# Patient Record
Sex: MALE | Race: OTHER | Employment: OTHER | URBAN - METROPOLITAN AREA
[De-identification: names, ages, dates, MRNs, and addresses within clinical notes are randomized per-mention and may not be internally consistent; named-entity substitution may affect disease eponyms.]

---

## 2017-08-21 ENCOUNTER — APPOINTMENT (OUTPATIENT)
Dept: CV DIAGNOSTICS | Facility: HOSPITAL | Age: 67
DRG: 247 | End: 2017-08-21
Attending: PHYSICIAN ASSISTANT
Payer: MEDICARE

## 2017-08-21 ENCOUNTER — APPOINTMENT (OUTPATIENT)
Dept: CT IMAGING | Facility: HOSPITAL | Age: 67
DRG: 247 | End: 2017-08-21
Attending: PHYSICIAN ASSISTANT
Payer: MEDICARE

## 2017-08-21 ENCOUNTER — HOSPITAL ENCOUNTER (INPATIENT)
Facility: HOSPITAL | Age: 67
LOS: 4 days | Discharge: HOME | DRG: 247 | End: 2017-08-25
Attending: EMERGENCY MEDICINE | Admitting: HOSPITALIST
Payer: MEDICARE

## 2017-08-21 ENCOUNTER — APPOINTMENT (OUTPATIENT)
Dept: GENERAL RADIOLOGY | Facility: HOSPITAL | Age: 67
DRG: 247 | End: 2017-08-21
Payer: MEDICARE

## 2017-08-21 DIAGNOSIS — R07.9 CHEST PAIN, CARDIAC: Primary | ICD-10-CM

## 2017-08-21 PROBLEM — R73.9 HYPERGLYCEMIA: Status: ACTIVE | Noted: 2017-08-21

## 2017-08-21 LAB
ALBUMIN SERPL-MCNC: 3.8 G/DL (ref 3.5–4.8)
ALP LIVER SERPL-CCNC: 70 U/L (ref 45–117)
ALT SERPL-CCNC: 37 U/L (ref 17–63)
APTT PPP: 22.5 SECONDS (ref 25–34)
APTT PPP: 28.1 SECONDS (ref 25–34)
AST SERPL-CCNC: 30 U/L (ref 15–41)
ATRIAL RATE: 87 BPM
BASOPHILS # BLD AUTO: 0.09 X10(3) UL (ref 0–0.1)
BASOPHILS NFR BLD AUTO: 0.9 %
BILIRUB SERPL-MCNC: 0.4 MG/DL (ref 0.1–2)
BUN BLD-MCNC: 19 MG/DL (ref 8–20)
CALCIUM BLD-MCNC: 9.7 MG/DL (ref 8.3–10.3)
CHLORIDE: 106 MMOL/L (ref 101–111)
CO2: 25 MMOL/L (ref 22–32)
CREAT BLD-MCNC: 1.04 MG/DL (ref 0.7–1.3)
EOSINOPHIL # BLD AUTO: 0.65 X10(3) UL (ref 0–0.3)
EOSINOPHIL NFR BLD AUTO: 6.7 %
ERYTHROCYTE [DISTWIDTH] IN BLOOD BY AUTOMATED COUNT: 13.3 % (ref 11.5–16)
GLUCOSE BLD-MCNC: 167 MG/DL (ref 70–99)
HCT VFR BLD AUTO: 43.1 % (ref 37–53)
HGB BLD-MCNC: 13.9 G/DL (ref 13–17)
IMMATURE GRANULOCYTE COUNT: 0.04 X10(3) UL (ref 0–1)
IMMATURE GRANULOCYTE RATIO %: 0.4 %
INR BLD: 0.95 (ref 0.89–1.11)
INR BLD: 0.99 (ref 0.89–1.11)
LYMPHOCYTES # BLD AUTO: 2 X10(3) UL (ref 0.9–4)
LYMPHOCYTES NFR BLD AUTO: 20.5 %
M PROTEIN MFR SERPL ELPH: 7.6 G/DL (ref 6.1–8.3)
MCH RBC QN AUTO: 28.7 PG (ref 27–33.2)
MCHC RBC AUTO-ENTMCNC: 32.3 G/DL (ref 31–37)
MCV RBC AUTO: 89 FL (ref 80–99)
MONOCYTES # BLD AUTO: 0.6 X10(3) UL (ref 0.1–0.6)
MONOCYTES NFR BLD AUTO: 6.2 %
NEUTROPHIL ABS PRELIM: 6.37 X10 (3) UL (ref 1.3–6.7)
NEUTROPHILS # BLD AUTO: 6.37 X10(3) UL (ref 1.3–6.7)
NEUTROPHILS NFR BLD AUTO: 65.3 %
P AXIS: 44 DEGREES
P-R INTERVAL: 166 MS
PLATELET # BLD AUTO: 324 10(3)UL (ref 150–450)
POTASSIUM SERPL-SCNC: 4.5 MMOL/L (ref 3.6–5.1)
PSA SERPL DL<=0.01 NG/ML-MCNC: 12.7 SECONDS (ref 12–14.3)
PSA SERPL DL<=0.01 NG/ML-MCNC: 13.1 SECONDS (ref 12–14.3)
Q-T INTERVAL: 432 MS
QRS DURATION: 162 MS
QTC CALCULATION (BEZET): 519 MS
R AXIS: -66 DEGREES
RBC # BLD AUTO: 4.84 X10(6)UL (ref 3.8–5.8)
RED CELL DISTRIBUTION WIDTH-SD: 43.4 FL (ref 35.1–46.3)
SODIUM SERPL-SCNC: 139 MMOL/L (ref 136–144)
T AXIS: 55 DEGREES
TROPONIN: <0.046 NG/ML (ref ?–0.05)
VENTRICULAR RATE: 87 BPM
WBC # BLD AUTO: 9.8 X10(3) UL (ref 4–13)

## 2017-08-21 PROCEDURE — 71010 XR CHEST AP PORTABLE  (CPT=71010): CPT

## 2017-08-21 PROCEDURE — 71010 XR CHEST AP PORTABLE  (CPT=71010): CPT | Performed by: EMERGENCY MEDICINE

## 2017-08-21 PROCEDURE — 93018 CV STRESS TEST I&R ONLY: CPT | Performed by: PHYSICIAN ASSISTANT

## 2017-08-21 PROCEDURE — 78452 HT MUSCLE IMAGE SPECT MULT: CPT | Performed by: PHYSICIAN ASSISTANT

## 2017-08-21 PROCEDURE — 99223 1ST HOSP IP/OBS HIGH 75: CPT | Performed by: HOSPITALIST

## 2017-08-21 PROCEDURE — 71275 CT ANGIOGRAPHY CHEST: CPT | Performed by: PHYSICIAN ASSISTANT

## 2017-08-21 PROCEDURE — 93017 CV STRESS TEST TRACING ONLY: CPT | Performed by: PHYSICIAN ASSISTANT

## 2017-08-21 RX ORDER — HYDROMORPHONE HYDROCHLORIDE 1 MG/ML
0.5 INJECTION, SOLUTION INTRAMUSCULAR; INTRAVENOUS; SUBCUTANEOUS EVERY 30 MIN PRN
Status: DISPENSED | OUTPATIENT
Start: 2017-08-21 | End: 2017-08-21

## 2017-08-21 RX ORDER — LOVASTATIN 20 MG/1
20 TABLET ORAL NIGHTLY
COMMUNITY
End: 2018-06-09 | Stop reason: ALTCHOICE

## 2017-08-21 RX ORDER — ONDANSETRON 2 MG/ML
4 INJECTION INTRAMUSCULAR; INTRAVENOUS EVERY 4 HOURS PRN
Status: DISCONTINUED | OUTPATIENT
Start: 2017-08-21 | End: 2017-08-21

## 2017-08-21 RX ORDER — SODIUM CHLORIDE 9 MG/ML
INJECTION, SOLUTION INTRAVENOUS CONTINUOUS
Status: DISCONTINUED | OUTPATIENT
Start: 2017-08-21 | End: 2017-08-23

## 2017-08-21 RX ORDER — ALFUZOSIN HYDROCHLORIDE 10 MG/1
10 TABLET, EXTENDED RELEASE ORAL
Status: DISCONTINUED | OUTPATIENT
Start: 2017-08-22 | End: 2017-08-25

## 2017-08-21 RX ORDER — BENAZEPRIL HYDROCHLORIDE 20 MG/1
20 TABLET ORAL DAILY
Status: ON HOLD | COMMUNITY
End: 2017-08-22

## 2017-08-21 RX ORDER — BRINZOLAMIDE 10 MG/ML
1 SUSPENSION/ DROPS OPHTHALMIC 3 TIMES DAILY
Status: ON HOLD | COMMUNITY
End: 2018-07-12

## 2017-08-21 RX ORDER — ENOXAPARIN SODIUM 100 MG/ML
0.5 INJECTION SUBCUTANEOUS NIGHTLY
Status: DISCONTINUED | OUTPATIENT
Start: 2017-08-21 | End: 2017-08-25

## 2017-08-21 RX ORDER — CLOPIDOGREL BISULFATE 75 MG/1
75 TABLET ORAL DAILY
Status: DISCONTINUED | OUTPATIENT
Start: 2017-08-22 | End: 2017-08-24

## 2017-08-21 RX ORDER — ASPIRIN 81 MG/1
324 TABLET, CHEWABLE ORAL ONCE
Status: COMPLETED | OUTPATIENT
Start: 2017-08-21 | End: 2017-08-21

## 2017-08-21 RX ORDER — MORPHINE SULFATE 4 MG/ML
4 INJECTION, SOLUTION INTRAMUSCULAR; INTRAVENOUS ONCE
Status: COMPLETED | OUTPATIENT
Start: 2017-08-21 | End: 2017-08-21

## 2017-08-21 RX ORDER — PRAVASTATIN SODIUM 20 MG
20 TABLET ORAL NIGHTLY
Status: DISCONTINUED | OUTPATIENT
Start: 2017-08-21 | End: 2017-08-25

## 2017-08-21 RX ORDER — LATANOPROST 50 UG/ML
1 SOLUTION/ DROPS OPHTHALMIC NIGHTLY
Status: ON HOLD | COMMUNITY
End: 2017-08-25

## 2017-08-21 RX ORDER — DORZOLAMIDE HCL 20 MG/ML
1 SOLUTION/ DROPS OPHTHALMIC 3 TIMES DAILY
Status: DISCONTINUED | OUTPATIENT
Start: 2017-08-21 | End: 2017-08-25

## 2017-08-21 RX ORDER — ASPIRIN 325 MG
325 TABLET, DELAYED RELEASE (ENTERIC COATED) ORAL DAILY
Status: DISCONTINUED | OUTPATIENT
Start: 2017-08-22 | End: 2017-08-21 | Stop reason: SDUPTHER

## 2017-08-21 RX ORDER — ASPIRIN 81 MG/1
324 TABLET, CHEWABLE ORAL ONCE
Status: COMPLETED | OUTPATIENT
Start: 2017-08-22 | End: 2017-08-22

## 2017-08-21 RX ORDER — LEVOTHYROXINE SODIUM 0.1 MG/1
100 TABLET ORAL
COMMUNITY
End: 2018-02-03

## 2017-08-21 RX ORDER — ENOXAPARIN SODIUM 100 MG/ML
40 INJECTION SUBCUTANEOUS DAILY
Status: DISCONTINUED | OUTPATIENT
Start: 2017-08-21 | End: 2017-08-21 | Stop reason: DRUGHIGH

## 2017-08-21 RX ORDER — BRIMONIDINE TARTRATE 2 MG/ML
1 SOLUTION/ DROPS OPHTHALMIC 3 TIMES DAILY
Status: ON HOLD | COMMUNITY
End: 2018-07-12

## 2017-08-21 RX ORDER — DORZOLAMIDE HCL 20 MG/ML
1 SOLUTION/ DROPS OPHTHALMIC 3 TIMES DAILY
Status: ON HOLD | COMMUNITY
End: 2017-08-25

## 2017-08-21 RX ORDER — TAMSULOSIN HYDROCHLORIDE 0.4 MG/1
0.8 CAPSULE ORAL DAILY
Status: ON HOLD | COMMUNITY
End: 2018-07-17

## 2017-08-21 RX ORDER — ASPIRIN 325 MG
325 TABLET, DELAYED RELEASE (ENTERIC COATED) ORAL DAILY
Status: DISCONTINUED | OUTPATIENT
Start: 2017-08-23 | End: 2017-08-25

## 2017-08-21 RX ORDER — ACETAMINOPHEN 325 MG/1
650 TABLET ORAL EVERY 6 HOURS PRN
Status: DISCONTINUED | OUTPATIENT
Start: 2017-08-21 | End: 2017-08-25

## 2017-08-21 RX ORDER — BRIMONIDINE TARTRATE 2 MG/ML
1 SOLUTION/ DROPS OPHTHALMIC 3 TIMES DAILY
Status: DISCONTINUED | OUTPATIENT
Start: 2017-08-21 | End: 2017-08-25

## 2017-08-21 RX ORDER — CLOPIDOGREL BISULFATE 75 MG/1
75 TABLET ORAL DAILY
Status: ON HOLD | COMMUNITY
End: 2017-08-25

## 2017-08-21 RX ORDER — ASPIRIN 81 MG/1
TABLET, CHEWABLE ORAL
Status: DISPENSED
Start: 2017-08-21 | End: 2017-08-22

## 2017-08-21 RX ORDER — ONDANSETRON 2 MG/ML
4 INJECTION INTRAMUSCULAR; INTRAVENOUS EVERY 6 HOURS PRN
Status: DISCONTINUED | OUTPATIENT
Start: 2017-08-21 | End: 2017-08-25

## 2017-08-21 NOTE — H&P
[facility]  History and Physical     Minta Mohs Patient Status:  Emergency    3/23/1950 MRN [id number]   Location [address] [facility] Attending Chapincito Peterson MD   Hosp Day # 0 PCP PHYSICIAN NONSTAFF     Chief Complaint: C (36.7 °C) (Temporal)   Resp 22   Ht 5' 9\" (1.753 m)   Wt 260 lb (117.9 kg)   SpO2 95%   BMI 38.40 kg/m²   General: No acute distress. Alert and oriented x 3. HEENT: Normocephalic atraumatic. Moist mucous membranes. EOM-I. PERRLA. Anicteric.   Neck: No lym

## 2017-08-21 NOTE — ED PROVIDER NOTES
Patient Seen in: [facility] Emergency Department    History   Patient presents with:  Chest Pain Angina (cardiovascular)    Stated Complaint: cp    HPI    CHIEF COMPLAINT: Chest pain, fatigue    HISTORY OF PRESENT ILLNESS: Patient is a 29-year-old c indicated as above.     Past Medical History:   Diagnosis Date   • Essential hypertension    • Glaucoma    • Hyperlipidemia    • Thyroid disease        Past Surgical History:  No date: OTHER      Comment: cardiac stents    Medications :   Lovastatin 20 MG O distention. Neurological:  Grossly intact, no deficits. cranial nerves are intact, 5 out of 5 symmetric upper and lower extremity motor strength. Gait normal.  Skin:  warm and dry, no rashes. No jaundice.  Brisk capillary refill  Musculoskeletal: neck is (cpt=71010)    CONCLUSION:  No acute disease. Dictated by: Charlene Nelson MD on 8/21/2017 at 12:47     Approved by: Charlene Nelson MD          Ct Angiography, Chest (cpt=71275)  CONCLUSION:  Negative for acute pulmonary embolism.     Dictated by: Luciana Fonseca,

## 2017-08-21 NOTE — ED PROVIDER NOTES
I reviewed that chart and discussed the case. I have examined the patient and noted 29-year-old male with a significant cardiovascular history presents with chest pain, fatigue with exertion, dyspnea, and diaphoresis.   The patient has been having these sy

## 2017-08-21 NOTE — CONSULTS
[facility] at [facility]  Report of Consultation    Graciela Marino Patient Status:  Emergency    3/23/1950 MRN [id number]   Location [address] Attending Lula Askew MD   [address] Day # 0 Soft, non-tender. Extremities: Without clubbing, cyanosis or edema. Peripheral pulses are 2+. Neurologic: Alert and oriented, normal affect. Skin: Warm and dry.      Laboratories and Data:  Diagnostics:  EKG: sinus, left anterior hemiblock, right bundl

## 2017-08-21 NOTE — ED INITIAL ASSESSMENT (HPI)
Pt has had intermittent left sided cp, left arm pain, fatigue for apx 2 days. Pt denies chest pain.    Pt also c/o right buttock and right upper leg pain for several months

## 2017-08-21 NOTE — ED NOTES
Pt seen by Dr. Indai Kirby. C/O pain to right leg that has been \"acting up for since yesterday and today\". Radiates into lower back and thigh. Same presentation as prior sciatica flare up. MD aware of request for meds.

## 2017-08-22 ENCOUNTER — APPOINTMENT (OUTPATIENT)
Dept: INTERVENTIONAL RADIOLOGY/VASCULAR | Facility: HOSPITAL | Age: 67
DRG: 247 | End: 2017-08-22
Attending: INTERNAL MEDICINE
Payer: MEDICARE

## 2017-08-22 LAB
EST. AVERAGE GLUCOSE BLD GHB EST-MCNC: 140 MG/DL (ref 68–126)
HBA1C MFR BLD HPLC: 6.5 % (ref ?–5.7)
P2Y12 REACTION UNITS: 41 PRU

## 2017-08-22 PROCEDURE — B2151ZZ FLUOROSCOPY OF LEFT HEART USING LOW OSMOLAR CONTRAST: ICD-10-PCS | Performed by: INTERNAL MEDICINE

## 2017-08-22 PROCEDURE — B2111ZZ FLUOROSCOPY OF MULTIPLE CORONARY ARTERIES USING LOW OSMOLAR CONTRAST: ICD-10-PCS | Performed by: INTERNAL MEDICINE

## 2017-08-22 PROCEDURE — 99232 SBSQ HOSP IP/OBS MODERATE 35: CPT | Performed by: HOSPITALIST

## 2017-08-22 PROCEDURE — B41F1ZZ FLUOROSCOPY OF RIGHT LOWER EXTREMITY ARTERIES USING LOW OSMOLAR CONTRAST: ICD-10-PCS | Performed by: INTERNAL MEDICINE

## 2017-08-22 PROCEDURE — 4A023N7 MEASUREMENT OF CARDIAC SAMPLING AND PRESSURE, LEFT HEART, PERCUTANEOUS APPROACH: ICD-10-PCS | Performed by: INTERNAL MEDICINE

## 2017-08-22 RX ORDER — MIDAZOLAM HYDROCHLORIDE 1 MG/ML
INJECTION INTRAMUSCULAR; INTRAVENOUS
Status: DISCONTINUED
Start: 2017-08-22 | End: 2017-08-22 | Stop reason: WASHOUT

## 2017-08-22 RX ORDER — TEMAZEPAM 15 MG/1
15 CAPSULE ORAL NIGHTLY PRN
Status: DISCONTINUED | OUTPATIENT
Start: 2017-08-22 | End: 2017-08-25

## 2017-08-22 RX ORDER — HEPARIN SODIUM 5000 [USP'U]/ML
INJECTION, SOLUTION INTRAVENOUS; SUBCUTANEOUS
Status: COMPLETED
Start: 2017-08-22 | End: 2017-08-22

## 2017-08-22 RX ORDER — MIDAZOLAM HYDROCHLORIDE 1 MG/ML
INJECTION INTRAMUSCULAR; INTRAVENOUS
Status: COMPLETED
Start: 2017-08-22 | End: 2017-08-22

## 2017-08-22 RX ORDER — SODIUM CHLORIDE 9 MG/ML
INJECTION, SOLUTION INTRAVENOUS CONTINUOUS
Status: ACTIVE | OUTPATIENT
Start: 2017-08-22 | End: 2017-08-22

## 2017-08-22 RX ORDER — LIDOCAINE HYDROCHLORIDE 10 MG/ML
INJECTION, SOLUTION INFILTRATION; PERINEURAL
Status: COMPLETED
Start: 2017-08-22 | End: 2017-08-22

## 2017-08-22 RX ORDER — BENAZEPRIL HYDROCHLORIDE 40 MG/1
40 TABLET, FILM COATED ORAL DAILY
Status: ON HOLD | COMMUNITY
End: 2017-08-25

## 2017-08-22 RX ORDER — HYDROCODONE BITARTRATE AND ACETAMINOPHEN 5; 325 MG/1; MG/1
1 TABLET ORAL EVERY 6 HOURS PRN
Status: DISCONTINUED | OUTPATIENT
Start: 2017-08-22 | End: 2017-08-23

## 2017-08-22 NOTE — PROGRESS NOTES
[facility]  Progress Note     Vincent Ranch Patient Status:  Inpatient    3/23/1950 MRN [id number]   [facility] 2NE-A Attending Janet Carreon MD   Hosp Day # 1 PCP PHYSICIAN NONSTAFF     Chief Complaint: Chest pain    S: Patient d 325 mg Oral Daily       ASSESSMENT / PLAN:     1. CAD sp PCI with abnormal stress  2. Essential hypertension  3. Dyslipidemia  4. Hypothyroidism  5.  Radiculopathy, chronic, RLE  6. Glaucoma    Plan:  Aspirin  Plavix  Metoprolol  Pravastatin  Angiogram toda

## 2017-08-22 NOTE — PROGRESS NOTES
Recommended CABG due to anatomy of sequential LAD lesions. Patient does not want open heart surgery and would opt to undergo PCI. Patient and family understands increased risk of restenosis with PCI in this setting.     Will plan on laser atherectomy and

## 2017-08-22 NOTE — PROGRESS NOTES
NURSING ADMISSION NOTE      Patient admitted via Cart  Oriented to room. Safety precautions initiated. Bed in low position. Call light in reach. Arrived from ED. IV in place. Tele placed. Admission navigator complete. Pt from kentrell SCHROEDER

## 2017-08-22 NOTE — PROCEDURES
[facility]    Cardiac Cath Procedure Note  Ramona Mccarthy Patient Status:  Inpatient    3/23/1950 MRN [id number]   [facility] 2NE-A Attending Sherif Jaquez MD   Hosp Day # 1 PCP PHYSICIAN NONSTAFF       Cardiologist: Viki Eisenmenger, MD  P

## 2017-08-22 NOTE — PLAN OF CARE
CARDIOVASCULAR - ADULT    • Maintains optimal cardiac output and hemodynamic stability Progressing        PAIN - ADULT    • Verbalizes/displays adequate comfort level or patient's stated pain goal Progressing          Assumed care of pt at 2100.   A/O.  RA.

## 2017-08-22 NOTE — PAYOR COMM NOTE
--------------  ADMISSION REVIEW     Payor: MEDICARE Archie Howard  Subscriber #:  [id number]  Authorization Number: N/A    Admit date: 8/21/17  Admit time: 2056       Admitting Physician: April Scott MD  Attending Physician:  Amanda Ji MD  Primary following:     PTT 22.5 (*)     All other components within normal limits    Narrative: The aPTT Heparin Therapeutic Range is approximately 65- 104 seconds. The therapeutic range has been validated against 0.3-0.7 heparin anti-Xa units/mL.      CBC W/ D mg Oral Georgina PARADA RN      aspirin chewable tab 324 mg     Date Action Dose Route User    8/22/2017 8115 Given 324 mg Oral Landon Cruz RN      brimonidine Tartrate (ALPHAGAN) 0.2 % ophthalmic solution 1 drop     Date Action Dose Route User

## 2017-08-22 NOTE — PROGRESS NOTES
[facility] Progress Note:  Anticoagulation Weight Dose Adjustment for enoxaparin (LOVENOX)    Mary Linder is a 74year old male who has been prescribed enoxaparin (LOVENOX) for VTE prophylaxis.       Estimated Creatinine Clearance: 70.8 mL/min (based on

## 2017-08-22 NOTE — PROGRESS NOTES
08/21/17 2058 08/21/17 2059 08/21/17 2102   Vital Signs   Pulse 74 90 82   Heart Rate Source Monitor --  --    Resp 18 --  --    Respiratory Quality Normal --  --    /77 132/91 114/77   BP Location Left arm Left arm Left arm   BP Method Automatic

## 2017-08-22 NOTE — CM/SW NOTE
08/22/17 1536   CM/SW Screening   Referral [facility] staff; Chart review;Nursing rounds   Patient's Mental Status Alert;Oriented   Patient's [address]   Patient lives with Children   Discharge Needs   Antic

## 2017-08-22 NOTE — CONSULTS
Cardiothoracic Surgery Consult   Consult  8/22/17  Dr Lyn Clark  PCP: Dr Sofia Valladares  Diagnosis:CAD  74year old with left arm pain that woke him up and dyspnea   He had a stress test in the ER which revealed reversible and fixed ischemia of the anterior wall.     Ca

## 2017-08-22 NOTE — PROGRESS NOTES
[facility]  Cardiology Consult Note    Lilo Hyde Patient Status:  Inpatient    3/23/1950 MRN [id number]   [facility] 2NE-A Attending Destini Lozano MD   Hosp Day # 1 PCP PHYSICIAN NONSTAFF     75 yo male  1.   CAD previous PCI in 2 AM

## 2017-08-23 LAB
ATRIAL RATE: 69 BPM
P AXIS: 52 DEGREES
P-R INTERVAL: 194 MS
Q-T INTERVAL: 466 MS
QRS DURATION: 168 MS
QTC CALCULATION (BEZET): 499 MS
R AXIS: -63 DEGREES
T AXIS: 11 DEGREES
VENTRICULAR RATE: 69 BPM

## 2017-08-23 PROCEDURE — 99233 SBSQ HOSP IP/OBS HIGH 50: CPT | Performed by: STUDENT IN AN ORGANIZED HEALTH CARE EDUCATION/TRAINING PROGRAM

## 2017-08-23 RX ORDER — LEVOTHYROXINE SODIUM 0.1 MG/1
100 TABLET ORAL
Status: DISCONTINUED | OUTPATIENT
Start: 2017-08-24 | End: 2017-08-25

## 2017-08-23 RX ORDER — HYDROCODONE BITARTRATE AND ACETAMINOPHEN 5; 325 MG/1; MG/1
2 TABLET ORAL EVERY 6 HOURS PRN
Status: DISCONTINUED | OUTPATIENT
Start: 2017-08-23 | End: 2017-08-25

## 2017-08-23 RX ORDER — HYDROCODONE BITARTRATE AND ACETAMINOPHEN 5; 325 MG/1; MG/1
1-2 TABLET ORAL EVERY 6 HOURS PRN
Status: DISCONTINUED | OUTPATIENT
Start: 2017-08-23 | End: 2017-08-23 | Stop reason: SDUPTHER

## 2017-08-23 RX ORDER — HYDROCODONE BITARTRATE AND ACETAMINOPHEN 5; 325 MG/1; MG/1
1 TABLET ORAL EVERY 6 HOURS PRN
Status: DISCONTINUED | OUTPATIENT
Start: 2017-08-23 | End: 2017-08-25

## 2017-08-23 RX ORDER — SODIUM CHLORIDE 9 MG/ML
INJECTION, SOLUTION INTRAVENOUS CONTINUOUS
Status: DISCONTINUED | OUTPATIENT
Start: 2017-08-23 | End: 2017-08-23

## 2017-08-23 RX ORDER — SODIUM CHLORIDE 9 MG/ML
INJECTION, SOLUTION INTRAVENOUS CONTINUOUS
Status: DISCONTINUED | OUTPATIENT
Start: 2017-08-24 | End: 2017-08-25

## 2017-08-23 RX ORDER — ASPIRIN 81 MG/1
324 TABLET, CHEWABLE ORAL ONCE
Status: DISCONTINUED | OUTPATIENT
Start: 2017-08-23 | End: 2017-08-25

## 2017-08-23 NOTE — PROGRESS NOTES
[facility]  Progress Note     Boogie Myers Patient Status:  Inpatient    3/23/1950 MRN [id number]   [facility] 2NE-A Attending Bnejamin Arcos MD   Hosp Day # 2 PCP PHYSICIAN NONSTAFF     Chief Complaint: Chest pain    S: Patient d ASSESSMENT / PLAN:     1. CAD sp PCI with abnormal stress  1. Multivessel dx on cardiac cath  2. Pt offered CABG- would rather forgo PCI   3. Risk factor modification discussed  4. Continue ASA, statin, BB,plavix   2. Essential hypertension  3.  Dysli

## 2017-08-23 NOTE — PLAN OF CARE
Problem: PAIN - ADULT  Goal: Verbalizes/displays adequate comfort level or patient's stated pain goal  INTERVENTIONS:  - Encourage pt to monitor pain and request assistance  - Assess pain using appropriate pain scale  - Administer analgesics based on type for signs/symptoms of CO2 retention   Outcome: Progressing      Problem: METABOLIC/FLUID AND ELECTROLYTES - ADULT  Goal: Electrolytes maintained within normal limits  INTERVENTIONS:  - Monitor labs and rhythm and assess patient for signs and symptoms of el

## 2017-08-23 NOTE — PROGRESS NOTES
[facility]  Cardiology Progress Note    Towana Favre Patient Status:  Inpatient    3/23/1950 MRN [id number]   [facility] 2NE-A Attending Jorge L Leblanc MD   Hosp Day # 2 PCP PHYSICIAN NONSTAFF     Subjective:  No complaints of chest p tomorrow per pt preference. Orders placed. · On DAPT, statin, BB.   · Follows with Cardiologist in [address] · Check lipids. · Pain management of sciatic nerve per primary service   · Check TSH.  Supplement per Dr. Ricarda Burdick   · Would restart ACE-I after PCI a

## 2017-08-24 ENCOUNTER — APPOINTMENT (OUTPATIENT)
Dept: INTERVENTIONAL RADIOLOGY/VASCULAR | Facility: HOSPITAL | Age: 67
DRG: 247 | End: 2017-08-24
Attending: INTERNAL MEDICINE
Payer: MEDICARE

## 2017-08-24 LAB
APTT PPP: 32.3 SECONDS (ref 25–34)
BASOPHILS # BLD AUTO: 0.07 X10(3) UL (ref 0–0.1)
BASOPHILS NFR BLD AUTO: 0.8 %
BUN BLD-MCNC: 19 MG/DL (ref 8–20)
CALCIUM BLD-MCNC: 9.6 MG/DL (ref 8.3–10.3)
CHLORIDE: 106 MMOL/L (ref 101–111)
CHOLEST SMN-MCNC: 213 MG/DL (ref ?–200)
CO2: 25 MMOL/L (ref 22–32)
CREAT BLD-MCNC: 0.85 MG/DL (ref 0.7–1.3)
EOSINOPHIL # BLD AUTO: 0.51 X10(3) UL (ref 0–0.3)
EOSINOPHIL NFR BLD AUTO: 5.5 %
ERYTHROCYTE [DISTWIDTH] IN BLOOD BY AUTOMATED COUNT: 12.9 % (ref 11.5–16)
FREE T4: 0.9 NG/DL (ref 0.9–1.8)
GLUCOSE BLD-MCNC: 107 MG/DL (ref 70–99)
HCT VFR BLD AUTO: 41.3 % (ref 37–53)
HDLC SERPL-MCNC: 37 MG/DL (ref 45–?)
HDLC SERPL: 5.76 {RATIO} (ref ?–4.97)
HGB BLD-MCNC: 13.7 G/DL (ref 13–17)
IMMATURE GRANULOCYTE COUNT: 0.02 X10(3) UL (ref 0–1)
IMMATURE GRANULOCYTE RATIO %: 0.2 %
INR BLD: 0.99 (ref 0.89–1.11)
ISTAT ACTIVATED CLOTTING TIME: 335 SECONDS (ref 74–137)
LDLC SERPL CALC-MCNC: 131 MG/DL (ref ?–130)
LDLC SERPL-MCNC: 45 MG/DL (ref 5–40)
LYMPHOCYTES # BLD AUTO: 2.15 X10(3) UL (ref 0.9–4)
LYMPHOCYTES NFR BLD AUTO: 23.1 %
MCH RBC QN AUTO: 28.8 PG (ref 27–33.2)
MCHC RBC AUTO-ENTMCNC: 33.2 G/DL (ref 31–37)
MCV RBC AUTO: 86.8 FL (ref 80–99)
MONOCYTES # BLD AUTO: 0.91 X10(3) UL (ref 0.1–0.6)
MONOCYTES NFR BLD AUTO: 9.8 %
NEUTROPHIL ABS PRELIM: 5.63 X10 (3) UL (ref 1.3–6.7)
NEUTROPHILS # BLD AUTO: 5.63 X10(3) UL (ref 1.3–6.7)
NEUTROPHILS NFR BLD AUTO: 60.6 %
NONHDLC SERPL-MCNC: 176 MG/DL (ref ?–130)
PLATELET # BLD AUTO: 317 10(3)UL (ref 150–450)
POTASSIUM SERPL-SCNC: 3.8 MMOL/L (ref 3.6–5.1)
PSA SERPL DL<=0.01 NG/ML-MCNC: 13.1 SECONDS (ref 12–14.3)
RBC # BLD AUTO: 4.76 X10(6)UL (ref 3.8–5.8)
RED CELL DISTRIBUTION WIDTH-SD: 40.8 FL (ref 35.1–46.3)
SODIUM SERPL-SCNC: 139 MMOL/L (ref 136–144)
TRIGLYCERIDES: 225 MG/DL (ref ?–150)
TSI SER-ACNC: 7.61 MIU/ML (ref 0.35–5.5)
WBC # BLD AUTO: 9.3 X10(3) UL (ref 4–13)

## 2017-08-24 PROCEDURE — 3E033GC INTRODUCTION OF OTHER THERAPEUTIC SUBSTANCE INTO PERIPHERAL VEIN, PERCUTANEOUS APPROACH: ICD-10-PCS | Performed by: INTERNAL MEDICINE

## 2017-08-24 PROCEDURE — B41F1ZZ FLUOROSCOPY OF RIGHT LOWER EXTREMITY ARTERIES USING LOW OSMOLAR CONTRAST: ICD-10-PCS | Performed by: INTERNAL MEDICINE

## 2017-08-24 PROCEDURE — B240ZZ3 ULTRASONOGRAPHY OF SINGLE CORONARY ARTERY, INTRAVASCULAR: ICD-10-PCS | Performed by: INTERNAL MEDICINE

## 2017-08-24 PROCEDURE — 99232 SBSQ HOSP IP/OBS MODERATE 35: CPT | Performed by: STUDENT IN AN ORGANIZED HEALTH CARE EDUCATION/TRAINING PROGRAM

## 2017-08-24 PROCEDURE — 0270356 DILATION OF CORONARY ARTERY, ONE ARTERY, BIFURCATION, WITH TWO DRUG-ELUTING INTRALUMINAL DEVICES, PERCUTANEOUS APPROACH: ICD-10-PCS | Performed by: INTERNAL MEDICINE

## 2017-08-24 PROCEDURE — 02C03ZZ EXTIRPATION OF MATTER FROM CORONARY ARTERY, ONE ARTERY, PERCUTANEOUS APPROACH: ICD-10-PCS | Performed by: INTERNAL MEDICINE

## 2017-08-24 RX ORDER — HEPARIN SODIUM 5000 [USP'U]/ML
INJECTION, SOLUTION INTRAVENOUS; SUBCUTANEOUS
Status: COMPLETED
Start: 2017-08-24 | End: 2017-08-24

## 2017-08-24 RX ORDER — TEMAZEPAM 15 MG/1
15 CAPSULE ORAL ONCE
Status: COMPLETED | OUTPATIENT
Start: 2017-08-24 | End: 2017-08-24

## 2017-08-24 RX ORDER — DIPHENHYDRAMINE HYDROCHLORIDE 50 MG/ML
INJECTION INTRAMUSCULAR; INTRAVENOUS
Status: COMPLETED
Start: 2017-08-24 | End: 2017-08-24

## 2017-08-24 RX ORDER — SODIUM CHLORIDE 9 MG/ML
INJECTION, SOLUTION INTRAVENOUS CONTINUOUS
Status: ACTIVE | OUTPATIENT
Start: 2017-08-24 | End: 2017-08-24

## 2017-08-24 RX ORDER — CLOPIDOGREL BISULFATE 75 MG/1
75 TABLET ORAL DAILY
Status: DISCONTINUED | OUTPATIENT
Start: 2017-08-25 | End: 2017-08-25

## 2017-08-24 RX ORDER — LIDOCAINE HYDROCHLORIDE 10 MG/ML
INJECTION, SOLUTION INFILTRATION; PERINEURAL
Status: COMPLETED
Start: 2017-08-24 | End: 2017-08-24

## 2017-08-24 RX ORDER — MIDAZOLAM HYDROCHLORIDE 1 MG/ML
INJECTION INTRAMUSCULAR; INTRAVENOUS
Status: COMPLETED
Start: 2017-08-24 | End: 2017-08-24

## 2017-08-24 RX ORDER — POTASSIUM CHLORIDE 20 MEQ/1
40 TABLET, EXTENDED RELEASE ORAL ONCE
Status: COMPLETED | OUTPATIENT
Start: 2017-08-24 | End: 2017-08-24

## 2017-08-24 NOTE — PROCEDURES
[facility]    [facility] Cardiac Cath Procedure Note  Graciela Redds Patient Status:  Inpatient    3/23/1950 MRN [id number]   [facility] 2NE-A Attending Naomi Packer MD   Hosp Day # 3 PCP PHYSICIAN NONSTAFF       Cardiologist: Munira Carlos LAD    IV was maintained by RN and moderate conscious sedation of versed and fentanyl was given. Patient was assessed and monitoring of oxygen, heart rate and blood pressure by nurse and myself during the exam from 320 2035 to 1849.       Marcelina Coates MD  08/24

## 2017-08-24 NOTE — PROGRESS NOTES
[facility]  Progress Note     Curvin Holy Patient Status:  Inpatient    3/23/1950 MRN [id number]   [facility] 2NE-A Attending Tavo Rivera MD   Hosp Day # 3 PCP PHYSICIAN NONSTAFF     Chief Complaint: Chest pain    S: Patient d Alfuzosin HCl ER  10 mg Oral Daily with breakfast   • Pravastatin Sodium  20 mg Oral Nightly   • Dorzolamide HCl  1 drop Both Eyes TID   • Clopidogrel Bisulfate  75 mg Oral Daily   • metoprolol tartrate  25 mg Oral 2x Daily(Beta Blocker)   • enoxaparin  0.

## 2017-08-24 NOTE — PLAN OF CARE
CARDIOVASCULAR - ADULT    • Maintains optimal cardiac output and hemodynamic stability Not Progressing        PAIN - ADULT    • Verbalizes/displays adequate comfort level or patient's stated pain goal Not Progressing        Patient/Family Goals    • Patien

## 2017-08-25 VITALS
OXYGEN SATURATION: 94 % | WEIGHT: 275.38 LBS | HEIGHT: 69 IN | RESPIRATION RATE: 20 BRPM | HEART RATE: 71 BPM | TEMPERATURE: 98 F | SYSTOLIC BLOOD PRESSURE: 122 MMHG | DIASTOLIC BLOOD PRESSURE: 67 MMHG | BODY MASS INDEX: 40.79 KG/M2

## 2017-08-25 LAB
ATRIAL RATE: 56 BPM
BUN BLD-MCNC: 19 MG/DL (ref 8–20)
CALCIUM BLD-MCNC: 9.4 MG/DL (ref 8.3–10.3)
CHLORIDE: 106 MMOL/L (ref 101–111)
CO2: 24 MMOL/L (ref 22–32)
CREAT BLD-MCNC: 0.87 MG/DL (ref 0.7–1.3)
ERYTHROCYTE [DISTWIDTH] IN BLOOD BY AUTOMATED COUNT: 13.2 % (ref 11.5–16)
GLUCOSE BLD-MCNC: 105 MG/DL (ref 70–99)
HCT VFR BLD AUTO: 38.9 % (ref 37–53)
HGB BLD-MCNC: 12.7 G/DL (ref 13–17)
MCH RBC QN AUTO: 28.7 PG (ref 27–33.2)
MCHC RBC AUTO-ENTMCNC: 32.6 G/DL (ref 31–37)
MCV RBC AUTO: 87.8 FL (ref 80–99)
P AXIS: 37 DEGREES
P-R INTERVAL: 190 MS
PLATELET # BLD AUTO: 285 10(3)UL (ref 150–450)
POTASSIUM SERPL-SCNC: 3.8 MMOL/L (ref 3.6–5.1)
Q-T INTERVAL: 474 MS
QRS DURATION: 162 MS
QTC CALCULATION (BEZET): 457 MS
R AXIS: -60 DEGREES
RBC # BLD AUTO: 4.43 X10(6)UL (ref 3.8–5.8)
RED CELL DISTRIBUTION WIDTH-SD: 42.7 FL (ref 35.1–46.3)
SODIUM SERPL-SCNC: 137 MMOL/L (ref 136–144)
T AXIS: 7 DEGREES
VENTRICULAR RATE: 56 BPM
WBC # BLD AUTO: 10.5 X10(3) UL (ref 4–13)

## 2017-08-25 PROCEDURE — 99239 HOSP IP/OBS DSCHRG MGMT >30: CPT | Performed by: STUDENT IN AN ORGANIZED HEALTH CARE EDUCATION/TRAINING PROGRAM

## 2017-08-25 RX ORDER — BENAZEPRIL HYDROCHLORIDE 20 MG/1
20 TABLET ORAL DAILY
Qty: 30 TABLET | Refills: 3 | Status: ON HOLD | OUTPATIENT
Start: 2017-08-25 | End: 2018-07-12

## 2017-08-25 RX ORDER — ASPIRIN 81 MG/1
81 TABLET ORAL DAILY
Status: DISCONTINUED | OUTPATIENT
Start: 2017-08-26 | End: 2017-08-25

## 2017-08-25 RX ORDER — HYDROCODONE BITARTRATE AND ACETAMINOPHEN 5; 325 MG/1; MG/1
1-2 TABLET ORAL EVERY 6 HOURS PRN
Qty: 12 TABLET | Refills: 0 | Status: SHIPPED | OUTPATIENT
Start: 2017-08-25 | End: 2017-10-17 | Stop reason: DRUGHIGH

## 2017-08-25 RX ORDER — CLOPIDOGREL BISULFATE 75 MG/1
75 TABLET ORAL DAILY
Qty: 30 TABLET | Refills: 11 | Status: ON HOLD | OUTPATIENT
Start: 2017-08-25 | End: 2018-07-17

## 2017-08-25 RX ORDER — ASPIRIN 81 MG/1
81 TABLET ORAL DAILY
Qty: 30 TABLET | Refills: 0 | Status: ON HOLD | COMMUNITY
Start: 2017-08-26 | End: 2018-07-12

## 2017-08-25 NOTE — PLAN OF CARE
Assumed care of patient around 1930 from cath, alert and oriented X4, no c/o CP/SOB, SpO2 % 94 on RA  Rhythm/HR: SR 70's    Right groin- perclose, femstop was in place, upon assessing with Al Solomon RN, hematoma noted, manual pressure held for over 10 minutes,

## 2017-08-25 NOTE — DISCHARGE SUMMARY
[facility] [place] [facility]ITAL[facility]  DISCHARGE SUMMARY     Vincent Ranch Patient Status:  Inpatient    3/23/1950 MRN [id number]   [facility] 2NE-A Attending Sarah Reyes MD   [facility] Day # 4 PCP PHYSICIAN NONSTAFF     Date of Admission: 2017  Date of Leroy Wilkerson admitted to the hospital, evaluated by cardiology. Cardaic cath was done and   Pt was advised to have BAG dpne but opted to forgo PCI instead. PCI completed on 8/24 with stenting to LAD.    Pt advised to adhere daily to DAPT, smoking cessation stressed as w details   brimonidine Tartrate 0.2 % Soln  Commonly known as:  ALPHAGAN      Place 1 drop into the right eye 3 (three) times daily.    Refills:  0     Brinzolamide 1 % Susp  Commonly known as:  AZOPT      Place 1 drop into the right eye 3 (three) times rupert murmurs, rubs or gallops. Abdomen: Soft, nontender, nondistended. Positive bowel sounds. No rebound or guarding. Neurologic: No focal neurological deficits. Musculoskeletal: Moves all extremities. Extremities: No edema.  Groin ecchymosis on RIGHT  --

## 2017-08-25 NOTE — PROGRESS NOTES
[facility]  Cardiology Consult Note    Wilnette Schwab Patient Status:  Inpatient    3/23/1950 MRN [id number]   [facility] 2NE-A Attending Dalila Ahumada, MD   Hosp Day # 4 PCP PHYSICIAN NONSTAFF     73 yo male s/p PCI LAD ISR  · CAD pre

## 2017-08-25 NOTE — PROGRESS NOTES
[facility]  Progress Note     Lucian Muff Patient Status:  Inpatient    3/23/1950 MRN [id number]   [facility] 2NE-A Attending Shaunna Paige MD   Hosp Day # 4 PCP PHYSICIAN NONSTAFF     Chief Complaint: Chest pain    S: Patient f Daily(Beta Blocker)   • enoxaparin  0.5 mg/kg Subcutaneous Nightly   • aspirin EC  325 mg Oral Daily       ASSESSMENT / PLAN:     1. CAD sp PCI with abnormal stress  1. Multivessel dx on cardiac cath  2. Pt offered CABG- would rather forgo PCI   3.  Risk fa

## 2017-08-25 NOTE — PROGRESS NOTES
Patient was given discharge instructions in regards to medications, follow up exams, incisional care, activity level, and pain management. IV was removed. Patient was removed from tele. Patient was escorted off the unit by transport.  Patient to go home wit

## 2017-08-25 NOTE — PROGRESS NOTES
Pt transferred to unit via bed; only IVF running. Right and left femoral sites verified with CCL transporting RNs. Right groin site had Femstop applied. Pedal pulses 3+ bilaterally. Telemetry &  applied. VSS.   Pt is extremely drowsy, mouth breathin

## 2017-08-25 NOTE — DIETARY NOTE
Nutrition Short Note   Dietitian consult received per cardiac rehab standing order. Pt to be educated by cardiac rehab staff and encouraged to attend outpatient classes taught by MINDA. MINDA available PRN.      Willi Kelsey RD, LDN  Pager [phone]

## 2017-08-28 ENCOUNTER — PRIOR ORIGINAL RECORDS (OUTPATIENT)
Dept: OTHER | Age: 67
End: 2017-08-28

## 2017-08-28 ENCOUNTER — MYAURORA ACCOUNT LINK (OUTPATIENT)
Dept: OTHER | Age: 67
End: 2017-08-28

## 2017-08-28 ENCOUNTER — HOSPITAL ENCOUNTER (EMERGENCY)
Facility: HOSPITAL | Age: 67
Discharge: HOME | End: 2017-08-28
Payer: MEDICARE

## 2017-08-28 NOTE — ED NOTES
Pt states he has appt at [facility] at 1530. Called daughter to confirm appointment and she said he should be at [facility] to see NP for 1530 appt. Volunteer taking pt to his scheduled appt per wheelchair. Pt alert and oriented.

## 2017-08-29 ENCOUNTER — PRIOR ORIGINAL RECORDS (OUTPATIENT)
Dept: OTHER | Age: 67
End: 2017-08-29

## 2017-08-31 ENCOUNTER — PRIOR ORIGINAL RECORDS (OUTPATIENT)
Dept: OTHER | Age: 67
End: 2017-08-31

## 2017-09-06 ENCOUNTER — PRIOR ORIGINAL RECORDS (OUTPATIENT)
Dept: OTHER | Age: 67
End: 2017-09-06

## 2017-09-28 ENCOUNTER — HOSPITAL ENCOUNTER (EMERGENCY)
Facility: HOSPITAL | Age: 67
Discharge: HOME | End: 2017-09-28
Attending: EMERGENCY MEDICINE
Payer: MEDICARE

## 2017-09-28 ENCOUNTER — APPOINTMENT (OUTPATIENT)
Dept: GENERAL RADIOLOGY | Facility: HOSPITAL | Age: 67
End: 2017-09-28
Attending: EMERGENCY MEDICINE
Payer: MEDICARE

## 2017-09-28 ENCOUNTER — APPOINTMENT (OUTPATIENT)
Dept: CT IMAGING | Facility: HOSPITAL | Age: 67
End: 2017-09-28
Attending: EMERGENCY MEDICINE
Payer: MEDICARE

## 2017-09-28 VITALS
HEART RATE: 92 BPM | SYSTOLIC BLOOD PRESSURE: 165 MMHG | OXYGEN SATURATION: 97 % | WEIGHT: 240 LBS | HEIGHT: 69 IN | BODY MASS INDEX: 35.55 KG/M2 | RESPIRATION RATE: 18 BRPM | DIASTOLIC BLOOD PRESSURE: 93 MMHG | TEMPERATURE: 96 F

## 2017-09-28 DIAGNOSIS — M50.30 DEGENERATIVE DISC DISEASE, CERVICAL: ICD-10-CM

## 2017-09-28 DIAGNOSIS — S13.4XXA WHIPLASH INJURY TO NECK, INITIAL ENCOUNTER: Primary | ICD-10-CM

## 2017-09-28 PROCEDURE — 99284 EMERGENCY DEPT VISIT MOD MDM: CPT

## 2017-09-28 PROCEDURE — 72072 X-RAY EXAM THORAC SPINE 3VWS: CPT | Performed by: EMERGENCY MEDICINE

## 2017-09-28 PROCEDURE — 72125 CT NECK SPINE W/O DYE: CPT | Performed by: EMERGENCY MEDICINE

## 2017-09-28 PROCEDURE — 72100 X-RAY EXAM L-S SPINE 2/3 VWS: CPT | Performed by: EMERGENCY MEDICINE

## 2017-09-28 RX ORDER — HYDROCODONE BITARTRATE AND ACETAMINOPHEN 5; 325 MG/1; MG/1
2 TABLET ORAL ONCE
Status: COMPLETED | OUTPATIENT
Start: 2017-09-28 | End: 2017-09-28

## 2017-09-28 RX ORDER — HYDROCODONE BITARTRATE AND ACETAMINOPHEN 5; 325 MG/1; MG/1
1 TABLET ORAL EVERY 6 HOURS PRN
Qty: 12 TABLET | Refills: 0 | Status: SHIPPED | OUTPATIENT
Start: 2017-09-28 | End: 2017-10-01

## 2017-09-28 NOTE — ED PROVIDER NOTES
Patient Seen in: [facility] Emergency Department    History   Patient presents with:  Back Pain (musculoskeletal)  Neck Pain (musculoskeletal, neurologic)    Stated Complaint: back and neck pain    HPI    The patient is a 51-year-old male presenting except as noted above. PSFH elements reviewed from today and agreed except as otherwise stated in HPI.     Physical Exam   ED Triage Vitals [09/28/17 1340]  BP: (!) 165/93  Pulse: 92  Resp: 18  Temp: (!) 96 °F (35.6 °C)  Temp src: Temporal  SpO2: 97 %  O Course  ------------------------------------------------------------   Patient was given Norco.  He is not driving today. CT of the cervical spine was obtained. X-rays of thoracic lumbar spine were obtained.   MDM     Xr Routine Thoracic Spine (3 Views) ( pain that radiates down his back. FINDINGS:   There is straightening of the cervical lordosis. There is no evidence of fracture or subluxation. The C1-2 articulation is intact. There is moderate left-sided C6-7 and C7-T1 left-sided facet arthropathy.  Mu There is disc space narrowing at L4-5 and L5-S1. No lytic or blastic lesions. CONCLUSION:  Lumbar spondylosis. Correlate for DISH. No acute process.     Dictated by: Halima Beckham MD on 9/28/2017 at 16:30     Approved by: Halima Beckham MD

## 2017-10-03 ENCOUNTER — HOSPITAL (OUTPATIENT)
Dept: OTHER | Age: 67
End: 2017-10-03
Attending: EMERGENCY MEDICINE

## 2017-10-07 ENCOUNTER — HOSPITAL (OUTPATIENT)
Dept: OTHER | Age: 67
End: 2017-10-07
Attending: EMERGENCY MEDICINE

## 2017-10-10 ENCOUNTER — APPOINTMENT (OUTPATIENT)
Dept: GENERAL RADIOLOGY | Facility: HOSPITAL | Age: 67
End: 2017-10-10
Attending: EMERGENCY MEDICINE
Payer: MEDICARE

## 2017-10-10 ENCOUNTER — HOSPITAL ENCOUNTER (EMERGENCY)
Facility: HOSPITAL | Age: 67
Discharge: LEFT BEFORE DISCHARGE | End: 2017-10-10
Attending: EMERGENCY MEDICINE
Payer: MEDICARE

## 2017-10-10 VITALS
OXYGEN SATURATION: 98 % | TEMPERATURE: 98 F | BODY MASS INDEX: 35.55 KG/M2 | DIASTOLIC BLOOD PRESSURE: 85 MMHG | HEIGHT: 69 IN | SYSTOLIC BLOOD PRESSURE: 107 MMHG | RESPIRATION RATE: 20 BRPM | WEIGHT: 240 LBS | HEART RATE: 92 BPM

## 2017-10-10 DIAGNOSIS — I25.118 CORONARY ARTERY DISEASE INVOLVING NATIVE HEART WITH OTHER FORM OF ANGINA PECTORIS, UNSPECIFIED VESSEL OR LESION TYPE (HCC): ICD-10-CM

## 2017-10-10 DIAGNOSIS — Z53.29 LEFT AGAINST MEDICAL ADVICE: ICD-10-CM

## 2017-10-10 DIAGNOSIS — R07.9 ACUTE CHEST PAIN: Primary | ICD-10-CM

## 2017-10-10 PROCEDURE — 71010 XR CHEST AP PORTABLE  (CPT=71010): CPT | Performed by: EMERGENCY MEDICINE

## 2017-10-10 PROCEDURE — 99285 EMERGENCY DEPT VISIT HI MDM: CPT

## 2017-10-10 PROCEDURE — 85025 COMPLETE CBC W/AUTO DIFF WBC: CPT | Performed by: EMERGENCY MEDICINE

## 2017-10-10 PROCEDURE — 84484 ASSAY OF TROPONIN QUANT: CPT | Performed by: EMERGENCY MEDICINE

## 2017-10-10 PROCEDURE — 80053 COMPREHEN METABOLIC PANEL: CPT | Performed by: EMERGENCY MEDICINE

## 2017-10-10 PROCEDURE — 93010 ELECTROCARDIOGRAM REPORT: CPT

## 2017-10-10 PROCEDURE — 93005 ELECTROCARDIOGRAM TRACING: CPT

## 2017-10-10 PROCEDURE — 36415 COLL VENOUS BLD VENIPUNCTURE: CPT

## 2017-10-10 RX ORDER — ASPIRIN 81 MG/1
324 TABLET, CHEWABLE ORAL ONCE
Status: COMPLETED | OUTPATIENT
Start: 2017-10-10 | End: 2017-10-10

## 2017-10-11 NOTE — ED INITIAL ASSESSMENT (HPI)
Pt drove self in to ed today cc left shoulder and left leg tightness since 0600 Monday \"feeling weird\" then stopped at 11am. Returned at 1400 Monday \"had to lay down\".  Got up Tuesday and felt fine then tightness to left shoulder & left arm retuned at 1

## 2017-10-11 NOTE — ED PROVIDER NOTES
Patient Seen in: [facility] Emergency Department    History   Patient presents with:  Chest Pain Angina (cardiovascular)    Stated Complaint:     HPI    81-year-old male with a history of coronary artery disease status post multiple stents including BMI 35.44 kg/m²         Physical Exam    General appearance: This is a middle-aged male lying in a gurney. Vital signs were reviewed per nurse's notes. HEENT: Normocephalic atraumatic. Anicteric sclera.   Oral mucosa is moist.  Oropharynx is normal.  Nec be normal variant. Abnormal EKG. Agree with EKG report. Xr Routine Thoracic Spine (3 Views) (cpt=72072)    Result Date: 9/28/2017  CONCLUSION:  No acute fractures. Degenerative disc disease.     Dictated by: Malachi Holstein, MD on 9/28/2017 at 16: heart with other form of angina pectoris, unspecified vessel or lesion type ([facility] [address]  Left against medical advice    Disposition:  Discharge    Follow-up:  Laura Tafoya MD  Replaced by [facility] [id number]  [phone]

## 2017-10-12 ENCOUNTER — HOSPITAL ENCOUNTER (EMERGENCY)
Facility: HOSPITAL | Age: 67
Discharge: HOME | End: 2017-10-12
Attending: PHYSICIAN ASSISTANT
Payer: MEDICARE

## 2017-10-12 ENCOUNTER — APPOINTMENT (OUTPATIENT)
Dept: GENERAL RADIOLOGY | Facility: HOSPITAL | Age: 67
End: 2017-10-12
Attending: PHYSICIAN ASSISTANT
Payer: MEDICARE

## 2017-10-12 VITALS
HEIGHT: 69 IN | BODY MASS INDEX: 35.55 KG/M2 | DIASTOLIC BLOOD PRESSURE: 74 MMHG | RESPIRATION RATE: 20 BRPM | SYSTOLIC BLOOD PRESSURE: 132 MMHG | TEMPERATURE: 98 F | WEIGHT: 240 LBS | HEART RATE: 88 BPM | OXYGEN SATURATION: 98 %

## 2017-10-12 DIAGNOSIS — S80.12XA CONTUSION OF LEFT LOWER LEG, INITIAL ENCOUNTER: Primary | ICD-10-CM

## 2017-10-12 DIAGNOSIS — S39.012A LUMBAR STRAIN, INITIAL ENCOUNTER: ICD-10-CM

## 2017-10-12 PROCEDURE — 99284 EMERGENCY DEPT VISIT MOD MDM: CPT

## 2017-10-12 PROCEDURE — 72100 X-RAY EXAM L-S SPINE 2/3 VWS: CPT | Performed by: PHYSICIAN ASSISTANT

## 2017-10-12 PROCEDURE — 73590 X-RAY EXAM OF LOWER LEG: CPT | Performed by: PHYSICIAN ASSISTANT

## 2017-10-12 RX ORDER — HYDROCODONE BITARTRATE AND ACETAMINOPHEN 5; 325 MG/1; MG/1
1 TABLET ORAL ONCE
Status: COMPLETED | OUTPATIENT
Start: 2017-10-12 | End: 2017-10-12

## 2017-10-12 RX ORDER — HYDROCODONE BITARTRATE AND ACETAMINOPHEN 5; 325 MG/1; MG/1
1 TABLET ORAL EVERY 6 HOURS PRN
Qty: 12 TABLET | Refills: 0 | Status: SHIPPED | OUTPATIENT
Start: 2017-10-12 | End: 2017-10-17 | Stop reason: ALTCHOICE

## 2017-10-12 NOTE — ED NOTES
Agitated daughter called stating she had been transferred 5 times and was asking for patient information. Daughter was told patient information could not be given out per HIPAA and was given the option to speak with nurse or relative directly.

## 2017-10-12 NOTE — ED NOTES
Attempted to talk to patient daughter Peaches on the phone. Explained to daughter that patient is available to discuss his plan of care but daughter refused and demanded that RN review patient chart with her.  RN once again gave the option to talk to her fa

## 2017-10-12 NOTE — ED PROVIDER NOTES
Patient Seen in: [facility] Emergency Department    History   Patient presents with:  Leg Pain    Stated Complaint:     HPI    HPI: Humberto Meyer. is a 74year old male who presents with chief complaint of left lower leg pain. Onset last night. Brinzolamide 1 % Ophthalmic Suspension,  Place 1 drop into the right eye 3 (three) times daily. Levothyroxine Sodium 100 MCG Oral Tab,  Take 100 mcg by mouth before breakfast.       No family history on file.     Smoking status: Current Every Day Smoke examined. Lymphatic: No gross lymphadenopathy noted. Musculoskeletal: Left lower extremity-Left lower extremity normal to inspection without acute bony deformity. Positive tenderness to palpation along the left lateral lower leg. Distal pulses intact. encounter    Disposition:  Discharge    Follow-up:  Your primary care physician    Schedule an appointment as soon as possible for a visit in 2 days  For follow-up    Ry Nava MD  [address] [address]

## 2017-10-17 ENCOUNTER — OFFICE VISIT (OUTPATIENT)
Dept: INTERNAL MEDICINE CLINIC | Facility: CLINIC | Age: 67
End: 2017-10-17

## 2017-10-17 VITALS
HEART RATE: 98 BPM | TEMPERATURE: 98 F | SYSTOLIC BLOOD PRESSURE: 122 MMHG | WEIGHT: 278 LBS | DIASTOLIC BLOOD PRESSURE: 84 MMHG | OXYGEN SATURATION: 98 % | BODY MASS INDEX: 41.18 KG/M2 | HEIGHT: 69 IN

## 2017-10-17 DIAGNOSIS — S39.012A LOW BACK STRAIN, INITIAL ENCOUNTER: Primary | ICD-10-CM

## 2017-10-17 DIAGNOSIS — I10 ESSENTIAL HYPERTENSION: ICD-10-CM

## 2017-10-17 DIAGNOSIS — I25.10 CORONARY ARTERY DISEASE INVOLVING NATIVE CORONARY ARTERY OF NATIVE HEART WITHOUT ANGINA PECTORIS: ICD-10-CM
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
## 2017-10-17 PROCEDURE — 99214 OFFICE O/P EST MOD 30 MIN: CPT | Performed by: INTERNAL MEDICINE

## 2017-10-17 RX ORDER — HYDROCODONE BITARTRATE AND ACETAMINOPHEN 7.5; 325 MG/1; MG/1
1 TABLET ORAL EVERY 4 HOURS PRN
Qty: 45 TABLET | Refills: 0 | Status: SHIPPED | OUTPATIENT
Start: 2017-10-17 | End: 2017-10-24

## 2017-10-17 RX ORDER — METHYLPREDNISOLONE 4 MG/1
TABLET ORAL
Qty: 1 KIT | Refills: 0 | Status: SHIPPED | OUTPATIENT
Start: 2017-10-17 | End: 2017-11-28 | Stop reason: ALTCHOICE

## 2017-10-17 RX ORDER — CYCLOBENZAPRINE HCL 10 MG
TABLET ORAL
Qty: 24 TABLET | Refills: 1 | Status: SHIPPED | OUTPATIENT
Start: 2017-10-17 | End: 2017-11-06

## 2017-10-17 NOTE — PROGRESS NOTES
HPI:    Patient ID: Harry Allan is a 74year old male. HPI      Patient is here for f/u after ED visit on 10/12/2017. Humberto Fly Allan is a 74year old male , who used to live in [place], recently moved to [place], presents today c/o left low 9/28/2017  PROCEDURE:  XR ROUTINE THORACIC SPINE (3 VIEWS) (CPT=72072)  TECHNIQUE:  AP, lateral, and swimmer's views of the thoracic spine were obtained. COMPARISON:  None.   INDICATIONS:  back and neck pain  PATIENT STATED HISTORY: (As transcribed by [employer] disc space narrowing noted anterior plate osteophytes A0-7 through C6-7. Minimal anterior osteophytes C2-3 through C4-5. Mild uncinate arthritis C5-6 and C6-7. Small central disc protrusion at through C5-6.  Small annular bulging disc with osteophyte comple Constitutional: Positive for activity change. Negative for appetite change and fever. HENT: Negative for sinus pain. Eyes: Negative for visual disturbance. Respiratory: Negative for chest tightness and shortness of breath.     Cardiovascular: Negat Physical Exam   Constitutional: No distress. Morbidly obese. Walks with walker. Antalgic gait. HENT:   Head: Normocephalic. Mouth/Throat: Oropharynx is clear and moist. No oropharyngeal exudate.    Eyes: Conjunctivae and EOM are normal. Pupils are plan and coordinating care.

## 2017-10-24 ENCOUNTER — OFFICE VISIT (OUTPATIENT)
Dept: INTERNAL MEDICINE CLINIC | Facility: CLINIC | Age: 67
End: 2017-10-24

## 2017-10-24 VITALS — OXYGEN SATURATION: 97 % | HEART RATE: 87 BPM | DIASTOLIC BLOOD PRESSURE: 78 MMHG | SYSTOLIC BLOOD PRESSURE: 130 MMHG

## 2017-10-24 DIAGNOSIS — S39.012D LOW BACK STRAIN, SUBSEQUENT ENCOUNTER: Primary | ICD-10-CM

## 2017-10-24 DIAGNOSIS — R29.6 RECURRENT FALLS: ICD-10-CM

## 2017-10-24 DIAGNOSIS — K21.9 GASTROESOPHAGEAL REFLUX DISEASE, ESOPHAGITIS PRESENCE NOT SPECIFIED: ICD-10-CM

## 2017-10-24 PROCEDURE — 99213 OFFICE O/P EST LOW 20 MIN: CPT | Performed by: INTERNAL MEDICINE

## 2017-10-24 RX ORDER — HYDROCODONE BITARTRATE AND ACETAMINOPHEN 5; 325 MG/1; MG/1
1 TABLET ORAL EVERY 6 HOURS PRN
Qty: 45 TABLET | Refills: 0 | Status: SHIPPED | OUTPATIENT
Start: 2017-10-24 | End: 2017-11-06

## 2017-10-24 RX ORDER — OMEPRAZOLE 40 MG/1
40 CAPSULE, DELAYED RELEASE ORAL DAILY
Qty: 90 CAPSULE | Refills: 0 | Status: SHIPPED | OUTPATIENT
Start: 2017-10-24 | End: 2018-01-23

## 2017-10-24 NOTE — PROGRESS NOTES
HPI:    Patient ID: Concepción Wade. is a 74year old male. HPI   Patient was last seen for low back pain after fall when he went out of balance on the hover . He was given medrol dose pack, hydrocodi=one and cyclobenzaprine.  He experienced heartburn and Bisulfate 75 MG Oral Tab Take 1 tablet (75 mg total) by mouth daily. Disp: 30 tablet Rfl: 11   Benazepril HCl 20 MG Oral Tab Take 1 tablet (20 mg total) by mouth daily. Disp: 30 tablet Rfl: 3   Lovastatin 20 MG Oral Tab Take 20 mg by mouth nightly.  Disp: PT soon  Reduce hydrocodone to 5/325  q 6 hs prn. Taper when feeling better. Recurrent Fall  Discussed fall precaution. Fall Prevention  Falls often occur due to slipping, tripping or losing your balance.  Millions of people fall every year and injure Release 90 capsule 0      Sig: Take 1 capsule (40 mg total) by mouth daily. HYDROcodone-acetaminophen (NORCO) 5-325 MG Oral Tab 45 tablet 0      Sig: Take 1 tablet by mouth every 6 (six) hours as needed for Pain. F/u in 3 weeks.

## 2017-10-29 ENCOUNTER — HOSPITAL (OUTPATIENT)
Dept: OTHER | Age: 67
End: 2017-10-29
Attending: EMERGENCY MEDICINE

## 2017-11-06 ENCOUNTER — OFFICE VISIT (OUTPATIENT)
Dept: INTERNAL MEDICINE CLINIC | Facility: CLINIC | Age: 67
End: 2017-11-06

## 2017-11-06 VITALS
HEART RATE: 69 BPM | BODY MASS INDEX: 42.21 KG/M2 | WEIGHT: 285 LBS | HEIGHT: 69 IN | TEMPERATURE: 99 F | OXYGEN SATURATION: 99 % | DIASTOLIC BLOOD PRESSURE: 82 MMHG | RESPIRATION RATE: 19 BRPM | SYSTOLIC BLOOD PRESSURE: 130 MMHG

## 2017-11-06 DIAGNOSIS — I25.10 CORONARY ARTERY DISEASE INVOLVING NATIVE CORONARY ARTERY OF NATIVE HEART WITHOUT ANGINA PECTORIS: ICD-10-CM

## 2017-11-06 DIAGNOSIS — I10 ESSENTIAL HYPERTENSION: ICD-10-CM

## 2017-11-06 DIAGNOSIS — G89.29 CHRONIC MIDLINE LOW BACK PAIN WITH BILATERAL SCIATICA: Primary | ICD-10-CM

## 2017-11-06 DIAGNOSIS — E03.9 ACQUIRED HYPOTHYROIDISM: ICD-10-CM

## 2017-11-06 DIAGNOSIS — Z12.5 PROSTATE CANCER SCREENING: ICD-10-CM

## 2017-11-06 DIAGNOSIS — R73.9 HYPERGLYCEMIA: ICD-10-CM

## 2017-11-06 DIAGNOSIS — M54.41 CHRONIC MIDLINE LOW BACK PAIN WITH BILATERAL SCIATICA: Primary | ICD-10-CM

## 2017-11-06 DIAGNOSIS — M54.42 CHRONIC MIDLINE LOW BACK PAIN WITH BILATERAL SCIATICA: Primary | ICD-10-CM

## 2017-11-06 DIAGNOSIS — R35.89 POLYURIA: ICD-10-CM

## 2017-11-06 PROCEDURE — 80053 COMPREHEN METABOLIC PANEL: CPT | Performed by: INTERNAL MEDICINE

## 2017-11-06 PROCEDURE — 85025 COMPLETE CBC W/AUTO DIFF WBC: CPT | Performed by: INTERNAL MEDICINE

## 2017-11-06 PROCEDURE — 36415 COLL VENOUS BLD VENIPUNCTURE: CPT | Performed by: INTERNAL MEDICINE

## 2017-11-06 PROCEDURE — 83036 HEMOGLOBIN GLYCOSYLATED A1C: CPT | Performed by: INTERNAL MEDICINE

## 2017-11-06 PROCEDURE — 81002 URINALYSIS NONAUTO W/O SCOPE: CPT | Performed by: INTERNAL MEDICINE

## 2017-11-06 PROCEDURE — 99214 OFFICE O/P EST MOD 30 MIN: CPT | Performed by: INTERNAL MEDICINE

## 2017-11-06 PROCEDURE — 84443 ASSAY THYROID STIM HORMONE: CPT | Performed by: INTERNAL MEDICINE

## 2017-11-06 RX ORDER — HYDROCODONE BITARTRATE AND ACETAMINOPHEN 5; 325 MG/1; MG/1
1 TABLET ORAL EVERY 6 HOURS PRN
Qty: 60 TABLET | Refills: 0 | Status: SHIPPED | OUTPATIENT
Start: 2017-11-06 | End: 2017-11-28

## 2017-11-06 RX ORDER — CYCLOBENZAPRINE HCL 10 MG
TABLET ORAL
Qty: 60 TABLET | Refills: 1 | Status: SHIPPED | OUTPATIENT
Start: 2017-11-06 | End: 2018-01-06

## 2017-11-07 NOTE — PROGRESS NOTES
Patient presented in office today for fasting blood draw. Blood draw successful in left arm  Saúl:  Cbc,cmp,A1C,tsh,PSA  D. O.B verified   Orders per Doctor Co

## 2017-11-20 ENCOUNTER — TELEPHONE (OUTPATIENT)
Dept: INTERNAL MEDICINE CLINIC | Facility: CLINIC | Age: 67
End: 2017-11-20

## 2017-11-21 NOTE — TELEPHONE ENCOUNTER
Called patient per insurance to inform patient that he needs to come in for a physical  No answer no way to leave a message letter sent out

## 2017-11-28 ENCOUNTER — HOSPITAL ENCOUNTER (OUTPATIENT)
Dept: GENERAL RADIOLOGY | Facility: HOSPITAL | Age: 67
Discharge: HOME | End: 2017-11-28
Attending: PHYSICAL MEDICINE & REHABILITATION
Payer: MEDICARE

## 2017-11-28 ENCOUNTER — OFFICE VISIT (OUTPATIENT)
Dept: NEUROLOGY | Facility: CLINIC | Age: 67
End: 2017-11-28

## 2017-11-28 ENCOUNTER — TELEPHONE (OUTPATIENT)
Dept: NEUROLOGY | Facility: CLINIC | Age: 67
End: 2017-11-28

## 2017-11-28 ENCOUNTER — APPOINTMENT (OUTPATIENT)
Dept: LAB | Facility: HOSPITAL | Age: 67
End: 2017-11-28
Attending: PHYSICAL MEDICINE & REHABILITATION
Payer: MEDICARE

## 2017-11-28 VITALS
RESPIRATION RATE: 16 BRPM | WEIGHT: 275 LBS | BODY MASS INDEX: 40.73 KG/M2 | HEART RATE: 72 BPM | SYSTOLIC BLOOD PRESSURE: 146 MMHG | HEIGHT: 69 IN | DIASTOLIC BLOOD PRESSURE: 94 MMHG

## 2017-11-28 DIAGNOSIS — M54.2 NECK PAIN, ACUTE: ICD-10-CM

## 2017-11-28 DIAGNOSIS — G89.29 CHRONIC BILATERAL LOW BACK PAIN WITH BILATERAL SCIATICA: Primary | ICD-10-CM

## 2017-11-28 DIAGNOSIS — M54.41 CHRONIC BILATERAL LOW BACK PAIN WITH BILATERAL SCIATICA: Primary | ICD-10-CM

## 2017-11-28 DIAGNOSIS — M54.42 CHRONIC BILATERAL LOW BACK PAIN WITH BILATERAL SCIATICA: ICD-10-CM

## 2017-11-28 DIAGNOSIS — G89.29 CHRONIC BILATERAL LOW BACK PAIN WITH BILATERAL SCIATICA: ICD-10-CM

## 2017-11-28 DIAGNOSIS — M54.41 CHRONIC BILATERAL LOW BACK PAIN WITH BILATERAL SCIATICA: ICD-10-CM

## 2017-11-28 DIAGNOSIS — M54.42 CHRONIC BILATERAL LOW BACK PAIN WITH BILATERAL SCIATICA: Primary | ICD-10-CM

## 2017-11-28 PROCEDURE — 72050 X-RAY EXAM NECK SPINE 4/5VWS: CPT | Performed by: PHYSICAL MEDICINE & REHABILITATION

## 2017-11-28 PROCEDURE — 99204 OFFICE O/P NEW MOD 45 MIN: CPT | Performed by: PHYSICAL MEDICINE & REHABILITATION

## 2017-11-28 PROCEDURE — 80307 DRUG TEST PRSMV CHEM ANLYZR: CPT

## 2017-11-28 RX ORDER — HYDROCODONE BITARTRATE AND ACETAMINOPHEN 10; 325 MG/1; MG/1
1 TABLET ORAL EVERY 8 HOURS PRN
Qty: 40 TABLET | Refills: 0 | Status: SHIPPED | OUTPATIENT
Start: 2017-11-28 | End: 2017-12-14

## 2017-11-28 NOTE — PATIENT INSTRUCTIONS
1) Follow up with me after you have the cervical spine Xray and the lumbar spine MRI done. 2) Wait for our office to call you to get you scheduled for the MRI. The Xray of the neck can be done without needing to schedule this.     As of October 6th 2014, family member will be picking up prescription, office must be given name of individual in advance and they must present an ID as well. · The name of the person picking up your prescription must be documented in your chart.

## 2017-11-28 NOTE — PROGRESS NOTES
Please let the patient know he has disc space narrowing the neck at multiple levels, most significant at C5-6. We will go over this in clinic and the plan remains the same.

## 2017-11-28 NOTE — H&P
No referring provider defined for this encounter.   Kimani Fontenot MD    PHYSICAL MEDICINE and REHABILITATION NEW PATIENT    Chief Complaint: low back pain    HPI: Anastacio Ochoa. is a 74year old male who presents with a chief complaint of Low Back Pain ([employer] affected:  Difficulty walking and standing up. Just walking from the front of the office to the exam room was difficult. Needs to shower while sitting down due to the pain.     Reports neck pain along with numbness and tingling in ===================================================================    Review of system/ Fam. Hx/ Soc.  Hx:    Fever/chills: no  Rash: no  Unintended weight loss: no   Blur vision: no  Shortness of breath: no  Chest pain:  no  Stomach ulcer:  no  Arthritis: Levothyroxine Sodium 100 MCG Oral Tab Take 100 mcg by mouth before breakfast. Disp:  Rfl:      No Known Allergies    Social History  Social History   Marital status: Single  Spouse name: N/A    Years of education: N/A  Number of children: N/A     Occupatio Manual muscle testing:                                         Muscle Stretch Reflex:   Right Left                     Right Left   Hip Flexor 4 4  Knee 1 1   Hip Adductor 4 4  Hamstring NT NT   Hip Abductor 4 4  Achilles 1 1   Knee Flexor 5 5  4=hyperrefl Impression/plan:  1) chronic low back pain with radiation down the posterior thighs, left worse than right  2) acute axial neck pain    Recommend further evaluation of the low back pain with an MRI given the long duration of persistent radicular symptoms,

## 2017-11-28 NOTE — TELEPHONE ENCOUNTER
Pt. informed insurance was verified and MRI L-spine wo is a covered benefit and does not require authorization. Can proceed with scheduling appt.

## 2017-11-29 ENCOUNTER — TELEPHONE (OUTPATIENT)
Dept: NEUROLOGY | Facility: CLINIC | Age: 67
End: 2017-11-29

## 2017-11-29 NOTE — TELEPHONE ENCOUNTER
----- Message from [email or web address], DO sent at 11/28/2017  4:49 PM CST -----  Please let the patient know he has disc space narrowing the neck at multiple levels, most significant at C5-6. We will go over this in clinic and the plan remains the same.
Left message to call back for results.
no

## 2017-12-14 ENCOUNTER — HOSPITAL ENCOUNTER (OUTPATIENT)
Dept: MRI IMAGING | Facility: HOSPITAL | Age: 67
Discharge: HOME | End: 2017-12-14
Attending: PHYSICAL MEDICINE & REHABILITATION
Payer: MEDICARE

## 2017-12-14 DIAGNOSIS — G89.29 CHRONIC BILATERAL LOW BACK PAIN WITH BILATERAL SCIATICA: ICD-10-CM

## 2017-12-14 DIAGNOSIS — M54.41 CHRONIC BILATERAL LOW BACK PAIN WITH BILATERAL SCIATICA: ICD-10-CM

## 2017-12-14 DIAGNOSIS — M54.42 CHRONIC BILATERAL LOW BACK PAIN WITH BILATERAL SCIATICA: ICD-10-CM

## 2017-12-14 DIAGNOSIS — M54.2 NECK PAIN, ACUTE: ICD-10-CM

## 2017-12-14 PROCEDURE — 72148 MRI LUMBAR SPINE W/O DYE: CPT | Performed by: PHYSICAL MEDICINE & REHABILITATION

## 2017-12-14 RX ORDER — HYDROCODONE BITARTRATE AND ACETAMINOPHEN 10; 325 MG/1; MG/1
1 TABLET ORAL EVERY 8 HOURS PRN
Qty: 80 TABLET | Refills: 0 | Status: SHIPPED | OUTPATIENT
Start: 2017-12-14 | End: 2018-03-05

## 2017-12-14 NOTE — TELEPHONE ENCOUNTER
Refill request for Norco  mg take 1 tab q8h prn, qt:40 0 refills    LOV: 11/28/17  NOV: 12/22/17  [place]: 11[place]28[place]17 [place]

## 2017-12-15 ENCOUNTER — TELEPHONE (OUTPATIENT)
Dept: NEUROLOGY | Facility: CLINIC | Age: 67
End: 2017-12-15

## 2017-12-15 NOTE — TELEPHONE ENCOUNTER
----- Message from Cassie Thomas DO sent at 12/15/2017  1:50 PM CST -----  MRI reviewed; please let the patient know he has narrowing of the canal at the L4-5 level that is likely causing the leg pain.  We can go over the images in clinic and talk about tr

## 2017-12-15 NOTE — PROGRESS NOTES
MRI reviewed; please let the patient know he has narrowing of the canal at the L4-5 level that is likely causing the leg pain. We can go over the images in clinic and talk about treatment options if he continues to have the left leg pain.

## 2017-12-15 NOTE — TELEPHONE ENCOUNTER
Left message on patient voice mail with Dr Petar Mcgraw note 12/15/17. Advised to call office back for any questions.

## 2017-12-15 NOTE — TELEPHONE ENCOUNTER
----- Message from Dianne Barrera DO sent at 12/15/2017  1:50 PM CST -----  MRI reviewed; please let the patient know he has narrowing of the canal at the L4-5 level that is likely causing the leg pain.  We can go over the images in clinic and talk about tr

## 2017-12-22 ENCOUNTER — OFFICE VISIT (OUTPATIENT)
Dept: NEUROLOGY | Facility: CLINIC | Age: 67
End: 2017-12-22

## 2017-12-22 VITALS
SYSTOLIC BLOOD PRESSURE: 118 MMHG | DIASTOLIC BLOOD PRESSURE: 74 MMHG | WEIGHT: 270 LBS | RESPIRATION RATE: 16 BRPM | HEIGHT: 69 IN | BODY MASS INDEX: 39.99 KG/M2 | HEART RATE: 76 BPM

## 2017-12-22 DIAGNOSIS — G89.29 CHRONIC BILATERAL LOW BACK PAIN WITH BILATERAL SCIATICA: Primary | ICD-10-CM

## 2017-12-22 DIAGNOSIS — M54.42 CHRONIC BILATERAL LOW BACK PAIN WITH BILATERAL SCIATICA: Primary | ICD-10-CM

## 2017-12-22 DIAGNOSIS — M54.2 NECK PAIN, ACUTE: ICD-10-CM

## 2017-12-22 DIAGNOSIS — M54.41 CHRONIC BILATERAL LOW BACK PAIN WITH BILATERAL SCIATICA: Primary | ICD-10-CM

## 2017-12-22 PROCEDURE — 99213 OFFICE O/P EST LOW 20 MIN: CPT | Performed by: PHYSICAL MEDICINE & REHABILITATION

## 2017-12-22 RX ORDER — OXYCODONE HYDROCHLORIDE AND ACETAMINOPHEN 5; 325 MG/1; MG/1
1 TABLET ORAL EVERY 8 HOURS PRN
Qty: 90 TABLET | Refills: 0 | Status: SHIPPED | OUTPATIENT
Start: 2017-12-22 | End: 2018-01-16

## 2017-12-22 NOTE — PATIENT INSTRUCTIONS
As of October 6th 2014, the Drug Enforcement Agency [facility]) is reclassifying all hydrocodone combination medications from Schedule III to Schedule II. This includes medications such as Norco, Vicodin, Lortab, Zohydro, and Vicoprofen.     What this means for y

## 2017-12-22 NOTE — PROGRESS NOTES
No referring provider defined for this encounter. Mihai Orozco MD    Chief Complaint:  Low back pain  HPI:  Zora Ibanez is a 74year old male who presents with complaints of Low Back Pain (LOV: 11/28/17. F/U on lower back pain.  Patient states that jim ulceration   Ambulation: Antalgic  Observation: No obvious atrophy in the shoulder, upper extremity, or lower extremity muscles  Range of motion: Pain with both lumbar flexion and extension, one not worse than another.    Palpation: Pain with palpation mohit

## 2017-12-27 ENCOUNTER — TELEPHONE (OUTPATIENT)
Dept: NEUROLOGY | Facility: CLINIC | Age: 67
End: 2017-12-27

## 2017-12-27 NOTE — TELEPHONE ENCOUNTER
Called patient to advise insurance was verified and BARIATRICS is a covered benefit and does not require authorization for initial evaluation and that he was going to wait until after the New Year

## 2018-01-06 DIAGNOSIS — M54.42 CHRONIC MIDLINE LOW BACK PAIN WITH BILATERAL SCIATICA: ICD-10-CM

## 2018-01-06 DIAGNOSIS — I10 ESSENTIAL HYPERTENSION: ICD-10-CM

## 2018-01-06 DIAGNOSIS — I25.10 CORONARY ARTERY DISEASE INVOLVING NATIVE CORONARY ARTERY OF NATIVE HEART WITHOUT ANGINA PECTORIS: ICD-10-CM

## 2018-01-06 DIAGNOSIS — R73.9 HYPERGLYCEMIA: ICD-10-CM

## 2018-01-06 DIAGNOSIS — Z12.5 PROSTATE CANCER SCREENING: ICD-10-CM

## 2018-01-06 DIAGNOSIS — R35.89 POLYURIA: ICD-10-CM

## 2018-01-06 DIAGNOSIS — G89.29 CHRONIC MIDLINE LOW BACK PAIN WITH BILATERAL SCIATICA: ICD-10-CM

## 2018-01-06 DIAGNOSIS — E03.9 ACQUIRED HYPOTHYROIDISM: ICD-10-CM

## 2018-01-06 DIAGNOSIS — M54.41 CHRONIC MIDLINE LOW BACK PAIN WITH BILATERAL SCIATICA: ICD-10-CM

## 2018-01-08 RX ORDER — LATANOPROST 50 UG/ML
SOLUTION/ DROPS OPHTHALMIC
Refills: 3 | Status: ON HOLD | COMMUNITY
Start: 2017-12-20 | End: 2018-07-17

## 2018-01-08 RX ORDER — TRAMADOL HYDROCHLORIDE 50 MG/1
TABLET ORAL
Refills: 0 | Status: ON HOLD | COMMUNITY
Start: 2017-10-31 | End: 2018-07-12

## 2018-01-08 RX ORDER — DORZOLAMIDE HYDROCHLORIDE AND TIMOLOL MALEATE 20; 5 MG/ML; MG/ML
SOLUTION/ DROPS OPHTHALMIC
Refills: 3 | Status: ON HOLD | COMMUNITY
Start: 2017-12-20 | End: 2018-07-17

## 2018-01-09 RX ORDER — CYCLOBENZAPRINE HCL 10 MG
TABLET ORAL
Qty: 60 TABLET | Refills: 1 | Status: SHIPPED | OUTPATIENT
Start: 2018-01-09 | End: 2018-03-31

## 2018-01-16 DIAGNOSIS — M54.41 CHRONIC BILATERAL LOW BACK PAIN WITH BILATERAL SCIATICA: ICD-10-CM

## 2018-01-16 DIAGNOSIS — M54.42 CHRONIC BILATERAL LOW BACK PAIN WITH BILATERAL SCIATICA: ICD-10-CM

## 2018-01-16 DIAGNOSIS — G89.29 CHRONIC BILATERAL LOW BACK PAIN WITH BILATERAL SCIATICA: ICD-10-CM

## 2018-01-16 RX ORDER — OXYCODONE HYDROCHLORIDE AND ACETAMINOPHEN 5; 325 MG/1; MG/1
1 TABLET ORAL EVERY 8 HOURS PRN
Qty: 90 TABLET | Refills: 0 | Status: SHIPPED | OUTPATIENT
Start: 2018-01-16 | End: 2018-05-31 | Stop reason: SINTOL

## 2018-01-16 NOTE — TELEPHONE ENCOUNTER
Medication request: Ocycodone-Acetaminophen 5-325 mg Take 1 tab by mouth every 8 hours as needed for pain.      LOV:12/22/17    NOV: 1/25/18      [place] 12[place]22/17 qt 90 per epic 12/22/17

## 2018-01-25 RX ORDER — OMEPRAZOLE 40 MG/1
CAPSULE, DELAYED RELEASE ORAL
Qty: 90 CAPSULE | Refills: 0 | Status: ON HOLD | OUTPATIENT
Start: 2018-01-25 | End: 2018-07-12

## 2018-02-02 ENCOUNTER — OFFICE VISIT (OUTPATIENT)
Dept: INTERNAL MEDICINE CLINIC | Facility: CLINIC | Age: 68
End: 2018-02-02

## 2018-02-02 ENCOUNTER — TELEPHONE (OUTPATIENT)
Dept: NEUROLOGY | Facility: CLINIC | Age: 68
End: 2018-02-02

## 2018-02-02 VITALS
HEART RATE: 76 BPM | DIASTOLIC BLOOD PRESSURE: 85 MMHG | OXYGEN SATURATION: 98 % | SYSTOLIC BLOOD PRESSURE: 135 MMHG | HEIGHT: 69 IN | BODY MASS INDEX: 42.51 KG/M2 | TEMPERATURE: 98 F | RESPIRATION RATE: 17 BRPM | WEIGHT: 287 LBS

## 2018-02-02 DIAGNOSIS — Z12.5 SCREENING FOR PROSTATE CANCER: ICD-10-CM

## 2018-02-02 DIAGNOSIS — Z23 NEED FOR VACCINATION: ICD-10-CM

## 2018-02-02 DIAGNOSIS — M54.41 CHRONIC BILATERAL LOW BACK PAIN WITH BILATERAL SCIATICA: ICD-10-CM

## 2018-02-02 DIAGNOSIS — M51.26 HERNIATED NUCLEUS PULPOSUS, L4-5 RIGHT: Primary | ICD-10-CM

## 2018-02-02 DIAGNOSIS — E03.9 ACQUIRED HYPOTHYROIDISM: Primary | ICD-10-CM

## 2018-02-02 DIAGNOSIS — I25.10 CORONARY ARTERY DISEASE INVOLVING NATIVE CORONARY ARTERY OF NATIVE HEART WITHOUT ANGINA PECTORIS: ICD-10-CM

## 2018-02-02 DIAGNOSIS — Z13.6 SCREENING FOR CARDIOVASCULAR CONDITION: ICD-10-CM

## 2018-02-02 DIAGNOSIS — G89.29 CHRONIC BILATERAL LOW BACK PAIN WITH BILATERAL SCIATICA: ICD-10-CM

## 2018-02-02 DIAGNOSIS — F17.200 CURRENT EVERY DAY SMOKER: ICD-10-CM

## 2018-02-02 DIAGNOSIS — M54.42 CHRONIC BILATERAL LOW BACK PAIN WITH BILATERAL SCIATICA: ICD-10-CM

## 2018-02-02 DIAGNOSIS — E11.8 TYPE 2 DIABETES MELLITUS WITH COMPLICATION, WITHOUT LONG-TERM CURRENT USE OF INSULIN (HCC): ICD-10-CM

## 2018-02-02 DIAGNOSIS — Z00.00 ENCOUNTER FOR ANNUAL HEALTH EXAMINATION: ICD-10-CM

## 2018-02-02 PROBLEM — R07.9 CHEST PAIN, CARDIAC: Status: RESOLVED | Noted: 2017-08-21 | Resolved: 2018-02-02

## 2018-02-02 LAB
ALBUMIN SERPL BCP-MCNC: 3.8 G/DL (ref 3.5–4.8)
ALBUMIN/GLOB SERPL: 1.2 {RATIO} (ref 1–2)
ALP SERPL-CCNC: 57 U/L (ref 32–100)
ALT SERPL-CCNC: 30 U/L (ref 17–63)
ANION GAP SERPL CALC-SCNC: 10 MMOL/L (ref 0–18)
AST SERPL-CCNC: 21 U/L (ref 15–41)
BASOPHILS # BLD: 0.1 K/UL (ref 0–0.2)
BASOPHILS NFR BLD: 1 %
BILIRUB SERPL-MCNC: 0.5 MG/DL (ref 0.3–1.2)
BUN SERPL-MCNC: 15 MG/DL (ref 8–20)
BUN/CREAT SERPL: 16.9 (ref 10–20)
CALCIUM SERPL-MCNC: 9.6 MG/DL (ref 8.5–10.5)
CHLORIDE SERPL-SCNC: 108 MMOL/L (ref 95–110)
CHOLEST SERPL-MCNC: 210 MG/DL (ref 110–200)
CO2 SERPL-SCNC: 21 MMOL/L (ref 22–32)
CREAT SERPL-MCNC: 0.89 MG/DL (ref 0.5–1.5)
CREAT UR-MCNC: 174.9 MG/DL
EOSINOPHIL # BLD: 0.5 K/UL (ref 0–0.7)
EOSINOPHIL NFR BLD: 6 %
ERYTHROCYTE [DISTWIDTH] IN BLOOD BY AUTOMATED COUNT: 14.4 % (ref 11–15)
GLOBULIN PLAS-MCNC: 3.1 G/DL (ref 2.5–3.7)
GLUCOSE SERPL-MCNC: 113 MG/DL (ref 70–99)
HCT VFR BLD AUTO: 45.5 % (ref 41–52)
HDLC SERPL-MCNC: 42 MG/DL
HGB BLD-MCNC: 14.6 G/DL (ref 13.5–17.5)
LDLC SERPL CALC-MCNC: 128 MG/DL (ref 0–99)
LYMPHOCYTES # BLD: 1.8 K/UL (ref 1–4)
LYMPHOCYTES NFR BLD: 20 %
MCH RBC QN AUTO: 28.3 PG (ref 27–32)
MCHC RBC AUTO-ENTMCNC: 32.1 G/DL (ref 32–37)
MCV RBC AUTO: 88.3 FL (ref 80–100)
MICROALBUMIN UR-MCNC: 8.9 MG/DL (ref 0–1.8)
MICROALBUMIN/CREAT UR: 50.9 MG/G{CREAT} (ref 0–20)
MONOCYTES # BLD: 0.7 K/UL (ref 0–1)
MONOCYTES NFR BLD: 8 %
NEUTROPHILS # BLD AUTO: 6 K/UL (ref 1.8–7.7)
NEUTROPHILS NFR BLD: 65 %
NONHDLC SERPL-MCNC: 168 MG/DL
OSMOLALITY UR CALC.SUM OF ELEC: 290 MOSM/KG (ref 275–295)
PLATELET # BLD AUTO: 291 K/UL (ref 140–400)
PMV BLD AUTO: 8.1 FL (ref 7.4–10.3)
POTASSIUM SERPL-SCNC: 3.9 MMOL/L (ref 3.3–5.1)
PROT SERPL-MCNC: 6.9 G/DL (ref 5.9–8.4)
RBC # BLD AUTO: 5.15 M/UL (ref 4.5–5.9)
SODIUM SERPL-SCNC: 139 MMOL/L (ref 136–144)
TRIGL SERPL-MCNC: 202 MG/DL (ref 1–149)
TSH SERPL-ACNC: 4.16 UIU/ML (ref 0.45–5.33)
WBC # BLD AUTO: 9.2 K/UL (ref 4–11)

## 2018-02-02 PROCEDURE — 85025 COMPLETE CBC W/AUTO DIFF WBC: CPT | Performed by: INTERNAL MEDICINE

## 2018-02-02 PROCEDURE — G0402 INITIAL PREVENTIVE EXAM: HCPCS | Performed by: INTERNAL MEDICINE

## 2018-02-02 PROCEDURE — 82043 UR ALBUMIN QUANTITATIVE: CPT | Performed by: INTERNAL MEDICINE

## 2018-02-02 PROCEDURE — 80061 LIPID PANEL: CPT | Performed by: INTERNAL MEDICINE

## 2018-02-02 PROCEDURE — 80053 COMPREHEN METABOLIC PANEL: CPT | Performed by: INTERNAL MEDICINE

## 2018-02-02 PROCEDURE — 90670 PCV13 VACCINE IM: CPT | Performed by: INTERNAL MEDICINE

## 2018-02-02 PROCEDURE — 84443 ASSAY THYROID STIM HORMONE: CPT | Performed by: INTERNAL MEDICINE

## 2018-02-02 PROCEDURE — 36415 COLL VENOUS BLD VENIPUNCTURE: CPT | Performed by: INTERNAL MEDICINE

## 2018-02-02 PROCEDURE — G0009 ADMIN PNEUMOCOCCAL VACCINE: HCPCS | Performed by: INTERNAL MEDICINE

## 2018-02-02 PROCEDURE — 83036 HEMOGLOBIN GLYCOSYLATED A1C: CPT | Performed by: INTERNAL MEDICINE

## 2018-02-02 PROCEDURE — 82570 ASSAY OF URINE CREATININE: CPT | Performed by: INTERNAL MEDICINE

## 2018-02-02 PROCEDURE — G0403 EKG FOR INITIAL PREVENT EXAM: HCPCS | Performed by: INTERNAL MEDICINE

## 2018-02-02 NOTE — PATIENT INSTRUCTIONS
Deejay Cody Jr.'s SCREENING SCHEDULE   Tests on this list are recommended by your physician but may not be covered, or covered at this frequency, by your insurer. Please check with your insurance carrier before scheduling to verify coverage.     PREVENTATI family history    Colorectal Cancer Screening Covered up to Age 76     Colonoscopy Screen   Covered every 10 years- more often if abnormal Colonoscopy,10 Years due on 03/23/2002 Update Health Maintenance if applicable    Flex Sigmoidoscopy Screen  Covered by Medicare Part B) No orders found for this or any previous visit.  This may be covered with your prescription benefits, but Medicare does not cover unless Medically needed    Zoster (Not covered by Medicare Part B) No orders found for this or any previous clutter. ? Make sure carpets and area rugs have skid proof backing. ? Do not use slippery wax on bare floors. ? Keep furniture in its accustomed place. ? If you have pets, be careful that you don’t trip over them. OUTSIDE SAFETY TIPS  ?  Always wear

## 2018-02-02 NOTE — TELEPHONE ENCOUNTER
Left message on voice mail to return call to office. Chart review seen by Dr Kiara Li today. Allergy updated today to include percodan for rash.

## 2018-02-02 NOTE — PROGRESS NOTES
Vaccination administered in left arm IM  Patient tolerated well no reaction at this time, no blood at injection site band aid applied. Vaccine given:  PCV-13  Orders per Doctor Co  Patient presented in office today for fasting blood draw.   Blood draw succ

## 2018-02-02 NOTE — PROGRESS NOTES
HPI:   Harry Allan is a 74year old male who presents for a Medicare Subsequent Annual Wellness visit (Pt already had Initial Annual Wellness). Gained 17 lbs in 1 mo due to inactivity. \" I don't eat much\" .  Chronic bilateral low back pain with problems based on screening of functional status. Vision Problems? : Yes   He has Walking problems based on screening of functional status. Difficulty walking?: Yes   He has problems with Daily Activities based on screening of functional status.    Prob Readings from Last 3 Encounters:  02/02/18 : 287 lb  12/22/17 : 270 lb  11/28/17 : 275 lb     Last Cholesterol Labs:     Lab Results  Component Value Date   CHOLEST 210 (H) 02/02/2018   HDL 42 02/02/2018    (H) 02/02/2018   TRIG 202 (H) 02/02/2018 headaches  PSYCHE: denies depression or anxiety  HEMATOLOGIC: denies hx of anemia  ENDOCRINE:  thyroid history  ALL/ASTHMA: denies hx of allergy or asthma      EXAM:   /85   Pulse 76   Temp 98.4 °F (36.9 °C) (Oral)   Resp 17   Ht 69\"   Wt 287 lb   S and positional perception intact. No foot ulser. FROM.            Vaccination History   Immunization History   Administered Date(s) Administered   • Influenza Vaccine, High Dose, Preserv Free 12/05/2017   • Pneumococcal (Prevnar 13) 02/02/2018        ASSESS Lovastatin  LDL goal < 70. RBBB with left anterior fascicular block  To see Cardio, Dr. Porsche Paris    HTN  BP not at goal today. stated home BP averaging 130/80   ON benazepril. 20 mgs QD, metoprol 25 mgs BID  Strict low salt diet.    DONG goal < 130/80 10/10/2017 179 (H)   ----------    Cardiovascular Disease Screening     LDL Annually LDL Cholesterol (mg/dL)   Date Value   02/02/2018 128 (H)   08/24/2017 131 (H)        EKG - w/ Initial Preventative Physical Exam only, or if medically necessary Electro prescription benefits      SPECIFIC DISEASE MONITORING Internal Lab or Procedure External Lab or Procedure      Annual Monitoring of Persistent     Medications (ACE/ARB, digoxin diuretics, anticonvulsants.)    Potassium  Annually Potassium (mmol/L)   Date

## 2018-02-03 LAB — HBA1C MFR BLD: 6.9 % (ref 4–6)

## 2018-02-03 RX ORDER — ROSUVASTATIN CALCIUM 20 MG/1
20 TABLET, COATED ORAL NIGHTLY
Qty: 90 TABLET | Refills: 3 | Status: ON HOLD | OUTPATIENT
Start: 2018-02-03 | End: 2018-07-17

## 2018-02-03 RX ORDER — LEVOTHYROXINE SODIUM 0.1 MG/1
100 TABLET ORAL
Qty: 90 TABLET | Refills: 0 | Status: ON HOLD | OUTPATIENT
Start: 2018-02-03 | End: 2018-07-17

## 2018-02-05 ENCOUNTER — TELEPHONE (OUTPATIENT)
Dept: NEUROLOGY | Facility: CLINIC | Age: 68
End: 2018-02-05

## 2018-02-05 NOTE — TELEPHONE ENCOUNTER
Spoke to patient. States has been getting itching about 1-2 hours after taking oxycodone since starting to take the medication. States got rash to trunk and forehead also. Unable to say when rash started. Seen by Dr Bonny Fairbanks on 2/2/18.   States he has stopped the

## 2018-02-05 NOTE — TELEPHONE ENCOUNTER
Medicare Online for insurance coverage of right L5-S1 TFESI  cpt codes 26820, 45181. Insurance was verified and procedure is a covered benefit and does not require authorization. Will inform Nursing.

## 2018-02-05 NOTE — TELEPHONE ENCOUNTER
Images reviewed again; due to paracentral disc hernation and L4-5 central stenosis will do right L5-S1 TFESI rather than L4-5. Order placed. If he continues to have pruritis and a rash will have to stick with tramadol.  Needs to have permission to be off AS

## 2018-02-07 NOTE — TELEPHONE ENCOUNTER
S/w pt to notify him of message stated below per Dr. Brando Michelle. Pt has appt w/ Cardiologist tomorrow and will call back to notify us how long he can hold ASA/Plavix.

## 2018-02-08 ENCOUNTER — MYAURORA ACCOUNT LINK (OUTPATIENT)
Dept: OTHER | Age: 68
End: 2018-02-08

## 2018-02-08 ENCOUNTER — TELEPHONE (OUTPATIENT)
Dept: NEUROLOGY | Facility: CLINIC | Age: 68
End: 2018-02-08

## 2018-02-08 ENCOUNTER — PRIOR ORIGINAL RECORDS (OUTPATIENT)
Dept: OTHER | Age: 68
End: 2018-02-08

## 2018-02-08 DIAGNOSIS — M48.061 SPINAL STENOSIS OF LUMBAR REGION, UNSPECIFIED WHETHER NEUROGENIC CLAUDICATION PRESENT: Primary | ICD-10-CM

## 2018-02-08 RX ORDER — HYDROCODONE BITARTRATE AND ACETAMINOPHEN 10; 325 MG/1; MG/1
1 TABLET ORAL EVERY 8 HOURS PRN
Qty: 90 TABLET | Refills: 0 | Status: SHIPPED | OUTPATIENT
Start: 2018-02-08 | End: 2018-02-27

## 2018-02-08 NOTE — TELEPHONE ENCOUNTER
Pt came in stating the Cardiologist did not approve him to be off of medication. Also provided doctors information. ..     Dr. Gonzalez Moran with [facility]    [phone]    Pt is also requesting Norco instead of Percocet due to his reaction, please advise

## 2018-02-08 NOTE — TELEPHONE ENCOUNTER
Norco 10/325 q8h prescribed. Please let patient know that pruritis is a known side effect of this class of medications. If the itching is too much we will need to consider other medications such as gabapentin.

## 2018-02-08 NOTE — TELEPHONE ENCOUNTER
Spoke to patient and notified him of below. He was understanding. He states that he has been taking coQ10 and another OTC medication and is going to stop these to see if the itching subsides.  He will call the office if he continues to have itching when he

## 2018-02-27 DIAGNOSIS — M48.061 SPINAL STENOSIS OF LUMBAR REGION, UNSPECIFIED WHETHER NEUROGENIC CLAUDICATION PRESENT: ICD-10-CM

## 2018-02-27 RX ORDER — HYDROCODONE BITARTRATE AND ACETAMINOPHEN 10; 325 MG/1; MG/1
1 TABLET ORAL EVERY 8 HOURS PRN
Qty: 90 TABLET | Refills: 0 | Status: SHIPPED | OUTPATIENT
Start: 2018-03-10 | End: 2018-03-28

## 2018-02-27 NOTE — TELEPHONE ENCOUNTER
Refill request for norco 10/325 mg, take 1 tab every 8 hrs as needed, #90, no refills    LOV: 12/22/17  NOV: None  [place] .  Next refill set up for 3/10

## 2018-03-05 ENCOUNTER — OFFICE VISIT (OUTPATIENT)
Dept: SURGERY | Facility: CLINIC | Age: 68
End: 2018-03-05

## 2018-03-05 VITALS
HEART RATE: 74 BPM | SYSTOLIC BLOOD PRESSURE: 122 MMHG | DIASTOLIC BLOOD PRESSURE: 79 MMHG | HEIGHT: 69 IN | RESPIRATION RATE: 16 BRPM | OXYGEN SATURATION: 95 %

## 2018-03-05 DIAGNOSIS — I10 ESSENTIAL HYPERTENSION: Primary | ICD-10-CM

## 2018-03-05 DIAGNOSIS — R53.82 CHRONIC FATIGUE: ICD-10-CM

## 2018-03-05 DIAGNOSIS — E66.01 MORBID OBESITY WITH BMI OF 40.0-44.9, ADULT (HCC): ICD-10-CM

## 2018-03-05 DIAGNOSIS — R06.00 DOE (DYSPNEA ON EXERTION): ICD-10-CM

## 2018-03-05 DIAGNOSIS — E11.8 TYPE 2 DIABETES MELLITUS WITH COMPLICATION, WITHOUT LONG-TERM CURRENT USE OF INSULIN (HCC): ICD-10-CM

## 2018-03-05 DIAGNOSIS — R60.0 LOWER EXTREMITY EDEMA: ICD-10-CM

## 2018-03-05 PROCEDURE — 99214 OFFICE O/P EST MOD 30 MIN: CPT | Performed by: INTERNAL MEDICINE

## 2018-03-05 RX ORDER — HYDROCHLOROTHIAZIDE 12.5 MG/1
12.5 CAPSULE, GELATIN COATED ORAL DAILY
Qty: 30 CAPSULE | Refills: 1 | Status: SHIPPED | OUTPATIENT
Start: 2018-03-05 | End: 2018-05-03

## 2018-03-05 RX ORDER — TOPIRAMATE 25 MG/1
25 TABLET ORAL EVERY EVENING
Qty: 30 TABLET | Refills: 2 | Status: SHIPPED | OUTPATIENT
Start: 2018-03-05 | End: 2018-06-02

## 2018-03-05 NOTE — PROGRESS NOTES
[facility]ness [facility] Consultation Note       Date of Consult:  3/5/2018    Patient:  Vincent Ceballos.   :      3/23/1950  MRN:      [id number]    Referring Provider: Dr. Shanae Cruz       Chief Complaint:  Patient presents with:  Consult  Weight Mustapha Forth Tab Take 1 tablet (25 mg total) by mouth 2x Daily(Beta Blocker). Disp: 60 tablet Rfl: 6   Clopidogrel Bisulfate 75 MG Oral Tab Take 1 tablet (75 mg total) by mouth daily. Disp: 30 tablet Rfl: 11   Lovastatin 20 MG Oral Tab Take 20 mg by mouth nightly.  Disp Yes    Exercise No     Social History Narrative    The patient uses the following assistive device(s):  single-point cane. The patient does live in a home with stairs.          Surgical History:  Past Surgical History:  No date: OTHER      Comment: car S1, S2 normal, no murmur, click, rub or gallop, regular rate and rhythm  Abdomen: soft, obese, non tender  Extremities: edema 1, limited movement at hips  Pulses: 2+ and symmetric  Skin: Skin color, texture, turgor normal. No rashes or lesions    ASSESSMEN shannan        [unfilled]

## 2018-03-28 DIAGNOSIS — M48.061 SPINAL STENOSIS OF LUMBAR REGION, UNSPECIFIED WHETHER NEUROGENIC CLAUDICATION PRESENT: ICD-10-CM

## 2018-03-28 RX ORDER — HYDROCODONE BITARTRATE AND ACETAMINOPHEN 10; 325 MG/1; MG/1
1 TABLET ORAL EVERY 8 HOURS PRN
Qty: 90 TABLET | Refills: 0 | Status: SHIPPED | OUTPATIENT
Start: 2018-04-09 | End: 2018-05-31

## 2018-03-28 NOTE — TELEPHONE ENCOUNTER
Refill request for norco 10/325 mg, take 1 tab every 8 hrs as needed, #90, no refills    LOV: 12/22/17  NOV: none  Last refilled on 3/7/18 per [facility].  Per epic, should have been dispensed on 3/10/18

## 2018-03-31 DIAGNOSIS — E03.9 ACQUIRED HYPOTHYROIDISM: ICD-10-CM

## 2018-03-31 DIAGNOSIS — I25.10 CORONARY ARTERY DISEASE INVOLVING NATIVE CORONARY ARTERY OF NATIVE HEART WITHOUT ANGINA PECTORIS: ICD-10-CM

## 2018-03-31 DIAGNOSIS — R73.9 HYPERGLYCEMIA: ICD-10-CM

## 2018-03-31 DIAGNOSIS — G89.29 CHRONIC MIDLINE LOW BACK PAIN WITH BILATERAL SCIATICA: ICD-10-CM

## 2018-03-31 DIAGNOSIS — I10 ESSENTIAL HYPERTENSION: ICD-10-CM

## 2018-03-31 DIAGNOSIS — M54.42 CHRONIC MIDLINE LOW BACK PAIN WITH BILATERAL SCIATICA: ICD-10-CM

## 2018-03-31 DIAGNOSIS — R35.89 POLYURIA: ICD-10-CM

## 2018-03-31 DIAGNOSIS — Z12.5 PROSTATE CANCER SCREENING: ICD-10-CM

## 2018-03-31 DIAGNOSIS — M54.41 CHRONIC MIDLINE LOW BACK PAIN WITH BILATERAL SCIATICA: ICD-10-CM

## 2018-04-02 RX ORDER — CYCLOBENZAPRINE HCL 10 MG
TABLET ORAL
Qty: 60 TABLET | Refills: 1 | Status: SHIPPED | OUTPATIENT
Start: 2018-04-02 | End: 2018-05-28

## 2018-04-30 ENCOUNTER — TELEPHONE (OUTPATIENT)
Dept: NEUROLOGY | Facility: CLINIC | Age: 68
End: 2018-04-30

## 2018-04-30 RX ORDER — HYDROCODONE BITARTRATE AND ACETAMINOPHEN 10; 325 MG/1; MG/1
1 TABLET ORAL EVERY 8 HOURS PRN
Qty: 90 TABLET | Refills: 0 | Status: SHIPPED | OUTPATIENT
Start: 2018-05-03 | End: 2018-05-31

## 2018-04-30 NOTE — TELEPHONE ENCOUNTER
Pt calling stating that he fell recently and now has home care. Pt has taken Norco to help with pain will run out on 5/3 and would like to know if he can get a refill sooner.

## 2018-04-30 NOTE — TELEPHONE ENCOUNTER
Prescription is ready for  at the [place] front office. Spoke with patient, per Ehsan Blanton notified he needs to keep his 5/31/18 appt. Pt verbalized understanding.

## 2018-04-30 NOTE — TELEPHONE ENCOUNTER
Spoke to patient. He states that he he took a fall about 2 weeks ago while going down some steps and was \"bruised up\" for a few days. He states that during those few days he was taking norco every 4 hrs to help with the pain.  He states that now he is sotero

## 2018-05-03 ENCOUNTER — TELEPHONE (OUTPATIENT)
Dept: SURGERY | Facility: CLINIC | Age: 68
End: 2018-05-03

## 2018-05-03 DIAGNOSIS — R60.0 LOWER EXTREMITY EDEMA: ICD-10-CM

## 2018-05-03 RX ORDER — HYDROCHLOROTHIAZIDE 12.5 MG/1
CAPSULE, GELATIN COATED ORAL
Qty: 30 CAPSULE | Refills: 0 | Status: SHIPPED | OUTPATIENT
Start: 2018-05-03 | End: 2018-05-08

## 2018-05-08 DIAGNOSIS — R60.0 LOWER EXTREMITY EDEMA: ICD-10-CM

## 2018-05-08 RX ORDER — HYDROCHLOROTHIAZIDE 12.5 MG/1
12.5 CAPSULE, GELATIN COATED ORAL
Qty: 90 CAPSULE | Refills: 0 | Status: ON HOLD | OUTPATIENT
Start: 2018-05-08 | End: 2018-07-12

## 2018-05-22 DIAGNOSIS — E11.65 TYPE 2 DIABETES MELLITUS WITH HYPERGLYCEMIA, WITHOUT LONG-TERM CURRENT USE OF INSULIN (HCC): ICD-10-CM

## 2018-05-22 DIAGNOSIS — R63.5 WEIGHT GAIN: Primary | ICD-10-CM

## 2018-05-28 DIAGNOSIS — M54.41 CHRONIC MIDLINE LOW BACK PAIN WITH BILATERAL SCIATICA: ICD-10-CM

## 2018-05-28 DIAGNOSIS — R73.9 HYPERGLYCEMIA: ICD-10-CM

## 2018-05-28 DIAGNOSIS — Z12.5 PROSTATE CANCER SCREENING: ICD-10-CM

## 2018-05-28 DIAGNOSIS — I25.10 CORONARY ARTERY DISEASE INVOLVING NATIVE CORONARY ARTERY OF NATIVE HEART WITHOUT ANGINA PECTORIS: ICD-10-CM

## 2018-05-28 DIAGNOSIS — M54.42 CHRONIC MIDLINE LOW BACK PAIN WITH BILATERAL SCIATICA: ICD-10-CM

## 2018-05-28 DIAGNOSIS — I10 ESSENTIAL HYPERTENSION: ICD-10-CM

## 2018-05-28 DIAGNOSIS — E03.9 ACQUIRED HYPOTHYROIDISM: ICD-10-CM

## 2018-05-28 DIAGNOSIS — R35.89 POLYURIA: ICD-10-CM

## 2018-05-28 DIAGNOSIS — G89.29 CHRONIC MIDLINE LOW BACK PAIN WITH BILATERAL SCIATICA: ICD-10-CM

## 2018-05-29 RX ORDER — CYCLOBENZAPRINE HCL 10 MG
TABLET ORAL
Qty: 60 TABLET | Refills: 1 | Status: ON HOLD | OUTPATIENT
Start: 2018-05-29 | End: 2018-07-12

## 2018-05-31 ENCOUNTER — APPOINTMENT (OUTPATIENT)
Dept: LAB | Facility: HOSPITAL | Age: 68
End: 2018-05-31
Attending: INTERNAL MEDICINE
Payer: MEDICARE

## 2018-05-31 ENCOUNTER — OFFICE VISIT (OUTPATIENT)
Dept: NEUROLOGY | Facility: CLINIC | Age: 68
End: 2018-05-31

## 2018-05-31 VITALS
RESPIRATION RATE: 16 BRPM | HEIGHT: 69 IN | SYSTOLIC BLOOD PRESSURE: 140 MMHG | WEIGHT: 280 LBS | BODY MASS INDEX: 41.47 KG/M2 | HEART RATE: 88 BPM | DIASTOLIC BLOOD PRESSURE: 80 MMHG

## 2018-05-31 DIAGNOSIS — Z12.5 SCREENING FOR PROSTATE CANCER: ICD-10-CM

## 2018-05-31 DIAGNOSIS — E11.65 TYPE 2 DIABETES MELLITUS WITH HYPERGLYCEMIA, WITHOUT LONG-TERM CURRENT USE OF INSULIN (HCC): ICD-10-CM

## 2018-05-31 DIAGNOSIS — M48.061 SPINAL STENOSIS OF LUMBAR REGION, UNSPECIFIED WHETHER NEUROGENIC CLAUDICATION PRESENT: ICD-10-CM

## 2018-05-31 DIAGNOSIS — M48.062 SPINAL STENOSIS OF LUMBAR REGION WITH NEUROGENIC CLAUDICATION: Primary | ICD-10-CM

## 2018-05-31 DIAGNOSIS — M48.062 LUMBAR STENOSIS WITH NEUROGENIC CLAUDICATION: ICD-10-CM

## 2018-05-31 DIAGNOSIS — R63.5 WEIGHT GAIN: ICD-10-CM

## 2018-05-31 PROBLEM — M54.41 CHRONIC LOW BACK PAIN WITH BILATERAL SCIATICA: Status: RESOLVED | Noted: 2017-11-28 | Resolved: 2018-05-31

## 2018-05-31 PROBLEM — G89.29 CHRONIC LOW BACK PAIN WITH BILATERAL SCIATICA: Status: RESOLVED | Noted: 2017-11-28 | Resolved: 2018-05-31

## 2018-05-31 PROBLEM — M54.42 CHRONIC LOW BACK PAIN WITH BILATERAL SCIATICA: Status: RESOLVED | Noted: 2017-11-28 | Resolved: 2018-05-31

## 2018-05-31 PROCEDURE — 84403 ASSAY OF TOTAL TESTOSTERONE: CPT

## 2018-05-31 PROCEDURE — 36415 COLL VENOUS BLD VENIPUNCTURE: CPT

## 2018-05-31 PROCEDURE — 80053 COMPREHEN METABOLIC PANEL: CPT

## 2018-05-31 PROCEDURE — 83036 HEMOGLOBIN GLYCOSYLATED A1C: CPT

## 2018-05-31 PROCEDURE — 99213 OFFICE O/P EST LOW 20 MIN: CPT | Performed by: PHYSICAL MEDICINE & REHABILITATION

## 2018-05-31 RX ORDER — HYDROCODONE BITARTRATE AND ACETAMINOPHEN 10; 325 MG/1; MG/1
1 TABLET ORAL EVERY 8 HOURS PRN
Qty: 90 TABLET | Refills: 0 | Status: SHIPPED | OUTPATIENT
Start: 2018-06-01 | End: 2018-06-09 | Stop reason: ALTCHOICE

## 2018-05-31 RX ORDER — GABAPENTIN 300 MG/1
300 CAPSULE ORAL 2 TIMES DAILY
Qty: 60 CAPSULE | Refills: 0 | Status: SHIPPED | OUTPATIENT
Start: 2018-05-31 | End: 2018-06-27

## 2018-05-31 NOTE — PROGRESS NOTES
HPI:    Patient ID: Marge Salmon. is a 79year old male. 53-year-old male presents for bilateral low back pain. Since the last time I saw him he fell while in the shower, right onto his tailbone.   Following the fall in the bathtub he now receives care Sodium 100 MCG Oral Tab Take 1 tablet (100 mcg total) by mouth before breakfast. Disp: 90 tablet Rfl: 0   Rosuvastatin Calcium 20 MG Oral Tab Take 1 tablet (20 mg total) by mouth nightly.  Disp: 90 tablet Rfl: 3   Dorzolamide HCl-Timolol Mal 22.3-6.8 MG/ML Negative Sonya and Fadir bilaterally. Neurological:   Strength: normal muscle bulk in b/l Le. Gives way with hip flexion bilaterally due to low back pain; otherwise 5/5 strength bilateral lower extremities.     Sensation: intact to light touch bilateral

## 2018-06-02 DIAGNOSIS — E11.8 TYPE 2 DIABETES MELLITUS WITH COMPLICATION, WITHOUT LONG-TERM CURRENT USE OF INSULIN (HCC): ICD-10-CM

## 2018-06-04 RX ORDER — TOPIRAMATE 25 MG/1
TABLET ORAL
Qty: 30 TABLET | Refills: 0 | Status: ON HOLD | OUTPATIENT
Start: 2018-06-04 | End: 2018-11-26

## 2018-06-08 ENCOUNTER — OFFICE VISIT (OUTPATIENT)
Dept: INTERNAL MEDICINE CLINIC | Facility: CLINIC | Age: 68
End: 2018-06-08

## 2018-06-08 VITALS
DIASTOLIC BLOOD PRESSURE: 78 MMHG | WEIGHT: 298 LBS | BODY MASS INDEX: 44.14 KG/M2 | RESPIRATION RATE: 19 BRPM | TEMPERATURE: 98 F | HEART RATE: 84 BPM | SYSTOLIC BLOOD PRESSURE: 126 MMHG | OXYGEN SATURATION: 98 % | HEIGHT: 69 IN

## 2018-06-08 DIAGNOSIS — Z12.11 COLON CANCER SCREENING: ICD-10-CM

## 2018-06-08 DIAGNOSIS — I25.10 CORONARY ARTERY DISEASE INVOLVING NATIVE CORONARY ARTERY OF NATIVE HEART WITHOUT ANGINA PECTORIS: ICD-10-CM

## 2018-06-08 DIAGNOSIS — E29.1 HYPOGONADISM IN MALE: Primary | ICD-10-CM

## 2018-06-08 DIAGNOSIS — I10 ESSENTIAL HYPERTENSION: ICD-10-CM

## 2018-06-08 DIAGNOSIS — R26.81 UNSTEADY GAIT: ICD-10-CM

## 2018-06-08 PROCEDURE — 99214 OFFICE O/P EST MOD 30 MIN: CPT | Performed by: INTERNAL MEDICINE

## 2018-06-08 RX ORDER — HYDROCODONE BITARTRATE AND ACETAMINOPHEN 5; 325 MG/1; MG/1
TABLET ORAL
Refills: 0 | Status: ON HOLD | COMMUNITY
Start: 2018-05-18 | End: 2018-07-12

## 2018-06-08 NOTE — PROGRESS NOTES
HPI:    Patient ID: Donavon Granado. is a 79year old male. HPI  Patient is here to discuss low testosterone. This is a chronic problem since 2015. . Stated insurance no longer covers  testosterone gel. C/o persistent weigh gain despite eating less. Umer Patel capsule Rfl: 0   Levothyroxine Sodium 100 MCG Oral Tab Take 1 tablet (100 mcg total) by mouth before breakfast. Disp: 90 tablet Rfl: 0   Rosuvastatin Calcium 20 MG Oral Tab Take 1 tablet (20 mg total) by mouth nightly.  Disp: 90 tablet Rfl: 3   OMEPRAZOLE 4 are normal.   Musculoskeletal: Normal range of motion. He exhibits no edema. Trace edema. Neurological: He is alert and oriented to person, place, and time. Psychiatric: Judgment normal.   Nursing note and vitals reviewed.              ASSESSMENT/PLAN

## 2018-06-11 PROCEDURE — 82274 ASSAY TEST FOR BLOOD FECAL: CPT | Performed by: INTERNAL MEDICINE

## 2018-06-26 DIAGNOSIS — M48.061 SPINAL STENOSIS OF LUMBAR REGION, UNSPECIFIED WHETHER NEUROGENIC CLAUDICATION PRESENT: ICD-10-CM

## 2018-06-26 RX ORDER — HYDROCODONE BITARTRATE AND ACETAMINOPHEN 10; 325 MG/1; MG/1
1 TABLET ORAL EVERY 8 HOURS PRN
Qty: 90 TABLET | Refills: 0 | Status: ON HOLD | OUTPATIENT
Start: 2018-07-01 | End: 2018-07-12

## 2018-06-26 NOTE — TELEPHONE ENCOUNTER
Refill request for norco 10/325 mg, take 1 tab every 8 hrs as needed, #90, no refills    LOV: 5/31/18  NOV: None  Last refilled on 5/31/18 per [facility].  Per [facility], should have been dispensed on 6/1/18

## 2018-06-27 RX ORDER — GABAPENTIN 300 MG/1
300 CAPSULE ORAL 2 TIMES DAILY
Qty: 60 CAPSULE | Refills: 0 | Status: SHIPPED | OUTPATIENT
Start: 2018-06-27 | End: 2018-09-07

## 2018-06-27 NOTE — TELEPHONE ENCOUNTER
Medication request: Gabapentin 300 mg, Take 1 cpsule by mouth BID    LOV: 5/31/18  NOV: 6/28/18    Last refill:5/31/18

## 2018-06-29 ENCOUNTER — PRIOR ORIGINAL RECORDS (OUTPATIENT)
Dept: OTHER | Age: 68
End: 2018-06-29

## 2018-07-12 ENCOUNTER — HOSPITAL ENCOUNTER (INPATIENT)
Facility: HOSPITAL | Age: 68
LOS: 5 days | Discharge: HOME | DRG: 247 | End: 2018-07-17
Attending: EMERGENCY MEDICINE | Admitting: HOSPITALIST
Payer: MEDICARE

## 2018-07-12 ENCOUNTER — APPOINTMENT (OUTPATIENT)
Dept: GENERAL RADIOLOGY | Facility: HOSPITAL | Age: 68
DRG: 247 | End: 2018-07-12
Attending: EMERGENCY MEDICINE
Payer: MEDICARE

## 2018-07-12 DIAGNOSIS — R07.9 CHEST PAIN, RULE OUT ACUTE MYOCARDIAL INFARCTION: Primary | ICD-10-CM

## 2018-07-12 DIAGNOSIS — E11.8 TYPE 2 DIABETES MELLITUS WITH COMPLICATION, WITHOUT LONG-TERM CURRENT USE OF INSULIN (HCC): ICD-10-CM

## 2018-07-12 PROBLEM — E87.6 HYPOKALEMIA: Status: ACTIVE | Noted: 2018-07-12

## 2018-07-12 LAB
ANION GAP SERPL CALC-SCNC: 8 MMOL/L (ref 0–18)
APTT PPP: 26.4 SECONDS (ref 23.2–35.3)
BASOPHILS # BLD: 0.1 K/UL (ref 0–0.2)
BASOPHILS NFR BLD: 1 %
BUN SERPL-MCNC: 11 MG/DL (ref 8–20)
BUN/CREAT SERPL: 12.4 (ref 10–20)
CALCIUM SERPL-MCNC: 9.5 MG/DL (ref 8.5–10.5)
CHLORIDE SERPL-SCNC: 106 MMOL/L (ref 95–110)
CO2 SERPL-SCNC: 25 MMOL/L (ref 22–32)
CREAT SERPL-MCNC: 0.89 MG/DL (ref 0.5–1.5)
D DIMER PPP FEU-MCNC: <0.27 MCG/ML (ref ?–0.66)
EOSINOPHIL # BLD: 0.6 K/UL (ref 0–0.7)
EOSINOPHIL NFR BLD: 6 %
ERYTHROCYTE [DISTWIDTH] IN BLOOD BY AUTOMATED COUNT: 15.1 % (ref 11–15)
GLUCOSE SERPL-MCNC: 184 MG/DL (ref 70–99)
HCT VFR BLD AUTO: 44.7 % (ref 41–52)
HGB BLD-MCNC: 14.3 G/DL (ref 13.5–17.5)
LYMPHOCYTES # BLD: 2.1 K/UL (ref 1–4)
LYMPHOCYTES NFR BLD: 20 %
MCH RBC QN AUTO: 28.8 PG (ref 27–32)
MCHC RBC AUTO-ENTMCNC: 32 G/DL (ref 32–37)
MCV RBC AUTO: 89.8 FL (ref 80–100)
MONOCYTES # BLD: 0.9 K/UL (ref 0–1)
MONOCYTES NFR BLD: 8 %
NEUTROPHILS # BLD AUTO: 7 K/UL (ref 1.8–7.7)
NEUTROPHILS NFR BLD: 66 %
OSMOLALITY UR CALC.SUM OF ELEC: 292 MOSM/KG (ref 275–295)
PLATELET # BLD AUTO: 264 K/UL (ref 140–400)
PMV BLD AUTO: 8.8 FL (ref 7.4–10.3)
POTASSIUM SERPL-SCNC: 3.5 MMOL/L (ref 3.3–5.1)
RBC # BLD AUTO: 4.97 M/UL (ref 4.5–5.9)
SODIUM SERPL-SCNC: 139 MMOL/L (ref 136–144)
TROPONIN I SERPL-MCNC: 0.01 NG/ML (ref ?–0.03)
TROPONIN I SERPL-MCNC: 0.02 NG/ML (ref ?–0.03)
WBC # BLD AUTO: 10.7 K/UL (ref 4–11)

## 2018-07-12 PROCEDURE — 99223 1ST HOSP IP/OBS HIGH 75: CPT | Performed by: HOSPITALIST

## 2018-07-12 PROCEDURE — 71046 X-RAY EXAM CHEST 2 VIEWS: CPT | Performed by: EMERGENCY MEDICINE

## 2018-07-12 RX ORDER — HYDROCODONE BITARTRATE AND ACETAMINOPHEN 10; 325 MG/1; MG/1
1 TABLET ORAL EVERY 8 HOURS PRN
Status: ON HOLD | COMMUNITY
End: 2018-07-17

## 2018-07-12 RX ORDER — POTASSIUM CHLORIDE 20 MEQ/1
40 TABLET, EXTENDED RELEASE ORAL EVERY 4 HOURS
Status: COMPLETED | OUTPATIENT
Start: 2018-07-12 | End: 2018-07-13

## 2018-07-12 RX ORDER — HYDROCODONE BITARTRATE AND ACETAMINOPHEN 5; 325 MG/1; MG/1
1 TABLET ORAL EVERY 4 HOURS PRN
Status: DISCONTINUED | OUTPATIENT
Start: 2018-07-12 | End: 2018-07-17

## 2018-07-12 RX ORDER — FUROSEMIDE 10 MG/ML
40 INJECTION INTRAMUSCULAR; INTRAVENOUS
Status: DISCONTINUED | OUTPATIENT
Start: 2018-07-12 | End: 2018-07-17

## 2018-07-12 RX ORDER — ONDANSETRON 2 MG/ML
4 INJECTION INTRAMUSCULAR; INTRAVENOUS EVERY 6 HOURS PRN
Status: DISCONTINUED | OUTPATIENT
Start: 2018-07-12 | End: 2018-07-15

## 2018-07-12 RX ORDER — ACETAMINOPHEN 325 MG/1
650 TABLET ORAL EVERY 4 HOURS PRN
Status: DISCONTINUED | OUTPATIENT
Start: 2018-07-12 | End: 2018-07-17

## 2018-07-12 RX ORDER — ASPIRIN 81 MG/1
324 TABLET, CHEWABLE ORAL ONCE
Status: COMPLETED | OUTPATIENT
Start: 2018-07-12 | End: 2018-07-12

## 2018-07-12 RX ORDER — HEPARIN SODIUM AND DEXTROSE 10000; 5 [USP'U]/100ML; G/100ML
1000 INJECTION INTRAVENOUS ONCE
Status: DISCONTINUED | OUTPATIENT
Start: 2018-07-12 | End: 2018-07-13

## 2018-07-12 RX ORDER — HEPARIN SODIUM AND DEXTROSE 10000; 5 [USP'U]/100ML; G/100ML
INJECTION INTRAVENOUS CONTINUOUS
Status: DISCONTINUED | OUTPATIENT
Start: 2018-07-13 | End: 2018-07-12

## 2018-07-12 RX ORDER — HEPARIN SODIUM 1000 [USP'U]/ML
5000 INJECTION, SOLUTION INTRAVENOUS; SUBCUTANEOUS ONCE
Status: COMPLETED | OUTPATIENT
Start: 2018-07-12 | End: 2018-07-12

## 2018-07-12 RX ORDER — MORPHINE SULFATE 4 MG/ML
4 INJECTION, SOLUTION INTRAMUSCULAR; INTRAVENOUS ONCE
Status: COMPLETED | OUTPATIENT
Start: 2018-07-12 | End: 2018-07-12

## 2018-07-12 RX ORDER — HYDROCODONE BITARTRATE AND ACETAMINOPHEN 5; 325 MG/1; MG/1
2 TABLET ORAL EVERY 4 HOURS PRN
Status: DISCONTINUED | OUTPATIENT
Start: 2018-07-12 | End: 2018-07-17

## 2018-07-12 RX ORDER — NITROGLYCERIN 0.4 MG/1
0.4 TABLET SUBLINGUAL EVERY 5 MIN PRN
Status: DISCONTINUED | OUTPATIENT
Start: 2018-07-12 | End: 2018-07-17

## 2018-07-12 RX ORDER — 0.9 % SODIUM CHLORIDE 0.9 %
VIAL (ML) INJECTION
Status: COMPLETED
Start: 2018-07-12 | End: 2018-07-12

## 2018-07-12 RX ORDER — SODIUM CHLORIDE 0.9 % (FLUSH) 0.9 %
3 SYRINGE (ML) INJECTION AS NEEDED
Status: DISCONTINUED | OUTPATIENT
Start: 2018-07-12 | End: 2018-07-17

## 2018-07-12 RX ORDER — HEPARIN SODIUM AND DEXTROSE 10000; 5 [USP'U]/100ML; G/100ML
INJECTION INTRAVENOUS CONTINUOUS
Status: DISCONTINUED | OUTPATIENT
Start: 2018-07-13 | End: 2018-07-13

## 2018-07-12 RX ORDER — ACETAMINOPHEN 325 MG/1
650 TABLET ORAL EVERY 6 HOURS PRN
Status: DISCONTINUED | OUTPATIENT
Start: 2018-07-12 | End: 2018-07-17

## 2018-07-12 RX ORDER — HEPARIN SODIUM AND DEXTROSE 10000; 5 [USP'U]/100ML; G/100ML
1000 INJECTION INTRAVENOUS ONCE
Status: DISCONTINUED | OUTPATIENT
Start: 2018-07-12 | End: 2018-07-15

## 2018-07-12 RX ORDER — ASPIRIN 325 MG
325 TABLET ORAL DAILY
Status: DISCONTINUED | OUTPATIENT
Start: 2018-07-13 | End: 2018-07-16

## 2018-07-12 NOTE — ED PROVIDER NOTES
Patient Seen in: [facility] AND [facility] Department    History   Patient presents with:  Chest Pain Angina (cardiovascular)    Stated Complaint: left arm pain, neck pain.  \"think im having a heart attack\"    HPI    79year old male presenting with c Oral Tab,  Take 1 tablet (20 mg total) by mouth nightly.    OMEPRAZOLE 40 MG Oral Capsule Delayed Release,  TAKE 1 CAPSULE(40 MG) BY MOUTH DAILY   Dorzolamide HCl-Timolol Mal 22.3-6.8 MG/ML Ophthalmic Solution,  INT 1 GTT IN OU BID   latanoprost 0.005 % Oph normal, no discharge. Sclera anicteric. Cardiovascular: tachy, regular  Respiratory: Normal breath sounds, no respiratory distress, no wheezing, no chest tenderness. GI: Bowel sounds normal, Soft, no tenderness, no masses, no pulsatile masses.   : No CV intervals and axes as noted on EKG Report.   Rate: 82  Rhythm: sinus rhythm  Reading: rbbb, lvh with strain           MDM     Monitor Interpretation:  80 nsr    Radiology Interpretation:  Xr Chest Pa + Lat Chest (cpt=71046)    Result Date: 7/12/2018  CONCLU aortic insufficiency, hypotension or shock) no         MDM     Admission disposition: 7/12/2018  7:19 PM                 Disposition and Plan     Clinical Impression:  Chest pain, rule out acute myocardial infarction  (primary encounter diagnosis)    Dispo

## 2018-07-12 NOTE — ED INITIAL ASSESSMENT (HPI)
Pt reports having chest pain on and off since last night, and today he feels a tightness in his throat and aching in his left arm. He has a history of 6 cardiac stents. States he has been out of his medications for almost a month.

## 2018-07-13 ENCOUNTER — APPOINTMENT (OUTPATIENT)
Dept: CV DIAGNOSTICS | Facility: HOSPITAL | Age: 68
DRG: 247 | End: 2018-07-13
Attending: HOSPITALIST
Payer: MEDICARE

## 2018-07-13 ENCOUNTER — APPOINTMENT (OUTPATIENT)
Dept: INTERVENTIONAL RADIOLOGY/VASCULAR | Facility: HOSPITAL | Age: 68
DRG: 247 | End: 2018-07-13
Attending: INTERNAL MEDICINE
Payer: MEDICARE

## 2018-07-13 LAB
ANION GAP SERPL CALC-SCNC: 9 MMOL/L (ref 0–18)
APTT PPP: 36.3 SECONDS (ref 23.2–35.3)
APTT PPP: 36.8 SECONDS (ref 23.2–35.3)
APTT PPP: 52.4 SECONDS (ref 23.2–35.3)
BASOPHILS # BLD: 0.1 K/UL (ref 0–0.2)
BASOPHILS NFR BLD: 1 %
BUN SERPL-MCNC: 12 MG/DL (ref 8–20)
BUN/CREAT SERPL: 11.8 (ref 10–20)
CALCIUM SERPL-MCNC: 9.6 MG/DL (ref 8.5–10.5)
CHLORIDE SERPL-SCNC: 105 MMOL/L (ref 95–110)
CHOLEST SERPL-MCNC: 152 MG/DL (ref 110–200)
CO2 SERPL-SCNC: 26 MMOL/L (ref 22–32)
CREAT SERPL-MCNC: 1.02 MG/DL (ref 0.5–1.5)
EOSINOPHIL # BLD: 0.7 K/UL (ref 0–0.7)
EOSINOPHIL NFR BLD: 6 %
ERYTHROCYTE [DISTWIDTH] IN BLOOD BY AUTOMATED COUNT: 15.1 % (ref 11–15)
FSH SERPL-ACNC: 0.2 MIU/ML (ref 1.3–19.3)
GLUCOSE BLDC GLUCOMTR-MCNC: 167 MG/DL (ref 70–99)
GLUCOSE BLDC GLUCOMTR-MCNC: 177 MG/DL (ref 70–99)
GLUCOSE SERPL-MCNC: 154 MG/DL (ref 70–99)
HCT VFR BLD AUTO: 44.5 % (ref 41–52)
HDLC SERPL-MCNC: 33 MG/DL
HGB BLD-MCNC: 14.2 G/DL (ref 13.5–17.5)
INR BLD: 1 (ref 0.9–1.2)
LDLC SERPL CALC-MCNC: 77 MG/DL (ref 0–99)
LYMPHOCYTES # BLD: 2.1 K/UL (ref 1–4)
LYMPHOCYTES NFR BLD: 18 %
MCH RBC QN AUTO: 28.8 PG (ref 27–32)
MCHC RBC AUTO-ENTMCNC: 32 G/DL (ref 32–37)
MCV RBC AUTO: 90 FL (ref 80–100)
MONOCYTES # BLD: 1 K/UL (ref 0–1)
MONOCYTES NFR BLD: 8 %
NEUTROPHILS # BLD AUTO: 7.9 K/UL (ref 1.8–7.7)
NEUTROPHILS NFR BLD: 68 %
NONHDLC SERPL-MCNC: 119 MG/DL
OSMOLALITY UR CALC.SUM OF ELEC: 293 MOSM/KG (ref 275–295)
PLATELET # BLD AUTO: 241 K/UL (ref 140–400)
PMV BLD AUTO: 8.4 FL (ref 7.4–10.3)
POTASSIUM SERPL-SCNC: 4.1 MMOL/L (ref 3.3–5.1)
PROTHROMBIN TIME: 12.7 SECONDS (ref 11.8–14.5)
RBC # BLD AUTO: 4.95 M/UL (ref 4.5–5.9)
SODIUM SERPL-SCNC: 140 MMOL/L (ref 136–144)
T3FREE SERPL-MCNC: 3.27 PG/ML (ref 2.53–4.29)
T4 FREE SERPL-MCNC: 0.65 NG/DL (ref 0.58–1.64)
TRIGL SERPL-MCNC: 208 MG/DL (ref 1–149)
TROPONIN I SERPL-MCNC: 0.01 NG/ML (ref ?–0.03)
TSH SERPL-ACNC: 15.42 UIU/ML (ref 0.45–5.33)
WBC # BLD AUTO: 11.8 K/UL (ref 4–11)

## 2018-07-13 PROCEDURE — 93306 TTE W/DOPPLER COMPLETE: CPT | Performed by: HOSPITALIST

## 2018-07-13 PROCEDURE — 99233 SBSQ HOSP IP/OBS HIGH 50: CPT | Performed by: HOSPITALIST

## 2018-07-13 RX ORDER — SODIUM CHLORIDE 9 MG/ML
INJECTION, SOLUTION INTRAVENOUS CONTINUOUS
Status: DISCONTINUED | OUTPATIENT
Start: 2018-07-13 | End: 2018-07-13

## 2018-07-13 RX ORDER — METOPROLOL SUCCINATE 25 MG/1
25 TABLET, EXTENDED RELEASE ORAL
Status: DISCONTINUED | OUTPATIENT
Start: 2018-07-13 | End: 2018-07-17

## 2018-07-13 RX ORDER — LATANOPROST 50 UG/ML
1 SOLUTION/ DROPS OPHTHALMIC DAILY
Status: DISCONTINUED | OUTPATIENT
Start: 2018-07-13 | End: 2018-07-17

## 2018-07-13 RX ORDER — GABAPENTIN 300 MG/1
300 CAPSULE ORAL 2 TIMES DAILY
Status: DISCONTINUED | OUTPATIENT
Start: 2018-07-13 | End: 2018-07-17

## 2018-07-13 RX ORDER — METOPROLOL SUCCINATE 25 MG/1
25 TABLET, EXTENDED RELEASE ORAL
Status: DISCONTINUED | OUTPATIENT
Start: 2018-07-14 | End: 2018-07-13

## 2018-07-13 RX ORDER — HEPARIN SODIUM 1000 [USP'U]/ML
3000 INJECTION, SOLUTION INTRAVENOUS; SUBCUTANEOUS ONCE
Status: COMPLETED | OUTPATIENT
Start: 2018-07-13 | End: 2018-07-13

## 2018-07-13 RX ORDER — CLOPIDOGREL BISULFATE 75 MG/1
75 TABLET ORAL DAILY
Status: DISCONTINUED | OUTPATIENT
Start: 2018-07-13 | End: 2018-07-16

## 2018-07-13 RX ORDER — TOPIRAMATE 25 MG/1
25 TABLET ORAL NIGHTLY
Status: DISCONTINUED | OUTPATIENT
Start: 2018-07-13 | End: 2018-07-17

## 2018-07-13 RX ORDER — CHLORHEXIDINE GLUCONATE 4 G/100ML
30 SOLUTION TOPICAL
Status: ACTIVE | OUTPATIENT
Start: 2018-07-14 | End: 2018-07-14

## 2018-07-13 RX ORDER — DEXTROSE MONOHYDRATE 25 G/50ML
50 INJECTION, SOLUTION INTRAVENOUS AS NEEDED
Status: DISCONTINUED | OUTPATIENT
Start: 2018-07-13 | End: 2018-07-17

## 2018-07-13 RX ORDER — LEVOTHYROXINE SODIUM 0.1 MG/1
100 TABLET ORAL
Status: DISCONTINUED | OUTPATIENT
Start: 2018-07-13 | End: 2018-07-17

## 2018-07-13 RX ORDER — ALFUZOSIN HYDROCHLORIDE 10 MG/1
10 TABLET, EXTENDED RELEASE ORAL
Status: DISCONTINUED | OUTPATIENT
Start: 2018-07-13 | End: 2018-07-17

## 2018-07-13 RX ORDER — ASPIRIN 81 MG/1
324 TABLET, CHEWABLE ORAL ONCE
Status: DISCONTINUED | OUTPATIENT
Start: 2018-07-13 | End: 2018-07-16

## 2018-07-13 RX ORDER — 0.9 % SODIUM CHLORIDE 0.9 %
VIAL (ML) INJECTION
Status: COMPLETED
Start: 2018-07-13 | End: 2018-07-13

## 2018-07-13 RX ORDER — ROSUVASTATIN CALCIUM 20 MG/1
20 TABLET, COATED ORAL NIGHTLY
Status: DISCONTINUED | OUTPATIENT
Start: 2018-07-13 | End: 2018-07-17

## 2018-07-13 RX ORDER — DORZOLAMIDE HYDROCHLORIDE AND TIMOLOL MALEATE 20; 5 MG/ML; MG/ML
1 SOLUTION/ DROPS OPHTHALMIC 2 TIMES DAILY
Status: DISCONTINUED | OUTPATIENT
Start: 2018-07-13 | End: 2018-07-17

## 2018-07-13 NOTE — HISTORICAL OFFICE NOTE
Bernard Pratt  : 1950  ACCOUNT:  [id number]  [phone]  PCP:    TODAY'S DATE: 2018  DICTATED BY:  [Dr. Ashlee Hampton: [Followup of CAD, of native vessels.]    HPI:    [On 2018, Nader Neumann, a 74year old male, presented w regular exercise. DIET: low sodium diet. MARITAL STATUS: single. OCCUPATION: retired.      ALLERGIES: No Known Allergies    MEDICATIONS: Selected prescriptions see below    VITAL SIGNS: [B/P - 102/78 , Pulse - 92, Weight -  236, Height -   69 , BMI - 34.8 ] Structure Following A Cardiac Catheterization  7. Tobacco abuse      PLAN:  [Please start cardiac rehab. Please see the dietitian to help to lose weight. Please continue taking your medications as directed. I will see you in 3 months.   Thank you]    PRE

## 2018-07-13 NOTE — PROGRESS NOTES
[facility]    Progress Note    Marge Salmon.  Patient Status:  Inpatient    3/23/1950 MRN [id number]   Location [facility]5W Attending Andrea Hollowayissa Day # 1 PCP Vandana Cruz MD     Subjective:     Eltono phone about care with pts permission         Results:     Lab Results  Component Value Date   WBC 11.8 (H) 07/13/2018   HGB 14.2 07/13/2018   HCT 44.5 07/13/2018    07/13/2018   CREATSERUM 1.02 07/13/2018   BUN 12 07/13/2018    07/13/2018   K

## 2018-07-13 NOTE — RESPIRATORY THERAPY NOTE
DIRK ASSESSMENT:    Pt does not have a previous diagnosis of DIRK. Pt does not routinely use a CPAP device at home. This pt is suspected to be at high risk for DIRK and sleep lab packet was provided to patient for outpatient follow-up.       RECOMENDATIONS:

## 2018-07-13 NOTE — CONSULTS
[facility]    Report of Consultation    Zulma Wang.  Patient Status:  Inpatient    3/23/1950 MRN [id number]   Location [address] [place] [address] Attending [facility] Day # 1 PCP Katherine Conrad MD     Date of Admission: Percodan [Kdc:Calixto*    HIVES    Prescriptions Prior to Admission:  HYDROcodone-acetaminophen  MG Oral Tab Take 1 tablet by mouth every 8 (eight) hours as needed for Pain.   gabapentin 300 MG Oral Cap Take 1 capsule (300 mg total) by mouth 2 (two) 4.1 07/13/2018    07/13/2018   CO2 26 07/13/2018    (H) 07/13/2018   CA 9.6 07/13/2018   ALB 3.7 05/31/2018   ALKPHO 63 05/31/2018   BILT 0.5 05/31/2018   TP 6.7 05/31/2018   AST 22 05/31/2018   ALT 26 05/31/2018   PTT 52.4 (H) 07/13/2018   IN TERE Arce  7/13/2018    =======================================================  Patient seen and examined independently. Note reviewed and labs reviewed. Agree with above assessment and plan.     Melina Alan MD  7/13/2018  Cardiology

## 2018-07-13 NOTE — PLAN OF CARE
Problem: Patient Centered Care  Goal: Patient preferences are identified and integrated in the patient's plan of care  Interventions:  - What would you like us to know as we care for you?  I moved here and havent taken my med because there is a problem with antiarrhythmic and heart rate control medications as ordered  - Initiate emergency measures for life threatening arrhythmias  - Monitor electrolytes and administer replacement therapy as ordered   Outcome: Progressing      Problem: METABOLIC/FLUID AND ELEC

## 2018-07-13 NOTE — CM/SW NOTE
SW self-referred to meet with patient due to case findings and diagnosis. Pt lives in apartment with 6 stairs to enter. Pt states that he lives alone but that he might be moving in with his daughter Haylee Mcdonald [address] [place] [address].  [place], [place]

## 2018-07-13 NOTE — H&P
[facility]    PATIENT'S NAME: Justen Cartwright   ATTENDING PHYSICIAN: Bethany Blum MD   PATIENT ACCOUNT#:   [id number]    LOCATION:  [place] [address]  MEDICAL RECORD #:   [id number]       YOB: 1950  ADMISSION DATE:       07/12/20 discomfort. It lasted throughout the night and then recurred this morning. No physical activity with the pain. No nausea or vomiting. The patient described diaphoresis lately at night. Other 12-point review of systems is negative.       PHYSICAL EXAMIN

## 2018-07-14 ENCOUNTER — APPOINTMENT (OUTPATIENT)
Dept: CT IMAGING | Facility: HOSPITAL | Age: 68
DRG: 247 | End: 2018-07-14
Attending: HOSPITALIST
Payer: MEDICARE

## 2018-07-14 ENCOUNTER — APPOINTMENT (OUTPATIENT)
Dept: CV DIAGNOSTICS | Facility: HOSPITAL | Age: 68
DRG: 247 | End: 2018-07-14
Attending: INTERNAL MEDICINE
Payer: MEDICARE

## 2018-07-14 ENCOUNTER — APPOINTMENT (OUTPATIENT)
Dept: NUCLEAR MEDICINE | Facility: HOSPITAL | Age: 68
DRG: 247 | End: 2018-07-14
Attending: INTERNAL MEDICINE
Payer: MEDICARE

## 2018-07-14 LAB
ANION GAP SERPL CALC-SCNC: 8 MMOL/L (ref 0–18)
BASOPHILS # BLD: 0.1 K/UL (ref 0–0.2)
BASOPHILS NFR BLD: 1 %
BUN SERPL-MCNC: 16 MG/DL (ref 8–20)
BUN/CREAT SERPL: 17 (ref 10–20)
CALCIUM SERPL-MCNC: 9.4 MG/DL (ref 8.5–10.5)
CHLORIDE SERPL-SCNC: 104 MMOL/L (ref 95–110)
CO2 SERPL-SCNC: 27 MMOL/L (ref 22–32)
CREAT SERPL-MCNC: 0.94 MG/DL (ref 0.5–1.5)
EOSINOPHIL # BLD: 0.5 K/UL (ref 0–0.7)
EOSINOPHIL NFR BLD: 5 %
ERYTHROCYTE [DISTWIDTH] IN BLOOD BY AUTOMATED COUNT: 14.8 % (ref 11–15)
ERYTHROCYTE [SEDIMENTATION RATE] IN BLOOD: 11 MM/HR (ref 0–20)
GLUCOSE BLDC GLUCOMTR-MCNC: 126 MG/DL (ref 70–99)
GLUCOSE BLDC GLUCOMTR-MCNC: 149 MG/DL (ref 70–99)
GLUCOSE BLDC GLUCOMTR-MCNC: 153 MG/DL (ref 70–99)
GLUCOSE BLDC GLUCOMTR-MCNC: 201 MG/DL (ref 70–99)
GLUCOSE SERPL-MCNC: 145 MG/DL (ref 70–99)
HCT VFR BLD AUTO: 46 % (ref 41–52)
HGB BLD-MCNC: 15 G/DL (ref 13.5–17.5)
LH SERPL-ACNC: 0.4 MIU/ML (ref 1.2–8.6)
LYMPHOCYTES # BLD: 1.9 K/UL (ref 1–4)
LYMPHOCYTES NFR BLD: 18 %
MAGNESIUM SERPL-MCNC: 1.6 MG/DL (ref 1.8–2.5)
MCH RBC QN AUTO: 29 PG (ref 27–32)
MCHC RBC AUTO-ENTMCNC: 32.6 G/DL (ref 32–37)
MCV RBC AUTO: 89.2 FL (ref 80–100)
MONOCYTES # BLD: 0.9 K/UL (ref 0–1)
MONOCYTES NFR BLD: 9 %
NEUTROPHILS # BLD AUTO: 7.3 K/UL (ref 1.8–7.7)
NEUTROPHILS NFR BLD: 68 %
OSMOLALITY UR CALC.SUM OF ELEC: 292 MOSM/KG (ref 275–295)
PLATELET # BLD AUTO: 278 K/UL (ref 140–400)
PMV BLD AUTO: 8.7 FL (ref 7.4–10.3)
POTASSIUM SERPL-SCNC: 4 MMOL/L (ref 3.3–5.1)
RBC # BLD AUTO: 5.15 M/UL (ref 4.5–5.9)
SODIUM SERPL-SCNC: 139 MMOL/L (ref 136–144)
VIT B12 SERPL-MCNC: 618 PG/ML (ref 181–914)
WBC # BLD AUTO: 10.8 K/UL (ref 4–11)

## 2018-07-14 PROCEDURE — 78452 HT MUSCLE IMAGE SPECT MULT: CPT | Performed by: INTERNAL MEDICINE

## 2018-07-14 PROCEDURE — 70470 CT HEAD/BRAIN W/O & W/DYE: CPT | Performed by: HOSPITALIST

## 2018-07-14 PROCEDURE — 93017 CV STRESS TEST TRACING ONLY: CPT | Performed by: INTERNAL MEDICINE

## 2018-07-14 PROCEDURE — 99233 SBSQ HOSP IP/OBS HIGH 50: CPT | Performed by: HOSPITALIST

## 2018-07-14 RX ORDER — MAGNESIUM SULFATE HEPTAHYDRATE 40 MG/ML
2 INJECTION, SOLUTION INTRAVENOUS ONCE
Status: COMPLETED | OUTPATIENT
Start: 2018-07-14 | End: 2018-07-16

## 2018-07-14 RX ORDER — LORAZEPAM 2 MG/ML
0.5 INJECTION INTRAMUSCULAR EVERY 6 HOURS PRN
Status: DISCONTINUED | OUTPATIENT
Start: 2018-07-14 | End: 2018-07-17

## 2018-07-14 RX ORDER — 0.9 % SODIUM CHLORIDE 0.9 %
VIAL (ML) INJECTION
Status: COMPLETED
Start: 2018-07-14 | End: 2018-07-14

## 2018-07-14 NOTE — PROGRESS NOTES
[facility]    Progress Note    Joseph Bryson.  Patient Status:  Inpatient    3/23/1950 MRN [id number]   Location [facility]5W Attending Candido Lacy,*   Hosp Day # 2 PCP Adilene Galeano MD       Subjective:   Joseph Bryson distress. Neurologic: Alert and oriented, normal affect. No motor or coordinational deficit. Skin: Warm and dry. Neck: No JVD, carotids 2+, no bruits. Cardiac: RRR. S1, S2 normal. No murmur or gallop.   Telemetry -  NSR, rates 70-80's  Lungs: Clear wi

## 2018-07-14 NOTE — PLAN OF CARE
Problem: Patient/Family Goals  Goal: Patient/Family Long Term Goal  Patient's Long Term Goal: To go home    Interventions:  - cath today, SW on consult for insurance assistance  - See additional Care Plan goals for specific interventions    Outcome: Progre

## 2018-07-14 NOTE — PROGRESS NOTES
Spoke with Caryl regarding procedure being cancelled today. Dr Celina Chou spoke with pt and family about the plan of care.

## 2018-07-14 NOTE — PROGRESS NOTES
[facility]    Progress Note    Juan Stanley.  Patient Status:  Inpatient    3/23/1950 MRN [id number]   Location [facility]5 [facility] ED Attending Andrea Holloway [place] Day # 2 PCP Verito Neri MD     Subjective:     Yatio repeated that to me as well will check ct and call neuro Mon to eval  7.        Hypothyroid and missed meds restart asap tsh high  ekg tracings reviewed  55 min spent on pt of which 30 min spent coordinating care with [facility]/nurse/dr riley/dr lovell and counseling

## 2018-07-15 LAB
ANION GAP SERPL CALC-SCNC: 9 MMOL/L (ref 0–18)
BASOPHILS # BLD: 0.1 K/UL (ref 0–0.2)
BASOPHILS NFR BLD: 1 %
BUN SERPL-MCNC: 15 MG/DL (ref 8–20)
BUN/CREAT SERPL: 14.6 (ref 10–20)
CALCIUM SERPL-MCNC: 9.3 MG/DL (ref 8.5–10.5)
CHLORIDE SERPL-SCNC: 101 MMOL/L (ref 95–110)
CO2 SERPL-SCNC: 27 MMOL/L (ref 22–32)
CREAT SERPL-MCNC: 1.03 MG/DL (ref 0.5–1.5)
EOSINOPHIL # BLD: 0.5 K/UL (ref 0–0.7)
EOSINOPHIL NFR BLD: 4 %
ERYTHROCYTE [DISTWIDTH] IN BLOOD BY AUTOMATED COUNT: 14.8 % (ref 11–15)
GLUCOSE BLDC GLUCOMTR-MCNC: 126 MG/DL (ref 70–99)
GLUCOSE BLDC GLUCOMTR-MCNC: 155 MG/DL (ref 70–99)
GLUCOSE BLDC GLUCOMTR-MCNC: 167 MG/DL (ref 70–99)
GLUCOSE BLDC GLUCOMTR-MCNC: 222 MG/DL (ref 70–99)
GLUCOSE SERPL-MCNC: 162 MG/DL (ref 70–99)
HCT VFR BLD AUTO: 48 % (ref 41–52)
HGB BLD-MCNC: 15.5 G/DL (ref 13.5–17.5)
LYMPHOCYTES # BLD: 1.8 K/UL (ref 1–4)
LYMPHOCYTES NFR BLD: 16 %
MAGNESIUM SERPL-MCNC: 1.9 MG/DL (ref 1.8–2.5)
MCH RBC QN AUTO: 28.6 PG (ref 27–32)
MCHC RBC AUTO-ENTMCNC: 32.2 G/DL (ref 32–37)
MCV RBC AUTO: 89 FL (ref 80–100)
MONOCYTES # BLD: 1.1 K/UL (ref 0–1)
MONOCYTES NFR BLD: 10 %
NEUTROPHILS # BLD AUTO: 8 K/UL (ref 1.8–7.7)
NEUTROPHILS NFR BLD: 69 %
OSMOLALITY UR CALC.SUM OF ELEC: 288 MOSM/KG (ref 275–295)
PLATELET # BLD AUTO: 275 K/UL (ref 140–400)
PMV BLD AUTO: 8.3 FL (ref 7.4–10.3)
POTASSIUM SERPL-SCNC: 3.8 MMOL/L (ref 3.3–5.1)
RBC # BLD AUTO: 5.4 M/UL (ref 4.5–5.9)
SODIUM SERPL-SCNC: 137 MMOL/L (ref 136–144)
WBC # BLD AUTO: 11.5 K/UL (ref 4–11)

## 2018-07-15 PROCEDURE — 99233 SBSQ HOSP IP/OBS HIGH 50: CPT | Performed by: HOSPITALIST

## 2018-07-15 RX ORDER — FAMOTIDINE 20 MG/1
20 TABLET ORAL 2 TIMES DAILY
Status: DISCONTINUED | OUTPATIENT
Start: 2018-07-15 | End: 2018-07-17

## 2018-07-15 RX ORDER — POTASSIUM CHLORIDE 20 MEQ/1
40 TABLET, EXTENDED RELEASE ORAL ONCE
Status: COMPLETED | OUTPATIENT
Start: 2018-07-15 | End: 2018-07-15

## 2018-07-15 RX ORDER — 0.9 % SODIUM CHLORIDE 0.9 %
VIAL (ML) INJECTION
Status: COMPLETED
Start: 2018-07-15 | End: 2018-07-15

## 2018-07-15 RX ORDER — SODIUM CHLORIDE 9 MG/ML
INJECTION, SOLUTION INTRAVENOUS CONTINUOUS
Status: DISCONTINUED | OUTPATIENT
Start: 2018-07-15 | End: 2018-07-16

## 2018-07-15 RX ORDER — CHLORHEXIDINE GLUCONATE 4 G/100ML
30 SOLUTION TOPICAL
Status: COMPLETED | OUTPATIENT
Start: 2018-07-15 | End: 2018-07-15

## 2018-07-15 RX ORDER — ONDANSETRON 2 MG/ML
4 INJECTION INTRAMUSCULAR; INTRAVENOUS EVERY 4 HOURS PRN
Status: DISCONTINUED | OUTPATIENT
Start: 2018-07-15 | End: 2018-07-17

## 2018-07-15 RX ORDER — DIPHENHYDRAMINE HCL 25 MG
25 CAPSULE ORAL EVERY 6 HOURS PRN
Status: DISCONTINUED | OUTPATIENT
Start: 2018-07-15 | End: 2018-07-17

## 2018-07-15 NOTE — PLAN OF CARE
Problem: Patient Centered Care  Goal: Patient preferences are identified and integrated in the patient's plan of care  Interventions:  - What would you like us to know as we care for you?  I moved here and havent taken my med because there is a problem with of antiarrhythmic and heart rate control medications as ordered  - Initiate emergency measures for life threatening arrhythmias  - Monitor electrolytes and administer replacement therapy as ordered   Outcome: Progressing      Problem: METABOLIC/FLUID AND E

## 2018-07-15 NOTE — PROGRESS NOTES
[facility]    Progress Note    Humberto Meyer.  Patient Status:  Inpatient    3/23/1950 MRN [id number]   Location [facility]5W Attending Andrea Holloway [place] Day # 3 PCP Gay Beverly MD       Subjective:   Humberto Meyer No apparent distress. No respiratory or constitutional distress. Neurologic: Alert and oriented, normal affect. No motor or coordinational deficit. Skin: Warm and dry. Neck: No JVD, carotids 2+, no bruits. Cardiac: RRR.  S1, S2 normal. No murmur or ga However, no evidence of pituitary enlargement on this exam. 4. Bilateral ethmoid, sphenoid and right frontal sinus mucosal changes 1.     Dictated by (CST): Antonella King MD on 7/14/2018 at 17:13     Approved by (CST): Antonella King MD on 7/14/2

## 2018-07-15 NOTE — PROGRESS NOTES
[facility]    Progress Note    Donavon Granado.  Patient Status:  Inpatient    3/23/1950 MRN [id number]   Location [facility] Attending Andrea Holloway Day # 3 PCP Fritz Elias MD     Subjective:     Eltono which 35 min spent coordinating care with nurse/dr riley and counseling pt as well as daughter peaches by phone about care with pts permission            Results:     Lab Results  Component Value Date   WBC 11.5 (H) 07/15/2018   HGB 15.5 07/15/2018   HCT 48

## 2018-07-15 NOTE — PLAN OF CARE
Problem: Patient/Family Goals  Goal: Patient/Family Long Term Goal  Patient's Long Term Goal: To go home    Interventions:  - cath today, SW on consult for insurance assistance  - See additional Care Plan goals for specific interventions    Outcome: Progre needed  - Instruct patient on self management of diabetes   Outcome: Progressing    Goal: Electrolytes maintained within normal limits  INTERVENTIONS:  - Monitor labs and rhythm and assess patient for signs and symptoms of electrolyte imbalances  - Adminis

## 2018-07-16 ENCOUNTER — APPOINTMENT (OUTPATIENT)
Dept: INTERVENTIONAL RADIOLOGY/VASCULAR | Facility: HOSPITAL | Age: 68
DRG: 247 | End: 2018-07-16
Attending: NURSE PRACTITIONER
Payer: MEDICARE

## 2018-07-16 LAB
ANION GAP SERPL CALC-SCNC: 11 MMOL/L (ref 0–18)
APTT PPP: 26.2 SECONDS (ref 23.2–35.3)
BASOPHILS # BLD: 0.1 K/UL (ref 0–0.2)
BASOPHILS NFR BLD: 1 %
BUN SERPL-MCNC: 21 MG/DL (ref 8–20)
BUN/CREAT SERPL: 16.5 (ref 10–20)
CALCIUM SERPL-MCNC: 9 MG/DL (ref 8.5–10.5)
CHLORIDE SERPL-SCNC: 100 MMOL/L (ref 95–110)
CO2 SERPL-SCNC: 26 MMOL/L (ref 22–32)
CREAT SERPL-MCNC: 1.27 MG/DL (ref 0.5–1.5)
EOSINOPHIL # BLD: 0.4 K/UL (ref 0–0.7)
EOSINOPHIL NFR BLD: 4 %
ERYTHROCYTE [DISTWIDTH] IN BLOOD BY AUTOMATED COUNT: 14.5 % (ref 11–15)
GLUCOSE BLDC GLUCOMTR-MCNC: 135 MG/DL (ref 70–99)
GLUCOSE BLDC GLUCOMTR-MCNC: 169 MG/DL (ref 70–99)
GLUCOSE SERPL-MCNC: 163 MG/DL (ref 70–99)
HCT VFR BLD AUTO: 46.6 % (ref 41–52)
HGB BLD-MCNC: 15.2 G/DL (ref 13.5–17.5)
INR BLD: 1 (ref 0.9–1.2)
LYMPHOCYTES # BLD: 1.6 K/UL (ref 1–4)
LYMPHOCYTES NFR BLD: 15 %
MAGNESIUM SERPL-MCNC: 1.9 MG/DL (ref 1.8–2.5)
MCH RBC QN AUTO: 29.1 PG (ref 27–32)
MCHC RBC AUTO-ENTMCNC: 32.6 G/DL (ref 32–37)
MCV RBC AUTO: 89.3 FL (ref 80–100)
MONOCYTES # BLD: 1.1 K/UL (ref 0–1)
MONOCYTES NFR BLD: 10 %
NEUTROPHILS # BLD AUTO: 7.6 K/UL (ref 1.8–7.7)
NEUTROPHILS NFR BLD: 70 %
OSMOLALITY UR CALC.SUM OF ELEC: 291 MOSM/KG (ref 275–295)
PLATELET # BLD AUTO: 256 K/UL (ref 140–400)
PMV BLD AUTO: 8.4 FL (ref 7.4–10.3)
POTASSIUM SERPL-SCNC: 4 MMOL/L (ref 3.3–5.1)
PROTHROMBIN TIME: 12.4 SECONDS (ref 11.8–14.5)
RBC # BLD AUTO: 5.22 M/UL (ref 4.5–5.9)
SODIUM SERPL-SCNC: 137 MMOL/L (ref 136–144)
T PALLIDUM AB SER QL: NEGATIVE
WBC # BLD AUTO: 10.8 K/UL (ref 4–11)

## 2018-07-16 PROCEDURE — 99233 SBSQ HOSP IP/OBS HIGH 50: CPT | Performed by: HOSPITALIST

## 2018-07-16 PROCEDURE — B2111ZZ FLUOROSCOPY OF MULTIPLE CORONARY ARTERIES USING LOW OSMOLAR CONTRAST: ICD-10-PCS | Performed by: INTERNAL MEDICINE

## 2018-07-16 PROCEDURE — 4A023N7 MEASUREMENT OF CARDIAC SAMPLING AND PRESSURE, LEFT HEART, PERCUTANEOUS APPROACH: ICD-10-PCS | Performed by: INTERNAL MEDICINE

## 2018-07-16 PROCEDURE — 027034Z DILATION OF CORONARY ARTERY, ONE ARTERY WITH DRUG-ELUTING INTRALUMINAL DEVICE, PERCUTANEOUS APPROACH: ICD-10-PCS | Performed by: INTERNAL MEDICINE

## 2018-07-16 PROCEDURE — 02C03ZZ EXTIRPATION OF MATTER FROM CORONARY ARTERY, ONE ARTERY, PERCUTANEOUS APPROACH: ICD-10-PCS | Performed by: INTERNAL MEDICINE

## 2018-07-16 RX ORDER — NITROGLYCERIN 0.4 MG/1
TABLET SUBLINGUAL
Status: COMPLETED
Start: 2018-07-16 | End: 2018-07-16

## 2018-07-16 RX ORDER — LIDOCAINE HYDROCHLORIDE 20 MG/ML
INJECTION, SOLUTION EPIDURAL; INFILTRATION; INTRACAUDAL; PERINEURAL
Status: COMPLETED
Start: 2018-07-16 | End: 2018-07-16

## 2018-07-16 RX ORDER — HEPARIN SODIUM 1000 [USP'U]/ML
INJECTION, SOLUTION INTRAVENOUS; SUBCUTANEOUS
Status: COMPLETED
Start: 2018-07-16 | End: 2018-07-16

## 2018-07-16 RX ORDER — CLOPIDOGREL BISULFATE 75 MG/1
TABLET ORAL
Status: COMPLETED
Start: 2018-07-16 | End: 2018-07-16

## 2018-07-16 RX ORDER — NITROGLYCERIN 20 MG/100ML
INJECTION INTRAVENOUS CONTINUOUS
Status: DISCONTINUED | OUTPATIENT
Start: 2018-07-16 | End: 2018-07-17

## 2018-07-16 RX ORDER — NITROGLYCERIN 20 MG/100ML
INJECTION INTRAVENOUS
Status: COMPLETED
Start: 2018-07-16 | End: 2018-07-16

## 2018-07-16 RX ORDER — MORPHINE SULFATE 4 MG/ML
INJECTION, SOLUTION INTRAMUSCULAR; INTRAVENOUS
Status: COMPLETED
Start: 2018-07-16 | End: 2018-07-16

## 2018-07-16 RX ORDER — MORPHINE SULFATE 4 MG/ML
2 INJECTION, SOLUTION INTRAMUSCULAR; INTRAVENOUS ONCE
Status: COMPLETED | OUTPATIENT
Start: 2018-07-16 | End: 2018-07-16

## 2018-07-16 RX ORDER — NITROGLYCERIN 20 MG/100ML
INJECTION INTRAVENOUS
Status: DISCONTINUED
Start: 2018-07-16 | End: 2018-07-17

## 2018-07-16 RX ORDER — ASPIRIN 81 MG/1
81 TABLET ORAL DAILY
Status: DISCONTINUED | OUTPATIENT
Start: 2018-07-17 | End: 2018-07-17

## 2018-07-16 RX ORDER — MIDAZOLAM HYDROCHLORIDE 1 MG/ML
INJECTION INTRAMUSCULAR; INTRAVENOUS
Status: COMPLETED
Start: 2018-07-16 | End: 2018-07-16

## 2018-07-16 RX ORDER — CLOPIDOGREL BISULFATE 75 MG/1
75 TABLET ORAL DAILY
Status: DISCONTINUED | OUTPATIENT
Start: 2018-07-17 | End: 2018-07-17

## 2018-07-16 RX ORDER — ZOLPIDEM TARTRATE 5 MG/1
5 TABLET ORAL NIGHTLY PRN
Status: DISCONTINUED | OUTPATIENT
Start: 2018-07-16 | End: 2018-07-17

## 2018-07-16 RX ORDER — SODIUM CHLORIDE 9 MG/ML
INJECTION, SOLUTION INTRAVENOUS CONTINUOUS
Status: ACTIVE | OUTPATIENT
Start: 2018-07-16 | End: 2018-07-16

## 2018-07-16 NOTE — PROGRESS NOTES
[facility]    Progress Note    Concepción Wade.  Patient Status:  Inpatient    3/23/1950 MRN [id number]   Location [facility] Attending Andrea Holloway Day # 4 PCP Eva Goodwin MD     Subject pain notes; itching on norco and cannot tolerate nsaids            Add benadryl and pepcid; stay on norco  51 min spent on pt of which 33 min spent coordinating care with nurse/dr riley and counseling pt as well as daughter peaches by phone about care with

## 2018-07-16 NOTE — PROGRESS NOTES
Procedure and Bedside hand off report given to [facility]. Pt's vital signs are stable. Procedural access site is dry and intact with no signs and symptoms of bleeding and hematoma. Dr. Julito Blount and Dr. Aurelio Le spoke with patient/family post procedure.

## 2018-07-16 NOTE — BRIEF PROCEDURE NOTE
[facility]    Brief Cardiac Cath Note  Anastacio Ochoa.  Patient Status:  Inpatient    3/23/1950 MRN [id number]   Location [facility] Attending Andrea Holloway [place] Day # 4 PCP Kimani Fontenot MD

## 2018-07-17 ENCOUNTER — TELEPHONE (OUTPATIENT)
Dept: NEUROLOGY | Facility: CLINIC | Age: 68
End: 2018-07-17

## 2018-07-17 VITALS
DIASTOLIC BLOOD PRESSURE: 80 MMHG | HEART RATE: 92 BPM | HEIGHT: 69 IN | SYSTOLIC BLOOD PRESSURE: 135 MMHG | BODY MASS INDEX: 42.65 KG/M2 | OXYGEN SATURATION: 98 % | RESPIRATION RATE: 17 BRPM | TEMPERATURE: 97 F | WEIGHT: 288 LBS

## 2018-07-17 LAB
ANION GAP SERPL CALC-SCNC: 9 MMOL/L (ref 0–18)
APTT PPP: 26.8 SECONDS (ref 23.2–35.3)
BASOPHILS # BLD: 0 K/UL (ref 0–0.2)
BASOPHILS NFR BLD: 1 %
BUN SERPL-MCNC: 18 MG/DL (ref 8–20)
BUN/CREAT SERPL: 15.4 (ref 10–20)
CALCIUM SERPL-MCNC: 8.5 MG/DL (ref 8.5–10.5)
CHLORIDE SERPL-SCNC: 101 MMOL/L (ref 95–110)
CO2 SERPL-SCNC: 26 MMOL/L (ref 22–32)
CREAT SERPL-MCNC: 1.17 MG/DL (ref 0.5–1.5)
EOSINOPHIL # BLD: 0.3 K/UL (ref 0–0.7)
EOSINOPHIL NFR BLD: 4 %
ERYTHROCYTE [DISTWIDTH] IN BLOOD BY AUTOMATED COUNT: 14.6 % (ref 11–15)
GLUCOSE BLDC GLUCOMTR-MCNC: 152 MG/DL (ref 70–99)
GLUCOSE BLDC GLUCOMTR-MCNC: 168 MG/DL (ref 70–99)
GLUCOSE SERPL-MCNC: 148 MG/DL (ref 70–99)
HCT VFR BLD AUTO: 45.4 % (ref 41–52)
HGB BLD-MCNC: 14.6 G/DL (ref 13.5–17.5)
LYMPHOCYTES # BLD: 1.4 K/UL (ref 1–4)
LYMPHOCYTES NFR BLD: 15 %
MAGNESIUM SERPL-MCNC: 1.9 MG/DL (ref 1.8–2.5)
MCH RBC QN AUTO: 29.2 PG (ref 27–32)
MCHC RBC AUTO-ENTMCNC: 32.2 G/DL (ref 32–37)
MCV RBC AUTO: 90.7 FL (ref 80–100)
MONOCYTES # BLD: 0.9 K/UL (ref 0–1)
MONOCYTES NFR BLD: 10 %
NEUTROPHILS # BLD AUTO: 6.2 K/UL (ref 1.8–7.7)
NEUTROPHILS NFR BLD: 70 %
OSMOLALITY UR CALC.SUM OF ELEC: 287 MOSM/KG (ref 275–295)
PLATELET # BLD AUTO: 226 K/UL (ref 140–400)
PMV BLD AUTO: 8.6 FL (ref 7.4–10.3)
POTASSIUM SERPL-SCNC: 3.4 MMOL/L (ref 3.3–5.1)
RBC # BLD AUTO: 5 M/UL (ref 4.5–5.9)
SODIUM SERPL-SCNC: 136 MMOL/L (ref 136–144)
WBC # BLD AUTO: 8.9 K/UL (ref 4–11)

## 2018-07-17 PROCEDURE — 99239 HOSP IP/OBS DSCHRG MGMT >30: CPT | Performed by: HOSPITALIST

## 2018-07-17 PROCEDURE — 99223 1ST HOSP IP/OBS HIGH 75: CPT | Performed by: OTHER

## 2018-07-17 RX ORDER — FAMOTIDINE 20 MG/1
20 TABLET ORAL 2 TIMES DAILY
Qty: 60 TABLET | Refills: 0 | Status: SHIPPED | OUTPATIENT
Start: 2018-07-17 | End: 2018-07-21

## 2018-07-17 RX ORDER — SPIRONOLACTONE 25 MG/1
25 TABLET ORAL DAILY
Status: DISCONTINUED | OUTPATIENT
Start: 2018-07-17 | End: 2018-07-17

## 2018-07-17 RX ORDER — TAMSULOSIN HYDROCHLORIDE 0.4 MG/1
0.8 CAPSULE ORAL DAILY
Qty: 60 CAPSULE | Refills: 0 | Status: SHIPPED | OUTPATIENT
Start: 2018-07-17 | End: 2018-10-21

## 2018-07-17 RX ORDER — HYDROCODONE BITARTRATE AND ACETAMINOPHEN 10; 325 MG/1; MG/1
1 TABLET ORAL EVERY 8 HOURS PRN
Qty: 15 TABLET | Refills: 0 | Status: ON HOLD | OUTPATIENT
Start: 2018-07-17 | End: 2018-07-24

## 2018-07-17 RX ORDER — ROSUVASTATIN CALCIUM 20 MG/1
20 TABLET, COATED ORAL NIGHTLY
Qty: 90 TABLET | Refills: 3 | Status: SHIPPED | OUTPATIENT
Start: 2018-07-17 | End: 2019-01-31

## 2018-07-17 RX ORDER — FUROSEMIDE 40 MG/1
40 TABLET ORAL DAILY
Qty: 30 TABLET | Refills: 0 | Status: SHIPPED | OUTPATIENT
Start: 2018-07-18 | End: 2018-11-30

## 2018-07-17 RX ORDER — POTASSIUM CHLORIDE 20 MEQ/1
40 TABLET, EXTENDED RELEASE ORAL EVERY 4 HOURS
Status: COMPLETED | OUTPATIENT
Start: 2018-07-17 | End: 2018-07-17

## 2018-07-17 RX ORDER — LATANOPROST 50 UG/ML
1 SOLUTION/ DROPS OPHTHALMIC DAILY
Qty: 1 BOTTLE | Refills: 0 | Status: SHIPPED | OUTPATIENT
Start: 2018-07-18 | End: 2020-02-20 | Stop reason: ALTCHOICE

## 2018-07-17 RX ORDER — POTASSIUM CHLORIDE 750 MG/1
10 TABLET, EXTENDED RELEASE ORAL
Qty: 15 TABLET | Refills: 0 | Status: SHIPPED | OUTPATIENT
Start: 2018-07-17 | End: 2018-11-30

## 2018-07-17 RX ORDER — FUROSEMIDE 40 MG/1
40 TABLET ORAL DAILY
Status: DISCONTINUED | OUTPATIENT
Start: 2018-07-17 | End: 2018-07-17

## 2018-07-17 RX ORDER — LEVOTHYROXINE SODIUM 0.1 MG/1
100 TABLET ORAL
Qty: 30 TABLET | Refills: 0 | Status: SHIPPED | OUTPATIENT
Start: 2018-07-18 | End: 2018-11-21

## 2018-07-17 RX ORDER — CLOPIDOGREL BISULFATE 75 MG/1
75 TABLET ORAL DAILY
Qty: 90 TABLET | Refills: 3 | Status: SHIPPED | OUTPATIENT
Start: 2018-07-17 | End: 2018-11-15

## 2018-07-17 RX ORDER — METOPROLOL SUCCINATE 25 MG/1
25 TABLET, EXTENDED RELEASE ORAL
Qty: 60 TABLET | Refills: 0 | Status: ON HOLD | OUTPATIENT
Start: 2018-07-18 | End: 2018-07-24

## 2018-07-17 RX ORDER — SPIRONOLACTONE 25 MG/1
25 TABLET ORAL DAILY
Qty: 30 TABLET | Refills: 0 | Status: SHIPPED | OUTPATIENT
Start: 2018-07-18 | End: 2019-01-31

## 2018-07-17 RX ORDER — 0.9 % SODIUM CHLORIDE 0.9 %
VIAL (ML) INJECTION
Status: COMPLETED
Start: 2018-07-17 | End: 2018-07-17

## 2018-07-17 RX ORDER — DORZOLAMIDE HYDROCHLORIDE AND TIMOLOL MALEATE 20; 5 MG/ML; MG/ML
1 SOLUTION/ DROPS OPHTHALMIC 2 TIMES DAILY
Qty: 1 BOTTLE | Refills: 0 | Status: SHIPPED | OUTPATIENT
Start: 2018-07-17 | End: 2020-05-01

## 2018-07-17 RX ORDER — ASPIRIN 81 MG/1
81 TABLET ORAL DAILY
Qty: 30 TABLET | Refills: 0 | Status: SHIPPED | OUTPATIENT
Start: 2018-07-18 | End: 2019-11-07

## 2018-07-17 NOTE — DISCHARGE SUMMARY
More than 30 min spent on dc  Dc summary # [id number]  Hospital Discharge Diagnoses: ubstable angina    Lace+ Score: 67  59-90 High Risk  29-58 Medium Risk  0-28   Low Risk. TCM Follow-Up Recommendation:  LACE > 58:  High Risk of readmission after discharg

## 2018-07-17 NOTE — CM/SW NOTE
3:11pm Update- Scripts not in chart. RICHAR unable to check the cash price for the meds needed. RN states the pt will need new plavix, cardiac meds, diabetic meds. RICHAR placed call to RICHAR Mgr to request assistance with options.  RICHAR provided resources for applying

## 2018-07-17 NOTE — CARDIAC REHAB
Cardiac Rehab Phase I    Activity:  Distance :Per self  Assistance needed :No  Patient tolerated activity Well per patient. .    Education:  Handouts provided and reviewed: [address] Diet: Healthy Cardiac diet reviewed.     Disease

## 2018-07-17 NOTE — PROGRESS NOTES
[facility]  [facility] Cardiology Progress Note    Humberto Meyer.  Patient Status:  Inpatient    3/23/1950 MRN [id number]   Location [facility] 2W/SW Attending Andrea Holloway Asher Day # 5 PCP Gay Beverly MD     80 yo male presen °C)  Pulse: 88  Resp: 20  BP: 134/85    Intake/Output:     Intake/Output Summary (Last 24 hours) at 07/17/18 0923  Last data filed at 07/17/18 0600   Gross per 24 hour   Intake             1020 ml   Output             1450 ml   Net             -430 ml

## 2018-07-17 NOTE — PLAN OF CARE
PAIN - ADULT    • Verbalizes/displays adequate comfort level or patient's stated pain goal Not Progressing        Patient Centered Care    • Patient preferences are identified and integrated in the patient's plan of care Not Progressing        Patient/Fami
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
## 2018-07-17 NOTE — TRANSITION NOTE
[facility]OSP - [facility]    Cardiology Discharge Summary    Char Bolanos.  Patient Status:  Inpatient    3/23/1950 MRN [id number]   [facility] 2W/SW Attending Andrea Hollowayissa Day # 5 PCP MD Nik Nguyen Daily Beta Blocker. Rosuvastatin Calcium 20 MG Oral Tab  Take 1 tablet (20 mg total) by mouth nightly. tamsulosin HCl 0.4 MG Oral Cap  Take 0.8 mg by mouth daily.                TOPIRAMATE 25 MG Oral Tab  TAKE 1 TABLET BY MOUTH EVERY

## 2018-07-17 NOTE — CONSULTS
Neurology Inpatient Consult Note    Zulma Wang. : 3/23/1950   Referring Physician: Dr. Maureen Leyva  HPI:     Zulma Wang. is a 79year old male who is being seen in neurologic evaluation.     Patient being seen in evaluation for cognitive impair Other Topics            Concern  Caffeine Concern        Yes  Exercise                No    Social History Narrative    The patient uses the following assistive device(s):  single-point cane. The patient does live in a home with stairs. However, no evidence of pituitary enlargement on this exam.  4. Bilateral ethmoid, sphenoid and right frontal sinus mucosal changes       B12 618, treponemal antibody screen negative, TSH 15.42, ESR 11, LDL 77    ASSESSMENT AND PLAN:   Memory loss  Althoug

## 2018-07-17 NOTE — TELEPHONE ENCOUNTER
Called patient who states he is in [address] [place] [address] currently. He states he went to ED on 7/12/2018 with chest pain and SOB. He was admitted and reported that he had to have a stent placed.       Patient states that he brought all his medications to the hospital with

## 2018-07-18 ENCOUNTER — PATIENT OUTREACH (OUTPATIENT)
Dept: CASE MANAGEMENT | Age: 68
End: 2018-07-18

## 2018-07-18 NOTE — TELEPHONE ENCOUNTER
Called patient to inform him of 's remarks regarding \"stolen Norco\". Also, restated [facility] Policy on safeguarding of opoid medications. Patient states, \" that's cool, I understand\".

## 2018-07-18 NOTE — TELEPHONE ENCOUNTER
Needs to report this to nursing staff and see if the medication turns up. Policy is to not replace lost or stolen opioid medications.  In the future he should keep the medication locked up and bring a list with him so that the medication does not get lost.

## 2018-07-18 NOTE — DISCHARGE SUMMARY
[facility]    PATIENT'S NAME: Shanelle Lyn   ATTENDING PHYSICIAN: Dennys Holloway MD   PATIENT ACCOUNT#:   [id number]    LOCATION:  [address] [place] [address]. RECORD #:   [id number]       YOB: 1950  ADMISSION DATE:       07/12/2 I discussed with his daughter. Dr. Lizzette Pacheco came to see him and believed either depression or some early frontotemporal dementia may have been responsible for his symptoms. We discussed workup which would be done as an outpatient.   He was actually anxious July 27. Follow up with Dr. Abbey Robertson in 2 weeks, Dr. Kvng Singletary in 2 weeks, Dr. Sami Starks in 2 weeks, and Dr. Melody Keller in 3 days. Return if fevers, chills, sweats, nausea, vomiting, chest pain, shortness of breath or other complaints.     DISCHARGE MEDICATIONS:

## 2018-07-19 NOTE — PROGRESS NOTES
Initial Post Discharge Follow Up   Discharge Date: 7/17/18  Contact Date: 7/18/2018    Consent Verification:  Assessment Completed With: Patient  HIPAA Verified?   Yes    Discharge Dx:   Chest pain R/O MI    General:   • How have you been since your disc

## 2018-07-21 ENCOUNTER — PRIOR ORIGINAL RECORDS (OUTPATIENT)
Dept: OTHER | Age: 68
End: 2018-07-21

## 2018-07-21 ENCOUNTER — HOSPITAL ENCOUNTER (INPATIENT)
Facility: HOSPITAL | Age: 68
LOS: 3 days | Discharge: HOME | DRG: 246 | End: 2018-07-24
Attending: EMERGENCY MEDICINE | Admitting: HOSPITALIST
Payer: MEDICARE

## 2018-07-21 ENCOUNTER — APPOINTMENT (OUTPATIENT)
Dept: GENERAL RADIOLOGY | Facility: HOSPITAL | Age: 68
DRG: 246 | End: 2018-07-21
Attending: EMERGENCY MEDICINE
Payer: MEDICARE

## 2018-07-21 DIAGNOSIS — I20.0 UNSTABLE ANGINA (HCC): Primary | ICD-10-CM

## 2018-07-21 LAB
ANION GAP SERPL CALC-SCNC: 10 MMOL/L (ref 0–18)
APTT PPP: 28.1 SECONDS (ref 23.2–35.3)
APTT PPP: 36.2 SECONDS (ref 23.2–35.3)
BASOPHILS # BLD: 0.1 K/UL (ref 0–0.2)
BASOPHILS NFR BLD: 1 %
BNP SERPL-MCNC: 28 PG/ML (ref 0–100)
BUN SERPL-MCNC: 20 MG/DL (ref 8–20)
BUN/CREAT SERPL: 16.7 (ref 10–20)
CALCIUM SERPL-MCNC: 9.9 MG/DL (ref 8.5–10.5)
CHLORIDE SERPL-SCNC: 105 MMOL/L (ref 95–110)
CHOLEST SERPL-MCNC: 117 MG/DL (ref 110–200)
CO2 SERPL-SCNC: 23 MMOL/L (ref 22–32)
CREAT SERPL-MCNC: 1.2 MG/DL (ref 0.5–1.5)
EOSINOPHIL # BLD: 0.6 K/UL (ref 0–0.7)
EOSINOPHIL NFR BLD: 5 %
ERYTHROCYTE [DISTWIDTH] IN BLOOD BY AUTOMATED COUNT: 14.8 % (ref 11–15)
GLUCOSE BLDC GLUCOMTR-MCNC: 150 MG/DL (ref 70–99)
GLUCOSE BLDC GLUCOMTR-MCNC: 183 MG/DL (ref 70–99)
GLUCOSE SERPL-MCNC: 227 MG/DL (ref 70–99)
HCT VFR BLD AUTO: 46.4 % (ref 41–52)
HDLC SERPL-MCNC: 29 MG/DL
HGB BLD-MCNC: 14.8 G/DL (ref 13.5–17.5)
LDLC SERPL CALC-MCNC: 56 MG/DL (ref 0–99)
LYMPHOCYTES # BLD: 1.6 K/UL (ref 1–4)
LYMPHOCYTES NFR BLD: 13 %
MCH RBC QN AUTO: 28.6 PG (ref 27–32)
MCHC RBC AUTO-ENTMCNC: 31.9 G/DL (ref 32–37)
MCV RBC AUTO: 89.6 FL (ref 80–100)
MONOCYTES # BLD: 1 K/UL (ref 0–1)
MONOCYTES NFR BLD: 8 %
NEUTROPHILS # BLD AUTO: 9.1 K/UL (ref 1.8–7.7)
NEUTROPHILS NFR BLD: 73 %
NONHDLC SERPL-MCNC: 88 MG/DL
OSMOLALITY UR CALC.SUM OF ELEC: 296 MOSM/KG (ref 275–295)
PLATELET # BLD AUTO: 294 K/UL (ref 140–400)
PMV BLD AUTO: 8.5 FL (ref 7.4–10.3)
POTASSIUM SERPL-SCNC: 3.7 MMOL/L (ref 3.3–5.1)
RBC # BLD AUTO: 5.18 M/UL (ref 4.5–5.9)
SODIUM SERPL-SCNC: 138 MMOL/L (ref 136–144)
TRIGL SERPL-MCNC: 162 MG/DL (ref 1–149)
TROPONIN I SERPL-MCNC: 0.23 NG/ML (ref ?–0.03)
TROPONIN I SERPL-MCNC: 0.27 NG/ML (ref ?–0.03)
TROPONIN I SERPL-MCNC: 0.28 NG/ML (ref ?–0.03)
WBC # BLD AUTO: 12.4 K/UL (ref 4–11)

## 2018-07-21 PROCEDURE — 71045 X-RAY EXAM CHEST 1 VIEW: CPT | Performed by: EMERGENCY MEDICINE

## 2018-07-21 PROCEDURE — 99223 1ST HOSP IP/OBS HIGH 75: CPT | Performed by: HOSPITALIST

## 2018-07-21 RX ORDER — NITROGLYCERIN 0.4 MG/1
0.4 TABLET SUBLINGUAL EVERY 5 MIN PRN
Status: DISCONTINUED | OUTPATIENT
Start: 2018-07-21 | End: 2018-07-24

## 2018-07-21 RX ORDER — HEPARIN SODIUM AND DEXTROSE 10000; 5 [USP'U]/100ML; G/100ML
1000 INJECTION INTRAVENOUS ONCE
Status: DISCONTINUED | OUTPATIENT
Start: 2018-07-21 | End: 2018-07-21

## 2018-07-21 RX ORDER — DORZOLAMIDE HYDROCHLORIDE AND TIMOLOL MALEATE 20; 5 MG/ML; MG/ML
1 SOLUTION/ DROPS OPHTHALMIC 2 TIMES DAILY
Status: DISCONTINUED | OUTPATIENT
Start: 2018-07-21 | End: 2018-07-24

## 2018-07-21 RX ORDER — FUROSEMIDE 40 MG/1
40 TABLET ORAL DAILY
Status: DISCONTINUED | OUTPATIENT
Start: 2018-07-22 | End: 2018-07-24

## 2018-07-21 RX ORDER — LEVOTHYROXINE SODIUM 0.1 MG/1
100 TABLET ORAL
Status: DISCONTINUED | OUTPATIENT
Start: 2018-07-22 | End: 2018-07-24

## 2018-07-21 RX ORDER — GABAPENTIN 300 MG/1
300 CAPSULE ORAL 2 TIMES DAILY
Status: DISCONTINUED | OUTPATIENT
Start: 2018-07-21 | End: 2018-07-24

## 2018-07-21 RX ORDER — LATANOPROST 50 UG/ML
1 SOLUTION/ DROPS OPHTHALMIC NIGHTLY
Status: DISCONTINUED | OUTPATIENT
Start: 2018-07-21 | End: 2018-07-24

## 2018-07-21 RX ORDER — HEPARIN SODIUM 1000 [USP'U]/ML
3000 INJECTION, SOLUTION INTRAVENOUS; SUBCUTANEOUS ONCE
Status: COMPLETED | OUTPATIENT
Start: 2018-07-21 | End: 2018-07-21

## 2018-07-21 RX ORDER — ASPIRIN 81 MG/1
81 TABLET ORAL DAILY
Status: DISCONTINUED | OUTPATIENT
Start: 2018-07-21 | End: 2018-07-24

## 2018-07-21 RX ORDER — ROSUVASTATIN CALCIUM 20 MG/1
20 TABLET, COATED ORAL NIGHTLY
Status: DISCONTINUED | OUTPATIENT
Start: 2018-07-21 | End: 2018-07-24

## 2018-07-21 RX ORDER — CLOPIDOGREL BISULFATE 75 MG/1
75 TABLET ORAL DAILY
Status: DISCONTINUED | OUTPATIENT
Start: 2018-07-21 | End: 2018-07-23

## 2018-07-21 RX ORDER — HEPARIN SODIUM AND DEXTROSE 10000; 5 [USP'U]/100ML; G/100ML
INJECTION INTRAVENOUS CONTINUOUS
Status: DISCONTINUED | OUTPATIENT
Start: 2018-07-21 | End: 2018-07-22

## 2018-07-21 RX ORDER — HYDROCODONE BITARTRATE AND ACETAMINOPHEN 10; 325 MG/1; MG/1
1 TABLET ORAL EVERY 8 HOURS PRN
Status: DISCONTINUED | OUTPATIENT
Start: 2018-07-21 | End: 2018-07-24

## 2018-07-21 RX ORDER — POTASSIUM CHLORIDE 750 MG/1
10 TABLET, EXTENDED RELEASE ORAL
Status: DISCONTINUED | OUTPATIENT
Start: 2018-07-22 | End: 2018-07-24

## 2018-07-21 RX ORDER — TAMSULOSIN HYDROCHLORIDE 0.4 MG/1
0.8 CAPSULE ORAL DAILY
Status: DISCONTINUED | OUTPATIENT
Start: 2018-07-21 | End: 2018-07-24

## 2018-07-21 RX ORDER — HEPARIN SODIUM AND DEXTROSE 10000; 5 [USP'U]/100ML; G/100ML
1000 INJECTION INTRAVENOUS ONCE
Status: COMPLETED | OUTPATIENT
Start: 2018-07-21 | End: 2018-07-21

## 2018-07-21 RX ORDER — METOPROLOL SUCCINATE 25 MG/1
25 TABLET, EXTENDED RELEASE ORAL
Status: DISCONTINUED | OUTPATIENT
Start: 2018-07-21 | End: 2018-07-24

## 2018-07-21 RX ORDER — ASPIRIN 325 MG
325 TABLET ORAL DAILY
Status: DISCONTINUED | OUTPATIENT
Start: 2018-07-21 | End: 2018-07-21

## 2018-07-21 RX ORDER — SODIUM CHLORIDE 0.9 % (FLUSH) 0.9 %
3 SYRINGE (ML) INJECTION AS NEEDED
Status: DISCONTINUED | OUTPATIENT
Start: 2018-07-21 | End: 2018-07-24

## 2018-07-21 RX ORDER — FUROSEMIDE 10 MG/ML
20 INJECTION INTRAMUSCULAR; INTRAVENOUS ONCE
Status: COMPLETED | OUTPATIENT
Start: 2018-07-21 | End: 2018-07-21

## 2018-07-21 RX ORDER — HEPARIN SODIUM AND DEXTROSE 10000; 5 [USP'U]/100ML; G/100ML
INJECTION INTRAVENOUS CONTINUOUS
Status: DISCONTINUED | OUTPATIENT
Start: 2018-07-21 | End: 2018-07-21

## 2018-07-21 RX ORDER — ACETAMINOPHEN 325 MG/1
650 TABLET ORAL EVERY 6 HOURS PRN
Status: DISCONTINUED | OUTPATIENT
Start: 2018-07-21 | End: 2018-07-24

## 2018-07-21 RX ORDER — DEXTROSE MONOHYDRATE 25 G/50ML
50 INJECTION, SOLUTION INTRAVENOUS AS NEEDED
Status: DISCONTINUED | OUTPATIENT
Start: 2018-07-21 | End: 2018-07-24

## 2018-07-21 RX ORDER — HEPARIN SODIUM 1000 [USP'U]/ML
5000 INJECTION, SOLUTION INTRAVENOUS; SUBCUTANEOUS ONCE
Status: COMPLETED | OUTPATIENT
Start: 2018-07-21 | End: 2018-07-21

## 2018-07-21 RX ORDER — ONDANSETRON 2 MG/ML
4 INJECTION INTRAMUSCULAR; INTRAVENOUS EVERY 6 HOURS PRN
Status: DISCONTINUED | OUTPATIENT
Start: 2018-07-21 | End: 2018-07-24

## 2018-07-21 NOTE — H&P
[facility]    PATIENT'S NAME: Sharyle Harris   ATTENDING PHYSICIAN: Queta Nelson MD   PATIENT ACCOUNT#:   [id number]    LOCATION:  [place] [address]  MEDICAL RECORD #:   [id number]       YOB: 1950  ADMISSION DATE:       07/21/20 SOCIAL HISTORY:  Ex-tobacco user. No current tobacco, alcohol, or drug use. Lives with his family, sedentary lifestyle.     REVIEW OF SYSTEMS:  The patient describes recurrent and precordial jaw and left arm pain that gets worse with physical activity follow.     Dictated By Heidi Jonas MD  d: 07/21/2018 14:06:33  t: 07/21/2018 14:37:28  Job [id number]  /

## 2018-07-21 NOTE — PLAN OF CARE
Problem: Patient/Family Goals  Goal: Patient/Family Long Term Goal  Patient's Long Term Goal: to be free of intermittent chest pain    Interventions:  - See additional Care Plan goals for specific interventions   Outcome: Progressing  Since arrival from ED ordered  Outcome: Progressing

## 2018-07-21 NOTE — ED INITIAL ASSESSMENT (HPI)
Patient presents to ER with c/o left chest pain that radiates down his left arm - patient had a stent placed three days ago.

## 2018-07-21 NOTE — ED PROVIDER NOTES
Patient Seen in: [facility] 3w/sw    History   Patient presents with:  Chest Pain Angina (cardiovascular)    Stated Complaint: left arm pain, chest pain    HPI    79year old male with multiple medical problems including HTN, high cholesterol, mor %  O2 Device: None (Room air)    Current:/80 (BP Location: Left arm)   Pulse 97   Temp 98.3 °F (36.8 °C) (Oral)   Resp 20   Ht 175.3 cm (5' 9\")   Wt 131.8 kg   SpO2 93%   BMI 42.90 kg/m²         Physical Exam   Constitutional: He is oriented to Chava Carrington POCT GLUCOSE - Abnormal; Notable for the following:     POC Glucose  150 (*)     All other components within normal limits   CBC W/ DIFFERENTIAL - Abnormal; Notable for the following:     WBC 12.4 (*)     MCHC 31.9 (*)     Neutrophil Absolute 9.1 (*) for follow up    Critical Care:  I spent a total of 30 minutes of critical care time in obtaining history, performing a physical exam, bedside monitoring of interventions, collecting and interpreting tests and discussion with consultants but not including t Units (5,000 Units Intravenous Given 7/21/18 1437)   furosemide (LASIX) injection 20 mg (20 mg Intravenous Given 7/21/18 1429)     D/w Dr Davie Vogel - will admit,   Advised to start the patient on heparin drip, trop elevated compared to prev may be from proc

## 2018-07-21 NOTE — HISTORICAL OFFICE NOTE
Rowan Boas  : 1950  ACCOUNT:  [id number]  [id number]  PCP:    TODAY'S DATE: 2018  DICTATED BY:  [Dr. Manish Hammer]        CHIEF COMPLAINT: [Followup of CAD, of native vessels. ]     HPI:    [On 2018, Frank Juarez, a 74year old male, presente EXERCISE: no regular exercise. DIET: low sodium diet. MARITAL STATUS: single.  OCCUPATION: retired.      ALLERGIES: No Known Allergies     MEDICATIONS: Selected prescriptions see below     VITAL SIGNS: [B/P - 102/78 , Pulse - 92, Weight -  236, Height -   6 Circulatory System Organ Or Structure Following A Cardiac Catheterization  7. Tobacco abuse        PLAN:  [Please start cardiac rehab. Please see the dietitian to help to lose weight. Please continue taking your medications as directed.   I will see you i

## 2018-07-21 NOTE — PROGRESS NOTES
Abraham Mata  : 1950  ACCOUNT:  [id number]  [phone]  PCP:    TODAY'S DATE: 2018  DICTATED BY:  [Dr. Олег Stevenson: [Followup of CAD, of native vessels.]    HPI:    [On 2018, True Fresh, a 74year old male, presented w regular exercise. DIET: low sodium diet. MARITAL STATUS: single. OCCUPATION: retired.      ALLERGIES: No Known Allergies    MEDICATIONS: Selected prescriptions see below    VITAL SIGNS: [B/P - 102/78 , Pulse - 92, Weight -  236, Height -   69 , BMI - 34.8 ] Structure Following A Cardiac Catheterization  7. Tobacco abuse      PLAN:  [Please start cardiac rehab. Please see the dietitian to help to lose weight. Please continue taking your medications as directed. I will see you in 3 months.   Thank you]    PRE

## 2018-07-21 NOTE — ED NOTES
Spoke with Guillermina Toscano RN TL. Attempted to give report to nurse Radha Young. No answer for this RN. HUONG Toscano states she will try to have Divya call this writer back. Awaiting call. Bed assignment also changed to room [address]654. Will inform transport.

## 2018-07-21 NOTE — CONSULTS
[facility]  [facility]HS/[facility]G Cardiology Consult Note    Stevan Hall.  Patient Status:  Emergency    3/23/1950 MRN [id number]   Location [address] Attending Mary Brooks MD   Hosp Day # 0 PCP Abdiel Ramírez MD     HPI:  48 now  - add ACEi closer to DC    Essential hypertension, controlled  - Continue current antihypertensives as noted above    Hyperlipidemia, stable  - continue statin      - repeat EKG now and again in AM  - trend troponins  - Heparin gtt low dose for now  -

## 2018-07-22 LAB
ANION GAP SERPL CALC-SCNC: 10 MMOL/L (ref 0–18)
APTT PPP: 27.2 SECONDS (ref 23.2–35.3)
APTT PPP: 54 SECONDS (ref 23.2–35.3)
BASOPHILS # BLD: 0.1 K/UL (ref 0–0.2)
BASOPHILS NFR BLD: 1 %
BUN SERPL-MCNC: 21 MG/DL (ref 8–20)
BUN/CREAT SERPL: 20.2 (ref 10–20)
CALCIUM SERPL-MCNC: 9.4 MG/DL (ref 8.5–10.5)
CHLORIDE SERPL-SCNC: 103 MMOL/L (ref 95–110)
CO2 SERPL-SCNC: 24 MMOL/L (ref 22–32)
CREAT SERPL-MCNC: 1.04 MG/DL (ref 0.5–1.5)
EOSINOPHIL # BLD: 0.7 K/UL (ref 0–0.7)
EOSINOPHIL NFR BLD: 5 %
ERYTHROCYTE [DISTWIDTH] IN BLOOD BY AUTOMATED COUNT: 14.8 % (ref 11–15)
GLUCOSE BLDC GLUCOMTR-MCNC: 145 MG/DL (ref 70–99)
GLUCOSE BLDC GLUCOMTR-MCNC: 163 MG/DL (ref 70–99)
GLUCOSE BLDC GLUCOMTR-MCNC: 173 MG/DL (ref 70–99)
GLUCOSE BLDC GLUCOMTR-MCNC: 199 MG/DL (ref 70–99)
GLUCOSE SERPL-MCNC: 186 MG/DL (ref 70–99)
HBA1C MFR BLD: 7.2 % (ref 4–6)
HCT VFR BLD AUTO: 44.6 % (ref 41–52)
HGB BLD-MCNC: 14.2 G/DL (ref 13.5–17.5)
LYMPHOCYTES # BLD: 1.9 K/UL (ref 1–4)
LYMPHOCYTES NFR BLD: 15 %
MCH RBC QN AUTO: 28.6 PG (ref 27–32)
MCHC RBC AUTO-ENTMCNC: 31.9 G/DL (ref 32–37)
MCV RBC AUTO: 89.6 FL (ref 80–100)
MONOCYTES # BLD: 1.1 K/UL (ref 0–1)
MONOCYTES NFR BLD: 9 %
NEUTROPHILS # BLD AUTO: 8.7 K/UL (ref 1.8–7.7)
NEUTROPHILS NFR BLD: 70 %
OSMOLALITY UR CALC.SUM OF ELEC: 292 MOSM/KG (ref 275–295)
PLATELET # BLD AUTO: 288 K/UL (ref 140–400)
PMV BLD AUTO: 8.4 FL (ref 7.4–10.3)
POTASSIUM SERPL-SCNC: 3.7 MMOL/L (ref 3.3–5.1)
RBC # BLD AUTO: 4.98 M/UL (ref 4.5–5.9)
SODIUM SERPL-SCNC: 137 MMOL/L (ref 136–144)
WBC # BLD AUTO: 12.5 K/UL (ref 4–11)

## 2018-07-22 PROCEDURE — 99233 SBSQ HOSP IP/OBS HIGH 50: CPT | Performed by: HOSPITALIST

## 2018-07-22 RX ORDER — POTASSIUM CHLORIDE 20 MEQ/1
40 TABLET, EXTENDED RELEASE ORAL ONCE
Status: COMPLETED | OUTPATIENT
Start: 2018-07-22 | End: 2018-07-22

## 2018-07-22 RX ORDER — HEPARIN SODIUM 1000 [USP'U]/ML
3000 INJECTION, SOLUTION INTRAVENOUS; SUBCUTANEOUS ONCE
Status: COMPLETED | OUTPATIENT
Start: 2018-07-22 | End: 2018-07-22

## 2018-07-22 NOTE — PLAN OF CARE
Problem: Patient Centered Care  Goal: Patient preferences are identified and integrated in the patient's plan of care  Interventions:  - What would you like us to know as we care for you?  Pt is from home with family  - Provide timely, complete, and accurat

## 2018-07-22 NOTE — PROGRESS NOTES
[facility] HOSP - [facility]  Progress Note     Kurt Moorejasmin.   : 3/23/1950    Status: Inpatient  Day #: 1    Attending: Marcelino Baptiste MD  PCP: Alfred Paniagua MD      Assessment and Plan     Unstable angina  CAD  -possible NSTEMI  -heparin drip  -con't plavix, 07/17/18   0437  07/21/18   1245  07/21/18   1513  07/21/18   2044  07/22/18   0428  07/22/18   0634   BUN  21*  18  20   --    --    --   21*   CREATSERUM  1.27  1.17  1.20   --    --    --   1.04   GFRAA  >60  >60  >60   --    --    --   >60   GFRNAA  5 ondansetron HCl, HYDROcodone-acetaminophen      Spent <35 minutes, <50% of the time counseling coordinating care. Discussed diagnosis and plan of treatment. Discussed diabetes, plan for treatment education. Counseled on weight loss.       Tony Williamson MD

## 2018-07-22 NOTE — PROGRESS NOTES
[facility]    Progress Note    Wilnette Schwab.  Patient Status:  Inpatient    3/23/1950 MRN [id number]   Location [facility] 3W/SW Attending Isa Samuels MD   Hosp Day # 1 PCP Ariela Orozco MD     Subjective:     Respiratory: Rosa Elena Goldman Michael Stern MD on 7/21/2018 at 13:28     Approved by (CST): Michael Jordan MD on 7/21/2018 at 13:30          Ekg 12-lead    Result Date: 7/22/2018  ECG Report  Interpretation  --------------------------     Ekg 12-lead    Result Date: 7/21/2018  EC

## 2018-07-22 NOTE — RESPIRATORY THERAPY NOTE
Humberto Bill DIRK ASSESSMENT:    Pt does not have a previous diagnosis of DIRK. Pt does not routinely use a CPAP device at home.  This pt is not suspected to be at high risk for DIRK and pt refused to take the sleep lab packet

## 2018-07-23 ENCOUNTER — APPOINTMENT (OUTPATIENT)
Dept: INTERVENTIONAL RADIOLOGY/VASCULAR | Facility: HOSPITAL | Age: 68
DRG: 246 | End: 2018-07-23
Attending: INTERNAL MEDICINE
Payer: MEDICARE

## 2018-07-23 LAB
ANION GAP SERPL CALC-SCNC: 10 MMOL/L (ref 0–18)
APTT PPP: 25.8 SECONDS (ref 23.2–35.3)
BUN SERPL-MCNC: 19 MG/DL (ref 8–20)
BUN/CREAT SERPL: 19.6 (ref 10–20)
CALCIUM SERPL-MCNC: 9.6 MG/DL (ref 8.5–10.5)
CHLORIDE SERPL-SCNC: 102 MMOL/L (ref 95–110)
CO2 SERPL-SCNC: 23 MMOL/L (ref 22–32)
CREAT SERPL-MCNC: 0.97 MG/DL (ref 0.5–1.5)
GLUCOSE BLDC GLUCOMTR-MCNC: 120 MG/DL (ref 70–99)
GLUCOSE BLDC GLUCOMTR-MCNC: 143 MG/DL (ref 70–99)
GLUCOSE BLDC GLUCOMTR-MCNC: 162 MG/DL (ref 70–99)
GLUCOSE BLDC GLUCOMTR-MCNC: 166 MG/DL (ref 70–99)
GLUCOSE SERPL-MCNC: 175 MG/DL (ref 70–99)
OSMOLALITY UR CALC.SUM OF ELEC: 287 MOSM/KG (ref 275–295)
POTASSIUM SERPL-SCNC: 4.1 MMOL/L (ref 3.3–5.1)
POTASSIUM SERPL-SCNC: 4.1 MMOL/L (ref 3.3–5.1)
SODIUM SERPL-SCNC: 135 MMOL/L (ref 136–144)

## 2018-07-23 PROCEDURE — 99233 SBSQ HOSP IP/OBS HIGH 50: CPT | Performed by: HOSPITALIST

## 2018-07-23 PROCEDURE — B2101ZZ FLUOROSCOPY OF SINGLE CORONARY ARTERY USING LOW OSMOLAR CONTRAST: ICD-10-PCS | Performed by: INTERNAL MEDICINE

## 2018-07-23 PROCEDURE — 4A023N7 MEASUREMENT OF CARDIAC SAMPLING AND PRESSURE, LEFT HEART, PERCUTANEOUS APPROACH: ICD-10-PCS | Performed by: INTERNAL MEDICINE

## 2018-07-23 PROCEDURE — 027034Z DILATION OF CORONARY ARTERY, ONE ARTERY WITH DRUG-ELUTING INTRALUMINAL DEVICE, PERCUTANEOUS APPROACH: ICD-10-PCS | Performed by: INTERNAL MEDICINE

## 2018-07-23 PROCEDURE — 02C03ZZ EXTIRPATION OF MATTER FROM CORONARY ARTERY, ONE ARTERY, PERCUTANEOUS APPROACH: ICD-10-PCS | Performed by: INTERNAL MEDICINE

## 2018-07-23 RX ORDER — LIDOCAINE HYDROCHLORIDE 20 MG/ML
INJECTION, SOLUTION EPIDURAL; INFILTRATION; INTRACAUDAL; PERINEURAL
Status: COMPLETED
Start: 2018-07-23 | End: 2018-07-23

## 2018-07-23 RX ORDER — HEPARIN SODIUM 1000 [USP'U]/ML
INJECTION, SOLUTION INTRAVENOUS; SUBCUTANEOUS
Status: COMPLETED
Start: 2018-07-23 | End: 2018-07-23

## 2018-07-23 RX ORDER — CLOPIDOGREL BISULFATE 75 MG/1
75 TABLET ORAL DAILY
Status: DISCONTINUED | OUTPATIENT
Start: 2018-07-24 | End: 2018-07-24

## 2018-07-23 RX ORDER — MIDAZOLAM HYDROCHLORIDE 1 MG/ML
INJECTION INTRAMUSCULAR; INTRAVENOUS
Status: COMPLETED
Start: 2018-07-23 | End: 2018-07-23

## 2018-07-23 RX ORDER — SODIUM CHLORIDE 9 MG/ML
INJECTION, SOLUTION INTRAVENOUS
Status: DISPENSED
Start: 2018-07-23 | End: 2018-07-23

## 2018-07-23 RX ORDER — ASPIRIN 81 MG/1
324 TABLET, CHEWABLE ORAL ONCE
Status: COMPLETED | OUTPATIENT
Start: 2018-07-23 | End: 2018-07-23

## 2018-07-23 RX ORDER — SODIUM CHLORIDE 9 MG/ML
INJECTION, SOLUTION INTRAVENOUS CONTINUOUS
Status: DISCONTINUED | OUTPATIENT
Start: 2018-07-23 | End: 2018-07-24

## 2018-07-23 RX ORDER — HYDROCODONE BITARTRATE AND ACETAMINOPHEN 10; 325 MG/1; MG/1
1 TABLET ORAL ONCE
Status: COMPLETED | OUTPATIENT
Start: 2018-07-23 | End: 2018-07-23

## 2018-07-23 RX ORDER — CLOPIDOGREL BISULFATE 75 MG/1
TABLET ORAL
Status: COMPLETED
Start: 2018-07-23 | End: 2018-07-23

## 2018-07-23 RX ORDER — VERAPAMIL HYDROCHLORIDE 2.5 MG/ML
INJECTION, SOLUTION INTRAVENOUS
Status: COMPLETED
Start: 2018-07-23 | End: 2018-07-23

## 2018-07-23 RX ORDER — NITROGLYCERIN 20 MG/100ML
INJECTION INTRAVENOUS
Status: COMPLETED
Start: 2018-07-23 | End: 2018-07-23

## 2018-07-23 NOTE — PRE-SEDATION ASSESSMENT
Pre-Procedure Sedation Assessment    Chief Complaint/Indication for procedure: unstable angina    History of snoring or sleep or apnea?    No    History of previous problems with anesthesia or sedation  No    Physical Findings:  Neck: nl ROM  CV: RRR no GMR

## 2018-07-23 NOTE — PROCEDURES
[facility] HOSP - [facility]    [facility]HS/[facility]G Cardiac Cath Procedure Note  Minta Mohs.  Patient Status:  Inpatient    3/23/1950 MRN [id number]   Location [facility] 3W/SW Attending Ovidio Rich MD   Hosp Day # 2 PCP Naida Danielle MD       Cardiolog

## 2018-07-23 NOTE — PLAN OF CARE
Problem: Patient/Family Goals  Goal: Patient/Family Long Term Goal  Patient's Long Term Goal: d/c home    Interventions:  - cardiac workup  - increase activity as tolerated  - See additional Care Plan goals for specific interventions     Outcome: Progressi

## 2018-07-23 NOTE — PROGRESS NOTES
[facility] HOSP - [facility]    Progress Note    Lucian Muff.  Patient Status:  Inpatient    3/23/1950 MRN [id number]   Location [facility] 3W/SW Attending Wallis Lombard, MD   Hosp Day # 2 PCP Lucita Herrera MD     Subjective:     Respiratory: Choco Good on 7/21/2018 at 13:28     Approved by (CST): Kaia Jordan MD on 7/21/2018 at 13:30          Ekg 12-lead    Result Date: 7/22/2018  ECG Report  Interpretation  -------------------------- Sinus Rhythm -Right bundle branch block with left axis -bif

## 2018-07-23 NOTE — PROGRESS NOTES
[facility]    Progress Note    Graciela Marino.  Patient Status:  Inpatient    3/23/1950 MRN [id number]   Location [facility] 3W/SW Attending Nancy Jones MD   Hosp Day # 2 PCP Jayro Mccarthy MD       Subjective:     Pt had left a 07/21/2018 14:55:45 No significant changes have occurred Electronically signed on 07/22/2018 at 16:30 by Leanna Nelson MD    Ekg 12-lead    Result Date: 7/21/2018  ECG Report  Interpretation  -------------------------- Sinus Rhythm -Right bundle branch block

## 2018-07-23 NOTE — PROGRESS NOTES
Ayana Saenz  [id number]  7/23/2018    Post procedure/ recovery hand-off report given to [facility]. Patient's vital signs stable, access site dry and intact, no signs and symptoms of bleeding/ hematoma. Site check also completed at bedside with [facility]

## 2018-07-24 ENCOUNTER — PRIOR ORIGINAL RECORDS (OUTPATIENT)
Dept: OTHER | Age: 68
End: 2018-07-24

## 2018-07-24 VITALS
BODY MASS INDEX: 42.75 KG/M2 | RESPIRATION RATE: 18 BRPM | DIASTOLIC BLOOD PRESSURE: 85 MMHG | HEIGHT: 69 IN | OXYGEN SATURATION: 96 % | SYSTOLIC BLOOD PRESSURE: 132 MMHG | HEART RATE: 75 BPM | WEIGHT: 288.63 LBS | TEMPERATURE: 98 F

## 2018-07-24 LAB
ANION GAP SERPL CALC-SCNC: 10 MMOL/L (ref 0–18)
BASOPHILS # BLD: 0.1 K/UL (ref 0–0.2)
BASOPHILS NFR BLD: 1 %
BUN SERPL-MCNC: 16 MG/DL (ref 8–20)
BUN/CREAT SERPL: 16.7 (ref 10–20)
CALCIUM SERPL-MCNC: 9.1 MG/DL (ref 8.5–10.5)
CHLORIDE SERPL-SCNC: 101 MMOL/L (ref 95–110)
CO2 SERPL-SCNC: 22 MMOL/L (ref 22–32)
CREAT SERPL-MCNC: 0.96 MG/DL (ref 0.5–1.5)
EOSINOPHIL # BLD: 0.6 K/UL (ref 0–0.7)
EOSINOPHIL NFR BLD: 5 %
ERYTHROCYTE [DISTWIDTH] IN BLOOD BY AUTOMATED COUNT: 14.5 % (ref 11–15)
GLUCOSE BLDC GLUCOMTR-MCNC: 150 MG/DL (ref 70–99)
GLUCOSE BLDC GLUCOMTR-MCNC: 183 MG/DL (ref 70–99)
GLUCOSE SERPL-MCNC: 160 MG/DL (ref 70–99)
HCT VFR BLD AUTO: 44 % (ref 41–52)
HGB BLD-MCNC: 14.3 G/DL (ref 13.5–17.5)
LYMPHOCYTES # BLD: 1.4 K/UL (ref 1–4)
LYMPHOCYTES NFR BLD: 12 %
MCH RBC QN AUTO: 28.6 PG (ref 27–32)
MCHC RBC AUTO-ENTMCNC: 32.4 G/DL (ref 32–37)
MCV RBC AUTO: 88.2 FL (ref 80–100)
MONOCYTES # BLD: 0.9 K/UL (ref 0–1)
MONOCYTES NFR BLD: 8 %
NEUTROPHILS # BLD AUTO: 8.9 K/UL (ref 1.8–7.7)
NEUTROPHILS NFR BLD: 75 %
OSMOLALITY UR CALC.SUM OF ELEC: 281 MOSM/KG (ref 275–295)
PLATELET # BLD AUTO: 316 K/UL (ref 140–400)
PMV BLD AUTO: 8.9 FL (ref 7.4–10.3)
POTASSIUM SERPL-SCNC: 4 MMOL/L (ref 3.3–5.1)
RBC # BLD AUTO: 4.99 M/UL (ref 4.5–5.9)
SODIUM SERPL-SCNC: 133 MMOL/L (ref 136–144)
WBC # BLD AUTO: 11.9 K/UL (ref 4–11)

## 2018-07-24 PROCEDURE — 99239 HOSP IP/OBS DSCHRG MGMT >30: CPT | Performed by: HOSPITALIST

## 2018-07-24 RX ORDER — METOPROLOL SUCCINATE 50 MG/1
50 TABLET, EXTENDED RELEASE ORAL DAILY
Qty: 30 TABLET | Refills: 6 | Status: SHIPPED | OUTPATIENT
Start: 2018-07-24 | End: 2018-09-18

## 2018-07-24 RX ORDER — HYDROCODONE BITARTRATE AND ACETAMINOPHEN 10; 325 MG/1; MG/1
1 TABLET ORAL EVERY 8 HOURS PRN
Qty: 15 TABLET | Refills: 0 | Status: SHIPPED | OUTPATIENT
Start: 2018-07-24 | End: 2018-08-30

## 2018-07-24 NOTE — TRANSITION NOTE
[facility] - [facility]    Cardiology Discharge Summary    Wilnette Schwab.  Patient Status:  Inpatient    3/23/1950 MRN [id number]   Location [facility] 3W/SW Attending No att. providers found   Hosp Day # 3 PCP Ariela Orozco MD       Subjective 07/22/18   0634  07/24/18   0643   RBC  5.18  4.98  4.99   HGB  14.8  14.2  14.3   HCT  46.4  44.6  44.0   MCV  89.6  89.6  88.2   MCH  28.6  28.6  28.6   MCHC  31.9*  31.9*  32.4   RDW  14.8  14.8  14.5   WBC  12.4*  12.5*  11.9*   PLT  294  288  316

## 2018-07-24 NOTE — DIABETES ED
[facility]   Diabetes Education Note      Mary Linder.  Patient Status Inpatient   3/23/1950  MRN [id number]  Location  [facility] 3W/SW  Attending Kei Washburn MD  Hospital Days # 3  PCP  Maile Brumfield MD    Reason for Visit education.       Gordon Magana RN    7/24/2018  10:04 AM

## 2018-07-24 NOTE — PLAN OF CARE
Problem: Patient Centered Care  Goal: Patient preferences are identified and integrated in the patient's plan of care  Interventions:  - What would you like us to know as we care for you?  I live at home with family  - Provide timely, complete, and accurate

## 2018-07-24 NOTE — CARDIAC REHAB
Cardiac Rehab Phase I    Activity:  Distance :Per self  Assistance needed :No  Patient tolerated activity :Well per patient. Education:  Handouts provided and reviewed: [address] Diet: Healthy Cardiac diet reviewed.     Disease

## 2018-07-24 NOTE — DISCHARGE SUMMARY
[facility]    Discharge Summary    Juan Stanley.  Patient Status:  Inpatient    3/23/1950 MRN [id number]   Location [facility] 3W/SW Attending No att. providers found   Hosp Day # 3 PCP Verito Neri MD     Date of Admission:  said he has been taking his medications, but since the procedure, his pains did not improve in the left arm, anterior chest, and jaw.   He came back today to the emergency department for evaluation and EKG shows right bundle branch block, which is preexiste aspirin 81 MG Tbec      Take 1 tablet (81 mg total) by mouth daily. Quantity:  30 tablet  Refills:  0     Clopidogrel Bisulfate 75 MG Tabs  Commonly known as:  PLAVIX      Take 1 tablet (75 mg total) by mouth daily.    Quantity:  90 tablet  Refills:  3 tablet  Refills:  0           Where to Get Your Medications      These medications were sent to [facility] [phone], [phone]  [address], [address] [place] [address]    Phone:  [phone]   · [facility]

## 2018-07-25 ENCOUNTER — TELEPHONE (OUTPATIENT)
Dept: NEUROLOGY | Facility: CLINIC | Age: 68
End: 2018-07-25

## 2018-07-25 ENCOUNTER — PATIENT OUTREACH (OUTPATIENT)
Dept: CASE MANAGEMENT | Age: 68
End: 2018-07-25

## 2018-07-25 RX ORDER — HYDROCODONE BITARTRATE AND ACETAMINOPHEN 10; 325 MG/1; MG/1
1 TABLET ORAL EVERY 8 HOURS PRN
Qty: 90 TABLET | Refills: 0 | Status: SHIPPED | OUTPATIENT
Start: 2018-07-28 | End: 2018-08-30

## 2018-07-25 NOTE — TELEPHONE ENCOUNTER
Medication request: Hydrocodone-acetaminophen 10-325mg    LOV:05/31/18  NOV:08/16/18    [place]07/17/18

## 2018-07-25 NOTE — TELEPHONE ENCOUNTER
Norco 10/325 q8h PRN pain prescribed, postdated for 7/28 since he just received a script from Dr. Timmy Gannon dated 7/24. Please remind him that his contract states he should be getting the norco from me.     Connie Minium DO  Physical Medicine and Rehabilitat

## 2018-07-25 NOTE — PROGRESS NOTES
Condition Update Post Discharge   Discharge Date: 7/24/18  Contact Date: 7/25/2018    Consent Verification:  Assessment Completed With: Patient  HIPAA Verified? Yes    General:   • How have you been since your discharge from the hospital/facility?  Pt

## 2018-07-26 ENCOUNTER — TELEPHONE (OUTPATIENT)
Dept: INTERNAL MEDICINE CLINIC | Facility: CLINIC | Age: 68
End: 2018-07-26

## 2018-07-26 LAB
BUN: 16 MG/DL
CALCIUM: 9.1 MG/DL
CHLORIDE: 101 MEQ/L
CHOLESTEROL, TOTAL: 117 MG/DL
CREATININE, SERUM: 0.96 MG/DL
GLUCOSE: 160 MG/DL
HDL CHOLESTEROL: 29 MG/DL
HEMATOCRIT: 44 %
HEMOGLOBIN: 14.3 G/DL
LDL CHOLESTEROL: 56 MG/DL
NON-HDL CHOLESTEROL: 88 MG/DL
PLATELETS: 316 K/UL
POTASSIUM, SERUM: 4 MEQ/L
RED BLOOD COUNT: 4.99 X 10-6/U
SODIUM: 133 MEQ/L
TRIGLYCERIDES: 162 MG/DL
WHITE BLOOD COUNT: 11.9 X 10-3/U

## 2018-07-26 NOTE — PROGRESS NOTES
Condition Update Post Discharge   Discharge Date: 7/24/18  Contact Date: 7/25/2018    Consent Verification:  Assessment Completed With: Patient  HIPAA Verified? Yes     Discharge Dx:  Unstable angina, s/p angiogram with GABRIELA to LAD.     General:   • How h by mouth every 8 (eight) hours as needed for Pain. Disp: 15 tablet Rfl: 0   MetFORMIN HCl 500 MG Oral Tab Take 1 tablet (500 mg total) by mouth daily with breakfast. Start Thursday morning (7/26), not today not tomorrow.  Disp: 30 tablet Rfl: 0   aspirin 81 Services ordered at D/C? No    CV Surgery:   Have there been any changes in your wound or incision? no  Are you walking more and more each day?     yes  Are you continuing to use your incentive spirometer?  yes  How well did you feel prepared for yo well as close f/u with PCP and cardiology. He verbalized understanding and will contact office with any further questions or concerns.       [x]  Discharge Summary, Discharge medications reviewed/discussed/and reconciled with patient, and orders reviewed a

## 2018-07-26 NOTE — TELEPHONE ENCOUNTER
Spoke to pt for HFU today. Pt does not have HFU appt scheduled at this time. HFU appt recommended by 7/31/18 as pt is a high risk for readmission. Please advise.       TRIAGE:  Please f/u with pt and try to get him to schedule as he would greatly benefit

## 2018-07-31 ENCOUNTER — TELEPHONE (OUTPATIENT)
Dept: NEUROLOGY | Facility: CLINIC | Age: 68
End: 2018-07-31

## 2018-07-31 NOTE — TELEPHONE ENCOUNTER
Pharmacist Davie Norton from [address] [place] [address] in [facility] called and explained she will have to refuse filling the most recent prescriptions refill orders from Dr. Gregg Petersen as the patient has been filling prescriptions from multiple providers at multiple pharmacies us

## 2018-08-02 ENCOUNTER — TELEPHONE (OUTPATIENT)
Dept: INTERNAL MEDICINE CLINIC | Facility: CLINIC | Age: 68
End: 2018-08-02

## 2018-08-02 NOTE — TELEPHONE ENCOUNTER
Called patient. No answer. VM left. Reminded him to have follow up visit for TCm , also to discuss labs.

## 2018-08-11 DIAGNOSIS — R60.0 LOWER EXTREMITY EDEMA: ICD-10-CM

## 2018-08-11 RX ORDER — HYDROCHLOROTHIAZIDE 12.5 MG/1
12.5 CAPSULE, GELATIN COATED ORAL
Qty: 90 CAPSULE | Refills: 0 | Status: SHIPPED | OUTPATIENT
Start: 2018-08-11 | End: 2018-08-30 | Stop reason: ALTCHOICE

## 2018-08-20 RX ORDER — HYDROCODONE BITARTRATE AND ACETAMINOPHEN 10; 325 MG/1; MG/1
1 TABLET ORAL EVERY 8 HOURS PRN
Qty: 90 TABLET | Refills: 0 | OUTPATIENT
Start: 2018-08-27

## 2018-08-20 NOTE — TELEPHONE ENCOUNTER
Medication request: Hydrocodone-acetaminophen  mg. Take 1 tablet every 8 hours as needed for pain. #90.  No refills    LOV-5/31/2018  NOV-9/7/2018    [place]7/25/2018    Per epic last refill 7/28/2018

## 2018-08-20 NOTE — TELEPHONE ENCOUNTER
Called patient and left message (hipaa verified)to notify patient that Dr. Tiburcio Aldrich will not be able to refill his [place] RX until his NOV of 9/7/2018.

## 2018-08-20 NOTE — TELEPHONE ENCOUNTER
Needs to come see me before I will refill Norco. He can either be moved up or keep the appointment scheduled.

## 2018-08-25 ENCOUNTER — APPOINTMENT (OUTPATIENT)
Dept: GENERAL RADIOLOGY | Facility: HOSPITAL | Age: 68
End: 2018-08-25
Payer: MEDICARE

## 2018-08-25 ENCOUNTER — HOSPITAL ENCOUNTER (EMERGENCY)
Facility: HOSPITAL | Age: 68
Discharge: HOME | End: 2018-08-25
Attending: EMERGENCY MEDICINE
Payer: MEDICARE

## 2018-08-25 VITALS
HEIGHT: 69 IN | SYSTOLIC BLOOD PRESSURE: 140 MMHG | TEMPERATURE: 98 F | BODY MASS INDEX: 39.99 KG/M2 | OXYGEN SATURATION: 94 % | HEART RATE: 82 BPM | RESPIRATION RATE: 22 BRPM | WEIGHT: 270 LBS | DIASTOLIC BLOOD PRESSURE: 92 MMHG

## 2018-08-25 DIAGNOSIS — J98.01 ACUTE BRONCHOSPASM: Primary | ICD-10-CM

## 2018-08-25 DIAGNOSIS — S30.0XXA CONTUSION OF COCCYX, INITIAL ENCOUNTER: ICD-10-CM

## 2018-08-25 DIAGNOSIS — J06.9 UPPER RESPIRATORY TRACT INFECTION, UNSPECIFIED TYPE: ICD-10-CM

## 2018-08-25 PROCEDURE — 94640 AIRWAY INHALATION TREATMENT: CPT

## 2018-08-25 PROCEDURE — 99284 EMERGENCY DEPT VISIT MOD MDM: CPT

## 2018-08-25 PROCEDURE — 71046 X-RAY EXAM CHEST 2 VIEWS: CPT | Performed by: EMERGENCY MEDICINE

## 2018-08-25 PROCEDURE — 72100 X-RAY EXAM L-S SPINE 2/3 VWS: CPT

## 2018-08-25 PROCEDURE — 72220 X-RAY EXAM SACRUM TAILBONE: CPT | Performed by: EMERGENCY MEDICINE

## 2018-08-25 PROCEDURE — 96372 THER/PROPH/DIAG INJ SC/IM: CPT

## 2018-08-25 RX ORDER — ALBUTEROL SULFATE 90 UG/1
2 AEROSOL, METERED RESPIRATORY (INHALATION) EVERY 4 HOURS PRN
Qty: 1 INHALER | Refills: 0 | Status: SHIPPED | OUTPATIENT
Start: 2018-08-25 | End: 2018-08-30

## 2018-08-25 RX ORDER — PREDNISONE 20 MG/1
40 TABLET ORAL DAILY
Qty: 8 TABLET | Refills: 0 | Status: SHIPPED | OUTPATIENT
Start: 2018-08-25 | End: 2018-08-29

## 2018-08-25 RX ORDER — KETOROLAC TROMETHAMINE 30 MG/ML
30 INJECTION, SOLUTION INTRAMUSCULAR; INTRAVENOUS ONCE
Status: COMPLETED | OUTPATIENT
Start: 2018-08-25 | End: 2018-08-25

## 2018-08-25 RX ORDER — IPRATROPIUM BROMIDE AND ALBUTEROL SULFATE 2.5; .5 MG/3ML; MG/3ML
3 SOLUTION RESPIRATORY (INHALATION) ONCE
Status: COMPLETED | OUTPATIENT
Start: 2018-08-25 | End: 2018-08-25

## 2018-08-25 RX ORDER — AZITHROMYCIN 250 MG/1
TABLET, FILM COATED ORAL
Qty: 1 PACKAGE | Refills: 0 | Status: SHIPPED | OUTPATIENT
Start: 2018-08-25 | End: 2018-08-30 | Stop reason: ALTCHOICE

## 2018-08-25 RX ORDER — NAPROXEN 500 MG/1
500 TABLET ORAL 2 TIMES DAILY PRN
Qty: 20 TABLET | Refills: 0 | Status: SHIPPED | OUTPATIENT
Start: 2018-08-25 | End: 2018-08-30 | Stop reason: ALTCHOICE

## 2018-08-25 NOTE — ED INITIAL ASSESSMENT (HPI)
Pt has had a cough for 3 days and he is feeling SOB and bilateral rib pain. Pt also fell last night and he landed on his tailbone.

## 2018-08-25 NOTE — ED PROVIDER NOTES
Patient Seen in: [facility] AND [facility] Emergency Department    History   Patient presents with:  Fall (musculoskeletal, neurologic)  Cough/URI    Stated Complaint: SOB x 3 days with fall yesterday no loc     HPI    Patient presents to the emergency departmen every 8 (eight) hours as needed for Pain. Metoprolol Succinate ER 50 MG Oral Tablet 24 Hr,  Take 1 tablet (50 mg total) by mouth daily. HYDROcodone-acetaminophen  MG Oral Tab,  Take 1 tablet by mouth every 8 (eight) hours as needed for Pain.    Me dysuria  Musculoskeletal: Pain radiates from the tailbone up to his low back    Positive for stated complaint: SOB x 3 days with fall yesterday no loc   Other systems are as noted in HPI. Constitutional and vital signs reviewed.       All other systems rev still having coughing we will give dose of oral prednisone as well. He does have history of smoking the past not currently a smoker. Patient was requesting prescription for pain medication.   He is already under the care of the pain clinic receiving chr

## 2018-08-30 ENCOUNTER — OFFICE VISIT (OUTPATIENT)
Dept: INTERNAL MEDICINE CLINIC | Facility: CLINIC | Age: 68
End: 2018-08-30
Payer: MEDICARE

## 2018-08-30 ENCOUNTER — TELEPHONE (OUTPATIENT)
Dept: INTERNAL MEDICINE CLINIC | Facility: CLINIC | Age: 68
End: 2018-08-30

## 2018-08-30 VITALS
SYSTOLIC BLOOD PRESSURE: 136 MMHG | TEMPERATURE: 98 F | DIASTOLIC BLOOD PRESSURE: 88 MMHG | HEIGHT: 69 IN | OXYGEN SATURATION: 95 % | BODY MASS INDEX: 41.77 KG/M2 | RESPIRATION RATE: 19 BRPM | HEART RATE: 88 BPM | WEIGHT: 282 LBS

## 2018-08-30 DIAGNOSIS — S30.0XXD COCCYX CONTUSION, SUBSEQUENT ENCOUNTER: ICD-10-CM

## 2018-08-30 DIAGNOSIS — E11.59 TYPE 2 DIABETES MELLITUS WITH OTHER CIRCULATORY COMPLICATION, WITHOUT LONG-TERM CURRENT USE OF INSULIN (HCC): ICD-10-CM

## 2018-08-30 DIAGNOSIS — J20.9 BRONCHITIS, ACUTE, WITH BRONCHOSPASM: ICD-10-CM

## 2018-08-30 DIAGNOSIS — I10 ESSENTIAL HYPERTENSION: ICD-10-CM

## 2018-08-30 DIAGNOSIS — E03.9 ACQUIRED HYPOTHYROIDISM: ICD-10-CM

## 2018-08-30 DIAGNOSIS — I51.9 CHRONIC SYSTOLIC DYSFUNCTION OF LEFT VENTRICLE: ICD-10-CM

## 2018-08-30 DIAGNOSIS — I25.10 CORONARY ARTERY DISEASE INVOLVING NATIVE CORONARY ARTERY OF NATIVE HEART WITHOUT ANGINA PECTORIS: Primary | ICD-10-CM

## 2018-08-30 LAB
ALBUMIN SERPL BCP-MCNC: 3.9 G/DL (ref 3.5–4.8)
ALBUMIN/GLOB SERPL: 1.2 {RATIO} (ref 1–2)
ALP SERPL-CCNC: 66 U/L (ref 32–100)
ALT SERPL-CCNC: 29 U/L (ref 17–63)
ANION GAP SERPL CALC-SCNC: 14 MMOL/L (ref 0–18)
AST SERPL-CCNC: 22 U/L (ref 15–41)
BILIRUB SERPL-MCNC: 0.5 MG/DL (ref 0.3–1.2)
BUN SERPL-MCNC: 28 MG/DL (ref 8–20)
BUN/CREAT SERPL: 22 (ref 10–20)
CALCIUM SERPL-MCNC: 9.4 MG/DL (ref 8.5–10.5)
CHLORIDE SERPL-SCNC: 105 MMOL/L (ref 95–110)
CO2 SERPL-SCNC: 18 MMOL/L (ref 22–32)
CREAT SERPL-MCNC: 1.27 MG/DL (ref 0.5–1.5)
CREAT UR-MCNC: 245.1 MG/DL
GLOBULIN PLAS-MCNC: 3.3 G/DL (ref 2.5–3.7)
GLUCOSE SERPL-MCNC: 136 MG/DL (ref 70–99)
MICROALBUMIN UR-MCNC: 39.4 MG/DL (ref 0–1.8)
MICROALBUMIN/CREAT UR: 160.8 MG/G{CREAT} (ref 0–20)
OSMOLALITY UR CALC.SUM OF ELEC: 292 MOSM/KG (ref 275–295)
PATIENT FASTING: YES
POTASSIUM SERPL-SCNC: 4.1 MMOL/L (ref 3.3–5.1)
PROT SERPL-MCNC: 7.2 G/DL (ref 5.9–8.4)
SODIUM SERPL-SCNC: 137 MMOL/L (ref 136–144)
T4 FREE SERPL-MCNC: 0.86 NG/DL (ref 0.58–1.64)
TSH SERPL-ACNC: 9.46 UIU/ML (ref 0.45–5.33)

## 2018-08-30 PROCEDURE — 83036 HEMOGLOBIN GLYCOSYLATED A1C: CPT | Performed by: INTERNAL MEDICINE

## 2018-08-30 PROCEDURE — 80053 COMPREHEN METABOLIC PANEL: CPT | Performed by: INTERNAL MEDICINE

## 2018-08-30 PROCEDURE — 84439 ASSAY OF FREE THYROXINE: CPT | Performed by: INTERNAL MEDICINE

## 2018-08-30 PROCEDURE — 84443 ASSAY THYROID STIM HORMONE: CPT | Performed by: INTERNAL MEDICINE

## 2018-08-30 PROCEDURE — 82570 ASSAY OF URINE CREATININE: CPT | Performed by: INTERNAL MEDICINE

## 2018-08-30 PROCEDURE — 82043 UR ALBUMIN QUANTITATIVE: CPT | Performed by: INTERNAL MEDICINE

## 2018-08-30 PROCEDURE — 99214 OFFICE O/P EST MOD 30 MIN: CPT | Performed by: INTERNAL MEDICINE

## 2018-08-30 RX ORDER — HYDROCODONE BITARTRATE AND ACETAMINOPHEN 10; 325 MG/1; MG/1
1 TABLET ORAL EVERY 8 HOURS PRN
Qty: 21 TABLET | Refills: 0 | Status: SHIPPED | OUTPATIENT
Start: 2018-08-30 | End: 2018-09-07

## 2018-08-30 RX ORDER — ALBUTEROL SULFATE 90 UG/1
2 AEROSOL, METERED RESPIRATORY (INHALATION) EVERY 4 HOURS PRN
Qty: 1 INHALER | Refills: 2 | Status: SHIPPED | OUTPATIENT
Start: 2018-08-30 | End: 2018-09-29

## 2018-08-30 RX ORDER — FLUTICASONE PROPIONATE 50 MCG
2 SPRAY, SUSPENSION (ML) NASAL DAILY
Qty: 1 BOTTLE | Refills: 3 | Status: SHIPPED | OUTPATIENT
Start: 2018-08-30 | End: 2019-08-25

## 2018-08-30 RX ORDER — GUAIFENESIN 600 MG
600 TABLET, EXTENDED RELEASE 12 HR ORAL 2 TIMES DAILY
Qty: 18 TABLET | Refills: 0 | Status: SHIPPED | OUTPATIENT
Start: 2018-08-30 | End: 2018-09-07

## 2018-08-30 NOTE — PROGRESS NOTES
HPI:    Patient ID: Donavon Granado. is a 79year old male. HPI  Patient had complex CAD with multiple PCI stent procedures . He had LAD stent in 8/2017. He was hospitazied 7/12/2018 with chest pain and EKG changes with CHF. EFwas 45 %.  He had stent in lungs every 4 (four) hours as needed for Wheezing. Disp: 1 Inhaler Rfl: 2   HYDROcodone-acetaminophen  MG Oral Tab Take 1 tablet by mouth every 8 (eight) hours as needed for Pain.  Disp: 21 tablet Rfl: 0   HYDROcodone-acetaminophen  MG Oral Tab [Kdc:Yello*    HIVES   PHYSICAL EXAM:   Physical Exam   Constitutional: He is oriented to person, place, and time. No distress. Morbidly obese, walks with cane. HENT:   Head: Normocephalic. Eyes: Pupils are equal, round, and reactive to light.    Nec COCCYX:          Coccyx intact. No acute fracture or destructive lesions. SACRO ILIAC JOINTS:  Well maintained. OTHER:             DJD noted in the visualized lumbar spine.     =====  CONCLUSION:   1. No acute findings.                   PROCEDURE: fung, pepperoni, soy sauce, pre-packaged rice or potatoes. Please remember to read nutrition labels for sodium content. 5. Coccyx contusion, subsequent encounter  Refill [place] for severe pain relief. F/u at pain clinic.      6.Type 2 diabetes mellitus

## 2018-08-30 NOTE — TELEPHONE ENCOUNTER
Called patient to schedule a ER F/u patient was not feeling well when I talked to him he will be in today at 940

## 2018-08-30 NOTE — PATIENT INSTRUCTIONS
Left-Sided Congestive Heart Failure (CHF)    The heart is a large muscle that pumps blood throughout the body. Blood carries oxygen to all the organs (including the brain), muscles, and skin of your body.  After the body takes the oxygen out of the blood, Heart failure is a chronic condition. There is no cure. The purpose of medical treatment is to improve the pumping action of the heart, and remove excess water and fluids from the body.  A number of medicines can help reach this goal, improve symptoms and k Besides taking your medicine as instructed, an important part of treatment includes lifestyle changes. These include diet, physical activity, stopping smoking, and weight control. Improve your diet.  Often in the hospital, people are given a heart healthy

## 2018-08-31 LAB — HBA1C MFR BLD: 7.8 % (ref 4–6)

## 2018-09-07 ENCOUNTER — OFFICE VISIT (OUTPATIENT)
Dept: NEUROLOGY | Facility: CLINIC | Age: 68
End: 2018-09-07
Payer: MEDICARE

## 2018-09-07 VITALS — RESPIRATION RATE: 23 BRPM | DIASTOLIC BLOOD PRESSURE: 90 MMHG | HEART RATE: 93 BPM | SYSTOLIC BLOOD PRESSURE: 134 MMHG

## 2018-09-07 DIAGNOSIS — M79.18 MYOFASCIAL PAIN SYNDROME, CERVICAL: ICD-10-CM

## 2018-09-07 DIAGNOSIS — M54.42 CHRONIC LEFT-SIDED LOW BACK PAIN WITH LEFT-SIDED SCIATICA: Primary | ICD-10-CM

## 2018-09-07 DIAGNOSIS — G89.29 CHRONIC LEFT-SIDED LOW BACK PAIN WITH LEFT-SIDED SCIATICA: Primary | ICD-10-CM

## 2018-09-07 PROCEDURE — 99213 OFFICE O/P EST LOW 20 MIN: CPT | Performed by: PHYSICAL MEDICINE & REHABILITATION

## 2018-09-07 RX ORDER — HYDROCODONE BITARTRATE AND ACETAMINOPHEN 10; 325 MG/1; MG/1
1 TABLET ORAL EVERY 8 HOURS PRN
Qty: 45 TABLET | Refills: 0 | Status: SHIPPED | OUTPATIENT
Start: 2018-09-07 | End: 2018-09-17

## 2018-09-07 RX ORDER — GABAPENTIN 300 MG/1
300 CAPSULE ORAL 2 TIMES DAILY
Qty: 60 CAPSULE | Refills: 0 | Status: SHIPPED | OUTPATIENT
Start: 2018-09-07 | End: 2018-10-08

## 2018-09-07 NOTE — PROGRESS NOTES
HPI:    Patient ID: Vincent Swann is a 79year old male. 72-year-old male presents for bilateral low back pain.  He was previously on Norco 10/325 TID; was considering epidural steroid injection but cannot be off DAPT per cardiologist. He had received m Oral Tab Take 1 tablet (20 mg total) by mouth nightly. Disp: 90 tablet Rfl: 3   spironolactone 25 MG Oral Tab Take 1 tablet (25 mg total) by mouth daily. Disp: 30 tablet Rfl: 0   tamsulosin HCl 0.4 MG Oral Cap Take 2 capsules (0.8 mg total) by mouth daily. ASSESSMENT/PLAN:   Chronic left-sided low back pain with left-sided sciatica  (primary encounter diagnosis)  Myofascial pain syndrome, cervical    Recommend aquatic therapy.   Patient given note to give to caregivers that he can arrange for transportation

## 2018-09-07 NOTE — PATIENT INSTRUCTIONS
As of October 6th 2014, the Drug Enforcement Agency [facility]) is reclassifying all hydrocodone combination medications from Schedule III to Schedule II. This includes medications such as Norco, Vicodin, Lortab, Zohydro, and Vicoprofen.     What this means for y

## 2018-09-17 RX ORDER — HYDROCODONE BITARTRATE AND ACETAMINOPHEN 10; 325 MG/1; MG/1
1 TABLET ORAL EVERY 8 HOURS PRN
Qty: 45 TABLET | Refills: 0 | Status: SHIPPED | OUTPATIENT
Start: 2018-09-22 | End: 2018-10-07

## 2018-09-17 NOTE — TELEPHONE ENCOUNTER
Medication request: HYDROcodone-acetaminophen  MG Oral Tab, #45, no refill  Take 1 tablet by mouth every 8 (eight) hours as needed for pain.     [place]: 9/8/18 (for 15 day supply)    LOV: 9/7/18  NOV: None    Medication refill pended - rout

## 2018-09-18 RX ORDER — METOPROLOL SUCCINATE 50 MG/1
50 TABLET, EXTENDED RELEASE ORAL DAILY
Qty: 90 TABLET | Refills: 3 | Status: SHIPPED | OUTPATIENT
Start: 2018-09-18 | End: 2019-04-07

## 2018-09-18 NOTE — TELEPHONE ENCOUNTER
Spoke to patient and notified him that prescription was ready for . Scheduled him for a follow-up on 10/8/18.

## 2018-10-08 ENCOUNTER — TELEPHONE (OUTPATIENT)
Dept: NEUROLOGY | Facility: CLINIC | Age: 68
End: 2018-10-08

## 2018-10-08 ENCOUNTER — OFFICE VISIT (OUTPATIENT)
Dept: NEUROLOGY | Facility: CLINIC | Age: 68
End: 2018-10-08
Payer: MEDICARE

## 2018-10-08 VITALS
BODY MASS INDEX: 41.47 KG/M2 | HEIGHT: 69 IN | HEART RATE: 80 BPM | SYSTOLIC BLOOD PRESSURE: 134 MMHG | WEIGHT: 280 LBS | DIASTOLIC BLOOD PRESSURE: 86 MMHG

## 2018-10-08 DIAGNOSIS — M48.062 LUMBAR STENOSIS WITH NEUROGENIC CLAUDICATION: ICD-10-CM

## 2018-10-08 DIAGNOSIS — M79.18 MYOFASCIAL PAIN: Primary | ICD-10-CM

## 2018-10-08 PROCEDURE — 99213 OFFICE O/P EST LOW 20 MIN: CPT | Performed by: PHYSICAL MEDICINE & REHABILITATION

## 2018-10-08 RX ORDER — HYDROCODONE BITARTRATE AND ACETAMINOPHEN 10; 325 MG/1; MG/1
1 TABLET ORAL EVERY 8 HOURS PRN
Qty: 90 TABLET | Refills: 0 | Status: SHIPPED | OUTPATIENT
Start: 2018-10-08 | End: 2018-11-02

## 2018-10-08 RX ORDER — GABAPENTIN 100 MG/1
100 CAPSULE ORAL 2 TIMES DAILY
Qty: 60 CAPSULE | Refills: 0 | Status: SHIPPED | OUTPATIENT
Start: 2018-10-08 | End: 2018-11-30

## 2018-10-08 NOTE — PROGRESS NOTES
HPI:    Patient ID: Ramona Yee is a 79year old male. 72-year-old male presents for bilateral low back pain.  He was previously on Norco 10/325 TID; was considering epidural steroid injection but cannot be off DAPT per cardiologist.  Today he continu aspirin 81 MG Oral Tab EC Take 1 tablet (81 mg total) by mouth daily. Disp: 30 tablet Rfl: 0   Clopidogrel Bisulfate 75 MG Oral Tab Take 1 tablet (75 mg total) by mouth daily.  Disp: 90 tablet Rfl: 3   Rosuvastatin Calcium 20 MG Oral Tab Take 1 tablet (20 dose of gabapentin to 100mg BID since the 300mg BID dosing is sedating for him. Norco 10/325 TID refilled today for 30 day script. He will follow up for trigger point injections to the bilateral upper lumbar paraspinals once approved.  When able he should p

## 2018-10-08 NOTE — PATIENT INSTRUCTIONS
1) Make sure you dispose of the old formulations of gabapentin since the dose I have sent to your pharmacy is different.     2) Trigger point injections ordered; if you do not hear back from us by Wednesday please call the office and ask if the injection ha

## 2018-10-08 NOTE — TELEPHONE ENCOUNTER
Called Medicaid to verify eligibility for bilateral upper lumbar paraspinal trigger point injections cpt code . Pt. is eligible for services until 10/30/18. He will be enrolled with [facility] effective 11/01/18.  Will inform Nursing

## 2018-10-22 RX ORDER — TAMSULOSIN HYDROCHLORIDE 0.4 MG/1
0.8 CAPSULE ORAL DAILY
Qty: 180 CAPSULE | Refills: 0 | Status: SHIPPED | OUTPATIENT
Start: 2018-10-22 | End: 2019-01-31

## 2018-10-23 ENCOUNTER — OFFICE VISIT (OUTPATIENT)
Dept: NEUROLOGY | Facility: CLINIC | Age: 68
End: 2018-10-23
Payer: MEDICARE

## 2018-10-23 VITALS
HEIGHT: 69 IN | RESPIRATION RATE: 20 BRPM | WEIGHT: 280 LBS | BODY MASS INDEX: 41.47 KG/M2 | HEART RATE: 84 BPM | DIASTOLIC BLOOD PRESSURE: 78 MMHG | SYSTOLIC BLOOD PRESSURE: 126 MMHG

## 2018-10-23 DIAGNOSIS — M79.18 MYOFASCIAL PAIN: ICD-10-CM

## 2018-10-23 PROCEDURE — 20552 NJX 1/MLT TRIGGER POINT 1/2: CPT | Performed by: PHYSICAL MEDICINE & REHABILITATION

## 2018-10-23 RX ORDER — TRIAMCINOLONE ACETONIDE 40 MG/ML
40 INJECTION, SUSPENSION INTRA-ARTICULAR; INTRAMUSCULAR ONCE
Status: COMPLETED | OUTPATIENT
Start: 2018-10-23 | End: 2018-10-23

## 2018-10-23 RX ORDER — LIDOCAINE HYDROCHLORIDE 10 MG/ML
4 INJECTION, SOLUTION INFILTRATION; PERINEURAL ONCE
Status: COMPLETED | OUTPATIENT
Start: 2018-10-23 | End: 2018-10-23

## 2018-10-23 NOTE — PROCEDURES
Procedure note: Trigger point injections  Procedure Diagnosis: Myofascial pain    Summary of Procedure:   Risks and benefits of the procedure were discussed with the patient and consent was obtained.  Trigger points were palpated and marked along the left l

## 2018-10-24 ENCOUNTER — MED REC SCAN ONLY (OUTPATIENT)
Dept: NEUROLOGY | Facility: CLINIC | Age: 68
End: 2018-10-24

## 2018-10-24 ENCOUNTER — TELEPHONE (OUTPATIENT)
Dept: INTERNAL MEDICINE CLINIC | Facility: CLINIC | Age: 68
End: 2018-10-24

## 2018-10-24 NOTE — TELEPHONE ENCOUNTER
Patient called requesting refill on medication for his prostate. Informed patient that Tamsulosin was sent 2 days ago by covering physician (Dr. Neno Murcia) to pharmacy -- [facility] in [facility]. He verbalized understanding.     Medication Detail      Disp

## 2018-10-25 RX ORDER — NAPROXEN 500 MG/1
500 TABLET ORAL 2 TIMES DAILY PRN
Qty: 20 TABLET | Refills: 0 | OUTPATIENT
Start: 2018-10-25 | End: 2018-11-01

## 2018-11-02 DIAGNOSIS — M48.062 LUMBAR STENOSIS WITH NEUROGENIC CLAUDICATION: ICD-10-CM

## 2018-11-02 RX ORDER — HYDROCODONE BITARTRATE AND ACETAMINOPHEN 10; 325 MG/1; MG/1
1 TABLET ORAL EVERY 8 HOURS PRN
Qty: 90 TABLET | Refills: 0 | Status: ON HOLD | OUTPATIENT
Start: 2018-11-09 | End: 2018-11-28

## 2018-11-05 ENCOUNTER — TELEPHONE (OUTPATIENT)
Dept: INTERNAL MEDICINE CLINIC | Facility: CLINIC | Age: 68
End: 2018-11-05

## 2018-11-15 RX ORDER — CLOPIDOGREL BISULFATE 75 MG/1
75 TABLET ORAL DAILY
Qty: 90 TABLET | Refills: 3 | Status: SHIPPED | OUTPATIENT
Start: 2018-11-15 | End: 2019-04-07

## 2018-11-20 ENCOUNTER — HOSPITAL ENCOUNTER (EMERGENCY)
Facility: HOSPITAL | Age: 68
Discharge: HOME | End: 2018-11-20
Attending: EMERGENCY MEDICINE
Payer: MEDICARE

## 2018-11-20 ENCOUNTER — APPOINTMENT (OUTPATIENT)
Dept: GENERAL RADIOLOGY | Facility: HOSPITAL | Age: 68
End: 2018-11-20
Attending: EMERGENCY MEDICINE
Payer: MEDICARE

## 2018-11-20 ENCOUNTER — TELEPHONE (OUTPATIENT)
Dept: NEUROLOGY | Facility: CLINIC | Age: 68
End: 2018-11-20

## 2018-11-20 VITALS
BODY MASS INDEX: 39.99 KG/M2 | WEIGHT: 270 LBS | HEIGHT: 69 IN | SYSTOLIC BLOOD PRESSURE: 147 MMHG | OXYGEN SATURATION: 95 % | HEART RATE: 64 BPM | DIASTOLIC BLOOD PRESSURE: 81 MMHG | RESPIRATION RATE: 20 BRPM

## 2018-11-20 DIAGNOSIS — M54.50 BACK PAIN, LUMBOSACRAL: Primary | ICD-10-CM

## 2018-11-20 PROCEDURE — 72110 X-RAY EXAM L-2 SPINE 4/>VWS: CPT | Performed by: EMERGENCY MEDICINE

## 2018-11-20 PROCEDURE — 72220 X-RAY EXAM SACRUM TAILBONE: CPT | Performed by: EMERGENCY MEDICINE

## 2018-11-20 PROCEDURE — 96372 THER/PROPH/DIAG INJ SC/IM: CPT

## 2018-11-20 PROCEDURE — 99284 EMERGENCY DEPT VISIT MOD MDM: CPT

## 2018-11-20 RX ORDER — MORPHINE SULFATE 4 MG/ML
10 INJECTION, SOLUTION INTRAMUSCULAR; INTRAVENOUS ONCE
Status: COMPLETED | OUTPATIENT
Start: 2018-11-20 | End: 2018-11-20

## 2018-11-20 NOTE — TELEPHONE ENCOUNTER
Spoke with patient regarding increase of pain. Pt has a caregiver that takes care of him 3 days per week. Caregiver lost control of the wheelchair going down ramp, pt fell out of wheelchair.  Has had constant back pain since this happened 5 days ago, unable

## 2018-11-20 NOTE — ED INITIAL ASSESSMENT (HPI)
Pt to ER with c/o increased lower back pain after falling out of wheelchair last Thursday. Pt denies hitting head or LOC. Pt states hx of chronic back pain. Pt sees Pain clinic and has a contract. Pt takes Norco at home- last dose 0600. Next due at 1400.  P

## 2018-11-20 NOTE — ED PROVIDER NOTES
Patient Seen in: [facility] AND [facility] Emergency Department    History   Patient presents with:  Back Pain (musculoskeletal)    Stated Complaint: back pain     HPI    Patient is a 71-year-old male with a long-standing history of chronic low back pain who sta Constitutional: He is oriented to person, place, and time. He appears well-developed and well-nourished. Morbidly obese   HENT:   Head: Normocephalic and atraumatic. Eyes: Conjunctivae and EOM are normal. Pupils are equal, round, and reactive to light.

## 2018-11-20 NOTE — ED NOTES
Pt to ED c/o lumbar back pain & tailbone pain s/p falling from wheelchair approximately 5 days ago. Per patient, his caregiver was wheeling him down a ramp at a hospital when she lost control of the chair and he fell out of the WC at the bottom.  Pt states

## 2018-11-21 RX ORDER — LEVOTHYROXINE SODIUM 0.1 MG/1
100 TABLET ORAL
Qty: 30 TABLET | Refills: 0 | Status: ON HOLD | OUTPATIENT
Start: 2018-11-21 | End: 2019-01-28

## 2018-11-21 NOTE — TELEPHONE ENCOUNTER
Spoke with patient who was seen in ED yesterday. Imaging was completed, in Epic. Pt was given a shot of morphine and was discharged with daughter. Pt states Norco 10-325mg is no longer offering relief.  Requested percocet, explained he used to take it b

## 2018-11-26 ENCOUNTER — HOSPITAL ENCOUNTER (INPATIENT)
Facility: HOSPITAL | Age: 68
LOS: 2 days | Discharge: HOME | DRG: 303 | End: 2018-11-28
Attending: EMERGENCY MEDICINE | Admitting: HOSPITALIST
Payer: MEDICARE

## 2018-11-26 ENCOUNTER — APPOINTMENT (OUTPATIENT)
Dept: GENERAL RADIOLOGY | Facility: HOSPITAL | Age: 68
DRG: 303 | End: 2018-11-26
Attending: EMERGENCY MEDICINE
Payer: MEDICARE

## 2018-11-26 DIAGNOSIS — M48.062 LUMBAR STENOSIS WITH NEUROGENIC CLAUDICATION: ICD-10-CM

## 2018-11-26 DIAGNOSIS — R07.9 CHEST PAIN, UNSPECIFIED TYPE: Primary | ICD-10-CM

## 2018-11-26 DIAGNOSIS — G89.29 CHRONIC BILATERAL LOW BACK PAIN WITHOUT SCIATICA: ICD-10-CM

## 2018-11-26 DIAGNOSIS — M54.50 CHRONIC BILATERAL LOW BACK PAIN WITHOUT SCIATICA: ICD-10-CM

## 2018-11-26 PROCEDURE — 71045 X-RAY EXAM CHEST 1 VIEW: CPT | Performed by: EMERGENCY MEDICINE

## 2018-11-26 PROCEDURE — 99223 1ST HOSP IP/OBS HIGH 75: CPT | Performed by: HOSPITALIST

## 2018-11-26 RX ORDER — METOPROLOL SUCCINATE 50 MG/1
50 TABLET, EXTENDED RELEASE ORAL DAILY
Status: DISCONTINUED | OUTPATIENT
Start: 2018-11-27 | End: 2018-11-28

## 2018-11-26 RX ORDER — DEXTROSE MONOHYDRATE 25 G/50ML
50 INJECTION, SOLUTION INTRAVENOUS AS NEEDED
Status: DISCONTINUED | OUTPATIENT
Start: 2018-11-26 | End: 2018-11-28

## 2018-11-26 RX ORDER — CLOPIDOGREL BISULFATE 75 MG/1
75 TABLET ORAL DAILY
Status: DISCONTINUED | OUTPATIENT
Start: 2018-11-26 | End: 2018-11-26

## 2018-11-26 RX ORDER — LEVOTHYROXINE SODIUM 0.1 MG/1
100 TABLET ORAL
Status: DISCONTINUED | OUTPATIENT
Start: 2018-11-27 | End: 2018-11-28

## 2018-11-26 RX ORDER — HYDROCODONE BITARTRATE AND ACETAMINOPHEN 10; 325 MG/1; MG/1
1 TABLET ORAL EVERY 8 HOURS PRN
Status: DISCONTINUED | OUTPATIENT
Start: 2018-11-26 | End: 2018-11-28

## 2018-11-26 RX ORDER — CLOPIDOGREL BISULFATE 75 MG/1
75 TABLET ORAL DAILY
Status: DISCONTINUED | OUTPATIENT
Start: 2018-11-27 | End: 2018-11-28

## 2018-11-26 RX ORDER — LATANOPROST 50 UG/ML
1 SOLUTION/ DROPS OPHTHALMIC NIGHTLY
Status: DISCONTINUED | OUTPATIENT
Start: 2018-11-26 | End: 2018-11-28

## 2018-11-26 RX ORDER — SODIUM CHLORIDE 9 MG/ML
125 INJECTION, SOLUTION INTRAVENOUS CONTINUOUS
Status: DISCONTINUED | OUTPATIENT
Start: 2018-11-26 | End: 2018-11-27

## 2018-11-26 RX ORDER — ONDANSETRON 2 MG/ML
4 INJECTION INTRAMUSCULAR; INTRAVENOUS EVERY 6 HOURS PRN
Status: DISCONTINUED | OUTPATIENT
Start: 2018-11-26 | End: 2018-11-28

## 2018-11-26 RX ORDER — ROSUVASTATIN CALCIUM 20 MG/1
20 TABLET, COATED ORAL NIGHTLY
Status: DISCONTINUED | OUTPATIENT
Start: 2018-11-26 | End: 2018-11-28

## 2018-11-26 RX ORDER — DORZOLAMIDE HYDROCHLORIDE AND TIMOLOL MALEATE 20; 5 MG/ML; MG/ML
1 SOLUTION/ DROPS OPHTHALMIC 2 TIMES DAILY
Status: DISCONTINUED | OUTPATIENT
Start: 2018-11-26 | End: 2018-11-28

## 2018-11-26 RX ORDER — MORPHINE SULFATE 2 MG/ML
1 INJECTION, SOLUTION INTRAMUSCULAR; INTRAVENOUS EVERY 2 HOUR PRN
Status: DISCONTINUED | OUTPATIENT
Start: 2018-11-26 | End: 2018-11-28

## 2018-11-26 RX ORDER — MORPHINE SULFATE 4 MG/ML
4 INJECTION, SOLUTION INTRAMUSCULAR; INTRAVENOUS ONCE
Status: COMPLETED | OUTPATIENT
Start: 2018-11-26 | End: 2018-11-26

## 2018-11-26 RX ORDER — ASPIRIN 325 MG
325 TABLET ORAL DAILY
Status: DISCONTINUED | OUTPATIENT
Start: 2018-11-26 | End: 2018-11-27

## 2018-11-26 RX ORDER — SODIUM CHLORIDE 9 MG/ML
INJECTION, SOLUTION INTRAVENOUS CONTINUOUS
Status: DISCONTINUED | OUTPATIENT
Start: 2018-11-26 | End: 2018-11-27

## 2018-11-26 RX ORDER — ALFUZOSIN HYDROCHLORIDE 10 MG/1
10 TABLET, EXTENDED RELEASE ORAL
Status: DISCONTINUED | OUTPATIENT
Start: 2018-11-27 | End: 2018-11-28

## 2018-11-26 RX ORDER — GABAPENTIN 100 MG/1
100 CAPSULE ORAL 2 TIMES DAILY
Status: DISCONTINUED | OUTPATIENT
Start: 2018-11-26 | End: 2018-11-28

## 2018-11-26 RX ORDER — NITROGLYCERIN 0.4 MG/1
0.4 TABLET SUBLINGUAL EVERY 5 MIN PRN
Status: DISCONTINUED | OUTPATIENT
Start: 2018-11-26 | End: 2018-11-28

## 2018-11-26 RX ORDER — HEPARIN SODIUM 5000 [USP'U]/ML
5000 INJECTION, SOLUTION INTRAVENOUS; SUBCUTANEOUS EVERY 12 HOURS SCHEDULED
Status: DISCONTINUED | OUTPATIENT
Start: 2018-11-26 | End: 2018-11-28

## 2018-11-26 RX ORDER — SODIUM CHLORIDE 0.9 % (FLUSH) 0.9 %
3 SYRINGE (ML) INJECTION AS NEEDED
Status: DISCONTINUED | OUTPATIENT
Start: 2018-11-26 | End: 2018-11-28

## 2018-11-26 RX ORDER — ACETAMINOPHEN 325 MG/1
650 TABLET ORAL EVERY 6 HOURS PRN
Status: DISCONTINUED | OUTPATIENT
Start: 2018-11-26 | End: 2018-11-28

## 2018-11-26 RX ORDER — FLUTICASONE PROPIONATE 50 MCG
2 SPRAY, SUSPENSION (ML) NASAL
Status: DISCONTINUED | OUTPATIENT
Start: 2018-11-26 | End: 2018-11-28

## 2018-11-26 RX ORDER — MORPHINE SULFATE 2 MG/ML
2 INJECTION, SOLUTION INTRAMUSCULAR; INTRAVENOUS EVERY 2 HOUR PRN
Status: DISCONTINUED | OUTPATIENT
Start: 2018-11-26 | End: 2018-11-28

## 2018-11-26 RX ORDER — MORPHINE SULFATE 4 MG/ML
4 INJECTION, SOLUTION INTRAMUSCULAR; INTRAVENOUS EVERY 2 HOUR PRN
Status: DISCONTINUED | OUTPATIENT
Start: 2018-11-26 | End: 2018-11-28

## 2018-11-26 NOTE — CM/SW NOTE
Dr. Tio Odell office called to cancel appointment as patient is in the ER currently. Pt. Rescheduled to first available apportionment @ 1/2/19 @ 9:15 am.  [address]

## 2018-11-26 NOTE — TELEPHONE ENCOUNTER
He may come see me in clinic so I may re-evalaute him if he wishes, however it appears he was prescribed tramadol and Norco 10/325 by two other physicians.  A few months ago I discussed in detail that he cannot be getting opioids from other providers and if

## 2018-11-26 NOTE — TELEPHONE ENCOUNTER
Left detailed message for patient (FYI verified) informing him that we are sorry to hear he had a negative experience at the ED today and that we received a response from Dr. Jean Ceballos pertaining to the discussion him and I had on 11/21 about the Norco 10-32

## 2018-11-26 NOTE — TELEPHONE ENCOUNTER
Pts daughter calling inregarding to pt being the ER, daughter states that pt cannot walk due to his pain and the ER \"does not seem to care and is going to discharge him\".  Daughter would like to speak with someone because she is not happy with how the ER

## 2018-11-26 NOTE — ED INITIAL ASSESSMENT (HPI)
[facility] is here for eval via EMS c/o posterior neck pain radiating to left arm. Had episode of chest pain, dizziness, and vomiting at 530am.  Denies chest pain now. Had episode of neck pain yesterday that subsided. Received 1 dose of SL nitro per EMS.

## 2018-11-26 NOTE — ED PROVIDER NOTES
Patient Seen in: [facility] AND [facility] Emergency Department    History   Patient presents with:  Pain (neurologic)    Stated Complaint: neck/arm pain    HPI    24-year-old male with history of hypertension, hypercholesterolemia, coronary artery disease post Temp 97.7 °F (36.5 °C)   Temp src Oral   SpO2 95 %   O2 Device None (Room air)       Current:/76   Pulse 82   Temp 97.7 °F (36.5 °C) (Oral)   Resp 23   SpO2 93%         Physical Exam    General Appearance: alert, no distress  Eyes: pupils equal and PLATELET.   Procedure                               Abnormality         Status                     ---------                               -----------         ------                     CBC W/ DIFFERENTIAL[[id number]]          Abnormal            Final resul

## 2018-11-26 NOTE — HISTORICAL OFFICE NOTE
Sy Olvera  : 1950  ACCOUNT:  [id number]  [phone]  PCP:    TODAY'S DATE: 2018  DICTATED BY:  [Dr. Jose Manuel Teran: [Followup of CAD, of native vessels.]    HPI:    [On 2018, August Christian, a 74year old male, presented w regular exercise. DIET: low sodium diet. MARITAL STATUS: single. OCCUPATION: retired.      ALLERGIES: No Known Allergies    MEDICATIONS: Selected prescriptions see below    VITAL SIGNS: [B/P - 102/78 , Pulse - 92, Weight -  236, Height -   69 , BMI - 34.8 ] Structure Following A Cardiac Catheterization  7. Tobacco abuse      PLAN:  [Please start cardiac rehab. Please see the dietitian to help to lose weight. Please continue taking your medications as directed. I will see you in 3 months.   Thank you]    PRE

## 2018-11-26 NOTE — H&P
[facility]    PATIENT'S NAME: Supriya Enmanuel   ATTENDING PHYSICIAN: Heidi Jonas MD   PATIENT ACCOUNT#:   [id number]    LOCATION:  [place] [address]  MEDICAL RECORD #:   [id number]       YOB: 1950  ADMISSION DATE:       11/26/20 ambulating, with shortness of breath. Also today he had an episode of precordial chest discomfort radiating to the left shoulder and left arm. Patient currently asymptomatic. Other 12-point review of systems negative.       PHYSICAL EXAMINATION:    GENER

## 2018-11-27 ENCOUNTER — APPOINTMENT (OUTPATIENT)
Dept: CV DIAGNOSTICS | Facility: HOSPITAL | Age: 68
DRG: 303 | End: 2018-11-27
Attending: INTERNAL MEDICINE
Payer: MEDICARE

## 2018-11-27 ENCOUNTER — APPOINTMENT (OUTPATIENT)
Dept: NUCLEAR MEDICINE | Facility: HOSPITAL | Age: 68
DRG: 303 | End: 2018-11-27
Attending: INTERNAL MEDICINE
Payer: MEDICARE

## 2018-11-27 PROCEDURE — 93017 CV STRESS TEST TRACING ONLY: CPT | Performed by: INTERNAL MEDICINE

## 2018-11-27 PROCEDURE — 99233 SBSQ HOSP IP/OBS HIGH 50: CPT | Performed by: HOSPITALIST

## 2018-11-27 PROCEDURE — 78452 HT MUSCLE IMAGE SPECT MULT: CPT | Performed by: INTERNAL MEDICINE

## 2018-11-27 RX ORDER — ASPIRIN 81 MG/1
81 TABLET, CHEWABLE ORAL DAILY
Status: DISCONTINUED | OUTPATIENT
Start: 2018-11-27 | End: 2018-11-28

## 2018-11-27 RX ORDER — 0.9 % SODIUM CHLORIDE 0.9 %
VIAL (ML) INJECTION
Status: COMPLETED
Start: 2018-11-27 | End: 2018-11-27

## 2018-11-27 NOTE — PROGRESS NOTES
[facility]  [facility] Cardiology Progress Note    Zulma Wang. Patient Status:  Inpatient    3/23/1950 MRN [id number]   Location [facility] 3W/SW Attending Gunner Goodman MD   Hosp Day # 1 PCP Katherine Conrad MD     Assessment:  1.   Ni He reports that he does not drink alcohol or use drugs.     Objective:   Temp: 97.8 °F (36.6 °C)  Pulse: 86  Resp: 18  BP: 129/80    Intake/Output:     Intake/Output Summary (Last 24 hours) at 11/27/2018 1102  Last data filed at 11/27/2018 7064  Gross per 2

## 2018-11-27 NOTE — TELEPHONE ENCOUNTER
Pt calling wanting to speak with a nurse about medications and breach of contract, pt states that he is unaware of any conversation with Dr. Karan Culver and that he has had medications filled elsewhere, please advise

## 2018-11-27 NOTE — CONSULTS
Cardiology (consult dictated)    Assessment:  1. Constellation of symptoms including shoulder pain or arm pain pectoral pain and sweats. Rule out ischemia. Negative troponin x2. Nonspecific EKG abnormalities.     2.  History of prior PCI with multiple s

## 2018-11-27 NOTE — PROGRESS NOTES
[facility] HOSP - [facility]  Progress Note     Winston Quiles.   : 3/23/1950    Status: Inpatient  Day #: 1    Attending: Luis Sosa MD  PCP: Marianne Pearce MD      Assessment and Plan     Chest pain  CAD  -cardiology consult appreciated  -troponin neg  -str K  3.9   --    --    --    CL  108   --    --   105   CO2  18*   --    --   22   GLU  154*   --    --   142*   TROP  0.00  0.01  0.00   --        Xr Chest Ap Portable  (cpt=71045)    Result Date: 11/26/2018  CONCLUSION: Normal examination.       Dictated Saline Flush, acetaminophen, ondansetron HCl, morphINE sulfate **OR** morphINE sulfate **OR** morphINE sulfate, influenza virus vaccine PF, Fluticasone Propionate, HYDROcodone-acetaminophen    Spent <35 minutes, <50% of the time counseling coordinating car

## 2018-11-27 NOTE — DISCHARGE SUMMARY
[facility]  Discharge Summary     Marshall Jackson.   : 3/23/1950    Status: Inpatient  Day #: 1    Attending: Gabrielle Garner MD  PCP: Castro Candelario MD     Date of Admission: 2018  Date of Discharge: 2018     Hospital Discharge Diagno Supple, symmetrical  Cardiac:  Regular rate, regular rhythm  Pulmonary:  Clear to auscultation bilaterally, respirations unlabored  Gastrointestinal:  Soft, non-tender, normal bowel sounds  Musculoskeletal:  No joint swelling  Extremities:  No edema, no c total) by mouth daily. Quantity:  90 tablet  Refills:  3     Potassium Chloride ER 10 MEQ Tbcr  Commonly known as:  K-DUR      Take 1 tablet (10 mEq total) by mouth every other day.    Quantity:  15 tablet  Refills:  0     Rosuvastatin Calcium 20 MG Tabs

## 2018-11-27 NOTE — PLAN OF CARE
Report called to [facility]. Transportation arranged. Tele box ordered. Pt medicated for pain with prn morphine. VSS. No acute distress noted.

## 2018-11-27 NOTE — TELEPHONE ENCOUNTER
Spoke to patient, I advised him Dr Lisette Caraballo will not be prescribing narcotics to him but he  can continue to see Dr Lisette Caraballo if he wishes.    States he has not been prescribed narcotics from other providers after speaking to Dr Lisette Caraballo about having narcotics

## 2018-11-27 NOTE — CM/SW NOTE
SW received an MDO regarding PHQ4. Sw met with pt at bedside. Pt lives alone in an apartment without an elevator and 6 stairs to enter. Pt has a daughter who lives close by and is able to assist pt if needed.  Pt states that he is not independent with ADLs,

## 2018-11-27 NOTE — CONSULTS
[facility]    PATIENT'S NAME: Gela Slider   ATTENDING PHYSICIAN: Twin Duval MD   CONSULTING PHYSICIAN: Skyler Gonzalez.  Noy Archibald MD   PATIENT ACCOUNT#:   [id number]    LOCATION:  [place] [address] #:   [id number]       DATE OF BIRTH:  03[birth year]2 exercise. FAMILY HISTORY:  Positive for premature coronary disease. REVIEW OF SYSTEMS:  Negative except for the above. PHYSICAL EXAMINATION:    GENERAL:  Well-developed, well-nourished male in no acute distress. Alert and oriented x3.    VITAL kidney as well as the renal arteries. Hold any ACE inhibitors that he may be on at home. Thank you for this consultation. Dictated By Luisito Rodriguez.  Fatemeh Dumont MD  d: 11/26/2018 18:47:14  t: 11/26/2018 20:09:39  [facility] [id number]  [facility]/

## 2018-11-27 NOTE — PLAN OF CARE
Problem: Patient/Family Goals  Goal: Patient/Family Long Term Goal  Patient's Long Term Goal:     Interventions:  - get better  -follow up with dr post discharge  - See additional Care Plan goals for specific interventions  Outcome: Progressing    Goal: Pa

## 2018-11-28 ENCOUNTER — APPOINTMENT (OUTPATIENT)
Dept: CV DIAGNOSTICS | Facility: HOSPITAL | Age: 68
DRG: 303 | End: 2018-11-28
Attending: INTERNAL MEDICINE
Payer: MEDICARE

## 2018-11-28 VITALS
OXYGEN SATURATION: 94 % | HEIGHT: 69 IN | DIASTOLIC BLOOD PRESSURE: 84 MMHG | HEART RATE: 87 BPM | BODY MASS INDEX: 40.31 KG/M2 | SYSTOLIC BLOOD PRESSURE: 134 MMHG | WEIGHT: 272.19 LBS | TEMPERATURE: 98 F | RESPIRATION RATE: 18 BRPM

## 2018-11-28 PROCEDURE — 99233 SBSQ HOSP IP/OBS HIGH 50: CPT | Performed by: HOSPITALIST

## 2018-11-28 PROCEDURE — 93306 TTE W/DOPPLER COMPLETE: CPT | Performed by: INTERNAL MEDICINE

## 2018-11-28 RX ORDER — HYDROCODONE BITARTRATE AND ACETAMINOPHEN 10; 325 MG/1; MG/1
1 TABLET ORAL EVERY 8 HOURS PRN
Qty: 5 TABLET | Refills: 0 | Status: SHIPPED | OUTPATIENT
Start: 2018-11-28 | End: 2018-11-30

## 2018-11-28 RX ORDER — ZOLPIDEM TARTRATE 5 MG/1
5 TABLET ORAL NIGHTLY PRN
Status: DISCONTINUED | OUTPATIENT
Start: 2018-11-28 | End: 2018-11-28

## 2018-11-28 RX ORDER — LOSARTAN POTASSIUM AND HYDROCHLOROTHIAZIDE 25; 100 MG/1; MG/1
TABLET ORAL
Qty: 30 TABLET | Refills: 0 | OUTPATIENT
Start: 2018-11-28

## 2018-11-28 NOTE — PROGRESS NOTES
[facility]  Progress Note     Chelsey Whitt.   : 3/23/1950    Status: Inpatient  Day #: 2    Attending: Kannan Martin MD  PCP: Marrion Rubinstein, MD      Assessment and Plan     Chest pain  CAD  -cardiology consult appreciated  -troponin neg  -str --    CL  108   --    --   105   CO2  18*   --    --   22   GLU  154*   --    --   142*   TROP  0.00  0.01  0.00   --        Xr Chest Ap Portable  (cpt=71045)    Result Date: 11/26/2018  CONCLUSION: Normal examination.       Dictated by (CST): Julian Flush, acetaminophen, ondansetron HCl, morphINE sulfate **OR** morphINE sulfate **OR** morphINE sulfate, influenza virus vaccine PF, Fluticasone Propionate, HYDROcodone-acetaminophen    Gen: No acute distress, alert and oriented x3  Pulm: Lungs clear, norm

## 2018-11-28 NOTE — PROGRESS NOTES
Echo EF unchanged to mildly improved compared to July. Wall motion of the inferior wall normal.    Discharge home, close outpatient follow up in 2 weeks.     [address] [place] [address], 11/28/18, 11:41 AM  INTERVENTIONAL CARDIOLOGY  [facility]

## 2018-11-28 NOTE — PROGRESS NOTES
[facility] AND [facility]  [facility] Cardiology Progress Note    Zora Jimenez. Patient Status:  Inpatient    3/23/1950 MRN [id number]   Location [facility] 3W/SW Attending Bartolo Bamberger, MD   Hosp Day # 2 PCP Mihai Orozco MD     Assessment:  1.   Ni drugs.    Objective:   Temp: 97.7 °F (36.5 °C)  Pulse: 87  Resp: 18  BP: 134/84    Intake/Output:     Intake/Output Summary (Last 24 hours) at 11/28/2018 0844  Last data filed at 11/28/2018 0547  Gross per 24 hour   Intake 360 ml   Output 0 ml   Net 360 ml

## 2018-11-28 NOTE — PLAN OF CARE
Problem: Patient Centered Care  Goal: Patient preferences are identified and integrated in the patient's plan of care  Interventions:  - What would you like us to know as we care for you  - Provide timely, complete, and accurate information to patient/fami Utilize distraction and/or relaxation techniques  - Monitor for opioid side effects  - Notify MD/LIP if interventions unsuccessful or patient reports new pain  - Anticipate increased pain with activity and pre-medicate as appropriate  Outcome: Progressing signs/symptoms of CO2 retention  Outcome: Progressing

## 2018-11-28 NOTE — TRANSITION NOTE
HISTORY OF PRESENT ILLNESS:  The patient is a 79-year-old male who noted night sweats the night before last.  Yesterday morning, he developed a pain in the left shoulder, left upper pectoral area, and the back of his neck. It lasted about 15 minutes. total) by mouth daily. Quantity:  30 tablet  Refills:  0     Clopidogrel Bisulfate 75 MG Tabs  Commonly known as:  PLAVIX      Take 1 tablet (75 mg total) by mouth daily.    Quantity:  90 tablet  Refills:  3     Dorzolamide HCl-Timolol Mal 22.3-6.8 MG/ML prescription for each of these medications  · HYDROcodone-acetaminophen  MG Tabs

## 2018-11-28 NOTE — SPIRITUAL CARE NOTE
provided emotional support, prayer, and listening to patient as he reviewed his life story.  Patient has strong [place] caridad, which is his support through times in the hospital.

## 2018-11-28 NOTE — PROGRESS NOTES
Patient refused echo because he stated he was tired and had a long day. Patient is in agreement having it tomorrow 11/28/2018.

## 2018-11-28 NOTE — RESPIRATORY THERAPY NOTE
DIRK ASSESSMENT:    Pt does not have a previous diagnosis of DIRK. Pt does not routinely use a CPAP device at home.  This pt is suspected to be at high risk for DIRK and sleep lab packet was provided to patient for outpatient follow-up.consider sleep study

## 2018-11-29 ENCOUNTER — TELEPHONE (OUTPATIENT)
Dept: INTERNAL MEDICINE CLINIC | Facility: CLINIC | Age: 68
End: 2018-11-29

## 2018-11-29 ENCOUNTER — PATIENT OUTREACH (OUTPATIENT)
Dept: CASE MANAGEMENT | Age: 68
End: 2018-11-29

## 2018-11-29 DIAGNOSIS — Z02.9 ENCOUNTERS FOR ADMINISTRATIVE PURPOSE: ICD-10-CM

## 2018-11-29 NOTE — PROGRESS NOTES
Initial Post Discharge Follow Up   Discharge Date: 11/28/18  Contact Date: 11/29/2018    Consent Verification:  Assessment Completed With: Patient  HIPAA Verified?   Yes    Discharge Dx:  Chest pain     General:   • How have you been since your discharge No  • Are you able to perform normal daily activities of living as you have prior to your hospital stay? no  o If No, What are some barriers or concerns?   Pt states he cannot complete ADLs due to pain and is refusing to eat or drink anything because he'll Solution Place 1 drop into both eyes 2 (two) times daily. Disp: 1 Bottle Rfl: 0   latanoprost 0.005 % Ophthalmic Solution Place 1 drop into both eyes daily.  (Patient taking differently: Place 1 drop into both eyes nightly.  ) Disp: 1 Bottle Rfl: 0   furose you cannot make your appointments? No     NCM Reviewed upcoming Specialist Appt with patient     Yes     Interventions by NCM:  All d/c instructions reviewed with pt. Reviewed when to call MD vs when to go to ER/call 911.   Advised pt to proceed to ER si

## 2018-11-29 NOTE — TELEPHONE ENCOUNTER
Spoke to pt for TCM today. Pt does not have HFU appt scheduled at this time. TCM/HFU appt recommended by 12/5/18 as pt is a high risk for readmission. Please advise.     BOOK BY DATE (last date for TCM): 12/12/18    TRIAGE:  Please f/u with pt and try to

## 2018-11-29 NOTE — TELEPHONE ENCOUNTER
Spoke to pt for TCM. Pt states he is having severe pain right now in his lower back and does not have any pain medication.   Patient states Dr. Gregg Petersen stopped [place] pain meds [place] by other physic

## 2018-11-30 ENCOUNTER — OFFICE VISIT (OUTPATIENT)
Dept: INTERNAL MEDICINE CLINIC | Facility: CLINIC | Age: 68
End: 2018-11-30
Payer: MEDICARE

## 2018-11-30 VITALS
OXYGEN SATURATION: 98 % | TEMPERATURE: 98 F | HEART RATE: 74 BPM | SYSTOLIC BLOOD PRESSURE: 135 MMHG | HEIGHT: 69 IN | BODY MASS INDEX: 29.47 KG/M2 | WEIGHT: 199 LBS | RESPIRATION RATE: 19 BRPM | DIASTOLIC BLOOD PRESSURE: 80 MMHG

## 2018-11-30 DIAGNOSIS — I25.10 CORONARY ARTERY DISEASE INVOLVING NATIVE CORONARY ARTERY OF NATIVE HEART WITHOUT ANGINA PECTORIS: ICD-10-CM

## 2018-11-30 DIAGNOSIS — E03.9 ACQUIRED HYPOTHYROIDISM: ICD-10-CM

## 2018-11-30 DIAGNOSIS — M48.062 LUMBAR STENOSIS WITH NEUROGENIC CLAUDICATION: ICD-10-CM

## 2018-11-30 DIAGNOSIS — Z12.11 SCREEN FOR COLON CANCER: Primary | ICD-10-CM

## 2018-11-30 DIAGNOSIS — E11.8 TYPE 2 DIABETES MELLITUS WITH COMPLICATION, WITHOUT LONG-TERM CURRENT USE OF INSULIN (HCC): ICD-10-CM

## 2018-11-30 DIAGNOSIS — I25.5 ISCHEMIC CARDIOMYOPATHY: ICD-10-CM

## 2018-11-30 PROBLEM — H40.9 GLAUCOMA: Status: ACTIVE | Noted: 2018-11-30

## 2018-11-30 PROCEDURE — 1111F DSCHRG MED/CURRENT MED MERGE: CPT | Performed by: INTERNAL MEDICINE

## 2018-11-30 PROCEDURE — 84443 ASSAY THYROID STIM HORMONE: CPT | Performed by: INTERNAL MEDICINE

## 2018-11-30 PROCEDURE — 84439 ASSAY OF FREE THYROXINE: CPT | Performed by: INTERNAL MEDICINE

## 2018-11-30 PROCEDURE — 99496 TRANSJ CARE MGMT HIGH F2F 7D: CPT | Performed by: INTERNAL MEDICINE

## 2018-11-30 PROCEDURE — 36415 COLL VENOUS BLD VENIPUNCTURE: CPT | Performed by: INTERNAL MEDICINE

## 2018-11-30 PROCEDURE — 80053 COMPREHEN METABOLIC PANEL: CPT | Performed by: INTERNAL MEDICINE

## 2018-11-30 RX ORDER — METHYLPREDNISOLONE 4 MG/1
TABLET ORAL
Qty: 1 KIT | Refills: 0 | Status: ON HOLD | OUTPATIENT
Start: 2018-11-30 | End: 2019-01-28

## 2018-11-30 RX ORDER — HYDROCODONE BITARTRATE AND ACETAMINOPHEN 10; 325 MG/1; MG/1
1 TABLET ORAL EVERY 8 HOURS PRN
Qty: 60 TABLET | Refills: 0 | Status: ON HOLD | OUTPATIENT
Start: 2018-11-30 | End: 2019-01-26

## 2018-11-30 RX ORDER — GLIPIZIDE 5 MG/1
5 TABLET ORAL
Qty: 30 TABLET | Refills: 0 | Status: SHIPPED | OUTPATIENT
Start: 2018-11-30 | End: 2018-12-27

## 2018-11-30 NOTE — PROGRESS NOTES
HPI:    Marge Salmon. is a 79year old male here today for hospital follow up.    He was discharged from Inpatient hospital, [facility] AND [facility]  to Home   Admission Date: 11/26/18   Discharge Date: 11/28/18    Hospital Discharge Diagnoses (since 10/31/2018) The patient had elevated creatinine at 1.7 on admission. His Lasix was held and he was given IV fluids. His creatinine improved to 1.04. He had no further recurrence of chest pain. He c/o severe low back pain. Gus Jerome gave him some relief.  His appt w <5.7 %    ESTIMATED AVERAGE GLUCOSE      68 - 126 mg/dL    TSH      0.45 - 5.33 uIU/mL      HB a1c 7.8   11/26/2018. Allergies:  He is allergic to percodan [kdc:yellow dye+aspirin+oxycodone].     Current Meds:    Current Outpatient Medications on File P Glaucoma, High blood pressure, High cholesterol, Hyperlipidemia, Left Sided Neck pain, acute (11/28/2017), Lumbar stenosis with neurogenic claudication (5/31/2018), Morbid obesity with BMI of 40.0-44.9, adult ([facility] [address] and Thyroid disease.     He  has a past s Trace edema. No calf tenderenss.   NEURO: Oriented times three, cranial nerves are intact, motor and sensory are grossly intact    ASSESSMENT/ PLAN:   CAD s/p multiple stents, No angina  Nuclear stress showed  a fixed defect and reduced Ef of 37%  Continue

## 2018-11-30 NOTE — PROGRESS NOTES
Non fasting blood draw administered in left arm   Draw successful   Saúl:  Tsh, cmp  D. O.B verified   Ordering Dr: Erica Thornton

## 2018-12-02 DIAGNOSIS — E06.3 HYPOTHYROIDISM DUE TO HASHIMOTO'S THYROIDITIS: Primary | ICD-10-CM

## 2018-12-02 DIAGNOSIS — E03.8 HYPOTHYROIDISM DUE TO HASHIMOTO'S THYROIDITIS: Primary | ICD-10-CM

## 2018-12-03 ENCOUNTER — TELEPHONE (OUTPATIENT)
Dept: NEUROLOGY | Facility: CLINIC | Age: 68
End: 2018-12-03

## 2018-12-03 RX ORDER — GABAPENTIN 100 MG/1
CAPSULE ORAL
Qty: 60 CAPSULE | Refills: 0 | Status: SHIPPED | OUTPATIENT
Start: 2018-12-03 | End: 2019-01-01

## 2018-12-03 NOTE — TELEPHONE ENCOUNTER
Medication request: gabapentin 100mg. Take 1 cap BID. #60. No refills.     LOV-10/23/2018  NOV-12/21/2018    Last refill:10/8/2018

## 2018-12-04 ENCOUNTER — OFFICE VISIT (OUTPATIENT)
Dept: NEUROLOGY | Facility: CLINIC | Age: 68
End: 2018-12-04
Payer: MEDICARE

## 2018-12-04 VITALS
HEIGHT: 69 IN | BODY MASS INDEX: 29.47 KG/M2 | SYSTOLIC BLOOD PRESSURE: 130 MMHG | HEART RATE: 72 BPM | WEIGHT: 199 LBS | RESPIRATION RATE: 16 BRPM | DIASTOLIC BLOOD PRESSURE: 78 MMHG

## 2018-12-04 DIAGNOSIS — M48.10 DISH (DIFFUSE IDIOPATHIC SKELETAL HYPEROSTOSIS): ICD-10-CM

## 2018-12-04 DIAGNOSIS — M54.16 LUMBAR RADICULITIS: ICD-10-CM

## 2018-12-04 DIAGNOSIS — E66.01 CLASS 3 SEVERE OBESITY WITH SERIOUS COMORBIDITY IN ADULT, UNSPECIFIED BMI, UNSPECIFIED OBESITY TYPE (HCC): Primary | ICD-10-CM

## 2018-12-04 DIAGNOSIS — M54.42 ACUTE LEFT-SIDED LOW BACK PAIN WITH LEFT-SIDED SCIATICA: ICD-10-CM

## 2018-12-04 DIAGNOSIS — S39.012A STRAIN OF LUMBAR REGION, INITIAL ENCOUNTER: ICD-10-CM

## 2018-12-04 PROCEDURE — 99215 OFFICE O/P EST HI 40 MIN: CPT | Performed by: PHYSICAL MEDICINE & REHABILITATION

## 2018-12-04 RX ORDER — METAXALONE 800 MG/1
800 TABLET ORAL 3 TIMES DAILY
Qty: 90 TABLET | Refills: 0 | Status: SHIPPED | OUTPATIENT
Start: 2018-12-04 | End: 2019-11-07

## 2018-12-04 RX ORDER — TRAMADOL HYDROCHLORIDE 50 MG/1
100 TABLET ORAL EVERY 6 HOURS PRN
Qty: 200 TABLET | Refills: 0 | Status: ON HOLD | OUTPATIENT
Start: 2018-12-04 | End: 2019-01-26

## 2018-12-04 NOTE — PROGRESS NOTES
[address]  Progress Note    CHIEF COMPLAINT:  Patient presents with:  Low Back Pain: Patient presents for a c/o lower back pain x 1 yr. He states he had an accident using a hover board.  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 He lives in a house where he has 6 steps to enter and 5 steps up to his bathroom and living quarters. He denies paresthesias, focal weakness, or bowel bladder incontinence.   He does sta as needed for Pain. Disp: 60 tablet Rfl: 0   methylPREDNISolone (MEDROL) 4 MG Oral Tablet Therapy Pack As directed.  Disp: 1 kit Rfl: 0   glipiZIDE 5 MG Oral Tab Take 1 tablet (5 mg total) by mouth every morning before breakfast. Disp: 30 tablet Rfl: 0   Le leg  Endo/Heme/Allergies: Negative. Psychiatric/Behavioral: Negative. All other systems reviewed and are negative. Pertinent positives and negatives noted in the HPI.         PHYSICAL EXAM:   /78 (BP Location: Left arm, Patient Position: Sitti laterally  Facet Loading: no specific facet pain  ALEXANDRA: Positive for ipsilateral low back pain when testing the left side and contralateral low back pain when testing the right side  Jeniffer's test: no SI joint instablitiy  Straight leg raise: negative f Lavender 11/26/2018 Auto Resulted   Final   • Hold Lt Green 11/26/2018 Auto Resulted   Final   • Hold Lt Green 11/26/2018 Auto Resulted   Final   • Hold Gold 11/26/2018 Auto Resulted   Final   • Hold Lavender 11/26/2018 Auto Resulted   Final   • Glucose 11 11/26/2018 0.01  <=0.03 ng/mL Final   • HgbA1C 11/26/2018 7.8* <5.7 % Final   • Estimated Average Glucose 11/26/2018 177* 68 - 126 mg/dL Final   • Troponin 11/26/2018 0.00  <=0.03 ng/mL Final   • POC Glucose  11/26/2018 193* 70 - 99 Final   • POC Glucose • Lymphocyte Absolute 11/27/2018 2.1  1.0 - 4.0 K/UL Final   • Monocyte Absolute 11/27/2018 1.0  0.0 - 1.0 K/UL Final   • Eosinophil Absolute 11/27/2018 0.4  0.0 - 0.7 K/UL Final   • Basophil Absolute 11/27/2018 0.1  0.0 - 0.2 K/UL Final   • POC Glucose 08/30/2018 3.9  3.5 - 4.8 g/dL Final   • Globulin 08/30/2018 3.3  2.5 - 3.7 g/dL Final   • A/G Ratio 08/30/2018 1.2  1.0 - 2.0 Final   • Anion Gap 08/30/2018 14  0 - 18 mmol/L Final   • BUN/CREA Ratio 08/30/2018 22.0* 10.0 - 20.0 Final   • Calculated Osmol <=0.03 ng/mL Final   • WBC 07/21/2018 12.4* 4.0 - 11.0 K/UL Final   • RBC 07/21/2018 5.18  4.50 - 5.90 M/UL Final   • HGB 07/21/2018 14.8  13.5 - 17.5 g/dL Final   • HCT 07/21/2018 46.4  41.0 - 52.0 % Final   • MCV 07/21/2018 89.6  80.0 - 100.0 fL Final Calcium, Total 07/22/2018 9.4  8.5 - 10.5 mg/dL Final   • BUN/CREA Ratio 07/22/2018 20.2* 10.0 - 20.0 Final   • Anion Gap 07/22/2018 10  0 - 18 mmol/L Final   • Calculated Osmolality 07/22/2018 292  275 - 295 mOsm/kg Final   • GFR, Non-African American 07/ 0. 97  0.50 - 1.50 mg/dL Final   • Calcium, Total 07/23/2018 9.6  8.5 - 10.5 mg/dL Final   • BUN/CREA Ratio 07/23/2018 19.6  10.0 - 20.0 Final   • Anion Gap 07/23/2018 10  0 - 18 mmol/L Final   • Calculated Osmolality 07/23/2018 287  275 - 295 mOsm/kg Final Neutrophil Absolute 07/24/2018 8.9* 1.8 - 7.7 K/UL Final   • Lymphocyte Absolute 07/24/2018 1.4  1.0 - 4.0 K/UL Final   • Monocyte Absolute 07/24/2018 0.9  0.0 - 1.0 K/UL Final   • Eosinophil Absolute 07/24/2018 0.6  0.0 - 0.7 K/UL Final   • Basophil Absol Other chronic pain M54.41 Lumbago with sciatica, right side M54.42 Lumbago with sciatica, left side     TECHNIQUE:    A variety of imaging planes and parameters were utilized for visualization of suspected pathology.      FINDINGS:          NUMERATION: For Trinidad Mejía MD on 12/15/2017 at 8:03       Approved by (CST): Laquita Fuentes MD on 12/15/2017 at 8:11          =====  CONCLUSION:   1. Multilevel degenerative changes of the lumbar spine as detailed. Notable levels as follows:  2.  L4-L5: Moderate to severe s radicular pain down the left leg with an EMG proven radiculopathy. We can perform a epidural steroid injection. I will see him in 6 weeks to discuss this further. RTC in 6 weeks after beginning PT.      Discharge Instructions were provided as documente

## 2018-12-04 NOTE — PATIENT INSTRUCTIONS
1) Tramadol 100 mg every 6 hrs as needed for pain. Tylenol in between (no more than 3,000 mg daily). Start metaxalone 800 mg three times per day as needed for spasms. Do not take this medication while operating heavy machinery.    2) [place] LSO brace  3) EMG

## 2018-12-25 RX ORDER — LEVOTHYROXINE SODIUM 0.1 MG/1
100 TABLET ORAL
Qty: 30 TABLET | Refills: 0 | OUTPATIENT
Start: 2018-12-25

## 2018-12-29 RX ORDER — GLIPIZIDE 5 MG/1
TABLET ORAL
Qty: 90 TABLET | Refills: 1 | Status: SHIPPED | OUTPATIENT
Start: 2018-12-29 | End: 2019-01-31

## 2019-01-01 DIAGNOSIS — M48.062 LUMBAR STENOSIS WITH NEUROGENIC CLAUDICATION: ICD-10-CM

## 2019-01-01 RX ORDER — GABAPENTIN 100 MG/1
CAPSULE ORAL
Qty: 60 CAPSULE | Refills: 0 | Status: SHIPPED | OUTPATIENT
Start: 2019-01-01 | End: 2019-01-07

## 2019-01-01 NOTE — TELEPHONE ENCOUNTER
Medication request: Gabapentin 100mg. Take 1 cap BID. #60.  No refill    LOV-12/4/2018 with Dr.Behar  NOV-none    [place] ref[place]:12/3/2018  -

## 2019-01-02 ENCOUNTER — OFFICE VISIT (OUTPATIENT)
Dept: ENDOCRINOLOGY CLINIC | Facility: CLINIC | Age: 69
End: 2019-01-02
Payer: MEDICARE

## 2019-01-02 VITALS
HEART RATE: 71 BPM | SYSTOLIC BLOOD PRESSURE: 119 MMHG | BODY MASS INDEX: 41 KG/M2 | DIASTOLIC BLOOD PRESSURE: 73 MMHG | WEIGHT: 280.38 LBS

## 2019-01-02 DIAGNOSIS — E03.9 HYPOTHYROIDISM, UNSPECIFIED TYPE: ICD-10-CM

## 2019-01-02 DIAGNOSIS — R79.89 LOW SERUM TESTOSTERONE LEVEL: Primary | ICD-10-CM

## 2019-01-02 PROCEDURE — 99204 OFFICE O/P NEW MOD 45 MIN: CPT | Performed by: INTERNAL MEDICINE

## 2019-01-02 PROCEDURE — G0463 HOSPITAL OUTPT CLINIC VISIT: HCPCS | Performed by: INTERNAL MEDICINE

## 2019-01-02 RX ORDER — OXYCODONE AND ACETAMINOPHEN 10; 325 MG/1; MG/1
TABLET ORAL
Refills: 0 | COMMUNITY
Start: 2018-12-12 | End: 2019-01-03 | Stop reason: ALTCHOICE

## 2019-01-02 NOTE — H&P
New Patient Evaluation - History and Physical    CONSULT - Reason for Visit:  Hypothyroidism.  Low testosterone level  Requesting Physician: Dr. Adrienne Grover:    Hypothyroidism  Low testosterone level     HISTORY OF PRESENT ILLNESS:   Leandra Angel is MG Oral Cap TAKE 1 CAPSULE BY MOUTH TWICE A DAY Disp: 60 capsule Rfl: 0   GLIPIZIDE 5 MG Oral Tab TAKE 1 TABLET BY MOUTH EVERY MORNING BEFORE BREAKFAST.  Disp: 90 tablet Rfl: 1   Metaxalone (METAXALL) 800 MG Oral Tab Take 1 tablet (800 mg total) by mouth 3 8 (eight) hours as needed for Pain.  Disp: 60 tablet Rfl: 0   Levothyroxine Sodium 100 MCG Oral Tab Take 1 tablet (100 mcg total) by mouth before breakfast. Disp: 30 tablet Rfl: 0       ALLERGIES:  No Active Allergies    SOCIAL HISTORY:    Social History CONSTITUTIONAL:  awake, alert, cooperative, no apparent distress, and appears stated age  PSYCH: normal affect  EYES:  No proptosis, no ptosis, conjunctiva normal  ENT:  Normocephalic, atraumatic  NECK:  Supple, symmetrical, trachea midline, no adenopa render a single T measurement inadequate to accurately. Hence repeat measurements are of utmost importance to establish the diagnosis of Testosterone deficiency that would necessitate testosterone replacement.       Discussed the changes that can be induced

## 2019-01-03 ENCOUNTER — OFFICE VISIT (OUTPATIENT)
Dept: INTERNAL MEDICINE CLINIC | Facility: CLINIC | Age: 69
End: 2019-01-03
Payer: MEDICARE

## 2019-01-03 VITALS
TEMPERATURE: 99 F | HEART RATE: 75 BPM | RESPIRATION RATE: 21 BRPM | DIASTOLIC BLOOD PRESSURE: 80 MMHG | SYSTOLIC BLOOD PRESSURE: 136 MMHG | WEIGHT: 280 LBS | HEIGHT: 69 IN | OXYGEN SATURATION: 95 % | BODY MASS INDEX: 41.47 KG/M2

## 2019-01-03 DIAGNOSIS — R79.89 LOW TESTOSTERONE: ICD-10-CM

## 2019-01-03 DIAGNOSIS — I25.5 ISCHEMIC CARDIOMYOPATHY: ICD-10-CM

## 2019-01-03 DIAGNOSIS — M48.062 LUMBAR STENOSIS WITH NEUROGENIC CLAUDICATION: Primary | ICD-10-CM

## 2019-01-03 DIAGNOSIS — R53.83 TIREDNESS: ICD-10-CM

## 2019-01-03 DIAGNOSIS — N18.30 STAGE 3 CHRONIC KIDNEY DISEASE (HCC): ICD-10-CM

## 2019-01-03 DIAGNOSIS — I51.9 CHRONIC SYSTOLIC DYSFUNCTION OF LEFT VENTRICLE: ICD-10-CM

## 2019-01-03 DIAGNOSIS — E11.8 TYPE 2 DIABETES MELLITUS WITH COMPLICATION, WITHOUT LONG-TERM CURRENT USE OF INSULIN (HCC): ICD-10-CM

## 2019-01-03 DIAGNOSIS — E03.9 ACQUIRED HYPOTHYROIDISM: ICD-10-CM

## 2019-01-03 PROCEDURE — 99214 OFFICE O/P EST MOD 30 MIN: CPT | Performed by: INTERNAL MEDICINE

## 2019-01-03 RX ORDER — HYDROCODONE BITARTRATE AND ACETAMINOPHEN 10; 325 MG/1; MG/1
1 TABLET ORAL EVERY 6 HOURS PRN
Qty: 60 TABLET | Refills: 0 | Status: ON HOLD | OUTPATIENT
Start: 2019-01-03 | End: 2019-01-26

## 2019-01-03 NOTE — PROGRESS NOTES
HPI:    Patient ID: Juan Stanley. is a 79year old male. HPI   C/o worsening low back pain. He has  lumbar stenosis, morbid obesity. could hardly get ou of bed. He walks with a cane .  Pain is increased with activity especially walking and prolong anna (METAXALL) 800 MG Oral Tab Take 1 tablet (800 mg total) by mouth 3 (three) times daily. Disp: 90 tablet Rfl: 0   TraMADol HCl 50 MG Oral Tab Take 2 tablets (100 mg total) by mouth every 6 (six) hours as needed for Pain.  Disp: 200 tablet Rfl: 0   Levothyrox Constitutional: He is oriented to person, place, and time and obese. Walks with cane   HENT:   Mouth/Throat: Oropharynx is clear and moist.   Eyes: Conjunctivae and EOM are normal. Pupils are equal, round, and reactive to light. No scleral icterus.    Danita Albright testosterone  Seen Dr. Jam Shen. Free and total testosterone ordered    8.  Stage 3 chronic kidney disease (HCC)  Avoid nephrotoxins, no NSAID        Meds This Visit:  Requested Prescriptions     Signed Prescriptions Disp Refills   • HYDROcodone-acetaminop

## 2019-01-07 DIAGNOSIS — M48.062 LUMBAR STENOSIS WITH NEUROGENIC CLAUDICATION: ICD-10-CM

## 2019-01-07 RX ORDER — GABAPENTIN 100 MG/1
CAPSULE ORAL
Qty: 60 CAPSULE | Refills: 0 | Status: ON HOLD | OUTPATIENT
Start: 2019-01-07 | End: 2019-01-28

## 2019-01-26 ENCOUNTER — APPOINTMENT (OUTPATIENT)
Dept: GENERAL RADIOLOGY | Facility: HOSPITAL | Age: 69
End: 2019-01-26
Payer: MEDICARE

## 2019-01-26 ENCOUNTER — APPOINTMENT (OUTPATIENT)
Dept: GENERAL RADIOLOGY | Facility: HOSPITAL | Age: 69
End: 2019-01-26
Attending: EMERGENCY MEDICINE
Payer: MEDICARE

## 2019-01-26 ENCOUNTER — HOSPITAL ENCOUNTER (OUTPATIENT)
Facility: HOSPITAL | Age: 69
Setting detail: OBSERVATION
Discharge: HOME | End: 2019-01-28
Attending: EMERGENCY MEDICINE | Admitting: HOSPITALIST
Payer: MEDICARE

## 2019-01-26 DIAGNOSIS — R07.9 ACUTE CHEST PAIN: Primary | ICD-10-CM

## 2019-01-26 LAB
ALBUMIN SERPL-MCNC: 3.3 G/DL (ref 3.1–4.5)
ALBUMIN/GLOB SERPL: 0.9 {RATIO} (ref 1–2)
ALP LIVER SERPL-CCNC: 64 U/L (ref 45–117)
ALT SERPL-CCNC: 22 U/L (ref 17–63)
ANION GAP SERPL CALC-SCNC: 7 MMOL/L (ref 0–18)
AST SERPL-CCNC: 14 U/L (ref 15–41)
ATRIAL RATE: 67 BPM
BASOPHILS # BLD AUTO: 0.08 X10(3) UL (ref 0–0.1)
BASOPHILS NFR BLD AUTO: 0.9 %
BILIRUB SERPL-MCNC: 0.2 MG/DL (ref 0.1–2)
BUN BLD-MCNC: 15 MG/DL (ref 8–20)
BUN/CREAT SERPL: 16.3 (ref 10–20)
CALCIUM BLD-MCNC: 9.5 MG/DL (ref 8.3–10.3)
CHLORIDE SERPL-SCNC: 108 MMOL/L (ref 101–111)
CO2 SERPL-SCNC: 27 MMOL/L (ref 22–32)
CREAT BLD-MCNC: 0.92 MG/DL (ref 0.7–1.3)
EOSINOPHIL # BLD AUTO: 0.61 X10(3) UL (ref 0–0.3)
EOSINOPHIL NFR BLD AUTO: 6.6 %
ERYTHROCYTE [DISTWIDTH] IN BLOOD BY AUTOMATED COUNT: 13.2 % (ref 11.5–16)
EST. AVERAGE GLUCOSE BLD GHB EST-MCNC: 148 MG/DL (ref 68–126)
GLOBULIN PLAS-MCNC: 3.7 G/DL (ref 2.8–4.4)
GLUCOSE BLD-MCNC: 112 MG/DL (ref 65–99)
GLUCOSE BLD-MCNC: 134 MG/DL (ref 70–99)
HBA1C MFR BLD HPLC: 6.8 % (ref ?–5.7)
HCT VFR BLD AUTO: 45.8 % (ref 37–53)
HGB BLD-MCNC: 14.5 G/DL (ref 13–17)
IMMATURE GRANULOCYTE COUNT: 0.05 X10(3) UL (ref 0–1)
IMMATURE GRANULOCYTE RATIO %: 0.5 %
LYMPHOCYTES # BLD AUTO: 1.66 X10(3) UL (ref 0.9–4)
LYMPHOCYTES NFR BLD AUTO: 17.9 %
M PROTEIN MFR SERPL ELPH: 7 G/DL (ref 6.4–8.2)
MCH RBC QN AUTO: 28.7 PG (ref 27–33.2)
MCHC RBC AUTO-ENTMCNC: 31.7 G/DL (ref 31–37)
MCV RBC AUTO: 90.7 FL (ref 80–99)
MONOCYTES # BLD AUTO: 0.73 X10(3) UL (ref 0.1–1)
MONOCYTES NFR BLD AUTO: 7.9 %
NEUTROPHIL ABS PRELIM: 6.12 X10 (3) UL (ref 1.3–6.7)
NEUTROPHILS # BLD AUTO: 6.12 X10(3) UL (ref 1.3–6.7)
NEUTROPHILS NFR BLD AUTO: 66.2 %
OSMOLALITY SERPL CALC.SUM OF ELEC: 297 MOSM/KG (ref 275–295)
P AXIS: 23 DEGREES
P-R INTERVAL: 148 MS
PLATELET # BLD AUTO: 316 10(3)UL (ref 150–450)
POTASSIUM SERPL-SCNC: 4.2 MMOL/L (ref 3.6–5.1)
Q-T INTERVAL: 438 MS
QRS DURATION: 142 MS
QTC CALCULATION (BEZET): 462 MS
R AXIS: -66 DEGREES
RBC # BLD AUTO: 5.05 X10(6)UL (ref 3.8–5.8)
RED CELL DISTRIBUTION WIDTH-SD: 44.1 FL (ref 35.1–46.3)
SODIUM SERPL-SCNC: 142 MMOL/L (ref 136–144)
T AXIS: -2 DEGREES
TROPONIN I SERPL-MCNC: <0.046 NG/ML (ref ?–0.05)
VENTRICULAR RATE: 67 BPM
WBC # BLD AUTO: 9.3 X10(3) UL (ref 4–13)

## 2019-01-26 PROCEDURE — 99220 INITIAL OBSERVATION CARE,LEVL III: CPT | Performed by: HOSPITALIST

## 2019-01-26 PROCEDURE — 71045 X-RAY EXAM CHEST 1 VIEW: CPT | Performed by: EMERGENCY MEDICINE

## 2019-01-26 RX ORDER — METOPROLOL SUCCINATE 50 MG/1
50 TABLET, EXTENDED RELEASE ORAL
Status: DISCONTINUED | OUTPATIENT
Start: 2019-01-27 | End: 2019-01-28

## 2019-01-26 RX ORDER — MORPHINE SULFATE 4 MG/ML
4 INJECTION, SOLUTION INTRAMUSCULAR; INTRAVENOUS EVERY 30 MIN PRN
Status: DISCONTINUED | OUTPATIENT
Start: 2019-01-26 | End: 2019-01-26

## 2019-01-26 RX ORDER — GABAPENTIN 100 MG/1
100 CAPSULE ORAL 2 TIMES DAILY
Status: DISCONTINUED | OUTPATIENT
Start: 2019-01-26 | End: 2019-01-27

## 2019-01-26 RX ORDER — ONDANSETRON 2 MG/ML
4 INJECTION INTRAMUSCULAR; INTRAVENOUS EVERY 6 HOURS PRN
Status: DISCONTINUED | OUTPATIENT
Start: 2019-01-26 | End: 2019-01-28

## 2019-01-26 RX ORDER — ENOXAPARIN SODIUM 100 MG/ML
0.5 INJECTION SUBCUTANEOUS NIGHTLY
Status: DISCONTINUED | OUTPATIENT
Start: 2019-01-26 | End: 2019-01-28

## 2019-01-26 RX ORDER — LEVOTHYROXINE SODIUM 0.12 MG/1
125 TABLET ORAL
Status: DISCONTINUED | OUTPATIENT
Start: 2019-01-27 | End: 2019-01-28

## 2019-01-26 RX ORDER — OXYCODONE AND ACETAMINOPHEN 10; 325 MG/1; MG/1
1 TABLET ORAL 2 TIMES DAILY PRN
Status: ON HOLD | COMMUNITY
End: 2019-01-28

## 2019-01-26 RX ORDER — HYDROCODONE BITARTRATE AND ACETAMINOPHEN 10; 325 MG/1; MG/1
1 TABLET ORAL EVERY 6 HOURS PRN
Status: DISCONTINUED | OUTPATIENT
Start: 2019-01-26 | End: 2019-01-27

## 2019-01-26 RX ORDER — ASPIRIN 81 MG/1
81 TABLET, CHEWABLE ORAL DAILY
Status: DISCONTINUED | OUTPATIENT
Start: 2019-01-27 | End: 2019-01-28

## 2019-01-26 RX ORDER — SPIRONOLACTONE 25 MG/1
25 TABLET ORAL DAILY
Status: DISCONTINUED | OUTPATIENT
Start: 2019-01-27 | End: 2019-01-26

## 2019-01-26 RX ORDER — METOCLOPRAMIDE HYDROCHLORIDE 5 MG/ML
10 INJECTION INTRAMUSCULAR; INTRAVENOUS EVERY 8 HOURS PRN
Status: DISCONTINUED | OUTPATIENT
Start: 2019-01-26 | End: 2019-01-28

## 2019-01-26 RX ORDER — LATANOPROST 50 UG/ML
1 SOLUTION/ DROPS OPHTHALMIC NIGHTLY
Status: DISCONTINUED | OUTPATIENT
Start: 2019-01-26 | End: 2019-01-28

## 2019-01-26 RX ORDER — DEXTROSE MONOHYDRATE 25 G/50ML
50 INJECTION, SOLUTION INTRAVENOUS
Status: DISCONTINUED | OUTPATIENT
Start: 2019-01-26 | End: 2019-01-28

## 2019-01-26 RX ORDER — ROSUVASTATIN CALCIUM 20 MG/1
20 TABLET, COATED ORAL NIGHTLY
Status: DISCONTINUED | OUTPATIENT
Start: 2019-01-26 | End: 2019-01-28

## 2019-01-26 RX ORDER — DORZOLAMIDE HYDROCHLORIDE AND TIMOLOL MALEATE 20; 5 MG/ML; MG/ML
1 SOLUTION/ DROPS OPHTHALMIC 2 TIMES DAILY
Status: DISCONTINUED | OUTPATIENT
Start: 2019-01-26 | End: 2019-01-28

## 2019-01-26 RX ORDER — NITROGLYCERIN 0.4 MG/1
0.4 TABLET SUBLINGUAL EVERY 5 MIN PRN
Status: DISCONTINUED | OUTPATIENT
Start: 2019-01-26 | End: 2019-01-28

## 2019-01-26 RX ORDER — MELATONIN
6 NIGHTLY PRN
Status: DISCONTINUED | OUTPATIENT
Start: 2019-01-26 | End: 2019-01-28

## 2019-01-26 RX ORDER — ACETAMINOPHEN 325 MG/1
650 TABLET ORAL EVERY 6 HOURS PRN
Status: DISCONTINUED | OUTPATIENT
Start: 2019-01-26 | End: 2019-01-28

## 2019-01-26 RX ORDER — CLOPIDOGREL BISULFATE 75 MG/1
75 TABLET ORAL DAILY
Status: DISCONTINUED | OUTPATIENT
Start: 2019-01-27 | End: 2019-01-28

## 2019-01-26 RX ORDER — MORPHINE SULFATE 4 MG/ML
4 INJECTION, SOLUTION INTRAMUSCULAR; INTRAVENOUS ONCE
Status: COMPLETED | OUTPATIENT
Start: 2019-01-26 | End: 2019-01-26

## 2019-01-26 RX ORDER — ALFUZOSIN HYDROCHLORIDE 10 MG/1
10 TABLET, EXTENDED RELEASE ORAL
Status: DISCONTINUED | OUTPATIENT
Start: 2019-01-27 | End: 2019-01-28

## 2019-01-26 NOTE — ED INITIAL ASSESSMENT (HPI)
Pt to ED with c/o CP that started at 0500. Pt sts pain radiates to L arm and L leg and lower back.    Pt received asa and nitro en route

## 2019-01-26 NOTE — ED PROVIDER NOTES
Patient Seen in: [facility] Emergency Department    History   Patient presents with:  Chest Pain Angina (cardiovascular)    Stated Complaint: CP    HPI    70-year-old male presents reporting chest pain.   He states that approximately 10 hours ago he reviewed and negative except as noted above.     Physical Exam     ED Triage Vitals [01/26/19 1457]   /80   Pulse 68   Resp 16   Temp 97.2 °F (36.2 °C)   Temp src Temporal   SpO2 95 %   O2 Device None (Room air)       Current:/80   Pulse 64   Te ST elevation. T wave in lead III is inverted when compared to November 26, 2018              Xr Chest Ap Portable  (cpt=71045)    Result Date: 1/26/2019  PROCEDURE:  XR CHEST AP PORTABLE  (CPT=71045)  TECHNIQUE:  AP chest radiograph was obtained.   Sebas Loyda

## 2019-01-26 NOTE — CONSULTS
[facility]  Cardiology Consultation    Chelsey Whitt.  Patient Status:  Emergency    3/23/1950 MRN [id number]   Location [address] [facility] Attending Odalis Jackson MD   Hosp Day # 0 PCP Marrion Rubinstein, MD     Reason for Consultation alcohol or use drugs. Allergies:  No Known Allergies    Medications:  No current facility-administered medications for this encounter. Review of Systems:  A comprehensive review of systems was negative if not otherwise mention in above HPI.     BP 13 ARB  HTN  HLP    Recommendations:  - admit to floor with tele  - continue home cardiac meds  - no need for IV heparin or nitro gtt  - LHC on Monday  - follow labs - repeat troponin in AM  - cardiac diet  - nursing heart failure protocol  - daily weights, I

## 2019-01-27 LAB
ANION GAP SERPL CALC-SCNC: 7 MMOL/L (ref 0–18)
BASOPHILS # BLD AUTO: 0.07 X10(3) UL (ref 0–0.1)
BASOPHILS NFR BLD AUTO: 0.8 %
BUN BLD-MCNC: 14 MG/DL (ref 8–20)
BUN/CREAT SERPL: 17.5 (ref 10–20)
CALCIUM BLD-MCNC: 8.7 MG/DL (ref 8.3–10.3)
CHLORIDE SERPL-SCNC: 109 MMOL/L (ref 101–111)
CHOLEST SMN-MCNC: 114 MG/DL (ref ?–200)
CO2 SERPL-SCNC: 24 MMOL/L (ref 22–32)
CREAT BLD-MCNC: 0.8 MG/DL (ref 0.7–1.3)
EOSINOPHIL # BLD AUTO: 0.6 X10(3) UL (ref 0–0.3)
EOSINOPHIL NFR BLD AUTO: 6.6 %
ERYTHROCYTE [DISTWIDTH] IN BLOOD BY AUTOMATED COUNT: 12.9 % (ref 11.5–16)
GLUCOSE BLD-MCNC: 113 MG/DL (ref 70–99)
GLUCOSE BLD-MCNC: 125 MG/DL (ref 65–99)
GLUCOSE BLD-MCNC: 128 MG/DL (ref 65–99)
GLUCOSE BLD-MCNC: 133 MG/DL (ref 65–99)
GLUCOSE BLD-MCNC: 206 MG/DL (ref 65–99)
HAV IGM SER QL: 1.9 MG/DL (ref 1.8–2.5)
HCT VFR BLD AUTO: 43.2 % (ref 37–53)
HDLC SERPL-MCNC: 35 MG/DL (ref 40–59)
HGB BLD-MCNC: 13.7 G/DL (ref 13–17)
IMMATURE GRANULOCYTE COUNT: 0.04 X10(3) UL (ref 0–1)
IMMATURE GRANULOCYTE RATIO %: 0.4 %
LDLC SERPL CALC-MCNC: 43 MG/DL (ref ?–100)
LYMPHOCYTES # BLD AUTO: 1.87 X10(3) UL (ref 0.9–4)
LYMPHOCYTES NFR BLD AUTO: 20.4 %
MCH RBC QN AUTO: 28.4 PG (ref 27–33.2)
MCHC RBC AUTO-ENTMCNC: 31.7 G/DL (ref 31–37)
MCV RBC AUTO: 89.6 FL (ref 80–99)
MONOCYTES # BLD AUTO: 0.81 X10(3) UL (ref 0.1–1)
MONOCYTES NFR BLD AUTO: 8.9 %
NEUTROPHIL ABS PRELIM: 5.76 X10 (3) UL (ref 1.3–6.7)
NEUTROPHILS # BLD AUTO: 5.76 X10(3) UL (ref 1.3–6.7)
NEUTROPHILS NFR BLD AUTO: 62.9 %
NONHDLC SERPL-MCNC: 79 MG/DL (ref ?–130)
OSMOLALITY SERPL CALC.SUM OF ELEC: 291 MOSM/KG (ref 275–295)
PLATELET # BLD AUTO: 285 10(3)UL (ref 150–450)
POTASSIUM SERPL-SCNC: 3.6 MMOL/L (ref 3.6–5.1)
POTASSIUM SERPL-SCNC: 4.5 MMOL/L (ref 3.6–5.1)
RBC # BLD AUTO: 4.82 X10(6)UL (ref 3.8–5.8)
RED CELL DISTRIBUTION WIDTH-SD: 42.5 FL (ref 35.1–46.3)
SODIUM SERPL-SCNC: 140 MMOL/L (ref 136–144)
TRIGL SERPL-MCNC: 181 MG/DL (ref 30–149)
TROPONIN I SERPL-MCNC: <0.046 NG/ML (ref ?–0.05)
VLDLC SERPL CALC-MCNC: 36 MG/DL (ref 0–30)
WBC # BLD AUTO: 9.2 X10(3) UL (ref 4–13)

## 2019-01-27 PROCEDURE — 99225 SUBSEQUENT OBSERVATION CARE: CPT | Performed by: HOSPITALIST

## 2019-01-27 RX ORDER — METHYLPREDNISOLONE 4 MG/1
8 TABLET ORAL
Status: DISCONTINUED | OUTPATIENT
Start: 2019-01-28 | End: 2019-01-27

## 2019-01-27 RX ORDER — POTASSIUM CHLORIDE 20 MEQ/1
40 TABLET, EXTENDED RELEASE ORAL EVERY 4 HOURS
Status: COMPLETED | OUTPATIENT
Start: 2019-01-27 | End: 2019-01-27

## 2019-01-27 RX ORDER — METHYLPREDNISOLONE 4 MG/1
4 TABLET ORAL
Status: DISCONTINUED | OUTPATIENT
Start: 2019-01-30 | End: 2019-01-27

## 2019-01-27 RX ORDER — OXYCODONE HYDROCHLORIDE AND ACETAMINOPHEN 5; 325 MG/1; MG/1
2 TABLET ORAL EVERY 4 HOURS PRN
Status: DISCONTINUED | OUTPATIENT
Start: 2019-01-27 | End: 2019-01-28

## 2019-01-27 RX ORDER — GABAPENTIN 100 MG/1
200 CAPSULE ORAL 3 TIMES DAILY
Status: DISCONTINUED | OUTPATIENT
Start: 2019-01-27 | End: 2019-01-28

## 2019-01-27 RX ORDER — METHYLPREDNISOLONE 4 MG/1
4 TABLET ORAL
Status: DISCONTINUED | OUTPATIENT
Start: 2019-02-01 | End: 2019-01-27

## 2019-01-27 RX ORDER — METHYLPREDNISOLONE 4 MG/1
4 TABLET ORAL 2 TIMES DAILY
Status: DISCONTINUED | OUTPATIENT
Start: 2019-01-31 | End: 2019-01-27

## 2019-01-27 RX ORDER — METHYLPREDNISOLONE 4 MG/1
4 TABLET ORAL
Status: DISCONTINUED | OUTPATIENT
Start: 2019-01-28 | End: 2019-01-27

## 2019-01-27 RX ORDER — METHYLPREDNISOLONE SODIUM SUCCINATE 40 MG/ML
40 INJECTION, POWDER, LYOPHILIZED, FOR SOLUTION INTRAMUSCULAR; INTRAVENOUS ONCE
Status: COMPLETED | OUTPATIENT
Start: 2019-01-27 | End: 2019-01-27

## 2019-01-27 RX ORDER — METHYLPREDNISOLONE 4 MG/1
4 TABLET ORAL
Status: DISCONTINUED | OUTPATIENT
Start: 2019-01-27 | End: 2019-01-27

## 2019-01-27 RX ORDER — METHYLPREDNISOLONE 4 MG/1
4 TABLET ORAL
Status: DISCONTINUED | OUTPATIENT
Start: 2019-01-29 | End: 2019-01-27

## 2019-01-27 RX ORDER — ZOLPIDEM TARTRATE 5 MG/1
5 TABLET ORAL NIGHTLY PRN
Status: DISCONTINUED | OUTPATIENT
Start: 2019-01-27 | End: 2019-01-28

## 2019-01-27 RX ORDER — CYCLOBENZAPRINE HCL 10 MG
10 TABLET ORAL 3 TIMES DAILY PRN
Status: DISCONTINUED | OUTPATIENT
Start: 2019-01-27 | End: 2019-01-28

## 2019-01-27 RX ORDER — OXYCODONE HYDROCHLORIDE AND ACETAMINOPHEN 5; 325 MG/1; MG/1
1 TABLET ORAL EVERY 4 HOURS PRN
Status: DISCONTINUED | OUTPATIENT
Start: 2019-01-27 | End: 2019-01-28

## 2019-01-27 RX ORDER — METHYLPREDNISOLONE 4 MG/1
8 TABLET ORAL
Status: DISCONTINUED | OUTPATIENT
Start: 2019-01-27 | End: 2019-01-27

## 2019-01-27 NOTE — PLAN OF CARE
Pt arrived from ER at [address]. Pt currently getting situated in bed and orientated to the room. Dr. Rahel Wilson placed admission orders and Dr. Luiza Craig saw in ER. Pt in severe pain on left leg.

## 2019-01-27 NOTE — PLAN OF CARE
Patient/Family Goals    • Patient/Family Long Term Goal Progressing    • Patient/Family Short Term Goal Progressing          Patient is A&Ox4. NSR with right BBB on tele. Pain ranged from moderate to severe in left leg. Sciatic pain.  Patient states he has

## 2019-01-27 NOTE — PLAN OF CARE
Patient/Family Goals    • Patient/Family Long Term Goal Progressing    • Patient/Family Short Term Goal Progressing        A/o x3 denies chest pain, lightheadedness, dizziness, sob. C/o extreme pain to left lower leg and great toe.  States he has no hx/o

## 2019-01-27 NOTE — PROGRESS NOTES
Acute Pain Service    Consult for chronic back pain; pt admitted to hospital yesterday with reports of chest pain. Assessed pt in bed. Pt reports lower back pain that radiates down the left side of his buttock and down his left leg. Denies paresthesia. Neuroscience      Plan  Spoke with Dr. Oumar Espinoza. Will add Flexeril to current regimen. If acute processes are suspected by hospitalist service, recommend imaging. An MRI is warranted if it is felt pt would benefit from \[facility] [place] [facility]\"" SERVICES.  Pt is on Plavix and cardiolo

## 2019-01-27 NOTE — PROGRESS NOTES
[facility] Cardiology Progress Note    Patient seen and examined.  Chart reviewed.  Discussed with RN. No chest pain or shortness of breath. Says he feels miserable because of left sided sciatica pain.     /73 (BP Location: Left arm)   Pulse 66   Temp 98 ophthalmic solution 1 drop 1 drop Both Eyes Nightly   Levothyroxine Sodium (SYNTHROID, LEVOTHROID) tab 125 mcg 125 mcg Oral Before breakfast   Alfuzosin HCl ER (UROXATRAL) 24 hr tab 10 mg 10 mg Oral Daily with breakfast   nitroGLYCERIN (NITROSTAT) SL tab 0

## 2019-01-27 NOTE — PROGRESS NOTES
[facility] Progress Note:  Anticoagulation Weight Dose Adjustment for enoxaparin (LOVENOX)    Lucian Goodwin. is a 79year old male who has been prescribed enoxaparin (LOVENOX) 40 mg subcutaneously nightly for VTE prophylaxis.       Estimated Creatinine Cl

## 2019-01-27 NOTE — H&P
[facility]     Graciela Marino.  Patient Status:  Observation    3/23/1950 MRN [id number]   [facility] 8NE-A Attending Ghulam Light MD   Hosp Day # 0 PCP Jayro Mccarthy MD     Chief Complaint: cp    History of  MG Oral Tab Take 1 tablet by mouth every 6 (six) hours as needed for Pain. Disp: 60 tablet Rfl: 0   GLIPIZIDE 5 MG Oral Tab TAKE 1 TABLET BY MOUTH EVERY MORNING BEFORE BREAKFAST.  Disp: 90 tablet Rfl: 1   Metaxalone (METAXALL) 800 MG Oral Tab Take 1 21-Apr-2017 22:25 both eyes daily. (Patient taking differently: Place 1 drop into both eyes nightly.  ) Disp: 1 Bottle Rfl: 0       Review of Systems:   A comprehensive 14 point review of systems was completed. Pertinent positives and negatives noted in the HPI.     Physi stress  5. Statin  3. Sciatica  1. Oxycodone  4. HTn  5.  HLD    Quality:  · DVT Prophylaxis: lovenox  · CODE status: full  · [place]: no    Plan of care discussed with pt, ed physician    Patsy Lira MD  1/26/2019

## 2019-01-27 NOTE — PROGRESS NOTES
[facility]  Progress Note     Zulma Wang.  Patient Status:  Observation    3/23/1950 MRN [id number]   [facility] 8NE-A Attending Max Abel MD   Hosp Day # 0 PCP Katherine Conrad MD     Chief Complaint: cp    S: Patient states no Oral Daily   • metoprolol succinate  50 mg Oral Daily Beta Blocker   • aspirin  81 mg Oral Daily   • Rosuvastatin Calcium  20 mg Oral Nightly   • Dorzolamide HCl-Timolol Mal  1 drop Both Eyes BID   • latanoprost  1 drop Both Eyes Nightly   • Levothyroxine

## 2019-01-28 VITALS
WEIGHT: 271.38 LBS | HEIGHT: 69 IN | BODY MASS INDEX: 40.2 KG/M2 | DIASTOLIC BLOOD PRESSURE: 71 MMHG | OXYGEN SATURATION: 96 % | TEMPERATURE: 98 F | HEART RATE: 78 BPM | RESPIRATION RATE: 20 BRPM | SYSTOLIC BLOOD PRESSURE: 145 MMHG

## 2019-01-28 LAB — GLUCOSE BLD-MCNC: 128 MG/DL (ref 65–99)

## 2019-01-28 PROCEDURE — 99217 OBSERVATION CARE DISCHARGE: CPT | Performed by: HOSPITALIST

## 2019-01-28 RX ORDER — LISINOPRIL 2.5 MG/1
2.5 TABLET ORAL DAILY
Qty: 30 TABLET | Refills: 1 | Status: SHIPPED | OUTPATIENT
Start: 2019-01-28 | End: 2019-01-31

## 2019-01-28 RX ORDER — METHYLPREDNISOLONE 4 MG/1
TABLET ORAL
Qty: 1 KIT | Refills: 0 | Status: SHIPPED | OUTPATIENT
Start: 2019-01-28 | End: 2019-01-31 | Stop reason: ALTCHOICE

## 2019-01-28 RX ORDER — GABAPENTIN 100 MG/1
200 CAPSULE ORAL 3 TIMES DAILY
Qty: 180 CAPSULE | Refills: 0 | Status: SHIPPED | OUTPATIENT
Start: 2019-01-28 | End: 2019-01-31

## 2019-01-28 RX ORDER — CYCLOBENZAPRINE HCL 10 MG
10 TABLET ORAL 3 TIMES DAILY PRN
Qty: 90 TABLET | Refills: 0 | Status: SHIPPED | OUTPATIENT
Start: 2019-01-28 | End: 2019-01-31

## 2019-01-28 RX ORDER — OXYCODONE HYDROCHLORIDE AND ACETAMINOPHEN 5; 325 MG/1; MG/1
1 TABLET ORAL EVERY 4 HOURS PRN
Qty: 20 TABLET | Refills: 0 | Status: SHIPPED | OUTPATIENT
Start: 2019-01-28 | End: 2019-02-20 | Stop reason: ALTCHOICE

## 2019-01-28 RX ORDER — TAMSULOSIN HYDROCHLORIDE 0.4 MG/1
CAPSULE ORAL
Qty: 180 CAPSULE | Refills: 0 | Status: CANCELLED | OUTPATIENT
Start: 2019-01-28

## 2019-01-28 NOTE — PHYSICAL THERAPY NOTE
PHYSICAL THERAPY QUICK EVALUATION - INPATIENT    Room Number: 1419/9809-V  Evaluation Date: 1/28/2019  Presenting Problem: Acute chest pain  Physician Order: PT Eval and Treat    Problem List  Principal Problem:    Acute chest pain  Active Problems:    [facility] extremity ROM and strength are within functional limits     Lower extremity ROM is within functional limits     Lower extremity strength is within functional limits except for the following:    Left Hip flexion  4/5  Left Knee extension  4/5  Left Knee fle recommendations. Patient End of Session: Needs met;Call light within reach;RN aware of session/findings; All patient questions and concerns addressed; In bed    ASSESSMENT   Patient is a 79year old male admitted on 1/26/2019 for acute chest pain.   Pertin

## 2019-01-28 NOTE — PLAN OF CARE
NURSING DISCHARGE NOTE    Discharged Home via Ambulance. Accompanied by Support staff  Belongings Taken by patient/family. Pt. Sent with medicar. Reviewed prescription for medrol dose destini and pain medications (along with flexaril).  Also, discussed add

## 2019-01-28 NOTE — OCCUPATIONAL THERAPY NOTE
OCCUPATIONAL THERAPY QUICK EVALUATION - INPATIENT    Room Number: 1367/2055-B  Evaluation Date: 1/28/2019     Type of Evaluation: Quick Eval  Presenting Problem: Acute chest Pain, Back/L calf pain    Physician Order: IP Consult to Occupational Therapy  Mercy Borrego husbands recovery from recent surgery and for him to receive some assist as well. Pt drives, does not have a car.      SUBJECTIVE   \"It just hurts a lot\"    Patient self-stated goal is to get rid of his pain    OBJECTIVE  Precautions: Fluid restriction current function related to AROM, MMT, GMc, FMC, cognition, mobility and ADL's. Bed mobilty, transfers at I level. Functional ambulation for approx 150 ft w/RW at Mod I level. Gross cognition assessed throughout session-no issues noted.    Pt returned to safety with ADLS: safely and independently

## 2019-01-28 NOTE — PLAN OF CARE
Pt. Suzanne Spurr down for stress test but refused the test due to he says \" I cannot sit still for 2 hours\". Cardiology notified of this. Pt. Is back on monitor and wants to talk with pain control  (who was paged).

## 2019-01-28 NOTE — PLAN OF CARE
Pt. Is alert and oriented times four. Lungs clear on auscultation. Pt. Has no c/o chest pain. He had treadmill breakfast and is to go for stress test today. He c/o left leg sciatica and walks with a limp. He is sinus rhythm on monitor with BBB.

## 2019-01-28 NOTE — CM/SW NOTE
MSW met with patient, he wants a medicar home due to leg pain, does not feel he can get in and out of a car. MSW told patient is private pay for [employer], they will bill him, he is agreeable.     Medicare set up for 12pm.

## 2019-01-28 NOTE — DISCHARGE SUMMARY
[facility]  DISCHARGE SUMMARY     Edilberto Henriquez.  Patient Status:  Observation    3/23/1950 MRN [id number]   [facility] 8NE-A Attending No att. providers found   Hosp Day # 0 PCP Earl Oconnell MD     Date of Admission: 2019  Claudio Attempted to get stress test but pt refused as he wanted pain meds in order to get test. Seen by cards and should f/u as outpt. Suspect non cardiac pain. Will dc w/ medrol dose pack, increased gabapentin, muscle relaxants.  Small quantity of percocet given Levothyroxine Sodium 125 MCG Caps  What changed:  Another medication with the same name was removed. Continue taking this medication, and follow the directions you see here. Take 1 tablet by mouth daily. Every morning on an empty stomach.    Stop yannick PERCOCET  Replaced by:  oxyCODONE-acetaminophen 5-325 MG Tabs              Where to Get Your Medications      These medications were sent to [facility] [address] [place] [address] [phone], [address] [facility]

## 2019-01-28 NOTE — PLAN OF CARE
Impaired Activities of Daily Living    • Achieve highest/safest level of independence in self care Adequate for Discharge        Impaired Functional Mobility    • Achieve highest/safest level of mobility/gait Adequate for Discharge        Patient/Family Go

## 2019-01-28 NOTE — PROGRESS NOTES
[facility]  Cardiology Progress Note    Graciela Marino.  Patient Status:  Observation    3/23/1950 MRN [id number]   [facility] 8NE-A Attending Ghulam Light MD   Hosp Day # 0 PCP Jayro Mccarthy MD     Subjective:  Complaining of leg pain sure if he had CP on admission.  Troponin (-), ECG not consistent with ischemia  · Ischemic CM, LVEF 45%  · HTN controlled   · HLD on statin  · Sciatica- Pain MD following      Plan:   · Patient refused nuclear stress test this AM, due to difficulty sitting

## 2019-01-28 NOTE — PLAN OF CARE
Patient/Family Goals    • Patient/Family Long Term Goal Progressing    • Patient/Family Short Term Goal Progressing          Patient is A&Ox4. NSR with right BBB on tele. Lung sounds clear upon auscultation.  Pain ranged from moderate to severe sciatic pain

## 2019-01-29 ENCOUNTER — PATIENT OUTREACH (OUTPATIENT)
Dept: CASE MANAGEMENT | Age: 69
End: 2019-01-29

## 2019-01-29 DIAGNOSIS — Z02.9 ENCOUNTERS FOR UNSPECIFIED ADMINISTRATIVE PURPOSE: ICD-10-CM

## 2019-01-29 DIAGNOSIS — M54.42 ACUTE LEFT-SIDED LOW BACK PAIN WITH LEFT-SIDED SCIATICA: ICD-10-CM

## 2019-01-29 PROCEDURE — 1111F DSCHRG MED/CURRENT MED MERGE: CPT

## 2019-01-30 NOTE — PROGRESS NOTES
Initial Post Discharge Follow Up   Discharge Date: 1/28/19  Contact Date: 1/29/2019    Consent Verification:  Assessment Completed With: Patient  HIPAA Verified?   Yes    Discharge Dx:  Chest pain, CAD, acute sciatic exacerbation    General:   • How have follow at discharge? no    Medications:  Meds reviewed with pt. Current Outpatient Medications:  gabapentin 100 MG Oral Cap Take 2 capsules (200 mg total) by mouth 3 (three) times daily.  Disp: 180 capsule Rfl: 0   Cyclobenzaprine HCl 10 MG Oral Tab T times daily. Disp: 1 Bottle Rfl: 0   latanoprost 0.005 % Ophthalmic Solution Place 1 drop into both eyes daily.  (Patient taking differently: Place 1 drop into both eyes nightly.  ) Disp: 1 Bottle Rfl: 0     • When you were leaving the hospital were any med Services ordered at D/C? No      Needs post D/C:   Now that you are home, are there any needs or concerns you need addressed before your next visit with your PCP?  (DME, meds, disease concerns, Etc): Yes, having issues filling Percocet 5-325mg.   See a medications and blood glucose meter/supplies if applicable.

## 2019-01-31 ENCOUNTER — OFFICE VISIT (OUTPATIENT)
Dept: INTERNAL MEDICINE CLINIC | Facility: CLINIC | Age: 69
End: 2019-01-31
Payer: MEDICARE

## 2019-01-31 VITALS
HEART RATE: 91 BPM | BODY MASS INDEX: 40 KG/M2 | WEIGHT: 268 LBS | OXYGEN SATURATION: 98 % | DIASTOLIC BLOOD PRESSURE: 84 MMHG | SYSTOLIC BLOOD PRESSURE: 124 MMHG | TEMPERATURE: 98 F | RESPIRATION RATE: 18 BRPM

## 2019-01-31 DIAGNOSIS — E11.59 TYPE 2 DIABETES MELLITUS WITH OTHER CIRCULATORY COMPLICATION, WITHOUT LONG-TERM CURRENT USE OF INSULIN (HCC): ICD-10-CM

## 2019-01-31 DIAGNOSIS — I10 ESSENTIAL HYPERTENSION: ICD-10-CM

## 2019-01-31 DIAGNOSIS — F33.1 MODERATE EPISODE OF RECURRENT MAJOR DEPRESSIVE DISORDER (HCC): ICD-10-CM

## 2019-01-31 DIAGNOSIS — M48.062 LUMBAR STENOSIS WITH NEUROGENIC CLAUDICATION: ICD-10-CM

## 2019-01-31 DIAGNOSIS — I25.84 CORONARY ARTERY DISEASE DUE TO CALCIFIED CORONARY LESION: ICD-10-CM

## 2019-01-31 DIAGNOSIS — I20.8 STABLE ANGINA PECTORIS (HCC): Primary | ICD-10-CM

## 2019-01-31 DIAGNOSIS — I25.10 CORONARY ARTERY DISEASE DUE TO CALCIFIED CORONARY LESION: ICD-10-CM

## 2019-01-31 DIAGNOSIS — E78.5 HYPERLIPIDEMIA LDL GOAL <70: ICD-10-CM

## 2019-01-31 PROCEDURE — 99496 TRANSJ CARE MGMT HIGH F2F 7D: CPT | Performed by: INTERNAL MEDICINE

## 2019-01-31 RX ORDER — ROSUVASTATIN CALCIUM 20 MG/1
20 TABLET, COATED ORAL NIGHTLY
Qty: 90 TABLET | Refills: 3 | Status: SHIPPED | OUTPATIENT
Start: 2019-01-31 | End: 2019-02-11

## 2019-01-31 RX ORDER — GLIPIZIDE 5 MG/1
TABLET ORAL
Qty: 90 TABLET | Refills: 3 | Status: SHIPPED | OUTPATIENT
Start: 2019-01-31 | End: 2019-04-07

## 2019-01-31 RX ORDER — LEVOTHYROXINE SODIUM 125 UG/1
1 CAPSULE ORAL DAILY
Qty: 90 CAPSULE | Refills: 1 | Status: SHIPPED | OUTPATIENT
Start: 2019-01-31 | End: 2019-04-07

## 2019-01-31 RX ORDER — TAMSULOSIN HYDROCHLORIDE 0.4 MG/1
0.8 CAPSULE ORAL DAILY
Qty: 180 CAPSULE | Refills: 2 | Status: SHIPPED | OUTPATIENT
Start: 2019-01-31 | End: 2019-04-07

## 2019-01-31 RX ORDER — GABAPENTIN 100 MG/1
200 CAPSULE ORAL 3 TIMES DAILY
Qty: 180 CAPSULE | Refills: 0 | Status: SHIPPED | OUTPATIENT
Start: 2019-01-31 | End: 2019-04-07

## 2019-01-31 RX ORDER — DULOXETIN HYDROCHLORIDE 30 MG/1
30 CAPSULE, DELAYED RELEASE ORAL DAILY
Qty: 90 CAPSULE | Refills: 1 | Status: SHIPPED | OUTPATIENT
Start: 2019-01-31 | End: 2019-04-07

## 2019-01-31 RX ORDER — CYCLOBENZAPRINE HCL 10 MG
10 TABLET ORAL 3 TIMES DAILY PRN
Qty: 90 TABLET | Refills: 0 | Status: SHIPPED | OUTPATIENT
Start: 2019-01-31 | End: 2019-02-19 | Stop reason: ALTCHOICE

## 2019-01-31 RX ORDER — METHYLPREDNISOLONE 4 MG/1
TABLET ORAL
Qty: 1 KIT | Refills: 0 | Status: SHIPPED | OUTPATIENT
Start: 2019-01-31 | End: 2019-02-20 | Stop reason: ALTCHOICE

## 2019-01-31 RX ORDER — HYDROCODONE BITARTRATE AND ACETAMINOPHEN 7.5; 325 MG/1; MG/1
1 TABLET ORAL EVERY 6 HOURS PRN
Qty: 21 TABLET | Refills: 0 | Status: SHIPPED | OUTPATIENT
Start: 2019-01-31 | End: 2019-02-19 | Stop reason: ALTCHOICE

## 2019-01-31 RX ORDER — SPIRONOLACTONE 25 MG/1
25 TABLET ORAL DAILY
Qty: 90 TABLET | Refills: 3 | Status: SHIPPED | OUTPATIENT
Start: 2019-01-31 | End: 2019-04-07

## 2019-01-31 RX ORDER — LISINOPRIL 2.5 MG/1
2.5 TABLET ORAL DAILY
Qty: 90 TABLET | Refills: 1 | Status: SHIPPED | OUTPATIENT
Start: 2019-01-31 | End: 2019-04-07

## 2019-01-31 NOTE — PROGRESS NOTES
HPI:    Devan Gillespie. is a 79year old male here today for hospital follow up.    He was discharged from Inpatient hospital, [facility] to Home   Admission Date: 1/26/19   Discharge Date: 1/28/19  Hospital Discharge Diagnoses (since 1/1/2019)     ches everywhere. Attempted to get stress test but pt refused as he wanted pain meds in order to get test. Seen by cards and will f/u as outpt. Suspect non cardiac pain. He was  D/c planned to be given w/ medrol dose pack, increased gabapentin, muscle relaxants. aspirin 81 MG Oral Tab EC Take 1 tablet (81 mg total) by mouth daily. Rosuvastatin Calcium 20 MG Oral Tab Take 1 tablet (20 mg total) by mouth nightly. spironolactone 25 MG Oral Tab Take 1 tablet (25 mg total) by mouth daily.    Dorzolamide HCl-Timolo dizziness, denies weakness  PSYCHE:  anxiety  HEMATOLOGIC: denies hx of anemia, or bruising, denies bleeding  ENDOCRINE: thyroid history  ALL/ASTHMA: denies hx of allergy or asthma    PHYSICAL EXAM:   No LMP for male patient.   Estimated body mass index is Examine feet daily      Low carbohydrate diet with portion control to ensure weight reduction       Regular exercise 3-4 times a week minimum      Should have yearly eye exam- Goal blood pressure 130/80       Goal Hb A1c < 7  5.     HTN         Stable

## 2019-02-04 ENCOUNTER — PATIENT OUTREACH (OUTPATIENT)
Dept: CASE MANAGEMENT | Age: 69
End: 2019-02-04

## 2019-02-06 ENCOUNTER — TELEPHONE (OUTPATIENT)
Dept: INTERNAL MEDICINE CLINIC | Facility: CLINIC | Age: 69
End: 2019-02-06

## 2019-02-06 ENCOUNTER — PATIENT OUTREACH (OUTPATIENT)
Dept: CASE MANAGEMENT | Age: 69
End: 2019-02-06

## 2019-02-06 DIAGNOSIS — R07.9 ACUTE CHEST PAIN: ICD-10-CM

## 2019-02-06 DIAGNOSIS — I25.10 CORONARY ARTERY DISEASE DUE TO CALCIFIED CORONARY LESION: ICD-10-CM

## 2019-02-06 DIAGNOSIS — I25.84 CORONARY ARTERY DISEASE DUE TO CALCIFIED CORONARY LESION: ICD-10-CM

## 2019-02-06 DIAGNOSIS — E11.8 TYPE 2 DIABETES MELLITUS WITH COMPLICATION, WITHOUT LONG-TERM CURRENT USE OF INSULIN (HCC): ICD-10-CM

## 2019-02-06 DIAGNOSIS — I10 ESSENTIAL HYPERTENSION: ICD-10-CM

## 2019-02-06 RX ORDER — TRAZODONE HYDROCHLORIDE 50 MG/1
50 TABLET ORAL NIGHTLY PRN
Qty: 60 TABLET | Refills: 0 | Status: SHIPPED | OUTPATIENT
Start: 2019-02-06 | End: 2019-04-07

## 2019-02-06 NOTE — PROGRESS NOTES
2/6/2019  Spoke to Michelle Tucker for CCM. Updates to patient care team/ comments:  None   Patient reported changes in medications: None (may be possible change to Flomax, pt requesting a new sleep medication.         Health Maintenance:   Colonoscopy due on 03/ 30     Physical assessment, complete health history, and care plan established by Lucita Herrera MD, need for CCM determined from these assessments and data.

## 2019-02-06 NOTE — TELEPHONE ENCOUNTER
Called pt today for CCM outreach. Pt states that the Flomax does not work for him and would like an alternative.  Pt states that he is having a lot of trouble sleeping at night since being discharged from the hospital.   He also is requesting a medicati

## 2019-02-06 NOTE — TELEPHONE ENCOUNTER
Outreach call done today. Patient states he went to workout last night and during the night and this morning he is c/o severe calf pain.

## 2019-02-07 ENCOUNTER — PATIENT OUTREACH (OUTPATIENT)
Dept: CASE MANAGEMENT | Age: 69
End: 2019-02-07

## 2019-02-07 NOTE — PROGRESS NOTES
Called pt to check on him- Right lower calf pain. Dr. Charissa Kohler recommended per TE from 2/6/19- pt to be seen at Dr. Sindhu Kay office. I called over to Dr. Sindhu Kay office [facility] for patient to be seen tomorrow. Appointment made.    I called p

## 2019-02-07 NOTE — TELEPHONE ENCOUNTER
Called patient to advise him of Dr. Enzo Iqbal wanting him to make appointment. Pt agreed with plan. Called Dr. Tam Carlin office and spoke with [facility]. appt made.    Called pt and he is aware appt is tomorrow at 9:20 AM.    Future Appointments   Date Time Provider Aneta Kc

## 2019-02-11 RX ORDER — ROSUVASTATIN CALCIUM 20 MG/1
20 TABLET, COATED ORAL NIGHTLY
Qty: 90 TABLET | Refills: 3 | Status: SHIPPED | OUTPATIENT
Start: 2019-02-11 | End: 2019-04-07

## 2019-02-19 ENCOUNTER — OFFICE VISIT (OUTPATIENT)
Dept: INTERNAL MEDICINE CLINIC | Facility: CLINIC | Age: 69
End: 2019-02-19
Payer: MEDICARE

## 2019-02-19 VITALS
BODY MASS INDEX: 40.73 KG/M2 | RESPIRATION RATE: 22 BRPM | SYSTOLIC BLOOD PRESSURE: 118 MMHG | TEMPERATURE: 98 F | HEART RATE: 54 BPM | OXYGEN SATURATION: 94 % | WEIGHT: 275 LBS | DIASTOLIC BLOOD PRESSURE: 70 MMHG | HEIGHT: 69 IN

## 2019-02-19 DIAGNOSIS — Z00.00 ENCOUNTER FOR ANNUAL HEALTH EXAMINATION: ICD-10-CM

## 2019-02-19 DIAGNOSIS — I20.8 STABLE ANGINA PECTORIS (HCC): ICD-10-CM

## 2019-02-19 DIAGNOSIS — R35.1 BENIGN PROSTATIC HYPERPLASIA WITH NOCTURIA: ICD-10-CM

## 2019-02-19 DIAGNOSIS — M54.16 LUMBAR RADICULITIS: ICD-10-CM

## 2019-02-19 DIAGNOSIS — M48.062 LUMBAR STENOSIS WITH NEUROGENIC CLAUDICATION: ICD-10-CM

## 2019-02-19 DIAGNOSIS — Z12.5 PROSTATE CANCER SCREENING: ICD-10-CM

## 2019-02-19 DIAGNOSIS — E78.5 HYPERLIPIDEMIA LDL GOAL <70: ICD-10-CM

## 2019-02-19 DIAGNOSIS — N40.1 BENIGN PROSTATIC HYPERPLASIA WITH NOCTURIA: ICD-10-CM

## 2019-02-19 DIAGNOSIS — Z23 NEED FOR VACCINATION: ICD-10-CM

## 2019-02-19 DIAGNOSIS — Z11.59 NEED FOR HEPATITIS C SCREENING TEST: ICD-10-CM

## 2019-02-19 DIAGNOSIS — E11.65 TYPE 2 DIABETES MELLITUS WITH HYPERGLYCEMIA, WITHOUT LONG-TERM CURRENT USE OF INSULIN (HCC): Primary | ICD-10-CM

## 2019-02-19 DIAGNOSIS — E03.9 ACQUIRED HYPOTHYROIDISM: ICD-10-CM

## 2019-02-19 DIAGNOSIS — F33.1 MODERATE EPISODE OF RECURRENT MAJOR DEPRESSIVE DISORDER (HCC): ICD-10-CM

## 2019-02-19 DIAGNOSIS — N18.30 STAGE 3 CHRONIC KIDNEY DISEASE (HCC): ICD-10-CM

## 2019-02-19 DIAGNOSIS — G47.9 SLEEP DISORDER: ICD-10-CM

## 2019-02-19 PROCEDURE — 99212 OFFICE O/P EST SF 10 MIN: CPT | Performed by: INTERNAL MEDICINE

## 2019-02-19 PROCEDURE — G0438 PPPS, INITIAL VISIT: HCPCS | Performed by: INTERNAL MEDICINE

## 2019-02-19 RX ORDER — HYDROCODONE BITARTRATE AND ACETAMINOPHEN 10; 325 MG/1; MG/1
1 TABLET ORAL EVERY 6 HOURS PRN
Qty: 40 TABLET | Refills: 0 | Status: SHIPPED | OUTPATIENT
Start: 2019-02-19 | End: 2019-11-07

## 2019-02-19 NOTE — PROGRESS NOTES
HPI:   Concepción Wade. is a 79year old male who presents for a Medicare Subsequent Annual Wellness visit (Pt already had Initial Annual Wellness). Patient has multiple medical issues.  He has CAD with multiple stent,type 2 Dm, HTN, Hyperlipidemia,  Interpretation: 4+ At Risk      Cognitive Assessment   He had a completely normal cognitive assessment- see flowsheet entries      Functional Ability/Status   Barbi Mancia. has some abnormal functions as listed below:  He has Dressing and/or Bathing issue screened for Alcohol abuse and had a score of 0 so is at low risk.      Patient Care Team: Patient Care Team:  Adebayo Graham MD as PCP - General (Internal Medicine)  Chelsea See DO as Consulting Physician (Physical Medicine)  Ramana Mendoza MD as Minor Harpin medical history of Atherosclerosis of coronary artery, Chronic low back pain with bilateral sciatica, left worse than right (11/28/2017), Coronary atherosclerosis, Diabetes ([facility] [address] Essential hypertension, Glaucoma, Heart attack ([facility] [address] High blood pressure, H Eye Chart Acuity: 20/50   Left Eye Visual Acuity: Uncorrected Left Eye Chart Acuity: 20/40   Both Eyes Visual Acuity: Uncorrected Both Eyes Chart Acuity: 20/30   Able To Tolerate Visual Acuity: Yes      General Appearance:  Alert, cooperative, no distress, male who presents for a Medicare Assessment.      PLAN SUMMARY:   Diagnoses and all orders for this visit:    Type 2 diabetes mellitus with hyperglycemia, without long-term current use of insulin ([facility] [address]  -Encourage to monitor BG at least 3 x weekly  -[employer] to the plan. Reinforced healthy diet, lifestyle, and exercise. Lab work ordered. Consult ordered. Continue with current treatment plan. Follow up in  3  month(s).         Earl Oconnell MD, 2/19/2019     General Health     In the past six months, have y applicable)     Influenza  Covered Annually 11/28/2018   Please get every year    Pneumococcal 13 (Prevnar)  Covered Once after 65 02/02/2018 Please get once after your 65th birthday    Pneumococcal 23 (Pneumovax)  Covered Once after 65 2/19/2019 Please ge

## 2019-02-19 NOTE — PATIENT INSTRUCTIONS
Irwin Renteria Jr.'s SCREENING SCHEDULE   Tests on this list are recommended by your physician but may not be covered, or covered at this frequency, by your insurer. Please check with your insurance carrier before scheduling to verify coverage.     PREVENTATI every 10 years- more often if abnormal Colonoscopy due on 03/23/1952 Update [facility] if applicable    Flex Sigmoidoscopy Screen  Covered every 5 years No results found for this or any previous visit. No flowsheet data found.      Fecal Occult Bloo Medicare Part B) No orders found for this or any previous visit.  This may be covered with your prescription benefits, but Medicare does not cover unless Medically needed    Zoster (Not covered by Medicare Part B) No orders found for this or any previous vi

## 2019-02-20 PROBLEM — R07.9 CHEST PAIN, RULE OUT ACUTE MYOCARDIAL INFARCTION: Status: RESOLVED | Noted: 2018-07-12 | Resolved: 2019-02-20

## 2019-02-20 PROBLEM — R73.9 HYPERGLYCEMIA: Status: RESOLVED | Noted: 2017-08-21 | Resolved: 2019-02-20

## 2019-02-20 PROBLEM — R07.9 CHEST PAIN: Status: RESOLVED | Noted: 2017-08-21 | Resolved: 2019-02-20

## 2019-02-20 PROBLEM — I20.0 UNSTABLE ANGINA (HCC): Status: RESOLVED | Noted: 2018-07-21 | Resolved: 2019-02-20

## 2019-02-20 PROBLEM — N18.30 STAGE 3 CHRONIC KIDNEY DISEASE (HCC): Status: ACTIVE | Noted: 2019-02-20

## 2019-02-20 PROBLEM — E87.6 HYPOKALEMIA: Status: RESOLVED | Noted: 2018-07-12 | Resolved: 2019-02-20

## 2019-02-20 PROBLEM — R07.9 CHEST PAIN, UNSPECIFIED TYPE: Status: RESOLVED | Noted: 2018-11-26 | Resolved: 2019-02-20

## 2019-02-20 PROBLEM — N40.1 BENIGN PROSTATIC HYPERPLASIA WITH NOCTURIA: Status: ACTIVE | Noted: 2019-02-20

## 2019-02-20 PROBLEM — R07.9 ACUTE CHEST PAIN: Status: RESOLVED | Noted: 2019-01-26 | Resolved: 2019-02-20

## 2019-02-20 PROBLEM — R35.1 BENIGN PROSTATIC HYPERPLASIA WITH NOCTURIA: Status: ACTIVE | Noted: 2019-02-20

## 2019-02-22 RX ORDER — CYCLOBENZAPRINE HCL 10 MG
10 TABLET ORAL 3 TIMES DAILY
Qty: 30 TABLET | Refills: 1 | OUTPATIENT
Start: 2019-02-22 | End: 2019-03-14

## 2019-02-27 ENCOUNTER — PATIENT OUTREACH (OUTPATIENT)
Dept: CASE MANAGEMENT | Age: 69
End: 2019-02-27

## 2019-02-27 NOTE — PROGRESS NOTES
Called pt for CCM ASSESSMENT. In the middle of assessment, pt had to go. He asked me to give him a call next week. Informed pt I would call him next week to follow up. I want to know a little about you. • What do you do for fun? • Any hobbies? No  · Pt states that he is not following a certain type of diet. He states that he is managing his diet and tries to eat \"fair. \"     Exercise:  • Do you exercise? What do you do? Pt states that as of right now he is not physically active.  He states that reports is stable on all medications and denies experiencing any side effects.     Care manager f/up:  Work on for next time:  Resources needed:  Care gaps? :       Goal:  Care Plan: Reviewed and updated where needed  Sending education on:  Time Spent This

## 2019-02-28 VITALS
BODY MASS INDEX: 41.03 KG/M2 | DIASTOLIC BLOOD PRESSURE: 72 MMHG | HEIGHT: 69 IN | WEIGHT: 277 LBS | HEART RATE: 74 BPM | SYSTOLIC BLOOD PRESSURE: 130 MMHG

## 2019-02-28 VITALS
DIASTOLIC BLOOD PRESSURE: 78 MMHG | BODY MASS INDEX: 34.96 KG/M2 | HEIGHT: 69 IN | SYSTOLIC BLOOD PRESSURE: 102 MMHG | OXYGEN SATURATION: 96 % | WEIGHT: 236 LBS | HEART RATE: 92 BPM

## 2019-02-28 PROCEDURE — 99490 CHRNC CARE MGMT STAFF 1ST 20: CPT

## 2019-03-04 ENCOUNTER — TELEPHONE (OUTPATIENT)
Dept: NEUROLOGY | Facility: CLINIC | Age: 69
End: 2019-03-04

## 2019-03-04 ENCOUNTER — TELEPHONE (OUTPATIENT)
Dept: INTERNAL MEDICINE CLINIC | Facility: CLINIC | Age: 69
End: 2019-03-04

## 2019-03-04 ENCOUNTER — PATIENT OUTREACH (OUTPATIENT)
Dept: CASE MANAGEMENT | Age: 69
End: 2019-03-04

## 2019-03-04 DIAGNOSIS — I10 ESSENTIAL HYPERTENSION: ICD-10-CM

## 2019-03-04 DIAGNOSIS — I25.10 CORONARY ARTERY DISEASE DUE TO CALCIFIED CORONARY LESION: ICD-10-CM

## 2019-03-04 DIAGNOSIS — E11.8 TYPE 2 DIABETES MELLITUS WITH COMPLICATION, WITHOUT LONG-TERM CURRENT USE OF INSULIN (HCC): ICD-10-CM

## 2019-03-04 DIAGNOSIS — I25.84 CORONARY ARTERY DISEASE DUE TO CALCIFIED CORONARY LESION: ICD-10-CM

## 2019-03-04 DIAGNOSIS — R53.82 CHRONIC FATIGUE: ICD-10-CM

## 2019-03-04 NOTE — TELEPHONE ENCOUNTER
I was speaking with patient regarding CCM. Pt is requesting a refill of Norco.   I informed pt that I would check to see if this is possible. I also informed pt of Dr. Howie Yanez recommendations on seeing Dr. Yarelis Lynn for pain control.  Pt asked me if

## 2019-03-04 NOTE — TELEPHONE ENCOUNTER
received call from Dr Nava`s office for patient to be seen by Dr Renae Orozco. I reviewed chart and spoke to Dr Ying Rodriguez, requests patient not have care transferred to other physiatry provider in this office.  Patient can continue to see Dr Ying Rodriguez or Dr Justin Moya

## 2019-03-04 NOTE — PROGRESS NOTES
Called pt. CCM ASSESSMENT CONTINUED. I want to know a little about you. • Pt states that he enjoys reading, he mostly reads everything no matter what it is he states.    · Pt states that he enjoys watching classic movies     Social support:  • Do you h diet you are put on?  (cardiac, Dm diet, etc) pt states no   o Pt states that he is not following a certain type of diet. He states that he is managing diet and tries to eat \"fair. \" He states that his caregiver helps him with his meals.    Exercise:  • Do pt Patient Active Problem List:     Hypothyroidism     Type 2 diabetes mellitus with complication, without long-term current use of insulin ([facility] [address]     Coronary artery disease     Left Sided Neck pain, acute     Essential hypertension     Chronic fatigue stable on all medications and denies experiencing any side effects. Care manager f/up: 1 month     Work on for next time:  Start off small- at home exercises. Also recommended by Dr. Velvet Funk - regular exercises 3-4 times a week minimum.    Resources needed:

## 2019-03-07 ENCOUNTER — TELEPHONE (OUTPATIENT)
Dept: INTERNAL MEDICINE CLINIC | Facility: CLINIC | Age: 69
End: 2019-03-07

## 2019-03-07 RX ORDER — ALBUTEROL SULFATE 90 UG/1
2 AEROSOL, METERED RESPIRATORY (INHALATION) EVERY 6 HOURS PRN
Qty: 18 G | Refills: 11 | Status: SHIPPED | OUTPATIENT
Start: 2019-03-07 | End: 2019-11-08

## 2019-03-07 NOTE — TELEPHONE ENCOUNTER
Received fax from [facility], proAir HFA 80 MCG is not covered by pt's insurance.  Suggested is ventolin 90 MCG

## 2019-03-28 ENCOUNTER — PATIENT OUTREACH (OUTPATIENT)
Dept: INTERNAL MEDICINE CLINIC | Facility: CLINIC | Age: 69
End: 2019-03-28

## 2019-03-31 PROCEDURE — 99490 CHRNC CARE MGMT STAFF 1ST 20: CPT

## 2019-04-02 RX ORDER — GABAPENTIN 100 MG/1
200 CAPSULE ORAL 3 TIMES DAILY
Qty: 180 CAPSULE | Refills: 0 | Status: CANCELLED | OUTPATIENT
Start: 2019-04-02

## 2019-04-02 RX ORDER — CLOPIDOGREL BISULFATE 75 MG/1
75 TABLET ORAL DAILY
Qty: 90 TABLET | Refills: 3 | Status: CANCELLED | OUTPATIENT
Start: 2019-04-02

## 2019-04-02 RX ORDER — LEVOTHYROXINE SODIUM 125 UG/1
1 CAPSULE ORAL DAILY
Qty: 90 CAPSULE | Refills: 1 | Status: CANCELLED | OUTPATIENT
Start: 2019-04-02 | End: 2019-07-01

## 2019-04-02 RX ORDER — METOPROLOL SUCCINATE 50 MG/1
50 TABLET, EXTENDED RELEASE ORAL DAILY
Qty: 90 TABLET | Refills: 3 | Status: CANCELLED | OUTPATIENT
Start: 2019-04-02

## 2019-04-03 ENCOUNTER — PATIENT OUTREACH (OUTPATIENT)
Dept: CASE MANAGEMENT | Age: 69
End: 2019-04-03

## 2019-04-03 NOTE — PROGRESS NOTES
Called and left detailed message to call back for CCM MONTHLY. Verified HIPPA. Will follow up with patient at a later time.      Time spent this encounter: 3 mins    Total time spent this month: 3 mins

## 2019-04-07 RX ORDER — CLOPIDOGREL BISULFATE 75 MG/1
75 TABLET ORAL DAILY
Qty: 90 TABLET | Refills: 3 | Status: SHIPPED | OUTPATIENT
Start: 2019-04-07 | End: 2019-11-01

## 2019-04-07 RX ORDER — METOPROLOL SUCCINATE 50 MG/1
50 TABLET, EXTENDED RELEASE ORAL DAILY
Qty: 90 TABLET | Refills: 3 | Status: SHIPPED | OUTPATIENT
Start: 2019-04-07 | End: 2019-09-19

## 2019-04-07 RX ORDER — GABAPENTIN 100 MG/1
200 CAPSULE ORAL 3 TIMES DAILY
Qty: 180 CAPSULE | Refills: 3 | Status: SHIPPED | OUTPATIENT
Start: 2019-04-07 | End: 2019-06-27

## 2019-04-07 RX ORDER — ROSUVASTATIN CALCIUM 20 MG/1
20 TABLET, COATED ORAL NIGHTLY
Qty: 90 TABLET | Refills: 3 | Status: SHIPPED | OUTPATIENT
Start: 2019-04-07 | End: 2019-12-02

## 2019-04-07 RX ORDER — LEVOTHYROXINE SODIUM 125 UG/1
1 CAPSULE ORAL DAILY
Qty: 90 CAPSULE | Refills: 3 | Status: SHIPPED | OUTPATIENT
Start: 2019-04-07 | End: 2019-07-06

## 2019-04-07 RX ORDER — DULOXETIN HYDROCHLORIDE 30 MG/1
30 CAPSULE, DELAYED RELEASE ORAL DAILY
Qty: 90 CAPSULE | Refills: 3 | Status: SHIPPED | OUTPATIENT
Start: 2019-04-07 | End: 2020-04-03

## 2019-04-07 RX ORDER — TRAZODONE HYDROCHLORIDE 50 MG/1
50 TABLET ORAL NIGHTLY PRN
Qty: 60 TABLET | Refills: 3 | Status: SHIPPED | OUTPATIENT
Start: 2019-04-07 | End: 2020-02-20 | Stop reason: ALTCHOICE

## 2019-04-07 RX ORDER — SPIRONOLACTONE 25 MG/1
25 TABLET ORAL DAILY
Qty: 90 TABLET | Refills: 3 | Status: SHIPPED | OUTPATIENT
Start: 2019-04-07 | End: 2020-02-20

## 2019-04-07 RX ORDER — TAMSULOSIN HYDROCHLORIDE 0.4 MG/1
0.8 CAPSULE ORAL DAILY
Qty: 180 CAPSULE | Refills: 2 | Status: SHIPPED | OUTPATIENT
Start: 2019-04-07 | End: 2019-10-14

## 2019-04-07 RX ORDER — GLIPIZIDE 5 MG/1
TABLET ORAL
Qty: 90 TABLET | Refills: 3 | Status: SHIPPED | OUTPATIENT
Start: 2019-04-07 | End: 2019-12-02

## 2019-04-07 RX ORDER — LISINOPRIL 2.5 MG/1
2.5 TABLET ORAL DAILY
Qty: 90 TABLET | Refills: 3 | Status: SHIPPED | OUTPATIENT
Start: 2019-04-07 | End: 2019-10-25

## 2019-04-10 RX ORDER — HYDROCODONE BITARTRATE AND ACETAMINOPHEN 10; 325 MG/1; MG/1
1 TABLET ORAL EVERY 6 HOURS PRN
Qty: 40 TABLET | Refills: 0 | OUTPATIENT
Start: 2019-04-10

## 2019-04-16 ENCOUNTER — PATIENT OUTREACH (OUTPATIENT)
Dept: CASE MANAGEMENT | Age: 69
End: 2019-04-16

## 2019-04-16 NOTE — PROGRESS NOTES
Called pt for CCM monthly. Left message to call back. Verified HIPPA. I will follow up with patient at a later time.      Time spent this encounter: 3 mins   Total time spent this month: 6 mins

## 2019-04-30 ENCOUNTER — PATIENT OUTREACH (OUTPATIENT)
Dept: CASE MANAGEMENT | Age: 69
End: 2019-04-30

## 2019-04-30 NOTE — PROGRESS NOTES
Called patient for CCM monthly. Left detailed message to call back  I will f/u with patient at a later time.      Time spent this encounter: 3 mins   Total time spent this month: 9 mins

## 2019-05-15 ENCOUNTER — PATIENT OUTREACH (OUTPATIENT)
Dept: CASE MANAGEMENT | Age: 69
End: 2019-05-15

## 2019-05-15 NOTE — PROGRESS NOTES
Called patient for ccm monthly.    Left detailed message to call back  Reviewed chart   I will f/u with patient at a later time     Time spent this encounter: 5 minutes   Total time spent this month: 5 minutes

## 2019-05-29 ENCOUNTER — TELEPHONE (OUTPATIENT)
Dept: INTERNAL MEDICINE CLINIC | Facility: CLINIC | Age: 69
End: 2019-05-29

## 2019-05-29 ENCOUNTER — PATIENT OUTREACH (OUTPATIENT)
Dept: CASE MANAGEMENT | Age: 69
End: 2019-05-29

## 2019-05-29 DIAGNOSIS — R79.89 LOW TESTOSTERONE: ICD-10-CM

## 2019-05-29 DIAGNOSIS — I10 ESSENTIAL HYPERTENSION: ICD-10-CM

## 2019-05-29 DIAGNOSIS — R35.1 BENIGN PROSTATIC HYPERPLASIA WITH NOCTURIA: ICD-10-CM

## 2019-05-29 DIAGNOSIS — N18.30 STAGE 3 CHRONIC KIDNEY DISEASE (HCC): ICD-10-CM

## 2019-05-29 DIAGNOSIS — I25.10 CORONARY ARTERY DISEASE DUE TO CALCIFIED CORONARY LESION: ICD-10-CM

## 2019-05-29 DIAGNOSIS — R53.82 CHRONIC FATIGUE: ICD-10-CM

## 2019-05-29 DIAGNOSIS — E11.8 TYPE 2 DIABETES MELLITUS WITH COMPLICATION, WITHOUT LONG-TERM CURRENT USE OF INSULIN (HCC): ICD-10-CM

## 2019-05-29 DIAGNOSIS — M48.062 LUMBAR STENOSIS WITH NEUROGENIC CLAUDICATION: ICD-10-CM

## 2019-05-29 DIAGNOSIS — N40.1 BENIGN PROSTATIC HYPERPLASIA WITH NOCTURIA: ICD-10-CM

## 2019-05-29 DIAGNOSIS — I25.84 CORONARY ARTERY DISEASE DUE TO CALCIFIED CORONARY LESION: ICD-10-CM

## 2019-05-29 RX ORDER — TRAMADOL HYDROCHLORIDE 50 MG/1
50 TABLET ORAL 2 TIMES DAILY
Refills: 0 | COMMUNITY
Start: 2019-03-18 | End: 2019-11-07

## 2019-05-29 NOTE — PROGRESS NOTES
5/29/2019  Spoke to Phil Marino for CCM.     Verified HIPPA    Patient Active Problem List:     Hypothyroidism     Type 2 diabetes mellitus with complication, without long-term current use of insulin ([facility] [address]     Coronary artery disease     Left Sided Neck pain, acut Health maintenance: we discussed health maintenance. I discussed the importance of his yearly eye exam and colonoscopy. Pt relates that upon his return next month from [place] he will try to do all of this.  I informed patient I would help him with sandyu

## 2019-05-29 NOTE — TELEPHONE ENCOUNTER
Called patient for CCM Monthly. Patient requesting refill of Hydrocodone for his back pain. He is currently in [place] helping out a relative who is sick. I discussed with patient if he has followed up with pain specialty and he has not yet done so.     H

## 2019-05-30 NOTE — TELEPHONE ENCOUNTER
Called patient and left detailed message to call back regarding Norco prescription.    He is to follow up with pain specialty for refills per Dr. Abigail Vogel.

## 2019-05-31 PROCEDURE — 99490 CHRNC CARE MGMT STAFF 1ST 20: CPT

## 2019-06-04 ENCOUNTER — TELEPHONE (OUTPATIENT)
Dept: NEUROLOGY | Facility: CLINIC | Age: 69
End: 2019-06-04

## 2019-06-14 ENCOUNTER — PATIENT OUTREACH (OUTPATIENT)
Dept: CASE MANAGEMENT | Age: 69
End: 2019-06-14

## 2019-06-18 ENCOUNTER — MED REC SCAN ONLY (OUTPATIENT)
Dept: NEUROLOGY | Facility: CLINIC | Age: 69
End: 2019-06-18

## 2019-06-27 ENCOUNTER — PATIENT OUTREACH (OUTPATIENT)
Dept: CASE MANAGEMENT | Age: 69
End: 2019-06-27

## 2019-06-27 RX ORDER — GABAPENTIN 100 MG/1
200 CAPSULE ORAL 3 TIMES DAILY
Qty: 180 CAPSULE | Refills: 0 | Status: SHIPPED | OUTPATIENT
Start: 2019-06-27 | End: 2019-08-06

## 2019-06-27 NOTE — PROGRESS NOTES
Called patient for ccm monthly. Was informed to call back later today.    Reviewed chart    Time spent: 5 mins   Time spent this month: 3 mins

## 2019-07-16 ENCOUNTER — PATIENT OUTREACH (OUTPATIENT)
Dept: CASE MANAGEMENT | Age: 69
End: 2019-07-16

## 2019-07-16 DIAGNOSIS — N40.1 BENIGN PROSTATIC HYPERPLASIA WITH NOCTURIA: ICD-10-CM

## 2019-07-16 DIAGNOSIS — R35.1 BENIGN PROSTATIC HYPERPLASIA WITH NOCTURIA: ICD-10-CM

## 2019-07-16 DIAGNOSIS — I25.10 CORONARY ARTERY DISEASE DUE TO CALCIFIED CORONARY LESION: ICD-10-CM

## 2019-07-16 DIAGNOSIS — M48.062 LUMBAR STENOSIS WITH NEUROGENIC CLAUDICATION: ICD-10-CM

## 2019-07-16 DIAGNOSIS — N18.30 STAGE 3 CHRONIC KIDNEY DISEASE (HCC): ICD-10-CM

## 2019-07-16 DIAGNOSIS — E11.8 TYPE 2 DIABETES MELLITUS WITH COMPLICATION, WITHOUT LONG-TERM CURRENT USE OF INSULIN (HCC): ICD-10-CM

## 2019-07-16 DIAGNOSIS — M54.32 SCIATICA OF LEFT SIDE: ICD-10-CM

## 2019-07-16 DIAGNOSIS — R53.82 CHRONIC FATIGUE: ICD-10-CM

## 2019-07-16 DIAGNOSIS — M54.2 NECK PAIN, ACUTE: ICD-10-CM

## 2019-07-16 DIAGNOSIS — E66.01 MORBID OBESITY WITH BMI OF 40.0-44.9, ADULT (HCC): ICD-10-CM

## 2019-07-16 DIAGNOSIS — I10 ESSENTIAL HYPERTENSION: ICD-10-CM

## 2019-07-16 DIAGNOSIS — I25.84 CORONARY ARTERY DISEASE DUE TO CALCIFIED CORONARY LESION: ICD-10-CM

## 2019-07-16 DIAGNOSIS — E03.9 ACQUIRED HYPOTHYROIDISM: ICD-10-CM

## 2019-07-16 NOTE — PROGRESS NOTES
7/16/2019  Spoke to [facility] for CCM.   Verified HIPPA    Patient Active Problem List:     Hypothyroidism     Type 2 diabetes mellitus with complication, without long-term current use of insulin ([facility] [address]     Coronary artery disease     Left Sided Neck pain, acute Goals/Action Plan:    Spoke with patient- he states that he is still in [place]. He is there taking care of his friend, she has health issues. He is taking her to doctor's appointments. He relates he is not sure when he is returning but hopefully soon.  He st [address]     Physical assessment, complete health history, and care plan established by Gay Beverly MD, need for CCM determined from these assessments and data.

## 2019-07-31 PROCEDURE — 99490 CHRNC CARE MGMT STAFF 1ST 20: CPT

## 2019-08-05 ENCOUNTER — OFFICE VISIT (OUTPATIENT)
Dept: INTERNAL MEDICINE CLINIC | Facility: CLINIC | Age: 69
End: 2019-08-05
Payer: MEDICARE

## 2019-08-05 VITALS
WEIGHT: 266 LBS | SYSTOLIC BLOOD PRESSURE: 138 MMHG | BODY MASS INDEX: 39.4 KG/M2 | RESPIRATION RATE: 19 BRPM | HEART RATE: 83 BPM | OXYGEN SATURATION: 98 % | HEIGHT: 69 IN | DIASTOLIC BLOOD PRESSURE: 88 MMHG

## 2019-08-05 DIAGNOSIS — R42 DIZZINESS: Primary | ICD-10-CM

## 2019-08-05 DIAGNOSIS — E78.5 HYPERLIPIDEMIA ASSOCIATED WITH TYPE 2 DIABETES MELLITUS (HCC): ICD-10-CM

## 2019-08-05 DIAGNOSIS — N40.1 BENIGN PROSTATIC HYPERPLASIA WITH NOCTURIA: ICD-10-CM

## 2019-08-05 DIAGNOSIS — Z12.5 PROSTATE CANCER SCREENING: ICD-10-CM

## 2019-08-05 DIAGNOSIS — Z11.59 NEED FOR HEPATITIS C SCREENING TEST: ICD-10-CM

## 2019-08-05 DIAGNOSIS — I10 ESSENTIAL HYPERTENSION: ICD-10-CM

## 2019-08-05 DIAGNOSIS — Z12.11 SCREEN FOR COLON CANCER: ICD-10-CM

## 2019-08-05 DIAGNOSIS — M54.16 LUMBAR RADICULOPATHY: ICD-10-CM

## 2019-08-05 DIAGNOSIS — I25.5 ISCHEMIC CARDIOMYOPATHY: ICD-10-CM

## 2019-08-05 DIAGNOSIS — I25.10 CORONARY ARTERY DISEASE DUE TO CALCIFIED CORONARY LESION: ICD-10-CM

## 2019-08-05 DIAGNOSIS — E11.65 TYPE 2 DIABETES MELLITUS WITH HYPERGLYCEMIA, WITHOUT LONG-TERM CURRENT USE OF INSULIN (HCC): ICD-10-CM

## 2019-08-05 DIAGNOSIS — R35.1 BENIGN PROSTATIC HYPERPLASIA WITH NOCTURIA: ICD-10-CM

## 2019-08-05 DIAGNOSIS — E11.69 HYPERLIPIDEMIA ASSOCIATED WITH TYPE 2 DIABETES MELLITUS (HCC): ICD-10-CM

## 2019-08-05 DIAGNOSIS — I25.84 CORONARY ARTERY DISEASE DUE TO CALCIFIED CORONARY LESION: ICD-10-CM

## 2019-08-05 DIAGNOSIS — E03.9 ACQUIRED HYPOTHYROIDISM: ICD-10-CM

## 2019-08-05 LAB
ALBUMIN SERPL-MCNC: 3.6 G/DL (ref 3.4–5)
ALBUMIN/GLOB SERPL: 0.9 {RATIO} (ref 1–2)
ALP LIVER SERPL-CCNC: 76 U/L (ref 45–117)
ALT SERPL-CCNC: 28 U/L (ref 16–61)
ANION GAP SERPL CALC-SCNC: 6 MMOL/L (ref 0–18)
AST SERPL-CCNC: 10 U/L (ref 15–37)
BASOPHILS # BLD AUTO: 0.06 X10(3) UL (ref 0–0.2)
BASOPHILS NFR BLD AUTO: 0.6 %
BILIRUB SERPL-MCNC: 0.4 MG/DL (ref 0.1–2)
BUN BLD-MCNC: 33 MG/DL (ref 7–18)
BUN/CREAT SERPL: 24.4 (ref 10–20)
CALCIUM BLD-MCNC: 9.6 MG/DL (ref 8.5–10.1)
CHLORIDE SERPL-SCNC: 108 MMOL/L (ref 98–112)
CO2 SERPL-SCNC: 29 MMOL/L (ref 21–32)
COMPLEXED PSA SERPL-MCNC: 2.18 NG/ML (ref ?–4)
CREAT BLD-MCNC: 1.35 MG/DL (ref 0.7–1.3)
DEPRECATED RDW RBC AUTO: 46.5 FL (ref 35.1–46.3)
EOSINOPHIL # BLD AUTO: 0.61 X10(3) UL (ref 0–0.7)
EOSINOPHIL NFR BLD AUTO: 6 %
ERYTHROCYTE [DISTWIDTH] IN BLOOD BY AUTOMATED COUNT: 13.8 % (ref 11–15)
EST. AVERAGE GLUCOSE BLD GHB EST-MCNC: 151 MG/DL (ref 68–126)
GLOBULIN PLAS-MCNC: 3.9 G/DL (ref 2.8–4.4)
GLUCOSE BLD-MCNC: 100 MG/DL (ref 70–99)
HBA1C MFR BLD HPLC: 6.9 % (ref ?–5.7)
HCT VFR BLD AUTO: 47.7 % (ref 39–53)
HCV AB SERPL QL IA: NONREACTIVE
HGB BLD-MCNC: 15 G/DL (ref 13–17.5)
IMM GRANULOCYTES # BLD AUTO: 0.02 X10(3) UL (ref 0–1)
IMM GRANULOCYTES NFR BLD: 0.2 %
LYMPHOCYTES # BLD AUTO: 1.72 X10(3) UL (ref 1–4)
LYMPHOCYTES NFR BLD AUTO: 17 %
M PROTEIN MFR SERPL ELPH: 7.5 G/DL (ref 6.4–8.2)
MCH RBC QN AUTO: 28.7 PG (ref 26–34)
MCHC RBC AUTO-ENTMCNC: 31.4 G/DL (ref 31–37)
MCV RBC AUTO: 91.4 FL (ref 80–100)
MONOCYTES # BLD AUTO: 0.86 X10(3) UL (ref 0.1–1)
MONOCYTES NFR BLD AUTO: 8.5 %
NEUTROPHILS # BLD AUTO: 6.85 X10 (3) UL (ref 1.5–7.7)
NEUTROPHILS # BLD AUTO: 6.85 X10(3) UL (ref 1.5–7.7)
NEUTROPHILS NFR BLD AUTO: 67.7 %
OSMOLALITY SERPL CALC.SUM OF ELEC: 303 MOSM/KG (ref 275–295)
PATIENT FASTING: NO
PLATELET # BLD AUTO: 309 10(3)UL (ref 150–450)
POTASSIUM SERPL-SCNC: 4.1 MMOL/L (ref 3.5–5.1)
RBC # BLD AUTO: 5.22 X10(6)UL (ref 3.8–5.8)
SODIUM SERPL-SCNC: 143 MMOL/L (ref 136–145)
TSI SER-ACNC: 1.22 MIU/ML (ref 0.36–3.74)
WBC # BLD AUTO: 10.1 X10(3) UL (ref 4–11)

## 2019-08-05 PROCEDURE — 36415 COLL VENOUS BLD VENIPUNCTURE: CPT | Performed by: INTERNAL MEDICINE

## 2019-08-05 PROCEDURE — 85025 COMPLETE CBC W/AUTO DIFF WBC: CPT | Performed by: INTERNAL MEDICINE

## 2019-08-05 PROCEDURE — 83036 HEMOGLOBIN GLYCOSYLATED A1C: CPT | Performed by: INTERNAL MEDICINE

## 2019-08-05 PROCEDURE — 84443 ASSAY THYROID STIM HORMONE: CPT | Performed by: INTERNAL MEDICINE

## 2019-08-05 PROCEDURE — 99214 OFFICE O/P EST MOD 30 MIN: CPT | Performed by: INTERNAL MEDICINE

## 2019-08-05 PROCEDURE — 80053 COMPREHEN METABOLIC PANEL: CPT | Performed by: INTERNAL MEDICINE

## 2019-08-05 PROCEDURE — 93000 ELECTROCARDIOGRAM COMPLETE: CPT | Performed by: INTERNAL MEDICINE

## 2019-08-05 PROCEDURE — 86803 HEPATITIS C AB TEST: CPT | Performed by: INTERNAL MEDICINE

## 2019-08-05 RX ORDER — TIMOLOL MALEATE 5 MG/ML
SOLUTION/ DROPS OPHTHALMIC
Refills: 6 | COMMUNITY
Start: 2019-07-26 | End: 2019-08-05

## 2019-08-05 NOTE — PROGRESS NOTES
HPI:   Fanny Cloud. is a 81year old male who presents for recheck of his diabetes. Patient presents with:  Back Pain  Diabetes    Patient left town to be with his step daughter for few months.,  He had returned  recently.   H/o  CAD, type 2 dm,  HTN, Urine microalbumin - Last refreshed: 8/5/2019  3:56 PM:  U microalb View Report for additional information:  39.4 mg/dL  8/30/2018:  11mo ago         Urine protein - Last refreshed: 8/5/2019  3:56 PM:  U protein View Report for additional information:  neg He  has a past medical history of Atherosclerosis of coronary artery, Chronic low back pain with bilateral sciatica, left worse than right (11/28/2017), Coronary atherosclerosis, Diabetes ([facility] [address] Essential hypertension, Glaucoma, Heart attack ([facility] [address] High bl Rate, intervals and axes as noted on EKG Report.   Rate: 75 BPM  Rhythm: Sinus Rhythm  Reading: RBBB with left axis-bifascicular block       Assessment and Plan     Dizziness  -Avoid sudden positional change   -Adequate hydration during warm weather   -[facility]

## 2019-08-05 NOTE — PROGRESS NOTES
Pt presented to clinic today for blood draw. Per physician able to draw orders. Orders  documented within chart. Pt tolerated lab draw well.  verified.   Orders drawn include: cmp, cbc, a1c, tsh, psa, hcv  Site of draw: rt jocelyne Reza CMA

## 2019-08-06 RX ORDER — GABAPENTIN 100 MG/1
200 CAPSULE ORAL 3 TIMES DAILY
Qty: 180 CAPSULE | Refills: 0 | Status: SHIPPED | OUTPATIENT
Start: 2019-08-06 | End: 2019-08-18

## 2019-08-09 ENCOUNTER — PATIENT OUTREACH (OUTPATIENT)
Dept: CASE MANAGEMENT | Age: 69
End: 2019-08-09

## 2019-08-09 DIAGNOSIS — M48.062 LUMBAR STENOSIS WITH NEUROGENIC CLAUDICATION: ICD-10-CM

## 2019-08-09 DIAGNOSIS — R35.1 BENIGN PROSTATIC HYPERPLASIA WITH NOCTURIA: ICD-10-CM

## 2019-08-09 DIAGNOSIS — R53.82 CHRONIC FATIGUE: ICD-10-CM

## 2019-08-09 DIAGNOSIS — I25.10 CORONARY ARTERY DISEASE DUE TO CALCIFIED CORONARY LESION: ICD-10-CM

## 2019-08-09 DIAGNOSIS — I10 ESSENTIAL HYPERTENSION: ICD-10-CM

## 2019-08-09 DIAGNOSIS — E66.01 MORBID OBESITY WITH BMI OF 40.0-44.9, ADULT (HCC): ICD-10-CM

## 2019-08-09 DIAGNOSIS — I25.84 CORONARY ARTERY DISEASE DUE TO CALCIFIED CORONARY LESION: ICD-10-CM

## 2019-08-09 DIAGNOSIS — M54.32 SCIATICA OF LEFT SIDE: ICD-10-CM

## 2019-08-09 DIAGNOSIS — N18.30 STAGE 3 CHRONIC KIDNEY DISEASE (HCC): ICD-10-CM

## 2019-08-09 DIAGNOSIS — E03.9 ACQUIRED HYPOTHYROIDISM: ICD-10-CM

## 2019-08-09 DIAGNOSIS — M54.2 NECK PAIN, ACUTE: ICD-10-CM

## 2019-08-09 DIAGNOSIS — N40.1 BENIGN PROSTATIC HYPERPLASIA WITH NOCTURIA: ICD-10-CM

## 2019-08-09 DIAGNOSIS — E11.8 TYPE 2 DIABETES MELLITUS WITH COMPLICATION, WITHOUT LONG-TERM CURRENT USE OF INSULIN (HCC): ICD-10-CM

## 2019-08-09 RX ORDER — GABAPENTIN 100 MG/1
200 CAPSULE ORAL 3 TIMES DAILY
Qty: 60 CAPSULE | Refills: 2 | OUTPATIENT
Start: 2019-08-09

## 2019-08-09 NOTE — PROGRESS NOTES
8/9/2019  Spoke to [place] for CCM.     Verified HIPPA    Patient Active Problem List:     Hypothyroidism     Type 2 diabetes mellitus with complication, without long-term current use of insulin ([facility] [address]     Coronary artery disease     Left Sided Neck pain, acute recently had an EKG- he has to follow up with cardiology- Dr. Hilton Lopez. Offered assistance in making this appointment for patient- he declined and states he will make it.      Transportation: pt relates that his daughter takes him to doctor's appointments established by Abraham Oquendo MD, need for CCM determined from these assessments and data.

## 2019-08-19 ENCOUNTER — TELEPHONE (OUTPATIENT)
Dept: INTERNAL MEDICINE CLINIC | Facility: CLINIC | Age: 69
End: 2019-08-19

## 2019-08-28 RX ORDER — GABAPENTIN 100 MG/1
200 CAPSULE ORAL 3 TIMES DAILY
Qty: 180 CAPSULE | Refills: 1 | Status: SHIPPED | OUTPATIENT
Start: 2019-08-28 | End: 2019-09-06

## 2019-08-31 PROCEDURE — 99490 CHRNC CARE MGMT STAFF 1ST 20: CPT

## 2019-09-12 ENCOUNTER — PATIENT OUTREACH (OUTPATIENT)
Dept: CASE MANAGEMENT | Age: 69
End: 2019-09-12

## 2019-09-16 RX ORDER — GABAPENTIN 100 MG/1
CAPSULE ORAL
Qty: 180 CAPSULE | Refills: 1 | Status: SHIPPED | OUTPATIENT
Start: 2019-09-16 | End: 2020-04-03

## 2019-09-19 ENCOUNTER — PATIENT OUTREACH (OUTPATIENT)
Dept: CASE MANAGEMENT | Age: 69
End: 2019-09-19

## 2019-09-19 DIAGNOSIS — N18.30 STAGE 3 CHRONIC KIDNEY DISEASE (HCC): ICD-10-CM

## 2019-09-19 DIAGNOSIS — I25.10 CORONARY ARTERY DISEASE DUE TO CALCIFIED CORONARY LESION: ICD-10-CM

## 2019-09-19 DIAGNOSIS — R35.1 BENIGN PROSTATIC HYPERPLASIA WITH NOCTURIA: ICD-10-CM

## 2019-09-19 DIAGNOSIS — M54.2 NECK PAIN, ACUTE: ICD-10-CM

## 2019-09-19 DIAGNOSIS — M48.062 LUMBAR STENOSIS WITH NEUROGENIC CLAUDICATION: ICD-10-CM

## 2019-09-19 DIAGNOSIS — N40.1 BENIGN PROSTATIC HYPERPLASIA WITH NOCTURIA: ICD-10-CM

## 2019-09-19 DIAGNOSIS — E11.8 TYPE 2 DIABETES MELLITUS WITH COMPLICATION, WITHOUT LONG-TERM CURRENT USE OF INSULIN (HCC): ICD-10-CM

## 2019-09-19 DIAGNOSIS — E66.01 MORBID OBESITY WITH BMI OF 40.0-44.9, ADULT (HCC): ICD-10-CM

## 2019-09-19 DIAGNOSIS — R53.82 CHRONIC FATIGUE: ICD-10-CM

## 2019-09-19 DIAGNOSIS — E03.9 ACQUIRED HYPOTHYROIDISM: ICD-10-CM

## 2019-09-19 DIAGNOSIS — I25.84 CORONARY ARTERY DISEASE DUE TO CALCIFIED CORONARY LESION: ICD-10-CM

## 2019-09-19 DIAGNOSIS — M54.32 SCIATICA OF LEFT SIDE: ICD-10-CM

## 2019-09-19 RX ORDER — METOPROLOL SUCCINATE 50 MG/1
50 TABLET, EXTENDED RELEASE ORAL DAILY
Qty: 90 TABLET | Refills: 3 | Status: SHIPPED | OUTPATIENT
Start: 2019-09-19 | End: 2020-02-20

## 2019-09-19 NOTE — PROGRESS NOTES
9/19/2019  Spoke to [facility] for CCM.       Updates to patient care team/ comments: UTD  Patient reported changes in medications: reports no changes   Med Adherence  Comment: reports taking medications as prescribed      Health Maintenance: REVIEWED   Informed dx.      See self management Goals/Action plan     Time Spent This Encounter Total: 28 min medical record review, telephone communication, care plan updates where needed, education, goals and action plan recreation/update.  Provided acknowledgment and valid

## 2019-09-19 NOTE — PROGRESS NOTES
Returned patient's call.    Reviewed chart  Left detailed message to call back    Time spent this encounter:  5 mins   Total time spent this month: 10 mins

## 2019-09-19 NOTE — TELEPHONE ENCOUNTER
Metoprolol Succinate ER 50 MG Oral Tablet 24 Hr   tamsulosin HCl 0.4 MG Oral Cap  GABAPENTIN 100 MG Oral Cap   Clopidogrel Bisulfate 75 MG Oral Tab     Please send this medications to the following pharmacy:    [facility] [id number] #[id number] - Shruthi Bronson,

## 2019-09-30 PROCEDURE — 99490 CHRNC CARE MGMT STAFF 1ST 20: CPT

## 2019-10-09 ENCOUNTER — PATIENT OUTREACH (OUTPATIENT)
Dept: CASE MANAGEMENT | Age: 69
End: 2019-10-09

## 2019-10-14 ENCOUNTER — TELEPHONE (OUTPATIENT)
Dept: INTERNAL MEDICINE CLINIC | Facility: CLINIC | Age: 69
End: 2019-10-14

## 2019-10-16 ENCOUNTER — TELEPHONE (OUTPATIENT)
Dept: INTERNAL MEDICINE CLINIC | Facility: CLINIC | Age: 69
End: 2019-10-16

## 2019-10-24 ENCOUNTER — PATIENT OUTREACH (OUTPATIENT)
Dept: CASE MANAGEMENT | Age: 69
End: 2019-10-24

## 2019-10-24 NOTE — PROGRESS NOTES
Called patient for monthly CCM outreach. No answer unable to leave voicemail. Time Spent This Encounter Total: 5 minutes medical record review, telephone communication, care plan updates where needed, education, goals and action plan recreation/update.

## 2019-10-25 RX ORDER — LISINOPRIL 2.5 MG/1
TABLET ORAL
Qty: 90 TABLET | Refills: 2 | Status: SHIPPED | OUTPATIENT
Start: 2019-10-25 | End: 2020-02-20 | Stop reason: ALTCHOICE

## 2019-10-29 ENCOUNTER — PATIENT OUTREACH (OUTPATIENT)
Dept: CASE MANAGEMENT | Age: 69
End: 2019-10-29

## 2019-10-29 NOTE — PROGRESS NOTES
Called patient for monthly CCM outreach. No answer left voicemail to call back.     Time Spent This Encounter Total: 3 minutes medical record review, telephone communication, care plan updates where needed, education, goals and action plan recreation/update

## 2019-11-04 RX ORDER — CLOPIDOGREL BISULFATE 75 MG/1
TABLET ORAL
Qty: 90 TABLET | Refills: 1 | Status: SHIPPED | OUTPATIENT
Start: 2019-11-04 | End: 2019-11-07

## 2019-11-07 ENCOUNTER — TELEPHONE (OUTPATIENT)
Dept: CARDIOLOGY | Age: 69
End: 2019-11-07

## 2019-11-07 ENCOUNTER — PATIENT OUTREACH (OUTPATIENT)
Dept: CASE MANAGEMENT | Age: 69
End: 2019-11-07

## 2019-11-07 DIAGNOSIS — I10 ESSENTIAL HYPERTENSION: ICD-10-CM

## 2019-11-07 DIAGNOSIS — E11.8 TYPE 2 DIABETES MELLITUS WITH COMPLICATION, WITHOUT LONG-TERM CURRENT USE OF INSULIN (HCC): ICD-10-CM

## 2019-11-07 DIAGNOSIS — E66.01 MORBID OBESITY WITH BMI OF 40.0-44.9, ADULT (HCC): ICD-10-CM

## 2019-11-07 DIAGNOSIS — N40.1 BENIGN PROSTATIC HYPERPLASIA WITH NOCTURIA: ICD-10-CM

## 2019-11-07 DIAGNOSIS — N18.30 STAGE 3 CHRONIC KIDNEY DISEASE (HCC): ICD-10-CM

## 2019-11-07 DIAGNOSIS — R53.82 CHRONIC FATIGUE: ICD-10-CM

## 2019-11-07 DIAGNOSIS — R35.1 BENIGN PROSTATIC HYPERPLASIA WITH NOCTURIA: ICD-10-CM

## 2019-11-07 RX ORDER — LEVOTHYROXINE SODIUM 0.12 MG/1
1 TABLET ORAL EVERY MORNING
Refills: 1 | COMMUNITY
Start: 2019-09-06 | End: 2019-12-02

## 2019-11-07 RX ORDER — OXYCODONE AND ACETAMINOPHEN 10; 325 MG/1; MG/1
1 TABLET ORAL 2 TIMES DAILY
Refills: 0 | COMMUNITY
Start: 2019-09-26 | End: 2020-10-30 | Stop reason: ALTCHOICE

## 2019-11-07 NOTE — PROGRESS NOTES
Returned patient's call. Pt states he called cardiologist and phone number was incorrect. I clarified cardiologist phone number and gave pt Dr. Trudy Severin contact information as well.

## 2019-11-07 NOTE — PROGRESS NOTES
11/7/2019  Spoke to [facility] for CCM. Updates to patient care team/ comments: None  Patient reported changes in medications: Pt now taking Oxycodone-Acetaminophen and Levothyroxine.  Updated med list.   Med Adherence  Comment: Taking as prescribed     Heal office. Memory issues- Pt states he has noticed he is being forgetful more often. Pt relates he has to get reminded of a lot things frequently. Pt states his daughter and family members notice his forgetfulness and point it out.  I suggested to send mess Sent message to PCP for medication refills. Time Spent This Encounter Total: 35 min medical record review, telephone communication, care plan updates where needed, education, goals and action plan recreation/update.  Provided acknowledgment and validation

## 2019-11-07 NOTE — TELEPHONE ENCOUNTER
Called patient for CCM monthly. Pt is requesting refills on Albuterol inhaler and Clopidogrel to be sent to [facility] [address] in [facility]. Scripts pended for approval. Please advise and thank you in advance.

## 2019-11-08 RX ORDER — ALBUTEROL SULFATE 90 UG/1
2 AEROSOL, METERED RESPIRATORY (INHALATION) EVERY 6 HOURS PRN
Qty: 18 G | Refills: 0 | Status: SHIPPED | OUTPATIENT
Start: 2019-11-08 | End: 2019-12-13

## 2019-11-08 RX ORDER — CLOPIDOGREL BISULFATE 75 MG/1
75 TABLET ORAL
Qty: 90 TABLET | Refills: 1 | Status: SHIPPED | OUTPATIENT
Start: 2019-11-08 | End: 2020-07-21

## 2019-11-12 ENCOUNTER — PATIENT OUTREACH (OUTPATIENT)
Dept: CASE MANAGEMENT | Age: 69
End: 2019-11-12

## 2019-11-12 PROBLEM — I25.10 CAD (CORONARY ARTERY DISEASE), NATIVE CORONARY ARTERY: Status: ACTIVE | Noted: 2019-11-12

## 2019-11-12 PROBLEM — I97.630: Status: ACTIVE | Noted: 2019-11-12

## 2019-11-12 PROBLEM — Z72.0 TOBACCO ABUSE: Status: ACTIVE | Noted: 2019-11-12

## 2019-11-12 PROBLEM — I10 HTN (HYPERTENSION): Status: ACTIVE | Noted: 2019-11-12

## 2019-11-12 PROBLEM — Z98.61 HISTORY OF PTCA: Status: ACTIVE | Noted: 2019-11-12

## 2019-11-12 PROBLEM — E78.00 PURE HYPERCHOLESTEROLEMIA: Status: ACTIVE | Noted: 2019-11-12

## 2019-11-12 NOTE — PROGRESS NOTES
Returned patient's call. Pt states he called cardiologist and is scheduled for 11/14/19. Pt mentioned he fell 2 days ago pt hit his head, hurt his L side and L elbow. Pt states he gets occasional dizziness.  I offered appt with Dr. João Yoder declined stated he

## 2019-11-21 ENCOUNTER — OFFICE VISIT (OUTPATIENT)
Dept: INTERNAL MEDICINE CLINIC | Facility: CLINIC | Age: 69
End: 2019-11-21
Payer: MEDICARE

## 2019-11-21 VITALS
DIASTOLIC BLOOD PRESSURE: 58 MMHG | SYSTOLIC BLOOD PRESSURE: 100 MMHG | HEIGHT: 69 IN | WEIGHT: 267 LBS | HEART RATE: 82 BPM | BODY MASS INDEX: 39.55 KG/M2 | OXYGEN SATURATION: 98 % | RESPIRATION RATE: 22 BRPM

## 2019-11-21 DIAGNOSIS — I25.10 CORONARY ARTERY DISEASE DUE TO CALCIFIED CORONARY LESION: Primary | ICD-10-CM

## 2019-11-21 DIAGNOSIS — J01.10 ACUTE NON-RECURRENT FRONTAL SINUSITIS: ICD-10-CM

## 2019-11-21 DIAGNOSIS — E29.1 HYPOGONADISM IN MALE: ICD-10-CM

## 2019-11-21 DIAGNOSIS — I25.84 CORONARY ARTERY DISEASE DUE TO CALCIFIED CORONARY LESION: Primary | ICD-10-CM

## 2019-11-21 DIAGNOSIS — R06.83 SNORES: ICD-10-CM

## 2019-11-21 DIAGNOSIS — E11.69 HYPERLIPIDEMIA ASSOCIATED WITH TYPE 2 DIABETES MELLITUS (HCC): ICD-10-CM

## 2019-11-21 DIAGNOSIS — S09.90XA INJURY OF HEAD, INITIAL ENCOUNTER: ICD-10-CM

## 2019-11-21 DIAGNOSIS — E78.5 HYPERLIPIDEMIA ASSOCIATED WITH TYPE 2 DIABETES MELLITUS (HCC): ICD-10-CM

## 2019-11-21 DIAGNOSIS — E11.65 TYPE 2 DIABETES MELLITUS WITH HYPERGLYCEMIA, WITHOUT LONG-TERM CURRENT USE OF INSULIN (HCC): ICD-10-CM

## 2019-11-21 DIAGNOSIS — E03.9 ACQUIRED HYPOTHYROIDISM: ICD-10-CM

## 2019-11-21 DIAGNOSIS — I10 ESSENTIAL HYPERTENSION: ICD-10-CM

## 2019-11-21 PROBLEM — Z72.0 TOBACCO ABUSE: Status: ACTIVE | Noted: 2019-11-12

## 2019-11-21 PROBLEM — E78.00 PURE HYPERCHOLESTEROLEMIA: Status: ACTIVE | Noted: 2019-11-12

## 2019-11-21 PROBLEM — Z98.61 HISTORY OF PTCA: Status: ACTIVE | Noted: 2019-11-12

## 2019-11-21 PROBLEM — I97.630: Status: ACTIVE | Noted: 2019-11-12

## 2019-11-21 PROCEDURE — 99214 OFFICE O/P EST MOD 30 MIN: CPT | Performed by: INTERNAL MEDICINE

## 2019-11-21 RX ORDER — BIMATOPROST 0.01 %
DROPS OPHTHALMIC (EYE)
Refills: 1 | Status: ON HOLD | COMMUNITY
Start: 2019-11-01 | End: 2020-10-30

## 2019-11-21 RX ORDER — TIMOLOL MALEATE 5 MG/ML
SOLUTION/ DROPS OPHTHALMIC
Refills: 6 | COMMUNITY
Start: 2019-11-11 | End: 2020-05-01

## 2019-11-21 RX ORDER — FLUTICASONE PROPIONATE 50 MCG
2 SPRAY, SUSPENSION (ML) NASAL DAILY
Qty: 1 BOTTLE | Refills: 0 | Status: SHIPPED | OUTPATIENT
Start: 2019-11-21 | End: 2019-12-18

## 2019-11-21 RX ORDER — AMOXICILLIN AND CLAVULANATE POTASSIUM 875; 125 MG/1; MG/1
1 TABLET, FILM COATED ORAL 2 TIMES DAILY
Qty: 14 TABLET | Refills: 0 | Status: SHIPPED | OUTPATIENT
Start: 2019-11-21 | End: 2020-01-03 | Stop reason: ALTCHOICE

## 2019-11-21 RX ORDER — BENAZEPRIL HYDROCHLORIDE 20 MG/1
20 TABLET ORAL
COMMUNITY
Start: 2018-01-19 | End: 2020-02-20 | Stop reason: ALTCHOICE

## 2019-11-21 RX ORDER — DEXTROMETHORPHAN HYDROBROMIDE AND PROMETHAZINE HYDROCHLORIDE 15; 6.25 MG/5ML; MG/5ML
2.5 SYRUP ORAL 4 TIMES DAILY PRN
Qty: 240 ML | Refills: 0 | Status: SHIPPED | OUTPATIENT
Start: 2019-11-21 | End: 2020-02-20 | Stop reason: ALTCHOICE

## 2019-11-21 NOTE — PROGRESS NOTES
HPI:    Patient ID: Joseph Bryson. is a 81year old male. HPI   Here for f/u: He was in [place], [place] for several months. Returned recently to [place]. She had fallen at home and hit his head. There was no loss of consciousness.       H/o CAD, ty 25 MG Oral Tab Take 25 mg by mouth. • Amoxicillin-Pot Clavulanate 875-125 MG Oral Tab Take 1 tablet by mouth 2 (two) times daily. 14 tablet 0   • Promethazine-DM 6.25-15 MG/5ML Oral Syrup Take 2.5 mL by mouth 4 (four) times daily as needed for cough.  2 HCl-Timolol Mal 22.3-6.8 MG/ML Ophthalmic Solution Place 1 drop into both eyes 2 (two) times daily. 1 Bottle 0   • latanoprost 0.005 % Ophthalmic Solution Place 1 drop into both eyes daily. (Patient taking differently: Place 1 drop into both eyes nightly. every morning. Adjust dose pending TSH    4. Acute non-recurrent frontal sinusitis  Augmentin 875 mg for 7 days. Adequate hydration. Mucinex 600 mg twice daily as needed.   Promethazine–DM 6.2 5–15 grams/5 mL 5 mL every 4 hours as needed for cough  OTC f INTERNAL  ENDOCRINOLOGY - INTERNAL  CT BRAIN OR HEAD ([id number])

## 2019-11-22 RX ORDER — FUROSEMIDE 40 MG/1
40 TABLET ORAL DAILY
COMMUNITY
End: 2019-11-23

## 2019-11-23 PROBLEM — Z72.0 TOBACCO ABUSE: Status: RESOLVED | Noted: 2019-11-12 | Resolved: 2019-11-23

## 2019-11-23 NOTE — PATIENT INSTRUCTIONS
Healthy Diet and Regular Exercise  [employer] [facility] [employer] for making healthy food choices    Enjoy your food, but eat less. Fully enjoy your food when eating. Don’t eat while distracted and slow down. Avoid over sized portions.    Don’t

## 2019-11-30 PROCEDURE — 99490 CHRNC CARE MGMT STAFF 1ST 20: CPT

## 2019-12-02 ENCOUNTER — PATIENT OUTREACH (OUTPATIENT)
Dept: CASE MANAGEMENT | Age: 69
End: 2019-12-02

## 2019-12-02 DIAGNOSIS — I10 ESSENTIAL HYPERTENSION: ICD-10-CM

## 2019-12-02 DIAGNOSIS — E78.00 PURE HYPERCHOLESTEROLEMIA: ICD-10-CM

## 2019-12-02 DIAGNOSIS — E66.01 MORBID OBESITY WITH BMI OF 40.0-44.9, ADULT (HCC): ICD-10-CM

## 2019-12-02 DIAGNOSIS — E11.8 TYPE 2 DIABETES MELLITUS WITH COMPLICATION, WITHOUT LONG-TERM CURRENT USE OF INSULIN (HCC): ICD-10-CM

## 2019-12-02 DIAGNOSIS — R53.82 CHRONIC FATIGUE: ICD-10-CM

## 2019-12-02 DIAGNOSIS — N18.30 STAGE 3 CHRONIC KIDNEY DISEASE (HCC): ICD-10-CM

## 2019-12-02 DIAGNOSIS — M48.062 LUMBAR STENOSIS WITH NEUROGENIC CLAUDICATION: ICD-10-CM

## 2019-12-02 RX ORDER — GLIPIZIDE 5 MG/1
TABLET ORAL
Qty: 90 TABLET | Refills: 1 | Status: SHIPPED | OUTPATIENT
Start: 2019-12-02 | End: 2020-04-03

## 2019-12-02 RX ORDER — ROSUVASTATIN CALCIUM 20 MG/1
20 TABLET, COATED ORAL NIGHTLY
Qty: 90 TABLET | Refills: 1 | Status: SHIPPED | OUTPATIENT
Start: 2019-12-02 | End: 2020-06-14

## 2019-12-02 RX ORDER — LEVOTHYROXINE SODIUM 0.12 MG/1
125 TABLET ORAL EVERY MORNING
Qty: 90 TABLET | Refills: 0 | Status: SHIPPED | OUTPATIENT
Start: 2019-12-02 | End: 2020-02-28

## 2019-12-02 NOTE — TELEPHONE ENCOUNTER
Spoke with pt for [employer]. Pt is requesting refills on Rosuvastatin, Levothyroxine and Glipizide to be sent to [facility] pharmacy in [facility] on file. Scripts pended for approval. Please advise and thank you in advance.     Last office visit: 1

## 2019-12-02 NOTE — PROGRESS NOTES
12/2/2019  Spoke to Chan Marinelli for CCM.       Updates to patient care team/ comments: None  Patient reported changes in medications: None  Med Adherence  Comment: Taking as prescribed    Health Maintenance:   Colonoscopy due on 03/23/2002  Diabetes Care Dilated E transportation. Per pt he will hold off for now. Other- Pt relates he has not scheduled sleep study ordered by Dr. Edilia Turner. I notified pt he is due for labs as well. I suggested for pt to get fasting labs done the same day he schedules CT scan.  Per pt he aisha

## 2019-12-18 RX ORDER — FLUTICASONE PROPIONATE 50 MCG
SPRAY, SUSPENSION (ML) NASAL
Qty: 16 G | Refills: 0 | Status: SHIPPED | OUTPATIENT
Start: 2019-12-18 | End: 2020-08-01

## 2019-12-31 PROCEDURE — 99490 CHRNC CARE MGMT STAFF 1ST 20: CPT

## 2020-01-03 ENCOUNTER — PATIENT OUTREACH (OUTPATIENT)
Dept: CASE MANAGEMENT | Age: 70
End: 2020-01-03

## 2020-01-03 DIAGNOSIS — N18.30 STAGE 3 CHRONIC KIDNEY DISEASE (HCC): ICD-10-CM

## 2020-01-03 DIAGNOSIS — E78.00 PURE HYPERCHOLESTEROLEMIA: ICD-10-CM

## 2020-01-03 DIAGNOSIS — R53.82 CHRONIC FATIGUE: ICD-10-CM

## 2020-01-03 DIAGNOSIS — E66.01 MORBID OBESITY WITH BMI OF 40.0-44.9, ADULT (HCC): ICD-10-CM

## 2020-01-03 DIAGNOSIS — I10 ESSENTIAL HYPERTENSION: ICD-10-CM

## 2020-01-03 DIAGNOSIS — M48.062 LUMBAR STENOSIS WITH NEUROGENIC CLAUDICATION: ICD-10-CM

## 2020-01-03 DIAGNOSIS — E11.8 TYPE 2 DIABETES MELLITUS WITH COMPLICATION, WITHOUT LONG-TERM CURRENT USE OF INSULIN (HCC): ICD-10-CM

## 2020-01-03 NOTE — PROGRESS NOTES
1/3/2020  Spoke to Suresh Calderon for CCM. Updates to patient care team/ comments: None  Patient reported changes in medications: None  Med Adherence  Comment: Taking as prescribed.     Health Maintenance:   Colonoscopy due on 03/23/2002  Diabetes Care Dilated E interested in getting colonoscopy done. We will discuss at next outreach call when pt returns from [place].      Previous goal met: Continue previous goal    Self Management Goals/Action Plan:    • Active goal from previous outreach: Make appointment with donaldo

## 2020-01-09 RX ORDER — ALBUTEROL SULFATE 90 UG/1
AEROSOL, METERED RESPIRATORY (INHALATION)
Qty: 18 G | Refills: 3 | Status: SHIPPED | OUTPATIENT
Start: 2020-01-09 | End: 2020-05-01

## 2020-01-31 PROCEDURE — 99490 CHRNC CARE MGMT STAFF 1ST 20: CPT

## 2020-02-03 ENCOUNTER — PATIENT OUTREACH (OUTPATIENT)
Dept: CASE MANAGEMENT | Age: 70
End: 2020-02-03

## 2020-02-11 ENCOUNTER — TELEPHONE (OUTPATIENT)
Dept: CARDIOLOGY | Age: 70
End: 2020-02-11

## 2020-02-20 ENCOUNTER — OFFICE VISIT (OUTPATIENT)
Dept: INTERNAL MEDICINE CLINIC | Facility: CLINIC | Age: 70
End: 2020-02-20
Payer: MEDICARE

## 2020-02-20 ENCOUNTER — APPOINTMENT (OUTPATIENT)
Dept: LAB | Facility: HOSPITAL | Age: 70
End: 2020-02-20
Attending: INTERNAL MEDICINE
Payer: MEDICARE

## 2020-02-20 VITALS
BODY MASS INDEX: 42.8 KG/M2 | RESPIRATION RATE: 22 BRPM | SYSTOLIC BLOOD PRESSURE: 130 MMHG | WEIGHT: 289 LBS | DIASTOLIC BLOOD PRESSURE: 80 MMHG | HEART RATE: 82 BPM | HEIGHT: 69 IN

## 2020-02-20 DIAGNOSIS — I20.8 STABLE ANGINA PECTORIS (HCC): ICD-10-CM

## 2020-02-20 DIAGNOSIS — N18.30 STAGE 3 CHRONIC KIDNEY DISEASE (HCC): ICD-10-CM

## 2020-02-20 DIAGNOSIS — E66.01 MORBID OBESITY WITH BMI OF 40.0-44.9, ADULT (HCC): ICD-10-CM

## 2020-02-20 DIAGNOSIS — E03.9 ACQUIRED HYPOTHYROIDISM: ICD-10-CM

## 2020-02-20 DIAGNOSIS — Z23 NEED FOR VACCINATION: ICD-10-CM

## 2020-02-20 DIAGNOSIS — E29.1 HYPOGONADISM IN MALE: ICD-10-CM

## 2020-02-20 DIAGNOSIS — I25.10 CORONARY ARTERY DISEASE DUE TO CALCIFIED CORONARY LESION: ICD-10-CM

## 2020-02-20 DIAGNOSIS — M48.062 LUMBAR STENOSIS WITH NEUROGENIC CLAUDICATION: ICD-10-CM

## 2020-02-20 DIAGNOSIS — E11.69 HYPERLIPIDEMIA ASSOCIATED WITH TYPE 2 DIABETES MELLITUS (HCC): ICD-10-CM

## 2020-02-20 DIAGNOSIS — I10 ESSENTIAL HYPERTENSION: ICD-10-CM

## 2020-02-20 DIAGNOSIS — H40.2290 CHRONIC PRIMARY ANGLE-CLOSURE GLAUCOMA, UNSPECIFIED GLAUCOMA STAGE, UNSPECIFIED LATERALITY: ICD-10-CM

## 2020-02-20 DIAGNOSIS — E11.65 TYPE 2 DIABETES MELLITUS WITH HYPERGLYCEMIA, WITHOUT LONG-TERM CURRENT USE OF INSULIN (HCC): ICD-10-CM

## 2020-02-20 DIAGNOSIS — R35.1 BENIGN PROSTATIC HYPERPLASIA WITH NOCTURIA: ICD-10-CM

## 2020-02-20 DIAGNOSIS — I25.84 CORONARY ARTERY DISEASE DUE TO CALCIFIED CORONARY LESION: ICD-10-CM

## 2020-02-20 DIAGNOSIS — I25.5 ISCHEMIC CARDIOMYOPATHY: ICD-10-CM

## 2020-02-20 DIAGNOSIS — Z00.00 ENCOUNTER FOR ANNUAL HEALTH EXAMINATION: Primary | ICD-10-CM

## 2020-02-20 DIAGNOSIS — R79.89 LOW TESTOSTERONE: ICD-10-CM

## 2020-02-20 DIAGNOSIS — I50.42 CHRONIC COMBINED SYSTOLIC AND DIASTOLIC CHF, NYHA CLASS 3 (HCC): ICD-10-CM

## 2020-02-20 DIAGNOSIS — N40.1 BENIGN PROSTATIC HYPERPLASIA WITH NOCTURIA: ICD-10-CM

## 2020-02-20 DIAGNOSIS — E78.5 HYPERLIPIDEMIA ASSOCIATED WITH TYPE 2 DIABETES MELLITUS (HCC): ICD-10-CM

## 2020-02-20 PROBLEM — I97.630: Status: RESOLVED | Noted: 2019-11-12 | Resolved: 2020-02-20

## 2020-02-20 PROBLEM — M54.2 NECK PAIN, ACUTE: Status: RESOLVED | Noted: 2017-11-28 | Resolved: 2020-02-20

## 2020-02-20 LAB
ABSOLUTE IMMATURE GRANULOCYTES (OFFPRE24): NORMAL
ALBUMIN SERPL-MCNC: 3.4 G/DL
ALBUMIN SERPL-MCNC: 3.4 G/DL (ref 3.4–5)
ALBUMIN/GLOB SERPL: 1 {RATIO}
ALBUMIN/GLOB SERPL: 1 {RATIO} (ref 1–2)
ALP LIVER SERPL-CCNC: 72 U/L (ref 45–117)
ALP SERPL-CCNC: 72 U/L
ALT SERPL-CCNC: 30 U/L
ALT SERPL-CCNC: 30 U/L (ref 16–61)
ANION GAP SERPL CALC-SCNC: 6 MMOL/L
ANION GAP SERPL CALC-SCNC: 6 MMOL/L (ref 0–18)
AST SERPL-CCNC: 10 U/L
AST SERPL-CCNC: 10 U/L (ref 15–37)
BASO+EOS+MONOS # BLD: NORMAL 10*3/UL
BASO+EOS+MONOS NFR BLD: NORMAL %
BASOPHILS # BLD AUTO: 0.1 X10(3) UL (ref 0–0.2)
BASOPHILS # BLD: NORMAL 10*3/UL
BASOPHILS NFR BLD AUTO: 1.2 %
BASOPHILS NFR BLD: NORMAL %
BILIRUB SERPL-MCNC: 0.3 MG/DL
BILIRUB SERPL-MCNC: 0.3 MG/DL (ref 0.1–2)
BUN BLD-MCNC: 15 MG/DL (ref 7–18)
BUN SERPL-MCNC: 15 MG/DL
BUN/CREAT SERPL: 15.8
BUN/CREAT SERPL: 15.8 (ref 10–20)
CALCIUM BLD-MCNC: 9.1 MG/DL (ref 8.5–10.1)
CALCIUM SERPL-MCNC: 9.1 MG/DL
CHLORIDE SERPL-SCNC: 108 MMOL/L
CHLORIDE SERPL-SCNC: 108 MMOL/L (ref 98–112)
CHOLEST SERPL-MCNC: 138 MG/DL
CHOLEST SMN-MCNC: 138 MG/DL (ref ?–200)
CHOLEST/HDLC SERPL: NORMAL {RATIO}
CO2 SERPL-SCNC: 25 MMOL/L
CO2 SERPL-SCNC: 25 MMOL/L (ref 21–32)
CREAT BLD-MCNC: 0.95 MG/DL (ref 0.7–1.3)
CREAT SERPL-MCNC: 0.95 MG/DL
DEPRECATED RDW RBC AUTO: 42.7 FL (ref 35.1–46.3)
DIFFERENTIAL METHOD BLD: NORMAL
EOSINOPHIL # BLD AUTO: 0.5 X10(3) UL (ref 0–0.7)
EOSINOPHIL # BLD: NORMAL 10*3/UL
EOSINOPHIL NFR BLD AUTO: 5.8 %
EOSINOPHIL NFR BLD: NORMAL %
ERYTHROCYTE [DISTWIDTH] IN BLOOD BY AUTOMATED COUNT: 13.2 % (ref 11–15)
ERYTHROCYTE [DISTWIDTH] IN BLOOD: NORMAL %
EST. AVERAGE GLUCOSE BLD GHB EST-MCNC: 174 MG/DL
EST. AVERAGE GLUCOSE BLD GHB EST-MCNC: 174 MG/DL (ref 68–126)
GLOBULIN PLAS-MCNC: 3.5 G/DL (ref 2.8–4.4)
GLOBULIN SER-MCNC: 3.5 G/DL
GLUCOSE BLD-MCNC: 204 MG/DL (ref 70–99)
GLUCOSE SERPL-MCNC: 204 MG/DL
HBA1C MFR BLD HPLC: 7.7 % (ref ?–5.7)
HBA1C MFR BLD: 7.7 %
HBA1C MFR BLD: NORMAL % (ref 4.5–5.6)
HCT VFR BLD AUTO: 44.9 % (ref 39–53)
HCT VFR BLD CALC: 44.9 %
HDLC SERPL-MCNC: 34 MG/DL
HDLC SERPL-MCNC: 34 MG/DL (ref 40–59)
HGB BLD-MCNC: 14.4 G/DL
HGB BLD-MCNC: 14.4 G/DL (ref 13–17.5)
IMM GRANULOCYTES # BLD AUTO: 0.03 X10(3) UL (ref 0–1)
IMM GRANULOCYTES NFR BLD: 0.3 %
IMMATURE GRANULOCYTES (OFFPRE25): NORMAL
LDLC SERPL CALC-MCNC: 70 MG/DL
LDLC SERPL CALC-MCNC: 70 MG/DL (ref ?–100)
LENGTH OF FAST TIME PATIENT: NORMAL H
LENGTH OF FAST TIME PATIENT: NORMAL H
LYMPHOCYTES # BLD AUTO: 1.72 X10(3) UL (ref 1–4)
LYMPHOCYTES # BLD: NORMAL 10*3/UL
LYMPHOCYTES NFR BLD AUTO: 19.9 %
LYMPHOCYTES NFR BLD: NORMAL %
M PROTEIN MFR SERPL ELPH: 6.9 G/DL (ref 6.4–8.2)
MCH RBC QN AUTO: 28.6 PG (ref 26–34)
MCH RBC QN AUTO: NORMAL PG
MCHC RBC AUTO-ENTMCNC: 32.1 G/DL (ref 31–37)
MCHC RBC AUTO-ENTMCNC: NORMAL G/DL
MCV RBC AUTO: 89.1 FL (ref 80–100)
MCV RBC AUTO: NORMAL FL
MONOCYTES # BLD AUTO: 0.78 X10(3) UL (ref 0.1–1)
MONOCYTES # BLD: NORMAL 10*3/UL
MONOCYTES NFR BLD AUTO: 9 %
MONOCYTES NFR BLD: NORMAL %
MPV (OFFPRE2): NORMAL
NEUTROPHILS # BLD AUTO: 5.51 X10 (3) UL (ref 1.5–7.7)
NEUTROPHILS # BLD AUTO: 5.51 X10(3) UL (ref 1.5–7.7)
NEUTROPHILS # BLD: NORMAL 10*3/UL
NEUTROPHILS NFR BLD AUTO: 63.8 %
NEUTROPHILS NFR BLD: NORMAL %
NONHDLC SERPL-MCNC: 104 MG/DL
NONHDLC SERPL-MCNC: 104 MG/DL (ref ?–130)
NRBC BLD MANUAL-RTO: NORMAL %
OSMOLALITY SERPL CALC.SUM OF ELEC: 295 MOSM/KG (ref 275–295)
PATIENT FASTING Y/N/NP: YES
PATIENT FASTING Y/N/NP: YES
PLAT MORPH BLD: NORMAL
PLATELET # BLD AUTO: 255 10(3)UL (ref 150–450)
PLATELET # BLD: 255 10*3/UL
POTASSIUM SERPL-SCNC: 4 MMOL/L
POTASSIUM SERPL-SCNC: 4 MMOL/L (ref 3.5–5.1)
PROT SERPL-MCNC: 6.9 G/DL
RBC # BLD AUTO: 5.04 X10(6)UL (ref 3.8–5.8)
RBC # BLD: 5.04 10*6/UL
RBC MORPH BLD: NORMAL
SODIUM SERPL-SCNC: 139 MMOL/L
SODIUM SERPL-SCNC: 139 MMOL/L (ref 136–145)
TESTOST SERPL-MCNC: 189.04 NG/DL
TRIGL SERPL-MCNC: 168 MG/DL
TRIGL SERPL-MCNC: 168 MG/DL (ref 30–149)
VLDLC SERPL CALC-MCNC: 34 MG/DL
VLDLC SERPL CALC-MCNC: 34 MG/DL (ref 0–30)
WBC # BLD AUTO: 8.6 X10(3) UL (ref 4–11)
WBC # BLD: 8.6 10*3/UL
WBC MORPH BLD: NORMAL

## 2020-02-20 PROCEDURE — 85025 COMPLETE CBC W/AUTO DIFF WBC: CPT | Performed by: INTERNAL MEDICINE

## 2020-02-20 PROCEDURE — 99497 ADVNCD CARE PLAN 30 MIN: CPT | Performed by: INTERNAL MEDICINE

## 2020-02-20 PROCEDURE — 90732 PPSV23 VACC 2 YRS+ SUBQ/IM: CPT | Performed by: INTERNAL MEDICINE

## 2020-02-20 PROCEDURE — G0439 PPPS, SUBSEQ VISIT: HCPCS | Performed by: INTERNAL MEDICINE

## 2020-02-20 PROCEDURE — 80053 COMPREHEN METABOLIC PANEL: CPT

## 2020-02-20 PROCEDURE — G0009 ADMIN PNEUMOCOCCAL VACCINE: HCPCS | Performed by: INTERNAL MEDICINE

## 2020-02-20 PROCEDURE — 80061 LIPID PANEL: CPT

## 2020-02-20 PROCEDURE — 83036 HEMOGLOBIN GLYCOSYLATED A1C: CPT

## 2020-02-20 PROCEDURE — 84403 ASSAY OF TOTAL TESTOSTERONE: CPT

## 2020-02-20 PROCEDURE — 36415 COLL VENOUS BLD VENIPUNCTURE: CPT

## 2020-02-20 RX ORDER — METOPROLOL SUCCINATE 50 MG/1
50 TABLET, EXTENDED RELEASE ORAL DAILY
Qty: 90 TABLET | Refills: 3 | Status: SHIPPED | OUTPATIENT
Start: 2020-02-20 | End: 2020-07-21

## 2020-02-20 RX ORDER — SPIRONOLACTONE 25 MG/1
25 TABLET ORAL DAILY
Qty: 90 TABLET | Refills: 3 | Status: SHIPPED | OUTPATIENT
Start: 2020-02-20 | End: 2020-08-01

## 2020-02-20 NOTE — PROGRESS NOTES
Pneumovax 23 injection/vaccine  0.5 mL administered IM to the rt deltoid. Vaccine/injection ordered by physician and documented within chart. Per physician able to administer vaccine/injection. Pt tolerated well.  VIS provided and pt had no further question

## 2020-02-20 NOTE — PROGRESS NOTES
HPI:   Jose Antonio Curtis. is a 81year old male who presents for a Medicare Subsequent Annual Wellness visit (Pt already had Initial Annual Wellness). Multiple issues as shown below. CAD with multiple stents. Non-complaint with diet.  Gained 22 lbs in oxyCODONE-acetaminophen  MG Oral Tab Take 1 tablet by mouth 2 (two) times daily. 0   • tamsulosin HCl 0.4 MG Oral Cap Take 2 capsules (0.8 mg total) by mouth daily.  At night 180 capsule 2   • GABAPENTIN 100 MG Oral Cap TAKE 2 CAPSULES BY MOUTH THREE No   He has Hearing problems based on screening of functional status. Hearing Problems?: Yes  He has Vision problems based on screening of functional status. Vision Problems? : Yes   He has Walking problems based on screening of functional status.    Laquita Ritter without long-term current use of insulin (HCC)     CAD (coronary artery disease), native coronary artery     HTN (hypertension)     Chronic fatigue     Morbid obesity with BMI of 40.0-44.9, adult (HCC)     Lumbar stenosis with neurogenic claudication     G Thyroid disease. He  has a past surgical history that includes other and cath bare metal stent (bms). His family history includes Hypertension in his brother. SOCIAL HISTORY:   He  reports that he has quit smoking.  His smoking use included cigarett drainage or sinus tenderness   Throat: Lips, mucosa, and tongue normal; teeth and gums normal,   Neck: Supple, symmetrical, trachea midline, no adenopathy, thyroid: not enlarged, symmetric, no tenderness/mass/nodules, no carotid bruit or JVD   Back:   Symm 0.00 - 0.70 x10(3) uL  0.50   Basophils Absolute      0.00 - 0.20 x10(3) uL  0.10   Immature Granulocyte Absolute      0.00 - 1.00 x10(3) uL  0.03   Neutrophils %      %  63.8   Lymphocytes %      %  19.9   Monocytes %      %  9.0   Eosinophils %      %  5 FIRST 30 MINS    Type 2 diabetes mellitus with hyperglycemia, without long-term current use of insulin (HCC)  - Examine feet daily  - Low carbohydrate diet with portion control to ensure weight reduction  - Regular exercise 3-4 times a week minimum  - Shou PLAN:  The patient indicates understanding of these issues and agrees to the plan. Reinforced healthy diet, lifestyle, and exercise. Lab work ordered. Consult ordered. Continue with current treatment plan.   Follow up in  3  month(s) with labs prior PSA  Annually PSA due on 08/05/2021  Update Health Maintenance if applicable     Immunizations (Update Immunization Activity if applicable)     Influenza  Covered Annually 10/24/2019   Please get every year    Pneumococcal 13 (Prevnar)  Covered Once after Data entered on: 12/20/2017   Last Dilated Eye Exam 12/20/2017     No flowsheet data found.

## 2020-02-20 NOTE — PATIENT INSTRUCTIONS
Neymar Posada Jr.'s SCREENING SCHEDULE   Tests on this list are recommended by your physician but may not be covered, or covered at this frequency, by your insurer. Please check with your insurance carrier before scheduling to verify coverage.     PREVENTATI every 10 years- more often if abnormal Colonoscopy due on 03/23/2002 Update [facility] if applicable    Flex Sigmoidoscopy Screen  Covered every 5 years No results found for this or any previous visit. No flowsheet data found.      Fecal Occult Bloo Tetanus Toxoid- Only covered with a cut with metal- TD and TDaP Not covered by Medicare Part B) No orders found for this or any previous visit.  This may be covered with your prescription benefits, but Medicare does not cover unless Medically needed    Z

## 2020-02-21 SDOH — HEALTH STABILITY: MENTAL HEALTH: HOW OFTEN DO YOU HAVE A DRINK CONTAINING ALCOHOL?: NEVER

## 2020-02-22 PROBLEM — I50.42 CHRONIC COMBINED SYSTOLIC AND DIASTOLIC CHF, NYHA CLASS 3 (HCC): Status: ACTIVE | Noted: 2020-02-22

## 2020-02-22 RX ORDER — LISINOPRIL 2.5 MG/1
2.5 TABLET ORAL DAILY
COMMUNITY
End: 2020-07-21

## 2020-02-24 ENCOUNTER — OFFICE VISIT (OUTPATIENT)
Dept: CARDIOLOGY | Age: 70
End: 2020-02-24

## 2020-02-24 VITALS
BODY MASS INDEX: 42.95 KG/M2 | SYSTOLIC BLOOD PRESSURE: 110 MMHG | HEART RATE: 56 BPM | HEIGHT: 69 IN | WEIGHT: 290 LBS | DIASTOLIC BLOOD PRESSURE: 72 MMHG | OXYGEN SATURATION: 95 %

## 2020-02-24 DIAGNOSIS — E78.00 PURE HYPERCHOLESTEROLEMIA: ICD-10-CM

## 2020-02-24 DIAGNOSIS — Z98.61 HISTORY OF PTCA: ICD-10-CM

## 2020-02-24 DIAGNOSIS — I25.118 CORONARY ARTERY DISEASE OF NATIVE ARTERY OF NATIVE HEART WITH STABLE ANGINA PECTORIS (CMD): Primary | ICD-10-CM

## 2020-02-24 DIAGNOSIS — I10 ESSENTIAL HYPERTENSION: ICD-10-CM

## 2020-02-24 PROCEDURE — 99214 OFFICE O/P EST MOD 30 MIN: CPT | Performed by: INTERNAL MEDICINE

## 2020-02-24 RX ORDER — ONDANSETRON 4 MG/1
4 TABLET, FILM COATED ORAL EVERY 8 HOURS PRN
Status: ON HOLD | COMMUNITY
End: 2022-02-04

## 2020-02-24 ASSESSMENT — PATIENT HEALTH QUESTIONNAIRE - PHQ9
SUM OF ALL RESPONSES TO PHQ9 QUESTIONS 1 AND 2: 0
SUM OF ALL RESPONSES TO PHQ9 QUESTIONS 1 AND 2: 0
2. FEELING DOWN, DEPRESSED OR HOPELESS: NOT AT ALL
1. LITTLE INTEREST OR PLEASURE IN DOING THINGS: NOT AT ALL

## 2020-02-27 ENCOUNTER — PATIENT OUTREACH (OUTPATIENT)
Dept: CASE MANAGEMENT | Age: 70
End: 2020-02-27

## 2020-02-27 DIAGNOSIS — N18.30 STAGE 3 CHRONIC KIDNEY DISEASE (HCC): ICD-10-CM

## 2020-02-27 DIAGNOSIS — R53.82 CHRONIC FATIGUE: ICD-10-CM

## 2020-02-27 DIAGNOSIS — E78.00 PURE HYPERCHOLESTEROLEMIA: ICD-10-CM

## 2020-02-27 DIAGNOSIS — I10 ESSENTIAL HYPERTENSION: ICD-10-CM

## 2020-02-27 DIAGNOSIS — R79.89 LOW TESTOSTERONE: ICD-10-CM

## 2020-02-27 DIAGNOSIS — E66.01 MORBID OBESITY WITH BMI OF 40.0-44.9, ADULT (HCC): ICD-10-CM

## 2020-02-27 DIAGNOSIS — I50.42 CHRONIC COMBINED SYSTOLIC AND DIASTOLIC CHF, NYHA CLASS 3 (HCC): ICD-10-CM

## 2020-02-27 DIAGNOSIS — E03.9 ACQUIRED HYPOTHYROIDISM: ICD-10-CM

## 2020-02-27 DIAGNOSIS — E11.8 TYPE 2 DIABETES MELLITUS WITH COMPLICATION, WITHOUT LONG-TERM CURRENT USE OF INSULIN (HCC): ICD-10-CM

## 2020-02-27 NOTE — PROGRESS NOTES
2/27/2020  Spoke to [facility] for CCM. Updates to patient care team/ comments: None  Patient reported changes in medications: None  Med Adherence  Comment: Taking as prescribed.      Health Maintenance:  Colonoscopy due on 03/23/2002  Diabetes Care Dilated barriers and concerns. - Plan for overcoming all barriers: N/A            Patient agrees to goal action plan.   Self-Management Abilities (patient reported)             1= least confident in achieving goal, 5= very confident

## 2020-02-28 RX ORDER — LEVOTHYROXINE SODIUM 0.12 MG/1
TABLET ORAL
Qty: 90 TABLET | Refills: 0 | Status: SHIPPED | OUTPATIENT
Start: 2020-02-28 | End: 2020-06-06

## 2020-02-29 PROCEDURE — 99490 CHRNC CARE MGMT STAFF 1ST 20: CPT

## 2020-03-12 ENCOUNTER — PATIENT OUTREACH (OUTPATIENT)
Dept: INTERNAL MEDICINE CLINIC | Facility: CLINIC | Age: 70
End: 2020-03-12

## 2020-03-12 RX ORDER — TAMSULOSIN HYDROCHLORIDE 0.4 MG/1
CAPSULE ORAL
Qty: 180 CAPSULE | Refills: 2 | Status: SHIPPED | OUTPATIENT
Start: 2020-03-12 | End: 2020-12-14

## 2020-03-19 ENCOUNTER — TELEPHONE (OUTPATIENT)
Dept: INTERNAL MEDICINE CLINIC | Facility: CLINIC | Age: 70
End: 2020-03-19

## 2020-03-25 ENCOUNTER — PATIENT OUTREACH (OUTPATIENT)
Dept: CASE MANAGEMENT | Age: 70
End: 2020-03-25

## 2020-04-03 ENCOUNTER — OFFICE VISIT (OUTPATIENT)
Dept: INTERNAL MEDICINE CLINIC | Facility: CLINIC | Age: 70
End: 2020-04-03
Payer: MEDICARE

## 2020-04-03 VITALS
WEIGHT: 283 LBS | HEART RATE: 74 BPM | SYSTOLIC BLOOD PRESSURE: 130 MMHG | HEIGHT: 69 IN | DIASTOLIC BLOOD PRESSURE: 80 MMHG | RESPIRATION RATE: 22 BRPM | OXYGEN SATURATION: 98 % | BODY MASS INDEX: 41.92 KG/M2

## 2020-04-03 DIAGNOSIS — R10.9 FLANK PAIN: ICD-10-CM

## 2020-04-03 DIAGNOSIS — E11.8 TYPE 2 DIABETES MELLITUS WITH COMPLICATION, WITHOUT LONG-TERM CURRENT USE OF INSULIN (HCC): ICD-10-CM

## 2020-04-03 DIAGNOSIS — I10 ESSENTIAL HYPERTENSION: ICD-10-CM

## 2020-04-03 DIAGNOSIS — M48.062 LUMBAR STENOSIS WITH NEUROGENIC CLAUDICATION: ICD-10-CM

## 2020-04-03 DIAGNOSIS — N20.0 RENAL CALCULUS OR STONE: Primary | ICD-10-CM

## 2020-04-03 DIAGNOSIS — I25.10 CORONARY ARTERY DISEASE INVOLVING NATIVE CORONARY ARTERY OF NATIVE HEART WITHOUT ANGINA PECTORIS: ICD-10-CM

## 2020-04-03 DIAGNOSIS — F32.4 MAJOR DEPRESSIVE DISORDER WITH SINGLE EPISODE, IN PARTIAL REMISSION (HCC): ICD-10-CM

## 2020-04-03 PROCEDURE — 81003 URINALYSIS AUTO W/O SCOPE: CPT | Performed by: INTERNAL MEDICINE

## 2020-04-03 PROCEDURE — 99214 OFFICE O/P EST MOD 30 MIN: CPT | Performed by: INTERNAL MEDICINE

## 2020-04-03 RX ORDER — ONDANSETRON 4 MG/1
4 TABLET, FILM COATED ORAL
COMMUNITY
End: 2020-08-01

## 2020-04-03 RX ORDER — DULOXETIN HYDROCHLORIDE 30 MG/1
30 CAPSULE, DELAYED RELEASE ORAL DAILY
Qty: 90 CAPSULE | Refills: 3 | Status: SHIPPED | OUTPATIENT
Start: 2020-04-03 | End: 2020-10-30

## 2020-04-03 RX ORDER — GABAPENTIN 600 MG/1
TABLET ORAL
COMMUNITY
Start: 2020-04-02 | End: 2020-09-09

## 2020-04-03 RX ORDER — GLIPIZIDE 5 MG/1
TABLET ORAL
Qty: 90 TABLET | Refills: 1 | Status: SHIPPED | OUTPATIENT
Start: 2020-04-03 | End: 2021-07-03

## 2020-04-03 NOTE — PROGRESS NOTES
HPI:    Patient ID: Chelsey Whitt. is a 78year old male. HPI      H/o  CAD, type 2 dm,chronic systolic and diastolic dysfunction  HTN, ,lumbar stenosis, morbid obesity. She has chronic low back pain . Presents today c/o right and left flank pain.  Pa LEVOTHYROXINE SODIUM 125 MCG Oral Tab TAKE 1 TABLET BY MOUTH EVERY MORNING 90 tablet 0   • lisinopril 2.5 MG Oral Tab Take 2.5 mg by mouth daily. • Metoprolol Succinate ER 50 MG Oral Tablet 24 Hr Take 1 tablet (50 mg total) by mouth daily.  90 tablet 3 no tenderness. Musculoskeletal:      Comments: Tender paralumbar area. Straight leg positive bilateral. No motor, sensory deficit. Neurological: He is alert and oriented to person, place, and time.    Psychiatric: Judgment normal. His mood appears depres 1 capsule (30 mg total) by mouth daily.        Imaging & Referrals:  CT ABDOMEN+PELVIS KIDNEYSTONE 2D RNDR(NO IV,NO ORAL)(CPT=[id number])         [facility]#[id number]

## 2020-04-03 NOTE — PATIENT INSTRUCTIONS
Healthy Diet and Regular Exercise  [employer] [facility] [employer] for making healthy food choices    Enjoy your food, but eat less. Fully enjoy your food when eating. Don’t eat while distracted and slow down. Avoid over sized portions.    Don’t

## 2020-04-28 ENCOUNTER — TELEPHONE (OUTPATIENT)
Dept: INTERNAL MEDICINE CLINIC | Facility: CLINIC | Age: 70
End: 2020-04-28

## 2020-04-28 NOTE — TELEPHONE ENCOUNTER
Greg Dickey from [facility] is calling in regards to some forms that he states he faxed on 03/18, 4/21 and 4/23 that need to be signed by the doctor. He has not received them back and I calling to follow up. Please advice.

## 2020-05-21 ENCOUNTER — TELEMEDICINE (OUTPATIENT)
Dept: INTERNAL MEDICINE CLINIC | Facility: CLINIC | Age: 70
End: 2020-05-21
Payer: MEDICARE

## 2020-05-21 DIAGNOSIS — I25.10 CORONARY ARTERY DISEASE INVOLVING NATIVE CORONARY ARTERY OF NATIVE HEART WITHOUT ANGINA PECTORIS: Primary | ICD-10-CM

## 2020-05-21 DIAGNOSIS — E78.2 MIXED HYPERLIPIDEMIA: ICD-10-CM

## 2020-05-21 DIAGNOSIS — I10 ESSENTIAL HYPERTENSION: ICD-10-CM

## 2020-05-21 DIAGNOSIS — I50.42 CHRONIC COMBINED SYSTOLIC AND DIASTOLIC CHF (CONGESTIVE HEART FAILURE) (HCC): ICD-10-CM

## 2020-05-21 DIAGNOSIS — R53.83 FATIGUE, UNSPECIFIED TYPE: ICD-10-CM

## 2020-05-21 DIAGNOSIS — G47.33 OSA (OBSTRUCTIVE SLEEP APNEA): ICD-10-CM

## 2020-05-21 DIAGNOSIS — E11.8 TYPE 2 DIABETES MELLITUS WITH COMPLICATION, WITHOUT LONG-TERM CURRENT USE OF INSULIN (HCC): ICD-10-CM

## 2020-05-21 DIAGNOSIS — R41.3 MEMORY LOSS: ICD-10-CM

## 2020-05-21 PROCEDURE — 99214 OFFICE O/P EST MOD 30 MIN: CPT | Performed by: INTERNAL MEDICINE

## 2020-05-24 RX ORDER — LANCETS 33 GAUGE
EACH MISCELLANEOUS
Qty: 1 BOX | Refills: 3 | Status: SHIPPED | OUTPATIENT
Start: 2020-05-24

## 2020-05-24 RX ORDER — BLOOD-GLUCOSE METER
1 EACH MISCELLANEOUS 2 TIMES DAILY
Qty: 1 KIT | Refills: 0 | Status: SHIPPED | OUTPATIENT
Start: 2020-05-24 | End: 2021-05-24

## 2020-05-24 RX ORDER — BLOOD SUGAR DIAGNOSTIC
STRIP MISCELLANEOUS
Qty: 100 STRIP | Refills: 3 | Status: SHIPPED | OUTPATIENT
Start: 2020-05-24 | End: 2021-05-24

## 2020-05-27 ENCOUNTER — HOSPITAL ENCOUNTER (OUTPATIENT)
Dept: CV DIAGNOSTICS | Facility: HOSPITAL | Age: 70
Discharge: HOME | End: 2020-05-27
Attending: INTERNAL MEDICINE
Payer: MEDICARE

## 2020-05-27 ENCOUNTER — HOSPITAL ENCOUNTER (OUTPATIENT)
Dept: NUCLEAR MEDICINE | Facility: HOSPITAL | Age: 70
Discharge: HOME | End: 2020-05-27
Attending: INTERNAL MEDICINE
Payer: MEDICARE

## 2020-05-27 DIAGNOSIS — I25.118 CORONARY ARTERY DISEASE INVOLVING NATIVE CORONARY ARTERY WITH OTHER FORM OF ANGINA PECTORIS, UNSPECIFIED WHETHER NATIVE OR TRANSPLANTED HEART (HCC): ICD-10-CM

## 2020-05-27 PROCEDURE — 78452 HT MUSCLE IMAGE SPECT MULT: CPT | Performed by: INTERNAL MEDICINE

## 2020-05-27 PROCEDURE — 93017 CV STRESS TEST TRACING ONLY: CPT | Performed by: INTERNAL MEDICINE

## 2020-05-27 PROCEDURE — 93016 CV STRESS TEST SUPVJ ONLY: CPT | Performed by: INTERNAL MEDICINE

## 2020-05-27 PROCEDURE — 93018 CV STRESS TEST I&R ONLY: CPT | Performed by: INTERNAL MEDICINE

## 2020-06-04 ENCOUNTER — TELEPHONE (OUTPATIENT)
Dept: CARDIOLOGY | Age: 70
End: 2020-06-04

## 2020-06-04 DIAGNOSIS — Z01.818 PRE-OP TESTING: Primary | ICD-10-CM

## 2020-06-04 DIAGNOSIS — Z98.61 HISTORY OF PTCA: ICD-10-CM

## 2020-06-04 DIAGNOSIS — I10 ESSENTIAL HYPERTENSION: ICD-10-CM

## 2020-06-04 DIAGNOSIS — I25.118 CORONARY ARTERY DISEASE OF NATIVE ARTERY OF NATIVE HEART WITH STABLE ANGINA PECTORIS (CMD): ICD-10-CM

## 2020-06-04 DIAGNOSIS — R94.39 ABNORMAL NUCLEAR STRESS TEST: ICD-10-CM

## 2020-06-04 DIAGNOSIS — E78.00 PURE HYPERCHOLESTEROLEMIA: ICD-10-CM

## 2020-06-06 RX ORDER — LEVOTHYROXINE SODIUM 0.12 MG/1
TABLET ORAL
Qty: 90 TABLET | Refills: 0 | Status: SHIPPED | OUTPATIENT
Start: 2020-06-06 | End: 2020-09-02

## 2020-06-08 DIAGNOSIS — I25.118 CORONARY ARTERY DISEASE OF NATIVE ARTERY OF NATIVE HEART WITH STABLE ANGINA PECTORIS (CMD): ICD-10-CM

## 2020-06-10 ENCOUNTER — TELEPHONE (OUTPATIENT)
Dept: INTERNAL MEDICINE CLINIC | Facility: CLINIC | Age: 70
End: 2020-06-10

## 2020-06-10 NOTE — TELEPHONE ENCOUNTER
XR cannot do much with kidney stones. Dr. Elham Browning already placed an active CT Abd/Pel order for this. Left detailed voicemail with central scheduling number so he can schedule an appointment. Already authorized, and no expiration date noted.   May schedule

## 2020-06-12 ENCOUNTER — HOSPITAL ENCOUNTER (OUTPATIENT)
Dept: CT IMAGING | Facility: HOSPITAL | Age: 70
Discharge: HOME | End: 2020-06-12
Attending: INTERNAL MEDICINE
Payer: MEDICARE

## 2020-06-12 DIAGNOSIS — N20.1 URETEROLITHIASIS: Primary | ICD-10-CM

## 2020-06-12 DIAGNOSIS — N20.0 RENAL CALCULUS OR STONE: ICD-10-CM

## 2020-06-12 PROCEDURE — 74176 CT ABD & PELVIS W/O CONTRAST: CPT | Performed by: INTERNAL MEDICINE

## 2020-06-14 RX ORDER — ROSUVASTATIN CALCIUM 20 MG/1
TABLET, COATED ORAL
Qty: 90 TABLET | Refills: 1 | Status: SHIPPED | OUTPATIENT
Start: 2020-06-14 | End: 2021-06-25

## 2020-06-15 DIAGNOSIS — E78.00 PURE HYPERCHOLESTEROLEMIA: ICD-10-CM

## 2020-06-15 DIAGNOSIS — I10 ESSENTIAL HYPERTENSION: ICD-10-CM

## 2020-06-15 DIAGNOSIS — Z98.61 HISTORY OF PTCA: ICD-10-CM

## 2020-06-15 DIAGNOSIS — R94.39 ABNORMAL NUCLEAR STRESS TEST: ICD-10-CM

## 2020-06-15 DIAGNOSIS — Z01.818 PRE-OP TESTING: Primary | ICD-10-CM

## 2020-06-15 DIAGNOSIS — I25.118 CORONARY ARTERY DISEASE OF NATIVE ARTERY OF NATIVE HEART WITH STABLE ANGINA PECTORIS (CMD): ICD-10-CM

## 2020-06-16 ENCOUNTER — TELEPHONE (OUTPATIENT)
Dept: CARDIOLOGY | Age: 70
End: 2020-06-16

## 2020-06-16 RX ORDER — ASPIRIN 81 MG/1
81 TABLET ORAL DAILY
Qty: 90 TABLET | Status: SHIPPED | COMMUNITY
Start: 2020-06-16 | End: 2022-08-04

## 2020-06-17 ENCOUNTER — OFFICE VISIT (OUTPATIENT)
Dept: SURGERY | Facility: CLINIC | Age: 70
End: 2020-06-17
Payer: MEDICARE

## 2020-06-17 ENCOUNTER — TELEPHONE (OUTPATIENT)
Dept: CARDIOLOGY | Age: 70
End: 2020-06-17

## 2020-06-17 VITALS
DIASTOLIC BLOOD PRESSURE: 81 MMHG | WEIGHT: 280 LBS | SYSTOLIC BLOOD PRESSURE: 123 MMHG | HEART RATE: 69 BPM | BODY MASS INDEX: 41 KG/M2

## 2020-06-17 DIAGNOSIS — N20.0 KIDNEY STONE ON LEFT SIDE: Primary | ICD-10-CM

## 2020-06-17 DIAGNOSIS — Z51.81 MONITORING FOR ANTICOAGULANT USE: ICD-10-CM

## 2020-06-17 DIAGNOSIS — Z79.01 MONITORING FOR ANTICOAGULANT USE: ICD-10-CM

## 2020-06-17 DIAGNOSIS — Z12.5 PROSTATE CANCER SCREENING: ICD-10-CM

## 2020-06-17 DIAGNOSIS — N47.1 PHIMOSIS OF PENIS: ICD-10-CM

## 2020-06-17 PROCEDURE — G0463 HOSPITAL OUTPT CLINIC VISIT: HCPCS | Performed by: UROLOGY

## 2020-06-17 PROCEDURE — 99205 OFFICE O/P NEW HI 60 MIN: CPT | Performed by: UROLOGY

## 2020-06-19 ENCOUNTER — TELEPHONE (OUTPATIENT)
Dept: INTERNAL MEDICINE CLINIC | Facility: CLINIC | Age: 70
End: 2020-06-19

## 2020-06-19 NOTE — TELEPHONE ENCOUNTER
Patient wants to speak to Dr. Montana Hewitt bout getting a second opinion for his kidney stone. Please call and advise.

## 2020-06-23 ENCOUNTER — TELEPHONE (OUTPATIENT)
Dept: CARDIOLOGY | Age: 70
End: 2020-06-23

## 2020-06-24 ENCOUNTER — TELEPHONE (OUTPATIENT)
Dept: CARDIOLOGY | Age: 70
End: 2020-06-24

## 2020-06-29 ENCOUNTER — APPOINTMENT (OUTPATIENT)
Dept: CARDIOLOGY | Age: 70
End: 2020-06-29

## 2020-06-29 ENCOUNTER — LAB ENCOUNTER (OUTPATIENT)
Dept: LAB | Facility: REFERENCE LAB | Age: 70
End: 2020-06-29
Attending: INTERNAL MEDICINE
Payer: MEDICARE

## 2020-06-29 DIAGNOSIS — N20.0 KIDNEY STONE ON LEFT SIDE: ICD-10-CM

## 2020-06-29 DIAGNOSIS — Z51.81 MONITORING FOR ANTICOAGULANT USE: ICD-10-CM

## 2020-06-29 DIAGNOSIS — Z79.01 MONITORING FOR ANTICOAGULANT USE: ICD-10-CM

## 2020-06-29 DIAGNOSIS — E11.8 TYPE 2 DIABETES MELLITUS WITH COMPLICATION, WITHOUT LONG-TERM CURRENT USE OF INSULIN (HCC): ICD-10-CM

## 2020-06-29 DIAGNOSIS — R41.3 MEMORY LOSS: ICD-10-CM

## 2020-06-29 LAB
ALBUMIN SERPL-MCNC: 3.5 G/DL (ref 3.4–5)
ALBUMIN/GLOB SERPL: 0.9 {RATIO} (ref 1–2)
ALP LIVER SERPL-CCNC: 74 U/L (ref 45–117)
ALT SERPL-CCNC: 31 U/L (ref 16–61)
ANION GAP SERPL CALC-SCNC: 7 MMOL/L (ref 0–18)
APTT PPP: 28.1 SECONDS (ref 23.2–35.3)
AST SERPL-CCNC: 12 U/L (ref 15–37)
BASOPHILS # BLD AUTO: 0.06 X10(3) UL (ref 0–0.2)
BASOPHILS NFR BLD AUTO: 0.7 %
BILIRUB SERPL-MCNC: 0.4 MG/DL (ref 0.1–2)
BILIRUB UR QL: NEGATIVE
BUN BLD-MCNC: 22 MG/DL (ref 7–18)
BUN/CREAT SERPL: 21 (ref 10–20)
CALCIUM BLD-MCNC: 9.2 MG/DL (ref 8.5–10.1)
CHLORIDE SERPL-SCNC: 107 MMOL/L (ref 98–112)
CO2 SERPL-SCNC: 25 MMOL/L (ref 21–32)
COLOR UR: YELLOW
CREAT BLD-MCNC: 1.05 MG/DL (ref 0.7–1.3)
DEPRECATED RDW RBC AUTO: 44.4 FL (ref 35.1–46.3)
EOSINOPHIL # BLD AUTO: 0.55 X10(3) UL (ref 0–0.7)
EOSINOPHIL NFR BLD AUTO: 6.1 %
ERYTHROCYTE [DISTWIDTH] IN BLOOD BY AUTOMATED COUNT: 13.2 % (ref 11–15)
EST. AVERAGE GLUCOSE BLD GHB EST-MCNC: 180 MG/DL (ref 68–126)
FOLATE SERPL-MCNC: 11.7 NG/ML (ref 8.7–?)
GLOBULIN PLAS-MCNC: 3.7 G/DL (ref 2.8–4.4)
GLUCOSE BLD-MCNC: 198 MG/DL (ref 70–99)
GLUCOSE UR-MCNC: NEGATIVE MG/DL
HBA1C MFR BLD HPLC: 7.9 % (ref ?–5.7)
HCT VFR BLD AUTO: 46.2 % (ref 39–53)
HGB BLD-MCNC: 14.7 G/DL (ref 13–17.5)
HGB UR QL STRIP.AUTO: NEGATIVE
IMM GRANULOCYTES # BLD AUTO: 0.04 X10(3) UL (ref 0–1)
IMM GRANULOCYTES NFR BLD: 0.4 %
INR BLD: 0.99 (ref 0.9–1.2)
KETONES UR-MCNC: NEGATIVE MG/DL
LYMPHOCYTES # BLD AUTO: 1.93 X10(3) UL (ref 1–4)
LYMPHOCYTES NFR BLD AUTO: 21.3 %
M PROTEIN MFR SERPL ELPH: 7.2 G/DL (ref 6.4–8.2)
MCH RBC QN AUTO: 28.9 PG (ref 26–34)
MCHC RBC AUTO-ENTMCNC: 31.8 G/DL (ref 31–37)
MCV RBC AUTO: 90.9 FL (ref 80–100)
MONOCYTES # BLD AUTO: 0.7 X10(3) UL (ref 0.1–1)
MONOCYTES NFR BLD AUTO: 7.7 %
NEUTROPHILS # BLD AUTO: 5.79 X10 (3) UL (ref 1.5–7.7)
NEUTROPHILS # BLD AUTO: 5.79 X10(3) UL (ref 1.5–7.7)
NEUTROPHILS NFR BLD AUTO: 63.8 %
NITRITE UR QL STRIP.AUTO: NEGATIVE
OSMOLALITY SERPL CALC.SUM OF ELEC: 297 MOSM/KG (ref 275–295)
PATIENT FASTING Y/N/NP: YES
PH UR: 5 [PH] (ref 5–8)
PLATELET # BLD AUTO: 247 10(3)UL (ref 150–450)
POTASSIUM SERPL-SCNC: 4.2 MMOL/L (ref 3.5–5.1)
PROT UR-MCNC: 30 MG/DL
PROTHROMBIN TIME: 12.9 SECONDS (ref 11.8–14.5)
RBC # BLD AUTO: 5.08 X10(6)UL (ref 3.8–5.8)
RBC #/AREA URNS AUTO: <1 /HPF
SODIUM SERPL-SCNC: 139 MMOL/L (ref 136–145)
SP GR UR STRIP: 1.03 (ref 1–1.03)
T4 FREE SERPL-MCNC: 0.9 NG/DL (ref 0.8–1.7)
TSI SER-ACNC: 8.75 MIU/ML (ref 0.36–3.74)
UROBILINOGEN UR STRIP-ACNC: <2
VIT B12 SERPL-MCNC: 351 PG/ML (ref 193–986)
WBC # BLD AUTO: 9.1 X10(3) UL (ref 4–11)
WBC #/AREA URNS AUTO: 3 /HPF

## 2020-06-29 PROCEDURE — 84443 ASSAY THYROID STIM HORMONE: CPT

## 2020-06-29 PROCEDURE — 85730 THROMBOPLASTIN TIME PARTIAL: CPT

## 2020-06-29 PROCEDURE — 85610 PROTHROMBIN TIME: CPT

## 2020-06-29 PROCEDURE — 82746 ASSAY OF FOLIC ACID SERUM: CPT

## 2020-06-29 PROCEDURE — 80053 COMPREHEN METABOLIC PANEL: CPT

## 2020-06-29 PROCEDURE — 81001 URINALYSIS AUTO W/SCOPE: CPT

## 2020-06-29 PROCEDURE — 83036 HEMOGLOBIN GLYCOSYLATED A1C: CPT

## 2020-06-29 PROCEDURE — 87086 URINE CULTURE/COLONY COUNT: CPT

## 2020-06-29 PROCEDURE — 84439 ASSAY OF FREE THYROXINE: CPT

## 2020-06-29 PROCEDURE — 82607 VITAMIN B-12: CPT

## 2020-06-29 PROCEDURE — 85025 COMPLETE CBC W/AUTO DIFF WBC: CPT

## 2020-06-29 PROCEDURE — 36415 COLL VENOUS BLD VENIPUNCTURE: CPT

## 2020-06-29 PROCEDURE — 87077 CULTURE AEROBIC IDENTIFY: CPT

## 2020-07-06 ENCOUNTER — PATIENT OUTREACH (OUTPATIENT)
Dept: CASE MANAGEMENT | Age: 70
End: 2020-07-06

## 2020-07-07 ENCOUNTER — HOSPITAL ENCOUNTER (OUTPATIENT)
Dept: GENERAL RADIOLOGY | Facility: HOSPITAL | Age: 70
Discharge: HOME | End: 2020-07-07
Attending: UROLOGY | Admitting: UROLOGY
Payer: MEDICARE

## 2020-07-07 ENCOUNTER — PATIENT OUTREACH (OUTPATIENT)
Dept: CASE MANAGEMENT | Age: 70
End: 2020-07-07

## 2020-07-07 ENCOUNTER — OFFICE VISIT (OUTPATIENT)
Dept: SURGERY | Facility: CLINIC | Age: 70
End: 2020-07-07
Payer: MEDICARE

## 2020-07-07 ENCOUNTER — HOSPITAL ENCOUNTER (OUTPATIENT)
Dept: GENERAL RADIOLOGY | Facility: HOSPITAL | Age: 70
Discharge: HOME | End: 2020-07-07
Attending: UROLOGY
Payer: MEDICARE

## 2020-07-07 ENCOUNTER — TELEPHONE (OUTPATIENT)
Dept: CARDIOLOGY | Age: 70
End: 2020-07-07

## 2020-07-07 VITALS
SYSTOLIC BLOOD PRESSURE: 143 MMHG | BODY MASS INDEX: 41 KG/M2 | HEART RATE: 68 BPM | DIASTOLIC BLOOD PRESSURE: 81 MMHG | WEIGHT: 280 LBS

## 2020-07-07 DIAGNOSIS — I10 ESSENTIAL HYPERTENSION: ICD-10-CM

## 2020-07-07 DIAGNOSIS — R35.1 BENIGN PROSTATIC HYPERPLASIA WITH NOCTURIA: ICD-10-CM

## 2020-07-07 DIAGNOSIS — I50.42 CHRONIC COMBINED SYSTOLIC AND DIASTOLIC CHF, NYHA CLASS 3 (HCC): ICD-10-CM

## 2020-07-07 DIAGNOSIS — N20.0 KIDNEY STONE ON LEFT SIDE: Primary | ICD-10-CM

## 2020-07-07 DIAGNOSIS — E78.00 PURE HYPERCHOLESTEROLEMIA: ICD-10-CM

## 2020-07-07 DIAGNOSIS — E03.9 ACQUIRED HYPOTHYROIDISM: ICD-10-CM

## 2020-07-07 DIAGNOSIS — E66.01 MORBID OBESITY WITH BMI OF 40.0-44.9, ADULT (HCC): ICD-10-CM

## 2020-07-07 DIAGNOSIS — R10.9 LEFT FLANK PAIN: ICD-10-CM

## 2020-07-07 DIAGNOSIS — N18.30 STAGE 3 CHRONIC KIDNEY DISEASE (HCC): ICD-10-CM

## 2020-07-07 DIAGNOSIS — E11.8 TYPE 2 DIABETES MELLITUS WITH COMPLICATION, WITHOUT LONG-TERM CURRENT USE OF INSULIN (HCC): ICD-10-CM

## 2020-07-07 DIAGNOSIS — N20.0 KIDNEY STONE ON LEFT SIDE: ICD-10-CM

## 2020-07-07 DIAGNOSIS — N40.1 BENIGN PROSTATIC HYPERPLASIA WITH NOCTURIA: ICD-10-CM

## 2020-07-07 DIAGNOSIS — R53.82 CHRONIC FATIGUE: ICD-10-CM

## 2020-07-07 DIAGNOSIS — N47.1 PHIMOSIS OF PENIS: ICD-10-CM

## 2020-07-07 DIAGNOSIS — M48.062 LUMBAR STENOSIS WITH NEUROGENIC CLAUDICATION: ICD-10-CM

## 2020-07-07 DIAGNOSIS — R79.89 LOW TESTOSTERONE: ICD-10-CM

## 2020-07-07 PROCEDURE — 99214 OFFICE O/P EST MOD 30 MIN: CPT | Performed by: UROLOGY

## 2020-07-07 PROCEDURE — 74018 RADEX ABDOMEN 1 VIEW: CPT | Performed by: UROLOGY

## 2020-07-07 PROCEDURE — 71046 X-RAY EXAM CHEST 2 VIEWS: CPT | Performed by: UROLOGY

## 2020-07-07 PROCEDURE — G0463 HOSPITAL OUTPT CLINIC VISIT: HCPCS | Performed by: UROLOGY

## 2020-07-07 NOTE — H&P
[facility], [facility] Urology  Follow-Up Visit    HPI: Joseph Braydon. is a 78year old male presents for a follow up visit. Patient was last seen on 6/17/2020. Here by himself. INTERVAL HISTORY: Patient tried topical triamcinolone cream for his phimosis.  He Flomax for the past several years as prescribed by his PCP.       PAST MEDICAL HISTORY: CAD s/p MI and PCI with multiple stents, DM, HTN, Glaucoma, HLD, morbid obesity, lumbar stenosis, hypothyroidism.     PAST SURGICAL HISTORY: PCI with coronary stents x 6 phimosis of the penile foreskin. Patient did not tolerate topical steroid therapy for his tight phimosis. He is requesting definitive management of his phimosis and left kidney stone. Findings reviewed with patient.   We previously discussed treatmen an updated urine culture prior to surgery. 25 mintues were spent with more than half the time in face-to-face discussion involving counseling, further management and treatment planning.     Sandy Be MD  7/7/2020

## 2020-07-07 NOTE — PROGRESS NOTES
Patient seen in office scheduled, Circumcision, Cystoscopy, Left Retrograde Pyelogram, left ureteral stent insertion, possible left ureteroscopy, left laser lithotripsy, Friday 07/31/2020, [facility]/outpatient, went over pre-op instructions, cxr do

## 2020-07-07 NOTE — PROGRESS NOTES
7/7/2020  Spoke to Tre Palma for CCM. Updates to patient care team/ comments: None  Patient reported changes in medications: None  Med Adherence  Comment: Per patient taking as prescribed.     Health Maintenance:   Colonoscopy due on 03/23/2002  Diabetes Ca Metoprolol and Plavix. I notified patient he has refills on file but will need them transferred to his new pharmacy.  Called [facility] in [facility] spoke with pharmacist he stated I have to contact patient's preferred pharmacy to get medications transferre

## 2020-07-08 ENCOUNTER — TELEPHONE (OUTPATIENT)
Dept: SURGERY | Facility: CLINIC | Age: 70
End: 2020-07-08

## 2020-07-14 ENCOUNTER — TELEPHONE (OUTPATIENT)
Dept: SURGERY | Facility: CLINIC | Age: 70
End: 2020-07-14

## 2020-07-14 NOTE — TELEPHONE ENCOUNTER
Phoned pt and spoke with him. Read to him 's result note as outlined below in this encounter, in it's entirety. Pt verbalized understanding, agrees to plan, and is thankful.

## 2020-07-14 NOTE — TELEPHONE ENCOUNTER
----- Message from Gary Ruiz MD sent at 7/13/2020 12:28 PM CDT -----  Results reviewed. Please inform patient that the recent KUB X-ray shows the left kidney stone to be in the same location within the kidney, which is similar to prior.  Will proceed

## 2020-07-21 ENCOUNTER — TELEPHONE (OUTPATIENT)
Dept: INTERNAL MEDICINE CLINIC | Facility: CLINIC | Age: 70
End: 2020-07-21

## 2020-07-21 RX ORDER — LISINOPRIL 2.5 MG/1
2.5 TABLET ORAL DAILY
Qty: 90 TABLET | Refills: 3 | Status: ON HOLD | OUTPATIENT
Start: 2020-07-21 | End: 2020-11-02

## 2020-07-21 RX ORDER — CLOPIDOGREL BISULFATE 75 MG/1
75 TABLET ORAL
Qty: 90 TABLET | Refills: 1 | Status: ON HOLD | OUTPATIENT
Start: 2020-07-21 | End: 2020-08-04

## 2020-07-21 RX ORDER — METOPROLOL SUCCINATE 50 MG/1
50 TABLET, EXTENDED RELEASE ORAL DAILY
Qty: 90 TABLET | Refills: 3 | Status: SHIPPED | OUTPATIENT
Start: 2020-07-21 | End: 2021-06-24

## 2020-07-27 ENCOUNTER — TELEPHONE (OUTPATIENT)
Dept: SURGERY | Facility: CLINIC | Age: 70
End: 2020-07-27

## 2020-07-27 DIAGNOSIS — N20.0 KIDNEY STONE ON LEFT SIDE: Primary | ICD-10-CM

## 2020-07-27 DIAGNOSIS — N47.1 PHIMOSIS: ICD-10-CM

## 2020-07-27 NOTE — TELEPHONE ENCOUNTER
Called patient due a scheduling conflict, patient' surgery scheduled for Friday 07/31/2020, will be rescheduled at this time. Patient verbalized understanding.

## 2020-07-29 NOTE — TELEPHONE ENCOUNTER
Called patient back, confirmed surgery Tuesday 08/04/2020, [facility]/outpatient, informed patient to hold plavix as previous planned.

## 2020-07-29 NOTE — TELEPHONE ENCOUNTER
Spoke with patient informed that I schedule surgery for Friday 08/21/2020, patient asking why does he have to wait so long. I informed patient that I would check for a sooner date, i will call back with a confirmed date and time.

## 2020-07-30 ENCOUNTER — TELEPHONE (OUTPATIENT)
Dept: SURGERY | Facility: CLINIC | Age: 70
End: 2020-07-30

## 2020-07-30 RX ORDER — HYDROCODONE BITARTRATE AND ACETAMINOPHEN 5; 325 MG/1; MG/1
1 TABLET ORAL EVERY 6 HOURS PRN
Qty: 30 TABLET | Refills: 0 | Status: SHIPPED | OUTPATIENT
Start: 2020-07-30 | End: 2020-10-30 | Stop reason: ALTCHOICE

## 2020-07-30 NOTE — TELEPHONE ENCOUNTER
Returned call and daughter, answered. She states she is the one who called and she is very angry because she feels her father is being taken advantage of.  She states he is weak and fragile and in pain, and the surgery that was supposed to help him just got anything. \" I informed him I will forward this message to Scottdelmy Payne and staff will call back with his reply. Confirmed with him that he would use [facility] on [address], should any meds be ordered. , please advise . Thank you.

## 2020-07-30 NOTE — TELEPHONE ENCOUNTER
Jocelyn Webb, please see pt's c/o pain, as outlined below in my note. Per Nurse manager,  asked that you address this. Thank you.

## 2020-07-30 NOTE — TELEPHONE ENCOUNTER
Daughter states pt is in a lot of pain and can't wait until Mon to do the surgery states he is to much pain can't get out of pain can't even make it to the bathroom please advise

## 2020-07-30 NOTE — TELEPHONE ENCOUNTER
Spoke with daughter Cherry Crespo who stated that not sure why her father surgery was canceled. I advised that do to family emergency with Dr. Sherryle Poot surgery needed to be rescheduled. Patient stated that her father is a cardiac patient that smokes.  I stated th

## 2020-07-30 NOTE — TELEPHONE ENCOUNTER
Called patient. He complains of left flank pain \"8/10\" when up and moving around. Pain is relieved with lying down. States he has done very little activity due to the pain. No nausea or vomiting. No fevers or chills.     He has morphine listed as an

## 2020-07-30 NOTE — TELEPHONE ENCOUNTER
Phoned pt again at this time. He continues to report no pain . States , again, his pain is only when he is up and walking. States when it occurs, it is below the level of his waist on the left side.  When asked if he was doing any strenuous activity that ma

## 2020-07-31 PROCEDURE — G2058 CCM ADD 20MIN: HCPCS

## 2020-07-31 PROCEDURE — 99490 CHRNC CARE MGMT STAFF 1ST 20: CPT

## 2020-08-01 ENCOUNTER — LAB ENCOUNTER (OUTPATIENT)
Dept: LAB | Facility: HOSPITAL | Age: 70
End: 2020-08-01
Attending: UROLOGY
Payer: MEDICARE

## 2020-08-01 DIAGNOSIS — Z01.818 PREOP TESTING: ICD-10-CM

## 2020-08-01 LAB — SARS-COV-2 RNA RESP QL NAA+PROBE: NOT DETECTED

## 2020-08-04 ENCOUNTER — ANESTHESIA (OUTPATIENT)
Dept: SURGERY | Facility: HOSPITAL | Age: 70
End: 2020-08-04
Payer: MEDICARE

## 2020-08-04 ENCOUNTER — HOSPITAL ENCOUNTER (OUTPATIENT)
Facility: HOSPITAL | Age: 70
Setting detail: HOSPITAL OUTPATIENT SURGERY
Discharge: HOME | End: 2020-08-04
Attending: UROLOGY | Admitting: UROLOGY
Payer: MEDICARE

## 2020-08-04 ENCOUNTER — ANESTHESIA EVENT (OUTPATIENT)
Dept: SURGERY | Facility: HOSPITAL | Age: 70
End: 2020-08-04
Payer: MEDICARE

## 2020-08-04 ENCOUNTER — APPOINTMENT (OUTPATIENT)
Dept: GENERAL RADIOLOGY | Facility: HOSPITAL | Age: 70
End: 2020-08-04
Attending: UROLOGY
Payer: MEDICARE

## 2020-08-04 VITALS
WEIGHT: 290.5 LBS | HEART RATE: 55 BPM | OXYGEN SATURATION: 94 % | HEIGHT: 69 IN | BODY MASS INDEX: 43.03 KG/M2 | SYSTOLIC BLOOD PRESSURE: 167 MMHG | DIASTOLIC BLOOD PRESSURE: 89 MMHG | RESPIRATION RATE: 16 BRPM | TEMPERATURE: 98 F

## 2020-08-04 DIAGNOSIS — Z01.818 PREOP TESTING: Primary | ICD-10-CM

## 2020-08-04 DIAGNOSIS — N20.0 KIDNEY STONE ON LEFT SIDE: ICD-10-CM

## 2020-08-04 DIAGNOSIS — N47.1 PHIMOSIS: ICD-10-CM

## 2020-08-04 LAB
BACTERIA UR QL AUTO: NEGATIVE /HPF
BACTERIA UR QL AUTO: NEGATIVE /HPF
BILIRUB UR QL: NEGATIVE
BILIRUB UR QL: NEGATIVE
COLOR UR: YELLOW
COLOR UR: YELLOW
GLUCOSE BLDC GLUCOMTR-MCNC: 136 MG/DL (ref 70–99)
GLUCOSE UR-MCNC: NEGATIVE MG/DL
GLUCOSE UR-MCNC: NEGATIVE MG/DL
HGB UR QL STRIP.AUTO: NEGATIVE
HGB UR QL STRIP.AUTO: NEGATIVE
KETONES UR-MCNC: NEGATIVE MG/DL
KETONES UR-MCNC: NEGATIVE MG/DL
LEUKOCYTE ESTERASE UR QL STRIP.AUTO: NEGATIVE
LEUKOCYTE ESTERASE UR QL STRIP.AUTO: NEGATIVE
NITRITE UR QL STRIP.AUTO: NEGATIVE
NITRITE UR QL STRIP.AUTO: NEGATIVE
PH UR: 5 [PH] (ref 5–8)
PH UR: 5 [PH] (ref 5–8)
PROT UR-MCNC: 30 MG/DL
PROT UR-MCNC: 30 MG/DL
RBC #/AREA URNS AUTO: 2 /HPF
RBC #/AREA URNS AUTO: 2 /HPF
SP GR UR STRIP: 1.03 (ref 1–1.03)
SP GR UR STRIP: 1.03 (ref 1–1.03)
UROBILINOGEN UR STRIP-ACNC: <2
UROBILINOGEN UR STRIP-ACNC: <2
WBC #/AREA URNS AUTO: 1 /HPF
WBC #/AREA URNS AUTO: 1 /HPF

## 2020-08-04 PROCEDURE — 0TC78ZZ EXTIRPATION OF MATTER FROM LEFT URETER, VIA NATURAL OR ARTIFICIAL OPENING ENDOSCOPIC: ICD-10-PCS | Performed by: UROLOGY

## 2020-08-04 PROCEDURE — BT1F1ZZ FLUOROSCOPY OF LEFT KIDNEY, URETER AND BLADDER USING LOW OSMOLAR CONTRAST: ICD-10-PCS | Performed by: UROLOGY

## 2020-08-04 PROCEDURE — 0T7D8DZ DILATION OF URETHRA WITH INTRALUMINAL DEVICE, VIA NATURAL OR ARTIFICIAL OPENING ENDOSCOPIC: ICD-10-PCS | Performed by: UROLOGY

## 2020-08-04 PROCEDURE — 54161 CIRCUM 28 DAYS OR OLDER: CPT | Performed by: UROLOGY

## 2020-08-04 PROCEDURE — 0VTTXZZ RESECTION OF PREPUCE, EXTERNAL APPROACH: ICD-10-PCS | Performed by: UROLOGY

## 2020-08-04 PROCEDURE — 52356 CYSTO/URETERO W/LITHOTRIPSY: CPT | Performed by: UROLOGY

## 2020-08-04 PROCEDURE — 0T778DZ DILATION OF LEFT URETER WITH INTRALUMINAL DEVICE, VIA NATURAL OR ARTIFICIAL OPENING ENDOSCOPIC: ICD-10-PCS | Performed by: UROLOGY

## 2020-08-04 DEVICE — URETERAL STENT
Type: IMPLANTABLE DEVICE | Site: URETER | Status: FUNCTIONAL
Brand: ASCERTA™

## 2020-08-04 RX ORDER — SODIUM CHLORIDE, SODIUM LACTATE, POTASSIUM CHLORIDE, CALCIUM CHLORIDE 600; 310; 30; 20 MG/100ML; MG/100ML; MG/100ML; MG/100ML
INJECTION, SOLUTION INTRAVENOUS CONTINUOUS
Status: DISCONTINUED | OUTPATIENT
Start: 2020-08-04 | End: 2020-08-04

## 2020-08-04 RX ORDER — FAMOTIDINE 20 MG/1
20 TABLET ORAL ONCE
Status: COMPLETED | OUTPATIENT
Start: 2020-08-04 | End: 2020-08-04

## 2020-08-04 RX ORDER — SENNA AND DOCUSATE SODIUM 50; 8.6 MG/1; MG/1
2 TABLET, FILM COATED ORAL NIGHTLY PRN
Qty: 14 TABLET | Refills: 0 | Status: SHIPPED | OUTPATIENT
Start: 2020-08-04 | End: 2021-05-05

## 2020-08-04 RX ORDER — BUPIVACAINE HYDROCHLORIDE 5 MG/ML
INJECTION, SOLUTION EPIDURAL; INTRACAUDAL AS NEEDED
Status: DISCONTINUED | OUTPATIENT
Start: 2020-08-04 | End: 2020-08-04

## 2020-08-04 RX ORDER — DEXTROSE MONOHYDRATE 25 G/50ML
50 INJECTION, SOLUTION INTRAVENOUS
Status: DISCONTINUED | OUTPATIENT
Start: 2020-08-04 | End: 2020-08-04

## 2020-08-04 RX ORDER — EPHEDRINE SULFATE 50 MG/ML
INJECTION, SOLUTION INTRAVENOUS AS NEEDED
Status: DISCONTINUED | OUTPATIENT
Start: 2020-08-04 | End: 2020-08-04 | Stop reason: SURG

## 2020-08-04 RX ORDER — NALOXONE HYDROCHLORIDE 0.4 MG/ML
80 INJECTION, SOLUTION INTRAMUSCULAR; INTRAVENOUS; SUBCUTANEOUS AS NEEDED
Status: DISCONTINUED | OUTPATIENT
Start: 2020-08-04 | End: 2020-08-04

## 2020-08-04 RX ORDER — PHENYLEPHRINE HCL 10 MG/ML
VIAL (ML) INJECTION AS NEEDED
Status: DISCONTINUED | OUTPATIENT
Start: 2020-08-04 | End: 2020-08-04 | Stop reason: SURG

## 2020-08-04 RX ORDER — PROCHLORPERAZINE EDISYLATE 5 MG/ML
5 INJECTION INTRAMUSCULAR; INTRAVENOUS ONCE AS NEEDED
Status: DISCONTINUED | OUTPATIENT
Start: 2020-08-04 | End: 2020-08-04

## 2020-08-04 RX ORDER — HYDROMORPHONE HYDROCHLORIDE 1 MG/ML
0.4 INJECTION, SOLUTION INTRAMUSCULAR; INTRAVENOUS; SUBCUTANEOUS EVERY 5 MIN PRN
Status: DISCONTINUED | OUTPATIENT
Start: 2020-08-04 | End: 2020-08-04

## 2020-08-04 RX ORDER — PHENAZOPYRIDINE HYDROCHLORIDE 200 MG/1
200 TABLET, FILM COATED ORAL ONCE AS NEEDED
Status: DISCONTINUED | OUTPATIENT
Start: 2020-08-04 | End: 2020-08-04

## 2020-08-04 RX ORDER — MORPHINE SULFATE 4 MG/ML
4 INJECTION, SOLUTION INTRAMUSCULAR; INTRAVENOUS EVERY 10 MIN PRN
Status: DISCONTINUED | OUTPATIENT
Start: 2020-08-04 | End: 2020-08-04

## 2020-08-04 RX ORDER — ACETAMINOPHEN 500 MG
1000 TABLET ORAL ONCE
Status: COMPLETED | OUTPATIENT
Start: 2020-08-04 | End: 2020-08-04

## 2020-08-04 RX ORDER — CEPHALEXIN 500 MG/1
500 CAPSULE ORAL 3 TIMES DAILY
Qty: 15 CAPSULE | Refills: 0 | Status: SHIPPED | OUTPATIENT
Start: 2020-08-04 | End: 2020-08-05

## 2020-08-04 RX ORDER — HALOPERIDOL 5 MG/ML
0.25 INJECTION INTRAMUSCULAR ONCE AS NEEDED
Status: DISCONTINUED | OUTPATIENT
Start: 2020-08-04 | End: 2020-08-04

## 2020-08-04 RX ORDER — HYDROMORPHONE HYDROCHLORIDE 1 MG/ML
0.6 INJECTION, SOLUTION INTRAMUSCULAR; INTRAVENOUS; SUBCUTANEOUS EVERY 5 MIN PRN
Status: DISCONTINUED | OUTPATIENT
Start: 2020-08-04 | End: 2020-08-04

## 2020-08-04 RX ORDER — CEFTRIAXONE 1 G/1
INJECTION, POWDER, FOR SOLUTION INTRAMUSCULAR; INTRAVENOUS AS NEEDED
Status: DISCONTINUED | OUTPATIENT
Start: 2020-08-04 | End: 2020-08-04 | Stop reason: SURG

## 2020-08-04 RX ORDER — HYDROCODONE BITARTRATE AND ACETAMINOPHEN 5; 325 MG/1; MG/1
2 TABLET ORAL EVERY 4 HOURS PRN
Status: DISCONTINUED | OUTPATIENT
Start: 2020-08-04 | End: 2020-08-04

## 2020-08-04 RX ORDER — PHENAZOPYRIDINE HYDROCHLORIDE 100 MG/1
100 TABLET, FILM COATED ORAL 3 TIMES DAILY PRN
Qty: 9 TABLET | Refills: 0 | Status: SHIPPED | OUTPATIENT
Start: 2020-08-04 | End: 2020-08-21

## 2020-08-04 RX ORDER — HYDROCODONE BITARTRATE AND ACETAMINOPHEN 5; 325 MG/1; MG/1
1 TABLET ORAL EVERY 4 HOURS PRN
Status: DISCONTINUED | OUTPATIENT
Start: 2020-08-04 | End: 2020-08-04

## 2020-08-04 RX ORDER — ONDANSETRON 2 MG/ML
4 INJECTION INTRAMUSCULAR; INTRAVENOUS ONCE AS NEEDED
Status: DISCONTINUED | OUTPATIENT
Start: 2020-08-04 | End: 2020-08-04

## 2020-08-04 RX ORDER — HYDROMORPHONE HYDROCHLORIDE 1 MG/ML
0.2 INJECTION, SOLUTION INTRAMUSCULAR; INTRAVENOUS; SUBCUTANEOUS EVERY 5 MIN PRN
Status: DISCONTINUED | OUTPATIENT
Start: 2020-08-04 | End: 2020-08-04

## 2020-08-04 RX ORDER — ACETAMINOPHEN 325 MG/1
650 TABLET ORAL EVERY 4 HOURS PRN
Status: DISCONTINUED | OUTPATIENT
Start: 2020-08-04 | End: 2020-08-04

## 2020-08-04 RX ORDER — MORPHINE SULFATE 10 MG/ML
6 INJECTION, SOLUTION INTRAMUSCULAR; INTRAVENOUS EVERY 10 MIN PRN
Status: DISCONTINUED | OUTPATIENT
Start: 2020-08-04 | End: 2020-08-04

## 2020-08-04 RX ORDER — HYDROCODONE BITARTRATE AND ACETAMINOPHEN 5; 325 MG/1; MG/1
1 TABLET ORAL AS NEEDED
Status: COMPLETED | OUTPATIENT
Start: 2020-08-04 | End: 2020-08-04

## 2020-08-04 RX ORDER — METOCLOPRAMIDE 10 MG/1
10 TABLET ORAL ONCE
Status: DISCONTINUED | OUTPATIENT
Start: 2020-08-04 | End: 2020-08-04 | Stop reason: HOSPADM

## 2020-08-04 RX ORDER — METOPROLOL TARTRATE 5 MG/5ML
2.5 INJECTION INTRAVENOUS ONCE
Status: DISCONTINUED | OUTPATIENT
Start: 2020-08-04 | End: 2020-08-04

## 2020-08-04 RX ORDER — MORPHINE SULFATE 4 MG/ML
2 INJECTION, SOLUTION INTRAMUSCULAR; INTRAVENOUS EVERY 10 MIN PRN
Status: DISCONTINUED | OUTPATIENT
Start: 2020-08-04 | End: 2020-08-04

## 2020-08-04 RX ORDER — HYDROCODONE BITARTRATE AND ACETAMINOPHEN 5; 325 MG/1; MG/1
2 TABLET ORAL AS NEEDED
Status: COMPLETED | OUTPATIENT
Start: 2020-08-04 | End: 2020-08-04

## 2020-08-04 RX ORDER — ONDANSETRON 2 MG/ML
INJECTION INTRAMUSCULAR; INTRAVENOUS AS NEEDED
Status: DISCONTINUED | OUTPATIENT
Start: 2020-08-04 | End: 2020-08-04 | Stop reason: SURG

## 2020-08-04 RX ADMIN — SODIUM CHLORIDE, SODIUM LACTATE, POTASSIUM CHLORIDE, CALCIUM CHLORIDE: 600; 310; 30; 20 INJECTION, SOLUTION INTRAVENOUS at 14:55:00

## 2020-08-04 RX ADMIN — CEFTRIAXONE 1 G: 1 INJECTION, POWDER, FOR SOLUTION INTRAMUSCULAR; INTRAVENOUS at 15:05:00

## 2020-08-04 RX ADMIN — ONDANSETRON 4 MG: 2 INJECTION INTRAMUSCULAR; INTRAVENOUS at 16:26:00

## 2020-08-04 RX ADMIN — PHENYLEPHRINE HCL 100 MCG: 10 MG/ML VIAL (ML) INJECTION at 15:09:00

## 2020-08-04 RX ADMIN — EPHEDRINE SULFATE 10 MG: 50 INJECTION, SOLUTION INTRAVENOUS at 15:13:00

## 2020-08-04 RX ADMIN — SODIUM CHLORIDE, SODIUM LACTATE, POTASSIUM CHLORIDE, CALCIUM CHLORIDE: 600; 310; 30; 20 INJECTION, SOLUTION INTRAVENOUS at 17:57:00

## 2020-08-04 NOTE — OPERATIVE REPORT
[facility]   Urology Operative Note     Gracieladonna Marino.  Location: OR   [facility] [id number] MRN [id number]   Admission Date 8/4/2020 Operation Date 8/4/2020   Service Urology Surgeon Humble Bosch MD      Primary Surgeon: Humble Bosch MD setting' into tiny passable fragments. 2. A 6 Fr x 26 cm ureteral JJ stent was inserted into the left lower pole moiety. 3. Tight phimosis. Surgical circumcision performed. 4. Narrow urethra meatus. Calibrated using mariya sounds to 28 Fr. performed. 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   The stone was noted to be in the lower pole moiety at the le fragments were retrieved and sent for chemical analysis. A sensor wire was introduced through the ureteroscope and coiled in the lower pole moiety.   The ureteroscope, access sheath, and safety wire were then withdrawn while maintaining the newly placed wi chromic suture with the first suture placed at 12 o'clock and the second suture placed at 6 o'clock. Once this was complete, a dorsal penile nerve block was performed using 0.5% plain Marcaine.  Bacitracin was then applied to the site of the circumcision,

## 2020-08-04 NOTE — INTERVAL H&P NOTE
Pre-op Diagnosis: Kidney stone on left side [N20.0]  Phimosis [N47.1]    The above referenced H&P was reviewed by Tony Chaney MD on 8/4/2020, the patient was examined and no significant changes have occurred in the patient's condition since the H&P wa

## 2020-08-04 NOTE — ANESTHESIA PROCEDURE NOTES
Airway  Urgency: Elective      General Information and Staff    Patient location during procedure: OR  Anesthesiologist: Marcell Weber MD  Performed: anesthesiologist     Indications and Patient Condition  Indications for airway management: anesthesia  Se

## 2020-08-04 NOTE — ANESTHESIA POSTPROCEDURE EVALUATION
Patient: Jose Antonio Curtis.     Procedure Summary     Date:  08/04/20 Room / Location:  [address] [place] [address] MAIN OR 14 / [address] [facility] MAIN OR    Anesthesia Start:  8304 Anesthesia Stop:  1800    Procedures:       CIRCUMCISION ADULT (N/A )      CYSTOSCOPY RETROGRADE (Left )      CYSTOSC

## 2020-08-05 ENCOUNTER — TELEPHONE (OUTPATIENT)
Dept: SURGERY | Facility: CLINIC | Age: 70
End: 2020-08-05

## 2020-08-05 RX ORDER — CEPHALEXIN 500 MG/1
500 CAPSULE ORAL 3 TIMES DAILY
Qty: 15 CAPSULE | Refills: 0 | Status: SHIPPED | OUTPATIENT
Start: 2020-08-05 | End: 2020-08-10

## 2020-08-05 NOTE — TELEPHONE ENCOUNTER
Ointment is Bacitracin. The bacitracin tube should've been sent home with the patient from the recovery room yesterday. If not, then he can purchase bacitracin or neosporin from over the counter and apply as instructed.

## 2020-08-05 NOTE — TELEPHONE ENCOUNTER
Spoke with daughter Larisa on JOE. Called pharmacy and states they do have cephalexin and will fill it now. Did not see any ointment ordered? Dr Viv Quintero please advise.

## 2020-08-05 NOTE — TELEPHONE ENCOUNTER
Spoke with daughter, informed instructions below. She also said cephelaxin needed to be sent to [facility], not [facility]. Resent script to [facility].

## 2020-08-06 ENCOUNTER — PATIENT OUTREACH (OUTPATIENT)
Dept: CASE MANAGEMENT | Age: 70
End: 2020-08-06

## 2020-08-06 ENCOUNTER — TELEPHONE (OUTPATIENT)
Dept: SURGERY | Facility: CLINIC | Age: 70
End: 2020-08-06

## 2020-08-06 DIAGNOSIS — N18.30 STAGE 3 CHRONIC KIDNEY DISEASE (HCC): ICD-10-CM

## 2020-08-06 DIAGNOSIS — I50.42 CHRONIC COMBINED SYSTOLIC AND DIASTOLIC CHF, NYHA CLASS 3 (HCC): ICD-10-CM

## 2020-08-06 DIAGNOSIS — N20.0 KIDNEY STONE ON LEFT SIDE: ICD-10-CM

## 2020-08-06 DIAGNOSIS — N40.1 BENIGN PROSTATIC HYPERPLASIA WITH NOCTURIA: ICD-10-CM

## 2020-08-06 DIAGNOSIS — E78.00 PURE HYPERCHOLESTEROLEMIA: ICD-10-CM

## 2020-08-06 DIAGNOSIS — R53.82 CHRONIC FATIGUE: ICD-10-CM

## 2020-08-06 DIAGNOSIS — M48.062 LUMBAR STENOSIS WITH NEUROGENIC CLAUDICATION: ICD-10-CM

## 2020-08-06 DIAGNOSIS — I10 ESSENTIAL HYPERTENSION: ICD-10-CM

## 2020-08-06 DIAGNOSIS — E11.8 TYPE 2 DIABETES MELLITUS WITH COMPLICATION, WITHOUT LONG-TERM CURRENT USE OF INSULIN (HCC): ICD-10-CM

## 2020-08-06 DIAGNOSIS — R35.1 BENIGN PROSTATIC HYPERPLASIA WITH NOCTURIA: ICD-10-CM

## 2020-08-06 DIAGNOSIS — E66.01 MORBID OBESITY WITH BMI OF 40.0-44.9, ADULT (HCC): ICD-10-CM

## 2020-08-06 DIAGNOSIS — Q62.5 DUPLICATED LEFT RENAL COLLECTING SYSTEM: ICD-10-CM

## 2020-08-06 DIAGNOSIS — E03.9 ACQUIRED HYPOTHYROIDISM: ICD-10-CM

## 2020-08-06 NOTE — PROGRESS NOTES
8/6/2020  Spoke to Kristan Espinoza for CCM. Updates to patient care team/ comments: None  Patient reported changes in medications: None  Med Adherence  Comment: Taking as prescribed per patient.     Health Maintenance:   Colonoscopy due on 03/23/2002  Diabetes Ca cardiologist and keep up with cardiac compliance.     · Patient reported progress toward goal: Patient states he will call to schedule follow up appointment with cardiologist once he recovers from his recent surgery.     · Update to previous barriers: N/A

## 2020-08-06 NOTE — TELEPHONE ENCOUNTER
Kailyn Pillai, sent below message through orders call. Unable to document on encounter type. Message copied below. Spoke with patient.  Patient confirmed appt for cystoscopy with stent removal.     Future Appointments   Date Time Provider Andrea Brown

## 2020-08-09 LAB — CALCULI NUMBER: 1

## 2020-08-21 ENCOUNTER — PROCEDURE (OUTPATIENT)
Dept: SURGERY | Facility: CLINIC | Age: 70
End: 2020-08-21
Payer: MEDICARE

## 2020-08-21 VITALS
HEART RATE: 77 BPM | DIASTOLIC BLOOD PRESSURE: 87 MMHG | HEIGHT: 69 IN | BODY MASS INDEX: 42.95 KG/M2 | SYSTOLIC BLOOD PRESSURE: 148 MMHG | WEIGHT: 290 LBS

## 2020-08-21 DIAGNOSIS — N20.0 KIDNEY STONE ON LEFT SIDE: ICD-10-CM

## 2020-08-21 DIAGNOSIS — Z98.890 S/P ROUTINE CIRCUMCISION: Primary | ICD-10-CM

## 2020-08-21 DIAGNOSIS — Z96.0 RETAINED URETERAL STENT: ICD-10-CM

## 2020-08-21 PROBLEM — N47.1 PHIMOSIS OF PENIS: Status: RESOLVED | Noted: 2020-06-17 | Resolved: 2020-08-21

## 2020-08-21 PROCEDURE — 52310 CYSTOSCOPY AND TREATMENT: CPT | Performed by: UROLOGY

## 2020-08-21 RX ORDER — CIPROFLOXACIN 500 MG/1
500 TABLET, FILM COATED ORAL ONCE
Status: CANCELLED | OUTPATIENT
Start: 2020-08-21 | End: 2020-08-21

## 2020-08-21 NOTE — PROGRESS NOTES
[facility], [facility] Urology  Follow-Up Visit    HPI: Edilberto Henriquez. is a 78year old male presents for a follow up visit. Patient was last seen on 7/7/2020. Here by himself.      INTERVAL HISTORY: Patient is status post circumcision, cystoscopy, left ureterosco - 8/4/2020: underwent circumcision and urethral meatal calibration. - 8/21/20 F/U: circumcision healing well. Patient has not been providing proper genital hygiene and postoperative care to circumcision site.  Instructed on proper care and need to r Effort normal.   Genitourinary: Circumcised. No phimosis. Genitourinary Comments: Circumcision site healing well however patient did not seem to be providing proper and adequate postoperative hygiene/care.   The foreskin was retracted completely and the without any complications. POST CYSTOSCOPY MEDICATIONS: sample one tablet Cipro 500 mg given to patient    DIAGNOSIS: Successful removal of left ureteral JJ stent.        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

## 2020-08-31 PROCEDURE — 99490 CHRNC CARE MGMT STAFF 1ST 20: CPT

## 2020-09-02 RX ORDER — LEVOTHYROXINE SODIUM 0.12 MG/1
TABLET ORAL
Qty: 90 TABLET | Refills: 0 | Status: SHIPPED | OUTPATIENT
Start: 2020-09-02 | End: 2020-09-11

## 2020-09-09 ENCOUNTER — PATIENT OUTREACH (OUTPATIENT)
Dept: CASE MANAGEMENT | Age: 70
End: 2020-09-09

## 2020-09-09 ENCOUNTER — TELEPHONE (OUTPATIENT)
Dept: INTERNAL MEDICINE CLINIC | Facility: CLINIC | Age: 70
End: 2020-09-09

## 2020-09-09 DIAGNOSIS — N40.1 BENIGN PROSTATIC HYPERPLASIA WITH NOCTURIA: ICD-10-CM

## 2020-09-09 DIAGNOSIS — M48.062 LUMBAR STENOSIS WITH NEUROGENIC CLAUDICATION: ICD-10-CM

## 2020-09-09 DIAGNOSIS — I10 ESSENTIAL HYPERTENSION: ICD-10-CM

## 2020-09-09 DIAGNOSIS — E03.9 ACQUIRED HYPOTHYROIDISM: ICD-10-CM

## 2020-09-09 DIAGNOSIS — E78.00 PURE HYPERCHOLESTEROLEMIA: ICD-10-CM

## 2020-09-09 DIAGNOSIS — R35.1 BENIGN PROSTATIC HYPERPLASIA WITH NOCTURIA: ICD-10-CM

## 2020-09-09 DIAGNOSIS — I50.42 CHRONIC COMBINED SYSTOLIC AND DIASTOLIC CHF, NYHA CLASS 3 (HCC): ICD-10-CM

## 2020-09-09 DIAGNOSIS — R53.82 CHRONIC FATIGUE: ICD-10-CM

## 2020-09-09 DIAGNOSIS — E11.8 TYPE 2 DIABETES MELLITUS WITH COMPLICATION, WITHOUT LONG-TERM CURRENT USE OF INSULIN (HCC): ICD-10-CM

## 2020-09-09 DIAGNOSIS — E66.01 MORBID OBESITY WITH BMI OF 40.0-44.9, ADULT (HCC): ICD-10-CM

## 2020-09-09 DIAGNOSIS — N18.30 STAGE 3 CHRONIC KIDNEY DISEASE (HCC): ICD-10-CM

## 2020-09-09 RX ORDER — PREGABALIN 300 MG/1
1 CAPSULE ORAL 2 TIMES DAILY
COMMUNITY
Start: 2020-08-14 | End: 2020-10-30 | Stop reason: ALTCHOICE

## 2020-09-09 RX ORDER — CLOPIDOGREL BISULFATE 75 MG/1
75 TABLET ORAL DAILY
COMMUNITY
End: 2020-12-07

## 2020-09-09 NOTE — TELEPHONE ENCOUNTER
Spoke with pt states he went out of town for a couple of days and lost his Levothyroxine 125 mcg pill bottle. Pt was only provided with 4 tablets by pharmacy and will not be due for refill until next month.  Pt is also requesting glucose meter to be sent to

## 2020-09-09 NOTE — PROGRESS NOTES
9/9/2020  Spoke to [facility] for CCM. Updates to patient care team/ comments: None  Patient reported changes in medications: Added Plavix and Lyrica  Med Adherence  Comment: Reviewed medications with patient taking as prescribed.     Health Maintenance:  Co with cardiologist and keep up with cardiac compliance.     · Patient reported progress toward goal: Patient states he scheduled an appointment with cardiologist for 9/27/20.     · Update to previous barriers: N/A     · Patient Reported New Barriers And Con

## 2020-09-11 RX ORDER — LEVOTHYROXINE SODIUM 0.12 MG/1
125 TABLET ORAL EVERY MORNING
Qty: 90 TABLET | Refills: 0 | Status: SHIPPED | OUTPATIENT
Start: 2020-09-11 | End: 2021-05-05

## 2020-09-30 PROCEDURE — 99490 CHRNC CARE MGMT STAFF 1ST 20: CPT

## 2020-09-30 PROCEDURE — G2058 CCM ADD 20MIN: HCPCS

## 2020-10-30 ENCOUNTER — APPOINTMENT (OUTPATIENT)
Dept: GENERAL RADIOLOGY | Facility: HOSPITAL | Age: 70
DRG: 247 | End: 2020-10-30
Attending: EMERGENCY MEDICINE
Payer: MEDICARE

## 2020-10-30 ENCOUNTER — OFFICE VISIT (OUTPATIENT)
Dept: INTERNAL MEDICINE CLINIC | Facility: CLINIC | Age: 70
End: 2020-10-30
Payer: MEDICARE

## 2020-10-30 ENCOUNTER — HOSPITAL ENCOUNTER (INPATIENT)
Facility: HOSPITAL | Age: 70
LOS: 3 days | Discharge: HOME | DRG: 247 | End: 2020-11-03
Attending: EMERGENCY MEDICINE | Admitting: HOSPITALIST
Payer: MEDICARE

## 2020-10-30 VITALS
OXYGEN SATURATION: 96 % | SYSTOLIC BLOOD PRESSURE: 144 MMHG | BODY MASS INDEX: 42.95 KG/M2 | DIASTOLIC BLOOD PRESSURE: 92 MMHG | WEIGHT: 290 LBS | HEART RATE: 88 BPM | HEIGHT: 69 IN

## 2020-10-30 DIAGNOSIS — E11.8 TYPE 2 DIABETES MELLITUS WITH COMPLICATION, WITHOUT LONG-TERM CURRENT USE OF INSULIN (HCC): ICD-10-CM

## 2020-10-30 DIAGNOSIS — I50.42 CHRONIC COMBINED SYSTOLIC AND DIASTOLIC CHF, NYHA CLASS 3 (HCC): ICD-10-CM

## 2020-10-30 DIAGNOSIS — R53.83 FATIGUE, UNSPECIFIED TYPE: ICD-10-CM

## 2020-10-30 DIAGNOSIS — R35.1 BENIGN PROSTATIC HYPERPLASIA WITH NOCTURIA: ICD-10-CM

## 2020-10-30 DIAGNOSIS — R07.9 ACUTE CHEST PAIN: Primary | ICD-10-CM

## 2020-10-30 DIAGNOSIS — N40.1 BENIGN PROSTATIC HYPERPLASIA WITH NOCTURIA: ICD-10-CM

## 2020-10-30 DIAGNOSIS — I10 ESSENTIAL HYPERTENSION: ICD-10-CM

## 2020-10-30 DIAGNOSIS — E03.9 ACQUIRED HYPOTHYROIDISM: ICD-10-CM

## 2020-10-30 DIAGNOSIS — I25.10 CORONARY ARTERY DISEASE INVOLVING NATIVE CORONARY ARTERY OF NATIVE HEART, ANGINA PRESENCE UNSPECIFIED: Primary | ICD-10-CM

## 2020-10-30 PROBLEM — N18.30 STAGE 3 CHRONIC KIDNEY DISEASE (HCC): Status: RESOLVED | Noted: 2019-02-20 | Resolved: 2020-10-30

## 2020-10-30 PROCEDURE — B2051ZZ PLAIN RADIOGRAPHY OF LEFT HEART USING LOW OSMOLAR CONTRAST: ICD-10-PCS | Performed by: INTERNAL MEDICINE

## 2020-10-30 PROCEDURE — 99223 1ST HOSP IP/OBS HIGH 75: CPT | Performed by: HOSPITALIST

## 2020-10-30 PROCEDURE — 71045 X-RAY EXAM CHEST 1 VIEW: CPT | Performed by: EMERGENCY MEDICINE

## 2020-10-30 PROCEDURE — 72170 X-RAY EXAM OF PELVIS: CPT | Performed by: EMERGENCY MEDICINE

## 2020-10-30 PROCEDURE — 99214 OFFICE O/P EST MOD 30 MIN: CPT | Performed by: INTERNAL MEDICINE

## 2020-10-30 PROCEDURE — B2011ZZ PLAIN RADIOGRAPHY OF MULTIPLE CORONARY ARTERIES USING LOW OSMOLAR CONTRAST: ICD-10-PCS | Performed by: INTERNAL MEDICINE

## 2020-10-30 PROCEDURE — 027034Z DILATION OF CORONARY ARTERY, ONE ARTERY WITH DRUG-ELUTING INTRALUMINAL DEVICE, PERCUTANEOUS APPROACH: ICD-10-PCS | Performed by: INTERNAL MEDICINE

## 2020-10-30 PROCEDURE — 02703ZZ DILATION OF CORONARY ARTERY, ONE ARTERY, PERCUTANEOUS APPROACH: ICD-10-PCS | Performed by: INTERNAL MEDICINE

## 2020-10-30 PROCEDURE — 4A023N7 MEASUREMENT OF CARDIAC SAMPLING AND PRESSURE, LEFT HEART, PERCUTANEOUS APPROACH: ICD-10-PCS | Performed by: INTERNAL MEDICINE

## 2020-10-30 PROCEDURE — 99222 1ST HOSP IP/OBS MODERATE 55: CPT | Performed by: INTERNAL MEDICINE

## 2020-10-30 RX ORDER — TAMSULOSIN HYDROCHLORIDE 0.4 MG/1
0.4 CAPSULE ORAL DAILY
Status: DISCONTINUED | OUTPATIENT
Start: 2020-10-31 | End: 2020-11-03

## 2020-10-30 RX ORDER — AMOXICILLIN AND CLAVULANATE POTASSIUM 875; 125 MG/1; MG/1
TABLET, FILM COATED ORAL
COMMUNITY
Start: 2020-10-28 | End: 2020-10-30 | Stop reason: ALTCHOICE

## 2020-10-30 RX ORDER — METOCLOPRAMIDE HYDROCHLORIDE 5 MG/ML
10 INJECTION INTRAMUSCULAR; INTRAVENOUS EVERY 8 HOURS PRN
Status: DISCONTINUED | OUTPATIENT
Start: 2020-10-30 | End: 2020-11-03

## 2020-10-30 RX ORDER — SENNA AND DOCUSATE SODIUM 50; 8.6 MG/1; MG/1
2 TABLET, FILM COATED ORAL NIGHTLY PRN
Status: DISCONTINUED | OUTPATIENT
Start: 2020-10-30 | End: 2020-11-03

## 2020-10-30 RX ORDER — NITROGLYCERIN 0.4 MG/1
0.4 TABLET SUBLINGUAL EVERY 5 MIN PRN
Status: DISCONTINUED | OUTPATIENT
Start: 2020-10-30 | End: 2020-11-03

## 2020-10-30 RX ORDER — KETOROLAC TROMETHAMINE 30 MG/ML
15 INJECTION, SOLUTION INTRAMUSCULAR; INTRAVENOUS ONCE
Status: COMPLETED | OUTPATIENT
Start: 2020-10-30 | End: 2020-10-30

## 2020-10-30 RX ORDER — PREGABALIN 75 MG/1
300 CAPSULE ORAL 2 TIMES DAILY PRN
Status: DISCONTINUED | OUTPATIENT
Start: 2020-10-30 | End: 2020-11-01

## 2020-10-30 RX ORDER — ENOXAPARIN SODIUM 100 MG/ML
40 INJECTION SUBCUTANEOUS DAILY
Status: DISCONTINUED | OUTPATIENT
Start: 2020-10-30 | End: 2020-10-30

## 2020-10-30 RX ORDER — PREGABALIN 300 MG/1
300 CAPSULE ORAL 2 TIMES DAILY PRN
COMMUNITY
End: 2022-01-21

## 2020-10-30 RX ORDER — TIMOLOL MALEATE 5 MG/ML
1 SOLUTION/ DROPS OPHTHALMIC 2 TIMES DAILY
Status: DISCONTINUED | OUTPATIENT
Start: 2020-10-30 | End: 2020-11-03

## 2020-10-30 RX ORDER — OXYCODONE AND ACETAMINOPHEN 10; 325 MG/1; MG/1
1 TABLET ORAL EVERY 8 HOURS PRN
Status: DISCONTINUED | OUTPATIENT
Start: 2020-10-30 | End: 2020-11-02

## 2020-10-30 RX ORDER — HYDROCODONE BITARTRATE AND ACETAMINOPHEN 7.5; 325 MG/1; MG/1
TABLET ORAL
Status: ON HOLD | COMMUNITY
Start: 2020-10-28 | End: 2020-10-30

## 2020-10-30 RX ORDER — ONDANSETRON 2 MG/ML
4 INJECTION INTRAMUSCULAR; INTRAVENOUS EVERY 6 HOURS PRN
Status: DISCONTINUED | OUTPATIENT
Start: 2020-10-30 | End: 2020-11-03

## 2020-10-30 RX ORDER — ENOXAPARIN SODIUM 100 MG/ML
0.5 INJECTION SUBCUTANEOUS NIGHTLY
Status: DISCONTINUED | OUTPATIENT
Start: 2020-10-30 | End: 2020-11-03

## 2020-10-30 RX ORDER — ACETAMINOPHEN 325 MG/1
650 TABLET ORAL EVERY 6 HOURS PRN
Status: DISCONTINUED | OUTPATIENT
Start: 2020-10-30 | End: 2020-11-03

## 2020-10-30 RX ORDER — MORPHINE SULFATE 2 MG/ML
2 INJECTION, SOLUTION INTRAMUSCULAR; INTRAVENOUS EVERY 2 HOUR PRN
Status: DISCONTINUED | OUTPATIENT
Start: 2020-10-30 | End: 2020-10-31

## 2020-10-30 RX ORDER — MORPHINE SULFATE 4 MG/ML
4 INJECTION, SOLUTION INTRAMUSCULAR; INTRAVENOUS EVERY 2 HOUR PRN
Status: DISCONTINUED | OUTPATIENT
Start: 2020-10-30 | End: 2020-10-31

## 2020-10-30 RX ORDER — ONDANSETRON 2 MG/ML
4 INJECTION INTRAMUSCULAR; INTRAVENOUS ONCE
Status: COMPLETED | OUTPATIENT
Start: 2020-10-30 | End: 2020-10-30

## 2020-10-30 RX ORDER — GABAPENTIN 600 MG/1
600 TABLET ORAL 3 TIMES DAILY
Status: DISCONTINUED | OUTPATIENT
Start: 2020-10-30 | End: 2020-10-31

## 2020-10-30 RX ORDER — GABAPENTIN 600 MG/1
600 TABLET ORAL 3 TIMES DAILY
Status: ON HOLD | COMMUNITY
End: 2020-10-31

## 2020-10-30 RX ORDER — METOPROLOL SUCCINATE 50 MG/1
50 TABLET, EXTENDED RELEASE ORAL DAILY
Status: DISCONTINUED | OUTPATIENT
Start: 2020-10-31 | End: 2020-11-03

## 2020-10-30 RX ORDER — DEXTROSE MONOHYDRATE 25 G/50ML
50 INJECTION, SOLUTION INTRAVENOUS
Status: DISCONTINUED | OUTPATIENT
Start: 2020-10-30 | End: 2020-11-03

## 2020-10-30 RX ORDER — DEXTROSE MONOHYDRATE 25 G/50ML
50 INJECTION, SOLUTION INTRAVENOUS
Status: DISCONTINUED | OUTPATIENT
Start: 2020-10-30 | End: 2020-10-30

## 2020-10-30 RX ORDER — LEVOTHYROXINE SODIUM 0.12 MG/1
125 TABLET ORAL
Status: DISCONTINUED | OUTPATIENT
Start: 2020-10-31 | End: 2020-11-03

## 2020-10-30 RX ORDER — SODIUM CHLORIDE 0.9 % (FLUSH) 0.9 %
3 SYRINGE (ML) INJECTION AS NEEDED
Status: DISCONTINUED | OUTPATIENT
Start: 2020-10-30 | End: 2020-11-03

## 2020-10-30 RX ORDER — MORPHINE SULFATE 4 MG/ML
2 INJECTION, SOLUTION INTRAMUSCULAR; INTRAVENOUS ONCE
Status: COMPLETED | OUTPATIENT
Start: 2020-10-30 | End: 2020-10-30

## 2020-10-30 RX ORDER — LISINOPRIL 10 MG/1
10 TABLET ORAL DAILY
Status: DISCONTINUED | OUTPATIENT
Start: 2020-10-30 | End: 2020-11-03

## 2020-10-30 RX ORDER — A/SINGAPORE/GP1908/2015 IVR-180 (AN A/MICHIGAN/45/2015 (H1N1)PDM09-LIKE VIRUS, A/HONG KONG/4801/2014, NYMC X-263B (H3N2) (AN A/HONG KONG/4801/2014-LIKE VIRUS), AND B/BRISBANE/60/2008, WILD TYPE (A B/BRISBANE/60/2008-LIKE VIRUS) 15; 15; 15 UG/.5ML; UG/.5ML; UG/.5ML
0.5 INJECTION, SUSPENSION INTRAMUSCULAR SEE ADMIN INSTRUCTIONS
Status: ON HOLD | COMMUNITY
Start: 2020-10-09 | End: 2020-10-30

## 2020-10-30 RX ORDER — SPIRONOLACTONE 25 MG/1
25 TABLET ORAL DAILY
COMMUNITY
End: 2021-05-05

## 2020-10-30 RX ORDER — GABAPENTIN 600 MG/1
TABLET ORAL
COMMUNITY
Start: 2020-10-01 | End: 2020-10-30 | Stop reason: ALTCHOICE

## 2020-10-30 RX ORDER — ALBUTEROL SULFATE 90 UG/1
2 AEROSOL, METERED RESPIRATORY (INHALATION) EVERY 6 HOURS PRN
Status: DISCONTINUED | OUTPATIENT
Start: 2020-10-30 | End: 2020-11-03

## 2020-10-30 RX ORDER — ROSUVASTATIN CALCIUM 10 MG/1
20 TABLET, COATED ORAL NIGHTLY
Status: DISCONTINUED | OUTPATIENT
Start: 2020-10-30 | End: 2020-11-03

## 2020-10-30 RX ORDER — MORPHINE SULFATE 2 MG/ML
1 INJECTION, SOLUTION INTRAMUSCULAR; INTRAVENOUS EVERY 2 HOUR PRN
Status: DISCONTINUED | OUTPATIENT
Start: 2020-10-30 | End: 2020-10-31

## 2020-10-30 RX ORDER — SPIRONOLACTONE 25 MG/1
25 TABLET ORAL DAILY
Status: DISCONTINUED | OUTPATIENT
Start: 2020-10-31 | End: 2020-11-03

## 2020-10-30 RX ORDER — GLIPIZIDE 5 MG/1
5 TABLET ORAL
Status: DISCONTINUED | OUTPATIENT
Start: 2020-10-31 | End: 2020-10-31

## 2020-10-30 RX ORDER — ASPIRIN 325 MG
325 TABLET ORAL DAILY
Status: DISCONTINUED | OUTPATIENT
Start: 2020-10-30 | End: 2020-10-31

## 2020-10-30 RX ORDER — OXYCODONE AND ACETAMINOPHEN 10; 325 MG/1; MG/1
1 TABLET ORAL EVERY 8 HOURS PRN
Status: ON HOLD | COMMUNITY
End: 2022-01-21

## 2020-10-30 RX ORDER — LATANOPROST 50 UG/ML
1 SOLUTION/ DROPS OPHTHALMIC NIGHTLY
Status: DISCONTINUED | OUTPATIENT
Start: 2020-10-30 | End: 2020-11-03

## 2020-10-30 RX ORDER — TIMOLOL MALEATE 5 MG/ML
1 SOLUTION/ DROPS OPHTHALMIC 2 TIMES DAILY
COMMUNITY

## 2020-10-30 RX ORDER — CLOPIDOGREL BISULFATE 75 MG/1
75 TABLET ORAL DAILY
Status: DISCONTINUED | OUTPATIENT
Start: 2020-10-30 | End: 2020-11-03

## 2020-10-30 NOTE — H&P
[facility]    PATIENT'S NAME: Edouard Liriano   ATTENDING PHYSICIAN: Crissy Mukherjee.  Zafar Barriga MD   PATIENT ACCOUNT#:   [id number]    LOCATION:  [place] [address]  MEDICAL RECORD #:   [id number]       YOB: 1950  ADMISSION DATE:       10/30/20 activities of daily living. Noncompliant with diet. REVIEW OF SYSTEMS:  The patient reports precordial chest discomfort and left forearm and arm discomfort more of tightness.   Comes with physical activity, goes away with rest.  Pain has been on and off

## 2020-10-30 NOTE — ED INITIAL ASSESSMENT (HPI)
Pt came in sent here from [address] office, for evaluation of chest pain and left arm pain that ahs been going on x 10 days.  + SOB. Pt has cardiac stents , history of heart attack as reported by pt. Pain scale at 8/10.   Pt is A/O x 4, breathing is unlabor

## 2020-10-30 NOTE — ED NOTES
Orders for admission, patient is aware of plan, and ready to go upstairs. Any questions, please [facility] [id number] [facility]

## 2020-10-30 NOTE — ED NOTES
Patient presents with left arm pain. Notes pain worse when walking. Denies cough/fevers. Denies chest pain at this time. Patient also notes right hip pain. Denies shortness of breath at this time.

## 2020-10-30 NOTE — ED PROVIDER NOTES
Patient Seen in: [facility] AND [facility] Emergency Department    History   Patient presents with:  Chest Pain  Chest Pain Angina    Stated Complaint:     HPI    Patient is here with multiple symptoms ongoing for he states he thinks a month or 2.   This includes • CYSTOSCOPY RETROGRADE Left 8/4/2020    Performed by Ricarda Skinner MD at [facility] OR   • [place] Left 8/4/2020    Performed by Ricarda Skinner MD at [facility] OR   • CYSTOSCOPY URETEROSCOPY Left 8/4/2020    Performed by [facility] every 6 (six) hours as needed for Wheezing. metFORMIN HCl 500 MG Oral Tab,  Take 1 tablet (500 mg total) by mouth 2 (two) times daily. glipiZIDE 5 MG Oral Tab,  TAKE 1 TABLET BY MOUTH EVERY MORNING BEFORE BREAKFAST.    DULoxetine HCl (CYMBALTA) 30 MG Or buttocks  Skin:  Warm, dry, well perfused. Good skin turgor. No rashes seen. Neurology:  Moving all extremities equally with good coordination. No cranial nerve asymmetry noted. Psychiatric:  Normal affect. Oriented. No unusual behavior.   Interactin RAINBOW DRAW GOLD   RAPID SARS-COV-2 BY PCR   CBC W/ DIFFERENTIAL     EKG    Rate, intervals and axes as noted on EKG Report.   Rate: EKG shows rate 68 normal sinus rhythm right bundle branch block       Repeat EKG shows rate 63 normal sinus rhythm no sawyer

## 2020-10-30 NOTE — ED NOTES
Pt reported having fall 2 days ago.   Per pt he felt dizzy, loss his balance and fell 4 stairs, unknown LOC, pt states \" I Don't remember\"

## 2020-10-30 NOTE — PROGRESS NOTES
HPI:    Patient ID: Zora Jimenez. is a 78year old male.     HPI      Patient has multiple medical problems, CAD with multiple stents , chronic combined systolic and diastolic heart failure [place] HA class III, hypertension, hyperlipidemia hypothyroidism, type Essential hypertension    • Glaucoma    • Heart attack [facility])    • High blood pressure    • High cholesterol    • Hyperlipidemia    • Kidney stone 07/2020   • Left Sided Neck pain, acute 11/28/2017   • Lumbar stenosis with neurogenic claudication 5/31/2018 Positive for sleep disturbance. Negative for behavioral problems. Current Outpatient Medications   Medication Sig Dispense Refill   • spironolactone 25 MG Oral Tab Take 25 mg by mouth daily.      • FLUAD QUADRIVALENT 0.5 ML Intramuscular Prefille Ophthalmic Solution Apply 1 drop to eye. • LUMIGAN 0.01 % Ophthalmic Solution INSTILL 1 DROP IN AFFECTED EYE EVERY EVENING  1     Allergies:No Known Allergies   PHYSICAL EXAM:   Physical Exam   Nursing note and vitals reviewed.    Constitutional: He is A1C; Future    5. Benign prostatic hyperplasia with nocturia  -Tamsulosin 0.4 mg 2 caps at night     6. Acquired hypothyroidism  -Resume levothyroxine 125 mcg every morning. Discussed importance of taking medication in AM and waiting 30-60 min before eating

## 2020-10-30 NOTE — CONSULTS
[facility]    Cardiology Consultation    Ramona Mccarthy.  Patient Status:  Emergency    3/23/1950 MRN [id number]   Location [address] Attending Hamida Pierce MD   [facility] Day # 0 PCP Jah Love MD     10/30/ than right 11/28/2017   • Coronary atherosclerosis    • Diabetes ([facility]     borderline    • Disorder of thyroid    • Essential hypertension    • Glaucoma    • Heart attack ([facility]    • High blood pressure    • High cholesterol    • Hyperlipidemia    • Kidney Pulse 67   Temp 98.9 °F (37.2 °C) (Oral)   Resp 17   Ht 5' 9\" (1.753 m)   Wt 280 lb (127 kg)   SpO2 98%   BMI 41.35 kg/m²   Temp (24hrs), Av.9 °F (37.2 °C), Min:98.9 °F (37.2 °C), Max:98.9 °F (37.2 °C)     No intake or output data in the 24 hours end Finalized by (CST): Lito Parks MD on 10/30/2020 at 3:50 PM          Ekg 12-lead    Result Date: 10/30/2020  ECG Report  Interpretation  --------------------------     Ekg 12-lead    Result Date: 10/30/2020  ECG Report  Interpretation  -------- HDL 34 in February 2020 at goal.        Will follow,    Thank you for allowing me to participate in the care of your patient. Bibiana Odell MD    Medical Director Non Invasive Cardiology [address] [place] [address] Cardiology.   [phone]

## 2020-10-31 ENCOUNTER — APPOINTMENT (OUTPATIENT)
Dept: CV DIAGNOSTICS | Facility: HOSPITAL | Age: 70
DRG: 247 | End: 2020-10-31
Attending: INTERNAL MEDICINE
Payer: MEDICARE

## 2020-10-31 PROCEDURE — 93306 TTE W/DOPPLER COMPLETE: CPT | Performed by: INTERNAL MEDICINE

## 2020-10-31 PROCEDURE — 99232 SBSQ HOSP IP/OBS MODERATE 35: CPT | Performed by: NURSE PRACTITIONER

## 2020-10-31 PROCEDURE — 99233 SBSQ HOSP IP/OBS HIGH 50: CPT | Performed by: INTERNAL MEDICINE

## 2020-10-31 RX ORDER — ASPIRIN 81 MG/1
81 TABLET ORAL DAILY
Status: DISCONTINUED | OUTPATIENT
Start: 2020-10-31 | End: 2020-11-03

## 2020-10-31 RX ORDER — AMOXICILLIN AND CLAVULANATE POTASSIUM 875; 125 MG/1; MG/1
875 TABLET, FILM COATED ORAL EVERY 12 HOURS SCHEDULED
Status: DISCONTINUED | OUTPATIENT
Start: 2020-10-31 | End: 2020-11-03

## 2020-10-31 RX ORDER — AMOXICILLIN AND CLAVULANATE POTASSIUM 875; 125 MG/1; MG/1
1 TABLET, FILM COATED ORAL 2 TIMES DAILY
COMMUNITY
End: 2020-12-14 | Stop reason: ALTCHOICE

## 2020-10-31 RX ORDER — MORPHINE SULFATE 2 MG/ML
1 INJECTION, SOLUTION INTRAMUSCULAR; INTRAVENOUS EVERY 2 HOUR PRN
Status: DISCONTINUED | OUTPATIENT
Start: 2020-10-31 | End: 2020-11-02

## 2020-10-31 NOTE — PROGRESS NOTES
[facility] HOSP - [facility]    Progress Note    Lucian Muff.  Patient Status:  Inpatient    3/23/1950 MRN [id number]   Location [address] Attending Klaudia Woodward MD   Hosp Day # 0 PCP MD Krysten Wood Aspart Pen  1-7 Units Subcutaneous TID CC   • lisinopril  10 mg Oral Daily   • Clopidogrel Bisulfate  75 mg Oral Daily   • enoxaparin  0.5 mg/kg Subcutaneous Nightly   • latanoprost  1 drop Both Eyes Nightly   • Levothyroxine Sodium  125 mcg Oral Before br dislocation. 2. Mild bilateral hip osteoarthritis.     Dictated by (CST): Hannah Granados MD on 10/30/2020 at 3:43 PM     Finalized by (CST): Hannah Granados MD on 10/30/2020 at 3:45 PM          Xr Chest Ap Portable  (cpt=71045)    Result Date: 10/3 statin. BB as HR allows.  Telemetry monitoring.    - Cardiology consulted, recs further ischemic evaluation with cardiac cath on Monday as pt with reported abnormal stress in Feb.         Hypothyroidism  - Cont levothyroxine      Type 2 diabetes mellitus wi

## 2020-10-31 NOTE — PROGRESS NOTES
· [facility]      Subjective:  No recurrent chest pain    Objective:  110/80  Afebrile SR SB with sleep  No I/O  Troponins negative    Neuro: awake/alert  HEENT: unable to assess JVP  Cardiac: S1 S2 regular  Lungs: clear anterior  Abdomen: sof

## 2020-10-31 NOTE — PLAN OF CARE
No c/o CP or shortness of breath. Pt reports intermittent dizziness when lying flat- pt states this is why he went to his doctor's office yesterday. Ambulating to the bathroom without difficulty. Repeat morning troponin negative.  Pt states \"I don't have d decreased coronary artery perfusion - ex.  Angina  - Evaluate fluid balance, assess for edema, trend weights  Outcome: Progressing  Goal: Absence of cardiac arrhythmias or at baseline  Description: INTERVENTIONS:  - Continuous cardiac monitoring, monitor vi

## 2020-10-31 NOTE — PLAN OF CARE
Patient slept well, no complaints of chest pain overnight, only chronic leg and back pain. Troponins negative. Plan for Cath on Monday.        Problem: Diabetes/Glucose Control  Goal: Glucose maintained within prescribed range  Description: INTERVENTIONS: response  - Implement non-pharmacological measures as appropriate and evaluate response  - Consider cultural and social influences on pain and pain management  - Manage/alleviate anxiety  - Utilize distraction and/or relaxation techniques  - Monitor for op

## 2020-10-31 NOTE — PROGRESS NOTES
[facility] Pharmacy Note:  Anticoagulation Weight Dose Adjustment for enoxaparin (LOVENOX)    Chelsey Whitt. is a 78year old patient who has been prescribed enoxaparin (LOVENOX) 40 mg every 24 hours.       Estimated Creatinine Clearance: 73.6 mL/min (based on SCr

## 2020-11-01 PROCEDURE — 99232 SBSQ HOSP IP/OBS MODERATE 35: CPT | Performed by: NURSE PRACTITIONER

## 2020-11-01 PROCEDURE — 99233 SBSQ HOSP IP/OBS HIGH 50: CPT | Performed by: INTERNAL MEDICINE

## 2020-11-01 RX ORDER — PREGABALIN 75 MG/1
150 CAPSULE ORAL ONCE
Status: COMPLETED | OUTPATIENT
Start: 2020-11-01 | End: 2020-11-01

## 2020-11-01 RX ORDER — PREGABALIN 75 MG/1
300 CAPSULE ORAL 2 TIMES DAILY
Status: DISCONTINUED | OUTPATIENT
Start: 2020-11-01 | End: 2020-11-03

## 2020-11-01 RX ORDER — SODIUM CHLORIDE 9 MG/ML
INJECTION, SOLUTION INTRAVENOUS
Status: COMPLETED | OUTPATIENT
Start: 2020-11-02 | End: 2020-11-02

## 2020-11-01 RX ORDER — CHLORHEXIDINE GLUCONATE 4 G/100ML
30 SOLUTION TOPICAL
Status: COMPLETED | OUTPATIENT
Start: 2020-11-02 | End: 2020-11-02

## 2020-11-01 RX ORDER — PREGABALIN 75 MG/1
150 CAPSULE ORAL 2 TIMES DAILY
Status: DISCONTINUED | OUTPATIENT
Start: 2020-11-01 | End: 2020-11-01

## 2020-11-01 NOTE — PLAN OF CARE
Pt c/o chronic generalized pain- to back and left knee. Asking for increased dosing and frequency of lyrica- MD notified- doses adjusted and pain services consulted. No c/o CP, shortness of breath or dizziness today.  Plan- Cardiac cath tomorrow, consent si balance, assess for edema, trend weights  Outcome: Progressing  Goal: Absence of cardiac arrhythmias or at baseline  Description: INTERVENTIONS:  - Continuous cardiac monitoring, monitor vital signs, obtain 12 lead EKG if indicated  - Evaluate effectivenes

## 2020-11-01 NOTE — PROGRESS NOTES
· [facility]      Subjective:  Just up to bathroom no CP or dyspnea, dizziness resolved    Objective:  120/63  145/81  Afebrile SR SB with sleep  No I/O  BUN/cr 16/0.87  K 4.4    Neuro: awake/alert  HEENT: unable to assess JVP  Cardiac: S1 S2

## 2020-11-01 NOTE — PLAN OF CARE
Patient without complaints of CP or dizziness overnight. PO pain medications for chronic leg pain. Plan for Cath of Monday. Will continue to monitor patient.      Problem: Diabetes/Glucose Control  Goal: Glucose maintained within prescribed range  Descripti and evaluate response  - Implement non-pharmacological measures as appropriate and evaluate response  - Consider cultural and social influences on pain and pain management  - Manage/alleviate anxiety  - Utilize distraction and/or relaxation techniques  - M

## 2020-11-01 NOTE — PROGRESS NOTES
[facility] HOSP - [facility]    Progress Note    Jacinto Whiting.  Patient Status:  Inpatient    3/23/1950 MRN [id number]   Location [address] Attending Javi Amor MD   Hosp Day # 1 PCP MD Loren Pedroza aspirin EC  81 mg Oral Daily   • Amoxicillin-Pot Clavulanate  875 mg Oral Q12H   • Insulin Aspart Pen  1-7 Units Subcutaneous TID CC   • lisinopril  10 mg Oral Daily   • Clopidogrel Bisulfate  75 mg Oral Daily   • enoxaparin  0.5 mg/kg Subcutaneous Nightly A1C 7.7 (H) 02/20/2020       Culture:  No results found for this visit on 10/30/20. Imaging/EKG:   Xr Pelvis (1 View) (cpt=72170)    Result Date: 10/30/2020  CONCLUSION:  1. No acute fracture dislocation. 2. Mild bilateral hip osteoarthritis.     Dict Value Date    DDIMER 0.29 10/30/2020    DDIMER <0.27 07/12/2018       Assessment and Plan:     Acute chest pain   - some concern for ACS in pt with risk factors. - Cont ASA, plavix, and statin. BB as HR allows.  Telemetry monitoring.    - Cardiology consu

## 2020-11-02 ENCOUNTER — PATIENT OUTREACH (OUTPATIENT)
Dept: CASE MANAGEMENT | Age: 70
End: 2020-11-02

## 2020-11-02 ENCOUNTER — APPOINTMENT (OUTPATIENT)
Dept: INTERVENTIONAL RADIOLOGY/VASCULAR | Facility: HOSPITAL | Age: 70
DRG: 247 | End: 2020-11-02
Attending: NURSE PRACTITIONER
Payer: MEDICARE

## 2020-11-02 PROCEDURE — B2151ZZ FLUOROSCOPY OF LEFT HEART USING LOW OSMOLAR CONTRAST: ICD-10-PCS | Performed by: INTERNAL MEDICINE

## 2020-11-02 PROCEDURE — 4A023N7 MEASUREMENT OF CARDIAC SAMPLING AND PRESSURE, LEFT HEART, PERCUTANEOUS APPROACH: ICD-10-PCS | Performed by: INTERNAL MEDICINE

## 2020-11-02 PROCEDURE — 99152 MOD SED SAME PHYS/QHP 5/>YRS: CPT | Performed by: INTERNAL MEDICINE

## 2020-11-02 PROCEDURE — 027034Z DILATION OF CORONARY ARTERY, ONE ARTERY WITH DRUG-ELUTING INTRALUMINAL DEVICE, PERCUTANEOUS APPROACH: ICD-10-PCS | Performed by: INTERNAL MEDICINE

## 2020-11-02 PROCEDURE — 92928 PRQ TCAT PLMT NTRAC ST 1 LES: CPT | Performed by: INTERNAL MEDICINE

## 2020-11-02 PROCEDURE — 93458 L HRT ARTERY/VENTRICLE ANGIO: CPT | Performed by: INTERNAL MEDICINE

## 2020-11-02 PROCEDURE — 02C03ZZ EXTIRPATION OF MATTER FROM CORONARY ARTERY, ONE ARTERY, PERCUTANEOUS APPROACH: ICD-10-PCS | Performed by: INTERNAL MEDICINE

## 2020-11-02 PROCEDURE — 99233 SBSQ HOSP IP/OBS HIGH 50: CPT | Performed by: INTERNAL MEDICINE

## 2020-11-02 PROCEDURE — B2111ZZ FLUOROSCOPY OF MULTIPLE CORONARY ARTERIES USING LOW OSMOLAR CONTRAST: ICD-10-PCS | Performed by: INTERNAL MEDICINE

## 2020-11-02 PROCEDURE — 92933 PRQ TRLML C ATHRC ST ANGIOP1: CPT | Performed by: INTERNAL MEDICINE

## 2020-11-02 PROCEDURE — 02703ZZ DILATION OF CORONARY ARTERY, ONE ARTERY, PERCUTANEOUS APPROACH: ICD-10-PCS | Performed by: INTERNAL MEDICINE

## 2020-11-02 RX ORDER — LIDOCAINE HYDROCHLORIDE 20 MG/ML
INJECTION, SOLUTION EPIDURAL; INFILTRATION; INTRACAUDAL; PERINEURAL
Status: COMPLETED
Start: 2020-11-02 | End: 2020-11-02

## 2020-11-02 RX ORDER — MIDAZOLAM HYDROCHLORIDE 1 MG/ML
INJECTION INTRAMUSCULAR; INTRAVENOUS
Status: COMPLETED
Start: 2020-11-02 | End: 2020-11-02

## 2020-11-02 RX ORDER — CLOPIDOGREL BISULFATE 75 MG/1
TABLET ORAL
Status: COMPLETED
Start: 2020-11-02 | End: 2020-11-02

## 2020-11-02 RX ORDER — OXYCODONE AND ACETAMINOPHEN 10; 325 MG/1; MG/1
1 TABLET ORAL EVERY 6 HOURS PRN
Status: DISCONTINUED | OUTPATIENT
Start: 2020-11-02 | End: 2020-11-03

## 2020-11-02 RX ORDER — HEPARIN SODIUM 1000 [USP'U]/ML
INJECTION, SOLUTION INTRAVENOUS; SUBCUTANEOUS
Status: COMPLETED
Start: 2020-11-02 | End: 2020-11-02

## 2020-11-02 RX ORDER — ASPIRIN 81 MG/1
81 TABLET ORAL DAILY
Qty: 30 TABLET | Refills: 2 | Status: SHIPPED | OUTPATIENT
Start: 2020-11-03

## 2020-11-02 RX ORDER — MORPHINE SULFATE 2 MG/ML
2 INJECTION, SOLUTION INTRAMUSCULAR; INTRAVENOUS EVERY 2 HOUR PRN
Status: DISCONTINUED | OUTPATIENT
Start: 2020-11-02 | End: 2020-11-03

## 2020-11-02 RX ORDER — ASPIRIN 81 MG/1
TABLET, CHEWABLE ORAL
Status: COMPLETED
Start: 2020-11-02 | End: 2020-11-02

## 2020-11-02 RX ORDER — LISINOPRIL 10 MG/1
10 TABLET ORAL DAILY
Qty: 30 TABLET | Refills: 2 | Status: SHIPPED | OUTPATIENT
Start: 2020-11-02 | End: 2021-02-11

## 2020-11-02 NOTE — PROGRESS NOTES
[facility]    Progress Note    Mary Linder.  Patient Status:  Inpatient    3/23/1950 MRN [id number]   Location [address] Attending Edwin Mann MD   [facility] Day # 2 PCP MD Salma Landin enoxaparin  0.5 mg/kg Subcutaneous Nightly   • latanoprost  1 drop Both Eyes Nightly   • Levothyroxine Sodium  125 mcg Oral Before breakfast   • Metoprolol Succinate ER  50 mg Oral Daily   • Rosuvastatin Calcium  20 mg Oral Nightly   • spironolactone  25 m 10/30/20. Imaging/EKG:   Xr Pelvis (1 View) (cpt=72170)    Result Date: 10/30/2020  CONCLUSION:  1. No acute fracture dislocation. 2. Mild bilateral hip osteoarthritis.     Dictated by (CST): Chad Ricardo MD on 10/30/2020 at 3:43 PM     Finalized

## 2020-11-02 NOTE — PROCEDURES
[facility]    [facility] Cardiac Cath Procedure Note  Marshall Jackson.  Patient Status:  Inpatient    3/23/1950 MRN [id number]   Location [address] Attending Nir Do MD   [facility] Day # 2 PCP Travis Ann unchanged after nitro. Decided not to add a second layer to the mid-distal segment. Cx intervention  Lesion Characteristics- not torturous, not calcified. Type non-C lesion. Pre-intervention stenosis 80%, Post intervention stenosis 0%.   Pre FATOU 3,

## 2020-11-02 NOTE — PLAN OF CARE
Received PRN pain medication for generalized/chronic LLE pain. NPO for cardiac cath, pt prepped.      Problem: Diabetes/Glucose Control  Goal: Glucose maintained within prescribed range  Description: INTERVENTIONS:  - Monitor Blood Glucose as ordered  - Ass indicated  - Evaluate effectiveness of antiarrhythmic and heart rate control medications as ordered  - Initiate emergency measures for life threatening arrhythmias  - Monitor electrolytes and administer replacement therapy as ordered  Outcome: Progressing

## 2020-11-02 NOTE — PLAN OF CARE
IV morphine and Percocet administered for pain. NPO after midnight. Plan for cath Monday.      Problem: Diabetes/Glucose Control  Goal: Glucose maintained within prescribed range  Description: INTERVENTIONS:  - Monitor Blood Glucose as ordered  - Assess for Evaluate effectiveness of antiarrhythmic and heart rate control medications as ordered  - Initiate emergency measures for life threatening arrhythmias  - Monitor electrolytes and administer replacement therapy as ordered  Outcome: Progressing     Problem:

## 2020-11-03 ENCOUNTER — TELEPHONE (OUTPATIENT)
Dept: CARDIOLOGY | Age: 70
End: 2020-11-03

## 2020-11-03 VITALS
WEIGHT: 281.69 LBS | DIASTOLIC BLOOD PRESSURE: 76 MMHG | OXYGEN SATURATION: 96 % | HEART RATE: 78 BPM | TEMPERATURE: 98 F | SYSTOLIC BLOOD PRESSURE: 109 MMHG | RESPIRATION RATE: 20 BRPM | HEIGHT: 69 IN | BODY MASS INDEX: 41.72 KG/M2

## 2020-11-03 PROCEDURE — 99232 SBSQ HOSP IP/OBS MODERATE 35: CPT | Performed by: NURSE PRACTITIONER

## 2020-11-03 PROCEDURE — 99239 HOSP IP/OBS DSCHRG MGMT >30: CPT | Performed by: INTERNAL MEDICINE

## 2020-11-03 NOTE — PLAN OF CARE
Problem: Diabetes/Glucose Control  Goal: Glucose maintained within prescribed range  Description: INTERVENTIONS:  - Monitor Blood Glucose as ordered  - Assess for signs and symptoms of hyperglycemia and hypoglycemia  - Administer ordered medications to m control medications as ordered  - Initiate emergency measures for life threatening arrhythmias  - Monitor electrolytes and administer replacement therapy as ordered  Outcome: Adequate for Discharge     Problem: PAIN - ADULT  Goal: Verbalizes/displays adequ

## 2020-11-03 NOTE — PROGRESS NOTES
Patient did not wait to be seen by diabetic educator and cardiac rehab prior to discharge. Patient states that \"I need to get home to my apartment and I can't wait to be seen. \"

## 2020-11-03 NOTE — DISCHARGE SUMMARY
[facility] HOSP - [facility]    Discharge Summary    Winston Quiles.  Patient Status:  Inpatient    3/23/1950 MRN [id number]   Location 1 Jo[facility] Wayne Attending Scar Del Cid MD   Hosp Day # 3 PCP Marianne Pearce MD     D Extraocular muscles were intact. PERRL. NECK:  Supple. Trach midline  CHEST:  Symmetrical movement on inspiration  HEART:  S1 and S2 heard. RRR   LUNGS:  Good air entry. No crackles or wheezes   ABDOMEN: Soft and non-tender.   Bowel sounds were present 11/03/20  0553   RBC 4.96 4.64 4.82 5.25 5.06   HGB 14.3 13.3 13.7 15.3 14.4   HCT 44.0 42.5 43.0 48.1 45.0   MCV 88.7 91.6 89.2 91.6 88.9   MCH 28.8 28.7 28.4 29.1 28.5   MCHC 32.5 31.3 31.9 31.8 32.0   RDW 13.8 13.8 13.4 13.7 13.7   NEPRELIM 5.63 6.55  - Soln  Generic drug: Bimatoprost      Apply 1 drop to eye. Refills: 0     metFORMIN HCl 500 MG Tabs  Commonly known as: GLUCOPHAGE  Notes to patient: **DO NOT TAKE UNTIL Thursday 11/05**      Take 1 tablet (500 mg total) by mouth 2 (two) times daily.    Marlo Goltz Inhaler  Refills: 3           Where to Get Your Medications      These medications were sent to [address], [address] [facility], [phone], [address] [facility], [address] [address]

## 2020-11-03 NOTE — PROGRESS NOTES
[facility] HOSP - [facility]    Progress Note    Kurt Stoddard.  Patient Status:  Inpatient    3/23/1950 MRN [id number]   Location 1 [facility] Attending Mattie Duong MD   Hosp Day # 3 PCP Alfred Paniagua MD       [facility] EC  81 mg Oral Daily   • Amoxicillin-Pot Clavulanate  875 mg Oral Q12H   • Insulin Aspart Pen  1-7 Units Subcutaneous TID CC   • lisinopril  10 mg Oral Daily   • Clopidogrel Bisulfate  75 mg Oral Daily   • enoxaparin  0.5 mg/kg Subcutaneous Nightly   • lat

## 2020-11-04 ENCOUNTER — PATIENT OUTREACH (OUTPATIENT)
Dept: CASE MANAGEMENT | Age: 70
End: 2020-11-04

## 2020-11-04 DIAGNOSIS — Z02.9 ENCOUNTERS FOR ADMINISTRATIVE PURPOSE: ICD-10-CM

## 2020-11-04 PROCEDURE — 1111F DSCHRG MED/CURRENT MED MERGE: CPT

## 2020-11-04 NOTE — PROGRESS NOTES
Initial Post Discharge Follow Up   Discharge Date: 11/3/20  Contact Date: 11/4/2020    Consent Verification:  Assessment Completed With: Patient  HIPAA Verified?   Yes    Discharge Dx:   Acute chest pain    Was TCC ordered: no      General:   • How have y needed. • Timolol Maleate 0.5 % Ophthalmic Solution Place 1 drop into both eyes 2 (two) times daily. • oxyCODONE-acetaminophen  MG Oral Tab Take 1 tablet by mouth every 8 (eight) hours as needed for Pain.      • Levothyroxine Sodium 125 MCG Or medication’s purpose & side effects reviewed? yes  o Do you have any questions about your new medication?  No  • Did you  your discharge medications when you left the hospital? Yes  • May I go over your medications with you to make sure we are not mi upon discharge and only has his Plavix and Aspirin as he had extra in the house. JAQUELINE informed patient that she would look into this and return his phone call. JAQUELINE scheduled HFU with PCP for 11/10/2020.  He denied having any further questions or concerns at

## 2020-11-05 ENCOUNTER — PATIENT OUTREACH (OUTPATIENT)
Dept: CASE MANAGEMENT | Age: 70
End: 2020-11-05

## 2020-11-05 DIAGNOSIS — R35.1 BENIGN PROSTATIC HYPERPLASIA WITH NOCTURIA: ICD-10-CM

## 2020-11-05 DIAGNOSIS — N40.1 BENIGN PROSTATIC HYPERPLASIA WITH NOCTURIA: ICD-10-CM

## 2020-11-05 DIAGNOSIS — E78.00 PURE HYPERCHOLESTEROLEMIA: ICD-10-CM

## 2020-11-05 DIAGNOSIS — E66.01 MORBID OBESITY WITH BMI OF 40.0-44.9, ADULT (HCC): ICD-10-CM

## 2020-11-05 DIAGNOSIS — R79.89 LOW TESTOSTERONE: ICD-10-CM

## 2020-11-05 DIAGNOSIS — I10 ESSENTIAL HYPERTENSION: ICD-10-CM

## 2020-11-05 DIAGNOSIS — I25.10 CORONARY ARTERY DISEASE INVOLVING NATIVE CORONARY ARTERY OF NATIVE HEART WITHOUT ANGINA PECTORIS: ICD-10-CM

## 2020-11-05 DIAGNOSIS — E11.8 TYPE 2 DIABETES MELLITUS WITH COMPLICATION, WITHOUT LONG-TERM CURRENT USE OF INSULIN (HCC): ICD-10-CM

## 2020-11-05 DIAGNOSIS — E03.9 ACQUIRED HYPOTHYROIDISM: ICD-10-CM

## 2020-11-05 NOTE — PROGRESS NOTES
Called patient for monthly CCM outreach as well as to introduce myself, left message to call back.       Chart review - 5 min  Time with patient - 0 min  Total time - 5 min

## 2020-11-05 NOTE — PROGRESS NOTES
11/5/2020  Spoke to [facility] for CCM.       Updates to patient care team/ comments: Fransisca Tidwell LPN CCM added  Patient reported changes in medications: None  Med Adherence  Comment: Not taking does not have any med's at home, they were not sent with pt upon Plan:    • Active goal from previous outreach:                       Make appointment with cardio and keep up with compliance    • Patient reported progress toward goal: Trying to follow poc but has no med's as the hospital didn't send them home with pt at

## 2020-11-17 ENCOUNTER — TELEPHONE (OUTPATIENT)
Dept: SURGERY | Facility: CLINIC | Age: 70
End: 2020-11-17

## 2020-11-17 NOTE — TELEPHONE ENCOUNTER
Patient having pain in the same area and having problem urinating, please advise at:[phone],thanks.

## 2020-11-17 NOTE — TELEPHONE ENCOUNTER
S/W pt and determined that he is OOT in [place] about 1 hr away and he is experiencing constant severe pain in his Lt flank at level 8-9 and difficulty urinating with a slow dribbling stream and having to strain to urinate and when the urine does come out

## 2020-11-18 NOTE — TELEPHONE ENCOUNTER
S/W pt and informed him of DUANE's response in his msg below and pt stated he would go the the nearest ER tomorrow.

## 2020-11-20 ENCOUNTER — APPOINTMENT (OUTPATIENT)
Dept: CT IMAGING | Facility: HOSPITAL | Age: 70
End: 2020-11-20
Attending: EMERGENCY MEDICINE
Payer: MEDICARE

## 2020-11-20 ENCOUNTER — HOSPITAL ENCOUNTER (EMERGENCY)
Facility: HOSPITAL | Age: 70
Discharge: HOME | End: 2020-11-20
Attending: EMERGENCY MEDICINE
Payer: MEDICARE

## 2020-11-20 ENCOUNTER — APPOINTMENT (OUTPATIENT)
Dept: GENERAL RADIOLOGY | Facility: HOSPITAL | Age: 70
End: 2020-11-20
Payer: MEDICARE

## 2020-11-20 VITALS
RESPIRATION RATE: 17 BRPM | OXYGEN SATURATION: 95 % | HEIGHT: 69 IN | WEIGHT: 280 LBS | DIASTOLIC BLOOD PRESSURE: 89 MMHG | TEMPERATURE: 98 F | SYSTOLIC BLOOD PRESSURE: 155 MMHG | BODY MASS INDEX: 41.47 KG/M2 | HEART RATE: 63 BPM

## 2020-11-20 DIAGNOSIS — J06.9 VIRAL UPPER RESPIRATORY TRACT INFECTION: Primary | ICD-10-CM

## 2020-11-20 DIAGNOSIS — M54.59 INTRACTABLE LOW BACK PAIN: ICD-10-CM

## 2020-11-20 DIAGNOSIS — R07.89 CHEST PAIN, ATYPICAL: ICD-10-CM

## 2020-11-20 PROCEDURE — 80048 BASIC METABOLIC PNL TOTAL CA: CPT

## 2020-11-20 PROCEDURE — 71045 X-RAY EXAM CHEST 1 VIEW: CPT

## 2020-11-20 PROCEDURE — 85025 COMPLETE CBC W/AUTO DIFF WBC: CPT | Performed by: EMERGENCY MEDICINE

## 2020-11-20 PROCEDURE — 85025 COMPLETE CBC W/AUTO DIFF WBC: CPT

## 2020-11-20 PROCEDURE — 93010 ELECTROCARDIOGRAM REPORT: CPT | Performed by: EMERGENCY MEDICINE

## 2020-11-20 PROCEDURE — 99285 EMERGENCY DEPT VISIT HI MDM: CPT

## 2020-11-20 PROCEDURE — 84484 ASSAY OF TROPONIN QUANT: CPT

## 2020-11-20 PROCEDURE — 84484 ASSAY OF TROPONIN QUANT: CPT | Performed by: EMERGENCY MEDICINE

## 2020-11-20 PROCEDURE — 74176 CT ABD & PELVIS W/O CONTRAST: CPT | Performed by: EMERGENCY MEDICINE

## 2020-11-20 PROCEDURE — 81003 URINALYSIS AUTO W/O SCOPE: CPT | Performed by: EMERGENCY MEDICINE

## 2020-11-20 PROCEDURE — 96374 THER/PROPH/DIAG INJ IV PUSH: CPT

## 2020-11-20 PROCEDURE — 96367 TX/PROPH/DG ADDL SEQ IV INF: CPT

## 2020-11-20 PROCEDURE — 93005 ELECTROCARDIOGRAM TRACING: CPT

## 2020-11-20 PROCEDURE — 80048 BASIC METABOLIC PNL TOTAL CA: CPT | Performed by: EMERGENCY MEDICINE

## 2020-11-20 RX ORDER — KETOROLAC TROMETHAMINE 15 MG/ML
15 INJECTION, SOLUTION INTRAMUSCULAR; INTRAVENOUS ONCE
Status: COMPLETED | OUTPATIENT
Start: 2020-11-20 | End: 2020-11-20

## 2020-11-20 RX ORDER — ACETAMINOPHEN 325 MG/1
650 TABLET ORAL ONCE
Status: COMPLETED | OUTPATIENT
Start: 2020-11-20 | End: 2020-11-20

## 2020-11-20 RX ORDER — TRAMADOL HYDROCHLORIDE 50 MG/1
TABLET ORAL EVERY 8 HOURS PRN
Qty: 10 TABLET | Refills: 0 | Status: SHIPPED | OUTPATIENT
Start: 2020-11-20 | End: 2020-11-27

## 2020-11-20 NOTE — ED PROVIDER NOTES
Patient Seen in: [facility] Emergency Department      History   Patient presents with:  Chest Pain Angina    Stated Complaint:     HPI    The patient is a 73-year-old male with a history of coronary artery disease, low back pain, kidney stone who Performed by Kaiden Coley MD at [facility] OR   • CYSTOSCOPY [address] Left 8/4/2020    Performed by Kaiden Coley MD at [facility] OR   • CYSTOSCOPY URETEROSCOPY Left 8/4/2020    Performed by Kaiden Coley MD at [facility] OR   • [id number] [facility] Breath sounds: Normal breath sounds. No wheezing or rales. Abdominal:      General: Bowel sounds are normal. There is no distension. Palpations: Abdomen is soft. There is no mass. Tenderness: There is no abdominal tenderness.  There is no gua Rate, intervals and axes as noted on EKG Report.   Rate: 77  Rhythm: Sinus Rhythm  Reading: Right bundle branch block, LVH  EKG #2  Rate intervals and axes as noted on EKG report  Rate: 62  Rhythm: Sinus  Reading: Right bundle branch block, LVH            P CONCLUSION:  1. 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 There is mild nonspecific dilation of the left renal lower pole moiety. However, no obstructing calculus is identified.   Therefore, this finding may be related to a recently passed calculus Schedule an appointment as soon as possible for a visit      Susan Acosta MD  [address] [facility] [phone]    Schedule an appointment as soon as possible for a visit      We recommend that you schedule follow up care with a

## 2020-11-20 NOTE — ED NOTES
Anh Castellano is here for multiple complaints: chest pain, dyspnea, and lower back pain. States he has history of kidney stone and attributes back pain to kidney stone. Also had episode of left upper chest pain radiating down left arm. States this has resolved.

## 2020-11-20 NOTE — ED INITIAL ASSESSMENT (HPI)
PATIENT AOX3 TO ED VIA PRIVATE VEHICLE PATIENT CO OF CHEST PAIN AND SOB X YESTERDAY. PATIENT  [facility] ON 11/2 FOR CARDIAC STENTS.

## 2020-11-30 PROCEDURE — 99490 CHRNC CARE MGMT STAFF 1ST 20: CPT

## 2020-12-08 ENCOUNTER — PATIENT OUTREACH (OUTPATIENT)
Dept: CASE MANAGEMENT | Age: 70
End: 2020-12-08

## 2020-12-08 ENCOUNTER — TELEPHONE (OUTPATIENT)
Dept: CASE MANAGEMENT | Age: 70
End: 2020-12-08

## 2020-12-08 DIAGNOSIS — R79.89 LOW TESTOSTERONE: ICD-10-CM

## 2020-12-08 DIAGNOSIS — E11.8 TYPE 2 DIABETES MELLITUS WITH COMPLICATION, WITHOUT LONG-TERM CURRENT USE OF INSULIN (HCC): ICD-10-CM

## 2020-12-08 DIAGNOSIS — E03.9 HYPOTHYROIDISM, UNSPECIFIED TYPE: ICD-10-CM

## 2020-12-08 DIAGNOSIS — R53.82 CHRONIC FATIGUE: ICD-10-CM

## 2020-12-08 DIAGNOSIS — I10 HYPERTENSION, UNSPECIFIED TYPE: ICD-10-CM

## 2020-12-08 RX ORDER — HYDROCODONE BITARTRATE AND ACETAMINOPHEN 7.5; 325 MG/1; MG/1
TABLET ORAL
COMMUNITY
Start: 2020-10-28 | End: 2021-05-05

## 2020-12-08 RX ORDER — HYDROCODONE BITARTRATE AND ACETAMINOPHEN 7.5; 325 MG/1; MG/1
TABLET ORAL
COMMUNITY
Start: 2020-11-06 | End: 2021-05-05

## 2020-12-08 RX ORDER — PREGABALIN 300 MG/1
300 CAPSULE ORAL
COMMUNITY
End: 2021-07-01

## 2020-12-08 RX ORDER — GABAPENTIN 600 MG/1
TABLET ORAL
Status: ON HOLD | COMMUNITY
Start: 2020-12-03 | End: 2022-01-09

## 2020-12-08 RX ORDER — AMOXICILLIN 500 MG/1
CAPSULE ORAL
COMMUNITY
Start: 2020-11-06 | End: 2020-12-14 | Stop reason: ALTCHOICE

## 2020-12-08 NOTE — TELEPHONE ENCOUNTER
Spoke to patient in [employer]. Patient needs refill of Plavix sent to local pharmacy.      Thank you

## 2020-12-08 NOTE — PROGRESS NOTES
12/8/2020  Spoke to [facility] for CCM.       Updates To Patient Care Team/ Comments:   *Up To Date    Patient Reported Changes In Medications:   *None At This Time     Med Adherence Comment:   *Taking As Directed     Health Maintenance:    *Patient is needing re over a week and went to pharmacy and he reports     That they gave him three pills and will check up on his refills. Plan For Overcoming All Barriers:   *Staying on track and watching his diet.    *I (Healthcare ), will route a message to PCP kwaku

## 2020-12-10 RX ORDER — CLOPIDOGREL BISULFATE 75 MG/1
75 TABLET ORAL DAILY
Qty: 90 TABLET | Refills: 0 | Status: SHIPPED | OUTPATIENT
Start: 2020-12-10 | End: 2021-06-25

## 2020-12-11 ENCOUNTER — PATIENT OUTREACH (OUTPATIENT)
Dept: CASE MANAGEMENT | Age: 70
End: 2020-12-11

## 2020-12-11 NOTE — PROGRESS NOTES
I ([address] [place] [address]), contacted patient to follow-up and/or check to see how they were doing since previous call.  Spoke with patient and asked how they were doing and/ or if they have received resources I have sent to them and/or to see how there appointme

## 2020-12-14 RX ORDER — TAMSULOSIN HYDROCHLORIDE 0.4 MG/1
CAPSULE ORAL
Qty: 180 CAPSULE | Refills: 2 | Status: SHIPPED | OUTPATIENT
Start: 2020-12-14 | End: 2020-12-17

## 2020-12-14 NOTE — TELEPHONE ENCOUNTER
Patient is calling for a refill on his Flomax. If it can be send to the [facility] in [facility] on patient chart. Please advice once it has been approved.

## 2020-12-17 RX ORDER — TAMSULOSIN HYDROCHLORIDE 0.4 MG/1
CAPSULE ORAL
Qty: 180 CAPSULE | Refills: 2 | Status: SHIPPED | OUTPATIENT
Start: 2020-12-17 | End: 2021-07-01

## 2020-12-31 PROCEDURE — 99490 CHRNC CARE MGMT STAFF 1ST 20: CPT

## 2021-01-12 ENCOUNTER — PATIENT OUTREACH (OUTPATIENT)
Dept: CASE MANAGEMENT | Age: 71
End: 2021-01-12

## 2021-01-12 DIAGNOSIS — E66.01 MORBID OBESITY WITH BMI OF 40.0-44.9, ADULT (HCC): ICD-10-CM

## 2021-01-12 DIAGNOSIS — R79.89 LOW TESTOSTERONE: ICD-10-CM

## 2021-01-12 DIAGNOSIS — I10 HYPERTENSION, UNSPECIFIED TYPE: ICD-10-CM

## 2021-01-12 DIAGNOSIS — E11.8 TYPE 2 DIABETES MELLITUS WITH COMPLICATION, WITHOUT LONG-TERM CURRENT USE OF INSULIN (HCC): ICD-10-CM

## 2021-01-12 DIAGNOSIS — Z12.5 PROSTATE CANCER SCREENING: ICD-10-CM

## 2021-01-12 DIAGNOSIS — M48.062 LUMBAR STENOSIS WITH NEUROGENIC CLAUDICATION: ICD-10-CM

## 2021-01-12 DIAGNOSIS — R53.82 CHRONIC FATIGUE: ICD-10-CM

## 2021-01-12 DIAGNOSIS — E03.9 HYPOTHYROIDISM, UNSPECIFIED TYPE: ICD-10-CM

## 2021-01-12 NOTE — PROGRESS NOTES
1/12/2021  Spoke to [facility] for CCM. Updates To Patient Care Team/ Comments:   *Up To Date. Patient Reported Changes In Medications:   *None At This Time. Med Adherence Comment:   *Taking As Directed.     Health Maintenance:  Colonoscopy due on 03/ Reported):  1= Least Confident In Achieving Goal, 5= Very Confident   Confidence: : 3    Care Manager Follow Up:   *1 Month. Reason For Follow Up:   *Review medications, care team, health maintenance and if any changes update patient's chart.  [employer]

## 2021-01-12 NOTE — PROGRESS NOTES
I (Healthcare ), spoke with patient regarding the his new pharmacy in [facility]. I informed the patient that I have put his preferred pharmacy as the [facility] in [facility].  I also informed patient that he still needs to call his previous pharmacy in [facility]

## 2021-01-26 DIAGNOSIS — Z23 NEED FOR VACCINATION: ICD-10-CM

## 2021-01-31 PROCEDURE — 99490 CHRNC CARE MGMT STAFF 1ST 20: CPT

## 2021-02-04 RX ORDER — LISINOPRIL 10 MG/1
TABLET ORAL
Qty: 30 TABLET | Refills: 2 | OUTPATIENT
Start: 2021-02-04

## 2021-02-04 NOTE — TELEPHONE ENCOUNTER
Patient not seen at this office, please send to [facility] medical group office   fax #[phone]

## 2021-02-05 ENCOUNTER — TELEMEDICINE (OUTPATIENT)
Dept: ENDOCRINOLOGY CLINIC | Facility: CLINIC | Age: 71
End: 2021-02-05
Payer: MEDICARE

## 2021-02-05 DIAGNOSIS — E03.9 HYPOTHYROIDISM, UNSPECIFIED TYPE: ICD-10-CM

## 2021-02-05 DIAGNOSIS — R68.82 LIBIDO, DECREASED: Primary | ICD-10-CM

## 2021-02-05 PROCEDURE — 99214 OFFICE O/P EST MOD 30 MIN: CPT | Performed by: INTERNAL MEDICINE

## 2021-02-05 NOTE — PROGRESS NOTES
Ramesh Saleh,  verbally consents to a video visit on 2/5/2021     Patient understands and accepts financial responsibility for any deductible, co-insurance and/or co-pays associated with this service.   Patient has been referred to the [facility] website at [address] Dispense Refill   • tamsulosin (FLOMAX) cap TAKE 2 CAPSULES(0.8 MG) BY MOUTH DAILY AT NIGHT 180 capsule 2   • Clopidogrel Bisulfate 75 MG Oral Tab Take 1 tablet (75 mg total) by mouth daily.  90 tablet 0   • gabapentin 600 MG Oral Tab TK 2 TS PO TID FOR NER lungs every 6 (six) hours as needed for Wheezing. 3 Inhaler 3   • metFORMIN HCl 500 MG Oral Tab Take 1 tablet (500 mg total) by mouth 2 (two) times daily. 180 tablet 3   • glipiZIDE 5 MG Oral Tab TAKE 1 TABLET BY MOUTH EVERY MORNING BEFORE BREAKFAST.  90 ta moving all extremities without difficulty  Psychiatric:  oriented to time, self, and place  Extremities: no obvious extremity swelling, no lesions      DATA:     Pertinent data reviewed    ASSESSMENT AND PLAN:      Patient is a 79year old male with:     1 understands that testosterone therapy can contribute to sleep apnea, cause acne and other skin reactions, stimulate non cancerous growth of prostate (benign prostate hyperplasia) and growth of existing prostate cancer, enlarge breasts, limit sperm producti medical decision-making and tests are ordered as detailed in the plan of care above.”

## 2021-02-11 ENCOUNTER — PATIENT OUTREACH (OUTPATIENT)
Dept: CASE MANAGEMENT | Age: 71
End: 2021-02-11

## 2021-02-11 ENCOUNTER — TELEPHONE (OUTPATIENT)
Dept: INTERNAL MEDICINE CLINIC | Facility: CLINIC | Age: 71
End: 2021-02-11

## 2021-02-11 DIAGNOSIS — M48.062 LUMBAR STENOSIS WITH NEUROGENIC CLAUDICATION: ICD-10-CM

## 2021-02-11 DIAGNOSIS — E11.8 TYPE 2 DIABETES MELLITUS WITH COMPLICATION, WITHOUT LONG-TERM CURRENT USE OF INSULIN (HCC): ICD-10-CM

## 2021-02-11 DIAGNOSIS — R79.89 LOW TESTOSTERONE: ICD-10-CM

## 2021-02-11 DIAGNOSIS — I10 HYPERTENSION, UNSPECIFIED TYPE: ICD-10-CM

## 2021-02-11 DIAGNOSIS — E03.9 HYPOTHYROIDISM, UNSPECIFIED TYPE: ICD-10-CM

## 2021-02-11 DIAGNOSIS — R53.82 CHRONIC FATIGUE: ICD-10-CM

## 2021-02-11 DIAGNOSIS — E66.01 MORBID OBESITY WITH BMI OF 40.0-44.9, ADULT (HCC): ICD-10-CM

## 2021-02-11 RX ORDER — DULOXETIN HYDROCHLORIDE 30 MG/1
CAPSULE, DELAYED RELEASE ORAL
COMMUNITY
Start: 2021-02-04 | End: 2021-05-05

## 2021-02-11 RX ORDER — LISINOPRIL 10 MG/1
10 TABLET ORAL DAILY
Qty: 7 TABLET | Refills: 0 | Status: SHIPPED | OUTPATIENT
Start: 2021-02-11 | End: 2021-02-11

## 2021-02-11 NOTE — PROGRESS NOTES
2/11/2021  Spoke to Margaret Dhillon for CCM. Updates To Patient Care Team/ Comments:   *Up To Date. Patient Reported Changes In Medications:   *None At This Time. Med Adherence Comment:   *Taking As Directed.     Health Maintenance:  [facility] Covid Vaccine eating whatever he can for the most part. He is still eating three times a day. His portion size has been smaller and he is not eating as much as he usually does.  Patient has been sleeping on the couch because it is closer to get to the bathroom with his b with the patient to ask if they have any barriers or concerns at this time.  To conclude, make sure the patient overall is in good standing and to see if they need anything from me Gameleon) such as resources or have any questions for me at this ti

## 2021-02-11 NOTE — TELEPHONE ENCOUNTER
Good Afternoon,     I just recently spoke with patient Stevan Hall. ([id number]).  Patient is requesting refills on this list of medications: Lisinopril 10 mg Oral Tab, Metformin HCl 500 mg Oral Tab, Timolol Maleate 0.5% Ophthalmic Solution, and Bimatopro

## 2021-02-11 NOTE — TELEPHONE ENCOUNTER
Spoke to patient. LOV 10/2020 and patient was sent to ER from office visit. Patient has not followed-up since (was recommended he follow-up a week after). Appt scheduled for 2/19 @ 12:40 Medicare Annual (pt is due this month).     Discussed with camilla

## 2021-02-28 PROCEDURE — 99490 CHRNC CARE MGMT STAFF 1ST 20: CPT

## 2021-03-08 ENCOUNTER — PATIENT OUTREACH (OUTPATIENT)
Dept: CASE MANAGEMENT | Age: 71
End: 2021-03-08

## 2021-03-08 DIAGNOSIS — M48.062 LUMBAR STENOSIS WITH NEUROGENIC CLAUDICATION: ICD-10-CM

## 2021-03-08 DIAGNOSIS — E11.8 TYPE 2 DIABETES MELLITUS WITH COMPLICATION, WITHOUT LONG-TERM CURRENT USE OF INSULIN (HCC): ICD-10-CM

## 2021-03-08 DIAGNOSIS — R53.82 CHRONIC FATIGUE: ICD-10-CM

## 2021-03-08 DIAGNOSIS — N40.1 BENIGN PROSTATIC HYPERPLASIA WITH NOCTURIA: ICD-10-CM

## 2021-03-08 DIAGNOSIS — E03.9 HYPOTHYROIDISM, UNSPECIFIED TYPE: ICD-10-CM

## 2021-03-08 DIAGNOSIS — E66.01 MORBID OBESITY WITH BMI OF 40.0-44.9, ADULT (HCC): ICD-10-CM

## 2021-03-08 DIAGNOSIS — R79.89 LOW TESTOSTERONE: ICD-10-CM

## 2021-03-08 DIAGNOSIS — I10 HYPERTENSION, UNSPECIFIED TYPE: ICD-10-CM

## 2021-03-08 DIAGNOSIS — Z98.61 HISTORY OF PTCA: ICD-10-CM

## 2021-03-08 DIAGNOSIS — R35.1 BENIGN PROSTATIC HYPERPLASIA WITH NOCTURIA: ICD-10-CM

## 2021-03-08 DIAGNOSIS — H40.2290 CHRONIC PRIMARY ANGLE-CLOSURE GLAUCOMA, UNSPECIFIED GLAUCOMA STAGE, UNSPECIFIED LATERALITY: ICD-10-CM

## 2021-03-08 DIAGNOSIS — I50.42 CHRONIC COMBINED SYSTOLIC AND DIASTOLIC CHF, NYHA CLASS 3 (HCC): ICD-10-CM

## 2021-03-08 NOTE — PROGRESS NOTES
3/8/2021  Spoke to Alison Hooks for CCM. Updates To Patient Care Team/ Comments:   *Up To Date. Patient Reported Changes In Medications:   *None At This Time. Med Adherence Comment:   *Taking As Directed.     Health Maintenance:  Colonoscopy due on 03/2 that he can see on his way home. Patient reported that he would like to move end of April or beginning of May. Previous Goal Met:   *Goal Still In Progress.      Self Management Goals/Action Plan      Active Goal From Previous Outreach:   *Monitoring injections to make the back pain go away. If the injections do not work he will be looking into back surgery. *Patient would like to move at end of April.  I will be helping and looking out for places in the patient's price range to help them find a home have any questions for me at this time. Care Managers Interventions:    I will continue to inspire and motivate my patient. As well as, encourage a nutritional, healthy, active, and positive lifestyle.  While uplifting their spirits to keep my patien

## 2021-03-31 ENCOUNTER — TELEPHONE (OUTPATIENT)
Dept: CARDIOLOGY | Age: 71
End: 2021-03-31

## 2021-03-31 PROCEDURE — 99490 CHRNC CARE MGMT STAFF 1ST 20: CPT

## 2021-03-31 RX ORDER — CLOPIDOGREL BISULFATE 75 MG/1
75 TABLET ORAL DAILY
Status: CANCELLED | OUTPATIENT
Start: 2021-03-31

## 2021-03-31 RX ORDER — CLOPIDOGREL BISULFATE 75 MG/1
75 TABLET ORAL DAILY
Qty: 90 TABLET | Refills: 0 | Status: SHIPPED | OUTPATIENT
Start: 2021-03-31

## 2021-04-06 ENCOUNTER — PATIENT OUTREACH (OUTPATIENT)
Dept: CASE MANAGEMENT | Age: 71
End: 2021-04-06

## 2021-04-06 DIAGNOSIS — R53.82 CHRONIC FATIGUE: ICD-10-CM

## 2021-04-06 DIAGNOSIS — I10 HYPERTENSION, UNSPECIFIED TYPE: ICD-10-CM

## 2021-04-06 DIAGNOSIS — E78.00 PURE HYPERCHOLESTEROLEMIA: ICD-10-CM

## 2021-04-06 DIAGNOSIS — E11.8 TYPE 2 DIABETES MELLITUS WITH COMPLICATION, WITHOUT LONG-TERM CURRENT USE OF INSULIN (HCC): ICD-10-CM

## 2021-04-06 DIAGNOSIS — E03.9 HYPOTHYROIDISM, UNSPECIFIED TYPE: ICD-10-CM

## 2021-04-06 DIAGNOSIS — Z12.5 PROSTATE CANCER SCREENING: ICD-10-CM

## 2021-04-06 DIAGNOSIS — E66.01 MORBID OBESITY WITH BMI OF 40.0-44.9, ADULT (HCC): ICD-10-CM

## 2021-04-06 DIAGNOSIS — H40.2290 CHRONIC PRIMARY ANGLE-CLOSURE GLAUCOMA, UNSPECIFIED GLAUCOMA STAGE, UNSPECIFIED LATERALITY: ICD-10-CM

## 2021-04-06 NOTE — PROGRESS NOTES
4/6/2021  Spoke to Neida Tamayo for CCM. Updates To Patient Care Team/ Comments:   *Up To Date. Patient Reported Changes In Medications:   *Up To Date. Med Adherence Comment:   *Taking As Directed.     Health Maintenance:  Colonoscopy Never done  Zoster in and out of the hospital.      Update To Previous Barriers:   *Patient has not rescheduled his COVID-19 VACCINE.   *Patient is walking and getting around much better. *Patient will be having injections in his back on April 15, 2021.   *Patient is still l patient moving forward and being successful in their present and future goals. Helping them execute their goals and have that feeling of success is my duty! Time Spent This Encounter Total:    21 MINUTES(s)       Medical Record Review.   Telephone Com

## 2021-04-15 ENCOUNTER — APPOINTMENT (OUTPATIENT)
Dept: CARDIOLOGY | Age: 71
End: 2021-04-15

## 2021-04-30 PROCEDURE — 99490 CHRNC CARE MGMT STAFF 1ST 20: CPT

## 2021-05-05 ENCOUNTER — PATIENT OUTREACH (OUTPATIENT)
Dept: CASE MANAGEMENT | Age: 71
End: 2021-05-05

## 2021-05-05 ENCOUNTER — TELEPHONE (OUTPATIENT)
Dept: CASE MANAGEMENT | Age: 71
End: 2021-05-05

## 2021-05-05 DIAGNOSIS — R79.89 LOW TESTOSTERONE: ICD-10-CM

## 2021-05-05 DIAGNOSIS — E11.8 TYPE 2 DIABETES MELLITUS WITH COMPLICATION, WITHOUT LONG-TERM CURRENT USE OF INSULIN (HCC): ICD-10-CM

## 2021-05-05 DIAGNOSIS — R53.82 CHRONIC FATIGUE: ICD-10-CM

## 2021-05-05 DIAGNOSIS — H40.2290 CHRONIC PRIMARY ANGLE-CLOSURE GLAUCOMA, UNSPECIFIED GLAUCOMA STAGE, UNSPECIFIED LATERALITY: ICD-10-CM

## 2021-05-05 DIAGNOSIS — M48.062 LUMBAR STENOSIS WITH NEUROGENIC CLAUDICATION: ICD-10-CM

## 2021-05-05 DIAGNOSIS — E66.01 MORBID OBESITY WITH BMI OF 40.0-44.9, ADULT (HCC): ICD-10-CM

## 2021-05-05 DIAGNOSIS — E03.9 HYPOTHYROIDISM, UNSPECIFIED TYPE: ICD-10-CM

## 2021-05-05 DIAGNOSIS — E78.00 PURE HYPERCHOLESTEROLEMIA: ICD-10-CM

## 2021-05-05 DIAGNOSIS — I50.42 CHRONIC COMBINED SYSTOLIC AND DIASTOLIC CHF, NYHA CLASS 3 (HCC): ICD-10-CM

## 2021-05-05 DIAGNOSIS — I10 HYPERTENSION, UNSPECIFIED TYPE: ICD-10-CM

## 2021-05-05 NOTE — TELEPHONE ENCOUNTER
Good Afternoon,     I just recently spoke with patient Lucian Goodwin. ([id number]).  Patient is requesting refills on the following medication:     DULOXETINE HCL 30 MG ORAL CAP DR PARTICLES    LISINOPRIL 10 MG ORAL TAB    PREGABALIN 300 MG ORAL CAP    SPIR

## 2021-05-05 NOTE — PROGRESS NOTES
5/5/2021  Spoke to Rachael Ordaz for CCM. Updates To Patient Care Team/ Comments:   *Up To Date. Patient Reported Changes In Medications:   *Up To Date. Med Adherence Comment:   *Taking As Directed. *PATIENT IS NEEDING REFILLS ON SOME MEDICATIONS.    *I been watching his portion sizes and has backed off the snacking all day long.        Update To Previous Barriers:   *Patient is visiting his lady friend who has cancer and informing her children to come and see her because he doesn't know how much longer sh Review. Telephone Communication. Care Plan Updated. Education. Goals & Action Plan Recreation/Update. Provided Acknowledgment & Validation To Patient's Concerns.        Monthly Minute Total Including Today:    39 MINUTE(s)      Physical assessment, c

## 2021-05-07 ENCOUNTER — TELEPHONE (OUTPATIENT)
Dept: CASE MANAGEMENT | Age: 71
End: 2021-05-07

## 2021-05-07 ENCOUNTER — PATIENT OUTREACH (OUTPATIENT)
Dept: CASE MANAGEMENT | Age: 71
End: 2021-05-07

## 2021-05-07 DIAGNOSIS — E03.9 HYPOTHYROIDISM, UNSPECIFIED TYPE: ICD-10-CM

## 2021-05-07 DIAGNOSIS — Z12.5 PROSTATE CANCER SCREENING: ICD-10-CM

## 2021-05-07 DIAGNOSIS — I10 HYPERTENSION, UNSPECIFIED TYPE: ICD-10-CM

## 2021-05-07 DIAGNOSIS — R53.82 CHRONIC FATIGUE: ICD-10-CM

## 2021-05-07 DIAGNOSIS — H40.2290 CHRONIC PRIMARY ANGLE-CLOSURE GLAUCOMA, UNSPECIFIED GLAUCOMA STAGE, UNSPECIFIED LATERALITY: ICD-10-CM

## 2021-05-07 DIAGNOSIS — E66.01 MORBID OBESITY WITH BMI OF 40.0-44.9, ADULT (HCC): ICD-10-CM

## 2021-05-07 DIAGNOSIS — E11.8 TYPE 2 DIABETES MELLITUS WITH COMPLICATION, WITHOUT LONG-TERM CURRENT USE OF INSULIN (HCC): ICD-10-CM

## 2021-05-07 DIAGNOSIS — R79.89 LOW TESTOSTERONE: ICD-10-CM

## 2021-05-07 NOTE — PROGRESS NOTES
I contacted patient to make sure that they have received their medications that had been refilled for them. Patient informed me that they have received their medications.  I also informed them that I they will have to call their eye doctor to refill their e

## 2021-05-07 NOTE — TELEPHONE ENCOUNTER
Good Morning,     I recently just spoke with patient Joseph Bryson. ([id number]). Patient is requesting the PCP office to call and help him set-up an appointment. Patient is having pain with the left side of his stomach and urinating.  Patient states that w

## 2021-05-07 NOTE — TELEPHONE ENCOUNTER
Good Morning,     Thank you for the response. Patient states that they have received their Pregabalin and that they will contact their eye doctor for their eye solution refill. Thank You & Have A Happy Mother's Day! Violeta SCHMITZ [facility] POP.

## 2021-05-31 PROCEDURE — 99439 CHRNC CARE MGMT STAF EA ADDL: CPT

## 2021-05-31 PROCEDURE — 99490 CHRNC CARE MGMT STAFF 1ST 20: CPT

## 2021-06-04 ENCOUNTER — PATIENT OUTREACH (OUTPATIENT)
Dept: CASE MANAGEMENT | Age: 71
End: 2021-06-04

## 2021-06-04 DIAGNOSIS — I25.10 CORONARY ARTERY DISEASE INVOLVING NATIVE CORONARY ARTERY OF NATIVE HEART WITHOUT ANGINA PECTORIS: ICD-10-CM

## 2021-06-04 DIAGNOSIS — E66.01 MORBID OBESITY WITH BMI OF 40.0-44.9, ADULT (HCC): ICD-10-CM

## 2021-06-04 DIAGNOSIS — M48.062 LUMBAR STENOSIS WITH NEUROGENIC CLAUDICATION: ICD-10-CM

## 2021-06-04 DIAGNOSIS — R53.82 CHRONIC FATIGUE: ICD-10-CM

## 2021-06-04 DIAGNOSIS — Z12.5 PROSTATE CANCER SCREENING: ICD-10-CM

## 2021-06-04 DIAGNOSIS — R79.89 LOW TESTOSTERONE: ICD-10-CM

## 2021-06-04 DIAGNOSIS — I50.42 CHRONIC COMBINED SYSTOLIC AND DIASTOLIC CHF, NYHA CLASS 3 (HCC): ICD-10-CM

## 2021-06-04 DIAGNOSIS — E11.8 TYPE 2 DIABETES MELLITUS WITH COMPLICATION, WITHOUT LONG-TERM CURRENT USE OF INSULIN (HCC): ICD-10-CM

## 2021-06-04 DIAGNOSIS — H40.2290 CHRONIC PRIMARY ANGLE-CLOSURE GLAUCOMA, UNSPECIFIED GLAUCOMA STAGE, UNSPECIFIED LATERALITY: ICD-10-CM

## 2021-06-04 DIAGNOSIS — I10 HYPERTENSION, UNSPECIFIED TYPE: ICD-10-CM

## 2021-06-04 DIAGNOSIS — E03.9 HYPOTHYROIDISM, UNSPECIFIED TYPE: ICD-10-CM

## 2021-06-04 NOTE — TELEPHONE ENCOUNTER
Good Afternoon,     I just recently spoke with patient Joseline Hussein. ([id number]). Patient is requesting PCP office contact him in regards to making an appointment or possible referral due to having a hard time urinating.  Patient states that he keeps havi

## 2021-06-04 NOTE — PROGRESS NOTES
6/4/2021  Spoke to [facility] for CCM. Updates To Patient Care Team/ Comments:   *Up To Date. Patient Reported Changes In Medications:   *Up To Date. Med Adherence Comment:   *Taking As Directed.     Health Maintenance:  COVID-19 Vaccine(1) Never done with his wife who has stage 3 cancer. He has been trying to take care of her and her needs. Update To Previous Barriers:   *Patient was able to get his medications refilled.      Patient Reported New Barriers And Concerns:   *Patient is having a hard Monthly Minute Total Including Today:    20 MINUTE(s)      Physical assessment, complete health history, and care plan established by Adilene Jaime MD, need for CCM determined from these assessments and data.

## 2021-06-15 NOTE — TELEPHONE ENCOUNTER
Good Afternoon,     I was just wondering if you have been able to contact patient in regards to making an appointment or possible referral due to the struggle with urination? Thank you & Have a great day! Violeta SCHMITZ [facility] NO. TH.

## 2021-06-24 ENCOUNTER — TELEPHONE (OUTPATIENT)
Dept: INTERNAL MEDICINE CLINIC | Facility: CLINIC | Age: 71
End: 2021-06-24

## 2021-06-24 RX ORDER — METOPROLOL SUCCINATE 50 MG/1
TABLET, EXTENDED RELEASE ORAL
Qty: 90 TABLET | Refills: 3 | Status: SHIPPED | OUTPATIENT
Start: 2021-06-24 | End: 2021-06-25

## 2021-06-24 NOTE — TELEPHONE ENCOUNTER
Patient is calling for a medication refill on the following: Clopidogrel Bisulfate 75 MG Oral Tab, Metoprolol Succinate ER 50 MG Oral Tablet 24 Hr, and lisinopril 10 MG Oral Tab. Patient rescheduled missed apt for 7/1. Please advise.

## 2021-06-25 RX ORDER — CLOPIDOGREL BISULFATE 75 MG/1
75 TABLET ORAL DAILY
Qty: 7 TABLET | Refills: 0 | Status: SHIPPED | OUTPATIENT
Start: 2021-06-25 | End: 2021-12-30

## 2021-06-25 RX ORDER — LISINOPRIL 10 MG/1
10 TABLET ORAL DAILY
Qty: 7 TABLET | Refills: 0 | Status: SHIPPED | OUTPATIENT
Start: 2021-06-25 | End: 2021-07-03

## 2021-06-25 RX ORDER — METOPROLOL SUCCINATE 50 MG/1
50 TABLET, EXTENDED RELEASE ORAL DAILY
Qty: 7 TABLET | Refills: 0 | Status: SHIPPED | OUTPATIENT
Start: 2021-06-25 | End: 2021-07-03

## 2021-06-25 RX ORDER — ROSUVASTATIN CALCIUM 20 MG/1
20 TABLET, COATED ORAL NIGHTLY
Qty: 7 TABLET | Refills: 0 | Status: SHIPPED | OUTPATIENT
Start: 2021-06-25 | End: 2021-07-03

## 2021-06-30 PROCEDURE — 99490 CHRNC CARE MGMT STAFF 1ST 20: CPT

## 2021-07-01 ENCOUNTER — OFFICE VISIT (OUTPATIENT)
Dept: INTERNAL MEDICINE CLINIC | Facility: CLINIC | Age: 71
End: 2021-07-01
Payer: MEDICARE

## 2021-07-01 VITALS
SYSTOLIC BLOOD PRESSURE: 135 MMHG | BODY MASS INDEX: 42.36 KG/M2 | DIASTOLIC BLOOD PRESSURE: 80 MMHG | HEIGHT: 69 IN | OXYGEN SATURATION: 95 % | HEART RATE: 71 BPM | WEIGHT: 286 LBS

## 2021-07-01 DIAGNOSIS — N18.31 STAGE 3A CHRONIC KIDNEY DISEASE (HCC): ICD-10-CM

## 2021-07-01 DIAGNOSIS — I25.10 CORONARY ARTERY DISEASE INVOLVING NATIVE CORONARY ARTERY OF NATIVE HEART WITHOUT ANGINA PECTORIS: Primary | ICD-10-CM

## 2021-07-01 DIAGNOSIS — R30.0 DYSURIA: ICD-10-CM

## 2021-07-01 DIAGNOSIS — E11.69 HYPERLIPIDEMIA ASSOCIATED WITH TYPE 2 DIABETES MELLITUS (HCC): ICD-10-CM

## 2021-07-01 DIAGNOSIS — R80.9 MICROALBUMINURIA DUE TO TYPE 2 DIABETES MELLITUS (HCC): ICD-10-CM

## 2021-07-01 DIAGNOSIS — E11.29 MICROALBUMINURIA DUE TO TYPE 2 DIABETES MELLITUS (HCC): ICD-10-CM

## 2021-07-01 DIAGNOSIS — R39.14 BENIGN PROSTATIC HYPERPLASIA WITH INCOMPLETE BLADDER EMPTYING: ICD-10-CM

## 2021-07-01 DIAGNOSIS — E55.9 VITAMIN D DEFICIENCY: ICD-10-CM

## 2021-07-01 DIAGNOSIS — I10 ESSENTIAL HYPERTENSION: ICD-10-CM

## 2021-07-01 DIAGNOSIS — E11.8 TYPE 2 DIABETES MELLITUS WITH COMPLICATION, WITHOUT LONG-TERM CURRENT USE OF INSULIN (HCC): ICD-10-CM

## 2021-07-01 DIAGNOSIS — N40.1 BENIGN PROSTATIC HYPERPLASIA WITH INCOMPLETE BLADDER EMPTYING: ICD-10-CM

## 2021-07-01 DIAGNOSIS — E78.5 HYPERLIPIDEMIA ASSOCIATED WITH TYPE 2 DIABETES MELLITUS (HCC): ICD-10-CM

## 2021-07-01 DIAGNOSIS — E29.1 HYPOTESTOSTERONEMIA IN MALE: ICD-10-CM

## 2021-07-01 DIAGNOSIS — E03.9 ACQUIRED HYPOTHYROIDISM: ICD-10-CM

## 2021-07-01 DIAGNOSIS — I25.5 ISCHEMIC CARDIOMYOPATHY: ICD-10-CM

## 2021-07-01 LAB
ALBUMIN SERPL-MCNC: 3.1 G/DL (ref 3.4–5)
ALBUMIN/GLOB SERPL: 0.8 {RATIO} (ref 1–2)
ALP LIVER SERPL-CCNC: 81 U/L
ALT SERPL-CCNC: 34 U/L
ANION GAP SERPL CALC-SCNC: 7 MMOL/L (ref 0–18)
AST SERPL-CCNC: 18 U/L (ref 15–37)
BASOPHILS # BLD AUTO: 0.1 X10(3) UL (ref 0–0.2)
BASOPHILS NFR BLD AUTO: 0.8 %
BILIRUB SERPL-MCNC: 0.2 MG/DL (ref 0.1–2)
BILIRUB UR QL: NEGATIVE
BUN BLD-MCNC: 19 MG/DL (ref 7–18)
BUN/CREAT SERPL: 17.9 (ref 10–20)
CALCIUM BLD-MCNC: 9.4 MG/DL (ref 8.5–10.1)
CHLORIDE SERPL-SCNC: 106 MMOL/L (ref 98–112)
CLARITY UR: CLEAR
CO2 SERPL-SCNC: 23 MMOL/L (ref 21–32)
COLOR UR: YELLOW
CREAT BLD-MCNC: 1.06 MG/DL
CREAT UR-SCNC: 61.4 MG/DL
DEPRECATED RDW RBC AUTO: 45.8 FL (ref 35.1–46.3)
EOSINOPHIL # BLD AUTO: 0.76 X10(3) UL (ref 0–0.7)
EOSINOPHIL NFR BLD AUTO: 6.1 %
ERYTHROCYTE [DISTWIDTH] IN BLOOD BY AUTOMATED COUNT: 13.8 % (ref 11–15)
EST. AVERAGE GLUCOSE BLD GHB EST-MCNC: 255 MG/DL (ref 68–126)
GLOBULIN PLAS-MCNC: 4.1 G/DL (ref 2.8–4.4)
GLUCOSE BLD-MCNC: 307 MG/DL (ref 70–99)
GLUCOSE UR-MCNC: >=500 MG/DL
HBA1C MFR BLD HPLC: 10.5 % (ref ?–5.7)
HCT VFR BLD AUTO: 43.9 %
HGB BLD-MCNC: 13.7 G/DL
HGB UR QL STRIP.AUTO: NEGATIVE
IMM GRANULOCYTES # BLD AUTO: 0.08 X10(3) UL (ref 0–1)
IMM GRANULOCYTES NFR BLD: 0.6 %
KETONES UR-MCNC: NEGATIVE MG/DL
LEUKOCYTE ESTERASE UR QL STRIP.AUTO: NEGATIVE
LYMPHOCYTES # BLD AUTO: 2.54 X10(3) UL (ref 1–4)
LYMPHOCYTES NFR BLD AUTO: 20.6 %
M PROTEIN MFR SERPL ELPH: 7.2 G/DL (ref 6.4–8.2)
MCH RBC QN AUTO: 28.2 PG (ref 26–34)
MCHC RBC AUTO-ENTMCNC: 31.2 G/DL (ref 31–37)
MCV RBC AUTO: 90.3 FL
MICROALBUMIN UR-MCNC: 6.11 MG/DL
MICROALBUMIN/CREAT 24H UR-RTO: 99.5 UG/MG (ref ?–30)
MONOCYTES # BLD AUTO: 0.74 X10(3) UL (ref 0.1–1)
MONOCYTES NFR BLD AUTO: 6 %
NEUTROPHILS # BLD AUTO: 8.14 X10 (3) UL (ref 1.5–7.7)
NEUTROPHILS # BLD AUTO: 8.14 X10(3) UL (ref 1.5–7.7)
NEUTROPHILS NFR BLD AUTO: 65.9 %
NITRITE UR QL STRIP.AUTO: NEGATIVE
OSMOLALITY SERPL CALC.SUM OF ELEC: 296 MOSM/KG (ref 275–295)
PATIENT FASTING Y/N/NP: YES
PH UR: 6 [PH] (ref 5–8)
PLATELET # BLD AUTO: 336 10(3)UL (ref 150–450)
POTASSIUM SERPL-SCNC: 4.4 MMOL/L (ref 3.5–5.1)
PROT UR-MCNC: NEGATIVE MG/DL
PSA SERPL-MCNC: 0.42 NG/ML (ref ?–4)
RBC # BLD AUTO: 4.86 X10(6)UL
SODIUM SERPL-SCNC: 136 MMOL/L (ref 136–145)
SP GR UR STRIP: 1.02 (ref 1–1.03)
T4 FREE SERPL-MCNC: 0.7 NG/DL (ref 0.8–1.7)
TSI SER-ACNC: 12.3 MIU/ML (ref 0.36–3.74)
UROBILINOGEN UR STRIP-ACNC: <2
WBC # BLD AUTO: 12.4 X10(3) UL (ref 4–11)

## 2021-07-01 PROCEDURE — 82306 VITAMIN D 25 HYDROXY: CPT | Performed by: INTERNAL MEDICINE

## 2021-07-01 PROCEDURE — 83036 HEMOGLOBIN GLYCOSYLATED A1C: CPT | Performed by: INTERNAL MEDICINE

## 2021-07-01 PROCEDURE — 80053 COMPREHEN METABOLIC PANEL: CPT | Performed by: INTERNAL MEDICINE

## 2021-07-01 PROCEDURE — 82570 ASSAY OF URINE CREATININE: CPT | Performed by: INTERNAL MEDICINE

## 2021-07-01 PROCEDURE — 36415 COLL VENOUS BLD VENIPUNCTURE: CPT | Performed by: INTERNAL MEDICINE

## 2021-07-01 PROCEDURE — 81003 URINALYSIS AUTO W/O SCOPE: CPT | Performed by: INTERNAL MEDICINE

## 2021-07-01 PROCEDURE — 99215 OFFICE O/P EST HI 40 MIN: CPT | Performed by: INTERNAL MEDICINE

## 2021-07-01 PROCEDURE — 82043 UR ALBUMIN QUANTITATIVE: CPT | Performed by: INTERNAL MEDICINE

## 2021-07-01 PROCEDURE — 84153 ASSAY OF PSA TOTAL: CPT | Performed by: INTERNAL MEDICINE

## 2021-07-01 PROCEDURE — 85025 COMPLETE CBC W/AUTO DIFF WBC: CPT | Performed by: INTERNAL MEDICINE

## 2021-07-01 PROCEDURE — 84439 ASSAY OF FREE THYROXINE: CPT | Performed by: INTERNAL MEDICINE

## 2021-07-01 PROCEDURE — 84443 ASSAY THYROID STIM HORMONE: CPT | Performed by: INTERNAL MEDICINE

## 2021-07-01 RX ORDER — DULOXETIN HYDROCHLORIDE 30 MG/1
CAPSULE, DELAYED RELEASE ORAL
Qty: 180 CAPSULE | Refills: 0 | Status: SHIPPED | OUTPATIENT
Start: 2021-07-01 | End: 2022-01-21

## 2021-07-01 RX ORDER — TAMSULOSIN HYDROCHLORIDE 0.4 MG/1
CAPSULE ORAL
Qty: 180 CAPSULE | Refills: 1 | Status: SHIPPED | OUTPATIENT
Start: 2021-07-01 | End: 2021-07-03

## 2021-07-01 NOTE — PROGRESS NOTES
HPI:    Patient ID: Boogie Myers. is a 73year old male. HPI     Patient has multiple medical issues.  : CAD with multiple stents,chronic systolic and diastolic dysfunction,  type 2 DM, HTN, HLD, hypothyroidism, morbid obesity, chronic low back pain, BP eyes 2 (two) times daily. • oxyCODONE-acetaminophen  MG Oral Tab Take 1 tablet by mouth every 8 (eight) hours as needed for Pain.      • OneTouch Delica Lancets 43S Does not apply Misc Check glucose 1 x daily 1 Box 3   • VENTOLIN  (90 Base) Negative for congestion, mouth sores, postnasal drip, sinus pressure and sore throat. Eyes: Negative for visual disturbance. Respiratory: Negative for cough and shortness of breath.     Cardiovascular: Negative for chest pain, palpitations and leg swel oxyCODONE-acetaminophen  MG Oral Tab Take 1 tablet by mouth every 8 (eight) hours as needed for Pain.      • OneTouch Delica Lancets 74V Does not apply Misc Check glucose 1 x daily 1 Box 3   • VENTOLIN  (90 Base) MCG/ACT Inhalation Aero Soln In 46.3 fL 45.8   RDW      11.0 - 15.0 % 13.8   Platelet Count      074.2 - 450.0 10(3)uL 336.0   Prelim Neutrophil Abs      1.50 - 7.70 x10 (3) uL 8.14 (H)   Neutrophils Absolute      1.50 - 7.70 x10(3) uL 8.14 (H)   Lymphocytes Absolute      1.00 - 4.00 x10 Urine      Negative Negative   Microscopic       Microscopic not indicated   MALB URINE      mg/dL 6.11   CREATININE UR RANDOM      mg/dL 61.40   MALB/CRE CALC      <=30.0 ug/mg 99.5 (H)   HEMOGLOBIN A1c      <5.7 % 10.5 (H)   ESTIMATED AVERAGE GLUCOSE AM and waiting 30-60 min before eating. He was not taking daily. Compliance discussed  Explained to him, hypothyroidism is likely cause of fatigue    - TSH W REFLEX TO FREE T4  - T4, FREE (S)    7. Vitamin D deficiency  Vit d 50,000 units 1 cap Q week. 3     Sig: Take 1 tablet (25 mg total) by mouth daily.    • tamsulosin (FLOMAX) cap 180 capsule 3     Sig: TAKE 2 CAPSULES(0.8 MG) BY MOUTH DAILY AT NIGHT       Imaging & Referrals:  UROLOGY - INTERNAL  ENDOCRINOLOGY - INTERNAL  OPHTHALMOLOGY - INTERNAL

## 2021-07-01 NOTE — PATIENT INSTRUCTIONS
Eating Heart-Healthy Foods  Eating has a big impact on your heart health. In fact, eating healthier can improve several of your heart risks at once. For instance, it helps you manage weight, cholesterol, and blood pressure.  Here are ideas to help you erin foods and getting regular exercise. To help you track your progress, keep a diary to record what you eat and how often you exercise. Choose the right foods  Aim to make these foods staples of your diet.  If you have diabetes, you may have different recomme down on added fat, sugar and salt. Look on the internet for lower-fat, lower-sodium recipes without a lot of added sugars. Also try these tips:   · Remove fat from meat and skin from poultry before cooking. · Skim fat from the surface of soups and sauces.

## 2021-07-02 LAB — 25(OH)D3 SERPL-MCNC: 9.4 NG/ML (ref 30–100)

## 2021-07-03 RX ORDER — ROSUVASTATIN CALCIUM 20 MG/1
20 TABLET, COATED ORAL NIGHTLY
Qty: 90 TABLET | Refills: 3 | Status: SHIPPED | OUTPATIENT
Start: 2021-07-03 | End: 2021-09-16

## 2021-07-03 RX ORDER — LEVOTHYROXINE SODIUM 0.12 MG/1
125 TABLET ORAL EVERY MORNING
Qty: 90 TABLET | Refills: 3 | Status: SHIPPED | OUTPATIENT
Start: 2021-07-03

## 2021-07-03 RX ORDER — SPIRONOLACTONE 25 MG/1
25 TABLET ORAL DAILY
Qty: 90 TABLET | Refills: 3 | Status: SHIPPED | OUTPATIENT
Start: 2021-07-03 | End: 2022-01-21

## 2021-07-03 RX ORDER — GLIPIZIDE 5 MG/1
TABLET ORAL
Qty: 90 TABLET | Refills: 3 | Status: SHIPPED | OUTPATIENT
Start: 2021-07-03 | End: 2022-01-21

## 2021-07-03 RX ORDER — METOPROLOL SUCCINATE 50 MG/1
50 TABLET, EXTENDED RELEASE ORAL DAILY
Qty: 90 TABLET | Refills: 3 | Status: SHIPPED | OUTPATIENT
Start: 2021-07-03

## 2021-07-03 RX ORDER — TAMSULOSIN HYDROCHLORIDE 0.4 MG/1
CAPSULE ORAL
Qty: 180 CAPSULE | Refills: 3 | Status: SHIPPED | OUTPATIENT
Start: 2021-07-03 | End: 2022-01-21

## 2021-07-03 RX ORDER — LISINOPRIL 10 MG/1
10 TABLET ORAL DAILY
Qty: 90 TABLET | Refills: 3 | Status: SHIPPED | OUTPATIENT
Start: 2021-07-03 | End: 2022-01-21

## 2021-07-03 RX ORDER — ERGOCALCIFEROL 1.25 MG/1
50000 CAPSULE ORAL WEEKLY
Qty: 12 CAPSULE | Refills: 2 | Status: SHIPPED | OUTPATIENT
Start: 2021-07-03 | End: 2021-08-02

## 2021-07-06 ENCOUNTER — PATIENT OUTREACH (OUTPATIENT)
Dept: CASE MANAGEMENT | Age: 71
End: 2021-07-06

## 2021-07-06 DIAGNOSIS — I10 ESSENTIAL HYPERTENSION: ICD-10-CM

## 2021-07-06 DIAGNOSIS — E66.01 MORBID OBESITY WITH BMI OF 40.0-44.9, ADULT (HCC): ICD-10-CM

## 2021-07-06 DIAGNOSIS — E11.8 TYPE 2 DIABETES MELLITUS WITH COMPLICATION, WITHOUT LONG-TERM CURRENT USE OF INSULIN (HCC): ICD-10-CM

## 2021-07-06 DIAGNOSIS — I50.42 CHRONIC COMBINED SYSTOLIC AND DIASTOLIC CHF, NYHA CLASS 3 (HCC): ICD-10-CM

## 2021-07-06 DIAGNOSIS — I25.10 CORONARY ARTERY DISEASE INVOLVING NATIVE CORONARY ARTERY OF NATIVE HEART WITHOUT ANGINA PECTORIS: ICD-10-CM

## 2021-07-06 NOTE — PROGRESS NOTES
7/6/2021  Spoke to Margaret Dhillon for CCM.       Updates to patient care team/ comments: no changes   Patient reported changes in medications: reports no changes  Med Adherence  Comment: reports adherence     Health Maintenance:     Colonoscopy Never done  Zoster Vac : 109/76    No future appointments. Previous goal met: continue with present goal    Self Management Goals/Action Plan:    • Active goal from previous outreach:                Monitoring Diet.     • Patient reported progress toward goal: digressing

## 2021-07-31 PROCEDURE — 99490 CHRNC CARE MGMT STAFF 1ST 20: CPT

## 2021-08-05 ENCOUNTER — PATIENT OUTREACH (OUTPATIENT)
Dept: CASE MANAGEMENT | Age: 71
End: 2021-08-05

## 2021-08-05 ENCOUNTER — TELEPHONE (OUTPATIENT)
Dept: CASE MANAGEMENT | Age: 71
End: 2021-08-05

## 2021-08-05 DIAGNOSIS — I50.42 CHRONIC COMBINED SYSTOLIC AND DIASTOLIC CHF, NYHA CLASS 3 (HCC): ICD-10-CM

## 2021-08-05 DIAGNOSIS — E11.8 TYPE 2 DIABETES MELLITUS WITH COMPLICATION, WITHOUT LONG-TERM CURRENT USE OF INSULIN (HCC): ICD-10-CM

## 2021-08-05 DIAGNOSIS — I10 ESSENTIAL HYPERTENSION: ICD-10-CM

## 2021-08-05 DIAGNOSIS — I25.10 CORONARY ARTERY DISEASE INVOLVING NATIVE CORONARY ARTERY OF NATIVE HEART WITHOUT ANGINA PECTORIS: ICD-10-CM

## 2021-08-05 NOTE — TELEPHONE ENCOUNTER
Spoke to patient for CCM monthly. Patient complains of back and butt pain. He states he had a fall last week after feeling dizzy. Having trouble walking and moving around. Did not hit head. Offered patient an appointment and states he cant even drive.  Christina Bray

## 2021-08-05 NOTE — PROGRESS NOTES
8/5/2021  Spoke to [facility] for CCM.       Updates to patient care team/ comments: UTD  Patient reported changes in medications: reports no changes   Med Adherence  Comment: reports adherence     Health Maintenance:     Colonoscopy Never done  Zoster Vaccines(1 Plan:    • Active goal from previous outreach:                         Staying healthy, maintain independence, and stability.        • Patient reported progress toward goal: stable                       - Plan for overcoming all barriers: encouraged patient

## 2021-08-05 NOTE — TELEPHONE ENCOUNTER
Spoke to patient. Has lower extremity pain. Did have recent fall last week. Thinks may be sciatica. Offered an appointment today but declined. Office is too far and that he lives 30 miles away. Phone visit scheduled instead and pt amenable to this.

## 2021-08-24 ENCOUNTER — TELEPHONE (OUTPATIENT)
Dept: FAMILY MEDICINE CLINIC | Facility: CLINIC | Age: 71
End: 2021-08-24

## 2021-08-24 DIAGNOSIS — M54.30 SCIATICA, UNSPECIFIED LATERALITY: Primary | ICD-10-CM

## 2021-08-24 DIAGNOSIS — M48.062 LUMBAR STENOSIS WITH NEUROGENIC CLAUDICATION: ICD-10-CM

## 2021-08-24 NOTE — TELEPHONE ENCOUNTER
Pt called requesting a referral for a specialist for his sciatica   Pt wants to see someone that is close to his area  Please call back and advise

## 2021-08-26 ENCOUNTER — TELEPHONE (OUTPATIENT)
Dept: INTERNAL MEDICINE CLINIC | Facility: CLINIC | Age: 71
End: 2021-08-26

## 2021-08-26 NOTE — TELEPHONE ENCOUNTER
Patient called if he can send over a form from [employer] that will help \"keep my lights on. \"  Was told physician has to fill it out for him and he is wondering if he can email it to office.     Informed him we are unfortunately unable to re

## 2021-08-26 NOTE — TELEPHONE ENCOUNTER
Spoke to patient. He states he is currently in the ER for his sciatica. Discussed with him that ER will help manage his pain for now, but afterwards, the specialty Dr. Trinh Novoa would like for him to see is Physiatry.   Informed him that our office does not know

## 2021-08-31 PROCEDURE — 99490 CHRNC CARE MGMT STAFF 1ST 20: CPT

## 2021-09-07 ENCOUNTER — PATIENT OUTREACH (OUTPATIENT)
Dept: CASE MANAGEMENT | Age: 71
End: 2021-09-07

## 2021-09-07 NOTE — PROGRESS NOTES
JIE verified for CCM monthly outreach. I called patient and left a detailed message to call back. I will follow up with patient at a later time.       Medical record reviewed including recent office visits and test results    Total time spent with sparkle

## 2021-09-14 NOTE — TELEPHONE ENCOUNTER
Medication request: Norco  mg, Take 1 tablet by mouth every 8 hours as needed for pain.  Qt 90 Refills 0    LOV: 10/23/2018  NOV: 12/21/18    [place]9/2018 157.48 Clear

## 2021-09-16 RX ORDER — ROSUVASTATIN CALCIUM 20 MG/1
20 TABLET, COATED ORAL NIGHTLY
Qty: 90 TABLET | Refills: 1 | Status: SHIPPED | OUTPATIENT
Start: 2021-09-16 | End: 2022-03-15 | Stop reason: ALTCHOICE

## 2021-09-30 ENCOUNTER — PATIENT OUTREACH (OUTPATIENT)
Dept: CASE MANAGEMENT | Age: 71
End: 2021-09-30

## 2021-10-04 ENCOUNTER — PATIENT OUTREACH (OUTPATIENT)
Dept: CASE MANAGEMENT | Age: 71
End: 2021-10-04

## 2021-11-02 ENCOUNTER — PATIENT OUTREACH (OUTPATIENT)
Dept: CASE MANAGEMENT | Age: 71
End: 2021-11-02

## 2021-12-06 ENCOUNTER — TELEPHONE (OUTPATIENT)
Dept: CASE MANAGEMENT | Age: 71
End: 2021-12-06

## 2021-12-06 ENCOUNTER — PATIENT OUTREACH (OUTPATIENT)
Dept: CASE MANAGEMENT | Age: 71
End: 2021-12-06

## 2021-12-06 DIAGNOSIS — R53.82 CHRONIC FATIGUE: ICD-10-CM

## 2021-12-06 DIAGNOSIS — I10 PRIMARY HYPERTENSION: ICD-10-CM

## 2021-12-06 DIAGNOSIS — I50.42 CHRONIC COMBINED SYSTOLIC AND DIASTOLIC CHF, NYHA CLASS 3 (HCC): ICD-10-CM

## 2021-12-06 DIAGNOSIS — E11.8 TYPE 2 DIABETES MELLITUS WITH COMPLICATION, WITHOUT LONG-TERM CURRENT USE OF INSULIN (HCC): ICD-10-CM

## 2021-12-06 RX ORDER — CLOPIDOGREL BISULFATE 75 MG/1
75 TABLET ORAL DAILY
Refills: 0 | Status: CANCELLED | OUTPATIENT
Start: 2021-12-06

## 2021-12-06 NOTE — TELEPHONE ENCOUNTER
Patient requesting refills and confused on some of his medications. Also complaining of being very fatigued with back pain. Please call patient to advise.     Thank you    Future Appointments   Date Time Provider Andrea Brown   12/27/2021  4:40 PM

## 2021-12-06 NOTE — PROGRESS NOTES
12/6/2021  Spoke to [facility] for CCM.       Updates to patient care team/ comments: UTD   Patient reported changes in medications: reports no changes   Med Adherence  Comment: reports adherence     Health Maintenance:     Colonoscopy Never done  Zoster Vaccines with active goal     Self Management Goals/Action Plan:    • Active goal from previous outreach:                       Staying healthy, maintain independence, and stability.        • Patient reported progress toward goal: stable     • Patient Reported New B

## 2021-12-08 NOTE — TELEPHONE ENCOUNTER
Patient has an appointment on 12/27 with Crystal Villalba MD  You 14 hours ago (7:23 PM)     Please appointment    Message text

## 2021-12-30 ENCOUNTER — TELEPHONE (OUTPATIENT)
Dept: INTERNAL MEDICINE CLINIC | Facility: CLINIC | Age: 71
End: 2021-12-30

## 2021-12-30 RX ORDER — CLOPIDOGREL BISULFATE 75 MG/1
75 TABLET ORAL DAILY
Qty: 30 TABLET | Refills: 0 | Status: SHIPPED | OUTPATIENT
Start: 2021-12-30 | End: 2021-12-31

## 2021-12-30 NOTE — TELEPHONE ENCOUNTER
Called patient in regards to refill request. Patient is overdue to be seen for physical and diabetes management. Patient was sent a 30 day supply for Clopidogrel  Bisulfate and rest of the refills will be sent on day of visit. Attending Attestation (For Attendings USE Only)...

## 2021-12-31 PROCEDURE — 99490 CHRNC CARE MGMT STAFF 1ST 20: CPT

## 2021-12-31 RX ORDER — CLOPIDOGREL BISULFATE 75 MG/1
75 TABLET ORAL DAILY
Qty: 90 TABLET | Refills: 0 | Status: SHIPPED | OUTPATIENT
Start: 2021-12-31

## 2022-01-05 RX ORDER — CLOPIDOGREL BISULFATE 75 MG/1
TABLET ORAL
Qty: 90 TABLET | Refills: 0 | OUTPATIENT
Start: 2022-01-05

## 2022-01-08 ENCOUNTER — HOSPITAL ENCOUNTER (INPATIENT)
Facility: HOSPITAL | Age: 72
LOS: 12 days | Discharge: NURSING, REHAB OR LONG-TERM CARE | DRG: 871 | End: 2022-01-21
Attending: EMERGENCY MEDICINE | Admitting: EMERGENCY MEDICINE
Payer: MEDICAID

## 2022-01-08 ENCOUNTER — APPOINTMENT (OUTPATIENT)
Dept: GENERAL RADIOLOGY | Facility: HOSPITAL | Age: 72
DRG: 871 | End: 2022-01-08
Attending: EMERGENCY MEDICINE
Payer: MEDICAID

## 2022-01-08 DIAGNOSIS — B97.89 VIRAL SEPSIS (HCC): ICD-10-CM

## 2022-01-08 DIAGNOSIS — J96.01 ACUTE HYPOXEMIC RESPIRATORY FAILURE DUE TO COVID-19 (HCC): Primary | ICD-10-CM

## 2022-01-08 DIAGNOSIS — J12.82 PNEUMONIA DUE TO COVID-19 VIRUS: ICD-10-CM

## 2022-01-08 DIAGNOSIS — U07.1 PNEUMONIA DUE TO COVID-19 VIRUS: ICD-10-CM

## 2022-01-08 DIAGNOSIS — A41.89 VIRAL SEPSIS (HCC): ICD-10-CM

## 2022-01-08 DIAGNOSIS — D72.829 LEUKOCYTOSIS, UNSPECIFIED TYPE: ICD-10-CM

## 2022-01-08 DIAGNOSIS — U07.1 ACUTE HYPOXEMIC RESPIRATORY FAILURE DUE TO COVID-19 (HCC): Primary | ICD-10-CM

## 2022-01-08 LAB
ALBUMIN SERPL-MCNC: 2.3 G/DL (ref 3.4–5)
ALBUMIN/GLOB SERPL: 0.5 {RATIO} (ref 1–2)
ALP LIVER SERPL-CCNC: 101 U/L
ALT SERPL-CCNC: 56 U/L
ANION GAP SERPL CALC-SCNC: 11 MMOL/L (ref 0–18)
AST SERPL-CCNC: 48 U/L (ref 15–37)
BASE EXCESS BLD CALC-SCNC: 1.7 MMOL/L (ref ?–2)
BASOPHILS # BLD AUTO: 0.08 X10(3) UL (ref 0–0.2)
BASOPHILS NFR BLD AUTO: 0.5 %
BILIRUB SERPL-MCNC: 0.7 MG/DL (ref 0.1–2)
BUN BLD-MCNC: 17 MG/DL (ref 7–18)
BUN/CREAT SERPL: 13 (ref 10–20)
CA-I BLD-SCNC: 1.12 MMOL/L (ref 0.95–1.32)
CALCIUM BLD-MCNC: 8.7 MG/DL (ref 8.5–10.1)
CHLORIDE SERPL-SCNC: 105 MMOL/L (ref 98–112)
CK SERPL-CCNC: 65 U/L
CO2 SERPL-SCNC: 24 MMOL/L (ref 21–32)
COHGB MFR BLD: 2 % (ref 0–1.5)
CREAT BLD-MCNC: 1.31 MG/DL
CRP SERPL-MCNC: 40 MG/DL (ref ?–0.3)
D DIMER PPP FEU-MCNC: 1.8 UG/ML FEU (ref ?–0.69)
DEPRECATED HBV CORE AB SER IA-ACNC: 245.3 NG/ML
DEPRECATED RDW RBC AUTO: 46.6 FL (ref 35.1–46.3)
EOSINOPHIL # BLD AUTO: 0.16 X10(3) UL (ref 0–0.7)
EOSINOPHIL NFR BLD AUTO: 0.9 %
ERYTHROCYTE [DISTWIDTH] IN BLOOD BY AUTOMATED COUNT: 13.8 % (ref 11–15)
GLOBULIN PLAS-MCNC: 5.1 G/DL (ref 2.8–4.4)
GLUCOSE BLD-MCNC: 188 MG/DL (ref 70–99)
HCO3 BLDA-SCNC: 25.7 MEQ/L (ref 21–27)
HCT VFR BLD AUTO: 42 %
HGB BLD-MCNC: 13 G/DL
HGB BLD-MCNC: 13.7 G/DL (ref 13.5–17.5)
IMM GRANULOCYTES # BLD AUTO: 0.35 X10(3) UL (ref 0–1)
IMM GRANULOCYTES NFR BLD: 2.1 %
LACTATE BLD-SCNC: 2.9 MMOL/L (ref 0.5–2.2)
LACTATE SERPL-SCNC: 5 MMOL/L (ref 0.4–2)
LYMPHOCYTES # BLD AUTO: 0.95 X10(3) UL (ref 1–4)
LYMPHOCYTES NFR BLD AUTO: 5.6 %
MCH RBC QN AUTO: 28.2 PG (ref 26–34)
MCHC RBC AUTO-ENTMCNC: 31 G/DL (ref 31–37)
MCV RBC AUTO: 91.1 FL
METHGB MFR BLD: 1 % SAT (ref 0.4–1.5)
MODIFIED ALLEN TEST: POSITIVE
MONOCYTES # BLD AUTO: 1.11 X10(3) UL (ref 0.1–1)
MONOCYTES NFR BLD AUTO: 6.6 %
NEUTROPHILS # BLD AUTO: 14.27 X10 (3) UL (ref 1.5–7.7)
NEUTROPHILS # BLD AUTO: 14.27 X10(3) UL (ref 1.5–7.7)
NEUTROPHILS NFR BLD AUTO: 84.3 %
O2 CT BLD-SCNC: 15 VOL% (ref 15–23)
O2/TOTAL GAS SETTING VFR VENT: 100 %
OSMOLALITY SERPL CALC.SUM OF ELEC: 297 MOSM/KG (ref 275–295)
OXYGEN LITERS/MINUTE: 40 L/MIN
PCO2 BLDA: 32 MM HG (ref 35–45)
PH BLDA: 7.49 [PH] (ref 7.35–7.45)
PLATELET # BLD AUTO: 515 10(3)UL (ref 150–450)
PO2 BLDA: 43 MM HG (ref 80–100)
POTASSIUM BLD-SCNC: 3.7 MMOL/L (ref 3.3–5.1)
POTASSIUM SERPL-SCNC: 3.8 MMOL/L (ref 3.5–5.1)
PROCALCITONIN SERPL-MCNC: 0.42 NG/ML (ref ?–0.16)
PROT SERPL-MCNC: 7.4 G/DL (ref 6.4–8.2)
PUNCTURE CHARGE: YES
RBC # BLD AUTO: 4.61 X10(6)UL
SAO2 % BLDA: 80.5 % (ref 94–100)
SARS-COV-2 RNA RESP QL NAA+PROBE: DETECTED
SODIUM BLD-SCNC: 137 MMOL/L (ref 135–145)
SODIUM SERPL-SCNC: 140 MMOL/L (ref 136–145)
WBC # BLD AUTO: 16.9 X10(3) UL (ref 4–11)

## 2022-01-08 PROCEDURE — XW033E5 INTRODUCTION OF REMDESIVIR ANTI-INFECTIVE INTO PERIPHERAL VEIN, PERCUTANEOUS APPROACH, NEW TECHNOLOGY GROUP 5: ICD-10-PCS | Performed by: EMERGENCY MEDICINE

## 2022-01-08 PROCEDURE — XW033H5 INTRODUCTION OF TOCILIZUMAB INTO PERIPHERAL VEIN, PERCUTANEOUS APPROACH, NEW TECHNOLOGY GROUP 5: ICD-10-PCS | Performed by: EMERGENCY MEDICINE

## 2022-01-08 PROCEDURE — 71045 X-RAY EXAM CHEST 1 VIEW: CPT | Performed by: EMERGENCY MEDICINE

## 2022-01-08 PROCEDURE — 3E0333Z INTRODUCTION OF ANTI-INFLAMMATORY INTO PERIPHERAL VEIN, PERCUTANEOUS APPROACH: ICD-10-PCS | Performed by: EMERGENCY MEDICINE

## 2022-01-08 PROCEDURE — 5A0955Z ASSISTANCE WITH RESPIRATORY VENTILATION, GREATER THAN 96 CONSECUTIVE HOURS: ICD-10-PCS | Performed by: EMERGENCY MEDICINE

## 2022-01-08 RX ORDER — ACYCLOVIR 400 MG/1
400 TABLET ORAL 2 TIMES DAILY
Status: DISCONTINUED | OUTPATIENT
Start: 2022-01-08 | End: 2022-01-10

## 2022-01-08 RX ORDER — KETOROLAC TROMETHAMINE 15 MG/ML
15 INJECTION, SOLUTION INTRAMUSCULAR; INTRAVENOUS ONCE
Status: COMPLETED | OUTPATIENT
Start: 2022-01-08 | End: 2022-01-08

## 2022-01-08 RX ORDER — KETAMINE HYDROCHLORIDE 50 MG/ML
0.2 INJECTION, SOLUTION, CONCENTRATE INTRAMUSCULAR; INTRAVENOUS ONCE
Status: COMPLETED | OUTPATIENT
Start: 2022-01-08 | End: 2022-01-08

## 2022-01-08 RX ORDER — DEXAMETHASONE SODIUM PHOSPHATE 4 MG/ML
12 VIAL (ML) INJECTION ONCE
Status: COMPLETED | OUTPATIENT
Start: 2022-01-08 | End: 2022-01-08

## 2022-01-09 PROBLEM — B97.89 VIRAL SEPSIS (HCC): Status: ACTIVE | Noted: 2022-01-09

## 2022-01-09 PROBLEM — B97.89 VIRAL SEPSIS: Status: ACTIVE | Noted: 2022-01-09

## 2022-01-09 PROBLEM — A41.89 VIRAL SEPSIS (HCC): Status: ACTIVE | Noted: 2022-01-09

## 2022-01-09 PROBLEM — D72.829 LEUKOCYTOSIS, UNSPECIFIED TYPE: Status: ACTIVE | Noted: 2022-01-09

## 2022-01-09 PROBLEM — A41.89 VIRAL SEPSIS  (HCC): Status: ACTIVE | Noted: 2022-01-09

## 2022-01-09 PROBLEM — B97.89 VIRAL SEPSIS  (HCC): Status: ACTIVE | Noted: 2022-01-09

## 2022-01-09 PROBLEM — U07.1 PNEUMONIA DUE TO COVID-19 VIRUS: Status: ACTIVE | Noted: 2022-01-09

## 2022-01-09 PROBLEM — J12.82 PNEUMONIA DUE TO COVID-19 VIRUS: Status: ACTIVE | Noted: 2022-01-09

## 2022-01-09 PROBLEM — A41.89 VIRAL SEPSIS: Status: ACTIVE | Noted: 2022-01-09

## 2022-01-09 LAB
ALBUMIN SERPL-MCNC: 2.3 G/DL (ref 3.4–5)
ALBUMIN/GLOB SERPL: 0.6 {RATIO} (ref 1–2)
ALP LIVER SERPL-CCNC: 96 U/L
ALT SERPL-CCNC: 50 U/L
ANION GAP SERPL CALC-SCNC: 7 MMOL/L (ref 0–18)
AST SERPL-CCNC: 54 U/L (ref 15–37)
BILIRUB SERPL-MCNC: 0.5 MG/DL (ref 0.1–2)
BUN BLD-MCNC: 20 MG/DL (ref 7–18)
BUN/CREAT SERPL: 18.2 (ref 10–20)
CALCIUM BLD-MCNC: 8 MG/DL (ref 8.5–10.1)
CHLORIDE SERPL-SCNC: 107 MMOL/L (ref 98–112)
CO2 SERPL-SCNC: 23 MMOL/L (ref 21–32)
CREAT BLD-MCNC: 1.1 MG/DL
CRP SERPL-MCNC: 43.6 MG/DL (ref ?–0.3)
D DIMER PPP FEU-MCNC: 3.18 UG/ML FEU (ref ?–0.69)
DEPRECATED HBV CORE AB SER IA-ACNC: 253.2 NG/ML
EST. AVERAGE GLUCOSE BLD GHB EST-MCNC: 209 MG/DL (ref 68–126)
GLOBULIN PLAS-MCNC: 3.8 G/DL (ref 2.8–4.4)
GLUCOSE BLD-MCNC: 268 MG/DL (ref 70–99)
GLUCOSE BLDC GLUCOMTR-MCNC: 245 MG/DL (ref 70–99)
GLUCOSE BLDC GLUCOMTR-MCNC: 262 MG/DL (ref 70–99)
GLUCOSE BLDC GLUCOMTR-MCNC: 273 MG/DL (ref 70–99)
GLUCOSE BLDC GLUCOMTR-MCNC: 295 MG/DL (ref 70–99)
GLUCOSE BLDC GLUCOMTR-MCNC: 370 MG/DL (ref 70–99)
HBA1C MFR BLD: 8.9 % (ref ?–5.7)
LACTATE SERPL-SCNC: 1.5 MMOL/L (ref 0.4–2)
LDH SERPL L TO P-CCNC: 689 U/L
OSMOLALITY SERPL CALC.SUM OF ELEC: 296 MOSM/KG (ref 275–295)
POTASSIUM SERPL-SCNC: 5.2 MMOL/L (ref 3.5–5.1)
PROCALCITONIN SERPL-MCNC: 0.57 NG/ML (ref ?–0.16)
PROT SERPL-MCNC: 6.1 G/DL (ref 6.4–8.2)
SODIUM SERPL-SCNC: 137 MMOL/L (ref 136–145)

## 2022-01-09 PROCEDURE — 99291 CRITICAL CARE FIRST HOUR: CPT | Performed by: INTERNAL MEDICINE

## 2022-01-09 RX ORDER — ALBUTEROL SULFATE 90 UG/1
2 AEROSOL, METERED RESPIRATORY (INHALATION) EVERY 6 HOURS PRN
Status: DISCONTINUED | OUTPATIENT
Start: 2022-01-09 | End: 2022-01-21

## 2022-01-09 RX ORDER — GABAPENTIN 600 MG/1
300 TABLET ORAL 2 TIMES DAILY
Status: DISCONTINUED | OUTPATIENT
Start: 2022-01-09 | End: 2022-01-09

## 2022-01-09 RX ORDER — DULOXETIN HYDROCHLORIDE 30 MG/1
30 CAPSULE, DELAYED RELEASE ORAL DAILY
Status: DISCONTINUED | OUTPATIENT
Start: 2022-01-09 | End: 2022-01-21

## 2022-01-09 RX ORDER — PREGABALIN 75 MG/1
300 CAPSULE ORAL DAILY
Status: DISCONTINUED | OUTPATIENT
Start: 2022-01-09 | End: 2022-01-09

## 2022-01-09 RX ORDER — PREGABALIN 75 MG/1
300 CAPSULE ORAL 2 TIMES DAILY
Status: DISCONTINUED | OUTPATIENT
Start: 2022-01-09 | End: 2022-01-21

## 2022-01-09 RX ORDER — DEXAMETHASONE SODIUM PHOSPHATE 4 MG/ML
10 VIAL (ML) INJECTION EVERY 24 HOURS
Status: DISCONTINUED | OUTPATIENT
Start: 2022-01-09 | End: 2022-01-10

## 2022-01-09 RX ORDER — SPIRONOLACTONE 25 MG/1
25 TABLET ORAL DAILY
Status: DISCONTINUED | OUTPATIENT
Start: 2022-01-09 | End: 2022-01-09

## 2022-01-09 RX ORDER — DEXTROSE MONOHYDRATE 25 G/50ML
50 INJECTION, SOLUTION INTRAVENOUS
Status: DISCONTINUED | OUTPATIENT
Start: 2022-01-09 | End: 2022-01-21

## 2022-01-09 RX ORDER — METOPROLOL SUCCINATE 50 MG/1
50 TABLET, EXTENDED RELEASE ORAL DAILY
Status: DISCONTINUED | OUTPATIENT
Start: 2022-01-09 | End: 2022-01-21

## 2022-01-09 RX ORDER — METOPROLOL TARTRATE 5 MG/5ML
5 INJECTION INTRAVENOUS ONCE
Status: COMPLETED | OUTPATIENT
Start: 2022-01-09 | End: 2022-01-09

## 2022-01-09 RX ORDER — ROSUVASTATIN CALCIUM 20 MG/1
20 TABLET, COATED ORAL NIGHTLY
Status: DISCONTINUED | OUTPATIENT
Start: 2022-01-09 | End: 2022-01-21

## 2022-01-09 RX ORDER — ENOXAPARIN SODIUM 100 MG/ML
40 INJECTION SUBCUTANEOUS EVERY 12 HOURS SCHEDULED
Status: DISCONTINUED | OUTPATIENT
Start: 2022-01-09 | End: 2022-01-11

## 2022-01-09 RX ORDER — TIMOLOL MALEATE 5 MG/ML
1 SOLUTION/ DROPS OPHTHALMIC 2 TIMES DAILY
Status: DISCONTINUED | OUTPATIENT
Start: 2022-01-09 | End: 2022-01-21

## 2022-01-09 RX ORDER — LATANOPROST 50 UG/ML
1 SOLUTION/ DROPS OPHTHALMIC NIGHTLY
Status: DISCONTINUED | OUTPATIENT
Start: 2022-01-09 | End: 2022-01-21

## 2022-01-09 RX ORDER — ASPIRIN 81 MG/1
81 TABLET ORAL DAILY
Status: DISCONTINUED | OUTPATIENT
Start: 2022-01-09 | End: 2022-01-21

## 2022-01-09 RX ORDER — TAMSULOSIN HYDROCHLORIDE 0.4 MG/1
0.4 CAPSULE ORAL DAILY
Status: DISCONTINUED | OUTPATIENT
Start: 2022-01-09 | End: 2022-01-21

## 2022-01-09 RX ORDER — CLOPIDOGREL BISULFATE 75 MG/1
75 TABLET ORAL DAILY
Status: DISCONTINUED | OUTPATIENT
Start: 2022-01-09 | End: 2022-01-21

## 2022-01-09 RX ORDER — OXYCODONE AND ACETAMINOPHEN 10; 325 MG/1; MG/1
1 TABLET ORAL EVERY 8 HOURS PRN
Status: DISCONTINUED | OUTPATIENT
Start: 2022-01-09 | End: 2022-01-21

## 2022-01-09 RX ORDER — PANTOPRAZOLE SODIUM 40 MG/1
40 TABLET, DELAYED RELEASE ORAL
Status: DISCONTINUED | OUTPATIENT
Start: 2022-01-09 | End: 2022-01-21

## 2022-01-09 RX ORDER — SODIUM CHLORIDE 9 MG/ML
INJECTION, SOLUTION INTRAVENOUS CONTINUOUS
Status: DISCONTINUED | OUTPATIENT
Start: 2022-01-09 | End: 2022-01-10

## 2022-01-09 RX ORDER — LISINOPRIL 10 MG/1
10 TABLET ORAL DAILY
Status: DISCONTINUED | OUTPATIENT
Start: 2022-01-09 | End: 2022-01-09

## 2022-01-09 NOTE — ED PROVIDER NOTES
Patient Seen in: [facility] AND [facility] Emergency Department    History   Patient presents with:  Covid    Stated Complaint: covid     HPI    29-year-old male with past medical history of CAD, HTN, HL, hypothyroid presenting for evaluation of 5 days of cold sym (500 mg total) by mouth 2 (two) times daily. Levothyroxine Sodium 125 MCG Oral Tab,  Take 1 tablet (125 mcg total) by mouth every morning. metoprolol succinate 50 MG Oral Tablet 24 Hr,  Take 1 tablet (50 mg total) by mouth daily.    spironolactone 25 MG today and agreed except as otherwise stated in HPI.     Physical Exam     ED Triage Vitals   BP 01/08/22 2102 143/73   Pulse 01/08/22 2053 106   Resp 01/08/22 2053 (!) 86   Temp 01/08/22 2102 (!) 100.8 °F (38.2 °C)   Temp src 01/08/22 2102 Temporal   SpO2 0 ACID, PLASMA - Abnormal; Notable for the following components:    Lactic Acid 5.0 (*)     All other components within normal limits   RAPID SARS-COV-2 BY PCR - Abnormal; Notable for the following components:    Rapid SARS-CoV-2 by PCR Detected (*)     All Radiologist):        PORTABLE FRONTAL CHEST RADIOGRAPH    Comparison: 11/20/20 chest x-ray    IMPRESSION:    Moderate multifocal pulmonary airspace disease in a distribution compatible with Covid 19 pneumonia. No pleural effusion or pneumothorax. Vapotherm/nonrebreather therapy initiated with profound improvement in oxygenation to greater than 90%. High-dose dexamethasone initiated, case discussed with pulmonary and in agreement with initiation of remdesivir in addition to empiric antibiotics.  Case pressure at home and keep a blood pressure log to bring to your physician.   Disposition and Plan     Clinical Impression:  Acute hypoxemic respiratory failure due to COVID-19 [facility])  (primary encounter diagnosis)  Pneumonia due to COVID-19 virus  Leukocytos

## 2022-01-09 NOTE — H&P
[facility]    History and Physical    Rani Pittman.  Patient Status:  Inpatient    3/23/1950 MRN [id number]   Location [facility] 2W/SW Attending Venkata Ortiz MD   Hosp Day # 0 PCP Reyes Choudhury MD     Date:  2022  Date o 08/04/2020    Dr. Chuy Bruce @ [address] [place] [address]   • CYSTO/URETERO W/LITHOTRIPSY Left 08/04/2020    Dr. 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     • OTHER      cardiac stents     Family History   Problem Relation Age  (90 Base) MCG/ACT Inhalation Aero Soln, Inhale 2 puffs into the lungs every 6 (six) hours as needed for Wheezing. Bimatoprost 0.01 % Ophthalmic Solution, Apply 1 drop to eye.         Review of Systems:     Constitutional: Positive for fatigue and f 01/09/2022    CA 8.0 (L) 01/09/2022    ALB 2.3 (L) 01/09/2022    ALKPHO 96 01/09/2022    BILT 0.5 01/09/2022    TP 6.1 (L) 01/09/2022    AST 54 (H) 01/09/2022    ALT 50 01/09/2022    PTT 28.1 06/29/2020    INR 0.99 06/29/2020    T4F 0.7 (L) 07/01/2021    T code       High risk / critical   ICU care   35 min cct           Susan Engle.  Misty Castro MD  1/9/2022

## 2022-01-09 NOTE — ED INITIAL ASSESSMENT (HPI)
Arrived [place] ems [place] [address] for sob. +covid yesterday. 33% room air for EMS, 15L NRB 77% for EMS. Arrived at 84% NRB 15L. RT at bedside to place vapotherm. Vaccinated without booster for covid.

## 2022-01-09 NOTE — PROGRESS NOTES
[facility] Pharmacy Note: Antimicrobial Weight Based Dose Adjustment for: meropenem (Allyne Blade)    Valarie Tran. is a 73year old patient who has been prescribed meropenem (MERREM) 500 mg every 8 hours.       Estimated Creatinine Clearance: 53.2 mL/min (A) (based on

## 2022-01-09 NOTE — PLAN OF CARE
Pt received from ED at ~0115. Maxed on vapotherm with NRB mask as needed. Denies chest pain/discomfort. Refusing to prone due to chronic lower back pain; pt states he tried proning in the ED and could not tolerate it.  Pt hypertensive, 1x dose of lopressor Angina  - Evaluate fluid balance, assess for edema, trend weights  Outcome: Progressing  Goal: Absence of cardiac arrhythmias or at baseline  Description: INTERVENTIONS:  - Continuous cardiac monitoring, monitor vital signs, obtain 12 lead EKG if indicated

## 2022-01-09 NOTE — CONSULTS
INFECTIOUS DISEASE TELEMEDICINE CONSULT NOTE    Sanna Childress.  Patient Status:  Inpatient    3/23/1950 MRN [id number]   Location [facility] 2W/SW Attending Ifrah Torres MD   [address] Day # joining in proximal ureter. • OTHER      cardiac stents     Family History   Problem Relation Age of Onset   • Hypertension Brother       reports that he has quit smoking. His smoking use included cigarettes. He quit after 50.00 years of use.  He has [employer] diabetes mellitus with complication, without long-term current use of insulin (HCC)     CAD (coronary artery disease), native coronary artery     HTN (hypertension)     Chronic fatigue     Morbid obesity with BMI of 40.0-44.9, adult ([facility] [address]     Lumbar stenos

## 2022-01-09 NOTE — RESPIRATORY THERAPY NOTE
RT called to ED for pt with low saturation. Pt placed on VapoTherm and NRB mask    Pt on settings 40L 100%  Pt tolerating and saturating appropriately. RT to continue to monitor.

## 2022-01-10 ENCOUNTER — APPOINTMENT (OUTPATIENT)
Dept: PICC SERVICES | Facility: HOSPITAL | Age: 72
DRG: 871 | End: 2022-01-10
Attending: INTERNAL MEDICINE
Payer: MEDICAID

## 2022-01-10 ENCOUNTER — APPOINTMENT (OUTPATIENT)
Dept: ULTRASOUND IMAGING | Facility: HOSPITAL | Age: 72
DRG: 871 | End: 2022-01-10
Attending: PHYSICIAN ASSISTANT
Payer: MEDICAID

## 2022-01-10 LAB
ALBUMIN SERPL-MCNC: 1.9 G/DL (ref 3.4–5)
ALBUMIN/GLOB SERPL: 0.5 {RATIO} (ref 1–2)
ALP LIVER SERPL-CCNC: 90 U/L
ALT SERPL-CCNC: 33 U/L
ANION GAP SERPL CALC-SCNC: 7 MMOL/L (ref 0–18)
AST SERPL-CCNC: 21 U/L (ref 15–37)
BASOPHILS # BLD AUTO: 0.03 X10(3) UL (ref 0–0.2)
BASOPHILS NFR BLD AUTO: 0.2 %
BILIRUB SERPL-MCNC: 0.2 MG/DL (ref 0.1–2)
BUN BLD-MCNC: 31 MG/DL (ref 7–18)
BUN/CREAT SERPL: 31.3 (ref 10–20)
CALCIUM BLD-MCNC: 7.9 MG/DL (ref 8.5–10.1)
CHLORIDE SERPL-SCNC: 107 MMOL/L (ref 98–112)
CO2 SERPL-SCNC: 25 MMOL/L (ref 21–32)
CREAT BLD-MCNC: 0.99 MG/DL
CRP SERPL-MCNC: 26.2 MG/DL (ref ?–0.3)
D DIMER PPP FEU-MCNC: 2.63 UG/ML FEU (ref ?–0.69)
DEPRECATED HBV CORE AB SER IA-ACNC: 277.1 NG/ML
DEPRECATED RDW RBC AUTO: 44.7 FL (ref 35.1–46.3)
EOSINOPHIL # BLD AUTO: 0.05 X10(3) UL (ref 0–0.7)
EOSINOPHIL NFR BLD AUTO: 0.3 %
ERYTHROCYTE [DISTWIDTH] IN BLOOD BY AUTOMATED COUNT: 13.5 % (ref 11–15)
GLOBULIN PLAS-MCNC: 3.5 G/DL (ref 2.8–4.4)
GLUCOSE BLD-MCNC: 300 MG/DL (ref 70–99)
GLUCOSE BLDC GLUCOMTR-MCNC: 220 MG/DL (ref 70–99)
GLUCOSE BLDC GLUCOMTR-MCNC: 240 MG/DL (ref 70–99)
GLUCOSE BLDC GLUCOMTR-MCNC: 260 MG/DL (ref 70–99)
GLUCOSE BLDC GLUCOMTR-MCNC: 339 MG/DL (ref 70–99)
HCT VFR BLD AUTO: 37.9 %
HGB BLD-MCNC: 11.9 G/DL
IMM GRANULOCYTES # BLD AUTO: 0.21 X10(3) UL (ref 0–1)
IMM GRANULOCYTES NFR BLD: 1.2 %
LDH SERPL L TO P-CCNC: 540 U/L
LYMPHOCYTES # BLD AUTO: 0.9 X10(3) UL (ref 1–4)
LYMPHOCYTES NFR BLD AUTO: 5.2 %
MCH RBC QN AUTO: 28.4 PG (ref 26–34)
MCHC RBC AUTO-ENTMCNC: 31.4 G/DL (ref 31–37)
MCV RBC AUTO: 90.5 FL
MONOCYTES # BLD AUTO: 0.88 X10(3) UL (ref 0.1–1)
MONOCYTES NFR BLD AUTO: 5.1 %
NEUTROPHILS # BLD AUTO: 15.08 X10 (3) UL (ref 1.5–7.7)
NEUTROPHILS # BLD AUTO: 15.08 X10(3) UL (ref 1.5–7.7)
NEUTROPHILS NFR BLD AUTO: 88 %
OSMOLALITY SERPL CALC.SUM OF ELEC: 306 MOSM/KG (ref 275–295)
PLATELET # BLD AUTO: 526 10(3)UL (ref 150–450)
POTASSIUM SERPL-SCNC: 5.2 MMOL/L (ref 3.5–5.1)
PROT SERPL-MCNC: 5.4 G/DL (ref 6.4–8.2)
RBC # BLD AUTO: 4.19 X10(6)UL
SODIUM SERPL-SCNC: 139 MMOL/L (ref 136–145)
WBC # BLD AUTO: 17.2 X10(3) UL (ref 4–11)

## 2022-01-10 PROCEDURE — 02HV33Z INSERTION OF INFUSION DEVICE INTO SUPERIOR VENA CAVA, PERCUTANEOUS APPROACH: ICD-10-PCS | Performed by: HOSPITALIST

## 2022-01-10 PROCEDURE — 93970 EXTREMITY STUDY: CPT | Performed by: PHYSICIAN ASSISTANT

## 2022-01-10 PROCEDURE — 99291 CRITICAL CARE FIRST HOUR: CPT | Performed by: INTERNAL MEDICINE

## 2022-01-10 RX ORDER — ZOLPIDEM TARTRATE 5 MG/1
5 TABLET ORAL NIGHTLY PRN
Status: DISCONTINUED | OUTPATIENT
Start: 2022-01-10 | End: 2022-01-21

## 2022-01-10 RX ORDER — HYDRALAZINE HYDROCHLORIDE 20 MG/ML
10 INJECTION INTRAMUSCULAR; INTRAVENOUS EVERY 4 HOURS PRN
Status: DISCONTINUED | OUTPATIENT
Start: 2022-01-10 | End: 2022-01-21

## 2022-01-10 RX ORDER — ZOLPIDEM TARTRATE 5 MG/1
5 TABLET ORAL ONCE
Status: COMPLETED | OUTPATIENT
Start: 2022-01-10 | End: 2022-01-10

## 2022-01-10 NOTE — PROGRESS NOTES
[facility] Pharmacy Note: Route Optimization for Dexamethasone (Decadron)    Patient is currently on Dexamethasone (Decadron) 10 mg IV every 24 hours.    The patient meets the criteria to convert to the oral equivalent as established by the IV to Oral conversion p

## 2022-01-10 NOTE — CM/SW NOTE
Received MDO for PHQ4 \"pt recently homeless and wife admitted/intubated at another hospital\"    Pt currently on Vapotherm plus NRB. Will follow up with discussion when appropriate.     Discussed in multidisciplinary rounds and pt currently residing in a

## 2022-01-10 NOTE — PROGRESS NOTES
[facility]    Progress Note    Harsh Noss.  Patient Status:  Inpatient    3/23/1950 MRN [id number]   Location [facility] 2W/SW Attending Nathan Chowdary MD   Hosp Day # 4 PCP Damari Burciaga MD     Subjective:  Feels well    D unspecified type     Viral sepsis [facility])      Patient is a 73year old male with COVID pneumonia  He has Type 2 DM, is on steroids for COVID, with resultant hyperglycemia   Endocrinology is consulted for inpatient DM management    PLAN:   Levemir 15 daily

## 2022-01-10 NOTE — PROGRESS NOTES
[facility] Pharmacy Note: Antimicrobial Weight Based Dose Adjustment for: cefepime (MAXIPIME)    José Miguel Maki. is a 73year old patient who has been prescribed cefepime (MAXIPIME) 1000 mg every 8 hours.     Estimated Creatinine Clearance: 70.4 mL/min (based on SC

## 2022-01-10 NOTE — PLAN OF CARE
Patient states he \"feels more like myself\". Patient is alert and oriented and able to better answer questions about his medical history. Patient unable to prone but attempting to lay on his side for short periods of time. Call light in reach.     Daughter INTERVENTIONS:  - Continuous cardiac monitoring, monitor vital signs, obtain 12 lead EKG if indicated  - Evaluate effectiveness of antiarrhythmic and heart rate control medications as ordered  - Initiate emergency measures for life threatening arrhythmias

## 2022-01-10 NOTE — PROGRESS NOTES
[facility] TELEMEDICINE PROGRESS NOTE    Bhakti Meneses.  Patient Status:  Inpatient    3/23/1950 MRN [id number]   Location [facility] 2W/SW Attending Kevon Jacobs MD   [facility] Day hyperlipidemia, and obesity who developed URI symptoms about 6 days ago. He underwent outpatient COVID-19 testing which was positive on 1/7/22. Due to positive COVID-19 test, he moved out of the house and was isolating in a [employer].  Unfortunately, he devel

## 2022-01-10 NOTE — PLAN OF CARE
Patient A&O x4. On Vapotherm and Non-rebreather. Dyspneic w/minimal activity. PRN Percocet administered for lower back pain. All other scheduled meds administered per [facility]. IVF continued. Blood glucose closely monitored.   Patient turns and repositions indep Spirometry  - Assess the need for suctioning and perform as needed  - Assess and instruct to report SOB or any respiratory difficulty  - Respiratory Therapy support as indicated  - Manage/alleviate anxiety  - Monitor for signs/symptoms of CO2 retention  Gianluca Olmos

## 2022-01-10 NOTE — PROGRESS NOTES
01/10/22 0440   Oxygen Utilization   O2 Device High flow/High humidity  (+15LNRB)   O2 Flow Rate (L/min) 40 L/min   FiO2 (%) 95 %     RT will continue to wean O2 as patient tolerates

## 2022-01-10 NOTE — CONSULTS
[facility] HOSP - [facility]    Report of Consultation    Kevin Theodore.  Patient Status:  Inpatient    3/23/1950 MRN [id number]   Location [facility] 2W/ Attending Namrata Webster MD   Hosp Day # 2 PCP Oniel Mascorro MD     Date of Admission: Known Allergies    Medications:    Current Facility-Administered Medications:   •  Insulin Aspart Pen (NOVOLOG) 100 UNIT/ML flexpen 4 Units, 4 Units, Subcutaneous, TID CC  •  zolpidem (AMBIEN) tab 5 mg, 5 mg, Oral, Nightly PRN  •  dexamethasone (DECADRON) chewable tab 8 tablet, 8 tablet, Oral, Q15 Min PRN  •  remdesivir 100 mg in sodium chloride 0.9% 270 mL IVPB, 100 mg, Intravenous, Q24H      A comprehensive 10 point review of systems was completed. Pertinent positives and negatives noted in the the HPI. hs  Hypoglycemia protocol    Thank you for the consult. We will follow.     Ursula Pope MD

## 2022-01-10 NOTE — PROGRESS NOTES
[facility]    Progress Note    Lillian Jaimes.  Patient Status:  Inpatient    3/23/1950 MRN [id number]   Location [facility] 2W/SW Attending Jana Copeland MD   Hosp Day # 1 PCP Mary Angel MD     Subjective:     Constitution CA 7.9 (L) 01/10/2022    ALB 1.9 (L) 01/10/2022    ALKPHO 90 01/10/2022    BILT 0.2 01/10/2022    TP 5.4 (L) 01/10/2022    AST 21 01/10/2022    ALT 33 01/10/2022    PTT 28.1 06/29/2020    INR 0.99 06/29/2020    T4F 0.7 (L) 07/01/2021    TSH 12.300 (H) 07/0 antibiotics  Negative cultures     3- h/o CAD with prior stenting   Metoprolol , asa , statin      4- HTN, HL      5- DM   Insulin   Endo following      6-morbid obesity with a BMI of 41     7-heavy smoker with likely underlying COPD  Tobacco abuse 1 pack

## 2022-01-11 ENCOUNTER — APPOINTMENT (OUTPATIENT)
Dept: GENERAL RADIOLOGY | Facility: HOSPITAL | Age: 72
DRG: 871 | End: 2022-01-11
Attending: INTERNAL MEDICINE
Payer: MEDICAID

## 2022-01-11 LAB
ALBUMIN SERPL-MCNC: 2 G/DL (ref 3.4–5)
ALBUMIN/GLOB SERPL: 0.5 {RATIO} (ref 1–2)
ALP LIVER SERPL-CCNC: 115 U/L
ALT SERPL-CCNC: 30 U/L
ANION GAP SERPL CALC-SCNC: 4 MMOL/L (ref 0–18)
AST SERPL-CCNC: 21 U/L (ref 15–37)
BASOPHILS # BLD AUTO: 0.03 X10(3) UL (ref 0–0.2)
BASOPHILS NFR BLD AUTO: 0.2 %
BILIRUB SERPL-MCNC: 0.2 MG/DL (ref 0.1–2)
BUN BLD-MCNC: 26 MG/DL (ref 7–18)
BUN/CREAT SERPL: 29.5 (ref 10–20)
CALCIUM BLD-MCNC: 8.4 MG/DL (ref 8.5–10.1)
CHLORIDE SERPL-SCNC: 108 MMOL/L (ref 98–112)
CO2 SERPL-SCNC: 27 MMOL/L (ref 21–32)
CREAT BLD-MCNC: 0.88 MG/DL
CRP SERPL-MCNC: 11.1 MG/DL (ref ?–0.3)
D DIMER PPP FEU-MCNC: 12.55 UG/ML FEU (ref ?–0.69)
DEPRECATED HBV CORE AB SER IA-ACNC: 215.1 NG/ML
DEPRECATED RDW RBC AUTO: 45.8 FL (ref 35.1–46.3)
EOSINOPHIL # BLD AUTO: 0.29 X10(3) UL (ref 0–0.7)
EOSINOPHIL NFR BLD AUTO: 2.3 %
ERYTHROCYTE [DISTWIDTH] IN BLOOD BY AUTOMATED COUNT: 13.7 % (ref 11–15)
GLOBULIN PLAS-MCNC: 3.8 G/DL (ref 2.8–4.4)
GLUCOSE BLD-MCNC: 171 MG/DL (ref 70–99)
GLUCOSE BLDC GLUCOMTR-MCNC: 123 MG/DL (ref 70–99)
GLUCOSE BLDC GLUCOMTR-MCNC: 135 MG/DL (ref 70–99)
GLUCOSE BLDC GLUCOMTR-MCNC: 264 MG/DL (ref 70–99)
GLUCOSE BLDC GLUCOMTR-MCNC: 325 MG/DL (ref 70–99)
HCT VFR BLD AUTO: 39.1 %
HGB BLD-MCNC: 12.3 G/DL
IMM GRANULOCYTES # BLD AUTO: 0.14 X10(3) UL (ref 0–1)
IMM GRANULOCYTES NFR BLD: 1.1 %
LDH SERPL L TO P-CCNC: 521 U/L
LYMPHOCYTES # BLD AUTO: 1.33 X10(3) UL (ref 1–4)
LYMPHOCYTES NFR BLD AUTO: 10.7 %
MCH RBC QN AUTO: 28.6 PG (ref 26–34)
MCHC RBC AUTO-ENTMCNC: 31.5 G/DL (ref 31–37)
MCV RBC AUTO: 90.9 FL
MONOCYTES # BLD AUTO: 0.81 X10(3) UL (ref 0.1–1)
MONOCYTES NFR BLD AUTO: 6.5 %
NEUTROPHILS # BLD AUTO: 9.82 X10 (3) UL (ref 1.5–7.7)
NEUTROPHILS # BLD AUTO: 9.82 X10(3) UL (ref 1.5–7.7)
NEUTROPHILS NFR BLD AUTO: 79.2 %
OSMOLALITY SERPL CALC.SUM OF ELEC: 297 MOSM/KG (ref 275–295)
PLATELET # BLD AUTO: 466 10(3)UL (ref 150–450)
POTASSIUM SERPL-SCNC: 4.3 MMOL/L (ref 3.5–5.1)
PROT SERPL-MCNC: 5.8 G/DL (ref 6.4–8.2)
RBC # BLD AUTO: 4.3 X10(6)UL
SODIUM SERPL-SCNC: 139 MMOL/L (ref 136–145)
WBC # BLD AUTO: 12.4 X10(3) UL (ref 4–11)

## 2022-01-11 PROCEDURE — 99233 SBSQ HOSP IP/OBS HIGH 50: CPT | Performed by: INTERNAL MEDICINE

## 2022-01-11 PROCEDURE — 99222 1ST HOSP IP/OBS MODERATE 55: CPT | Performed by: INTERNAL MEDICINE

## 2022-01-11 PROCEDURE — 71045 X-RAY EXAM CHEST 1 VIEW: CPT | Performed by: INTERNAL MEDICINE

## 2022-01-11 RX ORDER — ENOXAPARIN SODIUM 100 MG/ML
0.6 INJECTION SUBCUTANEOUS EVERY 12 HOURS SCHEDULED
Status: DISCONTINUED | OUTPATIENT
Start: 2022-01-11 | End: 2022-01-12

## 2022-01-11 NOTE — PLAN OF CARE
Endocrinology consulted today for pt's blood glucose control. Insulin adjusted and long acting insulin added. Pt educated on carb counting for carb control for meals. Pt anxious during day but able to be redirected to relax.  Pt hypertensive and hydralazine

## 2022-01-11 NOTE — PLAN OF CARE
Patient positive for covid,  patient on vapotherm with nonrebreathing,  o2 sat in the mid 90's,. Patient can sleep on side unable to prone,  o2 sat is improved on side.     Problem: CARDIOVASCULAR - ADULT  Goal: Maintains optimal cardiac output and hemodyn supplementation based on oxygen saturation or ABGs  - Provide Smoking Cessation handout, if applicable  - Encourage broncho-pulmonary hygiene including cough, deep breathe, Incentive Spirometry  - Assess the need for suctioning and perform as needed  - Ass

## 2022-01-11 NOTE — PLAN OF CARE
Pt remained on vapotherm and NRB. Scheduled novolog changed per MD. Bed locked and in lowest position, call light within reach.      Problem: Patient Centered Care  Goal: Patient preferences are identified and integrated in the patient's plan of care  Descr rate control medications as ordered  - Initiate emergency measures for life threatening arrhythmias  - Monitor electrolytes and administer replacement therapy as ordered  Outcome: Progressing     Problem: Diabetes/Glucose Control  Goal: Glucose maintained

## 2022-01-11 NOTE — TELEPHONE ENCOUNTER
----- Message from Summer Otero MD sent at 8/7/2019  6:08 PM CDT -----  Please call. Lab showed :  Neg Hep C  Normal PSA. TSH now normal . Continue Levothyroxine  125 mcg daily  Hba1c is 6.9  , close to our goal. Will continue same med for DM.   Blood Attending Attestation (For Attendings USE Only)...

## 2022-01-11 NOTE — PROGRESS NOTES
[facility]    Progress Note    Dee Jacobo.  Patient Status:  Inpatient    3/23/1950 MRN [id number]   Location [facility] 2W/SW Attending Chris Ha MD   Hosp Day # 2 PCP Saritha Snowden MD     Subjective:     Constitution 12.3 (L) 01/11/2022    HCT 39.1 01/11/2022    .0 (H) 01/11/2022    CREATSERUM 0.88 01/11/2022    BUN 26 (H) 01/11/2022     01/11/2022    K 4.3 01/11/2022     01/11/2022    CO2 27.0 01/11/2022     (H) 01/11/2022    CA 8.4 (L) 01/11    5- DM   Insulin   Endo following      6-morbid obesity with a BMI of 41     7-heavy smoker with likely underlying COPD  Tobacco abuse 1 pack a day  Combivent as needed     8-DVT prophylaxis  Lovenox subcu 0.6 mg/kg bid      9- Full code

## 2022-01-12 ENCOUNTER — APPOINTMENT (OUTPATIENT)
Dept: CT IMAGING | Facility: HOSPITAL | Age: 72
DRG: 871 | End: 2022-01-12
Attending: INTERNAL MEDICINE
Payer: MEDICAID

## 2022-01-12 LAB
ALBUMIN SERPL-MCNC: 2.1 G/DL (ref 3.4–5)
ALBUMIN/GLOB SERPL: 0.5 {RATIO} (ref 1–2)
ALP LIVER SERPL-CCNC: 136 U/L
ALT SERPL-CCNC: 37 U/L
ANION GAP SERPL CALC-SCNC: 5 MMOL/L (ref 0–18)
AST SERPL-CCNC: 31 U/L (ref 15–37)
BASOPHILS # BLD AUTO: 0.04 X10(3) UL (ref 0–0.2)
BASOPHILS NFR BLD AUTO: 0.3 %
BILIRUB SERPL-MCNC: 0.2 MG/DL (ref 0.1–2)
BUN BLD-MCNC: 26 MG/DL (ref 7–18)
BUN/CREAT SERPL: 31.3 (ref 10–20)
CALCIUM BLD-MCNC: 8.1 MG/DL (ref 8.5–10.1)
CHLORIDE SERPL-SCNC: 108 MMOL/L (ref 98–112)
CO2 SERPL-SCNC: 26 MMOL/L (ref 21–32)
CREAT BLD-MCNC: 0.83 MG/DL
D DIMER PPP FEU-MCNC: >20 UG/ML FEU (ref ?–0.69)
DEPRECATED RDW RBC AUTO: 45 FL (ref 35.1–46.3)
EOSINOPHIL # BLD AUTO: 0.4 X10(3) UL (ref 0–0.7)
EOSINOPHIL NFR BLD AUTO: 3.1 %
ERYTHROCYTE [DISTWIDTH] IN BLOOD BY AUTOMATED COUNT: 13.8 % (ref 11–15)
GLOBULIN PLAS-MCNC: 3.9 G/DL (ref 2.8–4.4)
GLUCOSE BLD-MCNC: 168 MG/DL (ref 70–99)
GLUCOSE BLDC GLUCOMTR-MCNC: 133 MG/DL (ref 70–99)
GLUCOSE BLDC GLUCOMTR-MCNC: 147 MG/DL (ref 70–99)
GLUCOSE BLDC GLUCOMTR-MCNC: 238 MG/DL (ref 70–99)
GLUCOSE BLDC GLUCOMTR-MCNC: 310 MG/DL (ref 70–99)
HCT VFR BLD AUTO: 40.3 %
HGB BLD-MCNC: 12.7 G/DL
IMM GRANULOCYTES # BLD AUTO: 0.21 X10(3) UL (ref 0–1)
IMM GRANULOCYTES NFR BLD: 1.7 %
LYMPHOCYTES # BLD AUTO: 1.5 X10(3) UL (ref 1–4)
LYMPHOCYTES NFR BLD AUTO: 11.8 %
MCH RBC QN AUTO: 28.2 PG (ref 26–34)
MCHC RBC AUTO-ENTMCNC: 31.5 G/DL (ref 31–37)
MCV RBC AUTO: 89.4 FL
MONOCYTES # BLD AUTO: 0.65 X10(3) UL (ref 0.1–1)
MONOCYTES NFR BLD AUTO: 5.1 %
NEUTROPHILS # BLD AUTO: 9.91 X10 (3) UL (ref 1.5–7.7)
NEUTROPHILS # BLD AUTO: 9.91 X10(3) UL (ref 1.5–7.7)
NEUTROPHILS NFR BLD AUTO: 78 %
NT-PROBNP SERPL-MCNC: 877 PG/ML (ref ?–125)
OSMOLALITY SERPL CALC.SUM OF ELEC: 297 MOSM/KG (ref 275–295)
PLATELET # BLD AUTO: 447 10(3)UL (ref 150–450)
POTASSIUM SERPL-SCNC: 4.2 MMOL/L (ref 3.5–5.1)
PROT SERPL-MCNC: 6 G/DL (ref 6.4–8.2)
RBC # BLD AUTO: 4.51 X10(6)UL
SODIUM SERPL-SCNC: 139 MMOL/L (ref 136–145)
WBC # BLD AUTO: 12.7 X10(3) UL (ref 4–11)

## 2022-01-12 PROCEDURE — 99232 SBSQ HOSP IP/OBS MODERATE 35: CPT | Performed by: INTERNAL MEDICINE

## 2022-01-12 PROCEDURE — 71260 CT THORAX DX C+: CPT | Performed by: INTERNAL MEDICINE

## 2022-01-12 PROCEDURE — 99233 SBSQ HOSP IP/OBS HIGH 50: CPT | Performed by: INTERNAL MEDICINE

## 2022-01-12 RX ORDER — POLYETHYLENE GLYCOL 3350 17 G/17G
17 POWDER, FOR SOLUTION ORAL DAILY PRN
Status: DISCONTINUED | OUTPATIENT
Start: 2022-01-12 | End: 2022-01-15

## 2022-01-12 RX ORDER — DEXAMETHASONE SODIUM PHOSPHATE 4 MG/ML
10 VIAL (ML) INJECTION DAILY
Status: DISCONTINUED | OUTPATIENT
Start: 2022-01-13 | End: 2022-01-14

## 2022-01-12 RX ORDER — ALPRAZOLAM 0.5 MG/1
0.5 TABLET ORAL EVERY 12 HOURS PRN
Status: DISCONTINUED | OUTPATIENT
Start: 2022-01-12 | End: 2022-01-21

## 2022-01-12 RX ORDER — ENOXAPARIN SODIUM 150 MG/ML
1 INJECTION SUBCUTANEOUS EVERY 12 HOURS SCHEDULED
Status: DISCONTINUED | OUTPATIENT
Start: 2022-01-12 | End: 2022-01-15

## 2022-01-12 NOTE — RESPIRATORY THERAPY NOTE
Received patient on continuous vapotherm  40L/80%, maintaining SpO2 >  92%. RT to continue monitor patient and wean if tolerated.

## 2022-01-12 NOTE — DIETARY NOTE
Nutrition Brief Note    Patient screened at no nutritional risk at admission by RN. Chart Review completed by RD due to COVID19+ . Intake fluctuates but as below shows was eating fairly with some 100% meal intakes as well. Decline at breakfast today: 25%.

## 2022-01-12 NOTE — PLAN OF CARE
Pt alert. Pain controlled with prn percocet. Breathing freely. Tolerating present O2 settings. Accucheck trending toward target range. Call light within easy reach. VSS. No acute distress noted.     Problem: CARDIOVASCULAR - ADULT  Goal: Maintains op minimize respiratory effort  - Oxygen supplementation based on oxygen saturation or ABGs  - Provide Smoking Cessation handout, if applicable  - Encourage broncho-pulmonary hygiene including cough, deep breathe, Incentive Spirometry  - Assess the need for s

## 2022-01-12 NOTE — PROGRESS NOTES
[facility]    Progress Note    [place].  Patient Status:  Inpatient    3/23/1950 MRN [id number]   Location [facility] 2W/SW Attending Brittni Jaimes MD   Hosp Day # 3 PCP Lori Yun MD     Subjective:     Constitution 01/12/2022    AST 31 01/12/2022    ALT 37 01/12/2022    PTT 28.1 06/29/2020    INR 0.99 06/29/2020    T4F 0.7 (L) 07/01/2021    TSH 12.300 (H) 07/01/2021    PSA 0.42 07/01/2021    DDIMER >20.00 (H) 01/12/2022    ESRML 11 07/14/2018    CRP 11.10 (H) 01/11/2 prophylaxis  Lovenox subcu 0.6 mg/kg bid      8- Full code      High risk       Addendum :   Chest ct positive for pulmonary embolism  Will change Lovenox to full therapeutic dose  1 mg/kg bid         Shannan Roberto MD  1/12/2022

## 2022-01-13 ENCOUNTER — APPOINTMENT (OUTPATIENT)
Dept: CV DIAGNOSTICS | Facility: HOSPITAL | Age: 72
DRG: 871 | End: 2022-01-13
Attending: INTERNAL MEDICINE
Payer: MEDICAID

## 2022-01-13 LAB
ALBUMIN SERPL-MCNC: 2.1 G/DL (ref 3.4–5)
ALBUMIN/GLOB SERPL: 0.6 {RATIO} (ref 1–2)
ALP LIVER SERPL-CCNC: 130 U/L
ALT SERPL-CCNC: 52 U/L
ANION GAP SERPL CALC-SCNC: 5 MMOL/L (ref 0–18)
AST SERPL-CCNC: 34 U/L (ref 15–37)
BASOPHILS # BLD AUTO: 0.05 X10(3) UL (ref 0–0.2)
BASOPHILS NFR BLD AUTO: 0.3 %
BILIRUB SERPL-MCNC: 0.2 MG/DL (ref 0.1–2)
BUN BLD-MCNC: 25 MG/DL (ref 7–18)
BUN/CREAT SERPL: 30.9 (ref 10–20)
CALCIUM BLD-MCNC: 8.5 MG/DL (ref 8.5–10.1)
CHLORIDE SERPL-SCNC: 107 MMOL/L (ref 98–112)
CO2 SERPL-SCNC: 28 MMOL/L (ref 21–32)
CREAT BLD-MCNC: 0.81 MG/DL
DEPRECATED RDW RBC AUTO: 44.5 FL (ref 35.1–46.3)
EOSINOPHIL # BLD AUTO: 0.55 X10(3) UL (ref 0–0.7)
EOSINOPHIL NFR BLD AUTO: 3.7 %
ERYTHROCYTE [DISTWIDTH] IN BLOOD BY AUTOMATED COUNT: 13.9 % (ref 11–15)
GLOBULIN PLAS-MCNC: 3.5 G/DL (ref 2.8–4.4)
GLUCOSE BLD-MCNC: 154 MG/DL (ref 70–99)
GLUCOSE BLDC GLUCOMTR-MCNC: 126 MG/DL (ref 70–99)
GLUCOSE BLDC GLUCOMTR-MCNC: 187 MG/DL (ref 70–99)
GLUCOSE BLDC GLUCOMTR-MCNC: 231 MG/DL (ref 70–99)
GLUCOSE BLDC GLUCOMTR-MCNC: 257 MG/DL (ref 70–99)
GLUCOSE BLDC GLUCOMTR-MCNC: 305 MG/DL (ref 70–99)
HCT VFR BLD AUTO: 39.9 %
HGB BLD-MCNC: 12.5 G/DL
IMM GRANULOCYTES # BLD AUTO: 0.32 X10(3) UL (ref 0–1)
IMM GRANULOCYTES NFR BLD: 2.1 %
LYMPHOCYTES # BLD AUTO: 1.5 X10(3) UL (ref 1–4)
LYMPHOCYTES NFR BLD AUTO: 10 %
MCH RBC QN AUTO: 27.6 PG (ref 26–34)
MCHC RBC AUTO-ENTMCNC: 31.3 G/DL (ref 31–37)
MCV RBC AUTO: 88.1 FL
MONOCYTES # BLD AUTO: 0.67 X10(3) UL (ref 0.1–1)
MONOCYTES NFR BLD AUTO: 4.5 %
NEUTROPHILS # BLD AUTO: 11.85 X10 (3) UL (ref 1.5–7.7)
NEUTROPHILS # BLD AUTO: 11.85 X10(3) UL (ref 1.5–7.7)
NEUTROPHILS NFR BLD AUTO: 79.4 %
OSMOLALITY SERPL CALC.SUM OF ELEC: 297 MOSM/KG (ref 275–295)
PLATELET # BLD AUTO: 453 10(3)UL (ref 150–450)
POTASSIUM SERPL-SCNC: 4.4 MMOL/L (ref 3.5–5.1)
PROT SERPL-MCNC: 5.6 G/DL (ref 6.4–8.2)
RBC # BLD AUTO: 4.53 X10(6)UL
SODIUM SERPL-SCNC: 140 MMOL/L (ref 136–145)
WBC # BLD AUTO: 14.9 X10(3) UL (ref 4–11)

## 2022-01-13 PROCEDURE — 93306 TTE W/DOPPLER COMPLETE: CPT | Performed by: INTERNAL MEDICINE

## 2022-01-13 PROCEDURE — 99232 SBSQ HOSP IP/OBS MODERATE 35: CPT | Performed by: INTERNAL MEDICINE

## 2022-01-13 PROCEDURE — 99233 SBSQ HOSP IP/OBS HIGH 50: CPT | Performed by: INTERNAL MEDICINE

## 2022-01-13 NOTE — PLAN OF CARE
Problem: Patient Centered Care  Goal: Patient preferences are identified and integrated in the patient's plan of care  Description: Interventions:  - What would you like us to know as we care for you?  \"I live with my wife and she's currently very sick\" Initiate emergency measures for life threatening arrhythmias  - Monitor electrolytes and administer replacement therapy as ordered  Outcome: Progressing     Problem: Diabetes/Glucose Control  Goal: Glucose maintained within prescribed range  Description: I function  - Maintain adequate hydration with IV or PO as ordered and tolerated  - Evaluate effectiveness of GI medications  - Encourage mobilization and activity  - Obtain nutritional consult as needed  - Establish a toileting routine/schedule  - Consider Avoid intramuscular injections, enemas and rectal medication administration  - Ensure safe mobilization of patient  - Hold pressure on venipuncture sites to achieve adequate hemostasis  - Assess for signs and symptoms of internal bleeding  - Monitor lab tr

## 2022-01-13 NOTE — PROGRESS NOTES
01-Sep-2019 15:24 Pulmonary/Critical Care Follow Up Note    HPI:   Nathanael Ni. is a 73year old male with Patient presents with:  Covid      PCP Brandon Dubon MD  Admission Attending Stefanie Lew 15 Day #4    No sob at rest  + cough  No CP  Has appetite flexpen 1-11 Units, 1-11 Units, Subcutaneous, TID CC  •  aspirin EC tab 81 mg, 81 mg, Oral, Daily  •  latanoprost (XALATAN) 0.005 % ophthalmic solution 1 drop, 1 drop, Both Eyes, Nightly  •  clopidogrel (PLAVIX) tab 75 mg, 75 mg, Oral, Daily  •  DULoxetine 01/13/2022    CREATSERUM 0.81 01/13/2022    BUN 25 01/13/2022     01/13/2022    K 4.4 01/13/2022     01/13/2022    CO2 28.0 01/13/2022     01/13/2022    CA 8.5 01/13/2022    ALB 2.1 01/13/2022    ALKPHO 130 01/13/2022    BILT 0.2 01/13/2

## 2022-01-13 NOTE — PLAN OF CARE
Pt remains on vapotherm, sats remained stable. SBP increased to 190, hydralazine given. Pt exhibited increased anxiety, xanax ordered. Pt to CT for MANNIE pe's. Bed locked and in lowest position, call light within reach.      Problem: Patient Centered Care  G signs, obtain 12 lead EKG if indicated  - Evaluate effectiveness of antiarrhythmic and heart rate control medications as ordered  - Initiate emergency measures for life threatening arrhythmias  - Monitor electrolytes and administer replacement therapy as o

## 2022-01-13 NOTE — PROGRESS NOTES
[facility] HOSP - [facility]    Progress Note    Aiden Mcdaniels.  Patient Status:  Inpatient    3/23/1950 MRN [id number]   Location [facility] 2W/SW Attending Yesi Minor MD   Hosp Day # 4 PCP Migel Victor MD     Subjective:  Feels better th virus     Leukocytosis, unspecified type     Viral sepsis [facility])      Patient is a 73year old male with COVID pneumonia  He has Type 2 DM, is on steroids for COVID, with resultant hyperglycemia   Endocrinology is consulted for inpatient DM management    PL

## 2022-01-14 LAB
ANION GAP SERPL CALC-SCNC: 4 MMOL/L (ref 0–18)
BASOPHILS # BLD AUTO: 0.08 X10(3) UL (ref 0–0.2)
BASOPHILS NFR BLD AUTO: 0.6 %
BUN BLD-MCNC: 27 MG/DL (ref 7–18)
BUN/CREAT SERPL: 36 (ref 10–20)
CALCIUM BLD-MCNC: 8.8 MG/DL (ref 8.5–10.1)
CHLORIDE SERPL-SCNC: 105 MMOL/L (ref 98–112)
CO2 SERPL-SCNC: 28 MMOL/L (ref 21–32)
CREAT BLD-MCNC: 0.75 MG/DL
DEPRECATED RDW RBC AUTO: 43.7 FL (ref 35.1–46.3)
EOSINOPHIL # BLD AUTO: 0.69 X10(3) UL (ref 0–0.7)
EOSINOPHIL NFR BLD AUTO: 4.8 %
ERYTHROCYTE [DISTWIDTH] IN BLOOD BY AUTOMATED COUNT: 14 % (ref 11–15)
GLUCOSE BLD-MCNC: 102 MG/DL (ref 70–99)
GLUCOSE BLDC GLUCOMTR-MCNC: 175 MG/DL (ref 70–99)
GLUCOSE BLDC GLUCOMTR-MCNC: 231 MG/DL (ref 70–99)
GLUCOSE BLDC GLUCOMTR-MCNC: 282 MG/DL (ref 70–99)
GLUCOSE BLDC GLUCOMTR-MCNC: 91 MG/DL (ref 70–99)
HCT VFR BLD AUTO: 43.4 %
HGB BLD-MCNC: 13.8 G/DL
IMM GRANULOCYTES # BLD AUTO: 0.38 X10(3) UL (ref 0–1)
IMM GRANULOCYTES NFR BLD: 2.6 %
LYMPHOCYTES # BLD AUTO: 1.78 X10(3) UL (ref 1–4)
LYMPHOCYTES NFR BLD AUTO: 12.3 %
MCH RBC QN AUTO: 27.8 PG (ref 26–34)
MCHC RBC AUTO-ENTMCNC: 31.8 G/DL (ref 31–37)
MCV RBC AUTO: 87.5 FL
MONOCYTES # BLD AUTO: 0.68 X10(3) UL (ref 0.1–1)
MONOCYTES NFR BLD AUTO: 4.7 %
NEUTROPHILS # BLD AUTO: 10.89 X10 (3) UL (ref 1.5–7.7)
NEUTROPHILS # BLD AUTO: 10.89 X10(3) UL (ref 1.5–7.7)
NEUTROPHILS NFR BLD AUTO: 75 %
OSMOLALITY SERPL CALC.SUM OF ELEC: 289 MOSM/KG (ref 275–295)
PLATELET # BLD AUTO: 488 10(3)UL (ref 150–450)
POTASSIUM SERPL-SCNC: 4.5 MMOL/L (ref 3.5–5.1)
RBC # BLD AUTO: 4.96 X10(6)UL
SODIUM SERPL-SCNC: 137 MMOL/L (ref 136–145)
WBC # BLD AUTO: 14.5 X10(3) UL (ref 4–11)

## 2022-01-14 PROCEDURE — 99233 SBSQ HOSP IP/OBS HIGH 50: CPT | Performed by: HOSPITALIST

## 2022-01-14 PROCEDURE — 99233 SBSQ HOSP IP/OBS HIGH 50: CPT | Performed by: INTERNAL MEDICINE

## 2022-01-14 NOTE — PROGRESS NOTES
[facility] HOSP - [facility]    Progress Note    Robbie Ernestomay.  Patient Status:  Inpatient    3/23/1950 MRN [id number]   Location [facility] 2W/SW Attending Sherie Huddleston MD   Hosp Day # 5 PCP María Granger MD     Subjective:  AM blood sugar Leukocytosis, unspecified type     Viral sepsis [facility])      Patient is a 73year old male with COVID pneumonia  He has Type 2 DM, is on steroids for COVID, with resultant hyperglycemia   Endocrinology is consulted for inpatient DM management    PLAN:   Misael

## 2022-01-14 NOTE — PLAN OF CARE
Problem: Patient Centered Care  Goal: Patient preferences are identified and integrated in the patient's plan of care  Description: Interventions:  - What would you like us to know as we care for you?  \"I live at home with my wife who is very sick right ordered  - Initiate emergency measures for life threatening arrhythmias  - Monitor electrolytes and administer replacement therapy as ordered  Outcome: Progressing     Problem: Diabetes/Glucose Control  Goal: Glucose maintained within prescribed range  Lior Assess bowel function  - Maintain adequate hydration with IV or PO as ordered and tolerated  - Evaluate effectiveness of GI medications  - Encourage mobilization and activity  - Obtain nutritional consult as needed  - Establish a toileting routine/schedule platelets)  INTERVENTIONS:  - Avoid intramuscular injections, enemas and rectal medication administration  - Ensure safe mobilization of patient  - Hold pressure on venipuncture sites to achieve adequate hemostasis  - Assess for signs and symptoms of inter

## 2022-01-14 NOTE — PLAN OF CARE
Caleb remains on vapotherm 40L 70% saturating well. Up to the chair x 4 hrs. Good appetite, BG under better control today. Meds per MAR, pain management. Will continue to monitor.          Problem: Patient Centered Care  Goal: Patient preferences are identif indicated  - Evaluate effectiveness of antiarrhythmic and heart rate control medications as ordered  - Initiate emergency measures for life threatening arrhythmias  - Monitor electrolytes and administer replacement therapy as ordered  Outcome: Progressing Progressing  Goal: Maintains or returns to baseline bowel function  Description: INTERVENTIONS:  - Assess bowel function  - Maintain adequate hydration with IV or PO as ordered and tolerated  - Evaluate effectiveness of GI medications  - Encourage mobiliza Progressing  Goal: Free from bleeding injury  Description: (Example usage: patient with low platelets)  INTERVENTIONS:  - Avoid intramuscular injections, enemas and rectal medication administration  - Ensure safe mobilization of patient  - Hold pressure on

## 2022-01-14 NOTE — PROGRESS NOTES
[facility] Pharmacy Note: Route Optimization for Dexamethasone (Decadron)    Patient is currently on Dexamethasone (Decadron) 10 mg IV every 24 hours.    The patient meets the criteria to convert to the oral equivalent as established by the IV to Oral conversion p

## 2022-01-14 NOTE — PROGRESS NOTES
[facility]    Progress Note    Bailey Duran.  Patient Status:  Inpatient    3/23/1950 MRN [id number]   Location [facility] 2W/SW Attending Nicole Vinson MD   Hosp Day # 5 PCP Jenelle Ferraro MD     Subjective:     Constitution PTT 28.1 06/29/2020    INR 0.99 06/29/2020    T4F 0.7 (L) 07/01/2021    TSH 12.300 (H) 07/01/2021    PSA 0.42 07/01/2021    DDIMER >20.00 (H) 01/12/2022    ESRML 11 07/14/2018    CRP 11.10 (H) 01/11/2022    MG 1.9 01/27/2019    TROP <0.045 11/20/2020 pcu / endorsed to hospitalist          Rajni Briggs.  Jen Verma MD  1/14/2022

## 2022-01-14 NOTE — PROGRESS NOTES
[facility] HOSP - [facility]    Progress Note    Edi Jordan. Patient Status:  Inpatient    3/23/1950 MRN [id number]   Location [facility] 2W/SW Attending Nhi Flores MD   Hosp Day # 5 PCP Mc Jara MD       Subjective:   Edi Jordan. CC  aspirin EC tab 81 mg, 81 mg, Oral, Daily  latanoprost (XALATAN) 0.005 % ophthalmic solution 1 drop, 1 drop, Both Eyes, Nightly  clopidogrel (PLAVIX) tab 75 mg, 75 mg, Oral, Daily  DULoxetine (CYMBALTA) DR particles cap 30 mg, 30 mg, Oral, Daily  levoth B12 351 06/29/2020       No results found.         Results:     CBC:    Lab Results   Component Value Date    WBC 14.5 (H) 01/14/2022    WBC 14.9 (H) 01/13/2022    WBC 12.7 (H) 01/12/2022     Lab Results   Component Value Date    HGB 13.8 01/14/2022    HGB inpatient services that will reasonably be expected to span two midnight's based on the clinical documentation in H+P. Based on patients current state of illness, I anticipate that, after discharge, patient will require TBD.

## 2022-01-15 LAB
ANION GAP SERPL CALC-SCNC: 1 MMOL/L (ref 0–18)
BASOPHILS # BLD AUTO: 0.06 X10(3) UL (ref 0–0.2)
BASOPHILS NFR BLD AUTO: 0.4 %
BUN BLD-MCNC: 27 MG/DL (ref 7–18)
BUN/CREAT SERPL: 35.1 (ref 10–20)
CALCIUM BLD-MCNC: 9 MG/DL (ref 8.5–10.1)
CHLORIDE SERPL-SCNC: 103 MMOL/L (ref 98–112)
CO2 SERPL-SCNC: 30 MMOL/L (ref 21–32)
CREAT BLD-MCNC: 0.77 MG/DL
CRP SERPL-MCNC: 0.94 MG/DL (ref ?–0.3)
D DIMER PPP FEU-MCNC: 3.41 UG/ML FEU (ref ?–0.69)
DEPRECATED HBV CORE AB SER IA-ACNC: 222.4 NG/ML
DEPRECATED RDW RBC AUTO: 44.8 FL (ref 35.1–46.3)
EOSINOPHIL # BLD AUTO: 0.58 X10(3) UL (ref 0–0.7)
EOSINOPHIL NFR BLD AUTO: 4.1 %
ERYTHROCYTE [DISTWIDTH] IN BLOOD BY AUTOMATED COUNT: 13.9 % (ref 11–15)
GLUCOSE BLD-MCNC: 112 MG/DL (ref 70–99)
GLUCOSE BLDC GLUCOMTR-MCNC: 199 MG/DL (ref 70–99)
GLUCOSE BLDC GLUCOMTR-MCNC: 239 MG/DL (ref 70–99)
GLUCOSE BLDC GLUCOMTR-MCNC: 263 MG/DL (ref 70–99)
GLUCOSE BLDC GLUCOMTR-MCNC: 99 MG/DL (ref 70–99)
HCT VFR BLD AUTO: 41.1 %
HGB BLD-MCNC: 13.3 G/DL
IMM GRANULOCYTES # BLD AUTO: 0.36 X10(3) UL (ref 0–1)
IMM GRANULOCYTES NFR BLD: 2.5 %
LYMPHOCYTES # BLD AUTO: 1.73 X10(3) UL (ref 1–4)
LYMPHOCYTES NFR BLD AUTO: 12.1 %
MCH RBC QN AUTO: 28.9 PG (ref 26–34)
MCHC RBC AUTO-ENTMCNC: 32.4 G/DL (ref 31–37)
MCV RBC AUTO: 89.2 FL
MONOCYTES # BLD AUTO: 1 X10(3) UL (ref 0.1–1)
MONOCYTES NFR BLD AUTO: 7 %
NEUTROPHILS # BLD AUTO: 10.56 X10 (3) UL (ref 1.5–7.7)
NEUTROPHILS # BLD AUTO: 10.56 X10(3) UL (ref 1.5–7.7)
NEUTROPHILS NFR BLD AUTO: 73.9 %
OSMOLALITY SERPL CALC.SUM OF ELEC: 284 MOSM/KG (ref 275–295)
PLATELET # BLD AUTO: 471 10(3)UL (ref 150–450)
POTASSIUM SERPL-SCNC: 4.4 MMOL/L (ref 3.5–5.1)
RBC # BLD AUTO: 4.61 X10(6)UL
SODIUM SERPL-SCNC: 134 MMOL/L (ref 136–145)
WBC # BLD AUTO: 14.3 X10(3) UL (ref 4–11)

## 2022-01-15 PROCEDURE — 99232 SBSQ HOSP IP/OBS MODERATE 35: CPT | Performed by: INTERNAL MEDICINE

## 2022-01-15 PROCEDURE — 99233 SBSQ HOSP IP/OBS HIGH 50: CPT | Performed by: HOSPITALIST

## 2022-01-15 RX ORDER — POLYETHYLENE GLYCOL 3350 17 G/17G
17 POWDER, FOR SOLUTION ORAL DAILY PRN
Status: DISCONTINUED | OUTPATIENT
Start: 2022-01-15 | End: 2022-01-21

## 2022-01-15 RX ORDER — DOCUSATE SODIUM 100 MG/1
100 CAPSULE, LIQUID FILLED ORAL 2 TIMES DAILY
Status: DISCONTINUED | OUTPATIENT
Start: 2022-01-15 | End: 2022-01-21

## 2022-01-15 RX ORDER — BISACODYL 10 MG
10 SUPPOSITORY, RECTAL RECTAL
Status: DISCONTINUED | OUTPATIENT
Start: 2022-01-15 | End: 2022-01-21

## 2022-01-15 RX ORDER — QUETIAPINE 25 MG/1
12.5 TABLET, FILM COATED ORAL EVERY 6 HOURS PRN
Status: DISCONTINUED | OUTPATIENT
Start: 2022-01-15 | End: 2022-01-21

## 2022-01-15 NOTE — PROGRESS NOTES
[facility]    Progress Note    Lillian Jaimes.  Patient Status:  Inpatient    3/23/1950 MRN [id number]   Location [facility] 2W/SW Attending Jana Copeland MD   Hosp Day # 6 PCP Mary Angel MD     Subjective:  AM blood sugar Leukocytosis, unspecified type     Viral sepsis [facility])      Patient is a 73year old male with COVID pneumonia  He has Type 2 DM, is on steroids for COVID, with resultant hyperglycemia   Endocrinology is consulted for inpatient DM management    PLAN:   [place]

## 2022-01-15 NOTE — PLAN OF CARE
Pt was received satting in the 70's as he helped himself up from the chair to the bed. His IV was pulled out and his vapotherm was also off. Pt was asymptomatic and was A+O x 4, he was switched to BiPAP to sleep.  Pt has anxiety and is also grieving the los assess for edema, trend weights  Outcome: Progressing  Goal: Absence of cardiac arrhythmias or at baseline  Description: INTERVENTIONS:  - Continuous cardiac monitoring, monitor vital signs, obtain 12 lead EKG if indicated  - Evaluate effectiveness of anti as appropriate  - Instruct patient on fluid and nutrition restrictions as appropriate  Outcome: Progressing     Problem: HEMATOLOGIC - ADULT  Goal: Maintains hematologic stability  Description: INTERVENTIONS  - Assess for signs and symptoms of bleeding or handout, if applicable  - Encourage broncho-pulmonary hygiene including cough, deep breathe, Incentive Spirometry  - Assess the need for suctioning and perform as needed  - Assess and instruct to report SOB or any respiratory difficulty  - Respiratory Ther
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
## 2022-01-15 NOTE — PROGRESS NOTES
[facility]    Progress Note    Ricarda Munoz. Patient Status:  Inpatient    3/23/1950 MRN [id number]   Location [facility] 2W/SW Attending Denise Hurtado MD   [facility] Day # 6 PCP Candi Beyer MD       Subjective:   Ricarda Munoz. solution 1 drop, 1 drop, Both Eyes, Nightly  clopidogrel (PLAVIX) tab 75 mg, 75 mg, Oral, Daily  DULoxetine (CYMBALTA) DR particles cap 30 mg, 30 mg, Oral, Daily  levothyroxine (Synthroid) tab 125 mcg, 125 mcg, Oral, QAM  metoprolol succinate (Toprol XL) 2 Component Value Date    WBC 14.3 (H) 01/15/2022    WBC 14.5 (H) 01/14/2022    WBC 14.9 (H) 01/13/2022     Lab Results   Component Value Date    HGB 13.3 01/15/2022    HGB 13.8 01/14/2022    HGB 12.5 (L) 01/13/2022      Lab Results   Component Value Date midnight's based on the clinical documentation in H+P. Based on patients current state of illness, I anticipate that, after discharge, patient will require TBD.

## 2022-01-15 NOTE — PHYSICAL THERAPY NOTE
PHYSICAL THERAPY EVALUATION - INPATIENT     Room Number: 220/220-A  Evaluation Date: 1/15/2022  Type of Evaluation: Initial   Physician Order: PT Eval and Treat    Presenting Problem: (+) COVID-19 on 1/8/22; acute hypoxemic resp failure     Reason for The training;Strengthening  Rehab Potential : Fair  Frequency (Obs): 3-5x/week       PHYSICAL THERAPY MEDICAL/SOCIAL HISTORY     History related to current admission: Per H&P: \"73year-old gentleman with history of CAD with prior multiple stenting, DM, HTN, H disease        Past Surgical History  Past Surgical History:   Procedure Laterality Date   • CATH BARE METAL STENT (BMS)     • CIRCUMCISION,OTHR  08/04/2020    Dr. Obie Porras @ [facility]   • CYSTO/URETERO W/LITHOTRIPSY Left 08/04/2020    Dr. Obie Porras @ [facility] -- dupl the patient currently need. ..    Help from Another: Moving to and from a bed to a chair (including a wheelchair)?: A Little   Help from Another: Need to walk in hospital room?: A Little   Help from Another: Climbing 3-5 steps with a railing?: A Lot     AM-P

## 2022-01-15 NOTE — PLAN OF CARE
Received on BiPAP, removing mask and dropping to 80% room air. Applied Vapotherm at 40L and 100% FiO2, after 10 minutes he tolerated getting up to chair.  Disoriented to time and situation, reports having visual hallucinations intermittently since 3 days pr cardiac monitoring, monitor vital signs, obtain 12 lead EKG if indicated  - Evaluate effectiveness of antiarrhythmic and heart rate control medications as ordered  - Initiate emergency measures for life threatening arrhythmias  - Monitor electrolytes and a deficit  - Monitor intake, output and patient weight  - Monitor urine specific gravity, serum osmolarity and serum sodium as indicated or ordered  - Monitor response to interventions for patient's volume status, including labs, urine output, blood pressure ordered, Vapotherm  - See additional Care Plan goals for specific interventions  Outcome: Not Progressing     Problem: RESPIRATORY - ADULT  Goal: Achieves optimal ventilation and oxygenation  Description: INTERVENTIONS:  - Assess for changes in respiratory

## 2022-01-16 LAB
ANION GAP SERPL CALC-SCNC: 2 MMOL/L (ref 0–18)
BASOPHILS # BLD AUTO: 0.05 X10(3) UL (ref 0–0.2)
BASOPHILS NFR BLD AUTO: 0.4 %
BUN BLD-MCNC: 32 MG/DL (ref 7–18)
BUN/CREAT SERPL: 37.6 (ref 10–20)
CALCIUM BLD-MCNC: 8.6 MG/DL (ref 8.5–10.1)
CHLORIDE SERPL-SCNC: 102 MMOL/L (ref 98–112)
CO2 SERPL-SCNC: 31 MMOL/L (ref 21–32)
CREAT BLD-MCNC: 0.85 MG/DL
DEPRECATED RDW RBC AUTO: 46.2 FL (ref 35.1–46.3)
EOSINOPHIL # BLD AUTO: 0.27 X10(3) UL (ref 0–0.7)
EOSINOPHIL NFR BLD AUTO: 1.9 %
ERYTHROCYTE [DISTWIDTH] IN BLOOD BY AUTOMATED COUNT: 14.3 % (ref 11–15)
GLUCOSE BLD-MCNC: 181 MG/DL (ref 70–99)
GLUCOSE BLDC GLUCOMTR-MCNC: 157 MG/DL (ref 70–99)
GLUCOSE BLDC GLUCOMTR-MCNC: 238 MG/DL (ref 70–99)
GLUCOSE BLDC GLUCOMTR-MCNC: 261 MG/DL (ref 70–99)
GLUCOSE BLDC GLUCOMTR-MCNC: 262 MG/DL (ref 70–99)
GLUCOSE BLDC GLUCOMTR-MCNC: 289 MG/DL (ref 70–99)
HCT VFR BLD AUTO: 41.2 %
HGB BLD-MCNC: 12.7 G/DL
IMM GRANULOCYTES # BLD AUTO: 0.35 X10(3) UL (ref 0–1)
IMM GRANULOCYTES NFR BLD: 2.5 %
LYMPHOCYTES # BLD AUTO: 1.64 X10(3) UL (ref 1–4)
LYMPHOCYTES NFR BLD AUTO: 11.7 %
MCH RBC QN AUTO: 27.6 PG (ref 26–34)
MCHC RBC AUTO-ENTMCNC: 30.8 G/DL (ref 31–37)
MCV RBC AUTO: 89.6 FL
MONOCYTES # BLD AUTO: 1.19 X10(3) UL (ref 0.1–1)
MONOCYTES NFR BLD AUTO: 8.5 %
NEUTROPHILS # BLD AUTO: 10.5 X10 (3) UL (ref 1.5–7.7)
NEUTROPHILS # BLD AUTO: 10.5 X10(3) UL (ref 1.5–7.7)
NEUTROPHILS NFR BLD AUTO: 75 %
OSMOLALITY SERPL CALC.SUM OF ELEC: 291 MOSM/KG (ref 275–295)
PLATELET # BLD AUTO: 446 10(3)UL (ref 150–450)
POTASSIUM SERPL-SCNC: 4.4 MMOL/L (ref 3.5–5.1)
RBC # BLD AUTO: 4.6 X10(6)UL
SODIUM SERPL-SCNC: 135 MMOL/L (ref 136–145)
WBC # BLD AUTO: 14 X10(3) UL (ref 4–11)

## 2022-01-16 PROCEDURE — 99233 SBSQ HOSP IP/OBS HIGH 50: CPT | Performed by: HOSPITALIST

## 2022-01-16 PROCEDURE — 99233 SBSQ HOSP IP/OBS HIGH 50: CPT | Performed by: INTERNAL MEDICINE

## 2022-01-16 PROCEDURE — 99232 SBSQ HOSP IP/OBS MODERATE 35: CPT | Performed by: INTERNAL MEDICINE

## 2022-01-16 NOTE — PROGRESS NOTES
[facility]    Progress Note    Ricarda Munoz. Patient Status:  Inpatient    3/23/1950 MRN [id number]   Location [facility] 2W/SW Attending Denise Hurtado MD   Hosp Day # 7 PCP Candi Beyer MD       Subjective:   Ricarda Munoz. injection 10 mg, 10 mg, Intravenous, Q4H PRN  aspirin EC tab 81 mg, 81 mg, Oral, Daily  latanoprost (XALATAN) 0.005 % ophthalmic solution 1 drop, 1 drop, Both Eyes, Nightly  clopidogrel (PLAVIX) tab 75 mg, 75 mg, Oral, Daily  DULoxetine (CYMBALTA) DR Sarah Bruce <0.045 11/20/2020    CK 65 01/08/2022    B12 351 06/29/2020       No results found.         Results:     CBC:    Lab Results   Component Value Date    WBC 14.0 (H) 01/16/2022    WBC 14.3 (H) 01/15/2022    WBC 14.5 (H) 01/14/2022     Lab Results   Component daughter       PHYSICIAN Certification of Need for Inpatient Hospitalization - Initial Certification    Patient will require inpatient services that will reasonably be expected to span two midnight's based on the clinical documentation in H+P.    Based on p

## 2022-01-16 NOTE — PROGRESS NOTES
[facility]    Progress Note    Lillian Jaimes.  Patient Status:  Inpatient    3/23/1950 MRN [id number]   Location [facility] 2W/SW Attending Jana Copeland MD   Hosp Day # 7 PCP Mary Angel MD     Subjective:  AM blood sugar Leukocytosis, unspecified type     Viral sepsis [facility])      Patient is a 73year old male with COVID pneumonia  He has Type 2 DM, is on steroids for COVID, with resultant hyperglycemia   Endocrinology is consulted for inpatient DM management    PLAN:   Jessi
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
## 2022-01-16 NOTE — PROGRESS NOTES
[facility]     Progress Note        Lillian Jaimes.  Patient Status:  Inpatient    3/23/1950 MRN [id number]   Location [facility] 2W/SW Attending Esteban Vo, [address] [facility] Day # 7 PCP Mary Angel MD       Subjective:   Patient s % ophthalmic solution 1 drop, 1 drop, Both Eyes, Nightly  clopidogrel (PLAVIX) tab 75 mg, 75 mg, Oral, Daily  DULoxetine (CYMBALTA) DR particles cap 30 mg, 30 mg, Oral, Daily  levothyroxine (Synthroid) tab 125 mcg, 125 mcg, Oral, QAM  metoprolol succinate Hypertension  5. Coronary artery disease  6. COPD  7. Morbid obesity  8.   Diabetes mellitus     Plan   -Patient presents with evidence of acute hypoxemic respiratory failure secondary to severe COVID-19 pneumonia  -Patient vaccinated but has not receive

## 2022-01-16 NOTE — PLAN OF CARE
Pt is A+Ox 4, reports visual hallucinations. The MD is aware and has prescribed seroquel. He is to be started on humulin with breakfast. Pt was afebrile, cardiac wdl, with good urine output. Pt is resting, bed in the lowest locked position.      Problem: Pa Assess quality of pulses, skin color and temperature  - Assess for signs of decreased coronary artery perfusion - ex.  Angina  - Evaluate fluid balance, assess for edema, trend weights  Outcome: Progressing  Goal: Absence of cardiac arrhythmias or at baseli response to interventions for patient's volume status, including labs, urine output, blood pressure (other measures as available)  - Encourage oral intake as appropriate  - Instruct patient on fluid and nutrition restrictions as appropriate  Outcome: Progr to facilitate oxygenation and minimize respiratory effort  - Oxygen supplementation based on oxygen saturation or ABGs  - Provide Smoking Cessation handout, if applicable  - Encourage broncho-pulmonary hygiene including cough, deep breathe, Incentive Joanne Perry

## 2022-01-17 LAB
ANION GAP SERPL CALC-SCNC: 4 MMOL/L (ref 0–18)
BASOPHILS # BLD AUTO: 0.05 X10(3) UL (ref 0–0.2)
BASOPHILS NFR BLD AUTO: 0.3 %
BUN BLD-MCNC: 30 MG/DL (ref 7–18)
BUN/CREAT SERPL: 42.9 (ref 10–20)
CALCIUM BLD-MCNC: 9 MG/DL (ref 8.5–10.1)
CHLORIDE SERPL-SCNC: 102 MMOL/L (ref 98–112)
CO2 SERPL-SCNC: 31 MMOL/L (ref 21–32)
CREAT BLD-MCNC: 0.7 MG/DL
CRP SERPL-MCNC: 0.31 MG/DL (ref ?–0.3)
D DIMER PPP FEU-MCNC: 1.92 UG/ML FEU (ref ?–0.69)
DEPRECATED HBV CORE AB SER IA-ACNC: 202.5 NG/ML
DEPRECATED RDW RBC AUTO: 45.3 FL (ref 35.1–46.3)
EOSINOPHIL # BLD AUTO: 0.18 X10(3) UL (ref 0–0.7)
EOSINOPHIL NFR BLD AUTO: 1.2 %
ERYTHROCYTE [DISTWIDTH] IN BLOOD BY AUTOMATED COUNT: 14.1 % (ref 11–15)
GLUCOSE BLD-MCNC: 94 MG/DL (ref 70–99)
GLUCOSE BLDC GLUCOMTR-MCNC: 132 MG/DL (ref 70–99)
GLUCOSE BLDC GLUCOMTR-MCNC: 132 MG/DL (ref 70–99)
GLUCOSE BLDC GLUCOMTR-MCNC: 276 MG/DL (ref 70–99)
GLUCOSE BLDC GLUCOMTR-MCNC: 332 MG/DL (ref 70–99)
GLUCOSE BLDC GLUCOMTR-MCNC: 99 MG/DL (ref 70–99)
HCT VFR BLD AUTO: 41.8 %
HGB BLD-MCNC: 13.1 G/DL
IMM GRANULOCYTES # BLD AUTO: 0.29 X10(3) UL (ref 0–1)
IMM GRANULOCYTES NFR BLD: 1.9 %
LYMPHOCYTES # BLD AUTO: 1.88 X10(3) UL (ref 1–4)
LYMPHOCYTES NFR BLD AUTO: 12.2 %
MCH RBC QN AUTO: 27.9 PG (ref 26–34)
MCHC RBC AUTO-ENTMCNC: 31.3 G/DL (ref 31–37)
MCV RBC AUTO: 89.1 FL
MONOCYTES # BLD AUTO: 1.74 X10(3) UL (ref 0.1–1)
MONOCYTES NFR BLD AUTO: 11.3 %
NEUTROPHILS # BLD AUTO: 11.24 X10 (3) UL (ref 1.5–7.7)
NEUTROPHILS # BLD AUTO: 11.24 X10(3) UL (ref 1.5–7.7)
NEUTROPHILS NFR BLD AUTO: 73.1 %
OSMOLALITY SERPL CALC.SUM OF ELEC: 290 MOSM/KG (ref 275–295)
PLATELET # BLD AUTO: 475 10(3)UL (ref 150–450)
POTASSIUM SERPL-SCNC: 4.4 MMOL/L (ref 3.5–5.1)
RBC # BLD AUTO: 4.69 X10(6)UL
SODIUM SERPL-SCNC: 137 MMOL/L (ref 136–145)
WBC # BLD AUTO: 15.4 X10(3) UL (ref 4–11)

## 2022-01-17 PROCEDURE — 99233 SBSQ HOSP IP/OBS HIGH 50: CPT | Performed by: INTERNAL MEDICINE

## 2022-01-17 PROCEDURE — 99233 SBSQ HOSP IP/OBS HIGH 50: CPT | Performed by: HOSPITALIST

## 2022-01-17 RX ORDER — LISINOPRIL 2.5 MG/1
5 TABLET ORAL DAILY
Status: DISCONTINUED | OUTPATIENT
Start: 2022-01-17 | End: 2022-01-21

## 2022-01-17 NOTE — PROGRESS NOTES
[facility]    Progress Note    Dwain Smith.  Patient Status:  Inpatient    3/23/1950 MRN [id number]   Location [facility] 2W/SW Attending Fercho Darby MD   Hosp Day # 8 PCP Rosalind Lara MD     Subjective:  AM blood sugar Leukocytosis, unspecified type     Viral sepsis [facility])      Patient is a 73year old male with COVID pneumonia  He has Type 2 DM, is on steroids for COVID, with resultant hyperglycemia   Endocrinology is consulted for inpatient DM management    PLAN:   Jessi

## 2022-01-17 NOTE — DIETARY NOTE
Nutrition Brief Note and follow up     1/12: Patient screened at no nutritional risk at admission by RN. Chart Review completed by RD due to COVID19+ . Intake fluctuates but as below shows was eating fairly with some 100% meal intakes as well.  Decline at

## 2022-01-17 NOTE — PROGRESS NOTES
[facility]    Progress Note    Dwain Smith. Patient Status:  Inpatient    3/23/1950 MRN [id number]   Location [facility] 2W/SW Attending Fercho Darby MD   [facility] Day # 8 PCP Rosalind Lara MD       Subjective:   Dwain Smith. (APRESOLINE) injection 10 mg, 10 mg, Intravenous, Q4H PRN  aspirin EC tab 81 mg, 81 mg, Oral, Daily  latanoprost (XALATAN) 0.005 % ophthalmic solution 1 drop, 1 drop, Both Eyes, Nightly  clopidogrel (PLAVIX) tab 75 mg, 75 mg, Oral, Daily  DULoxetine (CYMBA TROP <0.045 11/20/2020    CK 65 01/08/2022    B12 351 06/29/2020       No results found.         Results:     CBC:    Lab Results   Component Value Date    WBC 15.4 (H) 01/17/2022    WBC 14.0 (H) 01/16/2022    WBC 14.3 (H) 01/15/2022     Lab Results   Amanda Lashae Certification of Need for Inpatient Hospitalization - Initial Certification    Patient will require inpatient services that will reasonably be expected to span two midnight's based on the clinical documentation in H+P.    Based on patients current state of

## 2022-01-17 NOTE — PROGRESS NOTES
[facility]    Progress Note    Aiden Mcdaniels.  Patient Status:  Inpatient    3/23/1950 MRN [id number]   Location [facility] 2W/SW Attending Ham Gomez, [address] [facility] Day # 8 PCP Migel Victro MD     Subjective:     Constitution (L) 07/01/2021    TSH 12.300 (H) 07/01/2021    PSA 0.42 07/01/2021    DDIMER 1.92 (H) 01/17/2022    ESRML 11 07/14/2018    CRP 0.31 (H) 01/17/2022    MG 1.9 01/27/2019    TROP <0.045 11/20/2020    CK 65 01/08/2022    B12 351 06/29/2020       No results fou

## 2022-01-17 NOTE — PLAN OF CARE
Pt is A+O x4, stable on vapotherm @ 25L and 50% FiO2. Pt reports hallucinations have decreased since starting on Seroquel. Pt has good appetite, cardiac wdl, skin intact, pt refuses SCD's but gets up to the chair during the day.  Pt is on BiPAP at nights wh trends  - Monitor for bleeding, hypotension and signs of decreased cardiac output  - Evaluate effectiveness of vasoactive medications to optimize hemodynamic stability  - Monitor arterial and/or venous puncture sites for bleeding and/or hematoma  - Assess comfort measures as appropriate  - Advance diet as tolerated, if ordered  - Obtain nutritional consult as needed  - Evaluate fluid balance  Outcome: Progressing     Problem: METABOLIC/FLUID AND ELECTROLYTES - ADULT  Goal: Electrolytes maintained within nor hemostasis  - Assess for signs and symptoms of internal bleeding  - Monitor lab trends  - Patient is to report abnormal signs of bleeding to staff  - Avoid use of toothpicks and dental floss  - Use electric shaver for shaving  - Use soft bristle tooth brus

## 2022-01-18 LAB
ANION GAP SERPL CALC-SCNC: 4 MMOL/L (ref 0–18)
BASOPHILS # BLD AUTO: 0.04 X10(3) UL (ref 0–0.2)
BASOPHILS NFR BLD AUTO: 0.3 %
BUN BLD-MCNC: 37 MG/DL (ref 7–18)
BUN/CREAT SERPL: 42 (ref 10–20)
CALCIUM BLD-MCNC: 9.2 MG/DL (ref 8.5–10.1)
CHLORIDE SERPL-SCNC: 100 MMOL/L (ref 98–112)
CO2 SERPL-SCNC: 30 MMOL/L (ref 21–32)
CREAT BLD-MCNC: 0.88 MG/DL
CRP SERPL-MCNC: <0.29 MG/DL (ref ?–0.3)
D DIMER PPP FEU-MCNC: 1.86 UG/ML FEU (ref ?–0.69)
DEPRECATED HBV CORE AB SER IA-ACNC: 193.4 NG/ML
DEPRECATED RDW RBC AUTO: 45.9 FL (ref 35.1–46.3)
EOSINOPHIL # BLD AUTO: 0.12 X10(3) UL (ref 0–0.7)
EOSINOPHIL NFR BLD AUTO: 0.8 %
ERYTHROCYTE [DISTWIDTH] IN BLOOD BY AUTOMATED COUNT: 14.2 % (ref 11–15)
GLUCOSE BLD-MCNC: 128 MG/DL (ref 70–99)
GLUCOSE BLDC GLUCOMTR-MCNC: 143 MG/DL (ref 70–99)
GLUCOSE BLDC GLUCOMTR-MCNC: 218 MG/DL (ref 70–99)
GLUCOSE BLDC GLUCOMTR-MCNC: 232 MG/DL (ref 70–99)
GLUCOSE BLDC GLUCOMTR-MCNC: 298 MG/DL (ref 70–99)
GLUCOSE BLDC GLUCOMTR-MCNC: 318 MG/DL (ref 70–99)
HCT VFR BLD AUTO: 41.5 %
HGB BLD-MCNC: 13.2 G/DL
IMM GRANULOCYTES # BLD AUTO: 0.25 X10(3) UL (ref 0–1)
IMM GRANULOCYTES NFR BLD: 1.6 %
LYMPHOCYTES # BLD AUTO: 2.13 X10(3) UL (ref 1–4)
LYMPHOCYTES NFR BLD AUTO: 13.6 %
MCH RBC QN AUTO: 28.3 PG (ref 26–34)
MCHC RBC AUTO-ENTMCNC: 31.8 G/DL (ref 31–37)
MCV RBC AUTO: 88.9 FL
MONOCYTES # BLD AUTO: 1.56 X10(3) UL (ref 0.1–1)
MONOCYTES NFR BLD AUTO: 10 %
NEUTROPHILS # BLD AUTO: 11.57 X10 (3) UL (ref 1.5–7.7)
NEUTROPHILS # BLD AUTO: 11.57 X10(3) UL (ref 1.5–7.7)
NEUTROPHILS NFR BLD AUTO: 73.7 %
OSMOLALITY SERPL CALC.SUM OF ELEC: 288 MOSM/KG (ref 275–295)
PLATELET # BLD AUTO: 448 10(3)UL (ref 150–450)
POTASSIUM SERPL-SCNC: 4.5 MMOL/L (ref 3.5–5.1)
RBC # BLD AUTO: 4.67 X10(6)UL
SODIUM SERPL-SCNC: 134 MMOL/L (ref 136–145)
WBC # BLD AUTO: 15.7 X10(3) UL (ref 4–11)

## 2022-01-18 PROCEDURE — 99233 SBSQ HOSP IP/OBS HIGH 50: CPT | Performed by: HOSPITALIST

## 2022-01-18 PROCEDURE — 99233 SBSQ HOSP IP/OBS HIGH 50: CPT | Performed by: INTERNAL MEDICINE

## 2022-01-18 RX ORDER — DEXAMETHASONE 6 MG/1
6 TABLET ORAL
Status: DISCONTINUED | OUTPATIENT
Start: 2022-01-19 | End: 2022-01-19

## 2022-01-18 NOTE — CM/SW NOTE
01/18/22 1000   CM/SW Referral Data   Referral Source Physician;   Reason for Referral Discharge planning;Psychosocial assessment   Informant Patient   Patient Info   Patient's Current Mental Status at Time of Assessment Alert;Oriented   Kaleb Armijo

## 2022-01-18 NOTE — PROGRESS NOTES
[facility] HOSP - [facility]    Progress Note    Enriqueta Ovidio.  Patient Status:  Inpatient    3/23/1950 MRN [id number]   Location [facility] 2W/SW Attending Gurjit Roach MD   Hosp Day # 9 PCP Chely Harris MD     Subjective:  Blood sugars ar unspecified type     Viral sepsis [facility])      Patient is a 73year old male with COVID pneumonia  He has Type 2 DM, is on steroids for COVID, with resultant hyperglycemia   Endocrinology is consulted for inpatient DM management    PLAN:   Increase NPH from

## 2022-01-18 NOTE — OCCUPATIONAL THERAPY NOTE
OCCUPATIONAL THERAPY TREATMENT NOTE - INPATIENT        Room Number: 220/220-A                Problem List  Principal Problem:    Acute hypoxemic respiratory failure due to COVID-19 [facility])  Active Problems:    Type 2 diabetes mellitus with hyperglycemia, wit training;Functional transfer training; Endurance training;Patient/Family education;Patient/Family training;Equipment eval/education    SUBJECTIVE  \"I am hallucinating sometimes. . I know I'm doing it\"  \"My wife dies 5 days ago. Nikolas Puff Nikolas Puff \"    OBJECTIVE  Precautio session    Patient will tolerate standing for 2-3 minutes in prep for adls with SPV  Comment: progressing    Patient will complete LE/UE dressing with setup  Comment: progressing          Goals  on:   Frequency: 3-5x week

## 2022-01-18 NOTE — PROGRESS NOTES
[facility]    Progress Note    Kenneth Aguilar.  Patient Status:  Inpatient    3/23/1950 MRN [id number]   Location [facility] 2W/SW Attending Khushbu Kearney, [address] [facility] Day # 9 PCP Francisco Conteh MD     Subjective:     Constitution PSA 0.42 07/01/2021    DDIMER 1.86 (H) 01/18/2022    ESRML 11 07/14/2018    CRP <0.29 01/18/2022    MG 1.9 01/27/2019    TROP <0.045 11/20/2020    CK 65 01/08/2022    B12 351 06/29/2020         Assessment & Plan:     1- very severe covid 19 infection wi

## 2022-01-18 NOTE — PROGRESS NOTES
[facility]    Progress Note    Dannielle Rendon. Patient Status:  Inpatient    3/23/1950 MRN [id number]   [facility] 2W/SW Attending Gabe Hu MD   [facility] Day # 9 PCP Liam Villa MD       Subjective:   Dannielle Rendon. HCl (APRESOLINE) injection 10 mg, 10 mg, Intravenous, Q4H PRN  aspirin EC tab 81 mg, 81 mg, Oral, Daily  latanoprost (XALATAN) 0.005 % ophthalmic solution 1 drop, 1 drop, Both Eyes, Nightly  clopidogrel (PLAVIX) tab 75 mg, 75 mg, Oral, Daily  DULoxetine (C 01/27/2019    TROP <0.045 11/20/2020    CK 65 01/08/2022    B12 351 06/29/2020       No results found.         Results:     CBC:    Lab Results   Component Value Date    WBC 15.7 (H) 01/18/2022    WBC 15.4 (H) 01/17/2022    WBC 14.0 (H) 01/16/2022     Lab R Need for Inpatient Hospitalization - Initial Certification    Patient will require inpatient services that will reasonably be expected to span two midnight's based on the clinical documentation in H+P.    Based on patients current state of illness, I antici

## 2022-01-18 NOTE — PLAN OF CARE
Pt A&Ox4, On 2L NC. Up to chair with PT for 3 hours, sating fine during transfers. Safety measures implemented throughout shift.       Problem: Patient Centered Care  Goal: Patient preferences are identified and integrated in the patient's plan of care  Lior ex. Angina  - Evaluate fluid balance, assess for edema, trend weights  Outcome: Progressing  Goal: Absence of cardiac arrhythmias or at baseline  Description: INTERVENTIONS:  - Continuous cardiac monitoring, monitor vital signs, obtain 12 lead EKG if indic ordered  - Evaluate effectiveness of ordered antiemetic medications  - Provide nonpharmacologic comfort measures as appropriate  - Advance diet as tolerated, if ordered  - Obtain nutritional consult as needed  - Evaluate fluid balance  Outcome: Progressing hemorrhage  - Monitor labs and vital signs for trends  - Administer supportive blood products/factors, fluids and medications as ordered and appropriate  - Administer supportive blood products/factors as ordered and appropriate  Outcome: Progressing  Goal:

## 2022-01-18 NOTE — PHYSICAL THERAPY NOTE
PHYSICAL THERAPY TREATMENT NOTE - INPATIENT     Room Number: 924/179-E       Presenting Problem: (+) COVID-19 on 1/8/22; acute hypoxemic resp failure       Problem List  Principal Problem:    Acute hypoxemic respiratory failure due to COVID-19 [facility])  Activ PLAN  PT Treatment Plan: Bed mobility; Body mechanics; Endurance; Energy conservation;Patient education; Family education;Gait training;Range of motion;Strengthening;Transfer training;Balance training    SUBJECTIVE  \"I've been hallucinating you know, but flexed posture)  Stairs: (NT)    Patient End of Session: Up in chair;Needs met;Call light within reach;RN aware of session/findings; All patient questions and concerns addressed; Alarm set    CURRENT GOALS   Goals to be met by: 1/29/22  Patient Goal Patient

## 2022-01-18 NOTE — PLAN OF CARE
Patient had no chest discomfort, shortness of breath or changes in cognition overnight. Placed on Bipap and tolerated machine well. Pt has intermittent chronic low back pain slightly relieved by Percocet prn.      Problem: Patient Centered Care  Goal: [place] and temperature  - Assess for signs of decreased coronary artery perfusion - ex.  Angina  - Evaluate fluid balance, assess for edema, trend weights  Outcome: Progressing  Goal: Absence of cardiac arrhythmias or at baseline  Description: INTERVENTIONS:  - Co ordered and tolerated  - Nasogastric tube to low intermittent suction as ordered  - Evaluate effectiveness of ordered antiemetic medications  - Provide nonpharmacologic comfort measures as appropriate  - Advance diet as tolerated, if ordered  - Obtain nutr stability  Description: INTERVENTIONS  - Assess for signs and symptoms of bleeding or hemorrhage  - Monitor labs and vital signs for trends  - Administer supportive blood products/factors, fluids and medications as ordered and appropriate  - Administer sup

## 2022-01-19 LAB
ANION GAP SERPL CALC-SCNC: 5 MMOL/L (ref 0–18)
BASOPHILS # BLD AUTO: 0.07 X10(3) UL (ref 0–0.2)
BASOPHILS NFR BLD AUTO: 0.4 %
BUN BLD-MCNC: 32 MG/DL (ref 7–18)
BUN/CREAT SERPL: 40.5 (ref 10–20)
CALCIUM BLD-MCNC: 9.1 MG/DL (ref 8.5–10.1)
CHLORIDE SERPL-SCNC: 101 MMOL/L (ref 98–112)
CO2 SERPL-SCNC: 31 MMOL/L (ref 21–32)
CREAT BLD-MCNC: 0.79 MG/DL
CRP SERPL-MCNC: <0.29 MG/DL (ref ?–0.3)
D DIMER PPP FEU-MCNC: 1.7 UG/ML FEU (ref ?–0.69)
DEPRECATED HBV CORE AB SER IA-ACNC: 190.3 NG/ML
DEPRECATED RDW RBC AUTO: 45.5 FL (ref 35.1–46.3)
EOSINOPHIL # BLD AUTO: 0.18 X10(3) UL (ref 0–0.7)
EOSINOPHIL NFR BLD AUTO: 1.1 %
ERYTHROCYTE [DISTWIDTH] IN BLOOD BY AUTOMATED COUNT: 14.1 % (ref 11–15)
GLUCOSE BLD-MCNC: 138 MG/DL (ref 70–99)
GLUCOSE BLDC GLUCOMTR-MCNC: 152 MG/DL (ref 70–99)
GLUCOSE BLDC GLUCOMTR-MCNC: 236 MG/DL (ref 70–99)
GLUCOSE BLDC GLUCOMTR-MCNC: 283 MG/DL (ref 70–99)
GLUCOSE BLDC GLUCOMTR-MCNC: 99 MG/DL (ref 70–99)
HCT VFR BLD AUTO: 41.4 %
HGB BLD-MCNC: 13.2 G/DL
IMM GRANULOCYTES # BLD AUTO: 0.28 X10(3) UL (ref 0–1)
IMM GRANULOCYTES NFR BLD: 1.7 %
LYMPHOCYTES # BLD AUTO: 2.59 X10(3) UL (ref 1–4)
LYMPHOCYTES NFR BLD AUTO: 15.7 %
MCH RBC QN AUTO: 28.3 PG (ref 26–34)
MCHC RBC AUTO-ENTMCNC: 31.9 G/DL (ref 31–37)
MCV RBC AUTO: 88.8 FL
MONOCYTES # BLD AUTO: 1.71 X10(3) UL (ref 0.1–1)
MONOCYTES NFR BLD AUTO: 10.4 %
NEUTROPHILS # BLD AUTO: 11.68 X10 (3) UL (ref 1.5–7.7)
NEUTROPHILS # BLD AUTO: 11.68 X10(3) UL (ref 1.5–7.7)
NEUTROPHILS NFR BLD AUTO: 70.7 %
OSMOLALITY SERPL CALC.SUM OF ELEC: 293 MOSM/KG (ref 275–295)
PLATELET # BLD AUTO: 427 10(3)UL (ref 150–450)
POTASSIUM SERPL-SCNC: 4.4 MMOL/L (ref 3.5–5.1)
RBC # BLD AUTO: 4.66 X10(6)UL
SODIUM SERPL-SCNC: 137 MMOL/L (ref 136–145)
WBC # BLD AUTO: 16.5 X10(3) UL (ref 4–11)

## 2022-01-19 PROCEDURE — 99233 SBSQ HOSP IP/OBS HIGH 50: CPT | Performed by: INTERNAL MEDICINE

## 2022-01-19 PROCEDURE — 99232 SBSQ HOSP IP/OBS MODERATE 35: CPT | Performed by: INTERNAL MEDICINE

## 2022-01-19 PROCEDURE — 99233 SBSQ HOSP IP/OBS HIGH 50: CPT | Performed by: HOSPITALIST

## 2022-01-19 RX ORDER — CYANOCOBALAMIN 1000 UG/ML
1000 INJECTION INTRAMUSCULAR; SUBCUTANEOUS DAILY
Status: DISCONTINUED | OUTPATIENT
Start: 2022-01-19 | End: 2022-01-21

## 2022-01-19 RX ORDER — DEXAMETHASONE 4 MG/1
4 TABLET ORAL
Status: DISCONTINUED | OUTPATIENT
Start: 2022-01-20 | End: 2022-01-21

## 2022-01-19 NOTE — PROGRESS NOTES
[facility]    Progress Note    Kevin Theodore.  Patient Status:  Inpatient    3/23/1950 MRN [id number]   [facility] 2W/SW Attending Andrea Holloway Divya Day # 10 PCP Oniel Mascorro MD     Subjective:     Jason Richards 01/13/2022    PTT 28.1 06/29/2020    INR 0.99 06/29/2020    T4F 0.7 (L) 07/01/2021    TSH 12.300 (H) 07/01/2021    PSA 0.42 07/01/2021    DDIMER 1.70 (H) 01/19/2022    ESRML 11 07/14/2018    CRP <0.29 01/19/2022    MG 1.9 01/27/2019    TROP <0.045 11/20/20

## 2022-01-19 NOTE — PLAN OF CARE
Patient AxOx4. VSS. Weaning O2 as tolerated, denies SOB/CRUM. Utilizing percocet for chronic low back pain. Able to ambulate to bathroom/chair with a walker and assistance--PT/OT following, rec SNF on discharge. Awaiting bed on medical unit.     Problem: Porfirio Robles Assess quality of pulses, skin color and temperature  - Assess for signs of decreased coronary artery perfusion - ex.  Angina  - Evaluate fluid balance, assess for edema, trend weights  Outcome: Progressing  Goal: Absence of cardiac arrhythmias or at baseli Maintain adequate hydration with IV or PO as ordered and tolerated  - Nasogastric tube to low intermittent suction as ordered  - Evaluate effectiveness of ordered antiemetic medications  - Provide nonpharmacologic comfort measures as appropriate  - Advance ADULT  Goal: Maintains hematologic stability  Description: INTERVENTIONS  - Assess for signs and symptoms of bleeding or hemorrhage  - Monitor labs and vital signs for trends  - Administer supportive blood products/factors, fluids and medications as ordere

## 2022-01-19 NOTE — PROGRESS NOTES
[facility]    Progress Note    Edy Caraballo.  Patient Status:  Inpatient    3/23/1950 MRN [id number]   [facility] 2W/SW Attending Andrea Hollowayissa Day # 10 PCP Benjamin Hall MD     Subjective:     Lexie Nesbitt 01/13/2022    AST 34 01/13/2022    ALT 52 01/13/2022    PTT 28.1 06/29/2020    INR 0.99 06/29/2020    T4F 0.7 (L) 07/01/2021    TSH 12.300 (H) 07/01/2021    PSA 0.42 07/01/2021    DDIMER 1.70 (H) 01/19/2022    ESRML 11 07/14/2018    CRP <0.29 01/19/2022

## 2022-01-19 NOTE — PLAN OF CARE
Pt is A+O x 4, progressed to 4L on HFNC, currently awaiting transfer to the 5th floor. Vitals have been maintained WDL, skin is intact and mepilex on the sacral area. He goe to the [place] assist and he had a large bm overnight.  Pt is resting, bed puncture sites for bleeding and/or hematoma  - Assess quality of pulses, skin color and temperature  - Assess for signs of decreased coronary artery perfusion - ex.  Angina  - Evaluate fluid balance, assess for edema, trend weights  Outcome: Progressing  Go vomiting  Description: INTERVENTIONS:  - Maintain adequate hydration with IV or PO as ordered and tolerated  - Nasogastric tube to low intermittent suction as ordered  - Evaluate effectiveness of ordered antiemetic medications  - Provide nonpharmacologic c Progressing     Problem: HEMATOLOGIC - ADULT  Goal: Maintains hematologic stability  Description: INTERVENTIONS  - Assess for signs and symptoms of bleeding or hemorrhage  - Monitor labs and vital signs for trends  - Administer supportive blood products/fa

## 2022-01-19 NOTE — PROGRESS NOTES
[facility] HOSP - [facility]    Progress Note    Rick Hinders.  Patient Status:  Inpatient    3/23/1950 MRN [id number]   Location [facility] 2W/SW Attending Urmila Lizarraga MD   Hosp Day # 10 PCP Tucker Haines MD     Subjective:  Blood sugars a Leukocytosis, unspecified type     Viral sepsis [facility])      Patient is a 73year old male with COVID pneumonia  He has Type 2 DM, is on steroids for COVID, with resultant hyperglycemia   Endocrinology is consulted for inpatient DM management    PLAN:   Cont

## 2022-01-19 NOTE — CM/SW NOTE
Reviewed SNF referral, no accepting facilities at this time. Extended deadline and sent additional referrals.     Roselyn Yancey, [address]

## 2022-01-20 LAB
ANION GAP SERPL CALC-SCNC: 3 MMOL/L (ref 0–18)
BASOPHILS # BLD AUTO: 0.09 X10(3) UL (ref 0–0.2)
BASOPHILS NFR BLD AUTO: 0.6 %
BUN BLD-MCNC: 29 MG/DL (ref 7–18)
BUN/CREAT SERPL: 34.1 (ref 10–20)
CALCIUM BLD-MCNC: 8.7 MG/DL (ref 8.5–10.1)
CHLORIDE SERPL-SCNC: 102 MMOL/L (ref 98–112)
CO2 SERPL-SCNC: 30 MMOL/L (ref 21–32)
CREAT BLD-MCNC: 0.85 MG/DL
DEPRECATED RDW RBC AUTO: 45.8 FL (ref 35.1–46.3)
EOSINOPHIL # BLD AUTO: 0.28 X10(3) UL (ref 0–0.7)
EOSINOPHIL NFR BLD AUTO: 1.8 %
ERYTHROCYTE [DISTWIDTH] IN BLOOD BY AUTOMATED COUNT: 14 % (ref 11–15)
GLUCOSE BLD-MCNC: 106 MG/DL (ref 70–99)
GLUCOSE BLDC GLUCOMTR-MCNC: 115 MG/DL (ref 70–99)
GLUCOSE BLDC GLUCOMTR-MCNC: 150 MG/DL (ref 70–99)
GLUCOSE BLDC GLUCOMTR-MCNC: 161 MG/DL (ref 70–99)
GLUCOSE BLDC GLUCOMTR-MCNC: 168 MG/DL (ref 70–99)
HCT VFR BLD AUTO: 40.3 %
HGB BLD-MCNC: 12.9 G/DL
IMM GRANULOCYTES # BLD AUTO: 0.27 X10(3) UL (ref 0–1)
IMM GRANULOCYTES NFR BLD: 1.8 %
LYMPHOCYTES # BLD AUTO: 3.06 X10(3) UL (ref 1–4)
LYMPHOCYTES NFR BLD AUTO: 20 %
MAGNESIUM SERPL-MCNC: 2 MG/DL (ref 1.6–2.6)
MCH RBC QN AUTO: 28.7 PG (ref 26–34)
MCHC RBC AUTO-ENTMCNC: 32 G/DL (ref 31–37)
MCV RBC AUTO: 89.6 FL
MONOCYTES # BLD AUTO: 1.42 X10(3) UL (ref 0.1–1)
MONOCYTES NFR BLD AUTO: 9.3 %
NEUTROPHILS # BLD AUTO: 10.17 X10 (3) UL (ref 1.5–7.7)
NEUTROPHILS # BLD AUTO: 10.17 X10(3) UL (ref 1.5–7.7)
NEUTROPHILS NFR BLD AUTO: 66.5 %
OSMOLALITY SERPL CALC.SUM OF ELEC: 286 MOSM/KG (ref 275–295)
PHOSPHATE SERPL-MCNC: 3 MG/DL (ref 2.5–4.9)
PLATELET # BLD AUTO: 397 10(3)UL (ref 150–450)
POTASSIUM SERPL-SCNC: 4.3 MMOL/L (ref 3.5–5.1)
RBC # BLD AUTO: 4.5 X10(6)UL
SODIUM SERPL-SCNC: 135 MMOL/L (ref 136–145)
WBC # BLD AUTO: 15.3 X10(3) UL (ref 4–11)

## 2022-01-20 PROCEDURE — 99233 SBSQ HOSP IP/OBS HIGH 50: CPT | Performed by: INTERNAL MEDICINE

## 2022-01-20 PROCEDURE — 99233 SBSQ HOSP IP/OBS HIGH 50: CPT | Performed by: HOSPITALIST

## 2022-01-20 NOTE — PLAN OF CARE
Double RN skin check done prior to transfer off Unit. Skin check performed by this RN and [place]. Wounds are as followed: None. Will remain available for any further questions or concerns. Report given to [facility].  Patient aware of room change to 544 an

## 2022-01-20 NOTE — COVID NURSING ASSESSMENT
COVID-19 Daily Discharge Readiness-Nursing    O2 Sat at Rest:  SPO2% on Room Air at Rest: 95  %   O2 Sat with Exertion:   90 % on   room air  Temperature max from last 24 hrs: Temp (24hrs), Av.6 °F (36.4 °C), Min:96.7 °F (35.9 °C), Max:98.5 °F (36.9 °C

## 2022-01-20 NOTE — PLAN OF CARE
Pt is A+O x4, on 2 L HFNC, vitals wdl, goes to the restroom via front wheel walker. Skin is warm dry and intact. Pt resting bed in lowest locked position.   Problem: Patient Centered Care  Goal: Patient preferences are identified and integrated in the patie trends  - Monitor for bleeding, hypotension and signs of decreased cardiac output  - Evaluate effectiveness of vasoactive medications to optimize hemodynamic stability  - Monitor arterial and/or venous puncture sites for bleeding and/or hematoma  - Assess Progressing     Problem: GASTROINTESTINAL - ADULT  Goal: Minimal or absence of nausea and vomiting  Description: INTERVENTIONS:  - Maintain adequate hydration with IV or PO as ordered and tolerated  - Nasogastric tube to low intermittent suction as ordered volume excess or deficit  - Monitor intake, output and patient weight  - Monitor urine specific gravity, serum osmolarity and serum sodium as indicated or ordered  - Monitor response to interventions for patient's volume status, including labs, urine outpu for signs and symptoms of hyperglycemia and hypoglycemia  - Administer ordered medications to maintain glucose within target range  - Assess barriers to adequate nutritional intake and initiate nutrition consult as needed  - Instruct patient on self manage

## 2022-01-20 NOTE — PHYSICAL THERAPY NOTE
PHYSICAL THERAPY TREATMENT NOTE - INPATIENT     Room Number: 221/791-Q       Presenting Problem: (+) COVID-19 on 1/8/22; acute hypoxemic resp failure       Problem List  Principal Problem:    Acute hypoxemic respiratory failure due to COVID-19 [facility])  Activ mechanics;Breathing techniques;Relaxation;Repositioning    BALANCE  Static Sitting: Good  Dynamic Sitting: Fair +  Static Standing: Fair -  Dynamic Standing: Fair -    ACTIVITY TOLERANCE  Pulse: 74  Heart Rate Source: Monitor  Resp: 18  BP: 117/67  BP Loca Patient is able to demonstrate supine - sit EOB @ level: modified independent      Goal #1   Current Status SBA   Goal #2 Patient is able to demonstrate transfers Sit to/from Stand at assistance level: modified independent with walker - rolling      Goal #

## 2022-01-20 NOTE — PROGRESS NOTES
Pulmonary/Critical Care Follow Up Note    HPI:   Nathanael Ni. is a 73year old male with Patient presents with:  Covid      PCP Brandon Dubon MD  Admission Attending Stefanie Lew 15 Day #11    No sob  + cough  No CP  Has appetite    PMH:  P (DULCOLAX) rectal suppository 10 mg, 10 mg, Rectal, Daily PRN  •  ALPRAZolam (XANAX) tab 0.5 mg, 0.5 mg, Oral, Q12H PRN  •  zolpidem (AMBIEN) tab 5 mg, 5 mg, Oral, Nightly PRN  •  hydrALAzine HCl (APRESOLINE) injection 10 mg, 10 mg, Intravenous, Q4H PRN  • nl s1s2  abd soft obese nt/nd +bs   Ext + edema  No acute rash      LABS:    Lab Results   Component Value Date    WBC 15.3 01/20/2022    HGB 12.9 01/20/2022    HCT 40.3 01/20/2022    .0 01/20/2022    CREATSERUM 0.85 01/20/2022    BUN 29 01/20/2022

## 2022-01-20 NOTE — OCCUPATIONAL THERAPY NOTE
OCCUPATIONAL THERAPY TREATMENT NOTE - INPATIENT        Room Number: 291/255-K    Presenting Problem:  (hypoxia;covid+)    Problem List  Principal Problem:    Acute hypoxemic respiratory failure due to COVID-19 [facility])  Active Problems:    Type 2 diabetes praneeth get better to I can go to [place] with my boy. We have never been apart\"    OBJECTIVE  Precautions:  (alarm)    PAIN ASSESSMENT  Ratin  Location:  (back)  Management Techniques: Repositioning;Relaxation; Activity promotion; Body mechanics     ACTIVITY T

## 2022-01-20 NOTE — PROGRESS NOTES
[facility]    Progress Note    Dee Centeno.  Patient Status:  Inpatient    3/23/1950 MRN [id number]   Location [facility] 2W/SW Attending Chris Ha MD   Hosp Day # 11 PCP Saritha Snowden MD     Subjective:  Blood sugars a Leukocytosis, unspecified type     Viral sepsis [facility])      Patient is a 73year old male with COVID pneumonia  He has Type 2 DM, is on steroids for COVID, with resultant hyperglycemia   Endocrinology is consulted for inpatient DM management    PLAN:   Cont

## 2022-01-20 NOTE — PROGRESS NOTES
[facility]    Progress Note    Dee Centeno.  Patient Status:  Inpatient    3/23/1950 MRN [id number]   Location [facility] 2W/SW Attending Chris Ha MD   Hosp Day # 11 PCP Saritha Snowden MD     Subjective:  Blood sugars a hyperglycemia   Endocrinology is consulted for inpatient DM management    PLAN:   Continue NPH 24 unit every morning   Continue novolog from 6/14/14 high-dose CF with meals and hs  Accuchecks ac and hs  Hypoglycemia protocol    Heidi Lynch

## 2022-01-21 VITALS
TEMPERATURE: 98 F | SYSTOLIC BLOOD PRESSURE: 110 MMHG | BODY MASS INDEX: 41.27 KG/M2 | HEART RATE: 73 BPM | WEIGHT: 278.63 LBS | OXYGEN SATURATION: 95 % | HEIGHT: 69 IN | RESPIRATION RATE: 18 BRPM | DIASTOLIC BLOOD PRESSURE: 55 MMHG

## 2022-01-21 LAB
GLUCOSE BLDC GLUCOMTR-MCNC: 112 MG/DL (ref 70–99)
GLUCOSE BLDC GLUCOMTR-MCNC: 168 MG/DL (ref 70–99)

## 2022-01-21 PROCEDURE — 99239 HOSP IP/OBS DSCHRG MGMT >30: CPT | Performed by: HOSPITALIST

## 2022-01-21 PROCEDURE — 99232 SBSQ HOSP IP/OBS MODERATE 35: CPT | Performed by: INTERNAL MEDICINE

## 2022-01-21 PROCEDURE — 99231 SBSQ HOSP IP/OBS SF/LOW 25: CPT | Performed by: INTERNAL MEDICINE

## 2022-01-21 RX ORDER — DEXAMETHASONE 4 MG/1
4 TABLET ORAL
Qty: 6 TABLET | Refills: 0 | Status: SHIPPED | OUTPATIENT
Start: 2022-01-22

## 2022-01-21 RX ORDER — PREGABALIN 300 MG/1
300 CAPSULE ORAL 2 TIMES DAILY
Qty: 60 CAPSULE | Refills: 0 | Status: SHIPPED | OUTPATIENT
Start: 2022-01-21

## 2022-01-21 RX ORDER — PANTOPRAZOLE SODIUM 40 MG/1
40 TABLET, DELAYED RELEASE ORAL
Qty: 30 TABLET | Refills: 0 | Status: SHIPPED | OUTPATIENT
Start: 2022-01-22

## 2022-01-21 RX ORDER — ALPRAZOLAM 0.5 MG/1
0.5 TABLET ORAL EVERY 12 HOURS PRN
Qty: 15 TABLET | Refills: 0 | Status: SHIPPED | OUTPATIENT
Start: 2022-01-21

## 2022-01-21 RX ORDER — PSEUDOEPHEDRINE HCL 30 MG
100 TABLET ORAL 2 TIMES DAILY PRN
Qty: 20 CAPSULE | Refills: 0 | Status: SHIPPED | OUTPATIENT
Start: 2022-01-21

## 2022-01-21 RX ORDER — POLYETHYLENE GLYCOL 3350 17 G/17G
17 POWDER, FOR SOLUTION ORAL DAILY PRN
Qty: 30 PACKET | Refills: 0 | Status: SHIPPED | OUTPATIENT
Start: 2022-01-21

## 2022-01-21 RX ORDER — DULOXETIN HYDROCHLORIDE 30 MG/1
30 CAPSULE, DELAYED RELEASE ORAL DAILY
Qty: 30 CAPSULE | Refills: 0 | Status: SHIPPED | OUTPATIENT
Start: 2022-01-22

## 2022-01-21 RX ORDER — OXYCODONE AND ACETAMINOPHEN 10; 325 MG/1; MG/1
1 TABLET ORAL EVERY 8 HOURS PRN
Qty: 30 TABLET | Refills: 0 | Status: SHIPPED | OUTPATIENT
Start: 2022-01-21

## 2022-01-21 RX ORDER — TAMSULOSIN HYDROCHLORIDE 0.4 MG/1
0.4 CAPSULE ORAL DAILY
Qty: 30 CAPSULE | Refills: 0 | Status: SHIPPED | OUTPATIENT
Start: 2022-01-22 | End: 2022-02-21

## 2022-01-21 RX ORDER — QUETIAPINE 25 MG/1
12.5 TABLET, FILM COATED ORAL EVERY 6 HOURS PRN
Qty: 30 TABLET | Refills: 0 | Status: SHIPPED | OUTPATIENT
Start: 2022-01-21

## 2022-01-21 RX ORDER — LISINOPRIL 5 MG/1
5 TABLET ORAL DAILY
Qty: 30 TABLET | Refills: 0 | Status: SHIPPED | OUTPATIENT
Start: 2022-01-22

## 2022-01-21 RX ORDER — MELATONIN
1000 DAILY
Qty: 30 TABLET | Refills: 0 | Status: SHIPPED | OUTPATIENT
Start: 2022-01-21
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
## 2022-01-21 NOTE — OCCUPATIONAL THERAPY NOTE
Pt not seen for OT 2/ per nursing , pt is to be discharged shortly. Pt was issued [address] reacher by department 2/ need to assist with LE dressing once home, no charge. Nursing is aware.

## 2022-01-21 NOTE — DISCHARGE SUMMARY
Dc summary#[id number]  > 30 min spent on [facility] Discharge Diagnoses: covid pneumonia    Lace+ Score: 66  59-90 High Risk  29-58 Medium Risk  0-28   Low Risk. TCM Follow-Up Recommendation:  LACE > 58:  High Risk of readmission after discharge from t

## 2022-01-21 NOTE — CM/SW NOTE
Friday 1/21/22  Per RN rounds, pt is likely medically ready for DC today. RICHAR reviewed list of available facilities with pt. [address] [place] [address] is only facility able to accept/accomodate. Pt agreeable. RICHAR reserved facility in [address] [place] [address].  RICHAR coordina

## 2022-01-21 NOTE — PROGRESS NOTES
[facility]    Progress Note    Rick Hinders.  Patient Status:  Inpatient    3/23/1950 MRN [id number]   Location [facility]5W Attending Andrea Holloway Divya Day # 12 PCP Tucker Haines MD     Subjective:     Yati 06/29/2020       No results found.         Assessment & Plan:      1- severe covid 19 infection with acute hypoxemic respiratory failure   Onset of symptoms 6 days prior to admission   Admitted 1/8/22  Vaccinated with Pfizer x2 with no booster         Much

## 2022-01-21 NOTE — PROGRESS NOTES
[facility]    Progress Note    Dee Jacobo.  Patient Status:  Inpatient    3/23/1950 MRN [id number]   [facility] 2W/SW Attending Andrea Hollowayissa Day # 12 PCP Saritha Snowden MD     Subjective:     [facility] ALT 52 01/13/2022    PTT 28.1 06/29/2020    INR 0.99 06/29/2020    T4F 0.7 (L) 07/01/2021    TSH 12.300 (H) 07/01/2021    PSA 0.42 07/01/2021    DDIMER 1.70 (H) 01/19/2022    ESRML 11 07/14/2018    CRP <0.29 01/19/2022    MG 2.0 01/20/2022    PHOS 3.0 01/2

## 2022-01-21 NOTE — PLAN OF CARE
Patient alert and oriented, on RA, went on the BiPAP over night. He is feeling better and in good spirits. He is anxious on getting out of the hospital. Blood sugars control, educated him on central line care. Gave PRN meds for sleep.  Plan is for possible decreased cardiac output  - Evaluate effectiveness of vasoactive medications to optimize hemodynamic stability  - Monitor arterial and/or venous puncture sites for bleeding and/or hematoma  - Assess quality of pulses, skin color and temperature  - Assess f Monitor for signs/symptoms of CO2 retention  Outcome: Progressing     Problem: GASTROINTESTINAL - ADULT  Goal: Minimal or absence of nausea and vomiting  Description: INTERVENTIONS:  - Maintain adequate hydration with IV or PO as ordered and tolerated  - N measures as available)  - Encourage oral intake as appropriate  - Instruct patient on fluid and nutrition restrictions as appropriate  Outcome: Progressing     Problem: HEMATOLOGIC - ADULT  Goal: Maintains hematologic stability  Description: INTERVENTIONS

## 2022-01-24 NOTE — DISCHARGE SUMMARY
[facility] ER    PATIENT'S NAME: Bindu Borja   ATTENDING PHYSICIAN: Dennys Holloway MD   PATIENT ACCOUNT#:   [id number]    LOCATION:  [address] [place] [address] RECORD #:   [id number]       YOB: 1950  ADMISSION DATE:       01/08/202 S2.  No S3.    ABDOMEN:  Soft. EXTREMITIES:  Without edema. NEUROLOGIC:  He is alert and oriented, friend and cooperative.      The patient's daughter, Vickey Dawson, was on the phone at the time that I was discharging him and heard my discharge instructions for bleeding. 3.   Bimatoprost 0.01% one drop to eye daily. 4.   Synthroid 125 mcg daily. 5.   Metoprolol 50 mg daily. 6.   Oxycodone Percocet 10/325 one tablet every 8 hours as needed. 7.   Rosuvastatin 20 mg nightly.   Watch for muscle weakness

## 2022-01-25 ENCOUNTER — TELEPHONE (OUTPATIENT)
Dept: PULMONOLOGY | Facility: CLINIC | Age: 72
End: 2022-01-25

## 2022-01-25 ENCOUNTER — TELEPHONE (OUTPATIENT)
Dept: ENDOCRINOLOGY CLINIC | Facility: CLINIC | Age: 72
End: 2022-01-25

## 2022-01-25 NOTE — TELEPHONE ENCOUNTER
Yesi from [facility] called in to schedule an appt for 2 week follow up the schedule is booked out please follow up with Yesi to schedule

## 2022-01-25 NOTE — TELEPHONE ENCOUNTER
Yesi from [facility] called in to schedule a 2 week follow up appt.  The schedule is currently booked please follow up to schedule

## 2022-01-26 ENCOUNTER — INITIAL APN SNF VISIT (OUTPATIENT)
Dept: INTERNAL MEDICINE CLINIC | Facility: SKILLED NURSING FACILITY | Age: 72
End: 2022-01-26

## 2022-01-26 DIAGNOSIS — N40.1 BENIGN PROSTATIC HYPERPLASIA WITH NOCTURIA: ICD-10-CM

## 2022-01-26 DIAGNOSIS — I25.10 CORONARY ARTERY DISEASE INVOLVING NATIVE CORONARY ARTERY OF NATIVE HEART WITHOUT ANGINA PECTORIS: ICD-10-CM

## 2022-01-26 DIAGNOSIS — E03.9 HYPOTHYROIDISM, UNSPECIFIED TYPE: ICD-10-CM

## 2022-01-26 DIAGNOSIS — R35.1 BENIGN PROSTATIC HYPERPLASIA WITH NOCTURIA: ICD-10-CM

## 2022-01-26 DIAGNOSIS — E66.01 MORBID OBESITY WITH BMI OF 40.0-44.9, ADULT (HCC): ICD-10-CM

## 2022-01-26 DIAGNOSIS — J12.82 PNEUMONIA DUE TO COVID-19 VIRUS: ICD-10-CM

## 2022-01-26 DIAGNOSIS — U07.1 PNEUMONIA DUE TO COVID-19 VIRUS: ICD-10-CM

## 2022-01-26 DIAGNOSIS — Z79.4 TYPE 2 DIABETES MELLITUS WITH HYPERGLYCEMIA, WITH LONG-TERM CURRENT USE OF INSULIN (HCC): ICD-10-CM

## 2022-01-26 DIAGNOSIS — I10 PRIMARY HYPERTENSION: ICD-10-CM

## 2022-01-26 DIAGNOSIS — U07.1 ACUTE HYPOXEMIC RESPIRATORY FAILURE DUE TO COVID-19 (HCC): ICD-10-CM

## 2022-01-26 DIAGNOSIS — J96.01 ACUTE HYPOXEMIC RESPIRATORY FAILURE DUE TO COVID-19 (HCC): ICD-10-CM

## 2022-01-26 DIAGNOSIS — E11.65 TYPE 2 DIABETES MELLITUS WITH HYPERGLYCEMIA, WITH LONG-TERM CURRENT USE OF INSULIN (HCC): ICD-10-CM

## 2022-01-26 PROCEDURE — 99310 SBSQ NF CARE HIGH MDM 45: CPT | Performed by: NURSE PRACTITIONER

## 2022-01-26 PROCEDURE — 1111F DSCHRG MED/CURRENT MED MERGE: CPT | Performed by: NURSE PRACTITIONER

## 2022-01-26 PROCEDURE — 1123F ACP DISCUSS/DSCN MKR DOCD: CPT | Performed by: NURSE PRACTITIONER

## 2022-01-26 NOTE — TELEPHONE ENCOUNTER
Spoke with Yesi at [facility]. Appointment scheduled for patient on 2/14/22 at 12:15PM. Verified date, time, place, and where to park. Yesi verbalized understanding.

## 2022-01-26 NOTE — PROGRESS NOTES
Dannielle Rendon. : 3/23/1950  Age 73year old  male patient is admitted to [address] [place] [address] for rehabilitation and strengthening.       Chief complaint: acute resp failure due to covid    HPI  73-year-old gentleman with history of CAD with prior multiple METAL STENT (BMS)     • CIRCUMCISION,OTHR  08/04/2020    Dr. Kim Yañez @ [facility]   • CYSTO/URETERO W/LITHOTRIPSY Left 08/04/2020    Dr. 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     • OTHER      cardiac stents no bruits; nontender, no guarding, no rebound tenderness.   :Deferred  LYMPHATIC:no lymphedema  MUSCULOSKELETAL: no acute synovitis upper or lower extremity  EXTREMITIES/VASCULAR:no cyanosis, clubbing or edema and dorsalis pedal pulses 2+  NEUROLOGIC: int

## 2022-01-27 ENCOUNTER — PATIENT OUTREACH (OUTPATIENT)
Dept: CASE MANAGEMENT | Age: 72
End: 2022-01-27

## 2022-01-27 ENCOUNTER — EXTERNAL FACILITY (OUTPATIENT)
Dept: INTERNAL MEDICINE CLINIC | Facility: CLINIC | Age: 72
End: 2022-01-27

## 2022-01-27 DIAGNOSIS — J96.01 ACUTE HYPOXEMIC RESPIRATORY FAILURE DUE TO COVID-19 (HCC): ICD-10-CM

## 2022-01-27 DIAGNOSIS — N40.1 BENIGN PROSTATIC HYPERPLASIA WITH NOCTURIA: ICD-10-CM

## 2022-01-27 DIAGNOSIS — R35.1 BENIGN PROSTATIC HYPERPLASIA WITH NOCTURIA: ICD-10-CM

## 2022-01-27 DIAGNOSIS — I50.42 CHRONIC COMBINED SYSTOLIC AND DIASTOLIC CHF, NYHA CLASS 3 (HCC): ICD-10-CM

## 2022-01-27 DIAGNOSIS — U07.1 ACUTE HYPOXEMIC RESPIRATORY FAILURE DUE TO COVID-19 (HCC): ICD-10-CM

## 2022-01-27 PROCEDURE — 99306 1ST NF CARE HIGH MDM 50: CPT | Performed by: INTERNAL MEDICINE

## 2022-01-27 NOTE — PROGRESS NOTES
HPI  48-year-old gentleman with history of CAD with prior multiple stenting, DM, HTN, HL, obesity, hypothyroidism. Patient vaccinated x2 with [employer] few months ago with no booster yet.  Patient started with symptoms of COVID-19 about 5-6 days ago with we 1994   Drug use: No    ALLERGIES:  No Known Allergies    CODE STATUS: Full Code      CURRENT MEDICATIONS - reviewed and updated on SNF EMAR    SUBJECTIVE    Patient seen in room, feeling ok.      PHYSICAL EXAM:  GENERAL HEALTH: well developed, well nourishe flomax 0.4mg daily      Hypothyroidism  - continue synthroid 125mcg daily      Depression  - continuecurrent meds   Morbid Obesity  BMI 41

## 2022-02-01 ENCOUNTER — TELEPHONE (OUTPATIENT)
Dept: INTERNAL MEDICINE CLINIC | Facility: CLINIC | Age: 72
End: 2022-02-01

## 2022-02-01 ENCOUNTER — EXTERNAL FACILITY (OUTPATIENT)
Dept: INTERNAL MEDICINE CLINIC | Facility: CLINIC | Age: 72
End: 2022-02-01

## 2022-02-01 PROCEDURE — 99309 SBSQ NF CARE MODERATE MDM 30: CPT | Performed by: INTERNAL MEDICINE

## 2022-02-01 NOTE — TELEPHONE ENCOUNTER
----- Message from Constantine Giles MD sent at 2/1/2022  1:24 AM CST -----  Regarding: tcm  Needs TCm visit

## 2022-02-02 ENCOUNTER — SNF VISIT (OUTPATIENT)
Dept: INTERNAL MEDICINE CLINIC | Facility: SKILLED NURSING FACILITY | Age: 72
End: 2022-02-02

## 2022-02-02 PROCEDURE — 99309 SBSQ NF CARE MODERATE MDM 30: CPT | Performed by: NURSE PRACTITIONER

## 2022-02-03 ENCOUNTER — HOSPITAL ENCOUNTER (INPATIENT)
Age: 72
LOS: 1 days | Discharge: NURSING, REHAB OR LONG-TERM CARE | DRG: 176 | End: 2022-02-05
Attending: EMERGENCY MEDICINE | Admitting: INTERNAL MEDICINE

## 2022-02-03 ENCOUNTER — APPOINTMENT (OUTPATIENT)
Dept: GENERAL RADIOLOGY | Age: 72
DRG: 176 | End: 2022-02-03
Attending: EMERGENCY MEDICINE

## 2022-02-03 ENCOUNTER — EXTERNAL FACILITY (OUTPATIENT)
Dept: INTERNAL MEDICINE CLINIC | Facility: CLINIC | Age: 72
End: 2022-02-03

## 2022-02-03 DIAGNOSIS — E11.9 TYPE 2 DIABETES MELLITUS WITHOUT COMPLICATION, WITH LONG-TERM CURRENT USE OF INSULIN (CMD): ICD-10-CM

## 2022-02-03 DIAGNOSIS — R55 SYNCOPE, UNSPECIFIED SYNCOPE TYPE: ICD-10-CM

## 2022-02-03 DIAGNOSIS — I26.94 MULTIPLE SUBSEGMENTAL PULMONARY EMBOLI WITHOUT ACUTE COR PULMONALE (CMD): ICD-10-CM

## 2022-02-03 DIAGNOSIS — I10 PRIMARY HYPERTENSION: ICD-10-CM

## 2022-02-03 DIAGNOSIS — E66.01 CLASS 3 SEVERE OBESITY WITHOUT SERIOUS COMORBIDITY WITH BODY MASS INDEX (BMI) OF 40.0 TO 44.9 IN ADULT, UNSPECIFIED OBESITY TYPE (CMD): ICD-10-CM

## 2022-02-03 DIAGNOSIS — S62.234A CLOSED NONDISPLACED FRACTURE OF BASE OF FIRST METACARPAL BONE OF RIGHT HAND, UNSPECIFIED FRACTURE MORPHOLOGY, INITIAL ENCOUNTER: ICD-10-CM

## 2022-02-03 DIAGNOSIS — Z79.4 TYPE 2 DIABETES MELLITUS WITHOUT COMPLICATION, WITH LONG-TERM CURRENT USE OF INSULIN (CMD): ICD-10-CM

## 2022-02-03 DIAGNOSIS — I26.99 OTHER ACUTE PULMONARY EMBOLISM WITHOUT ACUTE COR PULMONALE (CMD): Primary | ICD-10-CM

## 2022-02-03 DIAGNOSIS — I25.10 CORONARY ARTERY DISEASE INVOLVING NATIVE CORONARY ARTERY OF NATIVE HEART WITHOUT ANGINA PECTORIS: ICD-10-CM

## 2022-02-03 LAB
BASOPHILS # BLD: 0.1 K/MCL (ref 0–0.3)
BASOPHILS NFR BLD: 1 %
D DIMER PPP FEU-MCNC: 1.1 MG/L (FEU)
DEPRECATED RDW RBC: 47.8 FL (ref 39–50)
EOSINOPHIL # BLD: 0.8 K/MCL (ref 0–0.5)
EOSINOPHIL NFR BLD: 9 %
ERYTHROCYTE [DISTWIDTH] IN BLOOD: 14.3 % (ref 11–15)
HCT VFR BLD CALC: 36 % (ref 39–51)
HGB BLD-MCNC: 11 G/DL (ref 13–17)
IMM GRANULOCYTES # BLD AUTO: 0.1 K/MCL (ref 0–0.2)
IMM GRANULOCYTES # BLD: 1 %
LYMPHOCYTES # BLD: 1.7 K/MCL (ref 1–4)
LYMPHOCYTES NFR BLD: 20 %
MCH RBC QN AUTO: 27.8 PG (ref 26–34)
MCHC RBC AUTO-ENTMCNC: 30.6 G/DL (ref 32–36.5)
MCV RBC AUTO: 91.1 FL (ref 78–100)
MONOCYTES # BLD: 0.9 K/MCL (ref 0.3–0.9)
MONOCYTES NFR BLD: 11 %
NEUTROPHILS # BLD: 4.9 K/MCL (ref 1.8–7.7)
NEUTROPHILS NFR BLD: 58 %
NRBC BLD MANUAL-RTO: 0 /100 WBC
PLATELET # BLD AUTO: 268 K/MCL (ref 140–450)
RBC # BLD: 3.95 MIL/MCL (ref 4.5–5.9)
WBC # BLD: 8.4 K/MCL (ref 4.2–11)

## 2022-02-03 PROCEDURE — 93005 ELECTROCARDIOGRAM TRACING: CPT | Performed by: EMERGENCY MEDICINE

## 2022-02-03 PROCEDURE — 73140 X-RAY EXAM OF FINGER(S): CPT

## 2022-02-03 PROCEDURE — 99309 SBSQ NF CARE MODERATE MDM 30: CPT | Performed by: INTERNAL MEDICINE

## 2022-02-03 PROCEDURE — 36415 COLL VENOUS BLD VENIPUNCTURE: CPT

## 2022-02-03 PROCEDURE — 99285 EMERGENCY DEPT VISIT HI MDM: CPT

## 2022-02-03 PROCEDURE — 85025 COMPLETE CBC W/AUTO DIFF WBC: CPT | Performed by: EMERGENCY MEDICINE

## 2022-02-03 PROCEDURE — 71045 X-RAY EXAM CHEST 1 VIEW: CPT

## 2022-02-03 PROCEDURE — 10002803 HB RX 637: Performed by: EMERGENCY MEDICINE

## 2022-02-03 PROCEDURE — 85379 FIBRIN DEGRADATION QUANT: CPT | Performed by: EMERGENCY MEDICINE

## 2022-02-03 PROCEDURE — 73564 X-RAY EXAM KNEE 4 OR MORE: CPT

## 2022-02-03 PROCEDURE — 10003579 HB TRAUMA W/O CRITICAL CARE

## 2022-02-03 PROCEDURE — 80053 COMPREHEN METABOLIC PANEL: CPT | Performed by: EMERGENCY MEDICINE

## 2022-02-03 PROCEDURE — 29125 APPL SHORT ARM SPLINT STATIC: CPT

## 2022-02-03 PROCEDURE — 29130 APPL FINGER SPLINT STATIC: CPT

## 2022-02-03 PROCEDURE — 85730 THROMBOPLASTIN TIME PARTIAL: CPT | Performed by: EMERGENCY MEDICINE

## 2022-02-03 PROCEDURE — 84484 ASSAY OF TROPONIN QUANT: CPT | Performed by: EMERGENCY MEDICINE

## 2022-02-03 PROCEDURE — 85610 PROTHROMBIN TIME: CPT | Performed by: EMERGENCY MEDICINE

## 2022-02-03 RX ORDER — HYDROCODONE BITARTRATE AND ACETAMINOPHEN 10; 325 MG/1; MG/1
1 TABLET ORAL ONCE
Status: COMPLETED | OUTPATIENT
Start: 2022-02-03 | End: 2022-02-03

## 2022-02-03 RX ADMIN — HYDROCODONE BITARTRATE AND ACETAMINOPHEN 1 TABLET: 10; 325 TABLET ORAL at 23:10

## 2022-02-03 ASSESSMENT — PAIN SCALES - GENERAL: PAINLEVEL_OUTOF10: 3

## 2022-02-03 ASSESSMENT — PAIN SCALES - PAIN ASSESSMENT IN ADVANCED DEMENTIA (PAINAD): BREATHING: NORMAL

## 2022-02-04 ENCOUNTER — APPOINTMENT (OUTPATIENT)
Dept: CT IMAGING | Age: 72
DRG: 176 | End: 2022-02-04
Attending: EMERGENCY MEDICINE

## 2022-02-04 ENCOUNTER — APPOINTMENT (OUTPATIENT)
Dept: ULTRASOUND IMAGING | Age: 72
DRG: 176 | End: 2022-02-04
Attending: INTERNAL MEDICINE

## 2022-02-04 ENCOUNTER — APPOINTMENT (OUTPATIENT)
Dept: CARDIOLOGY | Age: 72
DRG: 176 | End: 2022-02-04
Attending: INTERNAL MEDICINE

## 2022-02-04 LAB
ALBUMIN SERPL-MCNC: 2.5 G/DL (ref 3.6–5.1)
ALBUMIN/GLOB SERPL: 0.6 {RATIO} (ref 1–2.4)
ALP SERPL-CCNC: 69 UNITS/L (ref 45–117)
ALT SERPL-CCNC: 41 UNITS/L
ANION GAP SERPL CALC-SCNC: 10 MMOL/L (ref 10–20)
ANION GAP SERPL CALC-SCNC: 10 MMOL/L (ref 10–20)
APTT PPP: 28 SEC (ref 22–30)
AST SERPL-CCNC: 17 UNITS/L
ATRIAL RATE (BPM): 79
BASOPHILS # BLD: 0.1 K/MCL (ref 0–0.3)
BASOPHILS NFR BLD: 1 %
BILIRUB SERPL-MCNC: 0.2 MG/DL (ref 0.2–1)
BUN SERPL-MCNC: 12 MG/DL (ref 6–20)
BUN SERPL-MCNC: 14 MG/DL (ref 6–20)
BUN/CREAT SERPL: 16 (ref 7–25)
BUN/CREAT SERPL: 18 (ref 7–25)
CALCIUM SERPL-MCNC: 9 MG/DL (ref 8.4–10.2)
CALCIUM SERPL-MCNC: 9 MG/DL (ref 8.4–10.2)
CHLORIDE SERPL-SCNC: 108 MMOL/L (ref 98–107)
CHLORIDE SERPL-SCNC: 109 MMOL/L (ref 98–107)
CO2 SERPL-SCNC: 25 MMOL/L (ref 21–32)
CO2 SERPL-SCNC: 26 MMOL/L (ref 21–32)
CREAT SERPL-MCNC: 0.76 MG/DL (ref 0.67–1.17)
CREAT SERPL-MCNC: 0.77 MG/DL (ref 0.67–1.17)
DEPRECATED RDW RBC: 47.9 FL (ref 39–50)
EOSINOPHIL # BLD: 0.9 K/MCL (ref 0–0.5)
EOSINOPHIL NFR BLD: 10 %
ERYTHROCYTE [DISTWIDTH] IN BLOOD: 14.4 % (ref 11–15)
FASTING DURATION TIME PATIENT: ABNORMAL H
FASTING DURATION TIME PATIENT: ABNORMAL H
FLUAV RNA RESP QL NAA+PROBE: NOT DETECTED
FLUBV RNA RESP QL NAA+PROBE: NOT DETECTED
GFR SERPLBLD BASED ON 1.73 SQ M-ARVRAT: >90 ML/MIN
GFR SERPLBLD BASED ON 1.73 SQ M-ARVRAT: >90 ML/MIN
GLOBULIN SER-MCNC: 4 G/DL (ref 2–4)
GLUCOSE BLDC GLUCOMTR-MCNC: 154 MG/DL (ref 70–99)
GLUCOSE BLDC GLUCOMTR-MCNC: 171 MG/DL (ref 70–99)
GLUCOSE BLDC GLUCOMTR-MCNC: 176 MG/DL (ref 70–99)
GLUCOSE BLDC GLUCOMTR-MCNC: 187 MG/DL (ref 70–99)
GLUCOSE SERPL-MCNC: 123 MG/DL (ref 70–99)
GLUCOSE SERPL-MCNC: 216 MG/DL (ref 70–99)
HCT VFR BLD CALC: 35.5 % (ref 39–51)
HGB BLD-MCNC: 10.7 G/DL (ref 13–17)
IMM GRANULOCYTES # BLD AUTO: 0.1 K/MCL (ref 0–0.2)
IMM GRANULOCYTES # BLD: 1 %
INR PPP: 1
LYMPHOCYTES # BLD: 1.8 K/MCL (ref 1–4)
LYMPHOCYTES NFR BLD: 20 %
MCH RBC QN AUTO: 27.5 PG (ref 26–34)
MCHC RBC AUTO-ENTMCNC: 30.1 G/DL (ref 32–36.5)
MCV RBC AUTO: 91.3 FL (ref 78–100)
MONOCYTES # BLD: 1 K/MCL (ref 0.3–0.9)
MONOCYTES NFR BLD: 11 %
NEUTROPHILS # BLD: 5 K/MCL (ref 1.8–7.7)
NEUTROPHILS NFR BLD: 57 %
NRBC BLD MANUAL-RTO: 0 /100 WBC
P AXIS (DEGREES): 47
PLATELET # BLD AUTO: 273 K/MCL (ref 140–450)
POTASSIUM SERPL-SCNC: 3.6 MMOL/L (ref 3.4–5.1)
POTASSIUM SERPL-SCNC: 3.9 MMOL/L (ref 3.4–5.1)
PR-INTERVAL (MSEC): 196
PROT SERPL-MCNC: 6.5 G/DL (ref 6.4–8.2)
PROTHROMBIN TIME: 10.4 SEC (ref 9.7–11.8)
QRS-INTERVAL (MSEC): 158
QT-INTERVAL (MSEC): 428
QTC: 491
R AXIS (DEGREES): -58
RBC # BLD: 3.89 MIL/MCL (ref 4.5–5.9)
REPORT TEXT: NORMAL
RSV AG NPH QL IA.RAPID: NOT DETECTED
SARS-COV-2 RNA RESP QL NAA+PROBE: NOT DETECTED
SERVICE CMNT-IMP: NORMAL
SERVICE CMNT-IMP: NORMAL
SODIUM SERPL-SCNC: 140 MMOL/L (ref 135–145)
SODIUM SERPL-SCNC: 140 MMOL/L (ref 135–145)
T AXIS (DEGREES): 28
TROPONIN I SERPL DL<=0.01 NG/ML-MCNC: 20 NG/L
TROPONIN I SERPL DL<=0.01 NG/ML-MCNC: 22 NG/L
VENTRICULAR RATE EKG/MIN (BPM): 79
WBC # BLD: 8.8 K/MCL (ref 4.2–11)

## 2022-02-04 PROCEDURE — 96372 THER/PROPH/DIAG INJ SC/IM: CPT

## 2022-02-04 PROCEDURE — 93880 EXTRACRANIAL BILAT STUDY: CPT

## 2022-02-04 PROCEDURE — G1004 CDSM NDSC: HCPCS

## 2022-02-04 PROCEDURE — 10002805 HB CONTRAST AGENT: Performed by: INTERNAL MEDICINE

## 2022-02-04 PROCEDURE — 71275 CT ANGIOGRAPHY CHEST: CPT

## 2022-02-04 PROCEDURE — 0241U COVID/FLU/RSV PANEL: CPT | Performed by: EMERGENCY MEDICINE

## 2022-02-04 PROCEDURE — 80048 BASIC METABOLIC PNL TOTAL CA: CPT | Performed by: INTERNAL MEDICINE

## 2022-02-04 PROCEDURE — X1094 NO CHARGE VISIT: HCPCS | Performed by: INTERNAL MEDICINE

## 2022-02-04 PROCEDURE — 10002019 HB COUNTER RESP ASSESSMENT

## 2022-02-04 PROCEDURE — 36415 COLL VENOUS BLD VENIPUNCTURE: CPT | Performed by: INTERNAL MEDICINE

## 2022-02-04 PROCEDURE — 10002800 HB RX 250 W HCPCS: Performed by: EMERGENCY MEDICINE

## 2022-02-04 PROCEDURE — 10002805 HB CONTRAST AGENT: Performed by: EMERGENCY MEDICINE

## 2022-02-04 PROCEDURE — 76376 3D RENDER W/INTRP POSTPROCES: CPT | Performed by: INTERNAL MEDICINE

## 2022-02-04 PROCEDURE — 84484 ASSAY OF TROPONIN QUANT: CPT | Performed by: INTERNAL MEDICINE

## 2022-02-04 PROCEDURE — 93970 EXTREMITY STUDY: CPT

## 2022-02-04 PROCEDURE — 10002803 HB RX 637: Performed by: INTERNAL MEDICINE

## 2022-02-04 PROCEDURE — 82962 GLUCOSE BLOOD TEST: CPT

## 2022-02-04 PROCEDURE — 10002800 HB RX 250 W HCPCS: Performed by: INTERNAL MEDICINE

## 2022-02-04 PROCEDURE — 85025 COMPLETE CBC W/AUTO DIFF WBC: CPT | Performed by: INTERNAL MEDICINE

## 2022-02-04 PROCEDURE — G0378 HOSPITAL OBSERVATION PER HR: HCPCS

## 2022-02-04 PROCEDURE — 99223 1ST HOSP IP/OBS HIGH 75: CPT | Performed by: INTERNAL MEDICINE

## 2022-02-04 PROCEDURE — 93306 TTE W/DOPPLER COMPLETE: CPT

## 2022-02-04 PROCEDURE — 10003585 HB ROOM CHARGE INTERMEDIATE CARE

## 2022-02-04 PROCEDURE — 10004651 HB RX, NO CHARGE ITEM: Performed by: INTERNAL MEDICINE

## 2022-02-04 PROCEDURE — 70450 CT HEAD/BRAIN W/O DYE: CPT

## 2022-02-04 PROCEDURE — 93306 TTE W/DOPPLER COMPLETE: CPT | Performed by: INTERNAL MEDICINE

## 2022-02-04 RX ORDER — OMEPRAZOLE 20 MG/1
20 CAPSULE, DELAYED RELEASE ORAL DAILY
COMMUNITY
End: 2023-01-30

## 2022-02-04 RX ORDER — DULOXETIN HYDROCHLORIDE 30 MG/1
30 CAPSULE, DELAYED RELEASE ORAL DAILY
Status: DISCONTINUED | OUTPATIENT
Start: 2022-02-04 | End: 2022-02-04

## 2022-02-04 RX ORDER — ROSUVASTATIN CALCIUM 20 MG/1
20 TABLET, COATED ORAL DAILY
Status: DISCONTINUED | OUTPATIENT
Start: 2022-02-04 | End: 2022-02-04

## 2022-02-04 RX ORDER — OXYCODONE AND ACETAMINOPHEN 10; 325 MG/1; MG/1
1 TABLET ORAL EVERY 8 HOURS PRN
Status: ON HOLD | COMMUNITY
Start: 2022-01-21 | End: 2022-02-04

## 2022-02-04 RX ORDER — INSULIN LISPRO 100 [IU]/ML
14 INJECTION, SOLUTION INTRAVENOUS; SUBCUTANEOUS
Status: ON HOLD | COMMUNITY
End: 2022-08-03

## 2022-02-04 RX ORDER — DULOXETIN HYDROCHLORIDE 30 MG/1
60 CAPSULE, DELAYED RELEASE ORAL DAILY
COMMUNITY

## 2022-02-04 RX ORDER — CLOPIDOGREL BISULFATE 75 MG/1
75 TABLET ORAL DAILY
Status: DISCONTINUED | OUTPATIENT
Start: 2022-02-04 | End: 2022-02-05 | Stop reason: HOSPADM

## 2022-02-04 RX ORDER — NICOTINE POLACRILEX 4 MG
30 LOZENGE BUCCAL PRN
Status: DISCONTINUED | OUTPATIENT
Start: 2022-02-04 | End: 2022-02-05 | Stop reason: HOSPADM

## 2022-02-04 RX ORDER — TAMSULOSIN HYDROCHLORIDE 0.4 MG/1
0.4 CAPSULE ORAL DAILY
Status: DISCONTINUED | OUTPATIENT
Start: 2022-02-04 | End: 2022-02-05 | Stop reason: HOSPADM

## 2022-02-04 RX ORDER — ALBUTEROL SULFATE 90 UG/1
2 AEROSOL, METERED RESPIRATORY (INHALATION)
Status: DISCONTINUED | OUTPATIENT
Start: 2022-02-04 | End: 2022-02-04 | Stop reason: CLARIF

## 2022-02-04 RX ORDER — DULOXETIN HYDROCHLORIDE 20 MG/1
40 CAPSULE, DELAYED RELEASE ORAL DAILY
Status: DISCONTINUED | OUTPATIENT
Start: 2022-02-04 | End: 2022-02-05 | Stop reason: HOSPADM

## 2022-02-04 RX ORDER — INSULIN LISPRO 100 [IU]/ML
6 INJECTION, SOLUTION INTRAVENOUS; SUBCUTANEOUS
Status: DISCONTINUED | OUTPATIENT
Start: 2022-02-05 | End: 2022-02-05 | Stop reason: HOSPADM

## 2022-02-04 RX ORDER — POLYETHYLENE GLYCOL 3350 17 G/17G
17 POWDER, FOR SOLUTION ORAL DAILY PRN
Status: DISCONTINUED | OUTPATIENT
Start: 2022-02-04 | End: 2022-02-05 | Stop reason: HOSPADM

## 2022-02-04 RX ORDER — POTASSIUM CHLORIDE 20 MEQ/1
40 TABLET, EXTENDED RELEASE ORAL ONCE
Status: COMPLETED | OUTPATIENT
Start: 2022-02-04 | End: 2022-02-04

## 2022-02-04 RX ORDER — ALBUTEROL SULFATE 2.5 MG/3ML
2.5 SOLUTION RESPIRATORY (INHALATION)
Status: DISCONTINUED | OUTPATIENT
Start: 2022-02-04 | End: 2022-02-05 | Stop reason: HOSPADM

## 2022-02-04 RX ORDER — DOCUSATE SODIUM 100 MG/1
100 CAPSULE, LIQUID FILLED ORAL EVERY 12 HOURS PRN
COMMUNITY

## 2022-02-04 RX ORDER — DEXTROSE MONOHYDRATE 25 G/50ML
25 INJECTION, SOLUTION INTRAVENOUS PRN
Status: DISCONTINUED | OUTPATIENT
Start: 2022-02-04 | End: 2022-02-05 | Stop reason: HOSPADM

## 2022-02-04 RX ORDER — ENOXAPARIN SODIUM 150 MG/ML
1 INJECTION SUBCUTANEOUS EVERY 12 HOURS
Status: DISCONTINUED | OUTPATIENT
Start: 2022-02-04 | End: 2022-02-04

## 2022-02-04 RX ORDER — PREGABALIN 75 MG/1
300 CAPSULE ORAL 2 TIMES DAILY
Status: DISCONTINUED | OUTPATIENT
Start: 2022-02-04 | End: 2022-02-05 | Stop reason: HOSPADM

## 2022-02-04 RX ORDER — CHOLECALCIFEROL (VITAMIN D3) 125 MCG
1000 CAPSULE ORAL DAILY
Status: DISCONTINUED | OUTPATIENT
Start: 2022-02-04 | End: 2022-02-05 | Stop reason: HOSPADM

## 2022-02-04 RX ORDER — HYDROCODONE BITARTRATE AND ACETAMINOPHEN 7.5; 325 MG/1; MG/1
1 TABLET ORAL ONCE
Status: COMPLETED | OUTPATIENT
Start: 2022-02-04 | End: 2022-02-04

## 2022-02-04 RX ORDER — ACETAMINOPHEN 325 MG/1
650 TABLET ORAL EVERY 6 HOURS PRN
COMMUNITY
Start: 2023-02-07

## 2022-02-04 RX ORDER — ATORVASTATIN CALCIUM 80 MG/1
80 TABLET, FILM COATED ORAL DAILY
COMMUNITY

## 2022-02-04 RX ORDER — QUETIAPINE FUMARATE 25 MG/1
12.5 TABLET, FILM COATED ORAL EVERY 6 HOURS PRN
Status: ON HOLD | COMMUNITY
Start: 2022-01-23 | End: 2022-08-03

## 2022-02-04 RX ORDER — ALPRAZOLAM 0.5 MG/1
0.5 TABLET ORAL EVERY 12 HOURS PRN
Status: ON HOLD | COMMUNITY
Start: 2022-02-04 | End: 2022-08-04

## 2022-02-04 RX ORDER — ACETAMINOPHEN 325 MG/1
650 TABLET ORAL EVERY 6 HOURS PRN
Status: DISCONTINUED | OUTPATIENT
Start: 2022-02-04 | End: 2022-02-05 | Stop reason: HOSPADM

## 2022-02-04 RX ORDER — ENOXAPARIN SODIUM 150 MG/ML
1 INJECTION SUBCUTANEOUS ONCE
Status: COMPLETED | OUTPATIENT
Start: 2022-02-04 | End: 2022-02-04

## 2022-02-04 RX ORDER — LATANOPROST 50 UG/ML
1 SOLUTION/ DROPS OPHTHALMIC NIGHTLY
Status: DISCONTINUED | OUTPATIENT
Start: 2022-02-04 | End: 2022-02-05 | Stop reason: HOSPADM

## 2022-02-04 RX ORDER — LISINOPRIL 20 MG/1
20 TABLET ORAL DAILY
Status: DISCONTINUED | OUTPATIENT
Start: 2022-02-04 | End: 2022-02-04

## 2022-02-04 RX ORDER — NICOTINE POLACRILEX 4 MG
15 LOZENGE BUCCAL PRN
Status: DISCONTINUED | OUTPATIENT
Start: 2022-02-04 | End: 2022-02-05 | Stop reason: HOSPADM

## 2022-02-04 RX ORDER — DOCUSATE SODIUM 100 MG/1
100 CAPSULE, LIQUID FILLED ORAL EVERY 12 HOURS PRN
Status: DISCONTINUED | OUTPATIENT
Start: 2022-02-04 | End: 2022-02-05 | Stop reason: HOSPADM

## 2022-02-04 RX ORDER — PANTOPRAZOLE SODIUM 40 MG/1
40 TABLET, DELAYED RELEASE ORAL
Status: DISCONTINUED | OUTPATIENT
Start: 2022-02-04 | End: 2022-02-05 | Stop reason: HOSPADM

## 2022-02-04 RX ORDER — LISINOPRIL 5 MG/1
5 TABLET ORAL DAILY
Status: DISCONTINUED | OUTPATIENT
Start: 2022-02-05 | End: 2022-02-05 | Stop reason: HOSPADM

## 2022-02-04 RX ORDER — INSULIN LISPRO 100 [IU]/ML
14 INJECTION, SOLUTION INTRAVENOUS; SUBCUTANEOUS
Status: DISCONTINUED | OUTPATIENT
Start: 2022-02-04 | End: 2022-02-05 | Stop reason: HOSPADM

## 2022-02-04 RX ORDER — TIMOLOL MALEATE 5 MG/ML
1 SOLUTION/ DROPS OPHTHALMIC 2 TIMES DAILY
Status: DISCONTINUED | OUTPATIENT
Start: 2022-02-04 | End: 2022-02-05 | Stop reason: HOSPADM

## 2022-02-04 RX ORDER — METOPROLOL SUCCINATE 50 MG/1
50 TABLET, EXTENDED RELEASE ORAL DAILY
Status: DISCONTINUED | OUTPATIENT
Start: 2022-02-04 | End: 2022-02-05 | Stop reason: HOSPADM

## 2022-02-04 RX ORDER — QUETIAPINE FUMARATE 25 MG/1
12.5 TABLET, FILM COATED ORAL EVERY 6 HOURS PRN
Status: DISCONTINUED | OUTPATIENT
Start: 2022-02-04 | End: 2022-02-05 | Stop reason: HOSPADM

## 2022-02-04 RX ORDER — ALPRAZOLAM 0.25 MG/1
0.5 TABLET ORAL EVERY 12 HOURS PRN
Status: DISCONTINUED | OUTPATIENT
Start: 2022-02-04 | End: 2022-02-05 | Stop reason: HOSPADM

## 2022-02-04 RX ORDER — INSULIN LISPRO 100 [IU]/ML
6 INJECTION, SOLUTION INTRAVENOUS; SUBCUTANEOUS
Status: ON HOLD | COMMUNITY
End: 2022-08-03

## 2022-02-04 RX ORDER — DEXTROSE MONOHYDRATE 25 G/50ML
12.5 INJECTION, SOLUTION INTRAVENOUS PRN
Status: DISCONTINUED | OUTPATIENT
Start: 2022-02-04 | End: 2022-02-05 | Stop reason: HOSPADM

## 2022-02-04 RX ORDER — OXYCODONE AND ACETAMINOPHEN 10; 325 MG/1; MG/1
1 TABLET ORAL EVERY 8 HOURS PRN
Status: DISCONTINUED | OUTPATIENT
Start: 2022-02-04 | End: 2022-02-05 | Stop reason: HOSPADM

## 2022-02-04 RX ORDER — ENOXAPARIN SODIUM 150 MG/ML
1 INJECTION SUBCUTANEOUS EVERY 12 HOURS SCHEDULED
Status: DISCONTINUED | OUTPATIENT
Start: 2022-02-04 | End: 2022-02-05 | Stop reason: HOSPADM

## 2022-02-04 RX ORDER — POLYETHYLENE GLYCOL 3350 17 G/17G
17 POWDER, FOR SOLUTION ORAL DAILY PRN
COMMUNITY
Start: 2023-02-07

## 2022-02-04 RX ORDER — ATORVASTATIN CALCIUM 40 MG/1
40 TABLET, FILM COATED ORAL DAILY
Status: DISCONTINUED | OUTPATIENT
Start: 2022-02-04 | End: 2022-02-05 | Stop reason: HOSPADM

## 2022-02-04 RX ORDER — GABAPENTIN 300 MG/1
600 CAPSULE ORAL DAILY
Status: DISCONTINUED | OUTPATIENT
Start: 2022-02-04 | End: 2022-02-04

## 2022-02-04 RX ADMIN — PREGABALIN 300 MG: 75 CAPSULE ORAL at 10:46

## 2022-02-04 RX ADMIN — GABAPENTIN 600 MG: 300 CAPSULE ORAL at 08:59

## 2022-02-04 RX ADMIN — LATANOPROST 1 DROP: 50 SOLUTION OPHTHALMIC at 20:41

## 2022-02-04 RX ADMIN — ENOXAPARIN SODIUM 135 MG: 150 INJECTION SUBCUTANEOUS at 02:32

## 2022-02-04 RX ADMIN — LISINOPRIL 20 MG: 20 TABLET ORAL at 08:59

## 2022-02-04 RX ADMIN — ASPIRIN 81 MG: 81 TABLET, COATED ORAL at 12:38

## 2022-02-04 RX ADMIN — PANTOPRAZOLE SODIUM 40 MG: 40 TABLET, DELAYED RELEASE ORAL at 10:45

## 2022-02-04 RX ADMIN — LEVOTHYROXINE SODIUM 125 MCG: 0.03 TABLET ORAL at 05:52

## 2022-02-04 RX ADMIN — METOPROLOL SUCCINATE 50 MG: 50 TABLET, EXTENDED RELEASE ORAL at 08:59

## 2022-02-04 RX ADMIN — PREGABALIN 300 MG: 75 CAPSULE ORAL at 20:40

## 2022-02-04 RX ADMIN — ENOXAPARIN SODIUM 135 MG: 150 INJECTION SUBCUTANEOUS at 18:08

## 2022-02-04 RX ADMIN — IOHEXOL 80 ML: 350 INJECTION, SOLUTION INTRAVENOUS at 01:00

## 2022-02-04 RX ADMIN — PERFLUTREN 3 ML: 6.52 INJECTION, SUSPENSION INTRAVENOUS at 12:28

## 2022-02-04 RX ADMIN — TIMOLOL MALEATE 1 DROP: 5 SOLUTION/ DROPS OPHTHALMIC at 20:41

## 2022-02-04 RX ADMIN — OXYCODONE AND ACETAMINOPHEN 1 TABLET: 10; 325 TABLET ORAL at 10:46

## 2022-02-04 RX ADMIN — ROSUVASTATIN CALCIUM 20 MG: 20 TABLET, FILM COATED ORAL at 08:59

## 2022-02-04 RX ADMIN — POTASSIUM CHLORIDE 40 MEQ: 1500 TABLET, EXTENDED RELEASE ORAL at 05:52

## 2022-02-04 RX ADMIN — INSULIN LISPRO 1 UNITS: 100 INJECTION, SOLUTION INTRAVENOUS; SUBCUTANEOUS at 08:59

## 2022-02-04 RX ADMIN — CLOPIDOGREL BISULFATE 75 MG: 75 TABLET, FILM COATED ORAL at 10:46

## 2022-02-04 RX ADMIN — INSULIN LISPRO 1 UNITS: 100 INJECTION, SOLUTION INTRAVENOUS; SUBCUTANEOUS at 13:49

## 2022-02-04 RX ADMIN — OXYCODONE AND ACETAMINOPHEN 1 TABLET: 10; 325 TABLET ORAL at 20:40

## 2022-02-04 RX ADMIN — TAMSULOSIN HYDROCHLORIDE 0.4 MG: 0.4 CAPSULE ORAL at 08:59

## 2022-02-04 RX ADMIN — HYDROCODONE BITARTRATE AND ACETAMINOPHEN 1 TABLET: 7.5; 325 TABLET ORAL at 05:52

## 2022-02-04 RX ADMIN — ATORVASTATIN CALCIUM 40 MG: 40 TABLET ORAL at 10:46

## 2022-02-04 RX ADMIN — Medication 1000 MCG: at 12:38

## 2022-02-04 ASSESSMENT — ENCOUNTER SYMPTOMS
COUGH: 0
VOMITING: 0
ABDOMINAL DISTENTION: 0
DIARRHEA: 0
FATIGUE: 0
ABDOMINAL PAIN: 0
STRIDOR: 0
APPETITE CHANGE: 0
BRUISES/BLEEDS EASILY: 0
HEADACHES: 0
NEUROLOGICAL NEGATIVE: 1
GASTROINTESTINAL NEGATIVE: 1
ACTIVITY CHANGE: 0
FEVER: 0
NUMBNESS: 0
ENDOCRINE NEGATIVE: 1
WHEEZING: 0
PSYCHIATRIC NEGATIVE: 1
SHORTNESS OF BREATH: 0
CHEST TIGHTNESS: 0
WEAKNESS: 0
EYE PAIN: 0
HEMATOLOGIC/LYMPHATIC NEGATIVE: 1
NAUSEA: 0
EYES NEGATIVE: 1
TROUBLE SWALLOWING: 0
VOICE CHANGE: 0
CHILLS: 0
BACK PAIN: 1
CONSTITUTIONAL NEGATIVE: 1
ADENOPATHY: 0
EYE REDNESS: 0
SORE THROAT: 0
CONSTIPATION: 0

## 2022-02-04 ASSESSMENT — LIFESTYLE VARIABLES
AUDIT-C TOTAL SCORE: 0
HOW OFTEN DO YOU HAVE A DRINK CONTAINING ALCOHOL: NEVER
ALCOHOL_USE_STATUS: NO OR LOW RISK WITH VALIDATED TOOL
HOW OFTEN DO YOU HAVE 6 OR MORE DRINKS ON ONE OCCASION: NEVER
HOW MANY STANDARD DRINKS CONTAINING ALCOHOL DO YOU HAVE ON A TYPICAL DAY: 0,1 OR 2

## 2022-02-04 ASSESSMENT — PAIN SCALES - GENERAL
PAINLEVEL_OUTOF10: 5
PAINLEVEL_OUTOF10: 3
PAINLEVEL_OUTOF10: 8
PAINLEVEL_OUTOF10: 6

## 2022-02-04 ASSESSMENT — PATIENT HEALTH QUESTIONNAIRE - PHQ9
CLINICAL INTERPRETATION OF PHQ2 SCORE: NO FURTHER SCREENING NEEDED
IS PATIENT ABLE TO COMPLETE PHQ2 OR PHQ9: YES
SUM OF ALL RESPONSES TO PHQ9 QUESTIONS 1 AND 2: 0
1. LITTLE INTEREST OR PLEASURE IN DOING THINGS: NOT AT ALL
2. FEELING DOWN, DEPRESSED OR HOPELESS: NOT AT ALL
SUM OF ALL RESPONSES TO PHQ9 QUESTIONS 1 AND 2: 0

## 2022-02-04 ASSESSMENT — ACTIVITIES OF DAILY LIVING (ADL)
CHRONIC_PAIN_PRESENT: NO
ADL_SCORE: 12
ADL_SHORT_OF_BREATH: YES
ADL_BEFORE_ADMISSION: INDEPENDENT
RECENT_DECLINE_ADL: YES, ACUTE ILLNESS WITHOUT THERAPY NEEDS

## 2022-02-04 ASSESSMENT — COGNITIVE AND FUNCTIONAL STATUS - GENERAL
ARE YOU BLIND OR DO YOU HAVE SERIOUS DIFFICULTY SEEING, EVEN WHEN WEARING GLASSES: NO
DO YOU HAVE SERIOUS DIFFICULTY WALKING OR CLIMBING STAIRS: NO
ARE YOU DEAF OR DO YOU HAVE SERIOUS DIFFICULTY  HEARING: NO
BECAUSE OF A PHYSICAL, MENTAL, OR EMOTIONAL CONDITION, DO YOU HAVE DIFFICULTY DOING ERRANDS ALONE: NO
DO YOU HAVE DIFFICULTY DRESSING OR BATHING: NO
BECAUSE OF A PHYSICAL, MENTAL, OR EMOTIONAL CONDITION, DO YOU HAVE SERIOUS DIFFICULTY CONCENTRATING, REMEMBERING OR MAKING DECISIONS: NO

## 2022-02-04 ASSESSMENT — COLUMBIA-SUICIDE SEVERITY RATING SCALE - C-SSRS
IS THE PATIENT ABLE TO COMPLETE C-SSRS: YES
6. HAVE YOU EVER DONE ANYTHING, STARTED TO DO ANYTHING, OR PREPARED TO DO ANYTHING TO END YOUR LIFE?: NO
1. WITHIN THE PAST MONTH, HAVE YOU WISHED YOU WERE DEAD OR WISHED YOU COULD GO TO SLEEP AND NOT WAKE UP?: NO
2. HAVE YOU ACTUALLY HAD ANY THOUGHTS OF KILLING YOURSELF?: NO

## 2022-02-04 ASSESSMENT — PAIN DESCRIPTION - PAIN TYPE: TYPE: ACUTE PAIN

## 2022-02-04 ASSESSMENT — PULMONARY FUNCTION TESTS: FEV1/FVC: UNABLE TO OBTAIN, OR GREATER THAN 70%

## 2022-02-05 ENCOUNTER — APPOINTMENT (OUTPATIENT)
Dept: CARDIOLOGY | Age: 72
DRG: 176 | End: 2022-02-05
Attending: NURSE PRACTITIONER

## 2022-02-05 ENCOUNTER — APPOINTMENT (OUTPATIENT)
Dept: ULTRASOUND IMAGING | Age: 72
DRG: 176 | End: 2022-02-05
Attending: INTERNAL MEDICINE

## 2022-02-05 VITALS
HEIGHT: 69 IN | HEART RATE: 73 BPM | RESPIRATION RATE: 18 BRPM | SYSTOLIC BLOOD PRESSURE: 144 MMHG | DIASTOLIC BLOOD PRESSURE: 94 MMHG | OXYGEN SATURATION: 96 % | WEIGHT: 293.87 LBS | TEMPERATURE: 97.7 F | BODY MASS INDEX: 43.53 KG/M2

## 2022-02-05 LAB
BASOPHILS # BLD: 0.1 K/MCL (ref 0–0.3)
BASOPHILS NFR BLD: 1 %
DEPRECATED RDW RBC: 48.2 FL (ref 39–50)
EOSINOPHIL # BLD: 0.8 K/MCL (ref 0–0.5)
EOSINOPHIL NFR BLD: 9 %
ERYTHROCYTE [DISTWIDTH] IN BLOOD: 14.4 % (ref 11–15)
GLUCOSE BLDC GLUCOMTR-MCNC: 140 MG/DL (ref 70–99)
GLUCOSE BLDC GLUCOMTR-MCNC: 140 MG/DL (ref 70–99)
GLUCOSE BLDC GLUCOMTR-MCNC: 159 MG/DL (ref 70–99)
GLUCOSE BLDC GLUCOMTR-MCNC: 187 MG/DL (ref 70–99)
HCT VFR BLD CALC: 35.2 % (ref 39–51)
HGB BLD-MCNC: 10.8 G/DL (ref 13–17)
IMM GRANULOCYTES # BLD AUTO: 0.1 K/MCL (ref 0–0.2)
IMM GRANULOCYTES # BLD: 1 %
INR PPP: 1
LYMPHOCYTES # BLD: 2.1 K/MCL (ref 1–4)
LYMPHOCYTES NFR BLD: 25 %
MCH RBC QN AUTO: 28.2 PG (ref 26–34)
MCHC RBC AUTO-ENTMCNC: 30.7 G/DL (ref 32–36.5)
MCV RBC AUTO: 91.9 FL (ref 78–100)
MONOCYTES # BLD: 0.8 K/MCL (ref 0.3–0.9)
MONOCYTES NFR BLD: 10 %
NEUTROPHILS # BLD: 4.4 K/MCL (ref 1.8–7.7)
NEUTROPHILS NFR BLD: 54 %
NRBC BLD MANUAL-RTO: 0 /100 WBC
PLATELET # BLD AUTO: 276 K/MCL (ref 140–450)
POTASSIUM SERPL-SCNC: 4.2 MMOL/L (ref 3.4–5.1)
PROTHROMBIN TIME: 10.6 SEC (ref 9.7–11.8)
RBC # BLD: 3.83 MIL/MCL (ref 4.5–5.9)
WBC # BLD: 8.2 K/MCL (ref 4.2–11)

## 2022-02-05 PROCEDURE — 81240 F2 GENE: CPT | Performed by: INTERNAL MEDICINE

## 2022-02-05 PROCEDURE — 10002800 HB RX 250 W HCPCS: Performed by: INTERNAL MEDICINE

## 2022-02-05 PROCEDURE — 10002803 HB RX 637: Performed by: INTERNAL MEDICINE

## 2022-02-05 PROCEDURE — 99233 SBSQ HOSP IP/OBS HIGH 50: CPT | Performed by: NURSE PRACTITIONER

## 2022-02-05 PROCEDURE — 93975 VASCULAR STUDY: CPT

## 2022-02-05 PROCEDURE — 36415 COLL VENOUS BLD VENIPUNCTURE: CPT | Performed by: INTERNAL MEDICINE

## 2022-02-05 PROCEDURE — 85025 COMPLETE CBC W/AUTO DIFF WBC: CPT | Performed by: INTERNAL MEDICINE

## 2022-02-05 PROCEDURE — 93270 REMOTE 30 DAY ECG REV/REPORT: CPT

## 2022-02-05 PROCEDURE — 10004651 HB RX, NO CHARGE ITEM: Performed by: INTERNAL MEDICINE

## 2022-02-05 PROCEDURE — 99233 SBSQ HOSP IP/OBS HIGH 50: CPT | Performed by: INTERNAL MEDICINE

## 2022-02-05 PROCEDURE — 84132 ASSAY OF SERUM POTASSIUM: CPT | Performed by: INTERNAL MEDICINE

## 2022-02-05 PROCEDURE — 99239 HOSP IP/OBS DSCHRG MGMT >30: CPT | Performed by: INTERNAL MEDICINE

## 2022-02-05 PROCEDURE — 85610 PROTHROMBIN TIME: CPT | Performed by: INTERNAL MEDICINE

## 2022-02-05 PROCEDURE — 76705 ECHO EXAM OF ABDOMEN: CPT

## 2022-02-05 RX ORDER — WARFARIN SODIUM 7.5 MG/1
7.5 TABLET ORAL EVERY EVENING
INPATIENT
Start: 2022-02-05 | End: 2022-02-05 | Stop reason: SDUPTHER

## 2022-02-05 RX ORDER — WARFARIN SODIUM 7.5 MG/1
6 TABLET ORAL EVERY EVENING
INPATIENT
Start: 2022-02-05 | End: 2023-01-30

## 2022-02-05 RX ORDER — WARFARIN SODIUM 3 MG/1
3 TABLET ORAL ONCE
Status: DISCONTINUED | OUTPATIENT
Start: 2022-02-05 | End: 2022-02-05

## 2022-02-05 RX ORDER — ENOXAPARIN SODIUM 150 MG/ML
1 INJECTION SUBCUTANEOUS EVERY 12 HOURS SCHEDULED
Qty: 13 ML | Refills: 0 | INPATIENT
Start: 2022-02-05 | End: 2022-02-12

## 2022-02-05 RX ORDER — WARFARIN SODIUM 7.5 MG/1
7.5 TABLET ORAL ONCE
Status: COMPLETED | OUTPATIENT
Start: 2022-02-05 | End: 2022-02-05

## 2022-02-05 RX ORDER — OXYCODONE AND ACETAMINOPHEN 10; 325 MG/1; MG/1
1 TABLET ORAL EVERY 8 HOURS PRN
Qty: 4 TABLET | Refills: 0 | Status: ON HOLD | OUTPATIENT
Start: 2022-02-05 | End: 2022-08-04 | Stop reason: SDUPTHER

## 2022-02-05 RX ADMIN — ALPRAZOLAM 0.5 MG: 0.25 TABLET ORAL at 01:31

## 2022-02-05 RX ADMIN — LEVOTHYROXINE SODIUM 125 MCG: 0.03 TABLET ORAL at 05:46

## 2022-02-05 RX ADMIN — ATORVASTATIN CALCIUM 40 MG: 40 TABLET ORAL at 09:44

## 2022-02-05 RX ADMIN — CLOPIDOGREL BISULFATE 75 MG: 75 TABLET, FILM COATED ORAL at 09:44

## 2022-02-05 RX ADMIN — INSULIN LISPRO 14 UNITS: 100 INJECTION, SOLUTION INTRAVENOUS; SUBCUTANEOUS at 17:38

## 2022-02-05 RX ADMIN — PREGABALIN 300 MG: 75 CAPSULE ORAL at 09:44

## 2022-02-05 RX ADMIN — TIMOLOL MALEATE 1 DROP: 5 SOLUTION/ DROPS OPHTHALMIC at 09:47

## 2022-02-05 RX ADMIN — METOPROLOL SUCCINATE 50 MG: 50 TABLET, EXTENDED RELEASE ORAL at 09:44

## 2022-02-05 RX ADMIN — Medication 1000 MCG: at 09:45

## 2022-02-05 RX ADMIN — OXYCODONE AND ACETAMINOPHEN 1 TABLET: 10; 325 TABLET ORAL at 09:44

## 2022-02-05 RX ADMIN — WARFARIN SODIUM 7.5 MG: 7.5 TABLET ORAL at 17:38

## 2022-02-05 RX ADMIN — INSULIN LISPRO 14 UNITS: 100 INJECTION, SOLUTION INTRAVENOUS; SUBCUTANEOUS at 13:20

## 2022-02-05 RX ADMIN — INSULIN LISPRO 1 UNITS: 100 INJECTION, SOLUTION INTRAVENOUS; SUBCUTANEOUS at 17:39

## 2022-02-05 RX ADMIN — LISINOPRIL 5 MG: 5 TABLET ORAL at 09:44

## 2022-02-05 RX ADMIN — OXYCODONE AND ACETAMINOPHEN 1 TABLET: 10; 325 TABLET ORAL at 17:57

## 2022-02-05 RX ADMIN — INSULIN LISPRO 6 UNITS: 100 INJECTION, SOLUTION INTRAVENOUS; SUBCUTANEOUS at 09:47

## 2022-02-05 RX ADMIN — ASPIRIN 81 MG: 81 TABLET, COATED ORAL at 09:44

## 2022-02-05 RX ADMIN — INSULIN HUMAN 24 UNITS: 100 INJECTION, SUSPENSION SUBCUTANEOUS at 09:43

## 2022-02-05 RX ADMIN — PANTOPRAZOLE SODIUM 40 MG: 40 TABLET, DELAYED RELEASE ORAL at 05:46

## 2022-02-05 RX ADMIN — TAMSULOSIN HYDROCHLORIDE 0.4 MG: 0.4 CAPSULE ORAL at 09:45

## 2022-02-05 RX ADMIN — DULOXETINE HYDROCHLORIDE 40 MG: 20 CAPSULE, DELAYED RELEASE ORAL at 09:44

## 2022-02-05 RX ADMIN — ENOXAPARIN SODIUM 135 MG: 150 INJECTION SUBCUTANEOUS at 10:00

## 2022-02-05 ASSESSMENT — PAIN SCALES - GENERAL
PAINLEVEL_OUTOF10: 1
PAINLEVEL_OUTOF10: 5
PAINLEVEL_OUTOF10: 6

## 2022-02-05 ASSESSMENT — ENCOUNTER SYMPTOMS: RESPIRATORY NEGATIVE: 1

## 2022-02-07 ENCOUNTER — TELEPHONE (OUTPATIENT)
Dept: CARDIOLOGY | Age: 72
End: 2022-02-07

## 2022-02-07 ENCOUNTER — LAB ENCOUNTER (OUTPATIENT)
Dept: LAB | Facility: HOSPITAL | Age: 72
End: 2022-02-07
Attending: INTERNAL MEDICINE
Payer: MEDICAID

## 2022-02-07 ENCOUNTER — OFFICE VISIT (OUTPATIENT)
Dept: ENDOCRINOLOGY CLINIC | Facility: CLINIC | Age: 72
End: 2022-02-07
Payer: MEDICARE

## 2022-02-07 VITALS — DIASTOLIC BLOOD PRESSURE: 78 MMHG | BODY MASS INDEX: 41 KG/M2 | WEIGHT: 279 LBS | SYSTOLIC BLOOD PRESSURE: 112 MMHG

## 2022-02-07 DIAGNOSIS — E03.9 HYPOTHYROIDISM, UNSPECIFIED TYPE: Primary | ICD-10-CM

## 2022-02-07 DIAGNOSIS — E03.9 HYPOTHYROIDISM, UNSPECIFIED TYPE: ICD-10-CM

## 2022-02-07 DIAGNOSIS — E55.9 VITAMIN D DEFICIENCY: ICD-10-CM

## 2022-02-07 LAB
T4 FREE SERPL-MCNC: 1 NG/DL (ref 0.8–1.7)
TSI SER-ACNC: 7.93 MIU/ML (ref 0.36–3.74)
VIT D+METAB SERPL-MCNC: 10.4 NG/ML (ref 30–100)

## 2022-02-07 PROCEDURE — 84443 ASSAY THYROID STIM HORMONE: CPT

## 2022-02-07 PROCEDURE — 36415 COLL VENOUS BLD VENIPUNCTURE: CPT

## 2022-02-07 PROCEDURE — 99213 OFFICE O/P EST LOW 20 MIN: CPT | Performed by: INTERNAL MEDICINE

## 2022-02-07 PROCEDURE — 82306 VITAMIN D 25 HYDROXY: CPT

## 2022-02-07 PROCEDURE — 84439 ASSAY OF FREE THYROXINE: CPT

## 2022-02-07 RX ORDER — ACETAMINOPHEN 325 MG/1
650 TABLET ORAL
COMMUNITY
End: 2022-03-15 | Stop reason: ALTCHOICE

## 2022-02-07 RX ORDER — WARFARIN SODIUM 7.5 MG/1
5.62 TABLET ORAL EVERY EVENING
COMMUNITY
Start: 2022-02-05 | End: 2022-02-28

## 2022-02-08 ENCOUNTER — TELEPHONE (OUTPATIENT)
Dept: ENDOCRINOLOGY CLINIC | Facility: CLINIC | Age: 72
End: 2022-02-08

## 2022-02-08 ENCOUNTER — EXTERNAL FACILITY (OUTPATIENT)
Dept: INTERNAL MEDICINE CLINIC | Facility: CLINIC | Age: 72
End: 2022-02-08

## 2022-02-08 PROCEDURE — 99306 1ST NF CARE HIGH MDM 50: CPT | Performed by: INTERNAL MEDICINE

## 2022-02-08 NOTE — TELEPHONE ENCOUNTER
Vit D is low  Ergocalciferol 50,000 units q week x 12 weeks followed by one capsule once a month  25 OH vit D in three months    Thyroid labs indicate need for dose increase   He is on LT4  125 mcg daily  If compliant, please go up to 137 mcg daily  TSH and Free T4 in 3 months    Thanks

## 2022-02-09 ENCOUNTER — SNF VISIT (OUTPATIENT)
Dept: INTERNAL MEDICINE CLINIC | Facility: SKILLED NURSING FACILITY | Age: 72
End: 2022-02-09

## 2022-02-09 LAB
COAGULATION SPECIALIST REVIEW: NORMAL
F2 GENE MUT ANL BLD/T: NOT DETECTED
SERVICE CMNT-IMP: NORMAL

## 2022-02-09 PROCEDURE — 99309 SBSQ NF CARE MODERATE MDM 30: CPT | Performed by: NURSE PRACTITIONER

## 2022-02-10 ENCOUNTER — EXTERNAL FACILITY (OUTPATIENT)
Dept: INTERNAL MEDICINE CLINIC | Facility: CLINIC | Age: 72
End: 2022-02-10

## 2022-02-10 PROCEDURE — 99309 SBSQ NF CARE MODERATE MDM 30: CPT | Performed by: INTERNAL MEDICINE

## 2022-02-14 ENCOUNTER — TELEPHONE (OUTPATIENT)
Dept: PULMONOLOGY | Facility: CLINIC | Age: 72
End: 2022-02-14

## 2022-02-14 NOTE — TELEPHONE ENCOUNTER
Abdullahi at [employer] calling for patient that missed todays appointment due to transportation issue. Patient unable to make appointment offered tomorrow with n.p. due to another appointment conflict. Please call Ivette Lopez in [place] at [phone], refer to any nurse on the 1st floor or extension [phone],thanks.

## 2022-02-15 ENCOUNTER — EXTERNAL FACILITY (OUTPATIENT)
Dept: INTERNAL MEDICINE CLINIC | Facility: CLINIC | Age: 72
End: 2022-02-15

## 2022-02-15 PROCEDURE — 99309 SBSQ NF CARE MODERATE MDM 30: CPT | Performed by: INTERNAL MEDICINE

## 2022-02-15 NOTE — TELEPHONE ENCOUNTER
Spoke with Abdullahi at [facility] appointment scheduled with Dr. Nathan Coats on 3/2/22 at 4:30pm.  Verified date, time, location & parking, [id number] [facility] verbalized understanding. Dr. Nathan Coats - Patient's rescheduled hospital follow up on 3/2/22. Is this ok or do you want to see patient sooner?

## 2022-02-16 ENCOUNTER — SNF VISIT (OUTPATIENT)
Facility: SKILLED NURSING FACILITY | Age: 72
End: 2022-02-16

## 2022-02-16 PROCEDURE — 99309 SBSQ NF CARE MODERATE MDM 30: CPT | Performed by: NURSE PRACTITIONER

## 2022-02-17 ENCOUNTER — EXTERNAL FACILITY (OUTPATIENT)
Dept: INTERNAL MEDICINE CLINIC | Facility: CLINIC | Age: 72
End: 2022-02-17

## 2022-02-17 PROCEDURE — 99309 SBSQ NF CARE MODERATE MDM 30: CPT | Performed by: INTERNAL MEDICINE

## 2022-02-18 ENCOUNTER — SNF VISIT (OUTPATIENT)
Facility: SKILLED NURSING FACILITY | Age: 72
End: 2022-02-18

## 2022-02-18 PROCEDURE — 99309 SBSQ NF CARE MODERATE MDM 30: CPT | Performed by: NURSE PRACTITIONER

## 2022-02-21 ENCOUNTER — TELEPHONE (OUTPATIENT)
Dept: HEMATOLOGY/ONCOLOGY | Age: 72
End: 2022-02-21

## 2022-02-22 ENCOUNTER — PATIENT OUTREACH (OUTPATIENT)
Dept: CASE MANAGEMENT | Age: 72
End: 2022-02-22

## 2022-02-22 ENCOUNTER — EXTERNAL FACILITY (OUTPATIENT)
Dept: INTERNAL MEDICINE CLINIC | Facility: CLINIC | Age: 72
End: 2022-02-22

## 2022-02-22 PROCEDURE — 99308 SBSQ NF CARE LOW MDM 20: CPT | Performed by: INTERNAL MEDICINE

## 2022-02-22 NOTE — PROGRESS NOTES
JIE verified for CCM monthly outreach. I called patient and left a detailed message to call back. I will follow up with patient at a later time.       Medical record reviewed including recent office visits and test results    Total time spent with patient including chart review: 5 min   Time spent with patient this month: 5 min    Total time spent with communication and chart review this month to date: 5 min

## 2022-02-23 ENCOUNTER — SNF VISIT (OUTPATIENT)
Facility: SKILLED NURSING FACILITY | Age: 72
End: 2022-02-23

## 2022-02-23 PROCEDURE — 99309 SBSQ NF CARE MODERATE MDM 30: CPT | Performed by: NURSE PRACTITIONER

## 2022-02-24 PROCEDURE — 99308 SBSQ NF CARE LOW MDM 20: CPT | Performed by: INTERNAL MEDICINE

## 2022-02-25 ENCOUNTER — EXTERNAL FACILITY (OUTPATIENT)
Dept: INTERNAL MEDICINE CLINIC | Facility: CLINIC | Age: 72
End: 2022-02-25

## 2022-02-25 ENCOUNTER — SNF DISCHARGE (OUTPATIENT)
Facility: SKILLED NURSING FACILITY | Age: 72
End: 2022-02-25

## 2022-02-25 PROCEDURE — 99316 NF DSCHRG MGMT 30 MIN+: CPT | Performed by: NURSE PRACTITIONER

## 2022-02-28 RX ORDER — WARFARIN SODIUM 1 MG/1
9.5 TABLET ORAL EVERY EVENING
Qty: 100 TABLET | Refills: 0 | Status: SHIPPED | OUTPATIENT
Start: 2022-02-28

## 2022-02-28 RX ORDER — TRAMADOL HYDROCHLORIDE 50 MG/1
50 TABLET ORAL EVERY 6 HOURS PRN
Qty: 30 TABLET | Refills: 0 | Status: SHIPPED | OUTPATIENT
Start: 2022-02-28

## 2022-02-28 RX ORDER — PREGABALIN 300 MG/1
300 CAPSULE ORAL 2 TIMES DAILY
Qty: 30 CAPSULE | Refills: 0 | Status: SHIPPED | OUTPATIENT
Start: 2022-02-28

## 2022-03-01 ENCOUNTER — EXTERNAL FACILITY (OUTPATIENT)
Dept: INTERNAL MEDICINE CLINIC | Facility: CLINIC | Age: 72
End: 2022-03-01

## 2022-03-01 PROCEDURE — 99308 SBSQ NF CARE LOW MDM 20: CPT | Performed by: INTERNAL MEDICINE

## 2022-03-05 ENCOUNTER — LAB ENCOUNTER (OUTPATIENT)
Dept: LAB | Facility: HOSPITAL | Age: 72
End: 2022-03-05
Attending: INTERNAL MEDICINE
Payer: MEDICARE

## 2022-03-05 DIAGNOSIS — I26.99 PE (PULMONARY THROMBOEMBOLISM) (HCC): ICD-10-CM

## 2022-03-05 LAB
INR BLD: 1.04 (ref 0.8–1.2)
PROTHROMBIN TIME: 13.6 SECONDS (ref 11.6–14.8)

## 2022-03-05 PROCEDURE — 36415 COLL VENOUS BLD VENIPUNCTURE: CPT

## 2022-03-05 PROCEDURE — 85610 PROTHROMBIN TIME: CPT

## 2022-03-10 PROCEDURE — 93272 ECG/REVIEW INTERPRET ONLY: CPT | Performed by: INTERNAL MEDICINE

## 2022-03-11 ENCOUNTER — LAB ENCOUNTER (OUTPATIENT)
Dept: LAB | Facility: HOSPITAL | Age: 72
End: 2022-03-11
Attending: PEDIATRICS
Payer: MEDICARE

## 2022-03-11 DIAGNOSIS — T81.718A IATROGENIC PULMONARY EMBOLISM AND INFARCTION (HCC): Primary | ICD-10-CM

## 2022-03-11 DIAGNOSIS — I26.99 IATROGENIC PULMONARY EMBOLISM AND INFARCTION (HCC): Primary | ICD-10-CM

## 2022-03-11 LAB
INR BLD: 1.17 (ref 0.8–1.2)
PROTHROMBIN TIME: 14.9 SECONDS (ref 11.6–14.8)

## 2022-03-11 PROCEDURE — 85610 PROTHROMBIN TIME: CPT

## 2022-03-11 PROCEDURE — 36415 COLL VENOUS BLD VENIPUNCTURE: CPT

## 2022-03-14 ENCOUNTER — TELEPHONE (OUTPATIENT)
Dept: INTERNAL MEDICINE CLINIC | Facility: CLINIC | Age: 72
End: 2022-03-14

## 2022-03-14 ENCOUNTER — LAB ENCOUNTER (OUTPATIENT)
Dept: LAB | Facility: HOSPITAL | Age: 72
End: 2022-03-14
Attending: INTERNAL MEDICINE
Payer: MEDICARE

## 2022-03-14 DIAGNOSIS — I26.99 PULMONARY EMBOLUS (HCC): Primary | ICD-10-CM

## 2022-03-14 LAB
INR BLD: 1.46 (ref 0.8–1.2)
PROTHROMBIN TIME: 17.8 SECONDS (ref 11.6–14.8)

## 2022-03-14 PROCEDURE — 36415 COLL VENOUS BLD VENIPUNCTURE: CPT

## 2022-03-14 PROCEDURE — 85610 PROTHROMBIN TIME: CPT

## 2022-03-14 NOTE — TELEPHONE ENCOUNTER
Pharmacist is calling stating that some of medication pt has requested have been change in rehab.   Duloxetine 30 mg was changed to 40 mg   Levothyroxine 125 mg changed to 137 mg   Rosuvastatin 20 mg was changed to 40 mg   Lisinopril 10 mg was changed to 5 mg  Vit D 500 mg  tramadoi  50 mg    Please advise

## 2022-03-15 ENCOUNTER — OFFICE VISIT (OUTPATIENT)
Dept: INTERNAL MEDICINE CLINIC | Facility: CLINIC | Age: 72
End: 2022-03-15
Payer: MEDICARE

## 2022-03-15 VITALS
OXYGEN SATURATION: 97 % | WEIGHT: 288 LBS | HEART RATE: 81 BPM | HEIGHT: 69 IN | DIASTOLIC BLOOD PRESSURE: 86 MMHG | SYSTOLIC BLOOD PRESSURE: 124 MMHG | TEMPERATURE: 98 F | BODY MASS INDEX: 42.65 KG/M2 | RESPIRATION RATE: 18 BRPM

## 2022-03-15 DIAGNOSIS — S62.514D CLOSED NONDISPLACED FRACTURE OF PROXIMAL PHALANX OF RIGHT THUMB WITH ROUTINE HEALING, SUBSEQUENT ENCOUNTER: ICD-10-CM

## 2022-03-15 DIAGNOSIS — I10 PRIMARY HYPERTENSION: ICD-10-CM

## 2022-03-15 DIAGNOSIS — M48.062 LUMBAR STENOSIS WITH NEUROGENIC CLAUDICATION: ICD-10-CM

## 2022-03-15 DIAGNOSIS — E66.01 MORBID OBESITY WITH BMI OF 40.0-44.9, ADULT (HCC): ICD-10-CM

## 2022-03-15 DIAGNOSIS — Z98.61 HISTORY OF PTCA: ICD-10-CM

## 2022-03-15 DIAGNOSIS — E11.8 TYPE 2 DIABETES MELLITUS WITH COMPLICATION, WITHOUT LONG-TERM CURRENT USE OF INSULIN (HCC): ICD-10-CM

## 2022-03-15 DIAGNOSIS — I25.10 CORONARY ARTERY DISEASE INVOLVING NATIVE CORONARY ARTERY WITHOUT ANGINA PECTORIS, UNSPECIFIED WHETHER NATIVE OR TRANSPLANTED HEART: ICD-10-CM

## 2022-03-15 DIAGNOSIS — I26.99 PE (PULMONARY THROMBOEMBOLISM) (HCC): Primary | ICD-10-CM

## 2022-03-15 DIAGNOSIS — Z86.16 PERSONAL HISTORY OF COVID-19: ICD-10-CM

## 2022-03-15 DIAGNOSIS — E03.9 ACQUIRED HYPOTHYROIDISM: ICD-10-CM

## 2022-03-15 PROCEDURE — 99496 TRANSJ CARE MGMT HIGH F2F 7D: CPT | Performed by: INTERNAL MEDICINE

## 2022-03-15 RX ORDER — ATORVASTATIN CALCIUM 40 MG/1
40 TABLET, FILM COATED ORAL NIGHTLY
Status: ON HOLD | COMMUNITY
End: 2022-04-01

## 2022-03-15 RX ORDER — HYDROCODONE BITARTRATE AND ACETAMINOPHEN 5; 325 MG/1; MG/1
1 TABLET ORAL EVERY 8 HOURS PRN
Qty: 21 TABLET | Refills: 0 | Status: SHIPPED | OUTPATIENT
Start: 2022-03-15 | End: 2022-03-17 | Stop reason: ALTCHOICE

## 2022-03-15 RX ORDER — LEVOTHYROXINE SODIUM 0.12 MG/1
125 TABLET ORAL EVERY MORNING
Qty: 90 TABLET | Refills: 0 | Status: SHIPPED | OUTPATIENT
Start: 2022-03-15 | End: 2022-03-17 | Stop reason: DRUGHIGH

## 2022-03-16 ENCOUNTER — PATIENT OUTREACH (OUTPATIENT)
Dept: CASE MANAGEMENT | Age: 72
End: 2022-03-16

## 2022-03-17 ENCOUNTER — LAB ENCOUNTER (OUTPATIENT)
Dept: LAB | Facility: HOSPITAL | Age: 72
End: 2022-03-17
Attending: INTERNAL MEDICINE
Payer: MEDICARE

## 2022-03-17 DIAGNOSIS — I26.99 PULMONARY EMBOLISM (HCC): ICD-10-CM

## 2022-03-17 PROBLEM — A41.89 VIRAL SEPSIS (HCC): Status: RESOLVED | Noted: 2022-01-09 | Resolved: 2022-03-17

## 2022-03-17 PROBLEM — E78.5 HYPERLIPIDEMIA LDL GOAL <70: Status: ACTIVE | Noted: 2019-11-12

## 2022-03-17 PROBLEM — A41.89 VIRAL SEPSIS: Status: RESOLVED | Noted: 2022-01-09 | Resolved: 2022-03-17

## 2022-03-17 PROBLEM — A41.89 VIRAL SEPSIS  (HCC): Status: RESOLVED | Noted: 2022-01-09 | Resolved: 2022-03-17

## 2022-03-17 PROBLEM — B97.89 VIRAL SEPSIS (HCC): Status: RESOLVED | Noted: 2022-01-09 | Resolved: 2022-03-17

## 2022-03-17 PROBLEM — D72.829 LEUKOCYTOSIS, UNSPECIFIED TYPE: Status: RESOLVED | Noted: 2022-01-09 | Resolved: 2022-03-17

## 2022-03-17 PROBLEM — B97.89 VIRAL SEPSIS: Status: RESOLVED | Noted: 2022-01-09 | Resolved: 2022-03-17

## 2022-03-17 PROBLEM — Z12.5 PROSTATE CANCER SCREENING: Status: RESOLVED | Noted: 2020-06-17 | Resolved: 2022-03-17

## 2022-03-17 PROBLEM — B97.89 VIRAL SEPSIS  (HCC): Status: RESOLVED | Noted: 2022-01-09 | Resolved: 2022-03-17

## 2022-03-17 LAB
INR BLD: 1.87 (ref 0.8–1.2)
PROTHROMBIN TIME: 21.6 SECONDS (ref 11.6–14.8)

## 2022-03-17 PROCEDURE — 36415 COLL VENOUS BLD VENIPUNCTURE: CPT

## 2022-03-17 PROCEDURE — 85610 PROTHROMBIN TIME: CPT

## 2022-03-17 RX ORDER — LEVOTHYROXINE SODIUM 137 UG/1
137 TABLET ORAL
Qty: 90 TABLET | Refills: 0 | Status: SHIPPED | OUTPATIENT
Start: 2022-03-17

## 2022-03-17 RX ORDER — ERGOCALCIFEROL (VITAMIN D2) 1250 MCG
CAPSULE ORAL
Qty: 15 CAPSULE | Refills: 0 | Status: SHIPPED | OUTPATIENT
Start: 2022-03-17 | End: 2022-09-07

## 2022-03-17 RX ORDER — INSULIN LISPRO 100 [IU]/ML
6 INJECTION, SOLUTION INTRAVENOUS; SUBCUTANEOUS
COMMUNITY
End: 2022-03-29

## 2022-03-17 NOTE — TELEPHONE ENCOUNTER
Called [facility] at [facility] regarding hydrocodone 5-325. Called to discontinued medication and have it resent somewhere else.

## 2022-03-17 NOTE — TELEPHONE ENCOUNTER
Spoke to patient to relay message below - patient stated he was admitted to SNF right after f/u with Dr. Sarah Merritt -patient was a little confused on sequence of events (thought maybe he saw Dr. Sarah Merritt while in SNF - RN advised patient that he came to clinic and had labs after apt - patient stated \"that is probably correct\")    Patient stated understanding to start ergocalciferol 50,000 units weekly for 12 weeks then monthly and repeat labs in 3 months  Patient stated understanding to increase levothyroxine to 137mcg daily and repeat labs in 3 months  Labs ordered   2 RX sent

## 2022-03-17 NOTE — TELEPHONE ENCOUNTER
Pt called stating that his HYDROcodone-acetaminophen (Lendon Proper) 5-325 MG Oral Tab   Was sent to the wrong pharmacy  To please cancel and re send, to gaurav astudillo    Pt wants to be informed when this has been sent

## 2022-03-18 RX ORDER — HYDROCODONE BITARTRATE AND ACETAMINOPHEN 5; 325 MG/1; MG/1
1 TABLET ORAL EVERY 8 HOURS PRN
Qty: 21 TABLET | Refills: 0 | Status: SHIPPED | OUTPATIENT
Start: 2022-03-18 | End: 2022-03-18

## 2022-03-18 RX ORDER — HYDROCODONE BITARTRATE AND ACETAMINOPHEN 5; 325 MG/1; MG/1
1 TABLET ORAL EVERY 8 HOURS PRN
Qty: 21 TABLET | Refills: 0 | OUTPATIENT
Start: 2022-03-18

## 2022-03-28 ENCOUNTER — NURSE ONLY (OUTPATIENT)
Dept: LAB | Age: 72
End: 2022-03-28
Attending: INTERNAL MEDICINE
Payer: MEDICARE

## 2022-03-28 ENCOUNTER — HOSPITAL ENCOUNTER (OUTPATIENT)
Dept: GENERAL RADIOLOGY | Age: 72
Discharge: HOME | End: 2022-03-28
Attending: INTERNAL MEDICINE
Payer: MEDICARE

## 2022-03-28 DIAGNOSIS — I26.99 PULMONARY EMBOLISM (HCC): ICD-10-CM

## 2022-03-28 DIAGNOSIS — Z01.818 PRE-OP TESTING: ICD-10-CM

## 2022-03-28 LAB
ANION GAP SERPL CALC-SCNC: 6 MMOL/L (ref 0–18)
BUN BLD-MCNC: 16 MG/DL (ref 7–18)
CALCIUM BLD-MCNC: 9.6 MG/DL (ref 8.5–10.1)
CHLORIDE SERPL-SCNC: 107 MMOL/L (ref 98–112)
CO2 SERPL-SCNC: 22 MMOL/L (ref 21–32)
CREAT BLD-MCNC: 1.14 MG/DL
ERYTHROCYTE [DISTWIDTH] IN BLOOD BY AUTOMATED COUNT: 14.6 %
FASTING STATUS PATIENT QL REPORTED: NO
GLUCOSE BLD-MCNC: 250 MG/DL (ref 70–99)
HCT VFR BLD AUTO: 48.3 %
HGB BLD-MCNC: 14.5 G/DL
INR BLD: 1.77 (ref 0.8–1.2)
MCH RBC QN AUTO: 27.2 PG (ref 26–34)
MCHC RBC AUTO-ENTMCNC: 30 G/DL (ref 31–37)
MCV RBC AUTO: 90.6 FL
OSMOLALITY SERPL CALC.SUM OF ELEC: 290 MOSM/KG (ref 275–295)
PLATELET # BLD AUTO: 319 10(3)UL (ref 150–450)
PROTHROMBIN TIME: 20.7 SECONDS (ref 11.6–14.8)
RBC # BLD AUTO: 5.33 X10(6)UL
SODIUM SERPL-SCNC: 135 MMOL/L (ref 136–145)
WBC # BLD AUTO: 11.6 X10(3) UL (ref 4–11)

## 2022-03-28 PROCEDURE — 85027 COMPLETE CBC AUTOMATED: CPT

## 2022-03-28 PROCEDURE — 93010 ELECTROCARDIOGRAM REPORT: CPT | Performed by: INTERNAL MEDICINE

## 2022-03-28 PROCEDURE — 93005 ELECTROCARDIOGRAM TRACING: CPT

## 2022-03-28 PROCEDURE — 71046 X-RAY EXAM CHEST 2 VIEWS: CPT | Performed by: INTERNAL MEDICINE

## 2022-03-28 PROCEDURE — 80048 BASIC METABOLIC PNL TOTAL CA: CPT

## 2022-03-28 PROCEDURE — 85610 PROTHROMBIN TIME: CPT

## 2022-03-29 ENCOUNTER — LAB ENCOUNTER (OUTPATIENT)
Dept: LAB | Age: 72
End: 2022-03-29
Attending: PEDIATRICS
Payer: MEDICARE

## 2022-03-29 DIAGNOSIS — Z01.818 PRE-OP TESTING: ICD-10-CM

## 2022-03-29 LAB — POTASSIUM SERPL-SCNC: 4.3 MMOL/L (ref 3.5–5.1)

## 2022-03-29 PROCEDURE — 84132 ASSAY OF SERUM POTASSIUM: CPT

## 2022-03-29 RX ORDER — GABAPENTIN 300 MG/1
300 CAPSULE ORAL 3 TIMES DAILY
COMMUNITY

## 2022-03-29 RX ORDER — WARFARIN SODIUM 5 MG/1
5 TABLET ORAL NIGHTLY
COMMUNITY

## 2022-03-31 LAB
ATRIAL RATE: 82 BPM
ATRIAL RATE: 83 BPM
P AXIS: 39 DEGREES
P AXIS: 48 DEGREES
P-R INTERVAL: 158 MS
P-R INTERVAL: 166 MS
Q-T INTERVAL: 424 MS
Q-T INTERVAL: 428 MS
QRS DURATION: 152 MS
QRS DURATION: 152 MS
QTC CALCULATION (BEZET): 495 MS
QTC CALCULATION (BEZET): 502 MS
R AXIS: -63 DEGREES
R AXIS: -63 DEGREES
T AXIS: 63 DEGREES
T AXIS: 64 DEGREES
VENTRICULAR RATE: 82 BPM
VENTRICULAR RATE: 83 BPM

## 2022-03-31 PROCEDURE — 99490 CHRNC CARE MGMT STAFF 1ST 20: CPT

## 2022-04-01 ENCOUNTER — HOSPITAL ENCOUNTER (OUTPATIENT)
Dept: INTERVENTIONAL RADIOLOGY/VASCULAR | Facility: HOSPITAL | Age: 72
Discharge: HOME | End: 2022-04-01
Attending: INTERNAL MEDICINE | Admitting: INTERNAL MEDICINE
Payer: MEDICAID

## 2022-04-01 VITALS
HEART RATE: 59 BPM | WEIGHT: 290 LBS | DIASTOLIC BLOOD PRESSURE: 83 MMHG | HEIGHT: 69 IN | RESPIRATION RATE: 16 BRPM | BODY MASS INDEX: 42.95 KG/M2 | TEMPERATURE: 98 F | SYSTOLIC BLOOD PRESSURE: 148 MMHG | OXYGEN SATURATION: 95 %

## 2022-04-01 DIAGNOSIS — I50.20 HFREF (HEART FAILURE WITH REDUCED EJECTION FRACTION) (HCC): ICD-10-CM

## 2022-04-01 DIAGNOSIS — Z98.61 PERCUTANEOUS TRANSLUMINAL CORONARY ANGIOPLASTY (PTCA) WITHIN LAST 14 TO 24 MONTHS: ICD-10-CM

## 2022-04-01 DIAGNOSIS — Z01.818 PRE-OP TESTING: Primary | ICD-10-CM

## 2022-04-01 DIAGNOSIS — I25.10 CAD (CORONARY ARTERY DISEASE): ICD-10-CM

## 2022-04-01 DIAGNOSIS — R94.39 ABNORMAL NUCLEAR STRESS TEST: ICD-10-CM

## 2022-04-01 LAB
GLUCOSE BLDC GLUCOMTR-MCNC: 171 MG/DL (ref 70–99)
INR BLD: 1.22 (ref 0.8–1.2)
ISTAT ACTIVATED CLOTTING TIME: 232 SECONDS (ref 125–137)
ISTAT ACTIVATED CLOTTING TIME: 261 SECONDS (ref 125–137)
PROTHROMBIN TIME: 15.6 SECONDS (ref 11.6–14.8)

## 2022-04-01 PROCEDURE — 93454 CORONARY ARTERY ANGIO S&I: CPT

## 2022-04-01 PROCEDURE — 85347 COAGULATION TIME ACTIVATED: CPT

## 2022-04-01 PROCEDURE — 92921 HC PTCA EA ADDL MAJOR CORONARY ARTERY/BRANCH: CPT

## 2022-04-01 PROCEDURE — 92978 ENDOLUMINL IVUS OCT C 1ST: CPT

## 2022-04-01 PROCEDURE — 92933 PRQ TRLML C ATHRC ST ANGIOP1: CPT

## 2022-04-01 PROCEDURE — 4A023N7 MEASUREMENT OF CARDIAC SAMPLING AND PRESSURE, LEFT HEART, PERCUTANEOUS APPROACH: ICD-10-PCS | Performed by: INTERNAL MEDICINE

## 2022-04-01 PROCEDURE — B2151ZZ FLUOROSCOPY OF LEFT HEART USING LOW OSMOLAR CONTRAST: ICD-10-PCS | Performed by: INTERNAL MEDICINE

## 2022-04-01 PROCEDURE — B240ZZ3 ULTRASONOGRAPHY OF SINGLE CORONARY ARTERY, INTRAVASCULAR: ICD-10-PCS | Performed by: INTERNAL MEDICINE

## 2022-04-01 PROCEDURE — B2111ZZ FLUOROSCOPY OF MULTIPLE CORONARY ARTERIES USING LOW OSMOLAR CONTRAST: ICD-10-PCS | Performed by: INTERNAL MEDICINE

## 2022-04-01 PROCEDURE — 85610 PROTHROMBIN TIME: CPT | Performed by: INTERNAL MEDICINE

## 2022-04-01 PROCEDURE — 82962 GLUCOSE BLOOD TEST: CPT

## 2022-04-01 PROCEDURE — 02C03ZZ EXTIRPATION OF MATTER FROM CORONARY ARTERY, ONE ARTERY, PERCUTANEOUS APPROACH: ICD-10-PCS | Performed by: INTERNAL MEDICINE

## 2022-04-01 PROCEDURE — 99152 MOD SED SAME PHYS/QHP 5/>YRS: CPT

## 2022-04-01 PROCEDURE — 99153 MOD SED SAME PHYS/QHP EA: CPT

## 2022-04-01 PROCEDURE — 36415 COLL VENOUS BLD VENIPUNCTURE: CPT

## 2022-04-01 PROCEDURE — 02713EZ DILATION OF CORONARY ARTERY, TWO ARTERIES WITH TWO INTRALUMINAL DEVICES, PERCUTANEOUS APPROACH: ICD-10-PCS | Performed by: INTERNAL MEDICINE

## 2022-04-01 RX ORDER — HYDRALAZINE HYDROCHLORIDE 20 MG/ML
INJECTION INTRAMUSCULAR; INTRAVENOUS
Status: COMPLETED
Start: 2022-04-01 | End: 2022-04-01

## 2022-04-01 RX ORDER — ENALAPRIL MALEATE 10 MG/1
5 TABLET ORAL 2 TIMES DAILY
Qty: 180 TABLET | Refills: 1 | Status: SHIPPED | OUTPATIENT
Start: 2022-04-01

## 2022-04-01 RX ORDER — CARVEDILOL 12.5 MG/1
12.5 TABLET ORAL 2 TIMES DAILY WITH MEALS
Qty: 180 TABLET | Refills: 1 | Status: SHIPPED | OUTPATIENT
Start: 2022-04-01

## 2022-04-01 RX ORDER — ASPIRIN 81 MG/1
TABLET, CHEWABLE ORAL
Status: COMPLETED
Start: 2022-04-01 | End: 2022-04-01

## 2022-04-01 RX ORDER — ATORVASTATIN CALCIUM 40 MG/1
80 TABLET, FILM COATED ORAL NIGHTLY
Qty: 90 TABLET | Refills: 1 | Status: SHIPPED | OUTPATIENT
Start: 2022-04-01

## 2022-04-01 RX ORDER — LIDOCAINE HYDROCHLORIDE 20 MG/ML
INJECTION, SOLUTION EPIDURAL; INFILTRATION; INTRACAUDAL; PERINEURAL
Status: COMPLETED
Start: 2022-04-01 | End: 2022-04-01

## 2022-04-01 RX ORDER — NITROGLYCERIN 20 MG/100ML
INJECTION INTRAVENOUS
Status: COMPLETED
Start: 2022-04-01 | End: 2022-04-01

## 2022-04-01 RX ORDER — HEPARIN SODIUM 1000 [USP'U]/ML
INJECTION, SOLUTION INTRAVENOUS; SUBCUTANEOUS
Status: DISCONTINUED
Start: 2022-04-01 | End: 2022-04-01 | Stop reason: WASHOUT

## 2022-04-01 RX ORDER — CLOPIDOGREL BISULFATE 75 MG/1
75 TABLET ORAL DAILY
Status: DISCONTINUED | OUTPATIENT
Start: 2022-04-02 | End: 2022-04-01

## 2022-04-01 RX ORDER — HEPARIN SODIUM 1000 [USP'U]/ML
INJECTION, SOLUTION INTRAVENOUS; SUBCUTANEOUS
Status: COMPLETED
Start: 2022-04-01 | End: 2022-04-01

## 2022-04-01 RX ORDER — SODIUM CHLORIDE 9 MG/ML
INJECTION, SOLUTION INTRAVENOUS
Status: COMPLETED | OUTPATIENT
Start: 2022-04-01 | End: 2022-04-01

## 2022-04-01 RX ORDER — ASPIRIN 81 MG/1
81 TABLET ORAL DAILY
Status: DISCONTINUED | OUTPATIENT
Start: 2022-04-02 | End: 2022-04-01

## 2022-04-01 RX ORDER — MIDAZOLAM HYDROCHLORIDE 1 MG/ML
INJECTION INTRAMUSCULAR; INTRAVENOUS
Status: COMPLETED
Start: 2022-04-01 | End: 2022-04-01

## 2022-04-01 RX ADMIN — ASPIRIN 324 MG: 81 TABLET, CHEWABLE ORAL at 07:39:00

## 2022-04-01 RX ADMIN — SODIUM CHLORIDE: 9 INJECTION, SOLUTION INTRAVENOUS at 07:15:00

## 2022-04-01 NOTE — IVS NOTE
Patient states he took his plavix this morning 75MG and forgot to take ASA. Patient given ASA in prep (x4). And stopped taking his coumadin 3 days ago.   Bedside blood sugar 171

## 2022-04-01 NOTE — INTERVAL H&P NOTE
Pre-op Diagnosis: * No pre-op diagnosis entered *    The above referenced H&P was reviewed by Amanda Steinebrg MD on 4/1/2022, the patient was examined and no significant changes have occurred in the patient's condition since the H&P was performed. I discussed with the patient and/or legal representative the potential benefits, risks and side effects of this procedure; the likelihood of the patient achieving goals; and potential problems that might occur during recuperation. I discussed reasonable alternatives to the procedure, including risks, benefits and side effects related to the alternatives and risks related to not receiving this procedure. We will proceed with procedure as planned.

## 2022-04-01 NOTE — CARDIAC REHAB
Cardiac Rehab Phase I    Activity:  Distance   Assistance needed   Patient tolerated activity . Education:  Handouts provided and reviewed: [address] Diet: Healthy Cardiac diet reviewed. Disease Process: Disease process reviewed. Reviewed the following: SITE CARE: Reviewed/Femoral      RISK FACTORS: Reviewed      SMOKING CESSATION: Reviewed      HOME EXERCISE ACTIVITY: Reviewed      OUTPATIENT CARDIAC REHAB: Referred to Cardiac Rehabilitation Phase 2.

## 2022-04-01 NOTE — DIETARY NOTE
NUTRITION EDUCATION NOTE    Received consult for nutrition education per cardiac rehab order set. Appropriate education and handout(s) provided. See education section of Epic for specifics.     Karley Sharif RDN, LDN  Clinical Nutrition  Ext [phone]

## 2022-04-01 NOTE — IVS NOTE
Patient alert and awake x 4    VSS, ALVARADO    Instructions given to patient and daughter over the phone, they both stated understanding. Right groin site soft, no hematoma, no drainage seen    Right groin dressing dry, intact. Patient was able to eat, drink, ambulate and void prior to discharge. Pharmacy not called, already on plavix.

## 2022-04-04 RX ORDER — INSULIN LISPRO 100 [IU]/ML
6 INJECTION, SOLUTION INTRAVENOUS; SUBCUTANEOUS
Qty: 10 ML | Refills: 0 | Status: SHIPPED | OUTPATIENT
Start: 2022-04-04

## 2022-04-06 ENCOUNTER — TELEPHONE (OUTPATIENT)
Dept: SURGERY | Facility: CLINIC | Age: 72
End: 2022-04-06

## 2022-04-06 NOTE — TELEPHONE ENCOUNTER
Patient states he has an appointment scheduled tomorrow at 11 am but not on file. States he had a procedure 04/01 and was told to follow up with Dr. Enoc Liao.  Please advise

## 2022-04-06 NOTE — TELEPHONE ENCOUNTER
Spoke with patient. Patient states he has an appointment tomorrow with Maria A Wu. Patient states he had stents placed in the hospital.     Do not see patient has appointment with Maria A Wu scheduled. And do not see notes from a procedure on April 1st with . Asked patient what kind of stents he had. Patient states they are stents in the heart.  Advised patient that he probably has an appointment with his cardiologist.

## 2022-04-20 ENCOUNTER — PATIENT OUTREACH (OUTPATIENT)
Dept: CASE MANAGEMENT | Age: 72
End: 2022-04-20

## 2022-04-22 ENCOUNTER — LAB ENCOUNTER (OUTPATIENT)
Dept: LAB | Age: 72
End: 2022-04-22
Attending: INTERNAL MEDICINE
Payer: MEDICARE

## 2022-04-22 ENCOUNTER — OFFICE VISIT (OUTPATIENT)
Dept: INTERNAL MEDICINE CLINIC | Facility: CLINIC | Age: 72
End: 2022-04-22
Payer: MEDICARE

## 2022-04-22 VITALS
BODY MASS INDEX: 42.36 KG/M2 | HEIGHT: 69 IN | DIASTOLIC BLOOD PRESSURE: 80 MMHG | SYSTOLIC BLOOD PRESSURE: 110 MMHG | OXYGEN SATURATION: 97 % | WEIGHT: 286 LBS | HEART RATE: 76 BPM

## 2022-04-22 DIAGNOSIS — I26.99 PULMONARY EMBOLISM (HCC): ICD-10-CM

## 2022-04-22 DIAGNOSIS — M54.40 CHRONIC LOW BACK PAIN WITH SCIATICA, SCIATICA LATERALITY UNSPECIFIED, UNSPECIFIED BACK PAIN LATERALITY: ICD-10-CM

## 2022-04-22 DIAGNOSIS — E03.8 OTHER SPECIFIED HYPOTHYROIDISM: ICD-10-CM

## 2022-04-22 DIAGNOSIS — Z12.11 SCREENING FOR COLON CANCER: ICD-10-CM

## 2022-04-22 DIAGNOSIS — E11.00 TYPE 2 DIABETES MELLITUS WITH HYPEROSMOLARITY WITHOUT COMA, WITHOUT LONG-TERM CURRENT USE OF INSULIN (HCC): Primary | ICD-10-CM

## 2022-04-22 DIAGNOSIS — E11.00 TYPE 2 DIABETES MELLITUS WITH HYPEROSMOLARITY WITHOUT COMA, WITHOUT LONG-TERM CURRENT USE OF INSULIN (HCC): ICD-10-CM

## 2022-04-22 DIAGNOSIS — Z76.89 ENCOUNTER TO ESTABLISH CARE: ICD-10-CM

## 2022-04-22 DIAGNOSIS — G89.29 CHRONIC LOW BACK PAIN WITH SCIATICA, SCIATICA LATERALITY UNSPECIFIED, UNSPECIFIED BACK PAIN LATERALITY: ICD-10-CM

## 2022-04-22 LAB
CHOLEST SERPL-MCNC: 165 MG/DL (ref ?–200)
EST. AVERAGE GLUCOSE BLD GHB EST-MCNC: 166 MG/DL (ref 68–126)
FASTING PATIENT LIPID ANSWER: YES
HBA1C MFR BLD: 7.4 % (ref ?–5.7)
HDLC SERPL-MCNC: 34 MG/DL (ref 40–59)
INR BLD: 2.02 (ref 0.8–1.2)
LDLC SERPL CALC-MCNC: 105 MG/DL (ref ?–100)
NONHDLC SERPL-MCNC: 131 MG/DL (ref ?–130)
PROTHROMBIN TIME: 23.1 SECONDS (ref 11.6–14.8)
T3FREE SERPL-MCNC: 2.42 PG/ML (ref 2.4–4.2)
T4 FREE SERPL-MCNC: 1.6 NG/DL (ref 0.8–1.7)
TRIGL SERPL-MCNC: 145 MG/DL (ref 30–149)
TSI SER-ACNC: 0.1 MIU/ML (ref 0.36–3.74)
VLDLC SERPL CALC-MCNC: 25 MG/DL (ref 0–30)

## 2022-04-22 PROCEDURE — 1111F DSCHRG MED/CURRENT MED MERGE: CPT | Performed by: INTERNAL MEDICINE

## 2022-04-22 PROCEDURE — 99214 OFFICE O/P EST MOD 30 MIN: CPT | Performed by: INTERNAL MEDICINE

## 2022-04-22 PROCEDURE — 85610 PROTHROMBIN TIME: CPT

## 2022-04-22 PROCEDURE — 80061 LIPID PANEL: CPT

## 2022-04-22 PROCEDURE — 36415 COLL VENOUS BLD VENIPUNCTURE: CPT

## 2022-04-22 PROCEDURE — 84443 ASSAY THYROID STIM HORMONE: CPT

## 2022-04-22 PROCEDURE — 84481 FREE ASSAY (FT-3): CPT

## 2022-04-22 PROCEDURE — 84439 ASSAY OF FREE THYROXINE: CPT

## 2022-04-22 PROCEDURE — 83036 HEMOGLOBIN GLYCOSYLATED A1C: CPT

## 2022-04-22 RX ORDER — DULOXETIN HYDROCHLORIDE 30 MG/1
30 CAPSULE, DELAYED RELEASE ORAL DAILY
COMMUNITY

## 2022-04-25 ENCOUNTER — TELEPHONE (OUTPATIENT)
Dept: INTERNAL MEDICINE CLINIC | Facility: CLINIC | Age: 72
End: 2022-04-25

## 2022-04-25 NOTE — TELEPHONE ENCOUNTER
Called patient to explain he should be taking 125mcg. I went over his labs again. He did speak with Medical assistant today but he forgot what she said. Patient will  RX tomorrow. Called pharmacy to confirm RX levothyroxine 125mcg. Is correct dose. Left message on dedicated VM. Triage support: can you please print and mail Lab order TSH dated 4/24/22 and write on the order the expected date 7/24/22. This is to remind patient. He often forgets. Thank you.

## 2022-04-25 NOTE — TELEPHONE ENCOUNTER
Dr.Elezi Munguia Please see message below. I did call the pharmacy and they stated that pt told them that his dose was supposed to be increased from his current dose that he is currently taken which is 137 mcg. Pt Pharmacist needs to know what is the correct dose pt should be on. Ok to inform pt that he has to be on the 125 mcg this is the correct dose he should be taking.

## 2022-04-25 NOTE — PROGRESS NOTES
Pt informed of lab results and instructions new rx sent to pt pharmacy for levothyroxine 125 mcg daily and will recheck TSH in 6 weeks and a1c in three months.

## 2022-04-26 RX ORDER — ERGOCALCIFEROL 1.25 MG/1
CAPSULE ORAL
Qty: 15 CAPSULE | Refills: 0 | Status: SHIPPED | OUTPATIENT
Start: 2022-04-26

## 2022-04-29 NOTE — TELEPHONE ENCOUNTER
Duplicate request, previously addressed. Outpatient Medication Detail     Disp Refills Start End    metFORMIN 500 MG Oral Tab 180 tablet 0 4/24/2022     Sig - Route:  Take 1 tablet (500 mg total) by mouth 2 (two) times daily with meals. - Oral    Sent to pharmacy as: metFORMIN HCl 500 MG Oral Tablet (Glucophage)    E-Prescribing Status: Receipt confirmed by pharmacy (4/24/2022  1:56 PM CDT)        Order [address], [address] [place] [address], [phone], [phone]

## 2022-05-17 ENCOUNTER — PATIENT OUTREACH (OUTPATIENT)
Dept: CASE MANAGEMENT | Age: 72
End: 2022-05-17

## 2022-05-17 ENCOUNTER — TELEPHONE (OUTPATIENT)
Dept: INTERNAL MEDICINE CLINIC | Facility: CLINIC | Age: 72
End: 2022-05-17

## 2022-05-17 DIAGNOSIS — I10 PRIMARY HYPERTENSION: ICD-10-CM

## 2022-05-17 DIAGNOSIS — Z79.4 TYPE 2 DIABETES MELLITUS WITH HYPERGLYCEMIA, WITH LONG-TERM CURRENT USE OF INSULIN (HCC): ICD-10-CM

## 2022-05-17 DIAGNOSIS — E03.9 HYPOTHYROIDISM, UNSPECIFIED TYPE: ICD-10-CM

## 2022-05-17 DIAGNOSIS — E78.5 HYPERLIPIDEMIA LDL GOAL <70: ICD-10-CM

## 2022-05-17 DIAGNOSIS — I26.99 PE (PULMONARY THROMBOEMBOLISM) (HCC): ICD-10-CM

## 2022-05-17 DIAGNOSIS — E11.65 TYPE 2 DIABETES MELLITUS WITH HYPERGLYCEMIA, WITH LONG-TERM CURRENT USE OF INSULIN (HCC): ICD-10-CM

## 2022-05-17 NOTE — TELEPHONE ENCOUNTER
Spoke to patient for monthly CCM. Patient reports he is taking medications. He is not checking his blood sugars due to not having supplies for that. Also complaining of shortness of breath on exertion even when walking short distance. Please contact patient to advise.     Thank you

## 2022-05-17 NOTE — TELEPHONE ENCOUNTER
Called patient in regards to message below, states \" I already spke to someone a bout 15 minutes ago\"        See TE from [employer]

## 2022-06-15 ENCOUNTER — TELEPHONE (OUTPATIENT)
Dept: INTERNAL MEDICINE CLINIC | Facility: CLINIC | Age: 72
End: 2022-06-15

## 2022-06-15 ENCOUNTER — PATIENT OUTREACH (OUTPATIENT)
Dept: CASE MANAGEMENT | Age: 72
End: 2022-06-15

## 2022-06-15 DIAGNOSIS — I26.99 PE (PULMONARY THROMBOEMBOLISM) (HCC): ICD-10-CM

## 2022-06-15 DIAGNOSIS — Z79.4 TYPE 2 DIABETES MELLITUS WITH HYPERGLYCEMIA, WITH LONG-TERM CURRENT USE OF INSULIN (HCC): ICD-10-CM

## 2022-06-15 DIAGNOSIS — R53.82 CHRONIC FATIGUE: ICD-10-CM

## 2022-06-15 DIAGNOSIS — E11.00 TYPE 2 DIABETES MELLITUS WITH HYPEROSMOLARITY WITHOUT COMA, WITHOUT LONG-TERM CURRENT USE OF INSULIN (HCC): Primary | ICD-10-CM

## 2022-06-15 DIAGNOSIS — I10 PRIMARY HYPERTENSION: ICD-10-CM

## 2022-06-15 DIAGNOSIS — E11.65 TYPE 2 DIABETES MELLITUS WITH HYPERGLYCEMIA, WITH LONG-TERM CURRENT USE OF INSULIN (HCC): ICD-10-CM

## 2022-06-15 NOTE — TELEPHONE ENCOUNTER
Patient states there's no change to his breathing as he's been somewhat short of breath upon exertion for months. Patient denies wanting an office visit. Patient now requesting diabetic supplies to his pharmacy. Attempted to send generic supplies, but would not go through. Please advise.

## 2022-06-15 NOTE — TELEPHONE ENCOUNTER
[employer] is meant to used as a ride for pts whenever pt need it. They have a list of locations where they are only able to transport pt to and from home. This also needs to be on a scheduled basis. If pt has SOB and needs to be evaluated, please have pt call 911.

## 2022-06-15 NOTE — TELEPHONE ENCOUNTER
Fritz Jimenez from [employer] calling stating they received something from the office but is not sure what it is.  States it resembles a prescription but its not a prescription, would like clarification

## 2022-06-15 NOTE — TELEPHONE ENCOUNTER
Dominique Beard RN, supervisor - Would it be possible to waive $10 fee? Transportation to appointment. Please call patient with response. Thank you      RN called patient. Patient's date of birth and full name both confirmed. RN informed patient of provider's message as detailed below. He verbalizes understanding of all information, and agreeable to plan -- including office visit, but needs ride. Asking for a Free transportation to office. Currently in [facility]. [facility]. Advised $10 round trip - he prefers $0.   RN provided number for transportation services. He wrote it down. Denies shortness of breath now. Agrees that if symptoms worsen, he will go to Immediate Care or call 911 to go to ER.

## 2022-06-16 RX ORDER — CLOPIDOGREL BISULFATE 75 MG/1
75 TABLET ORAL DAILY
Qty: 90 TABLET | Refills: 0 | Status: SHIPPED | OUTPATIENT
Start: 2022-06-16

## 2022-06-17 ENCOUNTER — TELEPHONE (OUTPATIENT)
Dept: ENDOCRINOLOGY CLINIC | Facility: CLINIC | Age: 72
End: 2022-06-17

## 2022-06-17 ENCOUNTER — LAB ENCOUNTER (OUTPATIENT)
Dept: LAB | Facility: HOSPITAL | Age: 72
End: 2022-06-17
Attending: INTERNAL MEDICINE
Payer: MEDICARE

## 2022-06-17 DIAGNOSIS — E55.9 VITAMIN D DEFICIENCY: ICD-10-CM

## 2022-06-17 DIAGNOSIS — E03.9 HYPOTHYROIDISM, UNSPECIFIED TYPE: ICD-10-CM

## 2022-06-17 LAB
T4 FREE SERPL-MCNC: 1 NG/DL (ref 0.8–1.7)
TSI SER-ACNC: 2.07 MIU/ML (ref 0.36–3.74)
VIT D+METAB SERPL-MCNC: 47.8 NG/ML (ref 30–100)

## 2022-06-17 PROCEDURE — 36415 COLL VENOUS BLD VENIPUNCTURE: CPT

## 2022-06-17 PROCEDURE — 82306 VITAMIN D 25 HYDROXY: CPT

## 2022-06-17 PROCEDURE — 84443 ASSAY THYROID STIM HORMONE: CPT

## 2022-06-17 PROCEDURE — 84439 ASSAY OF FREE THYROXINE: CPT

## 2022-06-17 NOTE — TELEPHONE ENCOUNTER
[id number] requesting callback to get clarification in regards to diabetic supplies, need quntity and refills.

## 2022-06-17 NOTE — TELEPHONE ENCOUNTER
Pharmacist calling to verify times to check blood sugar and quantity of supplies. Informed 90 day supply and initially informed once per day to check due to patient's insurance. [facility] states the order he received was for twice a day.

## 2022-06-17 NOTE — TELEPHONE ENCOUNTER
Thyroid labs normal --> CPM with LT4  Vit D is normal, can switch to once a month ergocalciferol 50,000 units  Thanks

## 2022-06-20 NOTE — TELEPHONE ENCOUNTER
rn called patient with message below by dr Janna Oswald, pt verbalized understanding will call back at later time to book fu for december

## 2022-06-20 NOTE — TELEPHONE ENCOUNTER
Dr. Janelle Worley -- before calling patient, when would you like vitmain D labs to be repeated after changing regimen to monthly? Also, when should he follow up with you (not stated in the LOV note)  Thank you.

## 2022-07-15 ENCOUNTER — PATIENT OUTREACH (OUTPATIENT)
Dept: CASE MANAGEMENT | Age: 72
End: 2022-07-15

## 2022-07-15 ENCOUNTER — TELEPHONE (OUTPATIENT)
Dept: INTERNAL MEDICINE CLINIC | Facility: CLINIC | Age: 72
End: 2022-07-15

## 2022-07-15 DIAGNOSIS — E11.65 TYPE 2 DIABETES MELLITUS WITH HYPERGLYCEMIA, WITH LONG-TERM CURRENT USE OF INSULIN (HCC): ICD-10-CM

## 2022-07-15 DIAGNOSIS — I26.99 PE (PULMONARY THROMBOEMBOLISM) (HCC): ICD-10-CM

## 2022-07-15 DIAGNOSIS — I10 PRIMARY HYPERTENSION: ICD-10-CM

## 2022-07-15 DIAGNOSIS — Z79.4 TYPE 2 DIABETES MELLITUS WITH HYPERGLYCEMIA, WITH LONG-TERM CURRENT USE OF INSULIN (HCC): ICD-10-CM

## 2022-07-15 DIAGNOSIS — R53.82 CHRONIC FATIGUE: ICD-10-CM

## 2022-07-15 RX ORDER — LEVOTHYROXINE SODIUM 0.12 MG/1
TABLET ORAL
Qty: 90 TABLET | Refills: 0 | OUTPATIENT
Start: 2022-07-15

## 2022-07-15 RX ORDER — LEVOTHYROXINE SODIUM 0.12 MG/1
125 TABLET ORAL
Qty: 90 TABLET | Refills: 1 | Status: SHIPPED | OUTPATIENT
Start: 2022-07-15 | End: 2022-10-24

## 2022-07-15 NOTE — TELEPHONE ENCOUNTER
Spoke with Stephanie at [facility] coumadin clinic. Mr. Enrique Tobar is managed by their clinic for anticoagulation. She will follow up with the patient as he is overdue for an INR check.

## 2022-07-15 NOTE — TELEPHONE ENCOUNTER
Called patient in regards to message below from [facility]     Patient sates he fell recently 3 times , he stays in a hotel ( with his grandson)  and a few days ago  fell over the shoe rack and  has a scratch on his head ;    States he is out  of Warfarin but Janelle  told him to early to fill , has not taking the Warfarin for the past 3 days     States fell out of bed twice last night and does not know why he fell, says he was dreaming and was reaching for something in his dream  and then he fell out    of bed , denies any injury or LOC     Reports if he \" walks far\"  He is a bit short of breath but feels he is ok with short distances, no conversational dyspnea noted during this call     Offered appointment to be evaluated; declines at this time      - any recommendations? ?       Also routing to Coumadin clinic-   Please assist with Warfarin refill

## 2022-07-15 NOTE — TELEPHONE ENCOUNTER
Spoke to patient for monthly CCM. Patient admits he forgot about recent missed ov. Carrillo Herr out of bed twice last night unsure why. Denies loss of consciousness or injuring head. Continues to have breathing issues. Recently has been having headaches and believes its due to his B/P. He has no way of checking it at home doesn't have a machine. Also has not been checking blood sugars. States never received a machine from pharmacy. Feels more fatigued than usual and has been out of his warfarin. Please contact patient to advise.

## 2022-07-15 NOTE — TELEPHONE ENCOUNTER
Patient is not currently enrolled in the [facility] coumadin clinic. Per chart review, I do not see anticoagulation management documented. He was hospitalized 2/3/22 at [facility] for PE- notes state hematologist consult, possible Eliquis failure, recommend lovenox/coumadin for 3-6 months. Followed by Dr. Jeffery Salgado for cardiology. RN left a message at the [facility] anticoagulation clinic to inquire if they are managing this patient.

## 2022-07-15 NOTE — TELEPHONE ENCOUNTER
Per [facility] @ [phone] [facility].

## 2022-07-15 NOTE — TELEPHONE ENCOUNTER
Refill passed per [address] [place] protocol. Requested Prescriptions   Pending Prescriptions Disp Refills    LEVOTHYROXINE 125 MCG Oral Tab [Pharmacy Med Name: LEVOTHYROXINE 0.125MG (125MCG) TAB] 90 tablet 0     Sig: TAKE 1 TABLET(125 MCG) BY MOUTH BEFORE BREAKFAST.         Thyroid Medication Protocol Passed - 7/15/2022  5:22 AM        Passed - TSH in past 12 months        Passed - Last TSH value is normal     Lab Results   Component Value Date    TSH 2.070 06/17/2022                 Passed - In person appointment or virtual visit in the past 12 mos or appointment in next 3 mos       Recent Outpatient Visits              2 months ago Type 2 diabetes mellitus with hyperosmolarity without coma, without long-term current use of insulin [facility])    [address] MD    Office Visit    3 months ago Pre-op testing    [address]    Nurse Only    3 months ago Pre-op testing    [address]    Nurse Only    4 months ago PE (pulmonary thromboembolism) [facility])    Hailey Gurrola Arlo Liming, MD    Office Visit    5 months ago Hypothyroidism, unspecified type    [address] [facility] De Mckeon MD    Office Visit                       Recent Outpatient Visits              2 months ago Type 2 diabetes mellitus with hyperosmolarity without coma, without long-term current use of insulin [facility])    [address] MD    Office Visit    3 months ago Pre-op testing    [address]    Nurse Only    3 months ago Pre-op testing    [facility]    Nurse Only    4 months ago PE (pulmonary thromboembolism) [facility])    Hailey Gurrola Arlo Liming, MD    Office Visit    5 months ago Hypothyroidism, unspecified type    [address] [facility] De Mckeon MD    Office Visit

## 2022-07-15 NOTE — TELEPHONE ENCOUNTER
Left message for Gallito Juarez to go to ER to be evaluated due to falls     Advised to call back with any questions/ concerns

## 2022-07-16 NOTE — TELEPHONE ENCOUNTER
Follow up on ER recommendation. Per patient he is fine, is not going to ER and will let us know if he needs anything else.

## 2022-08-02 ENCOUNTER — APPOINTMENT (OUTPATIENT)
Dept: GENERAL RADIOLOGY | Age: 72
End: 2022-08-02
Attending: EMERGENCY MEDICINE

## 2022-08-02 ENCOUNTER — HOSPITAL ENCOUNTER (OUTPATIENT)
Age: 72
Setting detail: OBSERVATION
LOS: 1 days | Discharge: HOME | End: 2022-08-04
Attending: EMERGENCY MEDICINE | Admitting: INTERNAL MEDICINE

## 2022-08-02 ENCOUNTER — HOSPITAL ENCOUNTER (EMERGENCY)
Dept: CARDIOLOGY | Age: 72
Discharge: HOME | End: 2022-08-02
Attending: EMERGENCY MEDICINE

## 2022-08-02 DIAGNOSIS — I26.94 MULTIPLE SUBSEGMENTAL PULMONARY EMBOLI WITHOUT ACUTE COR PULMONALE (CMD): ICD-10-CM

## 2022-08-02 DIAGNOSIS — R06.00 DYSPNEA, UNSPECIFIED TYPE: ICD-10-CM

## 2022-08-02 DIAGNOSIS — R07.9 CHEST PAIN, UNSPECIFIED TYPE: Primary | ICD-10-CM

## 2022-08-02 DIAGNOSIS — I10 ESSENTIAL HYPERTENSION, BENIGN: Primary | ICD-10-CM

## 2022-08-02 LAB
ALBUMIN SERPL-MCNC: 3.3 G/DL (ref 3.6–5.1)
ALBUMIN/GLOB SERPL: 0.9 {RATIO} (ref 1–2.4)
ALP SERPL-CCNC: 82 UNITS/L (ref 45–117)
ALT SERPL-CCNC: 26 UNITS/L
ANION GAP SERPL CALC-SCNC: 10 MMOL/L (ref 7–19)
AST SERPL-CCNC: 14 UNITS/L
BASOPHILS # BLD: 0.1 K/MCL (ref 0–0.3)
BASOPHILS NFR BLD: 1 %
BILIRUB SERPL-MCNC: 0.2 MG/DL (ref 0.2–1)
BUN SERPL-MCNC: 18 MG/DL (ref 6–20)
BUN/CREAT SERPL: 19 (ref 7–25)
CALCIUM SERPL-MCNC: 9.1 MG/DL (ref 8.4–10.2)
CHLORIDE SERPL-SCNC: 106 MMOL/L (ref 97–110)
CO2 SERPL-SCNC: 24 MMOL/L (ref 21–32)
CREAT SERPL-MCNC: 0.94 MG/DL (ref 0.67–1.17)
D DIMER PPP FEU-MCNC: <0.19 MG/L (FEU)
DEPRECATED RDW RBC: 49 FL (ref 39–50)
EOSINOPHIL # BLD: 0.7 K/MCL (ref 0–0.5)
EOSINOPHIL NFR BLD: 6 %
ERYTHROCYTE [DISTWIDTH] IN BLOOD: 15.9 % (ref 11–15)
ERYTHROCYTE [SEDIMENTATION RATE] IN BLOOD BY WESTERGREN METHOD: 39 MM/HR (ref 0–20)
FASTING DURATION TIME PATIENT: ABNORMAL H
FLUAV RNA RESP QL NAA+PROBE: NOT DETECTED
FLUBV RNA RESP QL NAA+PROBE: NOT DETECTED
GFR SERPLBLD BASED ON 1.73 SQ M-ARVRAT: 87 ML/MIN
GLOBULIN SER-MCNC: 3.6 G/DL (ref 2–4)
GLUCOSE SERPL-MCNC: 188 MG/DL (ref 70–99)
HCT VFR BLD CALC: 37.3 % (ref 39–51)
HGB BLD-MCNC: 11.7 G/DL (ref 13–17)
IMM GRANULOCYTES # BLD AUTO: 0.1 K/MCL (ref 0–0.2)
IMM GRANULOCYTES # BLD: 1 %
INR PPP: 3
LIPASE SERPL-CCNC: 77 UNITS/L (ref 73–393)
LYMPHOCYTES # BLD: 2.3 K/MCL (ref 1–4)
LYMPHOCYTES NFR BLD: 21 %
MCH RBC QN AUTO: 26.7 PG (ref 26–34)
MCHC RBC AUTO-ENTMCNC: 31.4 G/DL (ref 32–36.5)
MCV RBC AUTO: 85.2 FL (ref 78–100)
MONOCYTES # BLD: 0.9 K/MCL (ref 0.3–0.9)
MONOCYTES NFR BLD: 8 %
NEUTROPHILS # BLD: 6.9 K/MCL (ref 1.8–7.7)
NEUTROPHILS NFR BLD: 63 %
NRBC BLD MANUAL-RTO: 0 /100 WBC
NT-PROBNP SERPL-MCNC: 113 PG/ML
PLATELET # BLD AUTO: 301 K/MCL (ref 140–450)
POTASSIUM SERPL-SCNC: 4.1 MMOL/L (ref 3.4–5.1)
PROCALCITONIN SERPL IA-MCNC: <0.05 NG/ML
PROT SERPL-MCNC: 6.9 G/DL (ref 6.4–8.2)
PROTHROMBIN TIME: 30.3 SEC (ref 9.7–11.8)
RBC # BLD: 4.38 MIL/MCL (ref 4.5–5.9)
RSV AG NPH QL IA.RAPID: NOT DETECTED
SARS-COV-2 RNA RESP QL NAA+PROBE: NOT DETECTED
SERVICE CMNT-IMP: NORMAL
SERVICE CMNT-IMP: NORMAL
SODIUM SERPL-SCNC: 136 MMOL/L (ref 135–145)
TROPONIN I SERPL DL<=0.01 NG/ML-MCNC: 141 NG/L
WBC # BLD: 10.9 K/MCL (ref 4.2–11)

## 2022-08-02 PROCEDURE — 71046 X-RAY EXAM CHEST 2 VIEWS: CPT

## 2022-08-02 PROCEDURE — 85652 RBC SED RATE AUTOMATED: CPT | Performed by: EMERGENCY MEDICINE

## 2022-08-02 PROCEDURE — G0378 HOSPITAL OBSERVATION PER HR: HCPCS

## 2022-08-02 PROCEDURE — C9803 HOPD COVID-19 SPEC COLLECT: HCPCS

## 2022-08-02 PROCEDURE — 84484 ASSAY OF TROPONIN QUANT: CPT | Performed by: EMERGENCY MEDICINE

## 2022-08-02 PROCEDURE — 85610 PROTHROMBIN TIME: CPT | Performed by: EMERGENCY MEDICINE

## 2022-08-02 PROCEDURE — 83880 ASSAY OF NATRIURETIC PEPTIDE: CPT | Performed by: EMERGENCY MEDICINE

## 2022-08-02 PROCEDURE — 93010 ELECTROCARDIOGRAM REPORT: CPT | Performed by: INTERNAL MEDICINE

## 2022-08-02 PROCEDURE — 80053 COMPREHEN METABOLIC PANEL: CPT | Performed by: EMERGENCY MEDICINE

## 2022-08-02 PROCEDURE — 99285 EMERGENCY DEPT VISIT HI MDM: CPT

## 2022-08-02 PROCEDURE — 36415 COLL VENOUS BLD VENIPUNCTURE: CPT

## 2022-08-02 PROCEDURE — 84145 PROCALCITONIN (PCT): CPT | Performed by: EMERGENCY MEDICINE

## 2022-08-02 PROCEDURE — 85025 COMPLETE CBC W/AUTO DIFF WBC: CPT | Performed by: EMERGENCY MEDICINE

## 2022-08-02 PROCEDURE — 93005 ELECTROCARDIOGRAM TRACING: CPT | Performed by: EMERGENCY MEDICINE

## 2022-08-02 PROCEDURE — 0241U COVID/FLU/RSV PANEL: CPT | Performed by: EMERGENCY MEDICINE

## 2022-08-02 PROCEDURE — 83690 ASSAY OF LIPASE: CPT | Performed by: EMERGENCY MEDICINE

## 2022-08-02 PROCEDURE — 85379 FIBRIN DEGRADATION QUANT: CPT | Performed by: EMERGENCY MEDICINE

## 2022-08-02 ASSESSMENT — HEART SCORE
EKG: NON SPECIFIC REPOLARIZATION DISTURBANCE
RISK FACTORS: EQUAL OR GREATER  THAN 3 RISK FACTORS OR HISTORY OF ATHEROSCLEROTIC DISEASE
TROPONIN: 1-3X NORMAL LIMIT
HEART SCORE: 7
HISTORY: MODERATELY SUSPICIOUS
AGE: EQUAL OR GREATER THAN 65

## 2022-08-02 ASSESSMENT — ACTIVITIES OF DAILY LIVING (ADL)
ADL_SHORT_OF_BREATH: YES
ADL_BEFORE_ADMISSION: INDEPENDENT
ADL_SCORE: 12
CHRONIC_PAIN_PRESENT: YES, CHRONIC
DESCRIBE HOW PAIN IMPACTS YOUR LIFE: MOBILITY;PHYSICAL ACTIVITY;SOCIAL ACTIVITIES/HOBBIES;RELAXATION/REST/SLEEP;WORK/SCHOOL ABILITY
RECENT_DECLINE_ADL: NO

## 2022-08-02 ASSESSMENT — PATIENT HEALTH QUESTIONNAIRE - PHQ9
1. LITTLE INTEREST OR PLEASURE IN DOING THINGS: SEVERAL DAYS
IS PATIENT ABLE TO COMPLETE PHQ2 OR PHQ9: YES
SUM OF ALL RESPONSES TO PHQ9 QUESTIONS 1 AND 2: 2
SUM OF ALL RESPONSES TO PHQ9 QUESTIONS 1 AND 2: 2
CLINICAL INTERPRETATION OF PHQ2 SCORE: NO FURTHER SCREENING NEEDED
2. FEELING DOWN, DEPRESSED OR HOPELESS: SEVERAL DAYS

## 2022-08-02 ASSESSMENT — COLUMBIA-SUICIDE SEVERITY RATING SCALE - C-SSRS
1. WITHIN THE PAST MONTH, HAVE YOU WISHED YOU WERE DEAD OR WISHED YOU COULD GO TO SLEEP AND NOT WAKE UP?: NO
IS THE PATIENT ABLE TO COMPLETE C-SSRS: YES
2. HAVE YOU ACTUALLY HAD ANY THOUGHTS OF KILLING YOURSELF?: NO
6. HAVE YOU EVER DONE ANYTHING, STARTED TO DO ANYTHING, OR PREPARED TO DO ANYTHING TO END YOUR LIFE?: NO

## 2022-08-02 ASSESSMENT — COGNITIVE AND FUNCTIONAL STATUS - GENERAL
BECAUSE OF A PHYSICAL, MENTAL, OR EMOTIONAL CONDITION, DO YOU HAVE SERIOUS DIFFICULTY CONCENTRATING, REMEMBERING OR MAKING DECISIONS: YES
BECAUSE OF A PHYSICAL, MENTAL, OR EMOTIONAL CONDITION, DO YOU HAVE DIFFICULTY DOING ERRANDS ALONE: YES
DO YOU HAVE DIFFICULTY DRESSING OR BATHING: YES
ARE YOU BLIND OR DO YOU HAVE SERIOUS DIFFICULTY SEEING, EVEN WHEN WEARING GLASSES: NO
DO YOU HAVE SERIOUS DIFFICULTY WALKING OR CLIMBING STAIRS: YES
ARE YOU DEAF OR DO YOU HAVE SERIOUS DIFFICULTY  HEARING: NO

## 2022-08-02 ASSESSMENT — LIFESTYLE VARIABLES
ALCOHOL_USE_STATUS: NO OR LOW RISK WITH VALIDATED TOOL
AUDIT-C TOTAL SCORE: 0
HOW MANY STANDARD DRINKS CONTAINING ALCOHOL DO YOU HAVE ON A TYPICAL DAY: 0,1 OR 2
HOW OFTEN DO YOU HAVE A DRINK CONTAINING ALCOHOL: NEVER
HOW OFTEN DO YOU HAVE 6 OR MORE DRINKS ON ONE OCCASION: NEVER

## 2022-08-03 ENCOUNTER — APPOINTMENT (OUTPATIENT)
Dept: CT IMAGING | Age: 72
End: 2022-08-03
Attending: INTERNAL MEDICINE

## 2022-08-03 LAB
ALBUMIN SERPL-MCNC: 2.8 G/DL (ref 3.6–5.1)
ALBUMIN/GLOB SERPL: 0.9 {RATIO} (ref 1–2.4)
ALP SERPL-CCNC: 70 UNITS/L (ref 45–117)
ALT SERPL-CCNC: 22 UNITS/L
ANION GAP SERPL CALC-SCNC: 8 MMOL/L (ref 7–19)
AST SERPL-CCNC: 12 UNITS/L
ATRIAL RATE (BPM): 71
BASE EXCESS / DEFICIT, ARTERIAL - RESPIRATORY: 1 MMOL/L (ref -2–3)
BDY SITE: ABNORMAL
BILIRUB SERPL-MCNC: 0.3 MG/DL (ref 0.2–1)
BODY TEMPERATURE: 37 DEGREES
BUN SERPL-MCNC: 19 MG/DL (ref 6–20)
BUN/CREAT SERPL: 21 (ref 7–25)
CALCIUM SERPL-MCNC: 8.9 MG/DL (ref 8.4–10.2)
CHLORIDE SERPL-SCNC: 108 MMOL/L (ref 97–110)
CO2 SERPL-SCNC: 26 MMOL/L (ref 21–32)
COHGB MFR BLDV: 0.9 %
CREAT SERPL-MCNC: 0.9 MG/DL (ref 0.67–1.17)
DEPRECATED RDW RBC: 49.3 FL (ref 39–50)
ERYTHROCYTE [DISTWIDTH] IN BLOOD: 15.9 % (ref 11–15)
FASTING DURATION TIME PATIENT: ABNORMAL H
FIO2 ON VENT: 21 %
GFR SERPLBLD BASED ON 1.73 SQ M-ARVRAT: >90 ML/MIN
GLOBULIN SER-MCNC: 3.2 G/DL (ref 2–4)
GLUCOSE BLDC GLUCOMTR-MCNC: 158 MG/DL (ref 70–99)
GLUCOSE BLDC GLUCOMTR-MCNC: 190 MG/DL (ref 70–99)
GLUCOSE BLDC GLUCOMTR-MCNC: 198 MG/DL (ref 70–99)
GLUCOSE BLDC GLUCOMTR-MCNC: 202 MG/DL (ref 70–99)
GLUCOSE BLDC GLUCOMTR-MCNC: 248 MG/DL (ref 70–99)
GLUCOSE SERPL-MCNC: 146 MG/DL (ref 70–99)
HCO3 BLDA-SCNC: 26 MMOL/L (ref 22–28)
HCT VFR BLD CALC: 35 % (ref 39–51)
HCT VFR BLD CALC: 36 % (ref 39–51)
HGB BLD-MCNC: 11.1 G/DL (ref 13–17)
HGB BLD-MCNC: 11.8 G/DL (ref 13–17)
HOROWITZ INDEX BLDA+IHG-RTO: 343 MM[HG] (ref 300–500)
INR PPP: 3.2
MCH RBC QN AUTO: 26.3 PG (ref 26–34)
MCHC RBC AUTO-ENTMCNC: 30.8 G/DL (ref 32–36.5)
MCV RBC AUTO: 85.3 FL (ref 78–100)
METHGB MFR BLDMV: 0 %
NRBC BLD MANUAL-RTO: 0 /100 WBC
OXYHGB MFR BLDA: 91.9 % (ref 94–98)
P AXIS (DEGREES): 46
PCO2 BLDA: 43 MM HG (ref 35–48)
PH BLDA: 7.39 UNITS (ref 7.35–7.45)
PLATELET # BLD AUTO: 265 K/MCL (ref 140–450)
PO2 BLDA: 72 MM HG (ref 83–108)
POTASSIUM SERPL-SCNC: 3.9 MMOL/L (ref 3.4–5.1)
PR-INTERVAL (MSEC): 174
PROT SERPL-MCNC: 6 G/DL (ref 6.4–8.2)
PROTHROMBIN TIME: 31.6 SEC (ref 9.7–11.8)
QRS-INTERVAL (MSEC): 152
QT-INTERVAL (MSEC): 454
QTC: 493
R AXIS (DEGREES): -58
RAINBOW EXTRA TUBES HOLD SPECIMEN: NORMAL
RBC # BLD: 4.22 MIL/MCL (ref 4.5–5.9)
REPORT TEXT: NORMAL
SAO2 % BLDA: 15 % (ref 15–23)
SAO2 % BLDA: 93 % (ref 95–99)
SODIUM SERPL-SCNC: 138 MMOL/L (ref 135–145)
T AXIS (DEGREES): 21
TROPONIN I SERPL DL<=0.01 NG/ML-MCNC: 143 NG/L
VENTRICULAR RATE EKG/MIN (BPM): 71
WBC # BLD: 10.7 K/MCL (ref 4.2–11)

## 2022-08-03 PROCEDURE — 82962 GLUCOSE BLOOD TEST: CPT

## 2022-08-03 PROCEDURE — 85027 COMPLETE CBC AUTOMATED: CPT | Performed by: INTERNAL MEDICINE

## 2022-08-03 PROCEDURE — 80053 COMPREHEN METABOLIC PANEL: CPT | Performed by: INTERNAL MEDICINE

## 2022-08-03 PROCEDURE — 10002803 HB RX 637: Performed by: INTERNAL MEDICINE

## 2022-08-03 PROCEDURE — 85018 HEMOGLOBIN: CPT

## 2022-08-03 PROCEDURE — 36600 WITHDRAWAL OF ARTERIAL BLOOD: CPT

## 2022-08-03 PROCEDURE — 10003585 HB ROOM CHARGE INTERMEDIATE CARE

## 2022-08-03 PROCEDURE — 10004281 HB COUNTER-STAFF TIME PER 15 MIN

## 2022-08-03 PROCEDURE — G0378 HOSPITAL OBSERVATION PER HR: HCPCS

## 2022-08-03 PROCEDURE — 85610 PROTHROMBIN TIME: CPT | Performed by: INTERNAL MEDICINE

## 2022-08-03 PROCEDURE — 84484 ASSAY OF TROPONIN QUANT: CPT | Performed by: SPECIALIST

## 2022-08-03 PROCEDURE — 83050 HGB METHEMOGLOBIN QUAN: CPT

## 2022-08-03 PROCEDURE — G1004 CDSM NDSC: HCPCS

## 2022-08-03 PROCEDURE — 10002800 HB RX 250 W HCPCS: Performed by: INTERNAL MEDICINE

## 2022-08-03 PROCEDURE — 99222 1ST HOSP IP/OBS MODERATE 55: CPT | Performed by: SPECIALIST

## 2022-08-03 PROCEDURE — 36415 COLL VENOUS BLD VENIPUNCTURE: CPT | Performed by: INTERNAL MEDICINE

## 2022-08-03 PROCEDURE — 71250 CT THORAX DX C-: CPT

## 2022-08-03 RX ORDER — ATORVASTATIN CALCIUM 40 MG/1
40 TABLET, FILM COATED ORAL DAILY
Status: DISCONTINUED | OUTPATIENT
Start: 2022-08-03 | End: 2022-08-04 | Stop reason: HOSPADM

## 2022-08-03 RX ORDER — OXYCODONE AND ACETAMINOPHEN 10; 325 MG/1; MG/1
1 TABLET ORAL EVERY 8 HOURS PRN
Status: DISCONTINUED | OUTPATIENT
Start: 2022-08-03 | End: 2022-08-04 | Stop reason: HOSPADM

## 2022-08-03 RX ORDER — ALPRAZOLAM 0.25 MG/1
0.5 TABLET ORAL EVERY 12 HOURS PRN
Status: DISCONTINUED | OUTPATIENT
Start: 2022-08-03 | End: 2022-08-04 | Stop reason: HOSPADM

## 2022-08-03 RX ORDER — DIAZEPAM 5 MG/1
5 TABLET ORAL EVERY 8 HOURS PRN
Status: DISCONTINUED | OUTPATIENT
Start: 2022-08-03 | End: 2022-08-04 | Stop reason: HOSPADM

## 2022-08-03 RX ORDER — TIMOLOL MALEATE 5 MG/ML
1 SOLUTION/ DROPS OPHTHALMIC 2 TIMES DAILY
Status: DISCONTINUED | OUTPATIENT
Start: 2022-08-03 | End: 2022-08-04 | Stop reason: HOSPADM

## 2022-08-03 RX ORDER — PANTOPRAZOLE SODIUM 40 MG/1
40 TABLET, DELAYED RELEASE ORAL
Status: DISCONTINUED | OUTPATIENT
Start: 2022-08-03 | End: 2022-08-04 | Stop reason: HOSPADM

## 2022-08-03 RX ORDER — NICOTINE POLACRILEX 4 MG
15 LOZENGE BUCCAL PRN
Status: DISCONTINUED | OUTPATIENT
Start: 2022-08-03 | End: 2022-08-04 | Stop reason: HOSPADM

## 2022-08-03 RX ORDER — PREGABALIN 100 MG/1
300 CAPSULE ORAL 2 TIMES DAILY
Status: DISCONTINUED | OUTPATIENT
Start: 2022-08-03 | End: 2022-08-04 | Stop reason: HOSPADM

## 2022-08-03 RX ORDER — INSULIN LISPRO 100 [IU]/ML
14 INJECTION, SOLUTION INTRAVENOUS; SUBCUTANEOUS
Status: DISCONTINUED | OUTPATIENT
Start: 2022-08-03 | End: 2022-08-04 | Stop reason: HOSPADM

## 2022-08-03 RX ORDER — ALBUTEROL SULFATE 2.5 MG/3ML
2.5 SOLUTION RESPIRATORY (INHALATION)
Status: DISCONTINUED | OUTPATIENT
Start: 2022-08-03 | End: 2022-08-04 | Stop reason: HOSPADM

## 2022-08-03 RX ORDER — CARVEDILOL 12.5 MG/1
12.5 TABLET ORAL EVERY 12 HOURS SCHEDULED
Status: DISCONTINUED | OUTPATIENT
Start: 2022-08-03 | End: 2022-08-04 | Stop reason: HOSPADM

## 2022-08-03 RX ORDER — LATANOPROST 50 UG/ML
1 SOLUTION/ DROPS OPHTHALMIC NIGHTLY
Status: DISCONTINUED | OUTPATIENT
Start: 2022-08-03 | End: 2022-08-04 | Stop reason: HOSPADM

## 2022-08-03 RX ORDER — DULOXETIN HYDROCHLORIDE 20 MG/1
40 CAPSULE, DELAYED RELEASE ORAL DAILY
Status: DISCONTINUED | OUTPATIENT
Start: 2022-08-03 | End: 2022-08-04 | Stop reason: HOSPADM

## 2022-08-03 RX ORDER — QUETIAPINE FUMARATE 25 MG/1
12.5 TABLET, FILM COATED ORAL EVERY 6 HOURS PRN
Status: DISCONTINUED | OUTPATIENT
Start: 2022-08-03 | End: 2022-08-04 | Stop reason: HOSPADM

## 2022-08-03 RX ORDER — INSULIN LISPRO 100 [IU]/ML
6 INJECTION, SOLUTION INTRAVENOUS; SUBCUTANEOUS
Status: DISCONTINUED | OUTPATIENT
Start: 2022-08-03 | End: 2022-08-04 | Stop reason: HOSPADM

## 2022-08-03 RX ORDER — WARFARIN SODIUM 3 MG/1
6 TABLET ORAL EVERY EVENING
Status: DISCONTINUED | OUTPATIENT
Start: 2022-08-03 | End: 2022-08-03 | Stop reason: SDUPTHER

## 2022-08-03 RX ORDER — LISINOPRIL 5 MG/1
5 TABLET ORAL DAILY
Status: DISCONTINUED | OUTPATIENT
Start: 2022-08-03 | End: 2022-08-04 | Stop reason: HOSPADM

## 2022-08-03 RX ORDER — PREGABALIN 100 MG/1
300 CAPSULE ORAL 2 TIMES DAILY
Status: DISCONTINUED | OUTPATIENT
Start: 2022-08-03 | End: 2022-08-03

## 2022-08-03 RX ORDER — CARVEDILOL 12.5 MG/1
12.5 TABLET ORAL 2 TIMES DAILY WITH MEALS
Status: ON HOLD | COMMUNITY
End: 2023-02-02 | Stop reason: HOSPADM

## 2022-08-03 RX ORDER — DEXTROSE MONOHYDRATE 25 G/50ML
12.5 INJECTION, SOLUTION INTRAVENOUS PRN
Status: DISCONTINUED | OUTPATIENT
Start: 2022-08-03 | End: 2022-08-04 | Stop reason: HOSPADM

## 2022-08-03 RX ORDER — ACETAMINOPHEN 325 MG/1
650 TABLET ORAL EVERY 6 HOURS PRN
Status: DISCONTINUED | OUTPATIENT
Start: 2022-08-03 | End: 2022-08-04 | Stop reason: HOSPADM

## 2022-08-03 RX ORDER — CLOPIDOGREL BISULFATE 75 MG/1
75 TABLET ORAL DAILY
Status: DISCONTINUED | OUTPATIENT
Start: 2022-08-03 | End: 2022-08-04 | Stop reason: HOSPADM

## 2022-08-03 RX ORDER — DEXTROSE MONOHYDRATE 25 G/50ML
25 INJECTION, SOLUTION INTRAVENOUS PRN
Status: DISCONTINUED | OUTPATIENT
Start: 2022-08-03 | End: 2022-08-04 | Stop reason: HOSPADM

## 2022-08-03 RX ORDER — METOPROLOL SUCCINATE 50 MG/1
50 TABLET, EXTENDED RELEASE ORAL DAILY
Status: DISCONTINUED | OUTPATIENT
Start: 2022-08-03 | End: 2022-08-03 | Stop reason: ALTCHOICE

## 2022-08-03 RX ORDER — ALBUTEROL SULFATE 90 UG/1
2 AEROSOL, METERED RESPIRATORY (INHALATION) EVERY 6 HOURS PRN
Status: DISCONTINUED | OUTPATIENT
Start: 2022-08-03 | End: 2022-08-03 | Stop reason: CLARIF

## 2022-08-03 RX ORDER — ERGOCALCIFEROL 1.25 MG/1
1.25 CAPSULE ORAL
Status: ON HOLD | COMMUNITY
End: 2022-08-04 | Stop reason: HOSPADM

## 2022-08-03 RX ORDER — NICOTINE POLACRILEX 4 MG
30 LOZENGE BUCCAL PRN
Status: DISCONTINUED | OUTPATIENT
Start: 2022-08-03 | End: 2022-08-04 | Stop reason: HOSPADM

## 2022-08-03 RX ADMIN — CARVEDILOL 12.5 MG: 12.5 TABLET, FILM COATED ORAL at 10:27

## 2022-08-03 RX ADMIN — PANTOPRAZOLE SODIUM 40 MG: 40 TABLET, DELAYED RELEASE ORAL at 05:32

## 2022-08-03 RX ADMIN — LATANOPROST 1 DROP: 50 SOLUTION OPHTHALMIC at 20:15

## 2022-08-03 RX ADMIN — ATORVASTATIN CALCIUM 40 MG: 40 TABLET, FILM COATED ORAL at 10:10

## 2022-08-03 RX ADMIN — DIAZEPAM 5 MG: 5 TABLET ORAL at 10:10

## 2022-08-03 RX ADMIN — INSULIN LISPRO 2 UNITS: 100 INJECTION, SOLUTION INTRAVENOUS; SUBCUTANEOUS at 12:19

## 2022-08-03 RX ADMIN — LATANOPROST 1 DROP: 50 SOLUTION OPHTHALMIC at 01:28

## 2022-08-03 RX ADMIN — LEVOTHYROXINE SODIUM 125 MCG: 0.03 TABLET ORAL at 10:11

## 2022-08-03 RX ADMIN — LISINOPRIL 5 MG: 5 TABLET ORAL at 10:11

## 2022-08-03 RX ADMIN — DIAZEPAM 5 MG: 5 TABLET ORAL at 22:33

## 2022-08-03 RX ADMIN — OXYCODONE HYDROCHLORIDE AND ACETAMINOPHEN 1 TABLET: 10; 325 TABLET ORAL at 11:13

## 2022-08-03 RX ADMIN — PREGABALIN 300 MG: 100 CAPSULE ORAL at 20:14

## 2022-08-03 RX ADMIN — DULOXETINE HYDROCHLORIDE 40 MG: 20 CAPSULE, DELAYED RELEASE ORAL at 10:10

## 2022-08-03 RX ADMIN — CLOPIDOGREL BISULFATE 75 MG: 75 TABLET, FILM COATED ORAL at 10:11

## 2022-08-03 RX ADMIN — PREGABALIN 300 MG: 100 CAPSULE ORAL at 02:25

## 2022-08-03 RX ADMIN — OXYCODONE HYDROCHLORIDE AND ACETAMINOPHEN 1 TABLET: 10; 325 TABLET ORAL at 20:14

## 2022-08-03 RX ADMIN — INSULIN LISPRO 2 UNITS: 100 INJECTION, SOLUTION INTRAVENOUS; SUBCUTANEOUS at 18:33

## 2022-08-03 RX ADMIN — DIAZEPAM 5 MG: 5 TABLET ORAL at 01:20

## 2022-08-03 RX ADMIN — PREGABALIN 300 MG: 100 CAPSULE ORAL at 10:11

## 2022-08-03 RX ADMIN — TIMOLOL MALEATE 1 DROP: 5 SOLUTION/ DROPS OPHTHALMIC at 10:33

## 2022-08-03 RX ADMIN — CARVEDILOL 12.5 MG: 12.5 TABLET, FILM COATED ORAL at 20:14

## 2022-08-03 ASSESSMENT — PAIN SCALES - GENERAL
PAINLEVEL_OUTOF10: 4
PAINLEVEL_OUTOF10: 8
PAINLEVEL_OUTOF10: 7
PAINLEVEL_OUTOF10: 0
PAINLEVEL_OUTOF10: 8

## 2022-08-04 VITALS
SYSTOLIC BLOOD PRESSURE: 155 MMHG | WEIGHT: 292.55 LBS | RESPIRATION RATE: 18 BRPM | HEIGHT: 69 IN | BODY MASS INDEX: 43.33 KG/M2 | DIASTOLIC BLOOD PRESSURE: 76 MMHG | TEMPERATURE: 97.5 F | OXYGEN SATURATION: 94 % | HEART RATE: 69 BPM

## 2022-08-04 LAB
GLUCOSE BLDC GLUCOMTR-MCNC: 132 MG/DL (ref 70–99)
GLUCOSE BLDC GLUCOMTR-MCNC: 152 MG/DL (ref 70–99)
INR PPP: 2
PROTHROMBIN TIME: 20.2 SEC (ref 9.7–11.8)

## 2022-08-04 PROCEDURE — G0378 HOSPITAL OBSERVATION PER HR: HCPCS

## 2022-08-04 PROCEDURE — 94640 AIRWAY INHALATION TREATMENT: CPT

## 2022-08-04 PROCEDURE — 85610 PROTHROMBIN TIME: CPT | Performed by: INTERNAL MEDICINE

## 2022-08-04 PROCEDURE — 10002803 HB RX 637: Performed by: INTERNAL MEDICINE

## 2022-08-04 PROCEDURE — 99225 SUBSEQUENT OBSERVATION CARE LEVEL II: CPT | Performed by: SPECIALIST

## 2022-08-04 PROCEDURE — 36415 COLL VENOUS BLD VENIPUNCTURE: CPT | Performed by: INTERNAL MEDICINE

## 2022-08-04 PROCEDURE — 10004281 HB COUNTER-STAFF TIME PER 15 MIN

## 2022-08-04 PROCEDURE — 10002800 HB RX 250 W HCPCS: Performed by: INTERNAL MEDICINE

## 2022-08-04 PROCEDURE — C9399 UNCLASSIFIED DRUGS OR BIOLOG: HCPCS | Performed by: INTERNAL MEDICINE

## 2022-08-04 RX ORDER — WARFARIN SODIUM 5 MG/1
5 TABLET ORAL ONCE
Status: DISCONTINUED | OUTPATIENT
Start: 2022-08-04 | End: 2022-08-04 | Stop reason: HOSPADM

## 2022-08-04 RX ORDER — PREGABALIN 300 MG/1
300 CAPSULE ORAL 2 TIMES DAILY
Qty: 30 CAPSULE | Refills: 0 | Status: SHIPPED | OUTPATIENT
Start: 2022-08-04 | End: 2023-01-30

## 2022-08-04 RX ORDER — ALPRAZOLAM 0.5 MG/1
0.5 TABLET ORAL EVERY 12 HOURS PRN
Qty: 4 TABLET | Refills: 0 | Status: SHIPPED | OUTPATIENT
Start: 2022-08-04 | End: 2023-01-30

## 2022-08-04 RX ORDER — OXYCODONE AND ACETAMINOPHEN 10; 325 MG/1; MG/1
1 TABLET ORAL EVERY 8 HOURS PRN
Qty: 7 TABLET | Refills: 0 | Status: SHIPPED | OUTPATIENT
Start: 2022-08-04

## 2022-08-04 RX ORDER — QUETIAPINE FUMARATE 25 MG/1
12.5 TABLET, FILM COATED ORAL EVERY 6 HOURS PRN
Qty: 30 TABLET | Refills: 0 | Status: SHIPPED | OUTPATIENT
Start: 2022-08-04 | End: 2023-01-30

## 2022-08-04 RX ADMIN — INSULIN LISPRO 2 UNITS: 100 INJECTION, SOLUTION INTRAVENOUS; SUBCUTANEOUS at 09:01

## 2022-08-04 RX ADMIN — FLUTICASONE FUROATE, UMECLIDINIUM BROMIDE AND VILANTEROL TRIFENATATE 1 PUFF: 200; 62.5; 25 POWDER RESPIRATORY (INHALATION) at 08:43

## 2022-08-04 RX ADMIN — TIMOLOL MALEATE 1 DROP: 5 SOLUTION/ DROPS OPHTHALMIC at 09:06

## 2022-08-04 RX ADMIN — LEVOTHYROXINE SODIUM 125 MCG: 0.03 TABLET ORAL at 08:55

## 2022-08-04 RX ADMIN — OXYCODONE HYDROCHLORIDE AND ACETAMINOPHEN 1 TABLET: 10; 325 TABLET ORAL at 05:23

## 2022-08-04 RX ADMIN — LISINOPRIL 5 MG: 5 TABLET ORAL at 08:52

## 2022-08-04 RX ADMIN — CARVEDILOL 12.5 MG: 12.5 TABLET, FILM COATED ORAL at 08:52

## 2022-08-04 RX ADMIN — PREGABALIN 300 MG: 100 CAPSULE ORAL at 08:52

## 2022-08-04 RX ADMIN — DIAZEPAM 5 MG: 5 TABLET ORAL at 08:53

## 2022-08-04 RX ADMIN — CLOPIDOGREL BISULFATE 75 MG: 75 TABLET, FILM COATED ORAL at 08:53

## 2022-08-04 RX ADMIN — ATORVASTATIN CALCIUM 40 MG: 40 TABLET, FILM COATED ORAL at 08:52

## 2022-08-04 RX ADMIN — OXYCODONE HYDROCHLORIDE AND ACETAMINOPHEN 1 TABLET: 10; 325 TABLET ORAL at 13:58

## 2022-08-04 ASSESSMENT — PAIN SCALES - GENERAL
PAINLEVEL_OUTOF10: 7
PAINLEVEL_OUTOF10: 8
PAINLEVEL_OUTOF10: 0

## 2022-08-10 ENCOUNTER — PATIENT OUTREACH (OUTPATIENT)
Dept: CASE MANAGEMENT | Age: 72
End: 2022-08-10

## 2022-08-10 DIAGNOSIS — I26.99 PE (PULMONARY THROMBOEMBOLISM) (HCC): ICD-10-CM

## 2022-08-10 DIAGNOSIS — I10 PRIMARY HYPERTENSION: ICD-10-CM

## 2022-08-10 DIAGNOSIS — E11.65 TYPE 2 DIABETES MELLITUS WITH HYPERGLYCEMIA, WITH LONG-TERM CURRENT USE OF INSULIN (HCC): ICD-10-CM

## 2022-08-10 DIAGNOSIS — Z79.4 TYPE 2 DIABETES MELLITUS WITH HYPERGLYCEMIA, WITH LONG-TERM CURRENT USE OF INSULIN (HCC): ICD-10-CM

## 2022-08-10 DIAGNOSIS — I25.10 CORONARY ARTERY DISEASE INVOLVING NATIVE CORONARY ARTERY WITHOUT ANGINA PECTORIS, UNSPECIFIED WHETHER NATIVE OR TRANSPLANTED HEART: ICD-10-CM

## 2022-08-10 RX ORDER — ALPRAZOLAM 0.5 MG/1
1 TABLET ORAL EVERY 12 HOURS PRN
COMMUNITY
Start: 2022-08-04 | End: 2022-09-03

## 2022-08-10 RX ORDER — ATORVASTATIN CALCIUM 80 MG/1
80 TABLET, FILM COATED ORAL DAILY
COMMUNITY
Start: 2022-06-16

## 2022-08-11 ENCOUNTER — TELEPHONE (OUTPATIENT)
Dept: INTERNAL MEDICINE CLINIC | Facility: CLINIC | Age: 72
End: 2022-08-11

## 2022-08-31 ENCOUNTER — TELEPHONE (OUTPATIENT)
Dept: INTERNAL MEDICINE CLINIC | Facility: CLINIC | Age: 72
End: 2022-08-31

## 2022-09-12 ENCOUNTER — HOSPITAL ENCOUNTER (EMERGENCY)
Facility: HOSPITAL | Age: 72
Discharge: HOME | End: 2022-09-12
Attending: EMERGENCY MEDICINE
Payer: MEDICARE

## 2022-09-12 ENCOUNTER — PATIENT OUTREACH (OUTPATIENT)
Dept: CASE MANAGEMENT | Age: 72
End: 2022-09-12

## 2022-09-12 ENCOUNTER — NURSE TRIAGE (OUTPATIENT)
Dept: INTERNAL MEDICINE CLINIC | Facility: CLINIC | Age: 72
End: 2022-09-12

## 2022-09-12 ENCOUNTER — TELEPHONE (OUTPATIENT)
Dept: INTERNAL MEDICINE CLINIC | Facility: CLINIC | Age: 72
End: 2022-09-12

## 2022-09-12 ENCOUNTER — APPOINTMENT (OUTPATIENT)
Dept: GENERAL RADIOLOGY | Facility: HOSPITAL | Age: 72
End: 2022-09-12
Attending: EMERGENCY MEDICINE
Payer: MEDICARE

## 2022-09-12 VITALS
TEMPERATURE: 99 F | SYSTOLIC BLOOD PRESSURE: 148 MMHG | BODY MASS INDEX: 42 KG/M2 | WEIGHT: 285.94 LBS | HEART RATE: 80 BPM | RESPIRATION RATE: 18 BRPM | DIASTOLIC BLOOD PRESSURE: 94 MMHG | OXYGEN SATURATION: 95 %

## 2022-09-12 DIAGNOSIS — Z79.4 TYPE 2 DIABETES MELLITUS WITH HYPERGLYCEMIA, WITH LONG-TERM CURRENT USE OF INSULIN (HCC): ICD-10-CM

## 2022-09-12 DIAGNOSIS — I10 PRIMARY HYPERTENSION: ICD-10-CM

## 2022-09-12 DIAGNOSIS — G62.9 PERIPHERAL POLYNEUROPATHY: ICD-10-CM

## 2022-09-12 DIAGNOSIS — S39.012A STRAIN OF LUMBAR REGION, INITIAL ENCOUNTER: Primary | ICD-10-CM

## 2022-09-12 DIAGNOSIS — M48.062 LUMBAR STENOSIS WITH NEUROGENIC CLAUDICATION: ICD-10-CM

## 2022-09-12 DIAGNOSIS — I26.99 PE (PULMONARY THROMBOEMBOLISM) (HCC): ICD-10-CM

## 2022-09-12 DIAGNOSIS — E11.65 TYPE 2 DIABETES MELLITUS WITH HYPERGLYCEMIA, WITH LONG-TERM CURRENT USE OF INSULIN (HCC): ICD-10-CM

## 2022-09-12 PROCEDURE — 99283 EMERGENCY DEPT VISIT LOW MDM: CPT

## 2022-09-12 PROCEDURE — 72100 X-RAY EXAM L-S SPINE 2/3 VWS: CPT | Performed by: EMERGENCY MEDICINE

## 2022-09-12 PROCEDURE — 96372 THER/PROPH/DIAG INJ SC/IM: CPT

## 2022-09-12 RX ORDER — ACETAMINOPHEN 325 MG/1
650 TABLET ORAL ONCE
Status: COMPLETED | OUTPATIENT
Start: 2022-09-12 | End: 2022-09-12

## 2022-09-12 RX ORDER — TRAMADOL HYDROCHLORIDE 50 MG/1
50 TABLET ORAL EVERY 6 HOURS PRN
Qty: 16 TABLET | Refills: 0 | Status: SHIPPED | OUTPATIENT
Start: 2022-09-12 | End: 2022-09-19

## 2022-09-12 RX ORDER — CYCLOBENZAPRINE HCL 10 MG
10 TABLET ORAL ONCE
Status: COMPLETED | OUTPATIENT
Start: 2022-09-12 | End: 2022-09-12

## 2022-09-12 RX ORDER — FLUTICASONE FUROATE, UMECLIDINIUM BROMIDE AND VILANTEROL TRIFENATATE 200; 62.5; 25 UG/1; UG/1; UG/1
POWDER RESPIRATORY (INHALATION)
COMMUNITY
Start: 2022-09-04

## 2022-09-12 RX ORDER — CYCLOBENZAPRINE HCL 10 MG
10 TABLET ORAL 3 TIMES DAILY PRN
Qty: 20 TABLET | Refills: 0 | Status: SHIPPED | OUTPATIENT
Start: 2022-09-12 | End: 2022-09-19

## 2022-09-12 RX ORDER — MORPHINE SULFATE 4 MG/ML
6 INJECTION, SOLUTION INTRAMUSCULAR; INTRAVENOUS ONCE
Status: COMPLETED | OUTPATIENT
Start: 2022-09-12 | End: 2022-09-12

## 2022-09-12 NOTE — TELEPHONE ENCOUNTER
Spoke to patient for monthly CCM. Patient complaining of low back pain. States it started 3 days ago. Having trouble ambulating. Patient aware due for a visit but has transportation issues.  Numerous resources given for transport in the past. ([facility] and social security has given him info)    Please contact patient to advise on back    Thank you

## 2022-09-12 NOTE — ED INITIAL ASSESSMENT (HPI)
Pt to ED via EMS s/p mechanical fall at home 2 days ago. Pt denies hitting head or LOC. Pt on Warfarin, asa, and plavix. Pt c/o lower back pain and left leg pain. Pt states chronic neuropathy pain to left foot. Pt states he has taken all the Lyrica prescribed to him for his left foot pain. Pt is alert and oriented x4. Pt states lives at home with grandson. No respiratory distress noted.

## 2022-09-12 NOTE — TELEPHONE ENCOUNTER
[facility] states she contacted pt to update address and pt had advised he can't go to ER until his daughter gets home to  15year old grandson. Spoke with pt who states he doesn't want to bring his 15year old to the ER with him. Pt states he will wait until his daughter will come pick his grandson up in a couple hours, then he will go to ER.

## 2022-09-12 NOTE — TELEPHONE ENCOUNTER
Call center - Please assist with updated address    RN spoke with patient via 9/12 Telephone Encounter  He says he moved 4 days ago.        New address:   [place], [address]

## 2022-09-13 NOTE — ED QUICK NOTES
Pt cleared by MD for discharge. Belongings with patient. Patient instructions reviewed including when and how to follow up with healthcare and when to seek for medical treatment. Medication use and prescription reviewed. Patient called an uber himself. Pt assisted out to the [facility].

## 2022-10-03 ENCOUNTER — NURSE TRIAGE (OUTPATIENT)
Dept: INTERNAL MEDICINE CLINIC | Facility: CLINIC | Age: 72
End: 2022-10-03

## 2022-10-03 ENCOUNTER — VIRTUAL PHONE E/M (OUTPATIENT)
Dept: INTERNAL MEDICINE CLINIC | Facility: CLINIC | Age: 72
End: 2022-10-03
Payer: MEDICARE

## 2022-10-03 DIAGNOSIS — J44.1 CHRONIC OBSTRUCTIVE PULMONARY DISEASE WITH ACUTE EXACERBATION (HCC): Primary | ICD-10-CM

## 2022-10-03 DIAGNOSIS — E11.65 TYPE 2 DIABETES MELLITUS WITH HYPERGLYCEMIA, WITH LONG-TERM CURRENT USE OF INSULIN (HCC): ICD-10-CM

## 2022-10-03 DIAGNOSIS — I25.10 CORONARY ARTERY DISEASE INVOLVING NATIVE CORONARY ARTERY WITHOUT ANGINA PECTORIS, UNSPECIFIED WHETHER NATIVE OR TRANSPLANTED HEART: ICD-10-CM

## 2022-10-03 DIAGNOSIS — Z79.4 TYPE 2 DIABETES MELLITUS WITH HYPERGLYCEMIA, WITH LONG-TERM CURRENT USE OF INSULIN (HCC): ICD-10-CM

## 2022-10-03 DIAGNOSIS — E78.00 PURE HYPERCHOLESTEROLEMIA: ICD-10-CM

## 2022-10-03 DIAGNOSIS — I10 PRIMARY HYPERTENSION: ICD-10-CM

## 2022-10-03 PROBLEM — K21.9 GASTRO-ESOPHAGEAL REFLUX DISEASE WITHOUT ESOPHAGITIS: Status: ACTIVE | Noted: 2022-01-22

## 2022-10-03 PROBLEM — R26.81 UNSTEADINESS ON FEET: Status: ACTIVE | Noted: 2022-01-24

## 2022-10-03 PROBLEM — F33.9 MAJOR DEPRESSIVE DISORDER, RECURRENT, UNSPECIFIED (HCC): Status: ACTIVE | Noted: 2022-01-22

## 2022-10-03 PROBLEM — J44.9 CHRONIC OBSTRUCTIVE PULMONARY DISEASE, UNSPECIFIED (HCC): Status: ACTIVE | Noted: 2022-01-22

## 2022-10-03 PROBLEM — F33.9 MAJOR DEPRESSIVE DISORDER, RECURRENT, UNSPECIFIED: Status: ACTIVE | Noted: 2022-01-22

## 2022-10-03 PROBLEM — R26.89 OTHER ABNORMALITIES OF GAIT AND MOBILITY: Status: ACTIVE | Noted: 2022-01-24

## 2022-10-03 PROBLEM — F41.9 ANXIETY DISORDER, UNSPECIFIED: Status: ACTIVE | Noted: 2022-01-22

## 2022-10-03 RX ORDER — AMOXICILLIN AND CLAVULANATE POTASSIUM 875; 125 MG/1; MG/1
1 TABLET, FILM COATED ORAL 2 TIMES DAILY
Qty: 20 TABLET | Refills: 0 | Status: SHIPPED | OUTPATIENT
Start: 2022-10-03 | End: 2022-10-13

## 2022-10-03 RX ORDER — CARVEDILOL 12.5 MG/1
12.5 TABLET ORAL 2 TIMES DAILY WITH MEALS
Qty: 180 TABLET | Refills: 1 | OUTPATIENT
Start: 2022-10-03

## 2022-10-03 RX ORDER — FLUTICASONE FUROATE, UMECLIDINIUM BROMIDE AND VILANTEROL TRIFENATATE 200; 62.5; 25 UG/1; UG/1; UG/1
1 POWDER RESPIRATORY (INHALATION) DAILY
Qty: 1 EACH | Refills: 0 | Status: SHIPPED | OUTPATIENT
Start: 2022-10-03

## 2022-10-03 RX ORDER — ALBUTEROL SULFATE 90 UG/1
2 AEROSOL, METERED RESPIRATORY (INHALATION) EVERY 4 HOURS PRN
Qty: 6.7 G | Refills: 0 | Status: SHIPPED | OUTPATIENT
Start: 2022-10-03

## 2022-10-03 NOTE — PATIENT INSTRUCTIONS
Diagnoses and all orders for this visit:    Primary hypertension  Careful with diet and excercise at least 30 minutes 3-4 times a week. Check blood pressures at different times on different days. Can purchase own blood pressure monitor. If not, check at local pharmacy. Bake foods more and grill occasionally. Avoid fried foods. No salt. Use other seasonings. Type 2 diabetes mellitus with hyperglycemia, with long-term current use of insulin (HCC)  Careful with diet and excercise at least 30 minutes 3-4 times a week. Check sugars at different times on different dates. Careful with low sugars. Carry something with you and check sugar if can. Can carry michela cracker, etc. Decrease carbohydrates. But also, careful with fruits and natural sugars. One serving a day and no more than 1 handful every day. Check feet  every AM and careful with sores and ulcers on feet bilaterally. Check eyes every year with dilated eye exam.  Check sugars. 2-hour postmeal should be less than 140s. Pre-meal should be 's. Both equally affected A1c.   Discussed importance of glycemic control to prevent complications of diabetes  -Discussed complications of diabetes include retinopathy, neuropathy, nephropathy and cardiovascular disease  -Discussed ABCs of DM  -Discussed importance of SBGM  -Discussed importance of low CHO diet, recommend 45gm per meal or 135gm per day      Chronic obstructive pulmonary disease with acute exacerbation (HCC)  Use Trelegy inhaler as prescribed  Use albuterol inhaler every 4-6 hours as needed  -complete course of antibiotics as prescribed-not completing course of antibiotics may result in infection not fully eradicated or may lead to antibiotic resistance  -eat probiotic yogurt (Activia)  or take probiotic capsules i.e Align or Florastor (sprinkles are available for children)  -take antibiotics on full stomach unless noted otherwise   -Call or return to office if symptoms are not markedly improved within 3-4 days or if symptoms are worsening      Other orders  -     fluticasone-umeclidin-vilant (TRELEGY ELLIPTA) 200-62.5-25 MCG/INH Inhalation Aerosol Powder, Breath Activated; Inhale 1 puff into the lungs daily. -     albuterol (PROAIR HFA) 108 (90 Base) MCG/ACT Inhalation Aero Soln; Inhale 2 puffs into the lungs every 4 (four) hours as needed for Wheezing.  -     amoxicillin clavulanate 875-125 MG Oral Tab; Take 1 tablet by mouth 2 (two) times daily for 10 days.

## 2022-10-03 NOTE — TELEPHONE ENCOUNTER
Patient states he is out of medications clopidogrel 75 mg oral tablet, carvedilol 12.5 mg oral tablet

## 2022-10-04 ENCOUNTER — HOSPITAL ENCOUNTER (EMERGENCY)
Facility: HOSPITAL | Age: 72
Discharge: HOME | End: 2022-10-04
Attending: EMERGENCY MEDICINE
Payer: MEDICARE

## 2022-10-04 ENCOUNTER — APPOINTMENT (OUTPATIENT)
Dept: GENERAL RADIOLOGY | Facility: HOSPITAL | Age: 72
End: 2022-10-04
Attending: EMERGENCY MEDICINE
Payer: MEDICARE

## 2022-10-04 VITALS
BODY MASS INDEX: 41.47 KG/M2 | DIASTOLIC BLOOD PRESSURE: 81 MMHG | HEART RATE: 74 BPM | OXYGEN SATURATION: 96 % | SYSTOLIC BLOOD PRESSURE: 125 MMHG | RESPIRATION RATE: 20 BRPM | WEIGHT: 280 LBS | HEIGHT: 69 IN

## 2022-10-04 DIAGNOSIS — M79.605 ACUTE LEG PAIN, LEFT: Primary | ICD-10-CM

## 2022-10-04 LAB
ANION GAP SERPL CALC-SCNC: 8 MMOL/L (ref 0–18)
BASOPHILS # BLD AUTO: 0.13 X10(3) UL (ref 0–0.2)
BASOPHILS NFR BLD AUTO: 1 %
BUN BLD-MCNC: 14 MG/DL (ref 7–18)
BUN/CREAT SERPL: 14.3 (ref 10–20)
CALCIUM BLD-MCNC: 8.9 MG/DL (ref 8.5–10.1)
CHLORIDE SERPL-SCNC: 110 MMOL/L (ref 98–112)
CO2 SERPL-SCNC: 22 MMOL/L (ref 21–32)
CREAT BLD-MCNC: 0.98 MG/DL
DEPRECATED RDW RBC AUTO: 46.6 FL (ref 35.1–46.3)
EOSINOPHIL # BLD AUTO: 0.47 X10(3) UL (ref 0–0.7)
EOSINOPHIL NFR BLD AUTO: 3.7 %
ERYTHROCYTE [DISTWIDTH] IN BLOOD BY AUTOMATED COUNT: 14.8 % (ref 11–15)
GFR SERPLBLD BASED ON 1.73 SQ M-ARVRAT: 83 ML/MIN/1.73M2 (ref 60–?)
GLUCOSE BLD-MCNC: 207 MG/DL (ref 70–99)
HCT VFR BLD AUTO: 39.1 %
HGB BLD-MCNC: 11.7 G/DL
IMM GRANULOCYTES # BLD AUTO: 0.09 X10(3) UL (ref 0–1)
IMM GRANULOCYTES NFR BLD: 0.7 %
LYMPHOCYTES # BLD AUTO: 1.8 X10(3) UL (ref 1–4)
LYMPHOCYTES NFR BLD AUTO: 14.3 %
MCH RBC QN AUTO: 25.7 PG (ref 26–34)
MCHC RBC AUTO-ENTMCNC: 29.9 G/DL (ref 31–37)
MCV RBC AUTO: 85.9 FL
MONOCYTES # BLD AUTO: 0.97 X10(3) UL (ref 0.1–1)
MONOCYTES NFR BLD AUTO: 7.7 %
NEUTROPHILS # BLD AUTO: 9.17 X10 (3) UL (ref 1.5–7.7)
NEUTROPHILS # BLD AUTO: 9.17 X10(3) UL (ref 1.5–7.7)
NEUTROPHILS NFR BLD AUTO: 72.6 %
OSMOLALITY SERPL CALC.SUM OF ELEC: 297 MOSM/KG (ref 275–295)
PLATELET # BLD AUTO: 497 10(3)UL (ref 150–450)
POTASSIUM SERPL-SCNC: 3.9 MMOL/L (ref 3.5–5.1)
RBC # BLD AUTO: 4.55 X10(6)UL
SARS-COV-2 RNA RESP QL NAA+PROBE: NOT DETECTED
SODIUM SERPL-SCNC: 140 MMOL/L (ref 136–145)
TROPONIN I HIGH SENSITIVITY: 30 NG/L
WBC # BLD AUTO: 12.6 X10(3) UL (ref 4–11)

## 2022-10-04 PROCEDURE — 80048 BASIC METABOLIC PNL TOTAL CA: CPT | Performed by: EMERGENCY MEDICINE

## 2022-10-04 PROCEDURE — 84484 ASSAY OF TROPONIN QUANT: CPT | Performed by: EMERGENCY MEDICINE

## 2022-10-04 PROCEDURE — 93005 ELECTROCARDIOGRAM TRACING: CPT

## 2022-10-04 PROCEDURE — 99284 EMERGENCY DEPT VISIT MOD MDM: CPT

## 2022-10-04 PROCEDURE — 93010 ELECTROCARDIOGRAM REPORT: CPT | Performed by: EMERGENCY MEDICINE

## 2022-10-04 PROCEDURE — 71045 X-RAY EXAM CHEST 1 VIEW: CPT | Performed by: EMERGENCY MEDICINE

## 2022-10-04 PROCEDURE — 85025 COMPLETE CBC W/AUTO DIFF WBC: CPT | Performed by: EMERGENCY MEDICINE

## 2022-10-04 PROCEDURE — 96374 THER/PROPH/DIAG INJ IV PUSH: CPT

## 2022-10-04 RX ORDER — GABAPENTIN 300 MG/1
300 CAPSULE ORAL ONCE
Status: COMPLETED | OUTPATIENT
Start: 2022-10-04 | End: 2022-10-04

## 2022-10-04 RX ORDER — GABAPENTIN 300 MG/1
300 CAPSULE ORAL 3 TIMES DAILY
Qty: 90 CAPSULE | Refills: 0 | Status: SHIPPED | OUTPATIENT
Start: 2022-10-04 | End: 2022-11-03

## 2022-10-04 RX ORDER — CLOPIDOGREL BISULFATE 75 MG/1
75 TABLET ORAL DAILY
Qty: 90 TABLET | Refills: 1 | Status: SHIPPED | OUTPATIENT
Start: 2022-10-04

## 2022-10-04 RX ORDER — KETOROLAC TROMETHAMINE 15 MG/ML
15 INJECTION, SOLUTION INTRAMUSCULAR; INTRAVENOUS ONCE
Status: COMPLETED | OUTPATIENT
Start: 2022-10-04 | End: 2022-10-04

## 2022-10-04 NOTE — TELEPHONE ENCOUNTER
Please review. Protocol failed / No protocol. Left detailed message on patient's voicemail, ok per JOE, to contact cardiologist for carvedilol refill. Requested Prescriptions   Pending Prescriptions Disp Refills    carvedilol 12.5 MG Oral Tab 180 tablet 1     Sig: Take 1 tablet (12.5 mg total) by mouth 2 (two) times daily with meals. Hypertensive Medications Protocol Failed - 10/3/2022  8:31 PM        Failed - Last BP reading less than 140/90     BP Readings from Last 1 Encounters:  09/12/22 : (!) 148/94                Failed - CMP or BMP in past 6 months     No results found for this or any previous visit (from the past 4392 hour(s)).               Passed - In person appointment in the past 12 or next 3 months       Recent Outpatient Visits              Today Chronic obstructive pulmonary disease with acute exacerbation [facility])    [address], [address], [facility], [facility], APRN    Virtual Phone E/M    5 months ago Type 2 diabetes mellitus with hyperosmolarity without coma, without long-term current use of insulin [facility])    [address], Adelfo Taylor MD    Office Visit    6 months ago Pre-op testing    [facility]    Nurse Only    6 months ago Pre-op testing    [facility]    Nurse Only    6 months ago PE (pulmonary thromboembolism) [facility])    Chioma Covarrubias, [address] [place] [address], Iza Bowers MD    Office Visit                 Passed - In person appointment or virtual visit in the past 6 months       Recent Outpatient Visits              Today Chronic obstructive pulmonary disease with acute exacerbation [facility])    [address], [address], [facility], [place], APRN    Virtual Phone E/M    5 months ago Type 2 diabetes mellitus with hyperosmolarity without coma, without long-term current use of insulin [facility])    Rachael [address] MD    Office Visit    6 months ago [facility]-op testing    [facility]    Nurse Only    6 months ago Pre-op testing    [facility]    Nurse Only    6 months ago PE (pulmonary thromboembolism) [facility])    Janis Boyd MD    Office Visit                 Passed - [facility] or GFRNAA > 50     GFR Evaluation  GFRNAA: 65 , resulted on 3/28/2022               clopidogrel 75 MG Oral Tab 90 tablet 1     Sig: Take 1 tablet (75 mg total) by mouth daily.         There is no refill protocol information for this order           Recent Outpatient Visits              Today Chronic obstructive pulmonary disease with acute exacerbation [facility])    [facility], [address], James Kraus, Carlos, APRN    Virtual Phone E/M    5 months ago Type 2 diabetes mellitus with hyperosmolarity without coma, without long-term current use of insulin [facility])    [facility] Allan Cervantes MD    Office Visit    6 months ago Pre-op testing    [address]    Nurse Only    6 months ago Pre-op testing    [address] Only    6 months ago PE (pulmonary thromboembolism) [facility])    Janis Boyd MD    Office Visit

## 2022-10-04 NOTE — ED INITIAL ASSESSMENT (HPI)
Patient arrived via EMS for fall/weakness. Patient got covid booster 3 days ago. Patient he started feeling worse and having chest pain since last night. Patient fell this morning on tile. EMS gave 324mg asprin, and 1 dose of nitroglycerin.

## 2022-10-12 ENCOUNTER — PATIENT OUTREACH (OUTPATIENT)
Dept: CASE MANAGEMENT | Age: 72
End: 2022-10-12

## 2022-10-24 ENCOUNTER — TELEPHONE (OUTPATIENT)
Dept: INTERNAL MEDICINE CLINIC | Facility: CLINIC | Age: 72
End: 2022-10-24

## 2022-10-24 RX ORDER — CLOPIDOGREL BISULFATE 75 MG/1
75 TABLET ORAL DAILY
Qty: 90 TABLET | Refills: 1 | Status: SHIPPED | OUTPATIENT
Start: 2022-10-24

## 2022-10-24 RX ORDER — LEVOTHYROXINE SODIUM 0.12 MG/1
125 TABLET ORAL
Qty: 90 TABLET | Refills: 0 | Status: SHIPPED | OUTPATIENT
Start: 2022-10-24

## 2022-10-24 NOTE — TELEPHONE ENCOUNTER
Pt using new pharmacy and requests Levothyroxine and plavix sent to [facility] in [place]. Rx resent to pt preferred pharmacy. Pt also states he needs a refill on coumadin. Per chart coumadin was not prescribed by Dr Meghann Del Cid. Pt confirms he takes coumadin and plavix due to hx of blood clot. Pt will call his cardiologist who is prescribing provider for coumadin refill.

## 2022-11-09 ENCOUNTER — PATIENT OUTREACH (OUTPATIENT)
Dept: CASE MANAGEMENT | Age: 72
End: 2022-11-09

## 2022-11-09 NOTE — PROGRESS NOTES
JIE verified for CCM monthly outreach. I called patient and no answer, phone kept ringing. I will follow up with patient at a later time.       Medical record reviewed including recent office visits and test results

## 2022-11-25 ENCOUNTER — NURSE TRIAGE (OUTPATIENT)
Dept: INTERNAL MEDICINE CLINIC | Facility: CLINIC | Age: 72
End: 2022-11-25

## 2022-12-01 ENCOUNTER — TELEPHONE (OUTPATIENT)
Facility: CLINIC | Age: 72
End: 2022-12-01

## 2022-12-01 NOTE — TELEPHONE ENCOUNTER
See medication question encounter 12/1/22 for the medication record request.  Pended prescription, routed to RN triage to run for protocol , thanks.

## 2022-12-02 RX ORDER — ALBUTEROL SULFATE 90 UG/1
2 AEROSOL, METERED RESPIRATORY (INHALATION) EVERY 4 HOURS PRN
Qty: 3 EACH | Refills: 1 | Status: SHIPPED | OUTPATIENT
Start: 2022-12-02

## 2022-12-02 NOTE — TELEPHONE ENCOUNTER
fluticasone-umeclidin-vilant 200-62.5-25 MCG/ACT  ==Passes protocol but listed as external.           Refill passed per [address] protocol. Requested Prescriptions   Pending Prescriptions Disp Refills    albuterol (PROAIR HFA) 108 (90 Base) MCG/ACT Inhalation Aero Soln 3 each 1     Sig: Inhale 2 puffs into the lungs every 4 (four) hours as needed for Wheezing. Asthma & COPD Medication Protocol Passed - 12/1/2022  1:59 PM        Passed - In person appointment or virtual visit in the past 6 mos or appointment in next 3 mos     Recent Outpatient Visits              2 months ago [facility] pulmonary [facility]    [address], [address], Carlos Brown APRN    Virtual Phone E/M    7 months ago Type 2 diabetes mellitus with hyperosmolarity without coma, without long-term current use of insulin [facility])    [address], [address] Manolo Hernandez MD    Office Visit    8 months ago Pre-op testing    [facility], [facility], [address] [place] [address] North Only    8 months ago Pre-op testing    [facility], [facility]    Nurse Only    8 months ago PE (pulmonary thromboembolism) [facility])    [address], [facility], Luisa Palomares MD    Office Visit          Future Appointments         Provider Department Appt Notes    In 1 month Juanda Carrel, [employer], [address], [place] LLE neuropathy                 fluticasone-umeclidin-vilant 200-62.5-25 MCG/ACT Inhalation Aerosol Powder, Breath Activated 3 each 1     Sig: Inhale 1 puff into the lungs daily.        Asthma & COPD Medication Protocol Passed - 12/1/2022  1:59 PM        Passed - In person appointment or virtual visit in the past 6 mos or appointment in next 3 mos     Recent Outpatient Visits              2 months ago Chronic obstructive pulmonary disease with acute exacerbation [facility])    [address] [place] [address] Street, Allegra Brown, APRN    Virtual Phone E/M    7 months ago Type 2 diabetes mellitus with hyperosmolarity without coma, without long-term current use of insulin [facility])    [place] [facility], [address] Grace Rider MD    Office Visit    8 months ago Pre-op testing    [facility] [place] Nathaniel Chung    Nurse Only    8 months ago Pre-op testing    [facility] [place] Aurora    Nurse Only    8 months ago PE (pulmonary thromboembolism) [facility])    [address], [address], Colin Isaacs MD    Office Visit          Future Appointments         Provider Department Appt Notes    In 1 month Isiah Minion, [employer], [address], [place] LLE neuropathy                     Future Appointments         Provider Department Appt Notes    In 1 month Isiah Minion, [employer], [address], [place] LLE neuropathy             Recent Outpatient Visits              2 months ago Chronic obstructive pulmonary disease with acute exacerbation ([facility]    [facility], [address], Carlos Brown, APRN    Virtual Phone E/M    7 months ago Type 2 diabetes mellitus with hyperosmolarity without coma, without long-term current use of insulin [facility])    [place] [facility] [facility], Tasha Flynn MD    Office Visit    8 months ago Pre-op testing    [facility], [place] Nathaniel Chung    Nurse Only    8 months ago Pre-op testing    [facility], [facility], [address] [place] [address] Only    8 months ago PE (pulmonary thromboembolism) [facility])    Levy Dakins, MD    Office Visit

## 2022-12-05 NOTE — TELEPHONE ENCOUNTER
I spoke with [address] [place] [address] because faxed failed. I read to the pharmacist the medication list. She wanted to make sure what medications he takes because patient moved from another town and patient states that he takes 11 medications. She wanted to make sure. She states per the lit; he is missing the following medications. Can you please prescribe those for him? Aspirin 81 MG  Carvedilol 12.5 MG  Diclofenac Sodium Voltaren 1%  Vitamin D2 1.25MG  Warfarin Sodium 1 mg.

## 2022-12-05 NOTE — TELEPHONE ENCOUNTER
I have not seen him for a while I am really not sure what medication he is currently taking I do see that he has appointment with Alma Quintero tomorrow so she can review the medication The patient is a 89y Male complaining of swelling of legs.

## 2022-12-05 NOTE — TELEPHONE ENCOUNTER
Faxed medication list to [facility] [phone].      Refill were sent on 12/02/2022

## 2022-12-13 ENCOUNTER — TELEPHONE (OUTPATIENT)
Facility: CLINIC | Age: 72
End: 2022-12-13

## 2022-12-13 ENCOUNTER — PATIENT OUTREACH (OUTPATIENT)
Dept: CASE MANAGEMENT | Age: 72
End: 2022-12-13

## 2022-12-13 DIAGNOSIS — J44.1 CHRONIC OBSTRUCTIVE PULMONARY DISEASE WITH ACUTE EXACERBATION (HCC): ICD-10-CM

## 2022-12-13 DIAGNOSIS — I10 PRIMARY HYPERTENSION: ICD-10-CM

## 2022-12-13 DIAGNOSIS — E03.9 HYPOTHYROIDISM, UNSPECIFIED TYPE: ICD-10-CM

## 2022-12-13 DIAGNOSIS — Z79.4 TYPE 2 DIABETES MELLITUS WITH HYPERGLYCEMIA, WITH LONG-TERM CURRENT USE OF INSULIN (HCC): ICD-10-CM

## 2022-12-13 DIAGNOSIS — E11.65 TYPE 2 DIABETES MELLITUS WITH HYPERGLYCEMIA, WITH LONG-TERM CURRENT USE OF INSULIN (HCC): ICD-10-CM

## 2022-12-13 RX ORDER — OXYCODONE AND ACETAMINOPHEN 10; 325 MG/1; MG/1
1 TABLET ORAL 4 TIMES DAILY PRN
COMMUNITY
Start: 2022-11-29

## 2022-12-13 NOTE — TELEPHONE ENCOUNTER
Spoke to patient for monthly CCM. Please contact patient to schedule a visit with PCP due for AWV and LABS. Schedule a virtual visit if needed as well regarding below. Patient has back pain on going for 1 week. Denies any urinary symptoms. Hands are shaking- states he's been taking higher dose of synthroid 125mcg since he ran out of lower dose. Hallucinating had a few episodes about 1 week ago. States he ran out of quetiapine 25mg. States he is only taking a few of his medications at this time since he has ran out. Unable to confirm which ones.      Please contact patient

## 2022-12-13 NOTE — TELEPHONE ENCOUNTER
Provided patient with [facility] ambulance phone number to inquire about [facility].       Patient has to look for his insurance card, and will try and arrange transportation      Future Appointments   Date Time Provider Andrea Brown   12/14/2022 10:45 AM Judge Mai MD [id number] [place] [id number]

## 2022-12-13 NOTE — TELEPHONE ENCOUNTER
Spoke with patient, states he has had transportation issues getting to appts    He does not have a cell phone or computer for telemedicine visit    Asking for help arranging transport

## 2022-12-13 NOTE — TELEPHONE ENCOUNTER
Spoke with [employer], they do not service [address] location

## 2022-12-15 RX ORDER — LEVOTHYROXINE SODIUM 0.12 MG/1
125 TABLET ORAL
Qty: 90 TABLET | Refills: 0 | OUTPATIENT
Start: 2022-12-15

## 2022-12-15 NOTE — TELEPHONE ENCOUNTER
Please review. Patient never completed repeat TSH level from April.  See result notes:  Michelle Orr MD   4/24/2022  1:52 PM CDT       His tsh is low - he is hyperthyroid , we need to decrease the dose of levothyroxine to 125 mcg daily , we need to recheck in 6 weeks , his hba1c is 7.4 is better then before but he needs to start medication, will start him on metformin 500 mg bid and recheck hemoglobin a1c in 3 months     Requested Prescriptions   Pending Prescriptions Disp Refills    LEVOTHYROXINE 125 MCG Oral Tab [Pharmacy Med Name: Levothyroxine Sodium 125 Mcg Tab Lupi] 90 tablet 0     Sig: Take 1 tablet (125 mcg total) by mouth before breakfast.       Thyroid Medication Protocol Passed - 12/14/2022  2:03 PM        Passed - TSH in past 12 months        Passed - Last TSH value is normal     Lab Results   Component Value Date    TSH 2.070 06/17/2022                 Passed - In person appointment or virtual visit in the past 12 mos or appointment in next 3 mos     Recent Outpatient Visits              2 months ago Chronic obstructive pulmonary disease with acute exacerbation ([facility] [address]    [email or web address], [address], Carlos Brown APRN    Virtual Phone E/M    7 months ago Type 2 diabetes mellitus with hyperosmolarity without coma, without long-term current use of insulin [facility])    [facility], [address] Noe Weber MD    Office Visit    8 months ago Pre-op testing    [facility], [address] [place] [address] Only    8 months ago Pre-op testing    [facility] [place]    Nurse Only    9 months ago PE (pulmonary thromboembolism) [facility])    Rafy Bashir MD    Office Visit                          Recent Outpatient Visits              2 months ago Chronic obstructive pulmonary disease with acute exacerbation [facility])    [email or web address], [address], Wild Huerta, APRN Virtual Phone E/M    7 months ago Type 2 diabetes mellitus with hyperosmolarity without coma, without long-term current use of insulin [facility])    [facility], Dick Mccollum MD    Office Visit    8 months ago Pre-op testing    [facility], Nathaniel Herman    Nurse Only    8 months ago Pre-op testing    [facility], Nathaniel Herman    Nurse Only    9 months ago PE (pulmonary thromboembolism) [facility])    Issac Lomeli MD    Office Visit

## 2022-12-15 NOTE — TELEPHONE ENCOUNTER
I  dont see labs where did he do ?  He is very complex patient needs to be seen or maybe we can help him to find home md

## 2022-12-15 NOTE — TELEPHONE ENCOUNTER
The patient stated he had a INR done today. I explained again he needs a TSH for his thyroid. I also gave him phone numbers for transportation companies to call to see if they can give him a ride. He will call back to schedule the appointment.

## 2022-12-15 NOTE — TELEPHONE ENCOUNTER
The patient stated his lab test was done today. He also does not have money to take a Urber for an appointment. Can he do a video call?

## 2022-12-22 NOTE — TELEPHONE ENCOUNTER
Called patient and relayed below message regarding TSH lab and appointment with Dr. Erlin Conde. Patient states that he has no money to take an Byron. I advised him about the transportation options and he stated that he called and all transportation choices are not available due to the holiday. Patient indicated that he gets a check at the beginning of the month and can schedule appointment. Patient scheduled for 01/03/2023.

## 2023-01-11 ENCOUNTER — PATIENT OUTREACH (OUTPATIENT)
Dept: CASE MANAGEMENT | Age: 73
End: 2023-01-11

## 2023-01-11 NOTE — PROGRESS NOTES
JIE verified for [facility] monthly outreach. Spoke to patient. Patient relates doing okay. Mood good/stable. Continues to have on going weakness when ambulating. Uses a cane for support. Feels dizzy and shaky at times. Currently out of allot of medications. Has visit with PCP tomorrow. Expressed the importance of following up with PCP tomorrow. Plans to bring in on medications bottles for reconcile. Was seen by at home MD and patient was very confused. States a nurse came to his house yesterday and told him since he has a PCP the at home MD will not be treating anymore. Doesn't plan to do any of the orders placed in care everywhere and would like Dr. Alyssa Stanton to order instead. No other questions or concerns.       Medical record reviewed including recent office visits and test results

## 2023-01-12 ENCOUNTER — OFFICE VISIT (OUTPATIENT)
Dept: INTERNAL MEDICINE CLINIC | Facility: CLINIC | Age: 73
End: 2023-01-12
Payer: MEDICARE

## 2023-01-12 VITALS
OXYGEN SATURATION: 96 % | HEART RATE: 69 BPM | TEMPERATURE: 99 F | SYSTOLIC BLOOD PRESSURE: 139 MMHG | WEIGHT: 290 LBS | HEIGHT: 69 IN | DIASTOLIC BLOOD PRESSURE: 78 MMHG | BODY MASS INDEX: 42.95 KG/M2

## 2023-01-12 DIAGNOSIS — Z00.00 ENCOUNTER FOR ANNUAL HEALTH EXAMINATION: ICD-10-CM

## 2023-01-12 DIAGNOSIS — Z00.00 ANNUAL PHYSICAL EXAM: Primary | ICD-10-CM

## 2023-01-12 DIAGNOSIS — E11.00 TYPE 2 DIABETES MELLITUS WITH HYPEROSMOLARITY WITHOUT COMA, WITHOUT LONG-TERM CURRENT USE OF INSULIN (HCC): ICD-10-CM

## 2023-01-12 DIAGNOSIS — I26.99 PE (PULMONARY THROMBOEMBOLISM) (HCC): ICD-10-CM

## 2023-01-12 DIAGNOSIS — Z12.11 SCREENING FOR COLON CANCER: ICD-10-CM

## 2023-01-12 LAB
ALBUMIN SERPL-MCNC: 2.9 G/DL (ref 3.4–5)
ALBUMIN/GLOB SERPL: 0.7 {RATIO} (ref 1–2)
ALP LIVER SERPL-CCNC: 90 U/L
ALT SERPL-CCNC: 28 U/L
ANION GAP SERPL CALC-SCNC: 5 MMOL/L (ref 0–18)
AST SERPL-CCNC: 18 U/L (ref 15–37)
BILIRUB SERPL-MCNC: 0.3 MG/DL (ref 0.1–2)
BUN BLD-MCNC: 15 MG/DL (ref 7–18)
BUN/CREAT SERPL: 17 (ref 10–20)
CALCIUM BLD-MCNC: 8.8 MG/DL (ref 8.5–10.1)
CHLORIDE SERPL-SCNC: 106 MMOL/L (ref 98–112)
CHOLEST SERPL-MCNC: 248 MG/DL (ref ?–200)
CO2 SERPL-SCNC: 26 MMOL/L (ref 21–32)
CREAT BLD-MCNC: 0.88 MG/DL
CREAT UR-SCNC: 241 MG/DL
EST. AVERAGE GLUCOSE BLD GHB EST-MCNC: 214 MG/DL (ref 68–126)
FASTING PATIENT LIPID ANSWER: NO
FASTING STATUS PATIENT QL REPORTED: NO
GFR SERPLBLD BASED ON 1.73 SQ M-ARVRAT: 93 ML/MIN/1.73M2 (ref 60–?)
GLOBULIN PLAS-MCNC: 4 G/DL (ref 2.8–4.4)
GLUCOSE BLD-MCNC: 143 MG/DL (ref 70–99)
HBA1C MFR BLD: 9.1 % (ref ?–5.7)
HDLC SERPL-MCNC: 33 MG/DL (ref 40–59)
LDLC SERPL CALC-MCNC: 161 MG/DL (ref ?–100)
MICROALBUMIN UR-MCNC: 58.2 MG/DL
MICROALBUMIN/CREAT 24H UR-RTO: 241.5 UG/MG (ref ?–30)
NONHDLC SERPL-MCNC: 215 MG/DL (ref ?–130)
OSMOLALITY SERPL CALC.SUM OF ELEC: 287 MOSM/KG (ref 275–295)
POTASSIUM SERPL-SCNC: 4.2 MMOL/L (ref 3.5–5.1)
PROT SERPL-MCNC: 6.9 G/DL (ref 6.4–8.2)
SODIUM SERPL-SCNC: 137 MMOL/L (ref 136–145)
T4 FREE SERPL-MCNC: 0.9 NG/DL (ref 0.8–1.7)
TRIGL SERPL-MCNC: 285 MG/DL (ref 30–149)
TSI SER-ACNC: 8.51 MIU/ML (ref 0.36–3.74)
VLDLC SERPL CALC-MCNC: 57 MG/DL (ref 0–30)

## 2023-01-12 PROCEDURE — 36415 COLL VENOUS BLD VENIPUNCTURE: CPT | Performed by: INTERNAL MEDICINE

## 2023-01-12 PROCEDURE — G0439 PPPS, SUBSEQ VISIT: HCPCS | Performed by: INTERNAL MEDICINE

## 2023-01-12 PROCEDURE — 90662 IIV NO PRSV INCREASED AG IM: CPT | Performed by: INTERNAL MEDICINE

## 2023-01-12 PROCEDURE — G0008 ADMIN INFLUENZA VIRUS VAC: HCPCS | Performed by: INTERNAL MEDICINE

## 2023-01-12 RX ORDER — CARVEDILOL 12.5 MG/1
12.5 TABLET ORAL 2 TIMES DAILY WITH MEALS
Qty: 180 TABLET | Refills: 1 | Status: SHIPPED | OUTPATIENT
Start: 2023-01-12 | End: 2023-01-13

## 2023-01-12 RX ORDER — ENALAPRIL MALEATE 10 MG/1
5 TABLET ORAL 2 TIMES DAILY
Qty: 180 TABLET | Refills: 1 | Status: SHIPPED | OUTPATIENT
Start: 2023-01-12

## 2023-01-13 PROBLEM — U07.1 PNEUMONIA DUE TO COVID-19 VIRUS: Status: RESOLVED | Noted: 2022-01-09 | Resolved: 2023-01-13

## 2023-01-13 PROBLEM — J12.82 PNEUMONIA DUE TO COVID-19 VIRUS: Status: RESOLVED | Noted: 2022-01-09 | Resolved: 2023-01-13

## 2023-01-13 RX ORDER — ATORVASTATIN CALCIUM 80 MG/1
80 TABLET, FILM COATED ORAL DAILY
Qty: 90 TABLET | Refills: 0 | Status: SHIPPED | OUTPATIENT
Start: 2023-01-13

## 2023-01-13 RX ORDER — CARVEDILOL 12.5 MG/1
12.5 TABLET ORAL 2 TIMES DAILY WITH MEALS
Qty: 180 TABLET | Refills: 1 | Status: SHIPPED | OUTPATIENT
Start: 2023-01-13

## 2023-01-13 RX ORDER — LEVOTHYROXINE SODIUM 137 UG/1
137 TABLET ORAL
Qty: 90 TABLET | Refills: 0 | Status: SHIPPED | OUTPATIENT
Start: 2023-01-13

## 2023-01-13 RX ORDER — ATORVASTATIN CALCIUM 80 MG/1
80 TABLET, FILM COATED ORAL DAILY
Qty: 90 TABLET | Refills: 0 | Status: SHIPPED | OUTPATIENT
Start: 2023-01-13 | End: 2023-01-13

## 2023-01-23 ENCOUNTER — TELEPHONE (OUTPATIENT)
Dept: INTERNAL MEDICINE CLINIC | Facility: CLINIC | Age: 73
End: 2023-01-23

## 2023-01-23 NOTE — TELEPHONE ENCOUNTER
Spoke with pt,  verified, pt req rx ref for gen plavix 75 mg. Pt was advised last ref 10-24-22 # 80 + 1 and to f/u with [facility], he stated understanding. Pt also provided with optha tel #.        AJZ

## 2023-01-30 ENCOUNTER — APPOINTMENT (OUTPATIENT)
Dept: GENERAL RADIOLOGY | Age: 73
DRG: 193 | End: 2023-01-30
Attending: EMERGENCY MEDICINE

## 2023-01-30 ENCOUNTER — NURSE TRIAGE (OUTPATIENT)
Dept: INTERNAL MEDICINE CLINIC | Facility: CLINIC | Age: 73
End: 2023-01-30

## 2023-01-30 ENCOUNTER — APPOINTMENT (OUTPATIENT)
Dept: CT IMAGING | Age: 73
DRG: 193 | End: 2023-01-30
Attending: EMERGENCY MEDICINE

## 2023-01-30 ENCOUNTER — HOSPITAL ENCOUNTER (OUTPATIENT)
Age: 73
Discharge: OTHER INSTITUTION | End: 2023-01-30
Attending: EMERGENCY MEDICINE
Payer: MEDICARE

## 2023-01-30 ENCOUNTER — HOSPITAL ENCOUNTER (INPATIENT)
Age: 73
LOS: 4 days | Discharge: HOME HEALTH | DRG: 193 | End: 2023-02-03
Attending: EMERGENCY MEDICINE | Admitting: INTERNAL MEDICINE

## 2023-01-30 VITALS
TEMPERATURE: 98 F | DIASTOLIC BLOOD PRESSURE: 95 MMHG | RESPIRATION RATE: 18 BRPM | HEART RATE: 94 BPM | OXYGEN SATURATION: 94 % | SYSTOLIC BLOOD PRESSURE: 142 MMHG

## 2023-01-30 DIAGNOSIS — I25.118 CORONARY ARTERY DISEASE OF NATIVE ARTERY OF NATIVE HEART WITH STABLE ANGINA PECTORIS (CMD): ICD-10-CM

## 2023-01-30 DIAGNOSIS — I10 HYPERTENSION, UNSPECIFIED TYPE: ICD-10-CM

## 2023-01-30 DIAGNOSIS — J18.9 PNEUMONIA OF BOTH LOWER LOBES DUE TO INFECTIOUS ORGANISM: ICD-10-CM

## 2023-01-30 DIAGNOSIS — R60.0 LOWER EXTREMITY EDEMA: ICD-10-CM

## 2023-01-30 DIAGNOSIS — R06.02 SHORTNESS OF BREATH: Primary | ICD-10-CM

## 2023-01-30 DIAGNOSIS — Z98.61 HISTORY OF PTCA: ICD-10-CM

## 2023-01-30 DIAGNOSIS — J96.02 ACUTE RESPIRATORY FAILURE WITH HYPOXIA AND HYPERCAPNIA (CMD): Primary | ICD-10-CM

## 2023-01-30 DIAGNOSIS — I42.9 CARDIOMYOPATHY, UNSPECIFIED TYPE (CMD): ICD-10-CM

## 2023-01-30 DIAGNOSIS — J96.01 ACUTE RESPIRATORY FAILURE WITH HYPOXIA AND HYPERCAPNIA (CMD): Primary | ICD-10-CM

## 2023-01-30 LAB
ALBUMIN SERPL-MCNC: 2.7 G/DL (ref 3.6–5.1)
ALBUMIN/GLOB SERPL: 0.6 {RATIO} (ref 1–2.4)
ALP SERPL-CCNC: 86 UNITS/L (ref 45–117)
ALT SERPL-CCNC: 41 UNITS/L
ANION GAP SERPL CALC-SCNC: 9 MMOL/L (ref 7–19)
APPEARANCE UR: CLEAR
AST SERPL-CCNC: 17 UNITS/L
ATRIAL RATE: 85 BPM
BASE EXCESS / DEFICIT, ARTERIAL - RESPIRATORY: -1 MMOL/L (ref -2–3)
BASOPHILS # BLD: 0.1 K/MCL (ref 0–0.3)
BASOPHILS NFR BLD: 1 %
BDY SITE: ABNORMAL
BILIRUB SERPL-MCNC: 0.2 MG/DL (ref 0.2–1)
BILIRUB UR QL STRIP: NEGATIVE
BODY TEMPERATURE: 36.6 DEGREES
BUN SERPL-MCNC: 30 MG/DL (ref 6–20)
BUN/CREAT SERPL: 33 (ref 7–25)
CA-I BLD-SCNC: 1.25 MMOL/L (ref 1.15–1.29)
CALCIUM SERPL-MCNC: 8.7 MG/DL (ref 8.4–10.2)
CHLORIDE BLD-SCNC: 106 MMOL/L (ref 97–110)
CHLORIDE SERPL-SCNC: 111 MMOL/L (ref 97–110)
CO2 SERPL-SCNC: 24 MMOL/L (ref 21–32)
COHGB MFR BLDV: 2.3 %
COLOR UR: NORMAL
CONDITION: ABNORMAL
CREAT SERPL-MCNC: 0.92 MG/DL (ref 0.67–1.17)
DEPRECATED RDW RBC: 59.9 FL (ref 39–50)
EOSINOPHIL # BLD: 0.7 K/MCL (ref 0–0.5)
EOSINOPHIL NFR BLD: 4 %
ERYTHROCYTE [DISTWIDTH] IN BLOOD: 18.6 % (ref 11–15)
FASTING DURATION TIME PATIENT: ABNORMAL H
FLUAV RNA RESP QL NAA+PROBE: NOT DETECTED
FLUBV RNA RESP QL NAA+PROBE: NOT DETECTED
GFR SERPLBLD BASED ON 1.73 SQ M-ARVRAT: 89 ML/MIN
GLOBULIN SER-MCNC: 4.2 G/DL (ref 2–4)
GLUCOSE BLD-MCNC: 132 MG/DL (ref 65–99)
GLUCOSE BLDC GLUCOMTR-MCNC: 143 MG/DL (ref 70–99)
GLUCOSE SERPL-MCNC: 147 MG/DL (ref 70–99)
GLUCOSE UR STRIP-MCNC: NEGATIVE MG/DL
HCO3 BLDA-SCNC: 26 MMOL/L (ref 22–28)
HCT VFR BLD CALC: 32 % (ref 39–51)
HCT VFR BLD CALC: 36.8 % (ref 39–51)
HGB BLD-MCNC: 10.7 G/DL (ref 13–17)
HGB BLD-MCNC: 10.7 G/DL (ref 13–17)
HGB UR QL STRIP: NEGATIVE
IMM GRANULOCYTES # BLD AUTO: 0.2 K/MCL (ref 0–0.2)
IMM GRANULOCYTES # BLD: 1 %
INR PPP: 1.5
KETONES UR STRIP-MCNC: NEGATIVE MG/DL
LACTATE BLDV-SCNC: 1.2 MMOL/L (ref 0–2)
LEUKOCYTE ESTERASE UR QL STRIP: NEGATIVE
LYMPHOCYTES # BLD: 1.7 K/MCL (ref 1–4)
LYMPHOCYTES NFR BLD: 11 %
MAGNESIUM SERPL-MCNC: 1.7 MG/DL (ref 1.7–2.4)
MCH RBC QN AUTO: 25.5 PG (ref 26–34)
MCHC RBC AUTO-ENTMCNC: 29.1 G/DL (ref 32–36.5)
MCV RBC AUTO: 87.8 FL (ref 78–100)
METHGB MFR BLDMV: 0 %
MONOCYTES # BLD: 1.4 K/MCL (ref 0.3–0.9)
MONOCYTES NFR BLD: 9 %
NEUTROPHILS # BLD: 12.2 K/MCL (ref 1.8–7.7)
NEUTROPHILS NFR BLD: 74 %
NITRITE UR QL STRIP: NEGATIVE
NRBC BLD MANUAL-RTO: 0 /100 WBC
NT-PROBNP SERPL-MCNC: 1723 PG/ML
OXYHGB MFR BLDA: 92.7 % (ref 94–98)
P AXIS: 63 DEGREES
P-R INTERVAL: 184 MS
PCO2 BLDA: 51 MM HG (ref 35–48)
PH BLDA: 7.32 UNITS (ref 7.35–7.45)
PH UR STRIP: 5.5 [PH] (ref 5–7)
PLATELET # BLD AUTO: 317 K/MCL (ref 140–450)
PO2 BLDA: 77 MM HG (ref 83–108)
POTASSIUM BLD-SCNC: 4.3 MMOL/L (ref 3.4–5.1)
POTASSIUM SERPL-SCNC: 4.4 MMOL/L (ref 3.4–5.1)
PROCALCITONIN SERPL IA-MCNC: 0.48 NG/ML
PROT SERPL-MCNC: 6.9 G/DL (ref 6.4–8.2)
PROT UR STRIP-MCNC: NEGATIVE MG/DL
PROTHROMBIN TIME: 15.3 SEC (ref 9.7–11.8)
Q-T INTERVAL: 414 MS
QRS DURATION: 152 MS
QTC CALCULATION (BEZET): 492 MS
R AXIS: -60 DEGREES
RBC # BLD: 4.19 MIL/MCL (ref 4.5–5.9)
RSV AG NPH QL IA.RAPID: NOT DETECTED
SAO2 % BLDA: 14 % (ref 15–23)
SAO2 % BLDA: 95 % (ref 95–99)
SARS-COV-2 RNA RESP QL NAA+PROBE: NOT DETECTED
SERVICE CMNT-IMP: NORMAL
SERVICE CMNT-IMP: NORMAL
SODIUM BLD-SCNC: 136 MMOL/L (ref 135–145)
SODIUM SERPL-SCNC: 140 MMOL/L (ref 135–145)
SP GR UR STRIP: 1.02 (ref 1–1.03)
T AXIS: 50 DEGREES
TROPONIN I SERPL DL<=0.01 NG/ML-MCNC: 26 NG/L
UROBILINOGEN UR STRIP-MCNC: 0.2 MG/DL
VENTRICULAR RATE: 85 BPM
WBC # BLD: 16.3 K/MCL (ref 4.2–11)

## 2023-01-30 PROCEDURE — 36415 COLL VENOUS BLD VENIPUNCTURE: CPT | Performed by: EMERGENCY MEDICINE

## 2023-01-30 PROCEDURE — 87040 BLOOD CULTURE FOR BACTERIA: CPT | Performed by: EMERGENCY MEDICINE

## 2023-01-30 PROCEDURE — 82805 BLOOD GASES W/O2 SATURATION: CPT

## 2023-01-30 PROCEDURE — 93010 ELECTROCARDIOGRAM REPORT: CPT

## 2023-01-30 PROCEDURE — 85018 HEMOGLOBIN: CPT

## 2023-01-30 PROCEDURE — 93005 ELECTROCARDIOGRAM TRACING: CPT | Performed by: EMERGENCY MEDICINE

## 2023-01-30 PROCEDURE — 71275 CT ANGIOGRAPHY CHEST: CPT

## 2023-01-30 PROCEDURE — 94660 CPAP INITIATION&MGMT: CPT

## 2023-01-30 PROCEDURE — 94640 AIRWAY INHALATION TREATMENT: CPT

## 2023-01-30 PROCEDURE — 10004180 HB COUNTER-TRANSPORT

## 2023-01-30 PROCEDURE — 84145 PROCALCITONIN (PCT): CPT | Performed by: EMERGENCY MEDICINE

## 2023-01-30 PROCEDURE — 84484 ASSAY OF TROPONIN QUANT: CPT | Performed by: EMERGENCY MEDICINE

## 2023-01-30 PROCEDURE — 99285 EMERGENCY DEPT VISIT HI MDM: CPT

## 2023-01-30 PROCEDURE — 82330 ASSAY OF CALCIUM: CPT

## 2023-01-30 PROCEDURE — 71045 X-RAY EXAM CHEST 1 VIEW: CPT

## 2023-01-30 PROCEDURE — 82947 ASSAY GLUCOSE BLOOD QUANT: CPT

## 2023-01-30 PROCEDURE — 10002800 HB RX 250 W HCPCS: Performed by: EMERGENCY MEDICINE

## 2023-01-30 PROCEDURE — 93005 ELECTROCARDIOGRAM TRACING: CPT

## 2023-01-30 PROCEDURE — G1004 CDSM NDSC: HCPCS

## 2023-01-30 PROCEDURE — C9803 HOPD COVID-19 SPEC COLLECT: HCPCS

## 2023-01-30 PROCEDURE — 83735 ASSAY OF MAGNESIUM: CPT | Performed by: EMERGENCY MEDICINE

## 2023-01-30 PROCEDURE — 84439 ASSAY OF FREE THYROXINE: CPT | Performed by: INTERNAL MEDICINE

## 2023-01-30 PROCEDURE — 10003585 HB ROOM CHARGE INTERMEDIATE CARE

## 2023-01-30 PROCEDURE — 80053 COMPREHEN METABOLIC PANEL: CPT | Performed by: EMERGENCY MEDICINE

## 2023-01-30 PROCEDURE — 99215 OFFICE O/P EST HI 40 MIN: CPT

## 2023-01-30 PROCEDURE — 10002801 HB RX 250 W/O HCPCS: Performed by: EMERGENCY MEDICINE

## 2023-01-30 PROCEDURE — 83880 ASSAY OF NATRIURETIC PEPTIDE: CPT | Performed by: EMERGENCY MEDICINE

## 2023-01-30 PROCEDURE — 85610 PROTHROMBIN TIME: CPT | Performed by: EMERGENCY MEDICINE

## 2023-01-30 PROCEDURE — 10004651 HB RX, NO CHARGE ITEM: Performed by: EMERGENCY MEDICINE

## 2023-01-30 PROCEDURE — 36600 WITHDRAWAL OF ARTERIAL BLOOD: CPT

## 2023-01-30 PROCEDURE — 99214 OFFICE O/P EST MOD 30 MIN: CPT

## 2023-01-30 PROCEDURE — 96374 THER/PROPH/DIAG INJ IV PUSH: CPT

## 2023-01-30 PROCEDURE — 84295 ASSAY OF SERUM SODIUM: CPT

## 2023-01-30 PROCEDURE — 10002807 HB RX 258: Performed by: EMERGENCY MEDICINE

## 2023-01-30 PROCEDURE — 10002805 HB CONTRAST AGENT: Performed by: EMERGENCY MEDICINE

## 2023-01-30 PROCEDURE — 83605 ASSAY OF LACTIC ACID: CPT | Performed by: EMERGENCY MEDICINE

## 2023-01-30 PROCEDURE — 81003 URINALYSIS AUTO W/O SCOPE: CPT | Performed by: EMERGENCY MEDICINE

## 2023-01-30 PROCEDURE — 0241U COVID/FLU/RSV PANEL: CPT | Performed by: EMERGENCY MEDICINE

## 2023-01-30 PROCEDURE — 85025 COMPLETE CBC W/AUTO DIFF WBC: CPT | Performed by: EMERGENCY MEDICINE

## 2023-01-30 PROCEDURE — 70450 CT HEAD/BRAIN W/O DYE: CPT

## 2023-01-30 PROCEDURE — 84443 ASSAY THYROID STIM HORMONE: CPT | Performed by: INTERNAL MEDICINE

## 2023-01-30 PROCEDURE — 10002803 HB RX 637: Performed by: INTERNAL MEDICINE

## 2023-01-30 RX ORDER — DEXTROSE MONOHYDRATE 25 G/50ML
25 INJECTION, SOLUTION INTRAVENOUS PRN
Status: DISCONTINUED | OUTPATIENT
Start: 2023-01-30 | End: 2023-02-03 | Stop reason: HOSPADM

## 2023-01-30 RX ORDER — LANOLIN ALCOHOL/MO/W.PET/CERES
6 CREAM (GRAM) TOPICAL NIGHTLY PRN
Status: DISCONTINUED | OUTPATIENT
Start: 2023-01-30 | End: 2023-02-03 | Stop reason: HOSPADM

## 2023-01-30 RX ORDER — CEFAZOLIN SODIUM/WATER 2 G/20 ML
2000 SYRINGE (ML) INTRAVENOUS ONCE
Status: COMPLETED | OUTPATIENT
Start: 2023-01-30 | End: 2023-01-30

## 2023-01-30 RX ORDER — WARFARIN SODIUM 7.5 MG/1
7.5 TABLET ORAL ONCE
Status: COMPLETED | OUTPATIENT
Start: 2023-01-30 | End: 2023-01-30

## 2023-01-30 RX ORDER — FUROSEMIDE 10 MG/ML
20 INJECTION INTRAMUSCULAR; INTRAVENOUS ONCE
Status: COMPLETED | OUTPATIENT
Start: 2023-01-30 | End: 2023-01-30

## 2023-01-30 RX ORDER — WARFARIN SODIUM 5 MG/1
7.5 TABLET ORAL EVERY EVENING
COMMUNITY

## 2023-01-30 RX ORDER — POLYETHYLENE GLYCOL 3350 17 G/17G
17 POWDER, FOR SOLUTION ORAL DAILY PRN
Status: DISCONTINUED | OUTPATIENT
Start: 2023-01-30 | End: 2023-02-03 | Stop reason: HOSPADM

## 2023-01-30 RX ORDER — LANOLIN ALCOHOL/MO/W.PET/CERES
400 CREAM (GRAM) TOPICAL ONCE
Status: COMPLETED | OUTPATIENT
Start: 2023-01-31 | End: 2023-01-31

## 2023-01-30 RX ORDER — ERGOCALCIFEROL 1.25 MG/1
1.25 CAPSULE ORAL
COMMUNITY

## 2023-01-30 RX ORDER — CEFAZOLIN SODIUM/WATER 2 G/20 ML
2000 SYRINGE (ML) INTRAVENOUS DAILY
Status: DISCONTINUED | OUTPATIENT
Start: 2023-01-31 | End: 2023-02-01

## 2023-01-30 RX ORDER — DEXTROSE MONOHYDRATE 25 G/50ML
12.5 INJECTION, SOLUTION INTRAVENOUS PRN
Status: DISCONTINUED | OUTPATIENT
Start: 2023-01-30 | End: 2023-02-03 | Stop reason: HOSPADM

## 2023-01-30 RX ORDER — ALBUTEROL SULFATE 2.5 MG/3ML
2.5 SOLUTION RESPIRATORY (INHALATION)
Status: DISCONTINUED | OUTPATIENT
Start: 2023-01-30 | End: 2023-02-03 | Stop reason: HOSPADM

## 2023-01-30 RX ORDER — CHOLECALCIFEROL (VITAMIN D3) 125 MCG
1000 CAPSULE ORAL DAILY
Status: DISCONTINUED | OUTPATIENT
Start: 2023-01-31 | End: 2023-02-03 | Stop reason: HOSPADM

## 2023-01-30 RX ORDER — LANOLIN ALCOHOL/MO/W.PET/CERES
400 CREAM (GRAM) TOPICAL ONCE
Status: COMPLETED | OUTPATIENT
Start: 2023-01-31 | End: 2023-01-30

## 2023-01-30 RX ORDER — NICOTINE POLACRILEX 4 MG
15 LOZENGE BUCCAL PRN
Status: DISCONTINUED | OUTPATIENT
Start: 2023-01-30 | End: 2023-02-03 | Stop reason: HOSPADM

## 2023-01-30 RX ORDER — ATORVASTATIN CALCIUM 80 MG/1
80 TABLET, FILM COATED ORAL NIGHTLY
Status: DISCONTINUED | OUTPATIENT
Start: 2023-01-30 | End: 2023-02-03 | Stop reason: HOSPADM

## 2023-01-30 RX ORDER — ENALAPRIL MALEATE 5 MG/1
5 TABLET ORAL EVERY 12 HOURS SCHEDULED
Status: ON HOLD | COMMUNITY
End: 2023-02-02 | Stop reason: ALTCHOICE

## 2023-01-30 RX ORDER — ONDANSETRON 2 MG/ML
4 INJECTION INTRAMUSCULAR; INTRAVENOUS 2 TIMES DAILY PRN
Status: DISCONTINUED | OUTPATIENT
Start: 2023-01-30 | End: 2023-02-03 | Stop reason: HOSPADM

## 2023-01-30 RX ORDER — 0.9 % SODIUM CHLORIDE 0.9 %
2 VIAL (ML) INJECTION EVERY 12 HOURS SCHEDULED
Status: DISCONTINUED | OUTPATIENT
Start: 2023-01-30 | End: 2023-02-03 | Stop reason: HOSPADM

## 2023-01-30 RX ORDER — IPRATROPIUM BROMIDE AND ALBUTEROL SULFATE 2.5; .5 MG/3ML; MG/3ML
6 SOLUTION RESPIRATORY (INHALATION) ONCE
Status: COMPLETED | OUTPATIENT
Start: 2023-01-30 | End: 2023-01-30

## 2023-01-30 RX ORDER — DULOXETIN HYDROCHLORIDE 30 MG/1
60 CAPSULE, DELAYED RELEASE ORAL DAILY
Status: DISCONTINUED | OUTPATIENT
Start: 2023-01-31 | End: 2023-02-03 | Stop reason: HOSPADM

## 2023-01-30 RX ORDER — NICOTINE POLACRILEX 4 MG
30 LOZENGE BUCCAL PRN
Status: DISCONTINUED | OUTPATIENT
Start: 2023-01-30 | End: 2023-02-03 | Stop reason: HOSPADM

## 2023-01-30 RX ORDER — CARVEDILOL 12.5 MG/1
12.5 TABLET ORAL 2 TIMES DAILY WITH MEALS
Status: DISCONTINUED | OUTPATIENT
Start: 2023-01-31 | End: 2023-02-02

## 2023-01-30 RX ORDER — ENALAPRIL MALEATE 5 MG/1
5 TABLET ORAL EVERY 12 HOURS SCHEDULED
Status: DISCONTINUED | OUTPATIENT
Start: 2023-01-30 | End: 2023-02-02

## 2023-01-30 RX ORDER — ACETAMINOPHEN 325 MG/1
650 TABLET ORAL EVERY 4 HOURS PRN
Status: DISCONTINUED | OUTPATIENT
Start: 2023-01-30 | End: 2023-02-03 | Stop reason: HOSPADM

## 2023-01-30 RX ORDER — AMOXICILLIN 250 MG
2 CAPSULE ORAL DAILY PRN
Status: DISCONTINUED | OUTPATIENT
Start: 2023-01-30 | End: 2023-02-03 | Stop reason: HOSPADM

## 2023-01-30 RX ORDER — CLOPIDOGREL BISULFATE 75 MG/1
75 TABLET ORAL DAILY
Status: DISCONTINUED | OUTPATIENT
Start: 2023-01-31 | End: 2023-02-03 | Stop reason: HOSPADM

## 2023-01-30 RX ORDER — OXYCODONE AND ACETAMINOPHEN 10; 325 MG/1; MG/1
1 TABLET ORAL EVERY 4 HOURS PRN
Status: DISCONTINUED | OUTPATIENT
Start: 2023-01-30 | End: 2023-02-03 | Stop reason: HOSPADM

## 2023-01-30 RX ADMIN — IOHEXOL 70 ML: 350 INJECTION, SOLUTION INTRAVENOUS at 18:46

## 2023-01-30 RX ADMIN — CEFTRIAXONE SODIUM 2000 MG: 10 INJECTION, POWDER, FOR SOLUTION INTRAVENOUS at 20:02

## 2023-01-30 RX ADMIN — IPRATROPIUM BROMIDE AND ALBUTEROL SULFATE 6 ML: 2.5; .5 SOLUTION RESPIRATORY (INHALATION) at 17:37

## 2023-01-30 RX ADMIN — OXYCODONE HYDROCHLORIDE AND ACETAMINOPHEN 1 TABLET: 10; 325 TABLET ORAL at 23:46

## 2023-01-30 RX ADMIN — DOXYCYCLINE 100 MG: 100 INJECTION, POWDER, LYOPHILIZED, FOR SOLUTION INTRAVENOUS at 20:11

## 2023-01-30 RX ADMIN — ENALAPRIL MALEATE 5 MG: 5 TABLET ORAL at 23:53

## 2023-01-30 RX ADMIN — Medication 6 MG: at 23:46

## 2023-01-30 RX ADMIN — SODIUM CHLORIDE, PRESERVATIVE FREE 2 ML: 5 INJECTION INTRAVENOUS at 20:12

## 2023-01-30 RX ADMIN — FUROSEMIDE 20 MG: 10 INJECTION, SOLUTION INTRAMUSCULAR; INTRAVENOUS at 17:40

## 2023-01-30 RX ADMIN — ATORVASTATIN CALCIUM 80 MG: 80 TABLET, FILM COATED ORAL at 23:46

## 2023-01-30 RX ADMIN — WARFARIN SODIUM 7.5 MG: 7.5 TABLET ORAL at 23:53

## 2023-01-30 RX ADMIN — Medication 400 MG: at 23:46

## 2023-01-30 SDOH — ECONOMIC STABILITY: TRANSPORTATION INSECURITY
IN THE PAST 12 MONTHS, HAS LACK OF TRANSPORTATION KEPT YOU FROM MEETINGS, WORK, OR FROM GETTING THINGS NEEDED FOR DAILY LIVING?: YES

## 2023-01-30 SDOH — HEALTH STABILITY: PHYSICAL HEALTH: DO YOU HAVE SERIOUS DIFFICULTY WALKING OR CLIMBING STAIRS?: NO

## 2023-01-30 SDOH — ECONOMIC STABILITY: GENERAL

## 2023-01-30 SDOH — HEALTH STABILITY: GENERAL: BECAUSE OF A PHYSICAL, MENTAL, OR EMOTIONAL CONDITION, DO YOU HAVE DIFFICULTY DOING ERRANDS ALONE?: YES

## 2023-01-30 SDOH — ECONOMIC STABILITY: HOUSING INSECURITY: WHAT IS YOUR LIVING SITUATION TODAY?: CHILDREN

## 2023-01-30 SDOH — SOCIAL STABILITY: SOCIAL NETWORK
HOW OFTEN DO YOU SEE OR TALK TO PEOPLE THAT YOU CARE ABOUT AND FEEL CLOSE TO? (FOR EXAMPLE: TALKING TO FRIENDS ON THE PHONE, VISITING FRIENDS OR FAMILY, GOING TO CHURCH OR CLUB MEETINGS): 5 OR MORE TIMES A WEEK

## 2023-01-30 SDOH — HEALTH STABILITY: PHYSICAL HEALTH: DO YOU HAVE DIFFICULTY DRESSING OR BATHING?: NO

## 2023-01-30 SDOH — ECONOMIC STABILITY: HOUSING INSECURITY: ARE YOU WORRIED ABOUT LOSING YOUR HOUSING?: NO

## 2023-01-30 SDOH — SOCIAL STABILITY: SOCIAL NETWORK: SUPPORT SYSTEMS: FAMILY MEMBERS

## 2023-01-30 SDOH — HEALTH STABILITY: GENERAL
BECAUSE OF A PHYSICAL, MENTAL, OR EMOTIONAL CONDITION, DO YOU HAVE SERIOUS DIFFICULTY CONCENTRATING, REMEMBERING OR MAKING DECISIONS?: YES

## 2023-01-30 SDOH — ECONOMIC STABILITY: HOUSING INSECURITY: WHAT IS YOUR LIVING SITUATION TODAY?: CONDO

## 2023-01-30 SDOH — ECONOMIC STABILITY: TRANSPORTATION INSECURITY
IN THE PAST 12 MONTHS, HAS THE LACK OF TRANSPORTATION KEPT YOU FROM MEDICAL APPOINTMENTS OR FROM GETTING MEDICATIONS?: YES

## 2023-01-30 SDOH — ECONOMIC STABILITY: FOOD INSECURITY: HOW OFTEN IN THE PAST 12 MONTHS WERE YOU WORRIED OR STRESSED ABOUT HAVING ENOUGH MONEY TO BUY NUTRITIOUS MEALS?: RARELY

## 2023-01-30 ASSESSMENT — ACTIVITIES OF DAILY LIVING (ADL)
ADL_BEFORE_ADMISSION: NEEDS/REQUIRES ASSISTANCE
FEEDING: INDEPENDENT
RECENT_DECLINE_ADL: YES, DECLINE IN AMBULATION/TRANSFERRING, COLLABORATE WITH PROVIDER (T)
ADL_SCORE: 11
DRESSING: INDEPENDENT
BATHING: INDEPENDENT
TOILETING: INDEPENDENT
ADL_SHORT_OF_BREATH: YES

## 2023-01-30 ASSESSMENT — PATIENT HEALTH QUESTIONNAIRE - PHQ9
IS PATIENT ABLE TO COMPLETE PHQ2 OR PHQ9: YES
SUM OF ALL RESPONSES TO PHQ9 QUESTIONS 1 AND 2: 1
2. FEELING DOWN, DEPRESSED OR HOPELESS: SEVERAL DAYS
CLINICAL INTERPRETATION OF PHQ2 SCORE: NO FURTHER SCREENING NEEDED
SUM OF ALL RESPONSES TO PHQ9 QUESTIONS 1 AND 2: 1
1. LITTLE INTEREST OR PLEASURE IN DOING THINGS: NOT AT ALL

## 2023-01-30 ASSESSMENT — LIFESTYLE VARIABLES
HOW MANY STANDARD DRINKS CONTAINING ALCOHOL DO YOU HAVE ON A TYPICAL DAY: 0,1 OR 2
HOW OFTEN DO YOU HAVE 6 OR MORE DRINKS ON ONE OCCASION: NEVER
AUDIT-C TOTAL SCORE: 0
HOW OFTEN DO YOU HAVE A DRINK CONTAINING ALCOHOL: NEVER
ALCOHOL_USE_STATUS: NO OR LOW RISK WITH VALIDATED TOOL

## 2023-01-30 ASSESSMENT — PAIN SCALES - GENERAL
PAINLEVEL_OUTOF10: 6
PAINLEVEL_OUTOF10: 0

## 2023-01-30 ASSESSMENT — COLUMBIA-SUICIDE SEVERITY RATING SCALE - C-SSRS
6. HAVE YOU EVER DONE ANYTHING, STARTED TO DO ANYTHING, OR PREPARED TO DO ANYTHING TO END YOUR LIFE?: NO
1. WITHIN THE PAST MONTH, HAVE YOU WISHED YOU WERE DEAD OR WISHED YOU COULD GO TO SLEEP AND NOT WAKE UP?: NO
2. HAVE YOU ACTUALLY HAD ANY THOUGHTS OF KILLING YOURSELF?: NO
IS THE PATIENT ABLE TO COMPLETE C-SSRS: YES

## 2023-01-30 NOTE — ED INITIAL ASSESSMENT (HPI)
PATIENT ARRIVED AMBULATORY TO ROOM WITH SON. SYMPTOMS STARTED 3 DAYS AGO. +SOB +COUGH. NO NASAL CONGESTION. NO FEVERS. SON STATES \"HE'S ALSO BEEN HALLUCINATING THE PAST 3 DAYS.  HE'S BEEN CALLING ME BY THE WRONG NAME AND SEEING HIS DEAD EX WIFE IN THE ROOM\"

## 2023-01-31 ENCOUNTER — APPOINTMENT (OUTPATIENT)
Dept: GENERAL RADIOLOGY | Age: 73
DRG: 193 | End: 2023-01-31
Attending: INTERNAL MEDICINE

## 2023-01-31 ENCOUNTER — APPOINTMENT (OUTPATIENT)
Dept: CARDIOLOGY | Age: 73
DRG: 193 | End: 2023-01-31
Attending: INTERNAL MEDICINE

## 2023-01-31 PROBLEM — Z86.711 HISTORY OF PULMONARY EMBOLISM: Status: ACTIVE | Noted: 2023-01-31

## 2023-01-31 LAB
ANION GAP SERPL CALC-SCNC: 8 MMOL/L (ref 7–19)
ATRIAL RATE (BPM): 83
BASOPHILS # BLD: 0.1 K/MCL (ref 0–0.3)
BASOPHILS NFR BLD: 1 %
BUN SERPL-MCNC: 22 MG/DL (ref 6–20)
BUN/CREAT SERPL: 23 (ref 7–25)
CALCIUM SERPL-MCNC: 8.9 MG/DL (ref 8.4–10.2)
CHLORIDE SERPL-SCNC: 108 MMOL/L (ref 97–110)
CO2 SERPL-SCNC: 28 MMOL/L (ref 21–32)
CREAT SERPL-MCNC: 0.96 MG/DL (ref 0.67–1.17)
DEPRECATED RDW RBC: 57.3 FL (ref 39–50)
EOSINOPHIL # BLD: 0.6 K/MCL (ref 0–0.5)
EOSINOPHIL NFR BLD: 4 %
ERYTHROCYTE [DISTWIDTH] IN BLOOD: 18.2 % (ref 11–15)
FASTING DURATION TIME PATIENT: ABNORMAL H
GFR SERPLBLD BASED ON 1.73 SQ M-ARVRAT: 85 ML/MIN
GLUCOSE BLDC GLUCOMTR-MCNC: 140 MG/DL (ref 70–99)
GLUCOSE BLDC GLUCOMTR-MCNC: 154 MG/DL (ref 70–99)
GLUCOSE BLDC GLUCOMTR-MCNC: 171 MG/DL (ref 70–99)
GLUCOSE BLDC GLUCOMTR-MCNC: 208 MG/DL (ref 70–99)
GLUCOSE SERPL-MCNC: 145 MG/DL (ref 70–99)
HCT VFR BLD CALC: 34.2 % (ref 39–51)
HGB BLD-MCNC: 10.2 G/DL (ref 13–17)
IMM GRANULOCYTES # BLD AUTO: 0.2 K/MCL (ref 0–0.2)
IMM GRANULOCYTES # BLD: 1 %
INR PPP: 1.6
LYMPHOCYTES # BLD: 1.6 K/MCL (ref 1–4)
LYMPHOCYTES NFR BLD: 11 %
MAGNESIUM SERPL-MCNC: 1.6 MG/DL (ref 1.7–2.4)
MCH RBC QN AUTO: 25.5 PG (ref 26–34)
MCHC RBC AUTO-ENTMCNC: 29.8 G/DL (ref 32–36.5)
MCV RBC AUTO: 85.5 FL (ref 78–100)
MONOCYTES # BLD: 1.2 K/MCL (ref 0.3–0.9)
MONOCYTES NFR BLD: 9 %
NEUTROPHILS # BLD: 10.6 K/MCL (ref 1.8–7.7)
NEUTROPHILS NFR BLD: 74 %
NRBC BLD MANUAL-RTO: 0 /100 WBC
P AXIS (DEGREES): 71
PLATELET # BLD AUTO: 302 K/MCL (ref 140–450)
POTASSIUM SERPL-SCNC: 4.3 MMOL/L (ref 3.4–5.1)
PR-INTERVAL (MSEC): 192
PROTHROMBIN TIME: 15.8 SEC (ref 9.7–11.8)
QRS-INTERVAL (MSEC): 148
QT-INTERVAL (MSEC): 422
QTC: 496
R AXIS (DEGREES): -56
RAINBOW EXTRA TUBES HOLD SPECIMEN: NORMAL
RBC # BLD: 4 MIL/MCL (ref 4.5–5.9)
REPORT TEXT: NORMAL
SODIUM SERPL-SCNC: 140 MMOL/L (ref 135–145)
T AXIS (DEGREES): 53
T4 FREE SERPL-MCNC: 0.9 NG/DL (ref 0.8–1.5)
TSH SERPL-ACNC: 5.11 MCUNITS/ML (ref 0.35–5)
VENTRICULAR RATE EKG/MIN (BPM): 83
WBC # BLD: 14.2 K/MCL (ref 4.2–11)

## 2023-01-31 PROCEDURE — 92611 MOTION FLUOROSCOPY/SWALLOW: CPT | Performed by: SPEECH-LANGUAGE PATHOLOGIST

## 2023-01-31 PROCEDURE — 10002807 HB RX 258: Performed by: INTERNAL MEDICINE

## 2023-01-31 PROCEDURE — 99223 1ST HOSP IP/OBS HIGH 75: CPT | Performed by: INTERNAL MEDICINE

## 2023-01-31 PROCEDURE — 83735 ASSAY OF MAGNESIUM: CPT | Performed by: INTERNAL MEDICINE

## 2023-01-31 PROCEDURE — 10002803 HB RX 637: Performed by: INTERNAL MEDICINE

## 2023-01-31 PROCEDURE — 36415 COLL VENOUS BLD VENIPUNCTURE: CPT | Performed by: INTERNAL MEDICINE

## 2023-01-31 PROCEDURE — 10004180 HB COUNTER-TRANSPORT

## 2023-01-31 PROCEDURE — 87633 RESP VIRUS 12-25 TARGETS: CPT | Performed by: INTERNAL MEDICINE

## 2023-01-31 PROCEDURE — 92610 EVALUATE SWALLOWING FUNCTION: CPT | Performed by: SPEECH-LANGUAGE PATHOLOGIST

## 2023-01-31 PROCEDURE — 10002800 HB RX 250 W HCPCS: Performed by: INTERNAL MEDICINE

## 2023-01-31 PROCEDURE — 10002801 HB RX 250 W/O HCPCS: Performed by: INTERNAL MEDICINE

## 2023-01-31 PROCEDURE — 85025 COMPLETE CBC W/AUTO DIFF WBC: CPT | Performed by: INTERNAL MEDICINE

## 2023-01-31 PROCEDURE — 80048 BASIC METABOLIC PNL TOTAL CA: CPT | Performed by: INTERNAL MEDICINE

## 2023-01-31 PROCEDURE — 94660 CPAP INITIATION&MGMT: CPT

## 2023-01-31 PROCEDURE — 97161 PT EVAL LOW COMPLEX 20 MIN: CPT

## 2023-01-31 PROCEDURE — 94640 AIRWAY INHALATION TREATMENT: CPT

## 2023-01-31 PROCEDURE — 10004651 HB RX, NO CHARGE ITEM: Performed by: EMERGENCY MEDICINE

## 2023-01-31 PROCEDURE — 10003585 HB ROOM CHARGE INTERMEDIATE CARE

## 2023-01-31 PROCEDURE — 97530 THERAPEUTIC ACTIVITIES: CPT

## 2023-01-31 PROCEDURE — 74230 X-RAY XM SWLNG FUNCJ C+: CPT

## 2023-01-31 PROCEDURE — 85610 PROTHROMBIN TIME: CPT | Performed by: INTERNAL MEDICINE

## 2023-01-31 RX ORDER — WARFARIN SODIUM 7.5 MG/1
3.75 TABLET ORAL ONCE
Status: COMPLETED | OUTPATIENT
Start: 2023-01-31 | End: 2023-01-31

## 2023-01-31 RX ORDER — ALPRAZOLAM 0.25 MG/1
0.25 TABLET ORAL EVERY 6 HOURS PRN
Status: DISCONTINUED | OUTPATIENT
Start: 2023-01-31 | End: 2023-02-03 | Stop reason: HOSPADM

## 2023-01-31 RX ADMIN — CLOPIDOGREL BISULFATE 75 MG: 75 TABLET, FILM COATED ORAL at 08:30

## 2023-01-31 RX ADMIN — LEVOTHYROXINE SODIUM 137 MCG: 0.11 TABLET ORAL at 04:33

## 2023-01-31 RX ADMIN — Medication 400 MG: at 08:29

## 2023-01-31 RX ADMIN — DOXYCYCLINE 100 MG: 100 INJECTION, POWDER, LYOPHILIZED, FOR SOLUTION INTRAVENOUS at 22:27

## 2023-01-31 RX ADMIN — WARFARIN SODIUM 3.75 MG: 7.5 TABLET ORAL at 17:10

## 2023-01-31 RX ADMIN — ENALAPRIL MALEATE 5 MG: 5 TABLET ORAL at 08:30

## 2023-01-31 RX ADMIN — SODIUM CHLORIDE, PRESERVATIVE FREE 2 ML: 5 INJECTION INTRAVENOUS at 08:28

## 2023-01-31 RX ADMIN — OXYCODONE HYDROCHLORIDE AND ACETAMINOPHEN 1 TABLET: 10; 325 TABLET ORAL at 04:33

## 2023-01-31 RX ADMIN — Medication 1000 MCG: at 08:30

## 2023-01-31 RX ADMIN — ALPRAZOLAM 0.25 MG: 0.25 TABLET ORAL at 22:05

## 2023-01-31 RX ADMIN — CARVEDILOL 12.5 MG: 12.5 TABLET, FILM COATED ORAL at 17:10

## 2023-01-31 RX ADMIN — DOXYCYCLINE 100 MG: 100 INJECTION, POWDER, LYOPHILIZED, FOR SOLUTION INTRAVENOUS at 08:41

## 2023-01-31 RX ADMIN — SODIUM CHLORIDE, PRESERVATIVE FREE 2 ML: 5 INJECTION INTRAVENOUS at 22:25

## 2023-01-31 RX ADMIN — OXYCODONE HYDROCHLORIDE AND ACETAMINOPHEN 1 TABLET: 10; 325 TABLET ORAL at 17:10

## 2023-01-31 RX ADMIN — OXYCODONE HYDROCHLORIDE AND ACETAMINOPHEN 1 TABLET: 10; 325 TABLET ORAL at 12:33

## 2023-01-31 RX ADMIN — CEFTRIAXONE SODIUM 2000 MG: 10 INJECTION, POWDER, FOR SOLUTION INTRAVENOUS at 22:16

## 2023-01-31 RX ADMIN — INSULIN LISPRO 2 UNITS: 100 INJECTION, SOLUTION INTRAVENOUS; SUBCUTANEOUS at 12:33

## 2023-01-31 RX ADMIN — ATORVASTATIN CALCIUM 80 MG: 80 TABLET, FILM COATED ORAL at 21:15

## 2023-01-31 RX ADMIN — FLUTICASONE FUROATE, UMECLIDINIUM BROMIDE AND VILANTEROL TRIFENATATE 1 PUFF: 200; 62.5; 25 POWDER RESPIRATORY (INHALATION) at 08:57

## 2023-01-31 RX ADMIN — DULOXETINE HYDROCHLORIDE 60 MG: 30 CAPSULE, DELAYED RELEASE ORAL at 08:30

## 2023-01-31 RX ADMIN — CARVEDILOL 12.5 MG: 12.5 TABLET, FILM COATED ORAL at 08:30

## 2023-01-31 RX ADMIN — OXYCODONE HYDROCHLORIDE AND ACETAMINOPHEN 1 TABLET: 10; 325 TABLET ORAL at 21:15

## 2023-01-31 RX ADMIN — Medication 6 MG: at 22:05

## 2023-01-31 RX ADMIN — INSULIN LISPRO 1 UNITS: 100 INJECTION, SOLUTION INTRAVENOUS; SUBCUTANEOUS at 21:17

## 2023-01-31 RX ADMIN — ENALAPRIL MALEATE 5 MG: 5 TABLET ORAL at 22:05

## 2023-01-31 RX ADMIN — INSULIN LISPRO 1 UNITS: 100 INJECTION, SOLUTION INTRAVENOUS; SUBCUTANEOUS at 17:11

## 2023-01-31 RX ADMIN — OXYCODONE HYDROCHLORIDE AND ACETAMINOPHEN 1 TABLET: 10; 325 TABLET ORAL at 08:28

## 2023-01-31 ASSESSMENT — PAIN SCALES - WONG BAKER: WONGBAKER_NUMERICALRESPONSE: 0

## 2023-01-31 ASSESSMENT — COGNITIVE AND FUNCTIONAL STATUS - GENERAL
REMEMBERING 5 ERRANDS WITH NO LIST: A LOT
BASIC_MOBILITY_RAW_SCORE: 20
APPLIED_COGNITIVE_CONVERTED_SCORE: 30.46
BASIC_MOBILITY_CONVERTED_SCORE: 43.99
REMEMBERING WHERE THINGS ARE: A LOT
UNDERSTANDING 10 TO 15 MIN SPEECH: A LITTLE
APPLIED_COGNITIVE_RAW_SCORE: 13
DO YOU HAVE DIFFICULTY DRESSING OR BATHING: NO
FOLLOWS FAMILIAR CONVERSATION: A LITTLE
REMEMBERING TO TAKE MEDICATION: A LOT
TAKING CARE OF COMPLICATED TASKS: UNABLE

## 2023-01-31 ASSESSMENT — ACTIVITIES OF DAILY LIVING (ADL)
PRIOR_ADL: INDEPENDENT
EATING: INDEPENDENT

## 2023-01-31 ASSESSMENT — PAIN SCALES - GENERAL
PAINLEVEL_OUTOF10: 2
PAINLEVEL_OUTOF10: 0
PAINLEVEL_OUTOF10: 6
PAINLEVEL_OUTOF10: 2
PAINLEVEL_OUTOF10: 8

## 2023-01-31 ASSESSMENT — ENCOUNTER SYMPTOMS
ENDOCRINE NEGATIVE: 1
ALLERGIC/IMMUNOLOGIC NEGATIVE: 1
NEUROLOGICAL NEGATIVE: 1
EYES NEGATIVE: 1
CONSTITUTIONAL NEGATIVE: 1
GASTROINTESTINAL NEGATIVE: 1
SHORTNESS OF BREATH: 1
PSYCHIATRIC NEGATIVE: 1
PAIN SEVERITY NOW: 8
HEMATOLOGIC/LYMPHATIC NEGATIVE: 1

## 2023-02-01 ENCOUNTER — APPOINTMENT (OUTPATIENT)
Dept: CARDIOLOGY | Age: 73
DRG: 193 | End: 2023-02-01
Attending: INTERNAL MEDICINE

## 2023-02-01 LAB
AORTIC VALVE AREA (AVA): 1.05
AV PEAK GRADIENT (AVPG): 4
AV PEAK VELOCITY (AVPV): 1.03
AV STENOSIS SEVERITY TEXT: NORMAL
AVI LVOT PEAK GRADIENT (LVOTMG): 1.2
C PNEUM DNA SPEC QL NAA+PROBE: NOT DETECTED
E WAVE DECELARATION TIME (MDT): 18.2
FLUAV H1 2009 PAND RNA SPEC QL NAA+PROBE: NOT DETECTED
FLUAV H1 RNA SPEC QL NAA+PROBE: NOT DETECTED
FLUAV H3 RNA SPEC QL NAA+PROBE: NOT DETECTED
FLUAV RNA SPEC QL NAA+PROBE: NORMAL
FLUBV RNA SPEC QL NAA+PROBE: NOT DETECTED
GLUCOSE BLDC GLUCOMTR-MCNC: 149 MG/DL (ref 70–99)
GLUCOSE BLDC GLUCOMTR-MCNC: 152 MG/DL (ref 70–99)
GLUCOSE BLDC GLUCOMTR-MCNC: 157 MG/DL (ref 70–99)
GLUCOSE BLDC GLUCOMTR-MCNC: 163 MG/DL (ref 70–99)
HADV DNA SPEC QL NAA+PROBE: NOT DETECTED
HBOV DNA SPEC QL NAA+PROBE: NOT DETECTED
HCOV 229E RNA SPEC QL NAA+PROBE: NOT DETECTED
HCOV HKU1 RNA SPEC QL NAA+PROBE: NOT DETECTED
HCOV NL63 RNA SPEC QL NAA+PROBE: NOT DETECTED
HCOV OC43 RNA SPEC QL NAA+PROBE: NOT DETECTED
HMPV RNA SPEC QL NAA+PROBE: NOT DETECTED
HPIV1 RNA SPEC QL NAA+PROBE: NOT DETECTED
HPIV2 RNA SPEC QL NAA+PROBE: NOT DETECTED
HPIV3 RNA SPEC QL NAA+PROBE: NOT DETECTED
HPIV4 RNA SPEC QL NAA+PROBE: NOT DETECTED
INR PPP: 1.7
INTERVENTRICULAR SEPTUM IN END DIASTOLE (IVSD): 2.64
L PNEUMO DNA SPEC QL NAA+PROBE: NOT DETECTED
LEFT INTERNAL DIMENSION IN SYSTOLE (LVSD): 1.2
LEFT VENTRICULAR INTERNAL DIMENSION IN DIASTOLE (LVDD): 4.5
LEFT VENTRICULAR POSTERIOR WALL IN END DIASTOLE (LVPW): 6.5
LV EF: NORMAL %
LVOT VTI (LVOTVTI): 0.87
M PNEUMO DNA SPEC QL NAA+PROBE: NOT DETECTED
MV E TISSUE VEL MED (MESV): 5.33
MV E WAVE VEL/E TISSUE VEL MED(MSR): 5.77
MV PEAK A VELOCITY (MVPAV): 175
MV PEAK E VELOCITY (MVPEV): 0.78
PROTHROMBIN TIME: 16.7 SEC (ref 9.7–11.8)
RAINBOW EXTRA TUBES HOLD SPECIMEN: NORMAL
RAINBOW EXTRA TUBES HOLD SPECIMEN: NORMAL
RSV A RNA SPEC QL NAA+PROBE: NOT DETECTED
RSV B RNA SPEC QL NAA+PROBE: NOT DETECTED
RV END SYSTOLIC LONGITUDINAL STRAIN FREE WALL (RVGS): 2.5
RV+EV RNA SPEC QL NAA+PROBE: NOT DETECTED
SERVICE CMNT-IMP: NORMAL
TRICUSPID VALVE PEAK REGURGITATION VELOCITY (TRPV): 3.3
TV ESTIMATED RIGHT ARTERIAL PRESSURE (RAP): 12

## 2023-02-01 PROCEDURE — 10002803 HB RX 637: Performed by: INTERNAL MEDICINE

## 2023-02-01 PROCEDURE — 99233 SBSQ HOSP IP/OBS HIGH 50: CPT | Performed by: INTERNAL MEDICINE

## 2023-02-01 PROCEDURE — 99232 SBSQ HOSP IP/OBS MODERATE 35: CPT | Performed by: INTERNAL MEDICINE

## 2023-02-01 PROCEDURE — 10004651 HB RX, NO CHARGE ITEM: Performed by: INTERNAL MEDICINE

## 2023-02-01 PROCEDURE — 97530 THERAPEUTIC ACTIVITIES: CPT

## 2023-02-01 PROCEDURE — 85610 PROTHROMBIN TIME: CPT | Performed by: INTERNAL MEDICINE

## 2023-02-01 PROCEDURE — 93306 TTE W/DOPPLER COMPLETE: CPT | Performed by: INTERNAL MEDICINE

## 2023-02-01 PROCEDURE — 76376 3D RENDER W/INTRP POSTPROCES: CPT | Performed by: INTERNAL MEDICINE

## 2023-02-01 PROCEDURE — 93306 TTE W/DOPPLER COMPLETE: CPT

## 2023-02-01 PROCEDURE — 36415 COLL VENOUS BLD VENIPUNCTURE: CPT | Performed by: INTERNAL MEDICINE

## 2023-02-01 PROCEDURE — 94640 AIRWAY INHALATION TREATMENT: CPT

## 2023-02-01 PROCEDURE — 10002800 HB RX 250 W HCPCS: Performed by: INTERNAL MEDICINE

## 2023-02-01 PROCEDURE — 10003585 HB ROOM CHARGE INTERMEDIATE CARE

## 2023-02-01 PROCEDURE — 10004180 HB COUNTER-TRANSPORT

## 2023-02-01 PROCEDURE — 94660 CPAP INITIATION&MGMT: CPT

## 2023-02-01 RX ORDER — DOXYCYCLINE HYCLATE 100 MG/1
100 CAPSULE ORAL EVERY 12 HOURS SCHEDULED
Status: DISCONTINUED | OUTPATIENT
Start: 2023-02-01 | End: 2023-02-03 | Stop reason: HOSPADM

## 2023-02-01 RX ORDER — CEFDINIR 300 MG/1
300 CAPSULE ORAL EVERY 12 HOURS SCHEDULED
Status: DISCONTINUED | OUTPATIENT
Start: 2023-02-01 | End: 2023-02-02

## 2023-02-01 RX ADMIN — LEVOTHYROXINE SODIUM 137 MCG: 0.11 TABLET ORAL at 06:35

## 2023-02-01 RX ADMIN — OXYCODONE HYDROCHLORIDE AND ACETAMINOPHEN 1 TABLET: 10; 325 TABLET ORAL at 04:27

## 2023-02-01 RX ADMIN — CLOPIDOGREL BISULFATE 75 MG: 75 TABLET, FILM COATED ORAL at 09:19

## 2023-02-01 RX ADMIN — OXYCODONE HYDROCHLORIDE AND ACETAMINOPHEN 1 TABLET: 10; 325 TABLET ORAL at 21:17

## 2023-02-01 RX ADMIN — INSULIN LISPRO 1 UNITS: 100 INJECTION, SOLUTION INTRAVENOUS; SUBCUTANEOUS at 09:20

## 2023-02-01 RX ADMIN — ENALAPRIL MALEATE 5 MG: 5 TABLET ORAL at 09:19

## 2023-02-01 RX ADMIN — OXYCODONE HYDROCHLORIDE AND ACETAMINOPHEN 1 TABLET: 10; 325 TABLET ORAL at 09:19

## 2023-02-01 RX ADMIN — DULOXETINE HYDROCHLORIDE 60 MG: 30 CAPSULE, DELAYED RELEASE ORAL at 09:19

## 2023-02-01 RX ADMIN — ACETAMINOPHEN 650 MG: 325 TABLET ORAL at 21:17

## 2023-02-01 RX ADMIN — CARVEDILOL 12.5 MG: 12.5 TABLET, FILM COATED ORAL at 17:12

## 2023-02-01 RX ADMIN — OXYCODONE HYDROCHLORIDE AND ACETAMINOPHEN 1 TABLET: 10; 325 TABLET ORAL at 17:12

## 2023-02-01 RX ADMIN — Medication 6 MG: at 21:17

## 2023-02-01 RX ADMIN — INSULIN LISPRO 1 UNITS: 100 INJECTION, SOLUTION INTRAVENOUS; SUBCUTANEOUS at 17:12

## 2023-02-01 RX ADMIN — ENALAPRIL MALEATE 5 MG: 5 TABLET ORAL at 21:17

## 2023-02-01 RX ADMIN — WARFARIN SODIUM 7.5 MG: 2.5 TABLET ORAL at 17:12

## 2023-02-01 RX ADMIN — INSULIN LISPRO 1 UNITS: 100 INJECTION, SOLUTION INTRAVENOUS; SUBCUTANEOUS at 13:07

## 2023-02-01 RX ADMIN — ALPRAZOLAM 0.25 MG: 0.25 TABLET ORAL at 21:21

## 2023-02-01 RX ADMIN — DOXYCYCLINE 100 MG: 100 CAPSULE ORAL at 09:19

## 2023-02-01 RX ADMIN — CARVEDILOL 12.5 MG: 12.5 TABLET, FILM COATED ORAL at 09:19

## 2023-02-01 RX ADMIN — DOXYCYCLINE 100 MG: 100 CAPSULE ORAL at 21:17

## 2023-02-01 RX ADMIN — OXYCODONE HYDROCHLORIDE AND ACETAMINOPHEN 1 TABLET: 10; 325 TABLET ORAL at 13:07

## 2023-02-01 RX ADMIN — ATORVASTATIN CALCIUM 80 MG: 80 TABLET, FILM COATED ORAL at 21:17

## 2023-02-01 RX ADMIN — FLUTICASONE FUROATE, UMECLIDINIUM BROMIDE AND VILANTEROL TRIFENATATE 1 PUFF: 200; 62.5; 25 POWDER RESPIRATORY (INHALATION) at 09:57

## 2023-02-01 RX ADMIN — CEFDINIR 300 MG: 300 CAPSULE ORAL at 21:17

## 2023-02-01 RX ADMIN — Medication 1000 MCG: at 09:19

## 2023-02-01 ASSESSMENT — COGNITIVE AND FUNCTIONAL STATUS - GENERAL
BASIC_MOBILITY_CONVERTED_SCORE: 42.48
BASIC_MOBILITY_RAW_SCORE: 19

## 2023-02-01 ASSESSMENT — PAIN SCALES - GENERAL
PAINLEVEL_OUTOF10: 0
PAINLEVEL_OUTOF10: 0
PAINLEVEL_OUTOF10: 3
PAINLEVEL_OUTOF10: 10

## 2023-02-01 ASSESSMENT — ENCOUNTER SYMPTOMS: PAIN SEVERITY NOW: 0

## 2023-02-02 LAB
CHOLEST SERPL-MCNC: 189 MG/DL
CHOLEST/HDLC SERPL: 5.9 {RATIO}
FASTING DURATION TIME PATIENT: ABNORMAL H
GLUCOSE BLDC GLUCOMTR-MCNC: 134 MG/DL (ref 70–99)
GLUCOSE BLDC GLUCOMTR-MCNC: 143 MG/DL (ref 70–99)
GLUCOSE BLDC GLUCOMTR-MCNC: 146 MG/DL (ref 70–99)
GLUCOSE BLDC GLUCOMTR-MCNC: 147 MG/DL (ref 70–99)
GLUCOSE BLDC GLUCOMTR-MCNC: 179 MG/DL (ref 70–99)
HDLC SERPL-MCNC: 32 MG/DL
INR PPP: 1.7
LDLC SERPL CALC-MCNC: 128 MG/DL
NONHDLC SERPL-MCNC: 157 MG/DL
PROTHROMBIN TIME: 17.2 SEC (ref 9.7–11.8)
RAINBOW EXTRA TUBES HOLD SPECIMEN: NORMAL
RAINBOW EXTRA TUBES HOLD SPECIMEN: NORMAL
TRIGL SERPL-MCNC: 146 MG/DL

## 2023-02-02 PROCEDURE — 36415 COLL VENOUS BLD VENIPUNCTURE: CPT | Performed by: INTERNAL MEDICINE

## 2023-02-02 PROCEDURE — 94640 AIRWAY INHALATION TREATMENT: CPT

## 2023-02-02 PROCEDURE — 85610 PROTHROMBIN TIME: CPT | Performed by: INTERNAL MEDICINE

## 2023-02-02 PROCEDURE — 97110 THERAPEUTIC EXERCISES: CPT

## 2023-02-02 PROCEDURE — 10002803 HB RX 637: Performed by: INTERNAL MEDICINE

## 2023-02-02 PROCEDURE — 10004651 HB RX, NO CHARGE ITEM: Performed by: EMERGENCY MEDICINE

## 2023-02-02 PROCEDURE — 92526 ORAL FUNCTION THERAPY: CPT

## 2023-02-02 PROCEDURE — 10002800 HB RX 250 W HCPCS: Performed by: SPECIALIST

## 2023-02-02 PROCEDURE — 80061 LIPID PANEL: CPT | Performed by: SPECIALIST

## 2023-02-02 PROCEDURE — 99233 SBSQ HOSP IP/OBS HIGH 50: CPT | Performed by: INTERNAL MEDICINE

## 2023-02-02 PROCEDURE — 10003585 HB ROOM CHARGE INTERMEDIATE CARE

## 2023-02-02 PROCEDURE — 94660 CPAP INITIATION&MGMT: CPT

## 2023-02-02 PROCEDURE — 10004180 HB COUNTER-TRANSPORT

## 2023-02-02 PROCEDURE — 10002801 HB RX 250 W/O HCPCS: Performed by: INTERNAL MEDICINE

## 2023-02-02 PROCEDURE — 10002800 HB RX 250 W HCPCS: Performed by: INTERNAL MEDICINE

## 2023-02-02 PROCEDURE — 10002803 HB RX 637: Performed by: SPECIALIST

## 2023-02-02 PROCEDURE — 99222 1ST HOSP IP/OBS MODERATE 55: CPT | Performed by: SPECIALIST

## 2023-02-02 PROCEDURE — 99232 SBSQ HOSP IP/OBS MODERATE 35: CPT | Performed by: INTERNAL MEDICINE

## 2023-02-02 RX ORDER — WARFARIN SODIUM 5 MG/1
10 TABLET ORAL ONCE
Status: COMPLETED | OUTPATIENT
Start: 2023-02-02 | End: 2023-02-02

## 2023-02-02 RX ORDER — FUROSEMIDE 10 MG/ML
40 INJECTION INTRAMUSCULAR; INTRAVENOUS DAILY
Status: DISCONTINUED | OUTPATIENT
Start: 2023-02-02 | End: 2023-02-03 | Stop reason: HOSPADM

## 2023-02-02 RX ORDER — PREGABALIN 75 MG/1
300 CAPSULE ORAL 2 TIMES DAILY
Status: DISCONTINUED | OUTPATIENT
Start: 2023-02-03 | End: 2023-02-03 | Stop reason: HOSPADM

## 2023-02-02 RX ORDER — CARVEDILOL 25 MG/1
25 TABLET ORAL 2 TIMES DAILY WITH MEALS
Qty: 90 TABLET | Refills: 0 | Status: SHIPPED | OUTPATIENT
Start: 2023-02-02

## 2023-02-02 RX ORDER — CARVEDILOL 25 MG/1
25 TABLET ORAL 2 TIMES DAILY WITH MEALS
Status: DISCONTINUED | OUTPATIENT
Start: 2023-02-02 | End: 2023-02-03 | Stop reason: HOSPADM

## 2023-02-02 RX ORDER — HYDRALAZINE HYDROCHLORIDE 20 MG/ML
20 INJECTION INTRAMUSCULAR; INTRAVENOUS EVERY 6 HOURS PRN
Status: DISCONTINUED | OUTPATIENT
Start: 2023-02-02 | End: 2023-02-03 | Stop reason: HOSPADM

## 2023-02-02 RX ADMIN — OXYCODONE HYDROCHLORIDE AND ACETAMINOPHEN 1 TABLET: 10; 325 TABLET ORAL at 03:03

## 2023-02-02 RX ADMIN — CARVEDILOL 25 MG: 25 TABLET, FILM COATED ORAL at 18:01

## 2023-02-02 RX ADMIN — OXYCODONE HYDROCHLORIDE AND ACETAMINOPHEN 1 TABLET: 10; 325 TABLET ORAL at 13:32

## 2023-02-02 RX ADMIN — ATORVASTATIN CALCIUM 80 MG: 80 TABLET, FILM COATED ORAL at 21:56

## 2023-02-02 RX ADMIN — WARFARIN SODIUM 10 MG: 5 TABLET ORAL at 18:01

## 2023-02-02 RX ADMIN — OXYCODONE HYDROCHLORIDE AND ACETAMINOPHEN 1 TABLET: 10; 325 TABLET ORAL at 18:01

## 2023-02-02 RX ADMIN — OXYCODONE HYDROCHLORIDE AND ACETAMINOPHEN 1 TABLET: 10; 325 TABLET ORAL at 08:31

## 2023-02-02 RX ADMIN — OXYCODONE HYDROCHLORIDE AND ACETAMINOPHEN 1 TABLET: 10; 325 TABLET ORAL at 21:56

## 2023-02-02 RX ADMIN — DULOXETINE HYDROCHLORIDE 60 MG: 30 CAPSULE, DELAYED RELEASE ORAL at 08:26

## 2023-02-02 RX ADMIN — Medication 1000 MCG: at 08:26

## 2023-02-02 RX ADMIN — CEFDINIR 300 MG: 300 CAPSULE ORAL at 08:27

## 2023-02-02 RX ADMIN — ALPRAZOLAM 0.25 MG: 0.25 TABLET ORAL at 03:33

## 2023-02-02 RX ADMIN — DOXYCYCLINE 100 MG: 100 CAPSULE ORAL at 21:56

## 2023-02-02 RX ADMIN — SODIUM CHLORIDE, PRESERVATIVE FREE 2 ML: 5 INJECTION INTRAVENOUS at 08:32

## 2023-02-02 RX ADMIN — INSULIN LISPRO 1 UNITS: 100 INJECTION, SOLUTION INTRAVENOUS; SUBCUTANEOUS at 18:01

## 2023-02-02 RX ADMIN — FUROSEMIDE 40 MG: 10 INJECTION, SOLUTION INTRAVENOUS at 13:28

## 2023-02-02 RX ADMIN — ENALAPRIL MALEATE 5 MG: 5 TABLET ORAL at 08:26

## 2023-02-02 RX ADMIN — Medication 6 MG: at 21:56

## 2023-02-02 RX ADMIN — CARVEDILOL 12.5 MG: 12.5 TABLET, FILM COATED ORAL at 08:27

## 2023-02-02 RX ADMIN — ALPRAZOLAM 0.25 MG: 0.25 TABLET ORAL at 21:56

## 2023-02-02 RX ADMIN — FLUTICASONE FUROATE, UMECLIDINIUM BROMIDE AND VILANTEROL TRIFENATATE 1 PUFF: 200; 62.5; 25 POWDER RESPIRATORY (INHALATION) at 10:10

## 2023-02-02 RX ADMIN — EMPAGLIFLOZIN 10 MG: 10 TABLET, FILM COATED ORAL at 13:33

## 2023-02-02 RX ADMIN — ALBUTEROL SULFATE 2.5 MG: 2.5 SOLUTION RESPIRATORY (INHALATION) at 10:01

## 2023-02-02 RX ADMIN — LEVOTHYROXINE SODIUM 137 MCG: 0.11 TABLET ORAL at 05:42

## 2023-02-02 RX ADMIN — CLOPIDOGREL BISULFATE 75 MG: 75 TABLET, FILM COATED ORAL at 08:26

## 2023-02-02 RX ADMIN — DOXYCYCLINE 100 MG: 100 CAPSULE ORAL at 08:27

## 2023-02-02 RX ADMIN — SODIUM CHLORIDE, PRESERVATIVE FREE 2 ML: 5 INJECTION INTRAVENOUS at 21:59

## 2023-02-02 ASSESSMENT — PAIN SCALES - GENERAL
PAINLEVEL_OUTOF10: 8
PAINLEVEL_OUTOF10: 7
PAINLEVEL_OUTOF10: 0
PAINLEVEL_OUTOF10: 5
PAINLEVEL_OUTOF10: 0
PAINLEVEL_OUTOF10: 7
PAINLEVEL_OUTOF10: 6
PAINLEVEL_OUTOF10: 5
PAINLEVEL_OUTOF10: 0
PAINLEVEL_OUTOF10: 8

## 2023-02-02 ASSESSMENT — ENCOUNTER SYMPTOMS: PAIN SEVERITY NOW: 0

## 2023-02-02 ASSESSMENT — COGNITIVE AND FUNCTIONAL STATUS - GENERAL
BASIC_MOBILITY_CONVERTED_SCORE: 42.48
BASIC_MOBILITY_RAW_SCORE: 19

## 2023-02-03 VITALS
OXYGEN SATURATION: 93 % | DIASTOLIC BLOOD PRESSURE: 101 MMHG | RESPIRATION RATE: 16 BRPM | WEIGHT: 272.27 LBS | TEMPERATURE: 97.7 F | BODY MASS INDEX: 40.33 KG/M2 | SYSTOLIC BLOOD PRESSURE: 152 MMHG | HEIGHT: 69 IN | HEART RATE: 106 BPM

## 2023-02-03 LAB
ANION GAP SERPL CALC-SCNC: 12 MMOL/L (ref 7–19)
BUN SERPL-MCNC: 18 MG/DL (ref 6–20)
BUN/CREAT SERPL: 20 (ref 7–25)
CALCIUM SERPL-MCNC: 9.1 MG/DL (ref 8.4–10.2)
CHLORIDE SERPL-SCNC: 108 MMOL/L (ref 97–110)
CO2 SERPL-SCNC: 24 MMOL/L (ref 21–32)
CREAT SERPL-MCNC: 0.9 MG/DL (ref 0.67–1.17)
FASTING DURATION TIME PATIENT: ABNORMAL H
GFR SERPLBLD BASED ON 1.73 SQ M-ARVRAT: >90 ML/MIN
GLUCOSE BLDC GLUCOMTR-MCNC: 144 MG/DL (ref 70–99)
GLUCOSE BLDC GLUCOMTR-MCNC: 145 MG/DL (ref 70–99)
GLUCOSE BLDC GLUCOMTR-MCNC: 162 MG/DL (ref 70–99)
GLUCOSE SERPL-MCNC: 111 MG/DL (ref 70–99)
INR PPP: 1.9
MAGNESIUM SERPL-MCNC: 1.8 MG/DL (ref 1.7–2.4)
POTASSIUM SERPL-SCNC: 3.5 MMOL/L (ref 3.4–5.1)
POTASSIUM SERPL-SCNC: 4 MMOL/L (ref 3.4–5.1)
PROTHROMBIN TIME: 18.6 SEC (ref 9.7–11.8)
RAINBOW EXTRA TUBES HOLD SPECIMEN: NORMAL
SODIUM SERPL-SCNC: 140 MMOL/L (ref 135–145)

## 2023-02-03 PROCEDURE — 99232 SBSQ HOSP IP/OBS MODERATE 35: CPT | Performed by: SPECIALIST

## 2023-02-03 PROCEDURE — 10002800 HB RX 250 W HCPCS: Performed by: INTERNAL MEDICINE

## 2023-02-03 PROCEDURE — 99232 SBSQ HOSP IP/OBS MODERATE 35: CPT | Performed by: INTERNAL MEDICINE

## 2023-02-03 PROCEDURE — 94640 AIRWAY INHALATION TREATMENT: CPT

## 2023-02-03 PROCEDURE — 10002803 HB RX 637: Performed by: INTERNAL MEDICINE

## 2023-02-03 PROCEDURE — 84132 ASSAY OF SERUM POTASSIUM: CPT | Performed by: INTERNAL MEDICINE

## 2023-02-03 PROCEDURE — 10004180 HB COUNTER-TRANSPORT

## 2023-02-03 PROCEDURE — 36415 COLL VENOUS BLD VENIPUNCTURE: CPT | Performed by: SPECIALIST

## 2023-02-03 PROCEDURE — 10002803 HB RX 637: Performed by: SPECIALIST

## 2023-02-03 PROCEDURE — 83735 ASSAY OF MAGNESIUM: CPT | Performed by: SPECIALIST

## 2023-02-03 PROCEDURE — 10002800 HB RX 250 W HCPCS: Performed by: SPECIALIST

## 2023-02-03 PROCEDURE — 99239 HOSP IP/OBS DSCHRG MGMT >30: CPT | Performed by: INTERNAL MEDICINE

## 2023-02-03 PROCEDURE — 10004651 HB RX, NO CHARGE ITEM: Performed by: EMERGENCY MEDICINE

## 2023-02-03 PROCEDURE — 94660 CPAP INITIATION&MGMT: CPT

## 2023-02-03 PROCEDURE — 80048 BASIC METABOLIC PNL TOTAL CA: CPT | Performed by: SPECIALIST

## 2023-02-03 PROCEDURE — 85610 PROTHROMBIN TIME: CPT | Performed by: INTERNAL MEDICINE

## 2023-02-03 PROCEDURE — 94664 DEMO&/EVAL PT USE INHALER: CPT

## 2023-02-03 RX ORDER — SACUBITRIL AND VALSARTAN 24; 26 MG/1; MG/1
1 TABLET, FILM COATED ORAL 2 TIMES DAILY
Qty: 60 TABLET | Refills: 1 | Status: SHIPPED | OUTPATIENT
Start: 2023-02-05

## 2023-02-03 RX ORDER — POTASSIUM CHLORIDE 1500 MG/1
20 TABLET, EXTENDED RELEASE ORAL DAILY
Qty: 30 TABLET | Refills: 1 | Status: SHIPPED | OUTPATIENT
Start: 2023-02-03

## 2023-02-03 RX ORDER — POTASSIUM CHLORIDE 20 MEQ/1
40 TABLET, EXTENDED RELEASE ORAL ONCE
Status: COMPLETED | OUTPATIENT
Start: 2023-02-03 | End: 2023-02-03

## 2023-02-03 RX ORDER — CEFDINIR 300 MG/1
300 CAPSULE ORAL EVERY 12 HOURS SCHEDULED
Status: DISCONTINUED | OUTPATIENT
Start: 2023-02-03 | End: 2023-02-03

## 2023-02-03 RX ORDER — TORSEMIDE 10 MG/1
10 TABLET ORAL DAILY
Qty: 30 TABLET | Refills: 1 | Status: SHIPPED | OUTPATIENT
Start: 2023-02-03

## 2023-02-03 RX ORDER — DOXYCYCLINE HYCLATE 100 MG/1
100 CAPSULE ORAL EVERY 12 HOURS SCHEDULED
Qty: 6 CAPSULE | Refills: 0 | Status: SHIPPED | OUTPATIENT
Start: 2023-02-03 | End: 2023-02-06

## 2023-02-03 RX ADMIN — PREGABALIN 300 MG: 75 CAPSULE ORAL at 08:00

## 2023-02-03 RX ADMIN — CARVEDILOL 25 MG: 25 TABLET, FILM COATED ORAL at 17:47

## 2023-02-03 RX ADMIN — OXYCODONE HYDROCHLORIDE AND ACETAMINOPHEN 1 TABLET: 10; 325 TABLET ORAL at 16:05

## 2023-02-03 RX ADMIN — WARFARIN SODIUM 7.5 MG: 5 TABLET ORAL at 17:47

## 2023-02-03 RX ADMIN — EMPAGLIFLOZIN 10 MG: 10 TABLET, FILM COATED ORAL at 08:00

## 2023-02-03 RX ADMIN — PREGABALIN 300 MG: 75 CAPSULE ORAL at 01:01

## 2023-02-03 RX ADMIN — POTASSIUM CHLORIDE 40 MEQ: 1500 TABLET, EXTENDED RELEASE ORAL at 08:00

## 2023-02-03 RX ADMIN — SODIUM CHLORIDE, PRESERVATIVE FREE 2 ML: 5 INJECTION INTRAVENOUS at 08:00

## 2023-02-03 RX ADMIN — OXYCODONE HYDROCHLORIDE AND ACETAMINOPHEN 1 TABLET: 10; 325 TABLET ORAL at 11:41

## 2023-02-03 RX ADMIN — DOXYCYCLINE 100 MG: 100 CAPSULE ORAL at 08:00

## 2023-02-03 RX ADMIN — CARVEDILOL 25 MG: 25 TABLET, FILM COATED ORAL at 08:00

## 2023-02-03 RX ADMIN — FUROSEMIDE 40 MG: 10 INJECTION, SOLUTION INTRAVENOUS at 08:01

## 2023-02-03 RX ADMIN — OXYCODONE HYDROCHLORIDE AND ACETAMINOPHEN 1 TABLET: 10; 325 TABLET ORAL at 06:41

## 2023-02-03 RX ADMIN — OXYCODONE HYDROCHLORIDE AND ACETAMINOPHEN 1 TABLET: 10; 325 TABLET ORAL at 02:01

## 2023-02-03 RX ADMIN — CLOPIDOGREL BISULFATE 75 MG: 75 TABLET, FILM COATED ORAL at 08:00

## 2023-02-03 RX ADMIN — INSULIN LISPRO 1 UNITS: 100 INJECTION, SOLUTION INTRAVENOUS; SUBCUTANEOUS at 11:41

## 2023-02-03 RX ADMIN — Medication 1000 MCG: at 08:00

## 2023-02-03 RX ADMIN — FLUTICASONE FUROATE, UMECLIDINIUM BROMIDE AND VILANTEROL TRIFENATATE 1 PUFF: 200; 62.5; 25 POWDER RESPIRATORY (INHALATION) at 11:13

## 2023-02-03 RX ADMIN — DULOXETINE HYDROCHLORIDE 60 MG: 30 CAPSULE, DELAYED RELEASE ORAL at 08:00

## 2023-02-03 RX ADMIN — LEVOTHYROXINE SODIUM 137 MCG: 0.11 TABLET ORAL at 06:42

## 2023-02-03 ASSESSMENT — PAIN SCALES - GENERAL
PAINLEVEL_OUTOF10: 6
PAINLEVEL_OUTOF10: 8
PAINLEVEL_OUTOF10: 8
PAINLEVEL_OUTOF10: 6
PAINLEVEL_OUTOF10: 8
PAINLEVEL_OUTOF10: 2
PAINLEVEL_OUTOF10: 6
PAINLEVEL_OUTOF10: 6
PAINLEVEL_OUTOF10: 8

## 2023-02-03 ASSESSMENT — PAIN SCALES - WONG BAKER: WONGBAKER_NUMERICALRESPONSE: 0

## 2023-02-05 LAB
BACTERIA BLD CULT: NORMAL
BACTERIA BLD CULT: NORMAL

## 2023-02-09 ENCOUNTER — TELEPHONE (OUTPATIENT)
Dept: INTERNAL MEDICINE | Age: 73
End: 2023-02-09

## 2023-02-13 ENCOUNTER — TELEPHONE (OUTPATIENT)
Dept: INTERNAL MEDICINE CLINIC | Facility: CLINIC | Age: 73
End: 2023-02-13

## 2023-02-13 NOTE — TELEPHONE ENCOUNTER
Kannan Mendoza at [facility] calling to inform that they performed a medication review, potential interactions between:  levothyroxine 137 MCG Oral Tab   aspirin 81 MG Oral Tab EC   clopidogrel 75 MG Oral Tab     All three of these possibly interact with:  warfarin 1 MG Oral Tab

## 2023-02-20 ENCOUNTER — PATIENT OUTREACH (OUTPATIENT)
Dept: CASE MANAGEMENT | Age: 73
End: 2023-02-20

## 2023-02-20 NOTE — PROGRESS NOTES
JOSE ANTONIOA verified for CCM monthly outreach. I called patient and no answer. Phone kept ringing. I will follow up with patient at a later time.       Medical record reviewed including recent office visits and test results

## 2023-03-03 ENCOUNTER — TELEPHONE (OUTPATIENT)
Dept: INTERNAL MEDICINE CLINIC | Facility: CLINIC | Age: 73
End: 2023-03-03

## 2023-03-03 NOTE — TELEPHONE ENCOUNTER
Michael Montejo with [facility] fax the plan of care for patient on 2/27, 3/1 and today. Fax number confirmed.  Please sign and fax to [phone]

## 2023-03-10 ENCOUNTER — PATIENT OUTREACH (OUTPATIENT)
Dept: CASE MANAGEMENT | Age: 73
End: 2023-03-10

## 2023-03-17 ENCOUNTER — NURSE TRIAGE (OUTPATIENT)
Dept: INTERNAL MEDICINE CLINIC | Facility: CLINIC | Age: 73
End: 2023-03-17

## 2023-03-22 ENCOUNTER — PATIENT OUTREACH (OUTPATIENT)
Dept: CASE MANAGEMENT | Age: 73
End: 2023-03-22

## 2023-04-13 RX ORDER — TORSEMIDE 10 MG/1
10 TABLET ORAL DAILY
COMMUNITY
Start: 2023-03-04 | End: 2023-04-13

## 2023-04-13 RX ORDER — POTASSIUM CHLORIDE 1500 MG/1
1 TABLET, FILM COATED, EXTENDED RELEASE ORAL DAILY
Qty: 90 TABLET | Refills: 1 | Status: SHIPPED | OUTPATIENT
Start: 2023-04-13

## 2023-04-13 RX ORDER — LEVOTHYROXINE SODIUM 137 UG/1
137 TABLET ORAL
Qty: 90 TABLET | Refills: 1 | Status: SHIPPED | OUTPATIENT
Start: 2023-04-13 | End: 2023-04-14

## 2023-04-13 RX ORDER — POTASSIUM CHLORIDE 1500 MG/1
1 TABLET, FILM COATED, EXTENDED RELEASE ORAL DAILY
COMMUNITY
Start: 2023-03-04 | End: 2023-04-13

## 2023-04-13 RX ORDER — TORSEMIDE 10 MG/1
10 TABLET ORAL DAILY
Qty: 90 TABLET | Refills: 1 | Status: SHIPPED | OUTPATIENT
Start: 2023-04-13

## 2023-04-13 RX ORDER — POTASSIUM CHLORIDE 1500 MG/1
20 TABLET, FILM COATED, EXTENDED RELEASE ORAL DAILY
Qty: 30 TABLET | Refills: 0 | Status: CANCELLED | OUTPATIENT
Start: 2023-04-13 | End: 2023-05-13

## 2023-04-13 NOTE — TELEPHONE ENCOUNTER
Patient stated that he has been out of medications for 1 week now. Pharmacy told him to contact the doctor. Rx's pended. Patient scheduled a follow up appointment with Dr Simon Palmer. Stated that last few months has noticed that gets tired and short of breath with exertion. Has stents and sees a cardiologist. Has not contacted the cardiologist yet. Denies being in distress. No chest pain. No other symptoms. Advised patient that if symptoms worse to go to urgent care/ER. Patient agreed. Appointment made for 4/14/2023 at 11:45am with Dr Radha Rodriguez in [address] [address].

## 2023-04-14 ENCOUNTER — OFFICE VISIT (OUTPATIENT)
Dept: INTERNAL MEDICINE CLINIC | Facility: CLINIC | Age: 73
End: 2023-04-14

## 2023-04-14 VITALS
HEART RATE: 77 BPM | BODY MASS INDEX: 42.65 KG/M2 | DIASTOLIC BLOOD PRESSURE: 65 MMHG | OXYGEN SATURATION: 98 % | HEIGHT: 69 IN | WEIGHT: 288 LBS | SYSTOLIC BLOOD PRESSURE: 113 MMHG | TEMPERATURE: 97 F

## 2023-04-14 DIAGNOSIS — H53.9 CHANGE IN VISION: ICD-10-CM

## 2023-04-14 DIAGNOSIS — R06.02 SOB (SHORTNESS OF BREATH) ON EXERTION: Primary | ICD-10-CM

## 2023-04-14 PROCEDURE — 99214 OFFICE O/P EST MOD 30 MIN: CPT | Performed by: INTERNAL MEDICINE

## 2023-04-14 RX ORDER — LEVOTHYROXINE SODIUM 137 UG/1
137 TABLET ORAL
Qty: 90 TABLET | Refills: 1 | Status: SHIPPED | OUTPATIENT
Start: 2023-04-14

## 2023-04-19 ENCOUNTER — TELEPHONE (OUTPATIENT)
Dept: INTERNAL MEDICINE CLINIC | Facility: CLINIC | Age: 73
End: 2023-04-19

## 2023-04-19 NOTE — TELEPHONE ENCOUNTER
This medication were prescribed by cardiology, he needs to check with the, I did give him referal for cardiology,pls make sure he follows

## 2023-04-19 NOTE — TELEPHONE ENCOUNTER
Dr Gwen Hernandez: please review; patient on Entresto (given by Dr Graciela Schwartz per dispense history)  Should he be taking enalapril also?

## 2023-04-19 NOTE — TELEPHONE ENCOUNTER
Patient returned Nurse's call. Patient is still taking Rx Entresto, twice a day; once in a.m. and once in p.m.

## 2023-04-21 NOTE — TELEPHONE ENCOUNTER
Spoke to patient. He has an appointment on Monday with cardiology. He will sort out the medication at his appointment.

## 2023-04-21 NOTE — TELEPHONE ENCOUNTER
Triage RN, please follow up with patient. He reports having memory issues and did not call back today to provide an update. Thank you.

## 2023-04-28 ENCOUNTER — PATIENT OUTREACH (OUTPATIENT)
Dept: CASE MANAGEMENT | Age: 73
End: 2023-04-28

## 2023-04-28 ENCOUNTER — TELEPHONE (OUTPATIENT)
Dept: INTERNAL MEDICINE CLINIC | Facility: CLINIC | Age: 73
End: 2023-04-28

## 2023-04-28 DIAGNOSIS — J44.1 CHRONIC OBSTRUCTIVE PULMONARY DISEASE WITH ACUTE EXACERBATION (HCC): ICD-10-CM

## 2023-04-28 DIAGNOSIS — E78.5 HYPERLIPIDEMIA LDL GOAL <70: ICD-10-CM

## 2023-04-28 DIAGNOSIS — H40.2290 CHRONIC PRIMARY ANGLE-CLOSURE GLAUCOMA, UNSPECIFIED GLAUCOMA STAGE, UNSPECIFIED LATERALITY: ICD-10-CM

## 2023-04-28 DIAGNOSIS — E03.9 HYPOTHYROIDISM, UNSPECIFIED TYPE: ICD-10-CM

## 2023-04-28 DIAGNOSIS — I10 PRIMARY HYPERTENSION: ICD-10-CM

## 2023-04-28 DIAGNOSIS — E78.00 PURE HYPERCHOLESTEROLEMIA: ICD-10-CM

## 2023-04-28 DIAGNOSIS — E11.65 TYPE 2 DIABETES MELLITUS WITH HYPERGLYCEMIA, WITH LONG-TERM CURRENT USE OF INSULIN (HCC): ICD-10-CM

## 2023-04-28 DIAGNOSIS — Z79.4 TYPE 2 DIABETES MELLITUS WITH HYPERGLYCEMIA, WITH LONG-TERM CURRENT USE OF INSULIN (HCC): ICD-10-CM

## 2023-04-28 NOTE — TELEPHONE ENCOUNTER
Spoke to patient for monthly CCM. Patient recently seen PCP. Patient states he is not testing blood sugars was never advised to or how often and doesn't have testing supplies. Last A1c value was 9.1% done 1/12/2023.     After reviewing labs patient is also due for lipid and TSH/T4    Please contact patient to advise      Thank you

## 2023-05-01 ENCOUNTER — TELEPHONE (OUTPATIENT)
Dept: INTERNAL MEDICINE CLINIC | Facility: CLINIC | Age: 73
End: 2023-05-01

## 2023-05-01 NOTE — TELEPHONE ENCOUNTER
Current Outpatient Medications:     ENTRESTO ORAL TABLET 24-26 MG   TAKE 1 TABLET BY MOUTH IN THE MORNING AND 1 TABLET IN THE EVENING

## 2023-05-03 ENCOUNTER — MED REC SCAN ONLY (OUTPATIENT)
Dept: INTERNAL MEDICINE CLINIC | Facility: CLINIC | Age: 73
End: 2023-05-03

## 2023-05-05 RX ORDER — SODIUM CHLORIDE 9 MG/ML
INJECTION, SOLUTION INTRAVENOUS
Status: CANCELLED | OUTPATIENT
Start: 2023-05-06 | End: 2023-05-06

## 2023-05-10 ENCOUNTER — OFFICE VISIT (OUTPATIENT)
Dept: OPHTHALMOLOGY | Facility: CLINIC | Age: 73
End: 2023-05-10

## 2023-05-10 DIAGNOSIS — H40.003 GLAUCOMA SUSPECT OF BOTH EYES: ICD-10-CM

## 2023-05-10 DIAGNOSIS — Z96.1 PSEUDOPHAKIA, RIGHT EYE: ICD-10-CM

## 2023-05-10 DIAGNOSIS — E11.9 TYPE 2 DIABETES MELLITUS WITHOUT RETINOPATHY (HCC): Primary | ICD-10-CM

## 2023-05-10 DIAGNOSIS — H25.12 AGE-RELATED NUCLEAR CATARACT OF LEFT EYE: ICD-10-CM

## 2023-05-10 PROCEDURE — 92004 COMPRE OPH EXAM NEW PT 1/>: CPT | Performed by: OPHTHALMOLOGY

## 2023-05-10 PROCEDURE — 92250 FUNDUS PHOTOGRAPHY W/I&R: CPT | Performed by: OPHTHALMOLOGY

## 2023-05-10 PROCEDURE — 92015 DETERMINE REFRACTIVE STATE: CPT | Performed by: OPHTHALMOLOGY

## 2023-05-10 RX ORDER — LATANOPROST 50 UG/ML
1 SOLUTION/ DROPS OPHTHALMIC NIGHTLY
Qty: 3 EACH | Refills: 3 | Status: SHIPPED | OUTPATIENT
Start: 2023-05-10

## 2023-05-10 NOTE — PATIENT INSTRUCTIONS
Type 2 diabetes mellitus without retinopathy ([facility] [address]  Discussed with patient that hemoglobin A1C of 9.1 is too high. To consider referral to endocrinologist in the future for glycemic control. Patient should discuss with their primary care physician. Glaucoma suspect of both eyes  Discussed with patient that he is a glaucoma suspect based on increased cupping of the optic nerves in both eyes. Retinal photos taken today to document optic nerves. Glaucoma diagnostic testing ordered. Will not start medication, but will continue to observe. Patient verbalized understanding. Will start patient on Latanoprost in both eyes every night    Will see patient for next available visual field, OCT and pachy with no MD, then 1 month for a pressure check    Pseudophakia, right eye  No treatment    Age-related nuclear cataract of left eye  Discussed mild cataracts with patient. No treatment recommended at this time.      New glasses Rx given today, recommend update

## 2023-05-10 NOTE — ASSESSMENT & PLAN NOTE
Discussed with patient that hemoglobin A1C of 9.1 is too high. To consider referral to endocrinologist in the future for glycemic control. Patient should discuss with their primary care physician.

## 2023-05-10 NOTE — ASSESSMENT & PLAN NOTE
Discussed with patient that he is a glaucoma suspect based on increased cupping of the optic nerves in both eyes. Retinal photos taken today to document optic nerves. Glaucoma diagnostic testing ordered. Will not start medication, but will continue to observe. Patient verbalized understanding.     Will start patient on Latanoprost in both eyes every night    Will see patient for next available visual field, OCT and pachy with no MD, then 1 month for a pressure check

## 2023-05-15 NOTE — PROGRESS NOTES
JIE verified for CCM monthly outreach. I called patient and no answer. Phone just kept ringing. I will follow up with patient at a later time.       Medical record reviewed including recent office visits and test results 1 Principal Discharge DX:	Normal spontaneous vaginal delivery  Assessment and plan of treatment:	Follow up in the office in 6 weeks for a postpartum visit. Regular diet. Resume normal activity as tolerated. No heavy lifting, driving, or strenuous activity for 2 weeks.  Nothing per vagina such as tampons, intercourse, douches or tub baths for 6 weeks or until you see your doctor. Call your doctor with any signs and symptoms of infection such as fever, chills, nausea or vomiting. Call your doctor if you're unable to tolerate food, increase in vaginal bleeding or have difficulty urinating. Call your doctor if you have pain that is not relieved by your prescribed medications. Notify your doctor with any other concerns.  Secondary Diagnosis:	Gestational hypertension  Assessment and plan of treatment:	 your prescribed blood pressure monitor from [facility] [address] (1st floor, back of gift shop).  Follow-up in your OB office within 1 week for a blood pressure check.    Please take your blood pressure 3x/day. Call your doctor if your blood pressure is 140/90 or higher [140 (top number) OR 90 (bottom number)]. Return to hospital if your blood pressure is 160/110 or higher [160 (top number)  (bottom number)]. Call your doctor if you experience symptoms such as headache, blurry vision, epigastric pain, severe swelling or nausea/vomiting.

## 2023-05-16 ENCOUNTER — TELEPHONE (OUTPATIENT)
Dept: OPHTHALMOLOGY | Facility: CLINIC | Age: 73
End: 2023-05-16

## 2023-05-16 NOTE — TELEPHONE ENCOUNTER
Per pt states he lost latanaprost bottle last night, pt states he went to pharmacy and was told to contact office. Please advise thank you.
Spoke to pt and informed him since he lost his drops he may have to pay for 1 bottle out of pocket if insurance doesn't cover it since he needs it sooner then 1 deon refill. He is scheduled tomorrow for glaucoma testing. He confirmed.
normal behavior/normal affect

## 2023-05-17 ENCOUNTER — LAB ENCOUNTER (OUTPATIENT)
Dept: LAB | Facility: HOSPITAL | Age: 73
End: 2023-05-17
Attending: INTERNAL MEDICINE
Payer: MEDICARE

## 2023-05-17 ENCOUNTER — NURSE ONLY (OUTPATIENT)
Dept: OPHTHALMOLOGY | Facility: CLINIC | Age: 73
End: 2023-05-17

## 2023-05-17 DIAGNOSIS — H40.003 GLAUCOMA SUSPECT OF BOTH EYES: ICD-10-CM

## 2023-05-17 DIAGNOSIS — Z01.818 PRE-OP TESTING: ICD-10-CM

## 2023-05-17 LAB
ANION GAP SERPL CALC-SCNC: 4 MMOL/L (ref 0–18)
BUN BLD-MCNC: 31 MG/DL (ref 7–18)
BUN/CREAT SERPL: 24.6 (ref 10–20)
CALCIUM BLD-MCNC: 9.6 MG/DL (ref 8.5–10.1)
CHLORIDE SERPL-SCNC: 110 MMOL/L (ref 98–112)
CO2 SERPL-SCNC: 22 MMOL/L (ref 21–32)
CREAT BLD-MCNC: 1.26 MG/DL
DEPRECATED RDW RBC AUTO: 48.4 FL (ref 35.1–46.3)
ERYTHROCYTE [DISTWIDTH] IN BLOOD BY AUTOMATED COUNT: 15.7 % (ref 11–15)
FASTING STATUS PATIENT QL REPORTED: NO
GFR SERPLBLD BASED ON 1.73 SQ M-ARVRAT: 61 ML/MIN/1.73M2 (ref 60–?)
GLUCOSE BLD-MCNC: 167 MG/DL (ref 70–99)
HCT VFR BLD AUTO: 42.5 %
HGB BLD-MCNC: 12.6 G/DL
MCH RBC QN AUTO: 25.2 PG (ref 26–34)
MCHC RBC AUTO-ENTMCNC: 29.6 G/DL (ref 31–37)
MCV RBC AUTO: 85 FL
OSMOLALITY SERPL CALC.SUM OF ELEC: 292 MOSM/KG (ref 275–295)
PLATELET # BLD AUTO: 388 10(3)UL (ref 150–450)
POTASSIUM SERPL-SCNC: 4 MMOL/L (ref 3.5–5.1)
RBC # BLD AUTO: 5 X10(6)UL
SODIUM SERPL-SCNC: 136 MMOL/L (ref 136–145)
WBC # BLD AUTO: 11.4 X10(3) UL (ref 4–11)

## 2023-05-17 PROCEDURE — 36415 COLL VENOUS BLD VENIPUNCTURE: CPT

## 2023-05-17 PROCEDURE — 92083 EXTENDED VISUAL FIELD XM: CPT | Performed by: OPHTHALMOLOGY

## 2023-05-17 PROCEDURE — 80048 BASIC METABOLIC PNL TOTAL CA: CPT

## 2023-05-17 PROCEDURE — 92133 CPTRZD OPH DX IMG PST SGM ON: CPT | Performed by: OPHTHALMOLOGY

## 2023-05-17 PROCEDURE — 76514 ECHO EXAM OF EYE THICKNESS: CPT | Performed by: OPHTHALMOLOGY

## 2023-05-17 PROCEDURE — 85027 COMPLETE CBC AUTOMATED: CPT

## 2023-05-19 ENCOUNTER — HOSPITAL ENCOUNTER (OUTPATIENT)
Dept: INTERVENTIONAL RADIOLOGY/VASCULAR | Facility: HOSPITAL | Age: 73
Discharge: HOME | End: 2023-05-19
Attending: INTERNAL MEDICINE | Admitting: INTERNAL MEDICINE
Payer: MEDICARE

## 2023-05-19 VITALS
TEMPERATURE: 98 F | DIASTOLIC BLOOD PRESSURE: 69 MMHG | OXYGEN SATURATION: 95 % | WEIGHT: 291 LBS | BODY MASS INDEX: 43.1 KG/M2 | HEART RATE: 70 BPM | HEIGHT: 69 IN | RESPIRATION RATE: 14 BRPM | SYSTOLIC BLOOD PRESSURE: 129 MMHG

## 2023-05-19 DIAGNOSIS — R94.39 ABNORMAL STRESS TEST: ICD-10-CM

## 2023-05-19 DIAGNOSIS — I25.10 CAD (CORONARY ARTERY DISEASE): ICD-10-CM

## 2023-05-19 DIAGNOSIS — I50.20 HFREF (HEART FAILURE WITH REDUCED EJECTION FRACTION) (HCC): ICD-10-CM

## 2023-05-19 DIAGNOSIS — Z01.818 PRE-OP TESTING: Primary | ICD-10-CM

## 2023-05-19 LAB
GLUCOSE BLDC GLUCOMTR-MCNC: 178 MG/DL (ref 70–99)
INR BLD: 0.81 (ref 0.85–1.16)
ISTAT ACTIVATED CLOTTING TIME: 257 SECONDS (ref 125–137)
ISTAT ACTIVATED CLOTTING TIME: 257 SECONDS (ref 125–137)
PROTHROMBIN TIME: 11.1 SECONDS (ref 11.6–14.8)

## 2023-05-19 PROCEDURE — 4A023N7 MEASUREMENT OF CARDIAC SAMPLING AND PRESSURE, LEFT HEART, PERCUTANEOUS APPROACH: ICD-10-PCS | Performed by: INTERNAL MEDICINE

## 2023-05-19 PROCEDURE — 02C03ZZ EXTIRPATION OF MATTER FROM CORONARY ARTERY, ONE ARTERY, PERCUTANEOUS APPROACH: ICD-10-PCS | Performed by: INTERNAL MEDICINE

## 2023-05-19 PROCEDURE — 0715T HC PERCUTANEOUS TRANSLUMINAL CORONARY LITHOTRIPSY: CPT | Performed by: INTERNAL MEDICINE

## 2023-05-19 PROCEDURE — 85347 COAGULATION TIME ACTIVATED: CPT

## 2023-05-19 PROCEDURE — 92925: CPT | Performed by: INTERNAL MEDICINE

## 2023-05-19 PROCEDURE — 82962 GLUCOSE BLOOD TEST: CPT

## 2023-05-19 PROCEDURE — 99152 MOD SED SAME PHYS/QHP 5/>YRS: CPT | Performed by: INTERNAL MEDICINE

## 2023-05-19 PROCEDURE — 92924 PRQ TRLUML C ATHRC 1 ART&/BR: CPT | Performed by: INTERNAL MEDICINE

## 2023-05-19 PROCEDURE — 36415 COLL VENOUS BLD VENIPUNCTURE: CPT

## 2023-05-19 PROCEDURE — B2151ZZ FLUOROSCOPY OF LEFT HEART USING LOW OSMOLAR CONTRAST: ICD-10-PCS | Performed by: INTERNAL MEDICINE

## 2023-05-19 PROCEDURE — 02703ZZ DILATION OF CORONARY ARTERY, ONE ARTERY, PERCUTANEOUS APPROACH: ICD-10-PCS | Performed by: INTERNAL MEDICINE

## 2023-05-19 PROCEDURE — 0270346 DILATION OF CORONARY ARTERY, ONE ARTERY, BIFURCATION, WITH DRUG-ELUTING INTRALUMINAL DEVICE, PERCUTANEOUS APPROACH: ICD-10-PCS | Performed by: INTERNAL MEDICINE

## 2023-05-19 PROCEDURE — 99153 MOD SED SAME PHYS/QHP EA: CPT | Performed by: INTERNAL MEDICINE

## 2023-05-19 PROCEDURE — 85610 PROTHROMBIN TIME: CPT | Performed by: INTERNAL MEDICINE

## 2023-05-19 PROCEDURE — 02F03ZZ FRAGMENTATION IN CORONARY ARTERY, ONE ARTERY, PERCUTANEOUS APPROACH: ICD-10-PCS | Performed by: INTERNAL MEDICINE

## 2023-05-19 PROCEDURE — B2111ZZ FLUOROSCOPY OF MULTIPLE CORONARY ARTERIES USING LOW OSMOLAR CONTRAST: ICD-10-PCS | Performed by: INTERNAL MEDICINE

## 2023-05-19 PROCEDURE — 93458 L HRT ARTERY/VENTRICLE ANGIO: CPT | Performed by: INTERNAL MEDICINE

## 2023-05-19 RX ORDER — LIDOCAINE HYDROCHLORIDE 20 MG/ML
INJECTION, SOLUTION EPIDURAL; INFILTRATION; INTRACAUDAL; PERINEURAL
Status: COMPLETED
Start: 2023-05-19 | End: 2023-05-19

## 2023-05-19 RX ORDER — HEPARIN SODIUM 1000 [USP'U]/ML
INJECTION, SOLUTION INTRAVENOUS; SUBCUTANEOUS
Status: COMPLETED
Start: 2023-05-19 | End: 2023-05-19

## 2023-05-19 RX ORDER — MIDAZOLAM HYDROCHLORIDE 1 MG/ML
INJECTION INTRAMUSCULAR; INTRAVENOUS
Status: COMPLETED
Start: 2023-05-19 | End: 2023-05-19

## 2023-05-19 RX ORDER — ACETAMINOPHEN 325 MG/1
650 TABLET ORAL EVERY 6 HOURS PRN
Status: DISCONTINUED | OUTPATIENT
Start: 2023-05-19 | End: 2023-05-19

## 2023-05-19 RX ORDER — ASPIRIN 81 MG/1
324 TABLET, CHEWABLE ORAL ONCE
Status: COMPLETED | OUTPATIENT
Start: 2023-05-19 | End: 2023-05-19

## 2023-05-19 RX ORDER — NITROGLYCERIN 20 MG/100ML
INJECTION INTRAVENOUS
Status: COMPLETED
Start: 2023-05-19 | End: 2023-05-19

## 2023-05-19 RX ORDER — CLOPIDOGREL BISULFATE 75 MG/1
TABLET ORAL
Status: COMPLETED
Start: 2023-05-19 | End: 2023-05-19

## 2023-05-19 RX ORDER — ASPIRIN 81 MG/1
81 TABLET ORAL DAILY
Status: DISCONTINUED | OUTPATIENT
Start: 2023-05-20 | End: 2023-05-19

## 2023-05-19 RX ORDER — VERAPAMIL HYDROCHLORIDE 2.5 MG/ML
INJECTION, SOLUTION INTRAVENOUS
Status: COMPLETED
Start: 2023-05-19 | End: 2023-05-19

## 2023-05-19 RX ORDER — ASPIRIN 81 MG/1
TABLET, CHEWABLE ORAL
Status: COMPLETED
Start: 2023-05-19 | End: 2023-05-19

## 2023-05-19 RX ORDER — SODIUM CHLORIDE 9 MG/ML
INJECTION, SOLUTION INTRAVENOUS CONTINUOUS
Status: ACTIVE | OUTPATIENT
Start: 2023-05-19 | End: 2023-05-19

## 2023-05-19 RX ORDER — CLOPIDOGREL BISULFATE 75 MG/1
75 TABLET ORAL DAILY
Status: DISCONTINUED | OUTPATIENT
Start: 2023-05-20 | End: 2023-05-19

## 2023-05-19 RX ORDER — CHLORHEXIDINE GLUCONATE 4 G/100ML
30 SOLUTION TOPICAL
Status: DISCONTINUED | OUTPATIENT
Start: 2023-05-19 | End: 2023-05-19

## 2023-05-19 RX ADMIN — ASPIRIN 324 MG: 81 TABLET, CHEWABLE ORAL at 08:30:00

## 2023-05-19 RX ADMIN — SODIUM CHLORIDE: 9 INJECTION, SOLUTION INTRAVENOUS at 08:00:00

## 2023-05-19 NOTE — IVS NOTE
DISCHARGE NOTE     Pt is able to sit up and ambulate without difficulty. Pt voided and tolerated fluids and food. Procedural site remains dry and intact with good circulation, motion, and sensation. No signs and symptoms of bleeding/hematoma noted. IV access removed  Instruction provided, patient/family verbalizes understanding. Dr. Alannah Masterson spoke with patient/family post procedure. Right wrist with arm board and sling. Pt discharge via wheelchair to [address]       Follow up Appointment: 05/23 at 1pm with [address] [address] Prescription: Okay to restart warfarin tonight 10mg per MD. [facility]). He should be on 10mg all days but 7.5mg on Sundays and Wednesdays. He should have 5mg tablets at home. Next INR draw is Monday 05/22. Patient to call [phone] to have them draw at his house. Instructed and written down for patient. He should be on ASA 81mg and plavix for now.  Once his INR is greater than 2, he should stop the baby ASA per MD

## 2023-05-19 NOTE — INTERVAL H&P NOTE
Pre-op Diagnosis: * No pre-op diagnosis entered *    The above referenced H&P was reviewed by Ponce Diez MD on 5/19/2023, the patient was examined and no significant changes have occurred in the patient's condition since the H&P was performed. I discussed with the patient and/or legal representative the potential benefits, risks and side effects of this procedure; the likelihood of the patient achieving goals; and potential problems that might occur during recuperation. I discussed reasonable alternatives to the procedure, including risks, benefits and side effects related to the alternatives and risks related to not receiving this procedure. We will proceed with procedure as planned.

## 2023-05-19 NOTE — DIETARY NOTE
NUTRITION EDUCATION NOTE     Received consult for cardiac nutrition education. Verbally reviewed basic cardiac diet restrictions. Provided with Eating Heart-Healthy and NCM handout to reinforce. Receptive to instruction. Would benefit from outpt f/u. Expect fair compliance. Pt presented no questions regarding diet.         Urdu Pleasure  Dietetic Intern  [phone]

## 2023-05-19 NOTE — PROCEDURES
[facility]    Cardiac Cath Procedure Note  Sanna Childress. Patient Status:  Outpatient    3/23/1950 MRN [id number]   Location [facility] Attending Vinnie Patrick MD   Hosp Day # 0 PCP Cristina Duran MD       Cardiologist: John Crain MD  Primary Proceduralist: John Crain MD  Procedure Performed: LHC, COR, LV and Laser atherectomy, cutting balloon, shockwave lithotripsy and angioplasty LAD, PCI diagonal  Date of Procedure: 2023   Indication: Abnormal stress test    Summary of procedure:  Successful laser atherectomy, shockwave lithotripsy, Cutting Balloon and angioplasty LAD and PCI diagonal with tap of the LAD        Left Ventriculography and hemodynamics:   LV EF not done  LV EDP 12 mmHg  No gradient across aortic valve        Coronary Angiography  RCA:  Dominant and free of obstructive disease, supplies PDA and PL. Nonobstructive old RCA stents. Left main:  Free of obstructive disease    Left anterior descending: Proximal vessel nonobstructive, long stented segment from the proximal to the distal LAD. At the mid LAD at an area of 2 layers of stent which are underexpanded and at the takeoff of a large diagonal there is severe in-stent restenosis progressing down into the distal LAD. Diagonal is subtotaled at site of an old stent. Circumflex:  Free of obstructive disease, supplies large OM to the lateral wall which has a stent and is nonobstructive. LAD intervention  Lesion Characteristics-not torturous, heavily calcified. Type non-C lesion. Pre-intervention stenosis 95%, Post intervention stenosis 0%.   Pre FATOU 3, Post FATOU 3.      Guide Catheter: EBU 3.75  Wire: Heaven blue down LAD, Heaven black down diagonal  Laser atherectomy: 1.4 mm laser catheter 60/40, multiple passes  Pre-dil: Cutting balloon 3.5 x 20 mm Floydada balloon throughout the entirety of the stented segment  Shockwave lithotripsy: 4 mm shockwave balloon at mid LAD under expanded segment all treatments given  Post-dil: 3 x 30 mm NC balloon throughout entire stented segment      Diagonal intervention  Lesion Characteristics-severely torturous, severely calcified. Type non-C lesion. Pre-intervention stenosis 95%, Post intervention stenosis 0%. Pre FATOU 2, Post FATOU 3.      Guide Catheter: EBU 3.75  Wire: Heaven black down diagonal, Heaven blue down LAD  Pre-dil: 2.5 x 15 mm balloon  Stent: 2.5 x 16 mm Synergy GABRIELA with 4 mm NC balloon positioned in the LAD. After stent deployed balloon catheter removed and 4 mm NC balloon inflated in the LAD at 20 nabila  Post-dil: None      Summary of Case: After written informed consent was obtained from the patient, patient was brought to the cardiac catheterization laboratory. Patient was prepped and draped in the usual sterile fashion. Lidocaine 1% was used to infiltrate the right radial artery for local anesthesia and a 6 Turkish introducer sheath was inserted into the right radial artery. Selective coronary angiography performed with JR4 catheter for RCA and JL3.5 catheter for LCA. Angiography performed in standard projections. 6 [place]an JR4 catheter placed in LV for hemodynamics. Specimen sent to: No specimen collected  Estimated blood loss: 10 cc  Closure:  TR band      IV was maintained by RN and moderate conscious sedation of versed and fentanyl was given. Patient was assessed and monitoring of oxygen, heart rate and blood pressure by nurse and myself during the exam 60 minutes.       Jerrod Reinoso MD  05/19/23

## 2023-05-19 NOTE — CARDIAC REHAB
Cardiac Rehab Phase I    Activity:  Distance   Assistance needed   Patient tolerated activity . Education:  Handouts provided and reviewed: [address] Diet: Healthy Cardiac diet reviewed. Disease Process: Disease process reviewed.     Reviewed the following:       RISK FACTORS: Reviewed      SMOKING CESSATION: Reviewed      HOME EXERCISE ACTIVITY: Reviewed      OUTPATIENT CARDIAC REHAB: Referred to Cardiac Rehabilitation

## 2023-05-19 NOTE — DISCHARGE INSTRUCTIONS
TRANSRADIAL DISCHARGE INSTRUCTION  HOME CARE INSTRUCTIONS FOLLOWING CORONARY ANGIOGRAPHY,  INSERTION OF STENT IN THE CORONARY    Activity  DO NOT drive after the procedure. You may resume driving late the following day according to the nurse or physician's instructions  Plan on resting and relaxing tonight and tomorrow  Resume your normal activity after 48 hours, or as instructed by your physician  Avoid drinking alcohol for the next 24 hours  Avoid wrist flexion, extension, and fine motor activities (i.e. texting, typing, using computer mouse, etc.) for 24 hours  Do not lift or pull anything heavier than 5 to 8  pounds with affected hand for 1 week    What is Normal?  A small lump at the procedure site associated with mild tenderness when touched  The procedure site may be bruised or discolored  There may be a small amount of drainage on the bandage    Special Instructions   Drink plenty of fluids during the next 24 hours to \"flush\" the contrast from your system  Keep the bandage clean and dry  After 24 hours, you must remove the bandage. Do not put ointment, powders, or creams to site.   You can shower after removing the bandage, and wash the procedure site gently with soap and water  DO NOT submerge the procedure site for 1 week (no bath tubs or pools)  If you choose to wear a bandage for a few days, make sure it remains clean and dry and that it is changed daily  For local swelling: apply ice  Bleeding can occur at the procedure site - both on the outside of the skin and/or beneath the surface of the skin        If bleeding occurs: Elevate hand above heart and apply local pressure  Swelling or a large lump at the procedure site can occur, which may be accompanied by moderate to severe pain  If the bleeding does not stop, call 911 and continue to apply pressure    When to contact physician:   Swelling, pain, or bleeding at the site that is not relieved by applying ice or pressure  Signs of infection: Redness, warmth, drainage at the site, chills, or temperature of 100.5 or greater  Changes in sensation, numbness, or tingling of affected hand    You Received a Stent:    You will remain on an antiplatelet drug and/or aspirin. Antiplatelet medications are usually taken for six months to one year and should not be stopped unless your cardiologist directs you to do so. These medications help to prevent blockage at the stent site. If another physician or dentist asks you to stop your antiplatelet medication, you need to consult your cardiologist first.  Together, your cardiologist and other physician can discuss the risks that may be involved if you are not taking the antiplatelet medication   If an MRI is necessary, it may be done 4-6 weeks after your procedure. Verify this with your cardiologist  Keep your stent card with you at all times! If you need an MRI in the future, your stent card will need to be shown to the technologist before performing the MRI. A duplicate card CANNOT be reproduced. Other    You may resume your present diet, unless otherwise specified by your physician. A list of your medications was provided to you at discharge.         **If you have any question or concern, please call the on-call nurse at [phone]

## 2023-05-29 RX ORDER — FLUTICASONE FUROATE, UMECLIDINIUM BROMIDE AND VILANTEROL TRIFENATATE 200; 62.5; 25 UG/1; UG/1; UG/1
1 POWDER RESPIRATORY (INHALATION) DAILY
Qty: 1 EACH | Refills: 2 | Status: SHIPPED | OUTPATIENT
Start: 2023-05-29

## 2023-05-30 NOTE — TELEPHONE ENCOUNTER
Refill passed per [facility] protocol. Requested Prescriptions   Pending Prescriptions Disp Refills    Paola Recinos 766-63.5-73 MCG/ACT Inhalation Aerosol Powder, Breath Activated [Pharmacy Med Name: Rossy Mcguire 650-70.0-05 Mcg/Act Aer Glax]  0     Sig: Inhale 1 puff into the lungs daily.        Asthma & COPD Medication Protocol Passed - 5/29/2023  1:30 AM        Passed - In person appointment or virtual visit in the past 6 mos or appointment in next 3 mos     Recent Outpatient Visits              1 week ago Glaucoma suspect of both eyes    [facility], [address], [place]    Nurse Only    2 weeks ago Type 2 diabetes mellitus without retinopathy ([facility] [address]    Yola Cristina MD    Office Visit    1 month ago SOB (shortness of breath) on exertion    James Mack MD    Office Visit    4 months ago Annual physical exam    [address], [address] [place] , Christa Santiago MD    Office Visit    7 months ago Chronic obstructive pulmonary disease with acute exacerbation [facility])    [address], [address], Orion Brown APRN    Virtual Phone E/M          Future Appointments         Provider Department Appt Notes    In 1 week Karlo Jacobsen MD [address] EP/1 month IOP check, started Latanoprost OU  on 5/10/23                  Recent Outpatient Visits              1 week ago Glaucoma suspect of both eyes    [facility], [address], [place]    Nurse Only    2 weeks ago Type 2 diabetes mellitus without retinopathy ([facility] [address]    Yola Cristina MD    Office Visit    1 month ago SOB (shortness of breath) on exertion    [address], Moni Healy MD    Office Visit    4 months ago Annual physical exam    Bon Ro MD    Office Visit    7 months ago Chronic obstructive pulmonary disease with acute exacerbation [facility])    [facility], [address] [facility] [address], Phuc Brown APRN    Virtual Phone E/M          Future Appointments         Provider Department Appt Notes    In 1 week Lucie Boucher MD [address] EP/1 month IOP check, started Latanoprost OU  on 5/10/23

## 2023-05-31 ENCOUNTER — PATIENT OUTREACH (OUTPATIENT)
Dept: CASE MANAGEMENT | Age: 73
End: 2023-05-31

## 2023-05-31 DIAGNOSIS — I10 PRIMARY HYPERTENSION: ICD-10-CM

## 2023-05-31 DIAGNOSIS — H25.12 AGE-RELATED NUCLEAR CATARACT OF LEFT EYE: ICD-10-CM

## 2023-05-31 DIAGNOSIS — E11.9 TYPE 2 DIABETES MELLITUS WITHOUT RETINOPATHY (HCC): ICD-10-CM

## 2023-05-31 DIAGNOSIS — J44.1 CHRONIC OBSTRUCTIVE PULMONARY DISEASE WITH ACUTE EXACERBATION (HCC): ICD-10-CM

## 2023-05-31 DIAGNOSIS — H40.003 GLAUCOMA SUSPECT OF BOTH EYES: ICD-10-CM

## 2023-05-31 DIAGNOSIS — I25.10 CORONARY ARTERY DISEASE INVOLVING NATIVE CORONARY ARTERY WITHOUT ANGINA PECTORIS, UNSPECIFIED WHETHER NATIVE OR TRANSPLANTED HEART: ICD-10-CM

## 2023-05-31 DIAGNOSIS — E03.9 HYPOTHYROIDISM, UNSPECIFIED TYPE: ICD-10-CM

## 2023-05-31 DIAGNOSIS — E78.00 PURE HYPERCHOLESTEROLEMIA: ICD-10-CM

## 2023-05-31 RX ORDER — BIMATOPROST 0.1 MG/ML
SOLUTION/ DROPS OPHTHALMIC
COMMUNITY
Start: 2021-07-17

## 2023-06-20 ENCOUNTER — HOSPITAL ENCOUNTER (OUTPATIENT)
Facility: HOSPITAL | Age: 73
Setting detail: OBSERVATION
Discharge: HOME | End: 2023-06-21
Attending: EMERGENCY MEDICINE | Admitting: INTERNAL MEDICINE
Payer: MEDICARE

## 2023-06-20 ENCOUNTER — APPOINTMENT (OUTPATIENT)
Dept: CT IMAGING | Facility: HOSPITAL | Age: 73
End: 2023-06-20
Attending: INTERNAL MEDICINE
Payer: MEDICARE

## 2023-06-20 ENCOUNTER — APPOINTMENT (OUTPATIENT)
Dept: GENERAL RADIOLOGY | Facility: HOSPITAL | Age: 73
End: 2023-06-20
Attending: EMERGENCY MEDICINE
Payer: MEDICARE

## 2023-06-20 DIAGNOSIS — R06.00 DYSPNEA, UNSPECIFIED TYPE: Primary | ICD-10-CM

## 2023-06-20 LAB
ALBUMIN SERPL-MCNC: 3 G/DL (ref 3.4–5)
ALP LIVER SERPL-CCNC: 83 U/L
ALT SERPL-CCNC: 44 U/L
AMMONIA PLAS-MCNC: <10 UMOL/L (ref 11–32)
ANION GAP SERPL CALC-SCNC: 6 MMOL/L (ref 0–18)
AST SERPL-CCNC: 28 U/L (ref 15–37)
ATRIAL RATE: 70 BPM
BASOPHILS # BLD AUTO: 0.07 X10(3) UL (ref 0–0.2)
BASOPHILS NFR BLD AUTO: 0.5 %
BILIRUB DIRECT SERPL-MCNC: <0.1 MG/DL (ref 0–0.2)
BILIRUB SERPL-MCNC: 0.3 MG/DL (ref 0.1–2)
BILIRUB UR QL: NEGATIVE
BUN BLD-MCNC: 23 MG/DL (ref 7–18)
BUN/CREAT SERPL: 16.8 (ref 10–20)
CALCIUM BLD-MCNC: 8.6 MG/DL (ref 8.5–10.1)
CHLORIDE SERPL-SCNC: 111 MMOL/L (ref 98–112)
CLARITY UR: CLEAR
CO2 SERPL-SCNC: 21 MMOL/L (ref 21–32)
CREAT BLD-MCNC: 1.37 MG/DL
DEPRECATED RDW RBC AUTO: 51.2 FL (ref 35.1–46.3)
EOSINOPHIL # BLD AUTO: 0.68 X10(3) UL (ref 0–0.7)
EOSINOPHIL NFR BLD AUTO: 5.3 %
ERYTHROCYTE [DISTWIDTH] IN BLOOD BY AUTOMATED COUNT: 16.3 % (ref 11–15)
EST. AVERAGE GLUCOSE BLD GHB EST-MCNC: 180 MG/DL (ref 68–126)
GFR SERPLBLD BASED ON 1.73 SQ M-ARVRAT: 55 ML/MIN/1.73M2 (ref 60–?)
GLUCOSE BLD-MCNC: 187 MG/DL (ref 70–99)
GLUCOSE BLDC GLUCOMTR-MCNC: 178 MG/DL (ref 70–99)
GLUCOSE BLDC GLUCOMTR-MCNC: 185 MG/DL (ref 70–99)
GLUCOSE BLDC GLUCOMTR-MCNC: 209 MG/DL (ref 70–99)
GLUCOSE UR-MCNC: >1000 MG/DL
HBA1C MFR BLD: 7.9 % (ref ?–5.7)
HCT VFR BLD AUTO: 41.9 %
HGB BLD-MCNC: 12.2 G/DL
HGB UR QL STRIP.AUTO: NEGATIVE
IMM GRANULOCYTES # BLD AUTO: 0.07 X10(3) UL (ref 0–1)
IMM GRANULOCYTES NFR BLD: 0.5 %
INR BLD: 0.97 (ref 0.85–1.16)
KETONES UR-MCNC: NEGATIVE MG/DL
LEUKOCYTE ESTERASE UR QL STRIP.AUTO: NEGATIVE
LYMPHOCYTES # BLD AUTO: 2.04 X10(3) UL (ref 1–4)
LYMPHOCYTES NFR BLD AUTO: 16 %
MCH RBC QN AUTO: 25.5 PG (ref 26–34)
MCHC RBC AUTO-ENTMCNC: 29.1 G/DL (ref 31–37)
MCV RBC AUTO: 87.5 FL
MONOCYTES # BLD AUTO: 1.01 X10(3) UL (ref 0.1–1)
MONOCYTES NFR BLD AUTO: 7.9 %
NEUTROPHILS # BLD AUTO: 8.88 X10 (3) UL (ref 1.5–7.7)
NEUTROPHILS # BLD AUTO: 8.88 X10(3) UL (ref 1.5–7.7)
NEUTROPHILS NFR BLD AUTO: 69.8 %
NITRITE UR QL STRIP.AUTO: NEGATIVE
NT-PROBNP SERPL-MCNC: 245 PG/ML (ref ?–125)
OSMOLALITY SERPL CALC.SUM OF ELEC: 295 MOSM/KG (ref 275–295)
P AXIS: 47 DEGREES
P-R INTERVAL: 194 MS
PH UR: 6 [PH] (ref 5–8)
PLATELET # BLD AUTO: 309 10(3)UL (ref 150–450)
POTASSIUM SERPL-SCNC: 4.6 MMOL/L (ref 3.5–5.1)
PROT SERPL-MCNC: 6.8 G/DL (ref 6.4–8.2)
PROT UR-MCNC: NEGATIVE MG/DL
PROTHROMBIN TIME: 12.8 SECONDS (ref 11.6–14.8)
Q-T INTERVAL: 432 MS
QRS DURATION: 164 MS
QTC CALCULATION (BEZET): 466 MS
R AXIS: -61 DEGREES
RBC # BLD AUTO: 4.79 X10(6)UL
SODIUM SERPL-SCNC: 138 MMOL/L (ref 136–145)
SP GR UR STRIP: >1.03 (ref 1–1.03)
T AXIS: 39 DEGREES
TROPONIN I HIGH SENSITIVITY: 36 NG/L
TROPONIN I HIGH SENSITIVITY: 39 NG/L
UROBILINOGEN UR STRIP-ACNC: NORMAL
VENTRICULAR RATE: 70 BPM
WBC # BLD AUTO: 12.8 X10(3) UL (ref 4–11)

## 2023-06-20 PROCEDURE — 71045 X-RAY EXAM CHEST 1 VIEW: CPT | Performed by: EMERGENCY MEDICINE

## 2023-06-20 PROCEDURE — 71260 CT THORAX DX C+: CPT | Performed by: INTERNAL MEDICINE

## 2023-06-20 RX ORDER — DULOXETIN HYDROCHLORIDE 30 MG/1
30 CAPSULE, DELAYED RELEASE ORAL DAILY
Status: DISCONTINUED | OUTPATIENT
Start: 2023-06-20 | End: 2023-06-21

## 2023-06-20 RX ORDER — CARVEDILOL 12.5 MG/1
12.5 TABLET ORAL 2 TIMES DAILY WITH MEALS
Status: DISCONTINUED | OUTPATIENT
Start: 2023-06-20 | End: 2023-06-21

## 2023-06-20 RX ORDER — NICOTINE POLACRILEX 4 MG
30 LOZENGE BUCCAL
Status: DISCONTINUED | OUTPATIENT
Start: 2023-06-20 | End: 2023-06-21

## 2023-06-20 RX ORDER — ATORVASTATIN CALCIUM 80 MG/1
80 TABLET, FILM COATED ORAL DAILY
Status: DISCONTINUED | OUTPATIENT
Start: 2023-06-20 | End: 2023-06-21

## 2023-06-20 RX ORDER — OXYCODONE AND ACETAMINOPHEN 10; 325 MG/1; MG/1
1 TABLET ORAL 4 TIMES DAILY PRN
Status: DISCONTINUED | OUTPATIENT
Start: 2023-06-20 | End: 2023-06-21

## 2023-06-20 RX ORDER — DEXTROSE MONOHYDRATE 25 G/50ML
50 INJECTION, SOLUTION INTRAVENOUS
Status: DISCONTINUED | OUTPATIENT
Start: 2023-06-20 | End: 2023-06-21

## 2023-06-20 RX ORDER — PREGABALIN 75 MG/1
300 CAPSULE ORAL 2 TIMES DAILY
Status: DISCONTINUED | OUTPATIENT
Start: 2023-06-20 | End: 2023-06-21

## 2023-06-20 RX ORDER — CLOPIDOGREL BISULFATE 75 MG/1
75 TABLET ORAL DAILY
Status: DISCONTINUED | OUTPATIENT
Start: 2023-06-20 | End: 2023-06-21

## 2023-06-20 RX ORDER — HYDROCODONE BITARTRATE AND ACETAMINOPHEN 5; 325 MG/1; MG/1
1 TABLET ORAL EVERY 6 HOURS PRN
Status: DISCONTINUED | OUTPATIENT
Start: 2023-06-20 | End: 2023-06-20

## 2023-06-20 RX ORDER — ALBUTEROL SULFATE 90 UG/1
2 AEROSOL, METERED RESPIRATORY (INHALATION) EVERY 4 HOURS PRN
Status: DISCONTINUED | OUTPATIENT
Start: 2023-06-20 | End: 2023-06-21

## 2023-06-20 RX ORDER — NICOTINE POLACRILEX 4 MG
15 LOZENGE BUCCAL
Status: DISCONTINUED | OUTPATIENT
Start: 2023-06-20 | End: 2023-06-21

## 2023-06-20 RX ORDER — MELATONIN
3 NIGHTLY
Status: DISCONTINUED | OUTPATIENT
Start: 2023-06-20 | End: 2023-06-21

## 2023-06-20 RX ORDER — ASPIRIN 81 MG/1
81 TABLET ORAL DAILY
Status: DISCONTINUED | OUTPATIENT
Start: 2023-06-20 | End: 2023-06-21

## 2023-06-20 RX ORDER — LATANOPROST 50 UG/ML
1 SOLUTION/ DROPS OPHTHALMIC NIGHTLY
Status: DISCONTINUED | OUTPATIENT
Start: 2023-06-20 | End: 2023-06-21

## 2023-06-20 RX ORDER — WARFARIN SODIUM 5 MG/1
10 TABLET ORAL NIGHTLY
Status: DISCONTINUED | OUTPATIENT
Start: 2023-06-20 | End: 2023-06-21

## 2023-06-20 NOTE — PROGRESS NOTES
Significant Event - Fall Note    Date/Time of Fall: 06/20/2023 @ 12  Fall huddle completed: No  Description of patient fall: unwitnessed fall  facing toilet in bathroom on knees  Patient fell from: attempting to use urinal Activity when fall occurred: attempting to use urinal in bathroom  Where did fall occur: bathroom in cath lab overflow room 15  Was the fall assisted:No  Who witnessed the fall: Unwitnessed fall  Patient narrative of fall: patient states he is unsure how he fell. Attached to tele monitor and oxygen tubing and patient had gym shoes on with un-tied shoe laces. Staff narrative of fall: patient found in bathroom on knees facing toilet. Name of Provider notified of fall: Dr. Terrial Kehr  Family notification: None  Factors contributing to fall: extended length tubing from oxygen in place. Patient hooked to tele monitor with long leads and shoe laces untied. Physical: unsteady gait   Psychological: patient is impulsive/forgetful. Environmental: chair alarms available in COF. Medications received in the past 8 hours: 0.9 NS Bolus 500cc  Medication(s) Administered in past 8 Hours from 06/20/2023 0707 to 06/20/2023 1507     Date/Time Order Dose Route Action Action by Comments    06/20/2023 1208 CDT sodium chloride 0.9 % IV bolus 500 mL 500 mL Intravenous Chato Parmar, RN --      Was patient identified as high fall risk prior to fall: No  What interventions were in place prior to fall: patient is in cath lab overflow area where chair alarms are not readily available. Patient had call light near him but failed to ask for help.   Interventions post fall: transfer to   Additional comments: None

## 2023-06-20 NOTE — ED QUICK NOTES
Orders for admission, patient is aware of plan and ready to go upstairs.  Any questions, please call ED RN Yasemin De La Cruz  at extension [facility] guided IV RFA      LOC at time of transport: A x4  N/C @ 2 l

## 2023-06-21 ENCOUNTER — APPOINTMENT (OUTPATIENT)
Dept: CV DIAGNOSTICS | Facility: HOSPITAL | Age: 73
End: 2023-06-21
Attending: INTERNAL MEDICINE
Payer: MEDICARE

## 2023-06-21 VITALS
SYSTOLIC BLOOD PRESSURE: 157 MMHG | HEART RATE: 61 BPM | WEIGHT: 294.88 LBS | RESPIRATION RATE: 18 BRPM | HEIGHT: 69 IN | TEMPERATURE: 98 F | OXYGEN SATURATION: 96 % | BODY MASS INDEX: 43.68 KG/M2 | DIASTOLIC BLOOD PRESSURE: 109 MMHG

## 2023-06-21 LAB
ANION GAP SERPL CALC-SCNC: 5 MMOL/L (ref 0–18)
BASOPHILS # BLD AUTO: 0.08 X10(3) UL (ref 0–0.2)
BASOPHILS NFR BLD AUTO: 0.7 %
BUN BLD-MCNC: 18 MG/DL (ref 7–18)
BUN/CREAT SERPL: 19.8 (ref 10–20)
CALCIUM BLD-MCNC: 8.8 MG/DL (ref 8.5–10.1)
CHLORIDE SERPL-SCNC: 111 MMOL/L (ref 98–112)
CO2 SERPL-SCNC: 25 MMOL/L (ref 21–32)
CREAT BLD-MCNC: 0.91 MG/DL
DEPRECATED RDW RBC AUTO: 49.1 FL (ref 35.1–46.3)
EOSINOPHIL # BLD AUTO: 0.71 X10(3) UL (ref 0–0.7)
EOSINOPHIL NFR BLD AUTO: 6.5 %
ERYTHROCYTE [DISTWIDTH] IN BLOOD BY AUTOMATED COUNT: 16.1 % (ref 11–15)
GFR SERPLBLD BASED ON 1.73 SQ M-ARVRAT: 90 ML/MIN/1.73M2 (ref 60–?)
GLUCOSE BLD-MCNC: 158 MG/DL (ref 70–99)
GLUCOSE BLDC GLUCOMTR-MCNC: 162 MG/DL (ref 70–99)
HCT VFR BLD AUTO: 38.4 %
HGB BLD-MCNC: 11.6 G/DL
IMM GRANULOCYTES # BLD AUTO: 0.06 X10(3) UL (ref 0–1)
IMM GRANULOCYTES NFR BLD: 0.5 %
INR BLD: 1.79 (ref 0.85–1.16)
LYMPHOCYTES # BLD AUTO: 1.81 X10(3) UL (ref 1–4)
LYMPHOCYTES NFR BLD AUTO: 16.5 %
MCH RBC QN AUTO: 25.4 PG (ref 26–34)
MCHC RBC AUTO-ENTMCNC: 30.2 G/DL (ref 31–37)
MCV RBC AUTO: 84 FL
MONOCYTES # BLD AUTO: 0.99 X10(3) UL (ref 0.1–1)
MONOCYTES NFR BLD AUTO: 9 %
NEUTROPHILS # BLD AUTO: 7.32 X10 (3) UL (ref 1.5–7.7)
NEUTROPHILS # BLD AUTO: 7.32 X10(3) UL (ref 1.5–7.7)
NEUTROPHILS NFR BLD AUTO: 66.8 %
OSMOLALITY SERPL CALC.SUM OF ELEC: 297 MOSM/KG (ref 275–295)
PLATELET # BLD AUTO: 306 10(3)UL (ref 150–450)
POTASSIUM SERPL-SCNC: 4.1 MMOL/L (ref 3.5–5.1)
PROTHROMBIN TIME: 20.5 SECONDS (ref 11.6–14.8)
RBC # BLD AUTO: 4.57 X10(6)UL
SODIUM SERPL-SCNC: 141 MMOL/L (ref 136–145)
WBC # BLD AUTO: 11 X10(3) UL (ref 4–11)

## 2023-06-21 PROCEDURE — 93306 TTE W/DOPPLER COMPLETE: CPT | Performed by: INTERNAL MEDICINE

## 2023-06-21 PROCEDURE — 99222 1ST HOSP IP/OBS MODERATE 55: CPT | Performed by: INTERNAL MEDICINE

## 2023-06-21 NOTE — PROGRESS NOTES
Pt requesting to leave MD ELAINE notified. Pt signed Amin Yoni papers, this RN placed them in his chart. Larisa (daughter) called to pick him up. Pharmacy called to get his home medication returned to him. 1247: This RN picked up home medications from pharmacy and medications returned to pt.    1330: pt discharging.

## 2023-06-23 NOTE — PROGRESS NOTES
This visit is conducted using Telemedicine with live, interactive audio and video. Patient understands and accepts financial responsibility for any deductible, co-insurance and/or co-pays associated with this service.           Duration of service: 28 (flowsheet) 4  1/26/2019:  1yr ago                 Labs      Last refreshed: 5/21/2020  2:54 PM:  T4 View Report for additional information:  0.90 ng/dL  11/30/2018:  1yr ago          Last refreshed: 5/21/2020  2:54 PM:  TSH View Report for additional info AM    TRIG 168 (H) 02/20/2020 11:35 AM    VLDL 34 (H) 02/20/2020 11:35 AM    T4F 0.90 11/30/2018 01:04 PM    TSH 1.220 08/05/2019 03:21 PM    BUN 15 02/20/2020 11:35 AM    CREATSERUM 0.95 02/20/2020 11:35 AM    GFR 76 10/10/2017 07:46 PM           Past Med (30 mg total) by mouth daily.  90 capsule 3   • tamsulosin (FLOMAX) cap TAKE 2 CAPSULES(0.8 MG) BY MOUTH DAILY AT NIGHT 180 capsule 2   • LEVOTHYROXINE SODIUM 125 MCG Oral Tab TAKE 1 TABLET BY MOUTH EVERY MORNING 90 tablet 0   • lisinopril 2.5 MG Oral Tab T Limit NSAID use  - Limit alcohol consumption   -Recommend home blood pressure monitoring. Goal blood pressure readings should be under 135/85 at  90% of the time.         3. Type 2 diabetes mellitus with complication, without long-term current use of insuli Appropriate medical decision-making and tests are ordered as detailed in the plan of care .        Patient affirmed understanding of plan and all questions were answered Ndc# For Kenalog Only: 6096-1287-39 Ndc# For Kenalog Only: 3385-8532-28

## 2023-06-26 ENCOUNTER — PATIENT OUTREACH (OUTPATIENT)
Dept: CASE MANAGEMENT | Age: 73
End: 2023-06-26

## 2023-06-26 NOTE — PROGRESS NOTES
Population Health - Noted. Thank you , and please reach out again , may need to call daughter to assist with follow-up. Vik Up - FYI's  FYI, [address] [facility], [address] working on TCM f/u with you. FYI - daughter and [facility] working on follow-up appointment with Cardiovascular. Patient's PCP, Dr Tangela Herron was previously aware of pt leaving AMA, per result notes:   Cardiac Echo 6/21/23:     Result Notes     Juana Garza RN  6/22/2023 11:16 AM CDT Back to Top      Dr. Tyrese Mayfield  daughter and [facility] both aware. But daughter says patient may not want to follow-up. She will discuss further with him. And [facility] will contact patient, as well. First Call attempt. To patient's number. Left Voicemail that it is important to call back our office. Office phone number provided with office hours. Rn called daughter (on JOE) Patient's date of birth and full name both confirmed. RN informed of provider's message as detailed below - needs to see Cardiology ASAP. Daughter says he might not contact cardiology. She will inform patient, discuss with patient, including encouraging patient to call our office if additional questions. RN called [facility] , spoke with Belem . RN informed of provider's message as detailed below. And ejection fraction result. She verbalizes understanding of all information, and agreeable to plan. She will contact patient now. Jennifer Keyes MD  6/22/2023  9:58 AM CDT       Please call patient he left AMA yesterday before we had results from 2D echo.   His echo is abnormal has decreased ejection fraction and has regional wall motion abnormality he needs to follow-up with cardiology as soon as possible please also contact [facility] to contact the patient

## 2023-06-26 NOTE — PROGRESS NOTES
JIE verified for CCM monthly outreach. I called patient and left a detailed message to call back. I will follow up with patient at a later time.       Medical record reviewed including recent office visits and test results      Patient needs hospital follow up. 6/21 left AMA

## 2023-07-17 RX ORDER — CLOPIDOGREL BISULFATE 75 MG/1
75 TABLET ORAL DAILY
Qty: 90 TABLET | Refills: 3 | Status: SHIPPED | OUTPATIENT
Start: 2023-07-17

## 2023-07-17 RX ORDER — LATANOPROST 50 UG/ML
1 SOLUTION/ DROPS OPHTHALMIC NIGHTLY
Qty: 7.5 ML | Refills: 3 | Status: SHIPPED | OUTPATIENT
Start: 2023-07-17

## 2023-07-17 NOTE — TELEPHONE ENCOUNTER
Pt was seen on 5/17/23 for testing and is scheduled next on 7/25/23. Rx pended to [facility].

## 2023-07-17 NOTE — TELEPHONE ENCOUNTER
Please review. Protocol failed/ No protocol. Requested Prescriptions   Pending Prescriptions Disp Refills    clopidogrel 75 MG Oral Tab [Pharmacy Med Name: Clopidogrel 75 Mg Tab Auro] 90 tablet 3     Sig: Take 1 tablet (75 mg total) by mouth daily.        There is no refill protocol information for this order          Recent Outpatient Visits              2 months ago Glaucoma suspect of both eyes    [address], [address] [facility] [address], [place]    Nurse Only    2 months ago Type 2 diabetes mellitus without retinopathy ([facility] [address]    Shante Meyers MD    Office Visit    3 months ago SOB (shortness of breath) on exertion    [address], Salomón Green MD    Office Visit    6 months ago Annual physical exam    [address], [address], Yesica Obregon MD    Office Visit    9 months ago Chronic obstructive pulmonary disease with acute exacerbation [facility])    [facility], [address], [facility], [facility]    Virtual Phone E/M            Future Appointments         Provider Department Appt Notes    In 1 week Jimmy Montaño MD [address] EP/IOP check

## 2023-07-18 ENCOUNTER — TELEPHONE (OUTPATIENT)
Dept: INTERNAL MEDICINE CLINIC | Facility: CLINIC | Age: 73
End: 2023-07-18

## 2023-07-18 NOTE — H&P (VIEW-ONLY)
[facility], [facility] Urology  Follow-Up Visit    HPI: Joseline Hussein. is a 78year old male presents for a follow up visit. Patient was last seen on 6/17/2020. Here by himself. INTERVAL HISTORY: Patient tried topical triamcinolone cream for his phimosis.  He Pt scheduled appt   Flomax for the past several years as prescribed by his PCP.       PAST MEDICAL HISTORY: CAD s/p MI and PCI with multiple stents, DM, HTN, Glaucoma, HLD, morbid obesity, lumbar stenosis, hypothyroidism.     PAST SURGICAL HISTORY: PCI with coronary stents x 6 phimosis of the penile foreskin. Patient did not tolerate topical steroid therapy for his tight phimosis. He is requesting definitive management of his phimosis and left kidney stone. Findings reviewed with patient.   We previously discussed treatmen an updated urine culture prior to surgery. 25 mintues were spent with more than half the time in face-to-face discussion involving counseling, further management and treatment planning.     Sahil Lynn MD  7/7/2020

## 2023-07-18 NOTE — TELEPHONE ENCOUNTER
Patient called requesting a call back to obtain the phone number for the clinic's lab number that provide services to draw blood at [facility]  Patient misplaced number

## 2023-07-19 NOTE — TELEPHONE ENCOUNTER
Pt states the coumadin clinic had given him the number for the lab, he will call them to request the lab's phone number.

## 2023-07-24 ENCOUNTER — PATIENT OUTREACH (OUTPATIENT)
Dept: CASE MANAGEMENT | Age: 73
End: 2023-07-24

## 2023-07-24 ENCOUNTER — TELEPHONE (OUTPATIENT)
Dept: INTERNAL MEDICINE CLINIC | Facility: CLINIC | Age: 73
End: 2023-07-24

## 2023-07-24 DIAGNOSIS — H40.2290 CHRONIC PRIMARY ANGLE-CLOSURE GLAUCOMA, UNSPECIFIED GLAUCOMA STAGE, UNSPECIFIED LATERALITY: ICD-10-CM

## 2023-07-24 DIAGNOSIS — E11.65 TYPE 2 DIABETES MELLITUS WITH HYPERGLYCEMIA, WITH LONG-TERM CURRENT USE OF INSULIN (HCC): Primary | ICD-10-CM

## 2023-07-24 DIAGNOSIS — Z79.4 TYPE 2 DIABETES MELLITUS WITH HYPERGLYCEMIA, WITH LONG-TERM CURRENT USE OF INSULIN (HCC): Primary | ICD-10-CM

## 2023-07-24 DIAGNOSIS — E11.9 TYPE 2 DIABETES MELLITUS WITHOUT RETINOPATHY (HCC): ICD-10-CM

## 2023-07-24 DIAGNOSIS — J44.1 CHRONIC OBSTRUCTIVE PULMONARY DISEASE WITH ACUTE EXACERBATION (HCC): ICD-10-CM

## 2023-07-24 DIAGNOSIS — R26.81 UNSTEADINESS ON FEET: ICD-10-CM

## 2023-07-24 NOTE — TELEPHONE ENCOUNTER
Talked to patient he says that he has an appointment already with his Dr Susan Benítez on 07/27/2023, told patient that his PCP is Dr Gamez Angles he insist that it is Dr Susan Benítez. He states that he will call back due to he's at work.

## 2023-07-24 NOTE — TELEPHONE ENCOUNTER
Per CCM recommendation, see below. CSS kindly assist patient with scheduling for a follow-up appointment. Thank you. Patient current concerns:   Spoke with patient. Patient relates doing ok. Mood stable. Was seen 6/20 at ED admitted and left AMA. Has not followed up with cardiology yet. Called [facility]  patient scheduled 7/27 with NP. Continues to have on going weakness and dizziness. Continues to feel short of breath. Blood pressure unknown doesn't check. Denies any edema. Continues to have labs for blood thinner. Last A1c value was 7.9% done 6/20/2023. Blood sugars unknown not checking. States he has no machine. Numerous machines have been sent and he will recheck for it. Vision still declining. Informed patient uncontrolled DM can lead to vision issues. Scheduled tomorrow with eye doctor. Reminded patient. Patient following a heart healthy and DM diet. Memory is declining. More forgetful. Needs follow up with PCP he will schedule. Also needs to see neurology. No other questions or concerns.

## 2023-07-24 NOTE — PROGRESS NOTES
7/24/2023  Spoke to Dc Ng for CCM. Updates to patient care team/ comments: UTD  Patient reported changes in medications: reports changes he thinks. Med Adherence  Comment: reports adherence     Health Maintenance:     Zoster Vaccines(1 of 2) Never done  Colorectal Cancer Screening due on 06/11/2019  COVID-19 Vaccine(4 - Amin Peter series) due on 03/23/2022  Diabetes Care Foot Exam (Annual) due on 01/01/2023  PSA due on 07/01/2023  Influenza Vaccine(1) due on 10/01/2023  Diabetes Care A1C due on 12/20/2023  Annual Physical due on 01/12/2024  Diabetes Care: Microalb/Creat Ratio due on 01/12/2024  Diabetes Care Dilated Eye Exam due on 05/10/2024  Diabetes Care: GFR due on 06/21/2024  Annual Depression Screening Completed  Fall Risk Screening (Annual) Completed  Pneumococcal Vaccine: 65+ Years Completed    Patient current concerns:   Spoke with patient. Patient relates doing ok. Mood stable. Was seen 6/20 at ED admitted and left AMA. Has not followed up with cardiology yet. Called [facility]  patient scheduled 7/27 with NP. Continues to have on going weakness and dizziness. Continues to feel short of breath. Blood pressure unknown doesn't check. Denies any edema. Continues to have labs for blood thinner. Last A1c value was 7.9% done 6/20/2023. Blood sugars unknown not checking. States he has no machine. Numerous machines have been sent and he will recheck for it. Vision still declining. Informed patient uncontrolled DM can lead to vision issues. Scheduled tomorrow with eye doctor. Reminded patient. Patient following a heart healthy and DM diet. Memory is declining. More forgetful. Needs follow up with PCP he will schedule. Also needs to see neurology. No other questions or concerns. Encouraged patient:   Self care: Take the time to do the things you love. Nutrition:  Good nutrition helps us to maintain our weight, fight off infection, and help reduce the risk of developing other chronic issues.    Physical activity:  Physical activity is important to help maintain independence and improve quality of life. Future Appointments   Date Time Provider Andrea Brown   7/25/2023 11:15 AM Chicho Welch MD [facility]     Previous goal met: continue with active goal     Self Management Goals/Action Plan: Active goal from previous outreach: Staying healthy, maintain independence, and stability. Patient reported progress toward goal: Declining     Patient Reported New Barriers And Concerns: as per above                    - Plan for overcoming all barriers: encourged patient to call with any questions or concerns. Care Manager Follow Up: 1 month   Reason For Follow Up: review progress and or barriers towards patients goals. Care managers interventions: Continue to provide encouragement and education for healthy coping and diagnosis. Called [facility] they will contact patient to schedule follow up. Time Spent This Encounter Total: 21 min medical record review, telephone communication, care plan updates where needed, education, goals and action plan recreation/update. Provided acknowledgment and validation to patient's concerns. Monthly Minute Total including today: 21 min     Physical assessment, complete health history, and care plan established by Anca Deng MD, need for CCM determined from these assessments and data.

## 2023-08-04 NOTE — TELEPHONE ENCOUNTER
Please review; protocol failed.  Or has no protocol    Requested Prescriptions   Pending Prescriptions Disp Refills    VENTOLIN  (90 Base) MCG/ACT Inhalation Aero Soln [Pharmacy Med Name: Ventolin Hfa 108 Mcg/Act Aer Glax] 54 g 0     Sig: INHALE 2 PUFFS INTO THE LUNGS EVERY 4 HOURS AS NEEDED FOR WHEEZING       Asthma & COPD Medication Protocol Passed - 8/3/2023  5:37 PM        Passed - In person appointment or virtual visit in the past 6 mos or appointment in next 3 mos     Recent Outpatient Visits              2 months ago Glaucoma suspect of both eyes    [address], [address], [address]    Nurse Only    2 months ago Type 2 diabetes mellitus without retinopathy ([facility] [address]    Leopoldo Cooper MD    Office Visit    3 months ago SOB (shortness of breath) on exertion    [address], Adelfo Taylor MD    Office Visit    6 months ago Annual physical exam    [address], [address] , Phares Crigler, MD    Office Visit    10 months ago Chronic obstructive pulmonary disease with acute exacerbation [facility])    [address], [address], James Nayana, Carlos, APRN    Virtual Phone E/M                            Recent Outpatient Visits              2 months ago Glaucoma suspect of both eyes    [address], [address], [place]    Nurse Only    2 months ago Type 2 diabetes mellitus without retinopathy ([facility] [address]    Leopoldo Cooper MD    Office Visit    3 months ago SOB (shortness of breath) on exertion    [address], Adelfo Taylor MD    Office Visit    6 months ago Annual physical exam    [address], Adelfo Taylor MD    Office Visit    10 months ago Chronic obstructive pulmonary disease with acute exacerbation [facility])    Karina Paz, Carlos Brown, MARIBEL    Virtual Phone E/M

## 2023-08-07 RX ORDER — ALBUTEROL SULFATE 90 UG/1
2 AEROSOL, METERED RESPIRATORY (INHALATION) EVERY 4 HOURS PRN
Qty: 54 G | Refills: 0 | Status: SHIPPED | OUTPATIENT
Start: 2023-08-07

## 2023-08-21 ENCOUNTER — PATIENT OUTREACH (OUTPATIENT)
Dept: CASE MANAGEMENT | Age: 73
End: 2023-08-21

## 2023-08-21 NOTE — PROGRESS NOTES
JIE verified for CCM monthly outreach. Spoke to patient. Patient states he did not follow up with any specialist. Has been dealing with family stress and health issues of son. Validated patients feelings and also expressed the importance of taking care of himself so is able to help his son as well. PCP follow up made and patient aware of date and time.      Medical record reviewed including recent office visits and test results      Future Appointments   Date Time Provider Andrea Brown   8/24/2023  1:00 PM Lois Allen MD [facility]

## 2023-08-29 RX ORDER — FLUTICASONE FUROATE, UMECLIDINIUM BROMIDE AND VILANTEROL TRIFENATATE 200; 62.5; 25 UG/1; UG/1; UG/1
1 POWDER RESPIRATORY (INHALATION) DAILY
Qty: 180 EACH | Refills: 3 | Status: SHIPPED | OUTPATIENT
Start: 2023-08-29

## 2023-08-31 NOTE — PLAN OF CARE
CARDIOVASCULAR - ADULT    • Maintains optimal cardiac output and hemodynamic stability Progressing    Pt on tele, NSR with BBB. Plan for angiogram this afternoon, CABG vs. PCI      Pt alert and oriented. On room air. Up ad lucius, encouraged ambulation.  Voids Stable

## 2023-09-12 ENCOUNTER — PATIENT OUTREACH (OUTPATIENT)
Dept: CASE MANAGEMENT | Age: 73
End: 2023-09-12

## 2023-09-22 ENCOUNTER — APPOINTMENT (OUTPATIENT)
Dept: GENERAL RADIOLOGY | Facility: HOSPITAL | Age: 73
End: 2023-09-22
Attending: EMERGENCY MEDICINE
Payer: MEDICARE

## 2023-09-22 ENCOUNTER — APPOINTMENT (OUTPATIENT)
Dept: CT IMAGING | Facility: HOSPITAL | Age: 73
End: 2023-09-22
Attending: EMERGENCY MEDICINE
Payer: MEDICARE

## 2023-09-22 ENCOUNTER — HOSPITAL ENCOUNTER (INPATIENT)
Facility: HOSPITAL | Age: 73
LOS: 2 days | Discharge: LEFT BEFORE DISCHARGE | End: 2023-09-26
Attending: EMERGENCY MEDICINE | Admitting: INTERNAL MEDICINE
Payer: MEDICARE

## 2023-09-22 ENCOUNTER — HOSPITAL ENCOUNTER (INPATIENT)
Facility: HOSPITAL | Age: 73
End: 2023-09-22
Attending: EMERGENCY MEDICINE | Admitting: INTERNAL MEDICINE
Payer: MEDICARE

## 2023-09-22 DIAGNOSIS — R55 SYNCOPE AND COLLAPSE: Primary | ICD-10-CM

## 2023-09-22 PROBLEM — D64.9 ANEMIA: Status: ACTIVE | Noted: 2023-09-22

## 2023-09-22 PROBLEM — N17.9 ACUTE KIDNEY INJURY (HCC): Status: ACTIVE | Noted: 2023-09-22

## 2023-09-22 PROBLEM — R73.9 HYPERGLYCEMIA: Status: ACTIVE | Noted: 2023-09-22

## 2023-09-22 PROBLEM — N17.9 ACUTE KIDNEY INJURY: Status: ACTIVE | Noted: 2023-09-22

## 2023-09-22 LAB
ALBUMIN SERPL-MCNC: 3.1 G/DL (ref 3.4–5)
ALBUMIN/GLOB SERPL: 0.8 {RATIO} (ref 1–2)
ALP LIVER SERPL-CCNC: 103 U/L
ALT SERPL-CCNC: 38 U/L
ANION GAP SERPL CALC-SCNC: 9 MMOL/L (ref 0–18)
APTT PPP: 38.4 SECONDS (ref 23.3–35.6)
AST SERPL-CCNC: 35 U/L (ref 15–37)
BASOPHILS # BLD AUTO: 0.08 X10(3) UL (ref 0–0.2)
BASOPHILS NFR BLD AUTO: 0.8 %
BILIRUB SERPL-MCNC: 0.3 MG/DL (ref 0.1–2)
BUN BLD-MCNC: 22 MG/DL (ref 7–18)
BUN/CREAT SERPL: 15.4 (ref 10–20)
CALCIUM BLD-MCNC: 8.6 MG/DL (ref 8.5–10.1)
CHLORIDE SERPL-SCNC: 108 MMOL/L (ref 98–112)
CO2 SERPL-SCNC: 22 MMOL/L (ref 21–32)
CREAT BLD-MCNC: 1.43 MG/DL
DEPRECATED RDW RBC AUTO: 53.8 FL (ref 35.1–46.3)
EGFRCR SERPLBLD CKD-EPI 2021: 52 ML/MIN/1.73M2 (ref 60–?)
EOSINOPHIL # BLD AUTO: 0.59 X10(3) UL (ref 0–0.7)
EOSINOPHIL NFR BLD AUTO: 5.8 %
ERYTHROCYTE [DISTWIDTH] IN BLOOD BY AUTOMATED COUNT: 17.9 % (ref 11–15)
EST. AVERAGE GLUCOSE BLD GHB EST-MCNC: 212 MG/DL (ref 68–126)
GLOBULIN PLAS-MCNC: 4.1 G/DL (ref 2.8–4.4)
GLUCOSE BLD-MCNC: 349 MG/DL (ref 70–99)
GLUCOSE BLDC GLUCOMTR-MCNC: 218 MG/DL (ref 70–99)
HBA1C MFR BLD: 9 % (ref ?–5.7)
HCT VFR BLD AUTO: 40.6 %
HGB BLD-MCNC: 11.8 G/DL
IMM GRANULOCYTES # BLD AUTO: 0.06 X10(3) UL (ref 0–1)
IMM GRANULOCYTES NFR BLD: 0.6 %
INR BLD: 2.24 (ref 0.85–1.16)
LYMPHOCYTES # BLD AUTO: 2.11 X10(3) UL (ref 1–4)
LYMPHOCYTES NFR BLD AUTO: 20.6 %
MCH RBC QN AUTO: 24.2 PG (ref 26–34)
MCHC RBC AUTO-ENTMCNC: 29.1 G/DL (ref 31–37)
MCV RBC AUTO: 83.2 FL
MONOCYTES # BLD AUTO: 0.83 X10(3) UL (ref 0.1–1)
MONOCYTES NFR BLD AUTO: 8.1 %
NEUTROPHILS # BLD AUTO: 6.57 X10 (3) UL (ref 1.5–7.7)
NEUTROPHILS # BLD AUTO: 6.57 X10(3) UL (ref 1.5–7.7)
NEUTROPHILS NFR BLD AUTO: 64.1 %
NT-PROBNP SERPL-MCNC: 190 PG/ML (ref ?–125)
OSMOLALITY SERPL CALC.SUM OF ELEC: 305 MOSM/KG (ref 275–295)
PLATELET # BLD AUTO: 312 10(3)UL (ref 150–450)
POTASSIUM SERPL-SCNC: 4.9 MMOL/L (ref 3.5–5.1)
PROT SERPL-MCNC: 7.2 G/DL (ref 6.4–8.2)
PROTHROMBIN TIME: 24.3 SECONDS (ref 11.6–14.8)
RBC # BLD AUTO: 4.88 X10(6)UL
SODIUM SERPL-SCNC: 139 MMOL/L (ref 136–145)
TROPONIN I HIGH SENSITIVITY: 39 NG/L
WBC # BLD AUTO: 10.2 X10(3) UL (ref 4–11)

## 2023-09-22 PROCEDURE — 71045 X-RAY EXAM CHEST 1 VIEW: CPT | Performed by: EMERGENCY MEDICINE

## 2023-09-22 PROCEDURE — 70450 CT HEAD/BRAIN W/O DYE: CPT | Performed by: EMERGENCY MEDICINE

## 2023-09-22 RX ORDER — ECHINACEA PURPUREA EXTRACT 125 MG
1 TABLET ORAL
Status: DISCONTINUED | OUTPATIENT
Start: 2023-09-22 | End: 2023-09-26

## 2023-09-22 RX ORDER — NICOTINE POLACRILEX 4 MG
15 LOZENGE BUCCAL
Status: DISCONTINUED | OUTPATIENT
Start: 2023-09-22 | End: 2023-09-26

## 2023-09-22 RX ORDER — ENEMA 19; 7 G/133ML; G/133ML
1 ENEMA RECTAL ONCE AS NEEDED
Status: DISCONTINUED | OUTPATIENT
Start: 2023-09-22 | End: 2023-09-26

## 2023-09-22 RX ORDER — DULOXETIN HYDROCHLORIDE 30 MG/1
30 CAPSULE, DELAYED RELEASE ORAL DAILY
Status: DISCONTINUED | OUTPATIENT
Start: 2023-09-23 | End: 2023-09-26

## 2023-09-22 RX ORDER — TRAZODONE HYDROCHLORIDE 50 MG/1
25 TABLET ORAL ONCE
Status: COMPLETED | OUTPATIENT
Start: 2023-09-22 | End: 2023-09-22

## 2023-09-22 RX ORDER — MELATONIN
3 NIGHTLY PRN
Status: DISCONTINUED | OUTPATIENT
Start: 2023-09-22 | End: 2023-09-26

## 2023-09-22 RX ORDER — GUAIFENESIN 600 MG/1
600 TABLET, EXTENDED RELEASE ORAL 2 TIMES DAILY
Status: DISCONTINUED | OUTPATIENT
Start: 2023-09-22 | End: 2023-09-26

## 2023-09-22 RX ORDER — ACETAMINOPHEN 500 MG
500 TABLET ORAL EVERY 4 HOURS PRN
Status: DISCONTINUED | OUTPATIENT
Start: 2023-09-22 | End: 2023-09-26

## 2023-09-22 RX ORDER — PREGABALIN 75 MG/1
300 CAPSULE ORAL 2 TIMES DAILY
Status: DISCONTINUED | OUTPATIENT
Start: 2023-09-22 | End: 2023-09-26

## 2023-09-22 RX ORDER — ASPIRIN 81 MG/1
81 TABLET ORAL DAILY
Status: DISCONTINUED | OUTPATIENT
Start: 2023-09-23 | End: 2023-09-26

## 2023-09-22 RX ORDER — MUSCLE RUB CREAM 100; 150 MG/G; MG/G
1 CREAM TOPICAL DAILY PRN
Status: DISCONTINUED | OUTPATIENT
Start: 2023-09-22 | End: 2023-09-26

## 2023-09-22 RX ORDER — LATANOPROST 50 UG/ML
1 SOLUTION/ DROPS OPHTHALMIC NIGHTLY
Status: DISCONTINUED | OUTPATIENT
Start: 2023-09-22 | End: 2023-09-26

## 2023-09-22 RX ORDER — BISACODYL 10 MG
10 SUPPOSITORY, RECTAL RECTAL
Status: DISCONTINUED | OUTPATIENT
Start: 2023-09-22 | End: 2023-09-26

## 2023-09-22 RX ORDER — SENNOSIDES 8.6 MG
17.2 TABLET ORAL NIGHTLY PRN
Status: DISCONTINUED | OUTPATIENT
Start: 2023-09-22 | End: 2023-09-26

## 2023-09-22 RX ORDER — POLYETHYLENE GLYCOL 3350 17 G/17G
17 POWDER, FOR SOLUTION ORAL DAILY PRN
Status: DISCONTINUED | OUTPATIENT
Start: 2023-09-22 | End: 2023-09-26

## 2023-09-22 RX ORDER — BENZONATATE 100 MG/1
200 CAPSULE ORAL 3 TIMES DAILY PRN
Status: DISCONTINUED | OUTPATIENT
Start: 2023-09-22 | End: 2023-09-26

## 2023-09-22 RX ORDER — TORSEMIDE 5 MG/1
10 TABLET ORAL DAILY
Status: DISCONTINUED | OUTPATIENT
Start: 2023-09-23 | End: 2023-09-26

## 2023-09-22 RX ORDER — METOCLOPRAMIDE HYDROCHLORIDE 5 MG/ML
10 INJECTION INTRAMUSCULAR; INTRAVENOUS EVERY 8 HOURS PRN
Status: DISCONTINUED | OUTPATIENT
Start: 2023-09-22 | End: 2023-09-26

## 2023-09-22 RX ORDER — POTASSIUM CHLORIDE 20 MEQ/1
20 TABLET, EXTENDED RELEASE ORAL DAILY
Status: DISCONTINUED | OUTPATIENT
Start: 2023-09-23 | End: 2023-09-26

## 2023-09-22 RX ORDER — DEXTROSE MONOHYDRATE 25 G/50ML
50 INJECTION, SOLUTION INTRAVENOUS
Status: DISCONTINUED | OUTPATIENT
Start: 2023-09-22 | End: 2023-09-26

## 2023-09-22 RX ORDER — ATORVASTATIN CALCIUM 80 MG/1
80 TABLET, FILM COATED ORAL DAILY
Status: DISCONTINUED | OUTPATIENT
Start: 2023-09-23 | End: 2023-09-26

## 2023-09-22 RX ORDER — ALBUTEROL SULFATE 90 UG/1
2 AEROSOL, METERED RESPIRATORY (INHALATION) EVERY 4 HOURS PRN
Status: DISCONTINUED | OUTPATIENT
Start: 2023-09-22 | End: 2023-09-26

## 2023-09-22 RX ORDER — CLOPIDOGREL BISULFATE 75 MG/1
75 TABLET ORAL DAILY
Status: DISCONTINUED | OUTPATIENT
Start: 2023-09-23 | End: 2023-09-26

## 2023-09-22 RX ORDER — ONDANSETRON 2 MG/ML
4 INJECTION INTRAMUSCULAR; INTRAVENOUS EVERY 6 HOURS PRN
Status: DISCONTINUED | OUTPATIENT
Start: 2023-09-22 | End: 2023-09-26

## 2023-09-22 RX ORDER — CARVEDILOL 12.5 MG/1
12.5 TABLET ORAL 2 TIMES DAILY WITH MEALS
Status: DISCONTINUED | OUTPATIENT
Start: 2023-09-22 | End: 2023-09-26

## 2023-09-22 RX ORDER — WARFARIN SODIUM 5 MG/1
10 TABLET ORAL NIGHTLY
Status: DISCONTINUED | OUTPATIENT
Start: 2023-09-22 | End: 2023-09-26

## 2023-09-22 RX ORDER — NICOTINE POLACRILEX 4 MG
30 LOZENGE BUCCAL
Status: DISCONTINUED | OUTPATIENT
Start: 2023-09-22 | End: 2023-09-26

## 2023-09-22 NOTE — ED QUICK NOTES
Orders for admission, patient is aware of plan and ready to go upstairs. Any questions, please call ED RN Liz Pacheco at extension [phone].      Patient Covid vaccination status: Fully vaccinated     COVID Test Ordered in ED: None    COVID Suspicion at Admission: N/A    Running Infusions:  None    Mental Status/LOC at time of transport: ALERT    Other pertinent information: On room air, ambulates independently, 1.5L O2 via nasal cannula, PRN O2 at home    CIWA score: N/A   NIH score:  N/A

## 2023-09-22 NOTE — ED INITIAL ASSESSMENT (HPI)
To ED for c/o chest pain that started yesterday. EMS states patient had a syncopal episode today from the chest pain. Patient states constant pain to left side of chest and radiates down to left side of arm. Patient states taking 325 mg of aspirin at home.

## 2023-09-23 ENCOUNTER — APPOINTMENT (OUTPATIENT)
Dept: CV DIAGNOSTICS | Facility: HOSPITAL | Age: 73
End: 2023-09-23
Attending: INTERNAL MEDICINE
Payer: MEDICARE

## 2023-09-23 PROBLEM — Z86.711 HISTORY OF PULMONARY EMBOLUS (PE): Status: ACTIVE | Noted: 2022-03-17

## 2023-09-23 LAB
ALBUMIN SERPL-MCNC: 2.8 G/DL (ref 3.4–5)
ALBUMIN/GLOB SERPL: 0.8 {RATIO} (ref 1–2)
ALP LIVER SERPL-CCNC: 89 U/L
ALT SERPL-CCNC: 33 U/L
ANION GAP SERPL CALC-SCNC: 5 MMOL/L (ref 0–18)
AST SERPL-CCNC: 15 U/L (ref 15–37)
BASOPHILS # BLD AUTO: 0.1 X10(3) UL (ref 0–0.2)
BASOPHILS NFR BLD AUTO: 0.8 %
BILIRUB SERPL-MCNC: 0.3 MG/DL (ref 0.1–2)
BUN BLD-MCNC: 21 MG/DL (ref 7–18)
BUN/CREAT SERPL: 16.7 (ref 10–20)
CALCIUM BLD-MCNC: 8.4 MG/DL (ref 8.5–10.1)
CHLORIDE SERPL-SCNC: 111 MMOL/L (ref 98–112)
CHOLEST SERPL-MCNC: 250 MG/DL (ref ?–200)
CO2 SERPL-SCNC: 26 MMOL/L (ref 21–32)
CREAT BLD-MCNC: 1.26 MG/DL
DEPRECATED RDW RBC AUTO: 52.7 FL (ref 35.1–46.3)
EGFRCR SERPLBLD CKD-EPI 2021: 61 ML/MIN/1.73M2 (ref 60–?)
EOSINOPHIL # BLD AUTO: 0.51 X10(3) UL (ref 0–0.7)
EOSINOPHIL NFR BLD AUTO: 4.3 %
ERYTHROCYTE [DISTWIDTH] IN BLOOD BY AUTOMATED COUNT: 17.8 % (ref 11–15)
GLOBULIN PLAS-MCNC: 3.7 G/DL (ref 2.8–4.4)
GLUCOSE BLD-MCNC: 247 MG/DL (ref 70–99)
GLUCOSE BLDC GLUCOMTR-MCNC: 217 MG/DL (ref 70–99)
GLUCOSE BLDC GLUCOMTR-MCNC: 224 MG/DL (ref 70–99)
GLUCOSE BLDC GLUCOMTR-MCNC: 265 MG/DL (ref 70–99)
GLUCOSE BLDC GLUCOMTR-MCNC: 288 MG/DL (ref 70–99)
HCT VFR BLD AUTO: 38.1 %
HDLC SERPL-MCNC: 32 MG/DL (ref 40–59)
HGB BLD-MCNC: 11.1 G/DL
IMM GRANULOCYTES # BLD AUTO: 0.05 X10(3) UL (ref 0–1)
IMM GRANULOCYTES NFR BLD: 0.4 %
LDLC SERPL CALC-MCNC: 156 MG/DL (ref ?–100)
LYMPHOCYTES # BLD AUTO: 2.01 X10(3) UL (ref 1–4)
LYMPHOCYTES NFR BLD AUTO: 17.1 %
MAGNESIUM SERPL-MCNC: 2.2 MG/DL (ref 1.6–2.6)
MCH RBC QN AUTO: 24.2 PG (ref 26–34)
MCHC RBC AUTO-ENTMCNC: 29.1 G/DL (ref 31–37)
MCV RBC AUTO: 83 FL
MONOCYTES # BLD AUTO: 1.11 X10(3) UL (ref 0.1–1)
MONOCYTES NFR BLD AUTO: 9.4 %
NEUTROPHILS # BLD AUTO: 7.99 X10 (3) UL (ref 1.5–7.7)
NEUTROPHILS # BLD AUTO: 7.99 X10(3) UL (ref 1.5–7.7)
NEUTROPHILS NFR BLD AUTO: 68 %
NONHDLC SERPL-MCNC: 218 MG/DL (ref ?–130)
OSMOLALITY SERPL CALC.SUM OF ELEC: 305 MOSM/KG (ref 275–295)
PHOSPHATE SERPL-MCNC: 2.2 MG/DL (ref 2.5–4.9)
PLATELET # BLD AUTO: 266 10(3)UL (ref 150–450)
POTASSIUM SERPL-SCNC: 4 MMOL/L (ref 3.5–5.1)
PROT SERPL-MCNC: 6.5 G/DL (ref 6.4–8.2)
RBC # BLD AUTO: 4.59 X10(6)UL
SODIUM SERPL-SCNC: 142 MMOL/L (ref 136–145)
TRIGL SERPL-MCNC: 330 MG/DL (ref 30–149)
VLDLC SERPL CALC-MCNC: 65 MG/DL (ref 0–30)
WBC # BLD AUTO: 11.8 X10(3) UL (ref 4–11)

## 2023-09-23 PROCEDURE — 93306 TTE W/DOPPLER COMPLETE: CPT | Performed by: INTERNAL MEDICINE

## 2023-09-23 PROCEDURE — 99223 1ST HOSP IP/OBS HIGH 75: CPT | Performed by: INTERNAL MEDICINE

## 2023-09-23 RX ORDER — TRAZODONE HYDROCHLORIDE 50 MG/1
25 TABLET ORAL NIGHTLY PRN
Status: DISCONTINUED | OUTPATIENT
Start: 2023-09-23 | End: 2023-09-26

## 2023-09-23 RX ORDER — OXYCODONE AND ACETAMINOPHEN 10; 325 MG/1; MG/1
1 TABLET ORAL 4 TIMES DAILY PRN
Status: DISCONTINUED | OUTPATIENT
Start: 2023-09-23 | End: 2023-09-26

## 2023-09-23 NOTE — IMAGING NOTE
PET Lexiscan Stress Test 4/25/2023       Stress EKG is non-diagnostic secondary to resting EKG changes. Pt denied chest pain. Rare PAC's. This is an equivocal perfusion study. Small fixed perfusion abnormality of moderate intensity in the apical segment. The left ventricular cavity is noted to be normal on the stress studies. The stress left ventricular ejection fraction was calculated to be 42% and left ventricular global function is mildly reduced.  The rest left ventricular cavity is noted to be normal. The rest left ventricular ejection fraction was calculated to be 78% and rest left ventricular global function is normal.

## 2023-09-23 NOTE — PLAN OF CARE
Patient is alert & oriented x4 on RA. Vital signs stable at this time. No acute changes noted throughout shift; patient slept. Fall precautions maintained - bed alarm on, bed locked in lowest position, call light and personal belongings within reach, non-skid socks in place. Frequent rounding by nursing staff. Patient unable to recall medications; son will be able to provide information in the morning. Problem: Patient Centered Care  Goal: Patient preferences are identified and integrated in the patient's plan of care  Description: Interventions:  - What would you like us to know as we care for you?  From home with son   - Provide timely, complete, and accurate information to patient/family  - Incorporate patient and family knowledge, values, beliefs, and cultural backgrounds into the planning and delivery of care  - Encourage patient/family to participate in care and decision-making at the level they choose  - Honor patient and family perspectives and choices  Outcome: Progressing     Problem: Diabetes/Glucose Control  Goal: Glucose maintained within prescribed range  Description: INTERVENTIONS:  - Monitor Blood Glucose as ordered  - Assess for signs and symptoms of hyperglycemia and hypoglycemia  - Administer ordered medications to maintain glucose within target range  - Assess barriers to adequate nutritional intake and initiate nutrition consult as needed  - Instruct patient on self management of diabetes  Outcome: Progressing     Problem: Patient/Family Goals  Goal: Patient/Family Long Term Goal  Description: Patient's Long Term Goal: To go home    Interventions:  - Follow MD orders  - See additional Care Plan goals for specific interventions  Outcome: Progressing  Goal: Patient/Family Short Term Goal  Description: Patient's Short Term Goal: Manage Cp    Interventions:   - Follow MD orders  - See additional Care Plan goals for specific interventions  Outcome: Progressing

## 2023-09-23 NOTE — DIABETES ED
Patient educated regarding diabetes diet basics. Discussed carbohydrate foods, portion sizes, and food label reading. Encouraged to attend outpatient diabetes education. Questions answered. Receptive to information. Has a child (15) at home that he is motivated to do well for; however, son is also drinking soda and door dashing lassiter's often. Pt drinking upwards of 4 sodas (regular) per day. Usually eats 2-3 meals, limited veg. Emphasized 3 meals, my plate - with fist size of carbs, limit/eliminate soda.

## 2023-09-23 NOTE — DISCHARGE INSTRUCTIONS
Diabetes: Your A1C level is greater than 8%. It is recommended that you attend an outpatient diabetes education program.  Please discuss with your Primary Care Provider at your next visit to obtain a referral.  If you wish to make an appointment at [place] [address], call [phone].

## 2023-09-23 NOTE — DIETARY NOTE
Nutrition Note         9/23/2023:  Received consult for \"poor eating habits\". Pt screened at no nutrition risk by RN upon admission. Pt is eating well , intake of % today. Wt up trending, Current BMI 44.3 kg/m2. DM Educator visited pt for elevated A1C 9.0% and provided DM nutrition assessment. Pt reports knowledgeable in DM and wt loss management but is having difficulty following them. He states he did not have glucose monitoring meter at home. However, he states he would like to start diet again to promote safe weight loss. States [facility]TR comes to his house to cook and clean 3x/week. Encouraged pt to visit DM outpt center for both DM and wt loss management. But pt states he does not have transportation to do so. Reinforced with pt use of \" My Plate\" nutrition concept to be able to control portion size of meals and follow 3 balanced meals/day. But, as this writer was leaving room, pt requested to get him a soda. Appears pt understand rationale of diet therapy but suspect low compliance.        Olegario Medeiros, [address], [address] [place] [address]   Clinical Dietitian  [phone]  9/23/2023
No

## 2023-09-23 NOTE — PLAN OF CARE
Patient had echo today. Wanted to be on O2 for comfort, but not needed. Educated patient about diabetes, but patient not ready to learn. Call light at bedside and safety measures in place. Problem: Patient Centered Care  Goal: Patient preferences are identified and integrated in the patient's plan of care  Description: Interventions:  - What would you like us to know as we care for you?  From home  - Provide timely, complete, and accurate information to patient/family  - Incorporate patient and family knowledge, values, beliefs, and cultural backgrounds into the planning and delivery of care  - Encourage patient/family to participate in care and decision-making at the level they choose  - Honor patient and family perspectives and choices  Outcome: Progressing     Problem: Diabetes/Glucose Control  Goal: Glucose maintained within prescribed range  Description: INTERVENTIONS:  - Monitor Blood Glucose as ordered  - Assess for signs and symptoms of hyperglycemia and hypoglycemia  - Administer ordered medications to maintain glucose within target range  - Assess barriers to adequate nutritional intake and initiate nutrition consult as needed  - Instruct patient on self management of diabetes  Outcome: Progressing     Problem: Patient/Family Goals  Goal: Patient/Family Long Term Goal  Description: Patient's Long Term Goal: Better maintenance of health    Interventions:  - See additional Care Plan goals for specific interventions  Outcome: Progressing  Goal: Patient/Family Short Term Goal  Description: Patient's Short Term Goal: Go home    Interventions:   - See additional Care Plan goals for specific interventions  Outcome: Progressing

## 2023-09-24 ENCOUNTER — APPOINTMENT (OUTPATIENT)
Dept: ULTRASOUND IMAGING | Facility: HOSPITAL | Age: 73
End: 2023-09-24
Attending: INTERNAL MEDICINE
Payer: MEDICARE

## 2023-09-24 PROBLEM — R40.0 SOMNOLENCE: Status: ACTIVE | Noted: 2023-09-24

## 2023-09-24 PROBLEM — R09.02 HYPOXIA: Status: ACTIVE | Noted: 2023-09-24

## 2023-09-24 LAB
ANION GAP SERPL CALC-SCNC: 5 MMOL/L (ref 0–18)
ATRIAL RATE: 95 BPM
BASOPHILS # BLD AUTO: 0.1 X10(3) UL (ref 0–0.2)
BASOPHILS NFR BLD AUTO: 0.7 %
BILIRUB UR QL: NEGATIVE
BUN BLD-MCNC: 31 MG/DL (ref 7–18)
BUN/CREAT SERPL: 19.1 (ref 10–20)
CALCIUM BLD-MCNC: 8.4 MG/DL (ref 8.5–10.1)
CHLORIDE SERPL-SCNC: 111 MMOL/L (ref 98–112)
CLARITY UR: CLEAR
CO2 SERPL-SCNC: 20 MMOL/L (ref 21–32)
CREAT BLD-MCNC: 1.62 MG/DL
DEPRECATED RDW RBC AUTO: 56.6 FL (ref 35.1–46.3)
EGFRCR SERPLBLD CKD-EPI 2021: 45 ML/MIN/1.73M2 (ref 60–?)
EOSINOPHIL # BLD AUTO: 0.54 X10(3) UL (ref 0–0.7)
EOSINOPHIL NFR BLD AUTO: 3.9 %
ERYTHROCYTE [DISTWIDTH] IN BLOOD BY AUTOMATED COUNT: 17.7 % (ref 11–15)
GLUCOSE BLD-MCNC: 234 MG/DL (ref 70–99)
GLUCOSE BLDC GLUCOMTR-MCNC: 220 MG/DL (ref 70–99)
GLUCOSE BLDC GLUCOMTR-MCNC: 247 MG/DL (ref 70–99)
GLUCOSE BLDC GLUCOMTR-MCNC: 251 MG/DL (ref 70–99)
GLUCOSE BLDC GLUCOMTR-MCNC: 255 MG/DL (ref 70–99)
GLUCOSE UR-MCNC: >1000 MG/DL
HCT VFR BLD AUTO: 40.8 %
HGB BLD-MCNC: 11.4 G/DL
HGB UR QL STRIP.AUTO: NEGATIVE
IMM GRANULOCYTES # BLD AUTO: 0.05 X10(3) UL (ref 0–1)
IMM GRANULOCYTES NFR BLD: 0.4 %
KETONES UR-MCNC: NEGATIVE MG/DL
LEUKOCYTE ESTERASE UR QL STRIP.AUTO: NEGATIVE
LYMPHOCYTES # BLD AUTO: 2.06 X10(3) UL (ref 1–4)
LYMPHOCYTES NFR BLD AUTO: 14.7 %
MCH RBC QN AUTO: 24.3 PG (ref 26–34)
MCHC RBC AUTO-ENTMCNC: 27.9 G/DL (ref 31–37)
MCV RBC AUTO: 86.8 FL
MONOCYTES # BLD AUTO: 1.3 X10(3) UL (ref 0.1–1)
MONOCYTES NFR BLD AUTO: 9.3 %
NEUTROPHILS # BLD AUTO: 9.95 X10 (3) UL (ref 1.5–7.7)
NEUTROPHILS # BLD AUTO: 9.95 X10(3) UL (ref 1.5–7.7)
NEUTROPHILS NFR BLD AUTO: 71 %
NITRITE UR QL STRIP.AUTO: NEGATIVE
OSMOLALITY SERPL CALC.SUM OF ELEC: 296 MOSM/KG (ref 275–295)
P AXIS: 74 DEGREES
P-R INTERVAL: 180 MS
PH UR: 5 [PH] (ref 5–8)
PHOSPHATE SERPL-MCNC: 4 MG/DL (ref 2.5–4.9)
PLATELET # BLD AUTO: 239 10(3)UL (ref 150–450)
POTASSIUM SERPL-SCNC: 5.1 MMOL/L (ref 3.5–5.1)
PROT UR-MCNC: 20 MG/DL
Q-T INTERVAL: 418 MS
QRS DURATION: 158 MS
QTC CALCULATION (BEZET): 525 MS
R AXIS: -63 DEGREES
RBC # BLD AUTO: 4.7 X10(6)UL
SODIUM SERPL-SCNC: 136 MMOL/L (ref 136–145)
SP GR UR STRIP: 1.02 (ref 1–1.03)
T AXIS: 80 DEGREES
UROBILINOGEN UR STRIP-ACNC: NORMAL
VENTRICULAR RATE: 95 BPM
WBC # BLD AUTO: 14 X10(3) UL (ref 4–11)

## 2023-09-24 PROCEDURE — 99233 SBSQ HOSP IP/OBS HIGH 50: CPT | Performed by: INTERNAL MEDICINE

## 2023-09-24 PROCEDURE — 76775 US EXAM ABDO BACK WALL LIM: CPT | Performed by: INTERNAL MEDICINE

## 2023-09-24 NOTE — PLAN OF CARE
AO4, 3L O2, VSS. Pt accidentally removed IV, found w/ moderate amount of blood on floor. Bathed. No complaints overnight. Plan: pending echo results    Call light within reach, fall precautions in place  Problem: Patient Centered Care  Goal: Patient preferences are identified and integrated in the patient's plan of care  Description: Interventions:  - What would you like us to know as we care for you?  From home with grandson.  - Provide timely, complete, and accurate information to patient/family  - Incorporate patient and family knowledge, values, beliefs, and cultural backgrounds into the planning and delivery of care  - Encourage patient/family to participate in care and decision-making at the level they choose  - Honor patient and family perspectives and choices  Outcome: Progressing     Problem: Diabetes/Glucose Control  Goal: Glucose maintained within prescribed range  Description: INTERVENTIONS:  - Monitor Blood Glucose as ordered  - Assess for signs and symptoms of hyperglycemia and hypoglycemia  - Administer ordered medications to maintain glucose within target range  - Assess barriers to adequate nutritional intake and initiate nutrition consult as needed  - Instruct patient on self management of diabetes  Outcome: Progressing

## 2023-09-24 NOTE — PLAN OF CARE
Sleepy but easily aroused. Update given to daughter Larisa. Possible DIRK, pt to wear CPAP at night. Ultra sound of kidneys completed today, see epic. Problem: Patient Centered Care  Goal: Patient preferences are identified and integrated in the patient's plan of care  Description: Interventions:  - What would you like us to know as we care for you?  From home with grandson.  - Provide timely, complete, and accurate information to patient/family  - Incorporate patient and family knowledge, values, beliefs, and cultural backgrounds into the planning and delivery of care  - Encourage patient/family to participate in care and decision-making at the level they choose  - Honor patient and family perspectives and choices  Outcome: Progressing     Problem: Diabetes/Glucose Control  Goal: Glucose maintained within prescribed range  Description: INTERVENTIONS:  - Monitor Blood Glucose as ordered  - Assess for signs and symptoms of hyperglycemia and hypoglycemia  - Administer ordered medications to maintain glucose within target range  - Assess barriers to adequate nutritional intake and initiate nutrition consult as needed  - Instruct patient on self management of diabetes  Outcome: Progressing     Problem: Patient/Family Goals  Goal: Patient/Family Long Term Goal  Description: Patient's Long Term Goal:     Interventions:  -   - See additional Care Plan goals for specific interventions  Outcome: Progressing  Goal: Patient/Family Short Term Goal  Description: Patient's Short Term Goal:     Interventions:   -   - See additional Care Plan goals for specific interventions  Outcome: Progressing

## 2023-09-24 NOTE — PROGRESS NOTES
Patient's O2 saturation on room air is 97% at rest. Patient's O2 saturation on room air is 86% when ambulating and 97% on 3L.

## 2023-09-25 ENCOUNTER — APPOINTMENT (OUTPATIENT)
Dept: CT IMAGING | Facility: HOSPITAL | Age: 73
End: 2023-09-25
Attending: STUDENT IN AN ORGANIZED HEALTH CARE EDUCATION/TRAINING PROGRAM
Payer: MEDICARE

## 2023-09-25 ENCOUNTER — APPOINTMENT (OUTPATIENT)
Dept: PICC SERVICES | Facility: HOSPITAL | Age: 73
End: 2023-09-25
Attending: INTERNAL MEDICINE
Payer: MEDICARE

## 2023-09-25 ENCOUNTER — APPOINTMENT (OUTPATIENT)
Dept: GENERAL RADIOLOGY | Facility: HOSPITAL | Age: 73
End: 2023-09-25
Attending: INTERNAL MEDICINE
Payer: MEDICARE

## 2023-09-25 PROBLEM — R44.1 VISUAL HALLUCINATION: Status: ACTIVE | Noted: 2023-09-25

## 2023-09-25 PROBLEM — R40.20 LOSS OF CONSCIOUSNESS (HCC): Status: ACTIVE | Noted: 2023-09-25

## 2023-09-25 LAB
ANION GAP SERPL CALC-SCNC: 3 MMOL/L (ref 0–18)
BASE EXCESS BLD CALC-SCNC: -0.5 MMOL/L (ref ?–2)
BASOPHILS # BLD AUTO: 0.06 X10(3) UL (ref 0–0.2)
BASOPHILS NFR BLD AUTO: 0.4 %
BUN BLD-MCNC: 38 MG/DL (ref 7–18)
BUN/CREAT SERPL: 32.8 (ref 10–20)
CALCIUM BLD-MCNC: 8.8 MG/DL (ref 8.5–10.1)
CHLORIDE SERPL-SCNC: 111 MMOL/L (ref 98–112)
CO2 SERPL-SCNC: 25 MMOL/L (ref 21–32)
CREAT BLD-MCNC: 1.16 MG/DL
DEPRECATED RDW RBC AUTO: 52.4 FL (ref 35.1–46.3)
EGFRCR SERPLBLD CKD-EPI 2021: 67 ML/MIN/1.73M2 (ref 60–?)
EOSINOPHIL # BLD AUTO: 0.49 X10(3) UL (ref 0–0.7)
EOSINOPHIL NFR BLD AUTO: 3.3 %
ERYTHROCYTE [DISTWIDTH] IN BLOOD BY AUTOMATED COUNT: 17.2 % (ref 11–15)
GLUCOSE BLD-MCNC: 213 MG/DL (ref 70–99)
GLUCOSE BLDC GLUCOMTR-MCNC: 191 MG/DL (ref 70–99)
GLUCOSE BLDC GLUCOMTR-MCNC: 208 MG/DL (ref 70–99)
GLUCOSE BLDC GLUCOMTR-MCNC: 230 MG/DL (ref 70–99)
GLUCOSE BLDC GLUCOMTR-MCNC: 366 MG/DL (ref 70–99)
HCO3 BLDA-SCNC: 24.4 MEQ/L (ref 21–27)
HCT VFR BLD AUTO: 37.4 %
HGB BLD-MCNC: 10.9 G/DL
IMM GRANULOCYTES # BLD AUTO: 0.07 X10(3) UL (ref 0–1)
IMM GRANULOCYTES NFR BLD: 0.5 %
INR BLD: 2.41 (ref 0.85–1.16)
LYMPHOCYTES # BLD AUTO: 1.76 X10(3) UL (ref 1–4)
LYMPHOCYTES NFR BLD AUTO: 12 %
MCH RBC QN AUTO: 24.2 PG (ref 26–34)
MCHC RBC AUTO-ENTMCNC: 29.1 G/DL (ref 31–37)
MCV RBC AUTO: 82.9 FL
MODIFIED ALLEN TEST: POSITIVE
MONOCYTES # BLD AUTO: 1.16 X10(3) UL (ref 0.1–1)
MONOCYTES NFR BLD AUTO: 7.9 %
NEUTROPHILS # BLD AUTO: 11.16 X10 (3) UL (ref 1.5–7.7)
NEUTROPHILS # BLD AUTO: 11.16 X10(3) UL (ref 1.5–7.7)
NEUTROPHILS NFR BLD AUTO: 75.9 %
O2 CT BLD-SCNC: 14.4 VOL% (ref 15–23)
OSMOLALITY SERPL CALC.SUM OF ELEC: 303 MOSM/KG (ref 275–295)
PCO2 BLDA: 43 MM HG (ref 35–45)
PH BLDA: 7.37 [PH] (ref 7.35–7.45)
PLATELET # BLD AUTO: 280 10(3)UL (ref 150–450)
PO2 BLDA: 58 MM HG (ref 80–100)
POTASSIUM SERPL-SCNC: 4.7 MMOL/L (ref 3.5–5.1)
PROTHROMBIN TIME: 25.8 SECONDS (ref 11.6–14.8)
PUNCTURE CHARGE: YES
RBC # BLD AUTO: 4.51 X10(6)UL
SAO2 % BLDA: 92.6 % (ref 94–100)
SODIUM SERPL-SCNC: 139 MMOL/L (ref 136–145)
WBC # BLD AUTO: 14.7 X10(3) UL (ref 4–11)

## 2023-09-25 PROCEDURE — 70498 CT ANGIOGRAPHY NECK: CPT | Performed by: STUDENT IN AN ORGANIZED HEALTH CARE EDUCATION/TRAINING PROGRAM

## 2023-09-25 PROCEDURE — 70496 CT ANGIOGRAPHY HEAD: CPT | Performed by: STUDENT IN AN ORGANIZED HEALTH CARE EDUCATION/TRAINING PROGRAM

## 2023-09-25 PROCEDURE — 99232 SBSQ HOSP IP/OBS MODERATE 35: CPT | Performed by: INTERNAL MEDICINE

## 2023-09-25 PROCEDURE — 99223 1ST HOSP IP/OBS HIGH 75: CPT | Performed by: STUDENT IN AN ORGANIZED HEALTH CARE EDUCATION/TRAINING PROGRAM

## 2023-09-25 PROCEDURE — 71045 X-RAY EXAM CHEST 1 VIEW: CPT | Performed by: INTERNAL MEDICINE

## 2023-09-25 PROCEDURE — 95816 EEG AWAKE AND DROWSY: CPT | Performed by: OTHER

## 2023-09-25 PROCEDURE — 99223 1ST HOSP IP/OBS HIGH 75: CPT | Performed by: INTERNAL MEDICINE

## 2023-09-25 RX ORDER — NALOXONE HYDROCHLORIDE 0.4 MG/ML
0.4 INJECTION, SOLUTION INTRAMUSCULAR; INTRAVENOUS; SUBCUTANEOUS ONCE
Status: COMPLETED | OUTPATIENT
Start: 2023-09-25 | End: 2023-09-25

## 2023-09-25 RX ORDER — HALOPERIDOL 5 MG/ML
1 INJECTION INTRAMUSCULAR ONCE
Status: COMPLETED | OUTPATIENT
Start: 2023-09-25 | End: 2023-09-25

## 2023-09-25 RX ORDER — IPRATROPIUM BROMIDE AND ALBUTEROL SULFATE 2.5; .5 MG/3ML; MG/3ML
3 SOLUTION RESPIRATORY (INHALATION)
Status: DISCONTINUED | OUTPATIENT
Start: 2023-09-25 | End: 2023-09-26

## 2023-09-25 RX ORDER — HALOPERIDOL 5 MG/ML
1 INJECTION INTRAMUSCULAR ONCE
Status: DISCONTINUED | OUTPATIENT
Start: 2023-09-25 | End: 2023-09-25

## 2023-09-25 RX ORDER — NALOXONE HYDROCHLORIDE 0.4 MG/ML
0.4 INJECTION, SOLUTION INTRAMUSCULAR; INTRAVENOUS; SUBCUTANEOUS ONCE
Status: DISCONTINUED | OUTPATIENT
Start: 2023-09-25 | End: 2023-09-25

## 2023-09-25 RX ORDER — NALOXONE HYDROCHLORIDE 0.4 MG/ML
INJECTION, SOLUTION INTRAMUSCULAR; INTRAVENOUS; SUBCUTANEOUS
Status: COMPLETED
Start: 2023-09-25 | End: 2023-09-25

## 2023-09-25 RX ORDER — HALOPERIDOL 5 MG/ML
INJECTION INTRAMUSCULAR
Status: COMPLETED
Start: 2023-09-25 | End: 2023-09-25

## 2023-09-25 NOTE — CM/SW NOTE
Social work met with the patient and the patient's daughter Peaches at bedside to discuss discharge planning. Social work provided the patient and the patient's daughter with the [facility] choices. Social work will follow up with the patient and his daughter on 9/26 regarding choice. RICHAR/CM to remain available for support and/or discharge planning.      Daniel VENEGAS, [facility]  Discharge Planner [id number]

## 2023-09-25 NOTE — PROCEDURES
EEG report    REFERRING PHYSICIAN: Dev Mccabe MD    PCP and phone number:  Selma Crocker MD  [phone]    TECHNIQUE: 21 channels of EEG, 2 channels of EOG, and 1 channel of EKG were recorded utilizing the International 10/20 System. The recording was performed in a digitized monopolar referential format and playback was reformatted into various referential and bipolar montages utilizing appropriate filter settings. Automatic seizure and spike detection programs were utilized throughout the recording. Video was not recorded during the study    CLINICAL DATA:  Patient is sent for the evaluation of possible seizures. MEDICATION:  Continuous Medications:      Scheduled Medications:  No current outpatient medications on file. PRN Medications:  oxyCODONE-acetaminophen, traZODone, albuterol, camphor/menthol/methyl salicylate, glucose **OR** glucose **OR** glucose-vitamin C **OR** dextrose **OR** glucose **OR** glucose **OR** glucose-vitamin C, acetaminophen, melatonin, polyethylene glycol (PEG 3350), sennosides, bisacodyl, fleet enema, ondansetron, metoclopramide, benzonatate, guaiFENesin, glycerin-hypromellose-, sodium chloride    ACTIVATION:  Hyperventilation: Not done  Photic stimulation: Not done  Sleep: No clear sleep architecture was seen. BACKGROUND  While the patient was awake, the posterior dominant rhythm consisted of poorly-regulated 7-8 Hz rhythmic waveforms, symmetrically distributed over both posterior quadrants and was reactive to eye opening. EEG ABNORMALITY  None    IMPRESSION:  This is a slightly abnormal EEG (PDR was slower than normal), that could indicate mild encephalopathy. No focal, lateralized, or epileptiform features are noted. Clinical correlation required.

## 2023-09-25 NOTE — CM/SW NOTE
09/25/23 1200   CM/SW Referral Data   Referral Source Social Work (self-referral)   Reason for Referral Discharge planning   Informant Patient   Medical Hx   Does patient have an established PCP? Yes   Significant Past Medical/Mental Health Hx anxiety, COPD< DM, HTN, High BP, Depression, Obesity, Hyperlipidemia   Patient Info   Patient's Current Mental Status at Time of Assessment Alert;Oriented   Patient's [address]   Number of Levels in Home 1   Number of Stair in Home 0   Patient lives with Grandchild   Patient Status Prior to Admission   Independent with ADLs and Mobility Yes   Discharge Needs   Anticipated D/C needs To be determined     The patient is a 72year old male admitted with syncope and collapse. Social work met with the patient at bedside to discuss discharge planning. The patient lives in a 1 level home with his grandson. The patient uses a walker and cane for DME. The patient is independent with most ADLs but he needs help with cooking and laundry. The patient's grandson is there for support when needed. Social work will follow up with any discharge recommendations. The patient does not have any further questions at this time. SW/CM to remain available for support and/or discharge planning.      Mikael Mejia MSW, [facility]  Discharge Planner [id number]

## 2023-09-25 NOTE — PHYSICAL THERAPY NOTE
PHYSICAL THERAPY EVALUATION - INPATIENT     Room Number: 341/341-A  Evaluation Date: 9/25/2023  Type of Evaluation: Initial   Physician Order: PT Eval and Treat    Presenting Problem: syncope and collapse, chest pain     Reason for Therapy: Mobility Dysfunction and Discharge Planning    PHYSICAL THERAPY ASSESSMENT     Patient is a 72year old male admitted 9/22/2023 for syncope and collapse, chest pain. CT brain negative for acute findings. Patient's current functional deficits include impaired bed mobility, transfers, gait, balance, and tolerance for activity, which are below the patient's pre-admission status. Patient in bed upon arrival. RN approved activity. Educated patient on POC and benefits of mobilization. Agreeable to participate. Patient reporting chronic L hip pain, not quantified per the pain scale. Patient slightly impulsive, requiring increased cues for safety. Wrist restraints doffed during activity, re-donned at end of session. Bed Mobility: SBA supine>EOB, increased time to complete. Patient tolerated sitting EOB approx 6 minutes, requiring BUE support and SBA in order to maintain static sitting balance. Transfers: CGA sit<>stand with RW; cues provided for appropriate UE placement during functional transfers. Instruction on activity pacing upon standing to allow body time to acclimate to change in position. Tolerated static standing with BUE support on RW and CGA for approx 1 minute prior to mobilization. Ambulation: CGA with RW for 80 ft; decreased mora speed, shuffled steps, flexed posture. Cues to look up while ambulating in order to maintain more upright standing posture. Cues for safe management of RW with turns. Activity limited due to fatigue. SpO2 on room air 86% with ambulation; increased to 91% within 1 minute with seated rest break and cues for pursed lip breathing. Patient in bed at end of session. Needs in reach and alarm activated. Wrist restraints in place. RN aware. The patient's Approx Degree of Impairment: 46.58% has been calculated based on documentation in the [facility] '6 clicks' Inpatient Basic Mobility Short Form. Research supports that patients with this level of impairment may benefit from home-health PT with initial 24 hour supervision/care in order to optimize functional independence. Patient will benefit from continued IP PT services to address these deficits in preparation for discharge. DISCHARGE RECOMMENDATIONS  PT Discharge Recommendations: Home with home health PT;24 hour care/supervision    PLAN  PT Treatment Plan: Bed mobility; Body mechanics; Coordination; Endurance; Energy conservation;Patient education;Gait training;Strengthening;Transfer training;Balance training  Rehab Potential : Good  Frequency (Obs): 5x/week       PHYSICAL THERAPY MEDICAL/SOCIAL HISTORY     Problem List  Principal Problem:    Syncope and collapse  Active Problems:    Hypothyroidism    Type 2 diabetes mellitus with hyperglycemia, with long-term current use of insulin (HCC)    CAD (coronary artery disease), native coronary artery    HTN (hypertension)    Lumbar stenosis with neurogenic claudication    History of pulmonary embolus (PE)    Anemia    Acute kidney injury ([address]    Hyperglycemia    Somnolence    Hypoxia      HOME SITUATION  Home Situation  Type of Home: Condo  Home Layout: One level  Lives With: Family (grandson?)  Drives: No  Patient Owned Equipment: Cane     Prior Level of [place]: Mod I for ADLs/iADLs/functional mobility with use of cane    SUBJECTIVE  \"Don't forget me\"    PHYSICAL THERAPY EXAMINATION     OBJECTIVE  Precautions: Bed/chair alarm; Restraints  Fall Risk: Standard fall risk    PAIN ASSESSMENT  Rating: Unable to rate  Location: chronic L hip pain  Management Techniques: Activity promotion; Body mechanics;Repositioning    COGNITION  Following Commands:  follows one step commands with increased time and follows one step commands with repetition    RANGE OF MOTION AND STRENGTH ASSESSMENT  Upper extremity ROM and strength are within functional limits   Lower extremity ROM is within functional limits   Lower extremity strength is within functional limits; generalized weakness    BALANCE  Static Sitting: Good  Dynamic Sitting: Fair +  Static Standing: Fair  Dynamic Standing: Fair -    ACTIVITY TOLERANCE  Pulse: 88  Heart Rate Source: Monitor     BP: 112/72 (128/65 mmHg sitting EOB)  BP Location: Right arm  BP Method: Automatic  Patient Position: Lying    O2 WALK  Oxygen Therapy  SPO2% on Room Air at Rest: 96  SPO2% Ambulation on Room Air: 86 (recovered to 91% within 1 minute with seated rest break and PLB)    AM-PAC '6-Clicks' INPATIENT SHORT FORM - BASIC MOBILITY  How much difficulty does the patient currently have. .. Patient Difficulty: Turning over in bed (including adjusting bedclothes, sheets and blankets)?: A Little   Patient Difficulty: Sitting down on and standing up from a chair with arms (e.g., wheelchair, bedside commode, etc.): A Little   Patient Difficulty: Moving from lying on back to sitting on the side of the bed?: A Little   How much help from another person does the patient currently need. .. Help from Another: Moving to and from a bed to a chair (including a wheelchair)?: A Little   Help from Another: Need to walk in hospital room?: A Little   Help from Another: Climbing 3-5 steps with a railing?: A Little     AM-PAC Score:  Raw Score: 18   Approx Degree of Impairment: 46.58%   Standardized Score (AM-PAC Scale): 43.63   CMS Modifier (G-Code): CK    FUNCTIONAL ABILITY STATUS  Functional Mobility/Gait Assessment  Gait Assistance: Contact guard assist  Distance (ft): 80 ft  Assistive Device: Rolling walker  Pattern: Shuffle (decreased mora speed, flexed posture)    Exercise/Education Provided:  Bed mobility  Body mechanics  Energy conservation  Functional activity tolerated  Gait training  Posture  Transfer training    Patient End of Session:  In bed;Needs met;Call light within reach;RN aware of session/findings; All patient questions and concerns addressed; Alarm set; Restraints    CURRENT GOALS    Goals to be met by: 10/2/23  Patient Goal Patient's self-stated goal is: go home   Goal #1 Patient is able to demonstrate supine - sit EOB @ level: supervision     Goal #1   Current Status    Goal #2 Patient is able to demonstrate transfers Sit to/from Stand at assistance level: supervision with walker - rolling     Goal #2  Current Status    Goal #3 Patient is able to ambulate 300 feet with assist device: walker - rolling at assistance level: supervision   Goal #3   Current Status    Goal #4 Patient to demonstrate independence with home activity/exercise instructions provided to patient in preparation for discharge.    Goal #4   Current Status      Patient Evaluation Complexity Level:  History Moderate - 1 or 2 personal factors and/or co-morbidities   Examination of body systems Moderate - addressing a total of 3 or more elements   Clinical Presentation Moderate - Evolving   Clinical Decision Making Moderate Complexity       Gait Training: 15 minutes
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
## 2023-09-25 NOTE — PLAN OF CARE
Pt lethargic, oriented x3-4, forgetful, 4L O2, VSS. More confused compared to last night. Daughter notified, requesting CT head. MD made aware. Tolerated CPAP from 6005-9692. Self dc IV. Plan: PT/OT    0600: See significant event note. Daughter called to update on patient's condition. Relayed daughter's request for CT head to MD again. Call light within reach, fall precautions in place  Problem: Patient Centered Care  Goal: Patient preferences are identified and integrated in the patient's plan of care  Description: Interventions:  - What would you like us to know as we care for you?  From home with grandson.  - Provide timely, complete, and accurate information to patient/family  - Incorporate patient and family knowledge, values, beliefs, and cultural backgrounds into the planning and delivery of care  - Encourage patient/family to participate in care and decision-making at the level they choose  - Honor patient and family perspectives and choices  9/24/2023 2339 by Stan Carney RN  Outcome: Progressing  9/24/2023 2338 by Stan Carney RN  Outcome: Progressing     Problem: Diabetes/Glucose Control  Goal: Glucose maintained within prescribed range  Description: INTERVENTIONS:  - Monitor Blood Glucose as ordered  - Assess for signs and symptoms of hyperglycemia and hypoglycemia  - Administer ordered medications to maintain glucose within target range  - Assess barriers to adequate nutritional intake and initiate nutrition consult as needed  - Instruct patient on self management of diabetes  9/24/2023 2339 by Stan Carney, RN  Outcome: Progressing  9/24/2023 2338 by Stan Carney RN  Outcome: Progressing     Problem: RESPIRATORY - ADULT  Goal: Achieves optimal ventilation and oxygenation  Description: INTERVENTIONS:  - Assess for changes in respiratory status  - Assess for changes in mentation and behavior  - Position to facilitate oxygenation and minimize respiratory effort  - Oxygen supplementation based on oxygen saturation or ABGs  - Provide Smoking Cessation handout, if applicable  - Encourage broncho-pulmonary hygiene including cough, deep breathe, Incentive Spirometry  - Assess the need for suctioning and perform as needed  - Assess and instruct to report SOB or any respiratory difficulty  - Respiratory Therapy support as indicated  - Manage/alleviate anxiety  - Monitor for signs/symptoms of CO2 retention  Outcome: Progressing     Problem: SAFETY ADULT - FALL  Goal: Free from fall injury  Description: INTERVENTIONS:  - Assess pt frequently for physical needs  - Identify cognitive and physical deficits and behaviors that affect risk of falls.   - [place] fall precautions as indicated by assessment.  - Educate pt/family on patient safety including physical limitations  - Instruct pt to call for assistance with activity based on assessment  - Modify environment to reduce risk of injury  - Provide assistive devices as appropriate  - Consider OT/PT consult to assist with strengthening/mobility  - Encourage toileting schedule  Outcome: Progressing     Problem: DISCHARGE PLANNING  Goal: Discharge to home or other facility with appropriate resources  Description: INTERVENTIONS:  - Identify barriers to discharge w/pt and caregiver  - Include patient/family/discharge partner in discharge planning  - Arrange for needed discharge resources and transportation as appropriate  - Identify discharge learning needs (meds, wound care, etc)  - Arrange for interpreters to assist at discharge as needed  - Consider post-discharge preferences of patient/family/discharge partner  - Complete POLST form as appropriate  - Assess patient's ability to be responsible for managing their own health  - Refer to Case Management Department for coordinating discharge planning if the patient needs post-hospital services based on physician/LIP order or complex needs related to functional status, cognitive ability or social support system  Outcome: Progressing

## 2023-09-26 ENCOUNTER — TELEPHONE (OUTPATIENT)
Dept: PULMONOLOGY | Facility: CLINIC | Age: 73
End: 2023-09-26

## 2023-09-26 VITALS
HEART RATE: 80 BPM | TEMPERATURE: 98 F | SYSTOLIC BLOOD PRESSURE: 145 MMHG | WEIGHT: 304 LBS | OXYGEN SATURATION: 96 % | HEIGHT: 69 IN | BODY MASS INDEX: 45.03 KG/M2 | RESPIRATION RATE: 18 BRPM | DIASTOLIC BLOOD PRESSURE: 62 MMHG

## 2023-09-26 DIAGNOSIS — G47.33 OSA (OBSTRUCTIVE SLEEP APNEA): Primary | ICD-10-CM

## 2023-09-26 LAB
BASOPHILS # BLD AUTO: 0.09 X10(3) UL (ref 0–0.2)
BASOPHILS NFR BLD AUTO: 0.8 %
DEPRECATED RDW RBC AUTO: 52.8 FL (ref 35.1–46.3)
EOSINOPHIL # BLD AUTO: 0.54 X10(3) UL (ref 0–0.7)
EOSINOPHIL NFR BLD AUTO: 4.7 %
ERYTHROCYTE [DISTWIDTH] IN BLOOD BY AUTOMATED COUNT: 17.5 % (ref 11–15)
GLUCOSE BLDC GLUCOMTR-MCNC: 209 MG/DL (ref 70–99)
HCT VFR BLD AUTO: 38.1 %
HGB BLD-MCNC: 11 G/DL
IMM GRANULOCYTES # BLD AUTO: 0.04 X10(3) UL (ref 0–1)
IMM GRANULOCYTES NFR BLD: 0.3 %
LYMPHOCYTES # BLD AUTO: 1.73 X10(3) UL (ref 1–4)
LYMPHOCYTES NFR BLD AUTO: 15 %
MCH RBC QN AUTO: 24.1 PG (ref 26–34)
MCHC RBC AUTO-ENTMCNC: 28.9 G/DL (ref 31–37)
MCV RBC AUTO: 83.4 FL
MONOCYTES # BLD AUTO: 1.09 X10(3) UL (ref 0.1–1)
MONOCYTES NFR BLD AUTO: 9.4 %
NEUTROPHILS # BLD AUTO: 8.07 X10 (3) UL (ref 1.5–7.7)
NEUTROPHILS # BLD AUTO: 8.07 X10(3) UL (ref 1.5–7.7)
NEUTROPHILS NFR BLD AUTO: 69.8 %
PLATELET # BLD AUTO: 258 10(3)UL (ref 150–450)
RBC # BLD AUTO: 4.57 X10(6)UL
WBC # BLD AUTO: 11.6 X10(3) UL (ref 4–11)

## 2023-09-26 PROCEDURE — 99232 SBSQ HOSP IP/OBS MODERATE 35: CPT | Performed by: INTERNAL MEDICINE

## 2023-09-26 PROCEDURE — 99239 HOSP IP/OBS DSCHRG MGMT >30: CPT | Performed by: INTERNAL MEDICINE

## 2023-09-26 NOTE — CDS QUERY
How to answer this Query:    1.) DON'T CLICK COSIGN BUTTON FIRST  2.) Click \"3 dots. Timmy Farrar \" to the right of cosign button and click EDIT on the toolbar.  2.) Type an \"X\" in the bracket for the diagnosis that applies. (You may also add additional clinical details as you feel necessary to substantiate your response). 3.) Finally click \"Sign\" to complete response. Thank You    CLINICAL DOCUMENTATION CLARIFICATION FORM  Dear Adriana Loza information (provided below) includes a diagnosis of Heart Failure. PLEASE (X)  DIAGNOSIS THAT APPLIES    (   x ) Chronic Systolic Heart Failure  (    ) Other - please specify:       Documentation from the Medical Record: [address] DR [id number]  -Continue GDMT  -On Jardiance at home as well. Being held during hospitalization     HOME MEDS=PLAVIX, TORSEMIDE, COREG     If you have any questions, please contact Clinical :  Dc Green RN at [phone]     Thank You!      THIS FORM IS A PERMANENT PART OF THE MEDICAL RECORD

## 2023-09-26 NOTE — PLAN OF CARE
Patient alert and oriented x 2-3. Patient's vitals stable on room air. Patient's pain managed with PO medication. Patient received EEG and CT of head today. Results pending. Plan for MRI. Call light within reach. Problem: Patient Centered Care  Goal: Patient preferences are identified and integrated in the patient's plan of care  Description: Interventions:  - What would you like us to know as we care for you?  From home with grandson.  - Provide timely, complete, and accurate information to patient/family  - Incorporate patient and family knowledge, values, beliefs, and cultural backgrounds into the planning and delivery of care  - Encourage patient/family to participate in care and decision-making at the level they choose  - Honor patient and family perspectives and choices  Outcome: Progressing     Problem: Diabetes/Glucose Control  Goal: Glucose maintained within prescribed range  Description: INTERVENTIONS:  - Monitor Blood Glucose as ordered  - Assess for signs and symptoms of hyperglycemia and hypoglycemia  - Administer ordered medications to maintain glucose within target range  - Assess barriers to adequate nutritional intake and initiate nutrition consult as needed  - Instruct patient on self management of diabetes  Outcome: Progressing     Problem: Patient/Family Goals  Goal: Patient/Family Long Term Goal  Description: Patient's Long Term Goal: Be able to discharge    Interventions:  - Monitor labs  - Medication administration  - See additional Care Plan goals for specific interventions  Outcome: Progressing  Goal: Patient/Family Short Term Goal  Description: Patient's Short Term Goal: Reduce pain    Interventions:   - Pain management  - See additional Care Plan goals for specific interventions  Outcome: Progressing     Problem: RESPIRATORY - ADULT  Goal: Achieves optimal ventilation and oxygenation  Description: INTERVENTIONS:  - Assess for changes in respiratory status  - Assess for changes in mentation and behavior  - Position to facilitate oxygenation and minimize respiratory effort  - Oxygen supplementation based on oxygen saturation or ABGs  - Provide Smoking Cessation handout, if applicable  - Encourage broncho-pulmonary hygiene including cough, deep breathe, Incentive Spirometry  - Assess the need for suctioning and perform as needed  - Assess and instruct to report SOB or any respiratory difficulty  - Respiratory Therapy support as indicated  - Manage/alleviate anxiety  - Monitor for signs/symptoms of CO2 retention  Outcome: Progressing     Problem: SAFETY ADULT - FALL  Goal: Free from fall injury  Description: INTERVENTIONS:  - Assess pt frequently for physical needs  - Identify cognitive and physical deficits and behaviors that affect risk of falls.   - [place] fall precautions as indicated by assessment.  - Educate pt/family on patient safety including physical limitations  - Instruct pt to call for assistance with activity based on assessment  - Modify environment to reduce risk of injury  - Provide assistive devices as appropriate  - Consider OT/PT consult to assist with strengthening/mobility  - Encourage toileting schedule  Outcome: Progressing     Problem: DISCHARGE PLANNING  Goal: Discharge to home or other facility with appropriate resources  Description: INTERVENTIONS:  - Identify barriers to discharge w/pt and caregiver  - Include patient/family/discharge partner in discharge planning  - Arrange for needed discharge resources and transportation as appropriate  - Identify discharge learning needs (meds, wound care, etc)  - Arrange for interpreters to assist at discharge as needed  - Consider post-discharge preferences of patient/family/discharge partner  - Complete POLST form as appropriate  - Assess patient's ability to be responsible for managing their own health  - Refer to Case Management Department for coordinating discharge planning if the patient needs post-hospital services based on physician/LIP order or complex needs related to functional status, cognitive ability or social support system  Outcome: Progressing     Problem: Safety Risk - Non-Violent Restraints  Goal: Patient will remain free from self-harm  Description: INTERVENTIONS:  - Apply the least restrictive restraint to prevent harm  - Notify patient and family of reasons restraints applied  - Assess for any contributing factors to confusion (electrolyte disturbances, delirium, medications)  - Discontinue any unnecessary medical devices as soon as possible  - Assess the patient's physical comfort, circulation, skin condition, hydration, nutrition and elimination needs   - Reorient and redirection as needed  - Assess for the need to continue restraints  Outcome: Progressing

## 2023-09-26 NOTE — PLAN OF CARE
Pt oriented x3, confused and impulsive. Pt pulled out IV around 10pm, refused to let RN attempt to put another one in states \"I do not want to be stuck again until late morning time\" Dr. Delilah Islas notified and aware of situation. Pain controlled with Percocet given for chronic back/hip pain as needed. Pt restless and unable to sleep at night. Problem: Patient Centered Care  Goal: Patient preferences are identified and integrated in the patient's plan of care  Description: Interventions:  - What would you like us to know as we care for you?  From home with grandson.  - Provide timely, complete, and accurate information to patient/family  - Incorporate patient and family knowledge, values, beliefs, and cultural backgrounds into the planning and delivery of care  - Encourage patient/family to participate in care and decision-making at the level they choose  - Honor patient and family perspectives and choices  9/26/2023 0204 by Nica Toth RN lic pend  Outcome: Progressing  9/26/2023 0204 by Nica Toth RN lic pend  Outcome: Progressing     Problem: Diabetes/Glucose Control  Goal: Glucose maintained within prescribed range  Description: INTERVENTIONS:  - Monitor Blood Glucose as ordered  - Assess for signs and symptoms of hyperglycemia and hypoglycemia  - Administer ordered medications to maintain glucose within target range  - Assess barriers to adequate nutritional intake and initiate nutrition consult as needed  - Instruct patient on self management of diabetes  9/26/2023 0204 by Nica Toth RN lic pend  Outcome: Progressing  9/26/2023 0204 by Nica Toth RN lic pend  Outcome: Progressing      Problem: RESPIRATORY - ADULT  Goal: Achieves optimal ventilation and oxygenation  Description: INTERVENTIONS:  - Assess for changes in respiratory status  - Assess for changes in mentation and behavior  - Position to facilitate oxygenation and minimize respiratory effort  - Oxygen supplementation based on oxygen saturation or ABGs  - Provide Smoking Cessation handout, if applicable  - Encourage broncho-pulmonary hygiene including cough, deep breathe, Incentive Spirometry  - Assess the need for suctioning and perform as needed  - Assess and instruct to report SOB or any respiratory difficulty  - Respiratory Therapy support as indicated  - Manage/alleviate anxiety  - Monitor for signs/symptoms of CO2 retention  9/26/2023 0204 by Lyric Dickinson RN lic pend  Outcome: Progressing  9/26/2023 0204 by Lyric Dickinson RN lic pend  Outcome: Progressing     Problem: SAFETY ADULT - FALL  Goal: Free from fall injury  Description: INTERVENTIONS:  - Assess pt frequently for physical needs  - Identify cognitive and physical deficits and behaviors that affect risk of falls.   - [place] fall precautions as indicated by assessment.  - Educate pt/family on patient safety including physical limitations  - Instruct pt to call for assistance with activity based on assessment  - Modify environment to reduce risk of injury  - Provide assistive devices as appropriate  - Consider OT/PT consult to assist with strengthening/mobility  - Encourage toileting schedule  9/26/2023 0204 by Lyric Dickinson RN lic pend  Outcome: Progressing  9/26/2023 0204 by Lyric Dickinson RN lic pend  Outcome: Progressing     Problem: DISCHARGE PLANNING  Goal: Discharge to home or other facility with appropriate resources  Description: INTERVENTIONS:  - Identify barriers to discharge w/pt and caregiver  - Include patient/family/discharge partner in discharge planning  - Arrange for needed discharge resources and transportation as appropriate  - Identify discharge learning needs (meds, wound care, etc)  - Arrange for interpreters to assist at discharge as needed  - Consider post-discharge preferences of patient/family/discharge partner  - Complete POLST form as appropriate  - Assess patient's ability to be responsible for managing their own health  - Refer to Case Management Department for coordinating discharge planning if the patient needs post-hospital services based on physician/LIP order or complex needs related to functional status, cognitive ability or social support system  9/26/2023 0204 by Kristy Lezama RN lic pend  Outcome: Progressing  9/26/2023 0204 by Kristy Lezama RN lic pend  Outcome: Progressing     Problem: Safety Risk - Non-Violent Restraints  Goal: Patient will remain free from self-harm  Description: INTERVENTIONS:  - Apply the least restrictive restraint to prevent harm  - Notify patient and family of reasons restraints applied  - Assess for any contributing factors to confusion (electrolyte disturbances, delirium, medications)  - Discontinue any unnecessary medical devices as soon as possible  - Assess the patient's physical comfort, circulation, skin condition, hydration, nutrition and elimination needs   - Reorient and redirection as needed  - Assess for the need to continue restraints  9/26/2023 0204 by Kristy Lezama RN lic pend  Outcome: Progressing  9/26/2023 0204 by Kristy Lezama RN lic pend  Outcome: Progressing

## 2023-09-26 NOTE — PROGRESS NOTES
Risks/benefits/alternatives discussed with patient as applicable to reason for leaving AMA? Yes  Provider(s) contacted: Dr. Kizzy Oswald  Patient education/AVS/follow-up appointments/prescriptions given? no  AMA paperwork completed? yes  Removed IV access/decannulated port if applicable and patient belongings returned.     Documented from Admission Navigator:  Belongings at Bedside: None  Belongings Sent to Public Safety: None  Medications brought by patient?: No

## 2023-09-26 NOTE — CDS QUERY
How to answer this Query:    1.) DON'T CLICK COSIGN BUTTON FIRST  2.) Click \"3 dots. Odella Fiona Odella Fiona \" to the right of cosign button and click EDIT on the toolbar.  2.) Type an \"X\" in the bracket for the diagnosis that applies. (You may also add additional clinical details as you feel necessary to substantiate your response). 3.) Finally click \"Sign\" to complete response. Thank You  CLINICAL DOCUMENTATION CLARIFICATION FORM  Dear Adriel Mike information (provided below) indicates altered level of consciousness and/or decreased level of consciousness without history of injury. PLEASE (X) ALL DIAGNOSES THAT APPLY. SELECTION BY PROVIDER ONLY        ( )  Toxic Encephalopathy (please specify toxic substance):     ( )  Metabolic Encephalopathy    ( x)  Other (please specify)  unclear , possisble due to medication        Documentation indicates altered level of consciousness and/or decreased level of consciousness with treatment/intervention in addition to diagnostic work-up:   924 DR BOOKER-Somnolence  -Patient is high risk for sleep apnea  -We will start him on CPAP at night today to see if this will improve his symptoms  -He will still need outpatient referral for sleep study for formal diagnosis     [address] DR SPRING=Agitation, confusion, opiate use. 925 EEG-MPRESSION:  This is a slightly abnormal EEG (PDR was slower than normal), that could indicate mild encephalopathy. [place]-NEURO CONSULT, EEG  If you have any questions, please contact Clinical :  Joelle Chavez RN at [phone]  . Thank You!      THIS FORM IS A PERMANENT PART OF THE MEDICAL RECORD

## 2023-09-27 RX ORDER — CARVEDILOL 12.5 MG/1
12.5 TABLET ORAL 2 TIMES DAILY WITH MEALS
Qty: 180 TABLET | Refills: 1 | Status: SHIPPED | OUTPATIENT
Start: 2023-09-27

## 2023-09-27 NOTE — TELEPHONE ENCOUNTER
Please review. Protocol failed / Has no protocol.      Requested Prescriptions   Pending Prescriptions Disp Refills    CARVEDILOL 12.5 MG Oral Tab [Pharmacy Med Name: Carvedilol 12.5 Mg Tab Zmary graceu] 180 tablet 0     Sig: TAKE ONE TABLET BY MOUTH TWICE A DAY WITH MEALS       Hypertensive Medications Protocol Failed - 9/27/2023  1:30 AM        Failed - Last BP reading less than 140/90     BP Readings from Last 1 Encounters:  09/26/23 : 145/62              Passed - In person appointment in the past 12 or next 3 months     Recent Outpatient Visits              4 months ago Glaucoma suspect of both eyes    Rachell Baig, [address], [place]    Nurse Only    4 months ago Type 2 diabetes mellitus without retinopathy (Katheryn Cruz)    Rachell Baig, [address], Red Shetty MD    Office Visit    5 months ago SOB (shortness of breath) on exertion    Tasha Rasmussen MD    Office Visit    8 months ago Annual physical exam    Orest Bland, [address] Grace Rider MD    Office Visit    11 months ago Chronic obstructive pulmonary disease with acute exacerbation (Katheryn Arroyo)    [facility], [address], [place], [place]    Virtual Phone E/M                      Passed - CMP or BMP in past 6 months     Recent Results (from the past 4392 hour(s))   Basic Metabolic Panel (8)    Collection Time: 09/25/23  7:06 AM   Result Value Ref Range    Glucose 213 (H) 70 - 99 mg/dL    Sodium 139 136 - 145 mmol/L    Potassium 4.7 3.5 - 5.1 mmol/L    Chloride 111 98 - 112 mmol/L    CO2 25.0 21.0 - 32.0 mmol/L    Anion Gap 3 0 - 18 mmol/L    BUN 38 (H) 7 - 18 mg/dL    Creatinine 1.16 0.70 - 1.30 mg/dL    BUN/CREA Ratio 32.8 (H) 10.0 - 20.0    Calcium, Total 8.8 8.5 - 10.1 mg/dL    Calculated Osmolality 303 (H) 275 - 295 mOsm/kg    eGFR-Cr 67 >=60 mL/min/1.73m2     *Note: Due to a large number of results and/or encounters for the requested time period, some results have not been displayed. A complete set of results can be found in Results Review.                Passed - In person appointment or virtual visit in the past 6 months     Recent Outpatient Visits              4 months ago Glaucoma suspect of both eyes    [id number], [address], [place]    Nurse Only    4 months ago Type 2 diabetes mellitus without retinopathy ([address]    Yola Cristina MD    Office Visit    5 months ago SOB (shortness of breath) on exertion    [address], Moni Healy MD    Office Visit    8 months ago Annual physical exam    James Mack MD    Office Visit    11 months ago Chronic obstructive pulmonary disease with acute exacerbation [facility])    James Cristina [place], [place]    Virtual Phone E/M                      Passed - EGFRCR or GFRNAA > 50     GFR Evaluation  EGFRCR: 67 , resulted on 9/25/2023                Recent Outpatient Visits              4 months ago Glaucoma suspect of both eyes    Mercedes Cristina    Nurse Only    4 months ago Type 2 diabetes mellitus without retinopathy ([address]    Yola Cristina MD    Office Visit    5 months ago SOB (shortness of breath) on exertion    [address], Moni Healy MD    Office Visit    8 months ago Annual physical exam    James Mack MD    Office Visit    11 months ago Chronic obstructive pulmonary disease with acute exacerbation [facility])    James Cristina [place], [place]    Virtual Phone E/M

## 2023-09-27 NOTE — DISCHARGE SUMMARY
DISCHARGE SUMMARY     Dee Gottlieb Patient Status:  Inpatient    3/23/1950 MRN [id number]   Location [facility] 3W/SW Attending No att. providers found   Hosp Day # 2 PCP Tyrone Babcock MD     Date of Admission: 2023  Date of Discharge: 2023  Discharge Disposition: Left Against Medical Advice    Admitting Diagnosis:   Syncope and collapse [R55]    Hospital Discharge Diagnoses: syncope    TCM Diagnosis at discharge from Hospital: Other: syncope; still recommend for TCM follow-up    Please note that only patients enrolled in the Medicare ACO, [facility] and [address] [place] [address] will be handled by a member of the Care Management Team.  For all other patients, please follow usual protocol for discharge care transition. Lace+ Score: 72  59-90 High Risk  29-58 Medium Risk  0-28   Low Risk. Risk of readmission: Dee Gottlieb has Moderate Risk of readmission after discharge from the hospital.    History of Present Illness:     Brief Synopsis: Acute hypoxia  -Patient's saturation dropping to 87% when ambulating  -Unclear etiology. Work-up is unremarkable   Seen by pulmonary , bippap at [facility] , outpatient pft      Syncope and collapse  -Troponin negative x1,   -Unremarkable CXR and CT brain  -EKG with bifascicular block  Nocturnal sherry= cardio note seen , event monitor upon dc    Seen by neurology cta reviewed - no high grade stenosis, eeg reviewed , pending mri        Generalized weakness  -PT OT consult placed     Leukocytosis  Trending down   -No fever  Emp abx , culture no growth        Acute kidney injury ([address]  Improved           History of pulmonary embolus (PE)  -on warfarin.   Continue to monitor       Anemia of Chronic disease  -Continue to monitor       Hypothyroidism  -Continue home medication       Type 2 diabetes mellitus with hyperglycemia, with long-term current use of insulin (HCC)  -sliding scale insulin       CAD (coronary artery disease), native coronary artery  -Status post stents  -Cardiology on board  -Continue DAPT  -Continue BB     HFrEF  -Continue GDMT  -On Jardiance at home as well. Being held during hospitalization       HTN (hypertension)  -Continue same meds       Lumbar stenosis with neurogenic claudication  Continue duloxetine      left AMA          Discharge Physical Exam: Constitutional:       Appearance: Normal appearance. HENT:      Head: Normocephalic and atraumatic. Cardiovascular:      Rate and Rhythm: Normal rate and regular rhythm. Pulses: Normal pulses. Heart sounds: Normal heart sounds. Pulmonary:      Effort: Pulmonary effort is normal.      Breath sounds: Normal breath sounds. Abdominal:      Palpations: Abdomen is soft. Musculoskeletal:         General: Normal range of motion. Cervical back: Normal range of motion and neck supple. Skin:     General: Skin is warm. Neurological:      Mental Status: He is alert. Mental status is at baseline. P  Discharge Medication List:     Discharge Medications        CONTINUE taking these medications        Instructions Prescription details   aspirin 81 MG Tbec      Take 1 tablet (81 mg total) by mouth daily. Quantity: 30 tablet  Refills: 2     atorvastatin 80 MG Tabs  Commonly known as: Lipitor      Take 1 tablet (80 mg total) by mouth daily. Quantity: 90 tablet  Refills: 0     Blood Glucose Monitoring Suppl Kit      Please use to check blood sugar twice daily. Quantity: 1 kit  Refills: 0     Blood Glucose Monitoring Suppl Misc      Please use to check blood sugar twice daily   Quantity: 200 each  Refills: 3     Blood Glucose Monitoring Suppl Misc      Please use to check blood sugar twice daily   Quantity: 200 each  Refills: 0     carvedilol 12.5 MG Tabs  Commonly known as: Coreg      Take 1 tablet (12.5 mg total) by mouth 2 (two) times daily with meals.    Quantity: 180 tablet  Refills: 1     clopidogrel 75 MG Tabs  Commonly known as: Plavix      Take 1 tablet (75 mg total) by mouth daily.   Quantity: 90 tablet  Refills: 3     Entresto 24-26 MG Tabs  Generic drug: sacubitril-valsartan      Take 1 tablet by mouth 2 (two) times daily. Quantity: 180 tablet  Refills: 0     Potassium Chloride ER 20 MEQ Tbcr      Take 1 tablet by mouth daily. Quantity: 90 tablet  Refills: 1     torsemide 10 MG Tabs  Commonly known as: DEMADEX      Take 1 tablet (10 mg total) by mouth daily. Quantity: 90 tablet  Refills: 1            ASK your doctor about these medications        Instructions Prescription details   albuterol 108 (90 Base) MCG/ACT Aers  Commonly known as: Ventolin HFA      Inhale 2 puffs into the lungs every 4 (four) hours as needed for Wheezing. Quantity: 54 g  Refills: 0     diclofenac 1 % Gel  Commonly known as: Voltaren      Apply 2 g topically daily as needed. Refills: 0     DULoxetine 30 MG Cpep  Commonly known as: Cymbalta      Take 1 capsule (30 mg total) by mouth daily. Refills: 0     empagliflozin 10 MG Tabs  Commonly known as: Jardiance      Take 1 tablet (10 mg total) by mouth daily. Quantity: 90 tablet  Refills: 2     ergocalciferol 1.25 MG (24784 UT) Caps  Commonly known as: Vitamin D2      TAKE ONE CAPSULE BY MOUTH ONCE A WEEK FOR 84 DAYS, THEN 1 CAPSULE EVERY 30 DAYS.(REPEAT LABS AFTER 12 WEEKS)   Quantity: 15 capsule  Refills: 0     latanoprost 0.005 % Soln  Commonly known as: Xalatan      Place 1 drop into both eyes nightly. Quantity: 7.5 mL  Refills: 3     levothyroxine 137 MCG Tabs  Commonly known as: Synthroid      Take 137 mcg by mouth before breakfast.   Quantity: 90 tablet  Refills: 1     Lumigan 0.01 % Soln  Generic drug: bimatoprost      Place 1 drop into both eyes every evening. Refills: 0     metFORMIN HCl 1000 MG Tabs  Commonly known as: GLUCOPHAGE      Take 0.5 tablets (500 mg total) by mouth 2 (two) times daily with meals.    Quantity: 180 tablet  Refills: 1     oxyCODONE-acetaminophen  MG Tabs  Commonly known as: Percocet      Take 1 tablet by mouth 4 (four) times daily as needed. Refills: 0     pregabalin 300 MG Caps  Commonly known as: LYRICA      Take 1 capsule (300 mg total) by mouth 2 (two) times daily. Quantity: 30 capsule  Refills: 0     Trelegy Ellipta 200-62.5-25 MCG/ACT Aepb  Generic drug: fluticasone-umeclidin-vilant      Inhale 1 puff into the lungs daily. Quantity: 180 each  Refills: 3     warfarin 5 MG Tabs  Commonly known as: Coumadin      Take 2 tablets (10 mg total) by mouth nightly. 7.5mg on Sunday and Wednesday  10mg on all other days   Refills: 0              Discharge Plan:      Discharge Medication List as of 9/26/2023 11:01 AM    Home Meds - Unchanged    empagliflozin (JARDIANCE) 10 MG Oral Tab  Take 1 tablet (10 mg total) by mouth daily. , Normal, Disp-90 tablet, R-2    fluticasone-umeclidin-vilant (TRELEGY ELLIPTA) 200-62.5-25 MCG/ACT Inhalation Aerosol Powder, Breath Activated  Inhale 1 puff into the lungs daily. , Normal, Disp-180 each, R-3    albuterol (VENTOLIN HFA) 108 (90 Base) MCG/ACT Inhalation Aero Soln  Inhale 2 puffs into the lungs every 4 (four) hours as needed for Wheezing., Normal, Disp-54 g, R-0    latanoprost 0.005 % Ophthalmic Solution  Place 1 drop into both eyes nightly., Normal, Disp-7.5 mL, R-3    bimatoprost (LUMIGAN) 0.01 % Ophthalmic Solution  Place 1 drop into both eyes every evening., Historical    levothyroxine 137 MCG Oral Tab  Take 137 mcg by mouth before breakfast., Normal, Disp-90 tablet, R-1    Potassium Chloride ER 20 MEQ Oral Tab CR  Take 1 tablet by mouth daily. , Normal, Disp-90 tablet, R-1    torsemide 10 MG Oral Tab  Take 1 tablet (10 mg total) by mouth daily. , Normal, Disp-90 tablet, R-1    atorvastatin 80 MG Oral Tab  Take 1 tablet (80 mg total) by mouth daily. , Normal, Disp-90 tablet, R-0    carvedilol 12.5 MG Oral Tab  Take 1 tablet (12.5 mg total) by mouth 2 (two) times daily with meals. , Normal, Disp-180 tablet, R-1    metFORMIN 1000 MG Oral Tab  Take 0.5 tablets (500 mg total) by mouth 2 (two) times daily with meals. , Normal, Disp-180 tablet, R-1    oxyCODONE-acetaminophen  MG Oral Tab  Take 1 tablet by mouth 4 (four) times daily as needed., Historical    ERGOCALCIFEROL 1.25 MG (14508 UT) Oral Cap  TAKE ONE CAPSULE BY MOUTH ONCE A WEEK FOR 84 DAYS, THEN 1 CAPSULE EVERY 30 DAYS.(REPEAT LABS AFTER 12 WEEKS), Normal, Disp-15 capsule, R-0    DULoxetine 30 MG Oral Cap DR Particles  Take 1 capsule (30 mg total) by mouth daily. , Historical    warfarin 5 MG Oral Tab  Take 2 tablets (10 mg total) by mouth nightly. 7.5mg on Sunday and Wednesday  10mg on all other days, Historical    pregabalin 300 MG Oral Cap  Take 1 capsule (300 mg total) by mouth 2 (two) times daily. , Normal, Disp-30 capsule, R-0    diclofenac 1 % External Gel  Apply 2 g topically daily as needed., Historical    aspirin 81 MG Oral Tab EC  Take 1 tablet (81 mg total) by mouth daily. , Normal, Disp-30 tablet, R-2    clopidogrel 75 MG Oral Tab  Take 1 tablet (75 mg total) by mouth daily. , Normal, Disp-90 tablet, R-3    Blood Glucose Monitoring Suppl Does not apply Kit  Please use to check blood sugar twice daily. , Normal, Disp-1 kit, R-0E 11.65-Type 2 diabetes mellitus with hyperglycemia, with long-term current use of insulin    !! Blood Glucose Monitoring Suppl Does not apply Misc  Please use to check blood sugar twice daily, Normal, Disp-200 each, R-3Please provide lancets covered by insurance. E 11.65 Type 2 diabetes mellitus with hyperglycemia, with long-term current use of insulin    !! Blood Glucose Monitoring Suppl Does not apply Misc  Please use to check blood sugar twice daily, Normal, Disp-200 each, R-0Please provide test strips covered by insurance. E11.65- Type 2 diabetes mellitus with hyperglycemia, with long-term current use of insulin    ENTRESTO 24-26 MG Oral Tab  Take 1 tablet by mouth 2 (two) times daily. , Normal, Disp-180 tablet, R-0, MILAGRO    !! - Potential duplicate medications found.  Please discuss with provider. Discharge Diet:     Discharge Activity:     Follow-up appointment:   No follow-up provider specified.     Vital signs:       -----------------------------------------------------------------------------------------------  PATIENT DISCHARGE INSTRUCTIONS: See electronic chart    Victoriano Quiles MD 9/27/2023    Time spent:  30 to 74 minutes

## 2023-09-28 ENCOUNTER — PATIENT OUTREACH (OUTPATIENT)
Dept: CASE MANAGEMENT | Age: 73
End: 2023-09-28

## 2023-09-28 NOTE — PROGRESS NOTES
1st attempt DM apt request (Left AMA (discharged 09/26))     Dr Carlos Syed  Internal Medicine  [address] [facility].  [address] [address] [phone]  Unable to contact pt (vm full); will try again

## 2023-09-29 ENCOUNTER — LAB ENCOUNTER (OUTPATIENT)
Dept: LAB | Facility: HOSPITAL | Age: 73
End: 2023-09-29
Attending: INTERNAL MEDICINE
Payer: MEDICARE

## 2023-09-29 DIAGNOSIS — E03.8 OTHER SPECIFIED HYPOTHYROIDISM: ICD-10-CM

## 2023-09-29 LAB
T4 FREE SERPL-MCNC: 1 NG/DL (ref 0.8–1.7)
TSI SER-ACNC: 12.7 MIU/ML (ref 0.36–3.74)

## 2023-09-29 PROCEDURE — 36415 COLL VENOUS BLD VENIPUNCTURE: CPT

## 2023-09-29 PROCEDURE — 84443 ASSAY THYROID STIM HORMONE: CPT

## 2023-09-29 PROCEDURE — 84439 ASSAY OF FREE THYROXINE: CPT

## 2023-10-06 ENCOUNTER — TELEPHONE (OUTPATIENT)
Dept: INTERNAL MEDICINE CLINIC | Facility: CLINIC | Age: 73
End: 2023-10-06

## 2023-10-06 NOTE — TELEPHONE ENCOUNTER
Pt's daughter stated pt still have hallucinations, was ok today, advised Appt, not on Pt's file will sign on Monday

## 2023-10-09 ENCOUNTER — OFFICE VISIT (OUTPATIENT)
Dept: INTERNAL MEDICINE CLINIC | Facility: CLINIC | Age: 73
End: 2023-10-09

## 2023-10-09 VITALS
HEART RATE: 75 BPM | HEIGHT: 69 IN | DIASTOLIC BLOOD PRESSURE: 77 MMHG | TEMPERATURE: 98 F | BODY MASS INDEX: 45.18 KG/M2 | SYSTOLIC BLOOD PRESSURE: 123 MMHG | WEIGHT: 305 LBS | OXYGEN SATURATION: 94 %

## 2023-10-09 DIAGNOSIS — G47.33 OSA (OBSTRUCTIVE SLEEP APNEA): Primary | ICD-10-CM

## 2023-10-09 PROCEDURE — 99214 OFFICE O/P EST MOD 30 MIN: CPT | Performed by: INTERNAL MEDICINE

## 2023-10-09 PROCEDURE — 1111F DSCHRG MED/CURRENT MED MERGE: CPT | Performed by: INTERNAL MEDICINE

## 2023-10-09 RX ORDER — GABAPENTIN 300 MG/1
300 CAPSULE ORAL 3 TIMES DAILY
Qty: 180 CAPSULE | Refills: 0 | Status: SHIPPED | OUTPATIENT
Start: 2023-10-09

## 2023-10-09 NOTE — PROGRESS NOTES
2nd attempt DM apt request (Left AMA (discharged 09/26))      Dr Lane Section  Internal Medicine  [address] [facility] [place] [facility].  [address] [address] [phone]  pt has existing apt [facility] 10/09 @11:00am  Closing encounter

## 2023-10-09 NOTE — PROGRESS NOTES
Subjective:     Patient ID: Casi De La Torre. is a 72year old male. HPI    History/Other: He came in today for follow-up after he was discharged from the hospital.  According to the patient since he stopped taking Lyrica he is not hallucinating. The Lyrica was prescribed by his pain management doctor due to chronic pain. He feels tired during the day according to his daughter he is not exercising sitting in the chair all the time. He is not checking his oxygen saturation at home. Review of Systems   Constitutional: Negative. HENT: Negative. Eyes: Negative. Respiratory: Negative. Cardiovascular: Negative. Gastrointestinal: Negative. Endocrine: Negative. Genitourinary: Negative. Musculoskeletal: Negative. Neurological: Negative. Hematological: Negative. Psychiatric/Behavioral: Negative. Current Outpatient Medications   Medication Sig Dispense Refill    levothyroxine 150 MCG Oral Tab Take 1 tablet (150 mcg total) by mouth before breakfast. 90 tablet 0    carvedilol 12.5 MG Oral Tab Take 1 tablet (12.5 mg total) by mouth 2 (two) times daily with meals. 180 tablet 1    empagliflozin (JARDIANCE) 10 MG Oral Tab Take 1 tablet (10 mg total) by mouth daily. 90 tablet 2    fluticasone-umeclidin-vilant (TRELEGY ELLIPTA) 346-67.2-35 MCG/ACT Inhalation Aerosol Powder, Breath Activated Inhale 1 puff into the lungs daily. 180 each 3    albuterol (VENTOLIN HFA) 108 (90 Base) MCG/ACT Inhalation Aero Soln Inhale 2 puffs into the lungs every 4 (four) hours as needed for Wheezing. 54 g 0    clopidogrel 75 MG Oral Tab Take 1 tablet (75 mg total) by mouth daily. 90 tablet 3    bimatoprost (LUMIGAN) 0.01 % Ophthalmic Solution Place 1 drop into both eyes every evening. ENTRESTO 24-26 MG Oral Tab Take 1 tablet by mouth 2 (two) times daily. 180 tablet 0    Potassium Chloride ER 20 MEQ Oral Tab CR Take 1 tablet by mouth daily.  90 tablet 1    torsemide 10 MG Oral Tab Take 1 tablet (10 mg total) by mouth daily. 90 tablet 1    atorvastatin 80 MG Oral Tab Take 1 tablet (80 mg total) by mouth daily. 90 tablet 0    metFORMIN 1000 MG Oral Tab Take 0.5 tablets (500 mg total) by mouth 2 (two) times daily with meals. 180 tablet 1    oxyCODONE-acetaminophen  MG Oral Tab Take 1 tablet by mouth 4 (four) times daily as needed. warfarin 5 MG Oral Tab Take 2 tablets (10 mg total) by mouth nightly. 7.5mg on Sunday and Wednesday  10mg on all other days      pregabalin 300 MG Oral Cap Take 1 capsule (300 mg total) by mouth 2 (two) times daily. 30 capsule 0    aspirin 81 MG Oral Tab EC Take 1 tablet (81 mg total) by mouth daily. 30 tablet 2    latanoprost 0.005 % Ophthalmic Solution Place 1 drop into both eyes nightly. (Patient not taking: Reported on 10/9/2023) 7.5 mL 3    Blood Glucose Monitoring Suppl Does not apply Kit Please use to check blood sugar twice daily. 1 kit 0    Blood Glucose Monitoring Suppl Does not apply Misc Please use to check blood sugar twice daily 200 each 3    Blood Glucose Monitoring Suppl Does not apply Misc Please use to check blood sugar twice daily 200 each 0    levothyroxine 137 MCG Oral Tab Take 137 mcg by mouth before breakfast. (Patient not taking: Reported on 10/9/2023) 90 tablet 1    ERGOCALCIFEROL 1.25 MG (44289 UT) Oral Cap TAKE ONE CAPSULE BY MOUTH ONCE A WEEK FOR 84 DAYS, THEN 1 CAPSULE EVERY 30 DAYS.(REPEAT LABS AFTER 12 WEEKS) (Patient not taking: Reported on 10/9/2023) 15 capsule 0    DULoxetine 30 MG Oral Cap DR Particles Take 1 capsule (30 mg total) by mouth daily. diclofenac 1 % External Gel Apply 2 g topically daily as needed.  (Patient not taking: Reported on 10/9/2023)       Allergies:No Known Allergies    Past Medical History:   Diagnosis Date    Anxiety disorder, unspecified 01/22/2022    Atherosclerosis of coronary artery     stents x 6    Chronic low back pain with bilateral sciatica, left worse than right 11/28/2017    Chronic obstructive pulmonary disease, unspecified ([address] 01/22/2022    Coronary atherosclerosis     Diabetes ([address]     borderline     Disorder of thyroid     Essential hypertension     Glaucoma 05/10/2023    first visit with IRMA, patient was taking Timolol OU BID for 20 years, has not used gtts in over 1 year because he ran out of gtts and had no refills, fundus photos taken today    Heart attack ([address]     High blood pressure     High cholesterol     Hyperlipidemia     Kidney stone 07/2020    Left Sided Neck pain, acute 11/28/2017    Lumbar stenosis with neurogenic claudication 05/31/2018    Major depressive disorder, recurrent, unspecified ([address] 01/22/2022    Morbid obesity with BMI of 40.0-44.9, adult ([address]     Pneumonia due to COVID-19 virus 01/09/2022    Thyroid disease       Past Surgical History:   Procedure Laterality Date    CATARACT EXTRACTION EXTRACAPSULAR W/ INTRAOCULAR LENS IMPLANTATION Right 2018    [address] ([facility])      Pheobe Richters  08/04/2020    Dr. Brynn Goodrich @ [address] [place] [address]    CYSTO/URETERO W/LITHOTRIPSY Left 08/04/2020    Dr. 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 IRIDOTOMY/IRIDECTOMY BY LASER      unknown Dr    OTHER      cardiac stents      Family History   Problem Relation Age of Onset    Heart Attack Father     Hypertension Brother     Glaucoma Neg     Macular degeneration Neg       Social History:   Social History     Socioeconomic History    Marital status: Single   Tobacco Use    Smoking status: Former     Years: 50     Types: Cigarettes    Smokeless tobacco: Former    Tobacco comments:     per pt, quit in 1994   Vaping Use    Vaping Use: Never used   Substance and Sexual Activity    Alcohol use: No     Comment: quit in 1994    Drug use: No   Other Topics Concern    Caffeine Concern Yes    Exercise No   Social History Narrative    The patient uses the following assistive device(s):  single-point cane.       The patient does live in a home with stairs. Objective:   Physical Exam  Vitals and nursing note reviewed. Constitutional:       Appearance: Normal appearance. HENT:      Head: Normocephalic and atraumatic. Cardiovascular:      Rate and Rhythm: Normal rate and regular rhythm. Pulses: Normal pulses. Heart sounds: Normal heart sounds. Pulmonary:      Effort: Pulmonary effort is normal.      Breath sounds: Normal breath sounds. Abdominal:      Palpations: Abdomen is soft. Musculoskeletal:         General: Normal range of motion. Cervical back: Normal range of motion and neck supple. Skin:     General: Skin is warm. Neurological:      Mental Status: He is alert. Mental status is at baseline. Assessment & Plan:   No diagnosis found. Fatigue/morbid obesity we will order sleep study    Chronic lower back pain I did advise him to hold on Lyrica we will start him on gabapentin follow-up with a pain management    Hypothyroidism-thyroid medication dose was adjusted, recheck TSH in 6-8 weeks     H/o of pe- on coumadin- continue with coumadin  No orders of the defined types were placed in this encounter.       Meds This Visit:  Requested Prescriptions      No prescriptions requested or ordered in this encounter       Imaging & Referrals:  None

## 2023-10-11 ENCOUNTER — PATIENT OUTREACH (OUTPATIENT)
Dept: CASE MANAGEMENT | Age: 73
End: 2023-10-11

## 2023-10-11 ENCOUNTER — TELEPHONE (OUTPATIENT)
Dept: INTERNAL MEDICINE CLINIC | Facility: CLINIC | Age: 73
End: 2023-10-11

## 2023-10-11 DIAGNOSIS — Z79.4 TYPE 2 DIABETES MELLITUS WITH HYPERGLYCEMIA, WITH LONG-TERM CURRENT USE OF INSULIN (HCC): ICD-10-CM

## 2023-10-11 DIAGNOSIS — R53.82 CHRONIC FATIGUE: ICD-10-CM

## 2023-10-11 DIAGNOSIS — K21.9 GASTRO-ESOPHAGEAL REFLUX DISEASE WITHOUT ESOPHAGITIS: ICD-10-CM

## 2023-10-11 DIAGNOSIS — H40.2290 CHRONIC PRIMARY ANGLE-CLOSURE GLAUCOMA, UNSPECIFIED GLAUCOMA STAGE, UNSPECIFIED LATERALITY: ICD-10-CM

## 2023-10-11 DIAGNOSIS — H25.12 AGE-RELATED NUCLEAR CATARACT OF LEFT EYE: ICD-10-CM

## 2023-10-11 DIAGNOSIS — E66.01 MORBID OBESITY WITH BMI OF 40.0-44.9, ADULT (HCC): ICD-10-CM

## 2023-10-11 DIAGNOSIS — E78.00 PURE HYPERCHOLESTEROLEMIA: ICD-10-CM

## 2023-10-11 DIAGNOSIS — I10 PRIMARY HYPERTENSION: Primary | ICD-10-CM

## 2023-10-11 DIAGNOSIS — R40.20 LOSS OF CONSCIOUSNESS (HCC): ICD-10-CM

## 2023-10-11 DIAGNOSIS — E11.65 TYPE 2 DIABETES MELLITUS WITH HYPERGLYCEMIA, WITH LONG-TERM CURRENT USE OF INSULIN (HCC): ICD-10-CM

## 2023-10-11 NOTE — TELEPHONE ENCOUNTER
Advised patient of Dr. Scottie valdez. Patient verbalized understanding and did not want to do home OT/PT currently. Patient indicated he was confused what the RMA was asking for. Stated that he had PT/OT about 7 months ago and did not care for it. Advised patient to talk to daughter first and if he changes his mind to give us a call back. Patient agreed.

## 2023-10-11 NOTE — TELEPHONE ENCOUNTER
Spoke to patient for monthly CCM. Patient reports having trouble sleeping (on going issue). Woke up at 4 am lost balance and fell forward. Reports no LOC or head injury. Currently ambulating with no assistance. Encourged patient to use cane or walker when ambulating until gait is more steady. Interested in home PT/OT to help strengthen. Is patient to have home health? Continues to not check blood sugars because he doesn't like doing it. PT/INR hasn't been checked. Aware due for cardiology follow up and will schedule when off phone. I contacted [address] clinic had to [facility]. Please contact patient to advise.     Thank you

## 2023-10-11 NOTE — PROGRESS NOTES
Spoke to TBS for [facility]. Updates to patient care team/comments: UTD  Patient reported changes in medications: reports no changes  Med Adherence  Comment: reports adherence      Health Maintenance: Zoster Vaccines(1 of 2) Never done  Colorectal Cancer Screening due on 06/11/2019  Diabetes Care Foot Exam (Annual) due on 01/01/2023  PSA due on 07/01/2023  COVID-19 Vaccine(4 - 2023-24 season) due on 09/01/2023  Influenza Vaccine(1) due on 10/01/2023  Diabetes Care A1C due on 12/22/2023  Diabetes Care: Microalb/Creat Ratio due on 01/12/2024  Annual Physical due on 01/12/2024  Diabetes Care Dilated Eye Exam due on 05/10/2024  Diabetes Care: GFR due on 09/25/2024  Annual Depression Screening Completed  Fall Risk Screening (Annual) Completed  Pneumococcal Vaccine: 65+ Years Completed    Patient updates/concerns:   Spoke with patient. Patient relates dong ok. Mood good/stable. Recently hospitalized for syncope with acute hypoxia left AMA. Followed up with PCP 10/9. Patient reports having trouble sleeping. Woke up at 4 am lost balance and fell forward. Reports no LOC or head injury did skin knees. Currently ambulating with no assistance. Encourged patient to use cane or walker when ambulating until gait is more steady. Reports no changes to medications. Last PT/INR unknown states its a been awhile. I called [facility] and they report last INR was 9/6. I was transferred to [address] [place] [address] clinic and [facility] to call back or contact patient. Patient informed due to see cardiology and will schedule. Vision changes stable. Has opthalmology follow up tomorrow. FBS unknown. Doesn't check them. States he just doesn't like to it. Numerous machines have been sent. Encourged to do it at least once a day and explained the importance. Weight up aware needs to make diet changes. Breathing stable. Needs to see pulm and aware to schedule sleep study. Blood pressure/pulse unknown not checking  at home. No other questions or concerns.     Encouraged patient:   Self care: Take the time to do the things you love. Nutrition:  Good nutrition helps us to maintain our weight, fight off infection, and help reduce the risk of developing other chronic issues. Physical activity:  Physical activity is important to help maintain independence and improve quality of life. Goals/Action Plan: Active goal from previous outreach: Staying healthy, maintain independence, and stability. Patient reported progress towards goals: non progressing                - What: needs to follow up on health conditions with specialist.            - Where/When/How: n/a  Patient Reported Barriers and Concerns: as per above                      - Plan for overcoming barriers: encourged patient to call with any questions or concerns     Care Managers Interventions: Continue to provide encouragement and education for healthy coping and diagnosis. Called [facility] to get PT/INR scheduled. Future Appointments:   Future Appointments   Date Time Provider Andrea Brown   10/12/2023 10:45 AM Karlo Jacobsen MD [address] [facility]   12/5/2023  4:00 PM [address] [place] [address] SLEEP ROOMS [address] [place] [address] SLEEP [facility]         Next Care Manager Follow Up Date: 1 month     Reason For Follow Up: review progress and or barriers towards patient's goals. Time Spent This Encounter Total: 25 min medical record review, telephone communication, care plan updates where needed, education, goals, and action plan recreation/update. Provided acknowledgment and validation to patient's concerns.    Monthly Minute Total including today: 25 min   Physical assessment, complete health history, and need for CCM established by Miguel Alcaraz MD.

## 2023-10-13 ENCOUNTER — TELEPHONE (OUTPATIENT)
Dept: INTERNAL MEDICINE CLINIC | Facility: CLINIC | Age: 73
End: 2023-10-13

## 2023-10-13 NOTE — TELEPHONE ENCOUNTER
Option one  Please place order for a diagnostic sleep study test patient wants to come in lab to take it to spend the night at hospital.

## 2023-11-13 ENCOUNTER — TELEPHONE (OUTPATIENT)
Dept: INTERNAL MEDICINE CLINIC | Facility: CLINIC | Age: 73
End: 2023-11-13

## 2023-11-13 ENCOUNTER — PATIENT OUTREACH (OUTPATIENT)
Dept: CASE MANAGEMENT | Age: 73
End: 2023-11-13

## 2023-11-13 DIAGNOSIS — I10 PRIMARY HYPERTENSION: ICD-10-CM

## 2023-11-13 DIAGNOSIS — K21.9 GASTRO-ESOPHAGEAL REFLUX DISEASE WITHOUT ESOPHAGITIS: ICD-10-CM

## 2023-11-13 DIAGNOSIS — Z79.4 TYPE 2 DIABETES MELLITUS WITH HYPERGLYCEMIA, WITH LONG-TERM CURRENT USE OF INSULIN (HCC): ICD-10-CM

## 2023-11-13 DIAGNOSIS — H40.2290 CHRONIC PRIMARY ANGLE-CLOSURE GLAUCOMA, UNSPECIFIED GLAUCOMA STAGE, UNSPECIFIED LATERALITY: ICD-10-CM

## 2023-11-13 DIAGNOSIS — E11.65 TYPE 2 DIABETES MELLITUS WITH HYPERGLYCEMIA, WITH LONG-TERM CURRENT USE OF INSULIN (HCC): ICD-10-CM

## 2023-11-13 DIAGNOSIS — N40.1 BENIGN PROSTATIC HYPERPLASIA WITH NOCTURIA: ICD-10-CM

## 2023-11-13 DIAGNOSIS — H25.12 AGE-RELATED NUCLEAR CATARACT OF LEFT EYE: ICD-10-CM

## 2023-11-13 DIAGNOSIS — E11.9 TYPE 2 DIABETES MELLITUS WITHOUT RETINOPATHY (HCC): Primary | ICD-10-CM

## 2023-11-13 DIAGNOSIS — R35.1 BENIGN PROSTATIC HYPERPLASIA WITH NOCTURIA: ICD-10-CM

## 2023-11-13 RX ORDER — LEVOTHYROXINE SODIUM 0.15 MG/1
150 TABLET ORAL
Qty: 90 TABLET | Refills: 1 | Status: CANCELLED | OUTPATIENT
Start: 2023-11-13

## 2023-11-13 RX ORDER — TORSEMIDE 10 MG/1
10 TABLET ORAL DAILY
Qty: 90 TABLET | Refills: 1 | Status: CANCELLED | OUTPATIENT
Start: 2023-11-13

## 2023-11-13 RX ORDER — NALOXEGOL OXALATE 25 MG/1
TABLET, FILM COATED ORAL
COMMUNITY
Start: 2023-10-17

## 2023-11-13 RX ORDER — POTASSIUM CHLORIDE 1500 MG/1
1 TABLET, EXTENDED RELEASE ORAL DAILY
Qty: 90 TABLET | Refills: 1 | Status: CANCELLED | OUTPATIENT
Start: 2023-11-13

## 2023-11-13 RX ORDER — ATORVASTATIN CALCIUM 80 MG/1
80 TABLET, FILM COATED ORAL DAILY
Qty: 90 TABLET | Refills: 1 | Status: CANCELLED | OUTPATIENT
Start: 2023-11-13

## 2023-11-13 NOTE — TELEPHONE ENCOUNTER
Also as a FYI. Patient states labs drawn last week for PT/INR but was not informed of results. Patient does have increased memory issues. I called to [facility] to confirm. They do not see any recent orders or draws. PT/INR last checked in September. They will contact patient today to set up.

## 2023-11-13 NOTE — PROGRESS NOTES
Spoke to [employer] for [facility]. Updates to patient care team/comments: UTD  Patient reported changes in medications: reports changes . Med Adherence  Comment: reports adherence but according to refill he has not filled some medications and has been out. .. Health Maintenance:   Health Maintenance   Topic Date Due    Zoster Vaccines (1 of 2) Never done    Colorectal Cancer Screening  06/11/2019    Diabetes Care Foot Exam (Annual)  01/01/2023    PSA  07/01/2023    COVID-19 Vaccine (4 - 2023-24 season) 09/01/2023    Influenza Vaccine (1) 10/01/2023    Annual Physical  01/12/2024    Diabetes Care A1C  12/22/2023    Diabetes Care: Microalb/Creat Ratio  01/12/2024    Diabetes Care Dilated Eye Exam  05/10/2024    Diabetes Care: GFR  09/25/2024    Annual Depression Screening  Completed    Fall Risk Screening (Annual)  Completed    Pneumococcal Vaccine: 65+ Years  Completed       Patient updates/concerns:   Spoke with patient. Patient relates doing ok. Mood good/stable. States currently fighting a cold. Has been using OTC medications to improve symptoms. Today is the first day he actually feels better. Breathing stable. Encourged to stay hydrated and to call or seek medical attention if symptoms worsen. Patient requesting medication refills. When going through medications some meds he did not have so has not been taking. We discussed the importance of adherence. Blood sugars unknown continues to not check them. Weight unknown has not weighed. Vision is the same missed eye doctor visit and aware to reschedule. Aware he needs to schedule sleep study as well. Blood pressure and pulse unknown not checking but denies symptoms. States scheduled next month with cardiologist. States PT/INR has been getting checked. Labs drawn last week but was never told results. I called [facility] to confirm. PT/INR has not been checked since September. Patient is over due! They will contact patient now to set up.  December 4 at 2:40 patient is scheduled to see Dr. Thad Quick. Denies any recent falls. Denies any urinary issues. Over due for PSA. Due for DM labs and TSH next month. No other questions or concerns. Encouraged patient:   Self care: Take the time to do the things you love. Nutrition:  Good nutrition helps us to maintain our weight, fight off infection, and help reduce the risk of developing other chronic issues. Physical activity:  Physical activity is important to help maintain independence and improve quality of life. Goals/Action Plan: Active goal from previous outreach: Staying healthy, maintain independence, and stability. Patient reported progress towards goals: no               - What: needs to follow up on health conditions            - Where/When/How- needs to check b/s. Needs to see specialist.   Patient Reported Barriers and Concerns: as per above                    - Plan for overcoming barriers: encourged patient to call with any questions or concerns. Care Managers Interventions: Continue to provide encouragement and education for healthy coping and diagnosis. Future Appointments:   Future Appointments   Date Time Provider Andrea Brown   12/5/2023  8:30 PM [address] [place] [address] SLEEP ROOMS [address] [place] [address] SLEEP [facility] Sleep C[facility]         Next Care Manager Follow Up Date: 1 month     Reason For Follow Up: review progress and or barriers towards patient's goals. Time Spent This Encounter Total: 23 min medical record review, telephone communication, care plan updates where needed, education, goals, and action plan recreation/update. Provided acknowledgment and validation to patient's concerns.    Monthly Minute Total including today: 23 min   Physical assessment, complete health history, and need for CCM established by Naomi Alcantara MD.

## 2023-11-13 NOTE — TELEPHONE ENCOUNTER
Spoke to patient for monthly CCM. Patient requesting refills on medications. Please contact pt to advise. Patient aware he needs to schedule sleep study. Aware he missed eye visit and needs to reschedule. Also continues to not check blood sugars. Will be due for DM labs next month and TSH recheck also over due for PSA.

## 2023-11-13 NOTE — TELEPHONE ENCOUNTER
See refill encounter 11/13/23. Confirmed refill requests with pt. Sent to refill request folder for processing.     Please reply to pool: MARIA G Warren

## 2023-11-14 RX ORDER — LEVOTHYROXINE SODIUM 0.15 MG/1
150 TABLET ORAL
Qty: 90 TABLET | Refills: 0 | Status: SHIPPED | OUTPATIENT
Start: 2023-11-14

## 2023-11-14 RX ORDER — POTASSIUM CHLORIDE 1500 MG/1
1 TABLET, EXTENDED RELEASE ORAL DAILY
Qty: 90 TABLET | Refills: 1 | Status: SHIPPED | OUTPATIENT
Start: 2023-11-14

## 2023-11-14 RX ORDER — ATORVASTATIN CALCIUM 80 MG/1
80 TABLET, FILM COATED ORAL DAILY
Qty: 90 TABLET | Refills: 0 | Status: SHIPPED | OUTPATIENT
Start: 2023-11-14

## 2023-11-14 RX ORDER — TORSEMIDE 10 MG/1
10 TABLET ORAL DAILY
Qty: 90 TABLET | Refills: 1 | Status: SHIPPED | OUTPATIENT
Start: 2023-11-14

## 2023-11-14 NOTE — TELEPHONE ENCOUNTER
Please review. Protocol failed/ No protocol. Requested Prescriptions   Pending Prescriptions Disp Refills    atorvastatin 80 MG Oral Tab 90 tablet 0     Sig: Take 1 tablet (80 mg total) by mouth daily.        Cholesterol Medication Protocol Failed - 11/13/2023  3:39 PM        Failed - Last LDL < 130     Lab Results   Component Value Date     (H) 09/23/2023             Passed - ALT in past 12 months        Passed - LDL in past 12 months        Passed - Last ALT < 80     Lab Results   Component Value Date    ALT 33 09/23/2023             Passed - In person appointment or virtual visit in the past 12 mos or appointment in next 3 mos     Recent Outpatient Visits              1 month ago DIRK (obstructive sleep apnea)    Jose Luis Segal, Mckinley Fitzgerald MD    Office Visit    6 months ago Glaucoma suspect of both eyes    [address], [address], [place]    Nurse Only    6 months ago Type 2 diabetes mellitus without retinopathy ([address]    Maria Esther Pascual MD    Office Visit    7 months ago SOB (shortness of breath) on exertion    [address], Grady Gonzáles MD    Office Visit    10 months ago Annual physical exam    [address], [address], Shaylee Lynch MD    Office Visit          Future Appointments         Provider Department Appt Notes    In 3 weeks [address] [facility] [address] no pa req                 levothyroxine 150 MCG Oral Tab 90 tablet 0     Sig: Take 1 tablet (150 mcg total) by mouth before breakfast.       Thyroid Medication Protocol Failed - 11/13/2023  3:39 PM        Failed - Last TSH value is normal     Lab Results   Component Value Date    TSH 12.700 (H) 09/29/2023                 Passed - TSH in past 12 months        Passed - In person appointment or virtual visit in the past 12 mos or appointment in next 3 mos     Recent Outpatient Visits              1 month ago DIRK (obstructive sleep apnea)    Darrion Spence MD    Office Visit    6 months ago Glaucoma suspect of both eyes    [address], [address], [place]    Nurse Only    6 months ago Type 2 diabetes mellitus without retinopathy ([address]    Alona Spence MD    Office Visit    7 months ago SOB (shortness of breath) on exertion    [address], Bon Brown MD    Office Visit    10 months ago Annual physical exam    [address], [address] Rom Chun MD    Office Visit          Future Appointments         Provider Department Appt Notes    In 3 weeks [address] [facility] [address] no pa req                 metFORMIN HCl 1000 MG Oral Tab 180 tablet 1     Sig: Take 0.5 tablets (500 mg total) by mouth 2 (two) times daily with meals.        Diabetes Medication Protocol Failed - 11/13/2023  3:39 PM        Failed - Last A1C < 7.5 and within past 6 months     Lab Results   Component Value Date    A1C 9.0 (H) 09/22/2023             Passed - In person appointment or virtual visit in the past 6 mos or appointment in next 3 mos     Recent Outpatient Visits              1 month ago DIRK (obstructive sleep apnea)    Darrion Spence MD    Office Visit    6 months ago Glaucoma suspect of both eyes    Mercedes Spence    Nurse Only    6 months ago Type 2 diabetes mellitus without retinopathy ([address]    Alona Spence MD    Office Visit    7 months ago SOB (shortness of breath) on exertion    [address], Bon Brown MD    Office Visit    10 months ago Annual physical exam    [address], [address] Grace Rider MD    Office Visit          Future Appointments         Provider Department Appt Notes    In 3 weeks [address] [facility] [address] no pa req               Passed - EGFRCR or GFRNAA > 50     GFR Evaluation  EGFRCR: 67 , resulted on 9/25/2023          Passed - GFR in the past 12 months          torsemide 10 MG Oral Tab 90 tablet 1     Sig: Take 1 tablet (10 mg total) by mouth daily.        Hypertensive Medications Protocol Passed - 11/13/2023  3:39 PM        Passed - In person appointment in the past 12 or next 3 months     Recent Outpatient Visits              1 month ago DIRK (obstructive sleep apnea)    [address] [facility], Barbie Pillai MD    Office Visit    6 months ago Glaucoma suspect of both eyes    [address], [address], [place]    Nurse Only    6 months ago Type 2 diabetes mellitus without retinopathy ([facility]    [address] [facility], Red Shetty MD    Office Visit    7 months ago SOB (shortness of breath) on exertion    [address], Tasha Flynn MD    Office Visit    10 months ago Annual physical exam    Katiana Santos MD    Office Visit          Future Appointments         Provider Department Appt Notes    In 3 weeks [address] [facility] [address] no pa req               Passed - Last BP reading less than 140/90     BP Readings from Last 1 Encounters:   10/09/23 123/77               Passed - CMP or BMP in past 6 months     Recent Results (from the past 4392 hour(s))   Basic Metabolic Panel (8)    Collection Time: 09/25/23  7:06 AM   Result Value Ref Range    Glucose 213 (H) 70 - 99 mg/dL    Sodium 139 136 - 145 mmol/L    Potassium 4.7 3.5 - 5.1 mmol/L    Chloride 111 98 - 112 mmol/L    CO2 25.0 21.0 - 32.0 mmol/L    Anion Gap 3 0 - 18 mmol/L    BUN 38 (H) 7 - 18 mg/dL    Creatinine 1.16 0.70 - 1.30 mg/dL    BUN/CREA Ratio 32.8 (H) 10.0 - 20.0    Calcium, Total 8.8 8.5 - 10.1 mg/dL    Calculated Osmolality 303 (H) 275 - 295 mOsm/kg    eGFR-Cr 67 >=60 mL/min/1.73m2     *Note: Due to a large number of results and/or encounters for the requested time period, some results have not been displayed. A complete set of results can be found in Results Review. Passed - In person appointment or virtual visit in the past 6 months     Recent Outpatient Visits              1 month ago DIRK (obstructive sleep apnea)    Megan Lea MD    Office Visit    6 months ago Glaucoma suspect of both eyes    [address], [address], [place]    Nurse Only    6 months ago Type 2 diabetes mellitus without retinopathy ([facility]    Abhijit Lanza MD    Office Visit    7 months ago SOB (shortness of breath) on exertion    [address], Estle Spurling, MD    Office Visit    10 months ago Annual physical exam    [address], Estle Spurling, MD    Office Visit          Future Appointments         Provider Department Appt Notes    In 3 weeks [address] [facility] [address] no pa req               Passed - EGFRCR or GFRNAA > 50     GFR Evaluation  EGFRCR: 67 , resulted on 9/25/2023            Potassium Chloride ER 20 MEQ Oral Tab CR 90 tablet 1     Sig: Take 1 tablet by mouth daily.        There is no refill protocol information for this order          Recent Outpatient Visits              1 month ago DIRK (obstructive sleep apnea)    Robina Lanza Austin, MD    Office Visit    6 months ago Glaucoma suspect of both eyes Mercedes Mcfarlane    Nurse Only    6 months ago Type 2 diabetes mellitus without retinopathy (HCC)    Rafia Mcfarlane Cathy Rm, MD    Office Visit    7 months ago SOB (shortness of breath) on exertion    Bertin Calderon MD    Office Visit    10 months ago Annual physical exam    [address], [address] Gallito Bolaños MD    Office Visit            Future Appointments         Provider Department Appt Notes    In 3 weeks [address] [facility] [address] no pa req

## 2023-12-11 ENCOUNTER — PATIENT OUTREACH (OUTPATIENT)
Dept: CASE MANAGEMENT | Age: 73
End: 2023-12-11

## 2023-12-11 ENCOUNTER — TELEPHONE (OUTPATIENT)
Dept: INTERNAL MEDICINE CLINIC | Facility: CLINIC | Age: 73
End: 2023-12-11

## 2023-12-11 DIAGNOSIS — I10 PRIMARY HYPERTENSION: ICD-10-CM

## 2023-12-11 DIAGNOSIS — N40.1 BENIGN PROSTATIC HYPERPLASIA WITH NOCTURIA: ICD-10-CM

## 2023-12-11 DIAGNOSIS — H40.003 GLAUCOMA SUSPECT OF BOTH EYES: ICD-10-CM

## 2023-12-11 DIAGNOSIS — H25.12 AGE-RELATED NUCLEAR CATARACT OF LEFT EYE: Primary | ICD-10-CM

## 2023-12-11 DIAGNOSIS — E03.8 OTHER SPECIFIED HYPOTHYROIDISM: ICD-10-CM

## 2023-12-11 DIAGNOSIS — R35.1 BENIGN PROSTATIC HYPERPLASIA WITH NOCTURIA: ICD-10-CM

## 2023-12-11 DIAGNOSIS — E11.9 TYPE 2 DIABETES MELLITUS WITHOUT RETINOPATHY (HCC): ICD-10-CM

## 2023-12-11 DIAGNOSIS — J44.1 CHRONIC OBSTRUCTIVE PULMONARY DISEASE WITH ACUTE EXACERBATION (HCC): ICD-10-CM

## 2023-12-11 RX ORDER — LATANOPROST 50 UG/ML
SOLUTION/ DROPS OPHTHALMIC
COMMUNITY
Start: 2023-12-06

## 2023-12-11 NOTE — TELEPHONE ENCOUNTER
Spoke to patient for monthly CCM. Patient requesting a medication to help him sleep. States he is unable to sleep consistently through out the night. States he just wants to sleep for 3-4 hours uninterrupted. Using OTC melatonin with no improvements. Aware due for sleep study but unable to leave child alone. Requesting a at home study. Due for DM labs and TSH recheck also over due for PSA. Please place orders. Last A1c value was 9% done 9/22/2023. Is patient to be testing B/S how often? States he has no supplies or how to use. Medication reconciliation is recommended. Please contact patient to advise.

## 2023-12-11 NOTE — PROGRESS NOTES
Spoke to [employer] for [facility]. Updates to patient care team/comments: UTD  Patient reported changes in medications: reports changes   Med Adherence  Comment: reports adherence      Health Maintenance:   Health Maintenance   Topic Date Due    Zoster Vaccines (1 of 2) Never done    Colorectal Cancer Screening  06/11/2019    Diabetes Care Foot Exam (Annual)  01/01/2023    PSA  07/01/2023    COVID-19 Vaccine (4 - 2023-24 season) 09/01/2023    Influenza Vaccine (1) 10/01/2023    Diabetes Care A1C  12/22/2023    Annual Physical  01/12/2024    Diabetes Care: Microalb/Creat Ratio  01/12/2024    Diabetes Care Dilated Eye Exam  05/10/2024    Diabetes Care: GFR  09/25/2024    Annual Depression Screening  Completed    Fall Risk Screening (Annual)  Completed    Pneumococcal Vaccine: 65+ Years  Completed       Patient updates/concerns:   Spoke with patient. Patient relates doing ok. Mood good/stable. Denies any recent falls. Patient requesting a medication to help him sleep. States he is unable to sleep consistently through out the night. States he just wants to sleep for 3-4 hours uninterrupted. Using OTC melatonin with no improvements. Aware due for sleep study but unable to leave child alone. Requesting at home study. TE sent to PCP to advise. Breathing stable. Denies any SOB but continues to smoke. Encourged to cut back. Blood sugars unknown continues to not check them due to no machine. Weight 299 lbs at cardiology visit. Vision has improved. Using prescribed drops now. Blood pressure and pulse unknown not checking but denies symptoms. Recently seen cardiology and stable. Denies any urinary issues. Over due for PSA. Due for DM labs and TSH . No other questions or concerns. Encouraged patient:   Self care: Take the time to do the things you love. Nutrition:  Good nutrition helps us to maintain our weight, fight off infection, and help reduce the risk of developing other chronic issues.    Physical activity:  Physical activity is important to help maintain independence and improve quality of life. Goals/Action Plan: Active goal from previous outreach: Staying healthy, maintain independence, and stability. Patient reported progress towards goals: progressing                - What: following up on health conditions            - Where/When/How: recently seen cardiology. Aware due for labs   Patient Reported Barriers and Concerns: as per above                    - Plan for overcoming barriers: encourged patient to call with any questions or concerns     Care Managers Interventions: Continue to provide encouragement and education for healthy coping and diagnosis. Future Appointments: No future appointments. Next Care Manager Follow Up Date: 1 month     Reason For Follow Up: review progress and or barriers towards patient's goals. Time Spent This Encounter Total: 21 min medical record review, telephone communication, care plan updates where needed, education, goals, and action plan recreation/update. Provided acknowledgment and validation to patient's concerns.    Monthly Minute Total including today: 21 min   Physical assessment, complete health history, and need for CCM established by Tyrone Babcock MD.

## 2023-12-11 NOTE — TELEPHONE ENCOUNTER
FYI Dr oRsa Borges patient notes are regarding:  Patient has an FYI of type EM Dismissed         [facility] Opthalmology Only - Patient pending dismissal for no shows. Urgent appts only until 12/2/23. Last OV with you was 10/9/23    Did your office also dismiss this patient?

## 2023-12-11 NOTE — TELEPHONE ENCOUNTER
This patient shows in the chart that is dismissed from our clinic can you double check but definitely he needs to follow-up with a doctor to discuss further

## 2023-12-11 NOTE — TELEPHONE ENCOUNTER
RAMESH Santiago,    Called patient (name and  verified) to discuss concerns below. Strongly encouraged patient to schedule an office appt with PCP to discuss multiple concerns directly. Offered several days and times but patient had several conflicts. Patient asked if PCP will prescribed a sleep medication, again stated that patient should schedule an office appt to speak with PCP directly and patient disconnected the call. See Message below.

## 2023-12-20 RX ORDER — NALOXEGOL OXALATE 25 MG/1
TABLET, FILM COATED ORAL
Qty: 30 TABLET | Refills: 0 | OUTPATIENT
Start: 2023-12-20

## 2023-12-20 NOTE — TELEPHONE ENCOUNTER
Failed Protocol/No Protocol.    Patient was a no-show for scheduled 12/18/23 appointment.     Requested Prescriptions   Pending Prescriptions Disp Refills    MOVANTIK 25 MG Oral Tab [Pharmacy Med Name: Movantik 25 Mg Tab Giovanni] 30 tablet 0     Sig: TAKE ONE TABLET BY MOUTH ONE TIME DAILY ONE TO TWO HOURS AFTER A MEAL       There is no refill protocol information for this order            Recent Outpatient Visits              2 months ago DIRK (obstructive sleep apnea)    [facility] Nilda Argueta MD    Office Visit    7 months ago Glaucoma suspect of both eyes    [facility]    Nurse Only    7 months ago Type 2 diabetes mellitus without retinopathy (HCC)    [facility] Boogie Velasquez MD    Office Visit    8 months ago SO[facility] (shortness of breath) [facility]    Office Visit    11 months ago Annual physical exam    [facility]    Office Visit

## 2023-12-23 ENCOUNTER — APPOINTMENT (OUTPATIENT)
Dept: CV DIAGNOSTICS | Facility: HOSPITAL | Age: 73
End: 2023-12-23
Attending: INTERNAL MEDICINE
Payer: MEDICARE

## 2023-12-23 ENCOUNTER — APPOINTMENT (OUTPATIENT)
Dept: GENERAL RADIOLOGY | Facility: HOSPITAL | Age: 73
End: 2023-12-23
Attending: EMERGENCY MEDICINE
Payer: MEDICARE

## 2023-12-23 ENCOUNTER — HOSPITAL ENCOUNTER (INPATIENT)
Facility: HOSPITAL | Age: 73
LOS: 5 days | Discharge: HOME | End: 2023-12-28
Attending: EMERGENCY MEDICINE | Admitting: INTERNAL MEDICINE
Payer: MEDICARE

## 2023-12-23 DIAGNOSIS — R09.02 HYPOXIA: Primary | ICD-10-CM

## 2023-12-23 DIAGNOSIS — J06.9 VIRAL URI WITH COUGH: ICD-10-CM

## 2023-12-23 DIAGNOSIS — J44.1 COPD EXACERBATION (HCC): ICD-10-CM

## 2023-12-23 DIAGNOSIS — Z86.711 HISTORY OF PULMONARY EMBOLUS (PE): ICD-10-CM

## 2023-12-23 PROBLEM — J96.01 ACUTE HYPOXIC RESPIRATORY FAILURE (HCC): Status: ACTIVE | Noted: 2023-09-24

## 2023-12-23 LAB
ALBUMIN SERPL-MCNC: 4 G/DL (ref 3.2–4.8)
ALBUMIN/GLOB SERPL: 1.4 {RATIO} (ref 1–2)
ALP LIVER SERPL-CCNC: 104 U/L
ALT SERPL-CCNC: 37 U/L
ANION GAP SERPL CALC-SCNC: 6 MMOL/L (ref 0–18)
AST SERPL-CCNC: 42 U/L (ref ?–34)
ATRIAL RATE: 74 BPM
BASOPHILS # BLD AUTO: 0.08 X10(3) UL (ref 0–0.2)
BASOPHILS NFR BLD AUTO: 0.5 %
BILIRUB SERPL-MCNC: 0.2 MG/DL (ref 0.2–1.1)
BNP SERPL-MCNC: 363 PG/ML
BUN BLD-MCNC: 14 MG/DL (ref 9–23)
BUN/CREAT SERPL: 14.9 (ref 10–20)
CALCIUM BLD-MCNC: 8.9 MG/DL (ref 8.7–10.4)
CHLORIDE SERPL-SCNC: 106 MMOL/L (ref 98–112)
CHOLEST SERPL-MCNC: 129 MG/DL (ref ?–200)
CO2 SERPL-SCNC: 26 MMOL/L (ref 21–32)
CREAT BLD-MCNC: 0.94 MG/DL
D DIMER PPP FEU-MCNC: 0.92 UG/ML FEU (ref ?–0.71)
DEPRECATED RDW RBC AUTO: 50.4 FL (ref 35.1–46.3)
EGFRCR SERPLBLD CKD-EPI 2021: 87 ML/MIN/1.73M2 (ref 60–?)
EOSINOPHIL # BLD AUTO: 0.56 X10(3) UL (ref 0–0.7)
EOSINOPHIL NFR BLD AUTO: 3.2 %
ERYTHROCYTE [DISTWIDTH] IN BLOOD BY AUTOMATED COUNT: 18.3 % (ref 11–15)
EST. AVERAGE GLUCOSE BLD GHB EST-MCNC: 197 MG/DL (ref 68–126)
FLUAV + FLUBV RNA SPEC NAA+PROBE: NEGATIVE
FLUAV + FLUBV RNA SPEC NAA+PROBE: NEGATIVE
GLOBULIN PLAS-MCNC: 2.9 G/DL (ref 2.8–4.4)
GLUCOSE BLD-MCNC: 189 MG/DL (ref 70–99)
GLUCOSE BLDC GLUCOMTR-MCNC: 222 MG/DL (ref 70–99)
GLUCOSE BLDC GLUCOMTR-MCNC: 291 MG/DL (ref 70–99)
GLUCOSE BLDC GLUCOMTR-MCNC: 304 MG/DL (ref 70–99)
GLUCOSE BLDC GLUCOMTR-MCNC: 362 MG/DL (ref 70–99)
HBA1C MFR BLD: 8.5 % (ref ?–5.7)
HCT VFR BLD AUTO: 34.4 %
HDLC SERPL-MCNC: 39 MG/DL (ref 40–59)
HGB BLD-MCNC: 9.8 G/DL
IMM GRANULOCYTES # BLD AUTO: 0.09 X10(3) UL (ref 0–1)
IMM GRANULOCYTES NFR BLD: 0.5 %
INR BLD: 2.41 (ref 0.8–1.2)
LDLC SERPL CALC-MCNC: 67 MG/DL (ref ?–100)
LYMPHOCYTES # BLD AUTO: 1.65 X10(3) UL (ref 1–4)
LYMPHOCYTES NFR BLD AUTO: 9.6 %
MCH RBC QN AUTO: 21.7 PG (ref 26–34)
MCHC RBC AUTO-ENTMCNC: 28.5 G/DL (ref 31–37)
MCV RBC AUTO: 76.3 FL
MONOCYTES # BLD AUTO: 1.79 X10(3) UL (ref 0.1–1)
MONOCYTES NFR BLD AUTO: 10.4 %
NEUTROPHILS # BLD AUTO: 13.08 X10 (3) UL (ref 1.5–7.7)
NEUTROPHILS # BLD AUTO: 13.08 X10(3) UL (ref 1.5–7.7)
NEUTROPHILS NFR BLD AUTO: 75.8 %
NONHDLC SERPL-MCNC: 90 MG/DL (ref ?–130)
OSMOLALITY SERPL CALC.SUM OF ELEC: 292 MOSM/KG (ref 275–295)
P AXIS: 47 DEGREES
P-R INTERVAL: 204 MS
PLATELET # BLD AUTO: 414 10(3)UL (ref 150–450)
POTASSIUM SERPL-SCNC: 4.8 MMOL/L (ref 3.5–5.1)
PROCALCITONIN SERPL-MCNC: 0.04 NG/ML (ref ?–0.05)
PROT SERPL-MCNC: 6.9 G/DL (ref 5.7–8.2)
PROTHROMBIN TIME: 27.8 SECONDS (ref 11.6–14.8)
Q-T INTERVAL: 420 MS
QRS DURATION: 128 MS
QTC CALCULATION (BEZET): 466 MS
R AXIS: -58 DEGREES
RBC # BLD AUTO: 4.51 X10(6)UL
RSV RNA SPEC NAA+PROBE: NEGATIVE
SARS-COV-2 RNA RESP QL NAA+PROBE: NOT DETECTED
SODIUM SERPL-SCNC: 138 MMOL/L (ref 136–145)
T AXIS: 5 DEGREES
TRIGL SERPL-MCNC: 127 MG/DL (ref 30–149)
TROPONIN I SERPL HS-MCNC: 63 NG/L
TROPONIN I SERPL HS-MCNC: 64 NG/L
TROPONIN I SERPL HS-MCNC: 68 NG/L
VENTRICULAR RATE: 74 BPM
VLDLC SERPL CALC-MCNC: 19 MG/DL (ref 0–30)
WBC # BLD AUTO: 17.3 X10(3) UL (ref 4–11)

## 2023-12-23 PROCEDURE — 71045 X-RAY EXAM CHEST 1 VIEW: CPT | Performed by: EMERGENCY MEDICINE

## 2023-12-23 PROCEDURE — 93308 TTE F-UP OR LMTD: CPT | Performed by: INTERNAL MEDICINE

## 2023-12-23 PROCEDURE — 99223 1ST HOSP IP/OBS HIGH 75: CPT | Performed by: INTERNAL MEDICINE

## 2023-12-23 RX ORDER — DULOXETIN HYDROCHLORIDE 30 MG/1
30 CAPSULE, DELAYED RELEASE ORAL DAILY
Status: DISCONTINUED | OUTPATIENT
Start: 2023-12-23 | End: 2023-12-28

## 2023-12-23 RX ORDER — DEXTROSE MONOHYDRATE 25 G/50ML
50 INJECTION, SOLUTION INTRAVENOUS
Status: DISCONTINUED | OUTPATIENT
Start: 2023-12-23 | End: 2023-12-28

## 2023-12-23 RX ORDER — METHYLPREDNISOLONE SODIUM SUCCINATE 125 MG/2ML
125 INJECTION, POWDER, LYOPHILIZED, FOR SOLUTION INTRAMUSCULAR; INTRAVENOUS ONCE
Status: COMPLETED | OUTPATIENT
Start: 2023-12-23 | End: 2023-12-23

## 2023-12-23 RX ORDER — OXYCODONE AND ACETAMINOPHEN 10; 325 MG/1; MG/1
1 TABLET ORAL 4 TIMES DAILY PRN
Status: DISCONTINUED | OUTPATIENT
Start: 2023-12-23 | End: 2023-12-28

## 2023-12-23 RX ORDER — NICOTINE POLACRILEX 4 MG
15 LOZENGE BUCCAL
Status: DISCONTINUED | OUTPATIENT
Start: 2023-12-23 | End: 2023-12-28

## 2023-12-23 RX ORDER — ACETAMINOPHEN 500 MG
500 TABLET ORAL EVERY 4 HOURS PRN
Status: DISCONTINUED | OUTPATIENT
Start: 2023-12-23 | End: 2023-12-28

## 2023-12-23 RX ORDER — NICOTINE POLACRILEX 4 MG
30 LOZENGE BUCCAL
Status: DISCONTINUED | OUTPATIENT
Start: 2023-12-23 | End: 2023-12-28

## 2023-12-23 RX ORDER — BENZONATATE 100 MG/1
200 CAPSULE ORAL 3 TIMES DAILY PRN
Status: DISCONTINUED | OUTPATIENT
Start: 2023-12-23 | End: 2023-12-28

## 2023-12-23 RX ORDER — ECHINACEA PURPUREA EXTRACT 125 MG
1 TABLET ORAL
Status: DISCONTINUED | OUTPATIENT
Start: 2023-12-23 | End: 2023-12-28

## 2023-12-23 RX ORDER — GABAPENTIN 300 MG/1
300 CAPSULE ORAL 3 TIMES DAILY
Status: DISCONTINUED | OUTPATIENT
Start: 2023-12-23 | End: 2023-12-27

## 2023-12-23 RX ORDER — ASPIRIN 81 MG/1
81 TABLET ORAL DAILY
Status: DISCONTINUED | OUTPATIENT
Start: 2023-12-23 | End: 2023-12-28

## 2023-12-23 RX ORDER — TORSEMIDE 5 MG/1
10 TABLET ORAL DAILY
Status: DISCONTINUED | OUTPATIENT
Start: 2023-12-23 | End: 2023-12-23

## 2023-12-23 RX ORDER — POLYETHYLENE GLYCOL 3350 17 G/17G
17 POWDER, FOR SOLUTION ORAL DAILY PRN
Status: DISCONTINUED | OUTPATIENT
Start: 2023-12-23 | End: 2023-12-28

## 2023-12-23 RX ORDER — TRAZODONE HYDROCHLORIDE 50 MG/1
25 TABLET ORAL NIGHTLY
Status: DISCONTINUED | OUTPATIENT
Start: 2023-12-23 | End: 2023-12-28

## 2023-12-23 RX ORDER — POTASSIUM CHLORIDE 20 MEQ/1
20 TABLET, EXTENDED RELEASE ORAL DAILY
Status: DISCONTINUED | OUTPATIENT
Start: 2023-12-23 | End: 2023-12-28

## 2023-12-23 RX ORDER — IPRATROPIUM BROMIDE AND ALBUTEROL SULFATE 2.5; .5 MG/3ML; MG/3ML
3 SOLUTION RESPIRATORY (INHALATION)
Status: DISCONTINUED | OUTPATIENT
Start: 2023-12-23 | End: 2023-12-25

## 2023-12-23 RX ORDER — ALBUTEROL SULFATE 90 UG/1
2 AEROSOL, METERED RESPIRATORY (INHALATION) EVERY 4 HOURS PRN
Status: DISCONTINUED | OUTPATIENT
Start: 2023-12-23 | End: 2023-12-28

## 2023-12-23 RX ORDER — ENEMA 19; 7 G/133ML; G/133ML
1 ENEMA RECTAL ONCE AS NEEDED
Status: DISCONTINUED | OUTPATIENT
Start: 2023-12-23 | End: 2023-12-28

## 2023-12-23 RX ORDER — METOCLOPRAMIDE HYDROCHLORIDE 5 MG/ML
10 INJECTION INTRAMUSCULAR; INTRAVENOUS EVERY 8 HOURS PRN
Status: DISCONTINUED | OUTPATIENT
Start: 2023-12-23 | End: 2023-12-28

## 2023-12-23 RX ORDER — ATORVASTATIN CALCIUM 80 MG/1
80 TABLET, FILM COATED ORAL DAILY
Status: DISCONTINUED | OUTPATIENT
Start: 2023-12-23 | End: 2023-12-28

## 2023-12-23 RX ORDER — PREGABALIN 75 MG/1
300 CAPSULE ORAL 2 TIMES DAILY
Status: DISCONTINUED | OUTPATIENT
Start: 2023-12-23 | End: 2023-12-23

## 2023-12-23 RX ORDER — SENNOSIDES 8.6 MG
17.2 TABLET ORAL NIGHTLY PRN
Status: DISCONTINUED | OUTPATIENT
Start: 2023-12-23 | End: 2023-12-28

## 2023-12-23 RX ORDER — BISACODYL 10 MG
10 SUPPOSITORY, RECTAL RECTAL
Status: DISCONTINUED | OUTPATIENT
Start: 2023-12-23 | End: 2023-12-28

## 2023-12-23 RX ORDER — LEVOTHYROXINE SODIUM 0.15 MG/1
150 TABLET ORAL
Status: DISCONTINUED | OUTPATIENT
Start: 2023-12-23 | End: 2023-12-28

## 2023-12-23 RX ORDER — ALBUTEROL SULFATE 90 UG/1
2 AEROSOL, METERED RESPIRATORY (INHALATION) EVERY 4 HOURS PRN
Status: DISCONTINUED | OUTPATIENT
Start: 2023-12-23 | End: 2023-12-23

## 2023-12-23 RX ORDER — WARFARIN SODIUM 5 MG/1
10 TABLET ORAL NIGHTLY
Status: DISCONTINUED | OUTPATIENT
Start: 2023-12-23 | End: 2023-12-28

## 2023-12-23 RX ORDER — ONDANSETRON 2 MG/ML
4 INJECTION INTRAMUSCULAR; INTRAVENOUS EVERY 6 HOURS PRN
Status: DISCONTINUED | OUTPATIENT
Start: 2023-12-23 | End: 2023-12-28

## 2023-12-23 RX ORDER — CLOPIDOGREL BISULFATE 75 MG/1
75 TABLET ORAL DAILY
Status: DISCONTINUED | OUTPATIENT
Start: 2023-12-23 | End: 2023-12-28

## 2023-12-23 RX ORDER — FUROSEMIDE 10 MG/ML
40 INJECTION INTRAMUSCULAR; INTRAVENOUS
Status: DISCONTINUED | OUTPATIENT
Start: 2023-12-23 | End: 2023-12-25

## 2023-12-23 RX ORDER — CARVEDILOL 12.5 MG/1
12.5 TABLET ORAL 2 TIMES DAILY WITH MEALS
Status: DISCONTINUED | OUTPATIENT
Start: 2023-12-23 | End: 2023-12-24

## 2023-12-23 RX ORDER — LATANOPROST 50 UG/ML
1 SOLUTION/ DROPS OPHTHALMIC DAILY
Status: DISCONTINUED | OUTPATIENT
Start: 2023-12-23 | End: 2023-12-28

## 2023-12-23 NOTE — ED QUICK NOTES
Care endorsed to Myrna Goldman RN regarding patient need for increase in O2 to 5L from 3L NC; pt boosted in bed, VSS, pt diaphoretic states he has been diaphoretic the past 2 days; pt provided with towel and dried off,sent up to floor with transport, no acute distress noted at time of transport, even respirations.

## 2023-12-23 NOTE — PLAN OF CARE
Patient with intermittent, asymptomatic sinus bradycardia. BP stable. Bradycardia not sustained. Will place hold parameters on coreg. Otherwise, continue present management.      MRAIBEL Pride  12/23/2023  2:38 PM  [phone]  Duard Hair [phone]

## 2023-12-23 NOTE — ED QUICK NOTES
Orders for admission, patient is aware of plan and ready to go upstairs. Any questions, please call ED RN Luisito yarbrough at [address].      Patient Covid vaccination status: Fully vaccinated     COVID Test Ordered in ED: SARS-CoV-2/Flu A and B/RSV by PCR (GeneXpert)    COVID Suspicion at Admission: N/A    Running Infusions:  None    Mental Status/LOC at time of transport: AOX4    Other pertinent information:   CIWA score: N/A   NIH score:  N/A

## 2023-12-23 NOTE — ED INITIAL ASSESSMENT (HPI)
BRYCE for the last 3hrs, tried inhaler with no relief. Ems found pt at 88% on RA and with wheezing, Gave one neb then sated at 100% on RA. Pt. Has a wet cough for a few days. Denies sputum or CP.

## 2023-12-23 NOTE — PLAN OF CARE
Pt alertOX4. Pt on 3L of oxygen on NC - RA baseline. Blood sugar monitoring - pt on insulin sliding scale. Pt on IV lasix - monitoring I/O - pt educated on the use of urinal for monitoring ouput by RN- Pt refused to use urinal. Hat placed. Admission assessment done - med rec completed. Cards on consult. Trops elevated - MD notifed. Pt had an Echo today. HR went down to 40s - pt assessed by RN - asymptomatic - MD notified & parameters for carvedilol in. Call light within reach - safety precautions in place - frequent rounding by nursing staffs. Problem: Patient Centered Care  Goal: Patient preferences are identified and integrated in the patient's plan of care  Description: Interventions:  - What would you like us to know as we care for you?   - Provide timely, complete, and accurate information to patient/family  - Incorporate patient and family knowledge, values, beliefs, and cultural backgrounds into the planning and delivery of care  - Encourage patient/family to participate in care and decision-making at the level they choose  - Honor patient and family perspectives and choices  Outcome: Progressing     Problem: CARDIOVASCULAR - ADULT  Goal: Maintains optimal cardiac output and hemodynamic stability  Description: INTERVENTIONS:  - Monitor vital signs, rhythm, and trends  - Monitor for bleeding, hypotension and signs of decreased cardiac output  - Evaluate effectiveness of vasoactive medications to optimize hemodynamic stability  - Monitor arterial and/or venous puncture sites for bleeding and/or hematoma  - Assess quality of pulses, skin color and temperature  - Assess for signs of decreased coronary artery perfusion - ex.  Angina  - Evaluate fluid balance, assess for edema, trend weights  Outcome: Progressing  Goal: Absence of cardiac arrhythmias or at baseline  Description: INTERVENTIONS:  - Continuous cardiac monitoring, monitor vital signs, obtain 12 lead EKG if indicated  - Evaluate effectiveness of antiarrhythmic and heart rate control medications as ordered  - Initiate emergency measures for life threatening arrhythmias  - Monitor electrolytes and administer replacement therapy as ordered  Outcome: Progressing     Problem: RESPIRATORY - ADULT  Goal: Achieves optimal ventilation and oxygenation  Description: INTERVENTIONS:  - Assess for changes in respiratory status  - Assess for changes in mentation and behavior  - Position to facilitate oxygenation and minimize respiratory effort  - Oxygen supplementation based on oxygen saturation or ABGs  - Provide Smoking Cessation handout, if applicable  - Encourage broncho-pulmonary hygiene including cough, deep breathe, Incentive Spirometry  - Assess the need for suctioning and perform as needed  - Assess and instruct to report SOB or any respiratory difficulty  - Respiratory Therapy support as indicated  - Manage/alleviate anxiety  - Monitor for signs/symptoms of CO2 retention  Outcome: Progressing     Problem: METABOLIC/FLUID AND ELECTROLYTES - ADULT  Goal: Glucose maintained within prescribed range  Description: INTERVENTIONS:  - Monitor Blood Glucose as ordered  - Assess for signs and symptoms of hyperglycemia and hypoglycemia  - Administer ordered medications to maintain glucose within target range  - Assess barriers to adequate nutritional intake and initiate nutrition consult as needed  - Instruct patient on self management of diabetes  Outcome: Progressing  Goal: Electrolytes maintained within normal limits  Description: INTERVENTIONS:  - Monitor labs and rhythm and assess patient for signs and symptoms of electrolyte imbalances  - Administer electrolyte replacement as ordered  - Monitor response to electrolyte replacements, including rhythm and repeat lab results as appropriate  - Fluid restriction as ordered  - Instruct patient on fluid and nutrition restrictions as appropriate  Outcome: Progressing  Goal: Hemodynamic stability and optimal renal function maintained  Description: INTERVENTIONS:  - Monitor labs and assess for signs and symptoms of volume excess or deficit  - Monitor intake, output and patient weight  - Monitor urine specific gravity, serum osmolarity and serum sodium as indicated or ordered  - Monitor response to interventions for patient's volume status, including labs, urine output, blood pressure (other measures as available)  - Encourage oral intake as appropriate  - Instruct patient on fluid and nutrition restrictions as appropriate  Outcome: Progressing

## 2023-12-24 LAB
ALBUMIN SERPL-MCNC: 3.9 G/DL (ref 3.2–4.8)
ALBUMIN/GLOB SERPL: 1.6 {RATIO} (ref 1–2)
ALP LIVER SERPL-CCNC: 91 U/L
ALT SERPL-CCNC: 25 U/L
ANION GAP SERPL CALC-SCNC: 5 MMOL/L (ref 0–18)
AST SERPL-CCNC: 12 U/L (ref ?–34)
BASOPHILS # BLD AUTO: 0.03 X10(3) UL (ref 0–0.2)
BASOPHILS NFR BLD AUTO: 0.2 %
BILIRUB SERPL-MCNC: 0.2 MG/DL (ref 0.2–1.1)
BUN BLD-MCNC: 19 MG/DL (ref 9–23)
BUN/CREAT SERPL: 18.4 (ref 10–20)
CALCIUM BLD-MCNC: 9 MG/DL (ref 8.7–10.4)
CHLORIDE SERPL-SCNC: 104 MMOL/L (ref 98–112)
CO2 SERPL-SCNC: 27 MMOL/L (ref 21–32)
CREAT BLD-MCNC: 1.03 MG/DL
DEPRECATED RDW RBC AUTO: 49.1 FL (ref 35.1–46.3)
EGFRCR SERPLBLD CKD-EPI 2021: 78 ML/MIN/1.73M2 (ref 60–?)
EOSINOPHIL # BLD AUTO: 0.02 X10(3) UL (ref 0–0.7)
EOSINOPHIL NFR BLD AUTO: 0.1 %
ERYTHROCYTE [DISTWIDTH] IN BLOOD BY AUTOMATED COUNT: 18.3 % (ref 11–15)
GLOBULIN PLAS-MCNC: 2.5 G/DL (ref 2.8–4.4)
GLUCOSE BLD-MCNC: 257 MG/DL (ref 70–99)
GLUCOSE BLDC GLUCOMTR-MCNC: 214 MG/DL (ref 70–99)
GLUCOSE BLDC GLUCOMTR-MCNC: 228 MG/DL (ref 70–99)
GLUCOSE BLDC GLUCOMTR-MCNC: 269 MG/DL (ref 70–99)
GLUCOSE BLDC GLUCOMTR-MCNC: 278 MG/DL (ref 70–99)
HCT VFR BLD AUTO: 34.5 %
HGB BLD-MCNC: 9.7 G/DL
IMM GRANULOCYTES # BLD AUTO: 0.08 X10(3) UL (ref 0–1)
IMM GRANULOCYTES NFR BLD: 0.5 %
INR BLD: 2.4 (ref 0.8–1.2)
L PNEUMO AG UR QL: NEGATIVE
LYMPHOCYTES # BLD AUTO: 1.16 X10(3) UL (ref 1–4)
LYMPHOCYTES NFR BLD AUTO: 7.1 %
MAGNESIUM SERPL-MCNC: 2.1 MG/DL (ref 1.6–2.6)
MCH RBC QN AUTO: 21.1 PG (ref 26–34)
MCHC RBC AUTO-ENTMCNC: 28.1 G/DL (ref 31–37)
MCV RBC AUTO: 75.2 FL
MONOCYTES # BLD AUTO: 1.51 X10(3) UL (ref 0.1–1)
MONOCYTES NFR BLD AUTO: 9.2 %
NEUTROPHILS # BLD AUTO: 13.57 X10 (3) UL (ref 1.5–7.7)
NEUTROPHILS # BLD AUTO: 13.57 X10(3) UL (ref 1.5–7.7)
NEUTROPHILS NFR BLD AUTO: 82.9 %
OSMOLALITY SERPL CALC.SUM OF ELEC: 293 MOSM/KG (ref 275–295)
PHOSPHATE SERPL-MCNC: 2.5 MG/DL (ref 2.4–5.1)
PLATELET # BLD AUTO: 382 10(3)UL (ref 150–450)
POTASSIUM SERPL-SCNC: 4.3 MMOL/L (ref 3.5–5.1)
PROT SERPL-MCNC: 6.4 G/DL (ref 5.7–8.2)
PROTHROMBIN TIME: 27.7 SECONDS (ref 11.6–14.8)
RBC # BLD AUTO: 4.59 X10(6)UL
SODIUM SERPL-SCNC: 136 MMOL/L (ref 136–145)
STREP PNEUMO ANTIGEN, URINE: NEGATIVE
WBC # BLD AUTO: 16.4 X10(3) UL (ref 4–11)

## 2023-12-24 PROCEDURE — 99223 1ST HOSP IP/OBS HIGH 75: CPT | Performed by: INTERNAL MEDICINE

## 2023-12-24 RX ORDER — METHYLPREDNISOLONE SODIUM SUCCINATE 40 MG/ML
40 INJECTION, POWDER, LYOPHILIZED, FOR SOLUTION INTRAMUSCULAR; INTRAVENOUS EVERY 12 HOURS
Status: DISCONTINUED | OUTPATIENT
Start: 2023-12-24 | End: 2023-12-25

## 2023-12-24 RX ORDER — AZITHROMYCIN 250 MG/1
500 TABLET, FILM COATED ORAL
Status: DISCONTINUED | OUTPATIENT
Start: 2023-12-24 | End: 2023-12-25

## 2023-12-24 RX ORDER — FUROSEMIDE 40 MG/1
40 TABLET ORAL ONCE
Status: COMPLETED | OUTPATIENT
Start: 2023-12-24 | End: 2023-12-24

## 2023-12-24 RX ORDER — CARVEDILOL 6.25 MG/1
6.25 TABLET ORAL 2 TIMES DAILY WITH MEALS
Status: DISCONTINUED | OUTPATIENT
Start: 2023-12-24 | End: 2023-12-28

## 2023-12-24 NOTE — PLAN OF CARE
Patient is Aox2-3, confused with time and occasionally situation. On 2L via nasal cannula, wheezing noted; had previously taken off supplemental oxygen and saturated well, however he complained of dyspnea without it. Did not wear CPAP tonight as he was awake and up in chair, had slept during the day. Independent in activity. Scheduled nebs. IV lasix. Problem: RESPIRATORY - ADULT  Goal: Achieves optimal ventilation and oxygenation  Description: INTERVENTIONS:  - Assess for changes in respiratory status  - Assess for changes in mentation and behavior  - Position to facilitate oxygenation and minimize respiratory effort  - Oxygen supplementation based on oxygen saturation or ABGs  - Provide Smoking Cessation handout, if applicable  - Encourage broncho-pulmonary hygiene including cough, deep breathe, Incentive Spirometry  - Assess the need for suctioning and perform as needed  - Assess and instruct to report SOB or any respiratory difficulty  - Respiratory Therapy support as indicated  - Manage/alleviate anxiety  - Monitor for signs/symptoms of CO2 retention  Outcome: Progressing     Problem: CARDIOVASCULAR - ADULT  Goal: Maintains optimal cardiac output and hemodynamic stability  Description: INTERVENTIONS:  - Monitor vital signs, rhythm, and trends  - Monitor for bleeding, hypotension and signs of decreased cardiac output  - Evaluate effectiveness of vasoactive medications to optimize hemodynamic stability  - Monitor arterial and/or venous puncture sites for bleeding and/or hematoma  - Assess quality of pulses, skin color and temperature  - Assess for signs of decreased coronary artery perfusion - ex.  Angina  - Evaluate fluid balance, assess for edema, trend weights  Outcome: Progressing     Problem: CARDIOVASCULAR - ADULT  Goal: Absence of cardiac arrhythmias or at baseline  Description: INTERVENTIONS:  - Continuous cardiac monitoring, monitor vital signs, obtain 12 lead EKG if indicated  - Evaluate effectiveness of antiarrhythmic and heart rate control medications as ordered  - Initiate emergency measures for life threatening arrhythmias  - Monitor electrolytes and administer replacement therapy as ordered  Outcome: Progressing

## 2023-12-24 NOTE — PLAN OF CARE
RA. Remains complaining of shortness of breath. Oxygen saturations remain above 90%. Started on IV abx. IV lasix. Call light within reach. Safety precautions in place. Plan: Back home when medically cleared. Problem: Patient Centered Care  Goal: Patient preferences are identified and integrated in the patient's plan of care  Description: Interventions:  - Provide timely, complete, and accurate information to patient/family  - Incorporate patient and family knowledge, values, beliefs, and cultural backgrounds into the planning and delivery of care  - Encourage patient/family to participate in care and decision-making at the level they choose  - Honor patient and family perspectives and choices  Outcome: Progressing     Problem: CARDIOVASCULAR - ADULT  Goal: Maintains optimal cardiac output and hemodynamic stability  Description: INTERVENTIONS:  - Monitor vital signs, rhythm, and trends  - Monitor for bleeding, hypotension and signs of decreased cardiac output  - Evaluate effectiveness of vasoactive medications to optimize hemodynamic stability  - Monitor arterial and/or venous puncture sites for bleeding and/or hematoma  - Assess quality of pulses, skin color and temperature  - Assess for signs of decreased coronary artery perfusion - ex.  Angina  - Evaluate fluid balance, assess for edema, trend weights  Outcome: Progressing  Goal: Absence of cardiac arrhythmias or at baseline  Description: INTERVENTIONS:  - Continuous cardiac monitoring, monitor vital signs, obtain 12 lead EKG if indicated  - Evaluate effectiveness of antiarrhythmic and heart rate control medications as ordered  - Initiate emergency measures for life threatening arrhythmias  - Monitor electrolytes and administer replacement therapy as ordered  Outcome: Progressing     Problem: RESPIRATORY - ADULT  Goal: Achieves optimal ventilation and oxygenation  Description: INTERVENTIONS:  - Assess for changes in respiratory status  - Assess for changes in mentation and behavior  - Position to facilitate oxygenation and minimize respiratory effort  - Oxygen supplementation based on oxygen saturation or ABGs  - Provide Smoking Cessation handout, if applicable  - Encourage broncho-pulmonary hygiene including cough, deep breathe, Incentive Spirometry  - Assess the need for suctioning and perform as needed  - Assess and instruct to report SOB or any respiratory difficulty  - Respiratory Therapy support as indicated  - Manage/alleviate anxiety  - Monitor for signs/symptoms of CO2 retention  Outcome: Progressing     Problem: METABOLIC/FLUID AND ELECTROLYTES - ADULT  Goal: Glucose maintained within prescribed range  Description: INTERVENTIONS:  - Monitor Blood Glucose as ordered  - Assess for signs and symptoms of hyperglycemia and hypoglycemia  - Administer ordered medications to maintain glucose within target range  - Assess barriers to adequate nutritional intake and initiate nutrition consult as needed  - Instruct patient on self management of diabetes  Outcome: Progressing  Goal: Electrolytes maintained within normal limits  Description: INTERVENTIONS:  - Monitor labs and rhythm and assess patient for signs and symptoms of electrolyte imbalances  - Administer electrolyte replacement as ordered  - Monitor response to electrolyte replacements, including rhythm and repeat lab results as appropriate  - Fluid restriction as ordered  - Instruct patient on fluid and nutrition restrictions as appropriate  Outcome: Progressing  Goal: Hemodynamic stability and optimal renal function maintained  Description: INTERVENTIONS:  - Monitor labs and assess for signs and symptoms of volume excess or deficit  - Monitor intake, output and patient weight  - Monitor urine specific gravity, serum osmolarity and serum sodium as indicated or ordered  - Monitor response to interventions for patient's volume status, including labs, urine output, blood pressure (other measures as available)  - Encourage oral intake as appropriate  - Instruct patient on fluid and nutrition restrictions as appropriate  Outcome: Progressing

## 2023-12-25 LAB
ANION GAP SERPL CALC-SCNC: 5 MMOL/L (ref 0–18)
BASOPHILS # BLD AUTO: 0.05 X10(3) UL (ref 0–0.2)
BASOPHILS NFR BLD AUTO: 0.4 %
BUN BLD-MCNC: 20 MG/DL (ref 9–23)
BUN/CREAT SERPL: 26.3 (ref 10–20)
CALCIUM BLD-MCNC: 9 MG/DL (ref 8.7–10.4)
CHLORIDE SERPL-SCNC: 106 MMOL/L (ref 98–112)
CO2 SERPL-SCNC: 28 MMOL/L (ref 21–32)
CREAT BLD-MCNC: 0.76 MG/DL
DEPRECATED RDW RBC AUTO: 49 FL (ref 35.1–46.3)
EGFRCR SERPLBLD CKD-EPI 2021: 96 ML/MIN/1.73M2 (ref 60–?)
EOSINOPHIL # BLD AUTO: 0.41 X10(3) UL (ref 0–0.7)
EOSINOPHIL NFR BLD AUTO: 3.1 %
ERYTHROCYTE [DISTWIDTH] IN BLOOD BY AUTOMATED COUNT: 18.1 % (ref 11–15)
GLUCOSE BLD-MCNC: 150 MG/DL (ref 70–99)
GLUCOSE BLDC GLUCOMTR-MCNC: 173 MG/DL (ref 70–99)
GLUCOSE BLDC GLUCOMTR-MCNC: 289 MG/DL (ref 70–99)
GLUCOSE BLDC GLUCOMTR-MCNC: 338 MG/DL (ref 70–99)
GLUCOSE BLDC GLUCOMTR-MCNC: 408 MG/DL (ref 70–99)
HCT VFR BLD AUTO: 35.4 %
HGB BLD-MCNC: 10 G/DL
IMM GRANULOCYTES # BLD AUTO: 0.06 X10(3) UL (ref 0–1)
IMM GRANULOCYTES NFR BLD: 0.5 %
INR BLD: 2.17 (ref 0.8–1.2)
LYMPHOCYTES # BLD AUTO: 1.66 X10(3) UL (ref 1–4)
LYMPHOCYTES NFR BLD AUTO: 12.5 %
MCH RBC QN AUTO: 21.4 PG (ref 26–34)
MCHC RBC AUTO-ENTMCNC: 28.2 G/DL (ref 31–37)
MCV RBC AUTO: 75.6 FL
MONOCYTES # BLD AUTO: 1.05 X10(3) UL (ref 0.1–1)
MONOCYTES NFR BLD AUTO: 7.9 %
NEUTROPHILS # BLD AUTO: 10.02 X10 (3) UL (ref 1.5–7.7)
NEUTROPHILS # BLD AUTO: 10.02 X10(3) UL (ref 1.5–7.7)
NEUTROPHILS NFR BLD AUTO: 75.6 %
OSMOLALITY SERPL CALC.SUM OF ELEC: 293 MOSM/KG (ref 275–295)
PHOSPHATE SERPL-MCNC: 2.6 MG/DL (ref 2.4–5.1)
PLATELET # BLD AUTO: 404 10(3)UL (ref 150–450)
POTASSIUM SERPL-SCNC: 3.8 MMOL/L (ref 3.5–5.1)
PROTHROMBIN TIME: 25.5 SECONDS (ref 11.6–14.8)
RBC # BLD AUTO: 4.68 X10(6)UL
SODIUM SERPL-SCNC: 139 MMOL/L (ref 136–145)
TSI SER-ACNC: 3.88 MIU/ML (ref 0.55–4.78)
WBC # BLD AUTO: 13.3 X10(3) UL (ref 4–11)

## 2023-12-25 PROCEDURE — 99233 SBSQ HOSP IP/OBS HIGH 50: CPT | Performed by: INTERNAL MEDICINE

## 2023-12-25 RX ORDER — FUROSEMIDE 10 MG/ML
60 INJECTION INTRAMUSCULAR; INTRAVENOUS
Status: DISCONTINUED | OUTPATIENT
Start: 2023-12-25 | End: 2023-12-28

## 2023-12-25 RX ORDER — NICOTINE 21 MG/24HR
1 PATCH, TRANSDERMAL 24 HOURS TRANSDERMAL DAILY
Status: DISCONTINUED | OUTPATIENT
Start: 2023-12-25 | End: 2023-12-28

## 2023-12-25 RX ORDER — SPIRONOLACTONE 25 MG/1
25 TABLET ORAL DAILY
Status: DISCONTINUED | OUTPATIENT
Start: 2023-12-25 | End: 2023-12-28

## 2023-12-25 RX ORDER — AZITHROMYCIN 250 MG/1
250 TABLET, FILM COATED ORAL
Status: DISCONTINUED | OUTPATIENT
Start: 2023-12-26 | End: 2023-12-26

## 2023-12-25 RX ORDER — POTASSIUM CHLORIDE 20 MEQ/1
40 TABLET, EXTENDED RELEASE ORAL ONCE
Status: COMPLETED | OUTPATIENT
Start: 2023-12-25 | End: 2023-12-25

## 2023-12-25 RX ORDER — PREDNISONE 20 MG/1
40 TABLET ORAL
Status: DISCONTINUED | OUTPATIENT
Start: 2023-12-26 | End: 2023-12-26

## 2023-12-25 RX ORDER — IPRATROPIUM BROMIDE AND ALBUTEROL SULFATE 2.5; .5 MG/3ML; MG/3ML
3 SOLUTION RESPIRATORY (INHALATION)
Status: DISCONTINUED | OUTPATIENT
Start: 2023-12-26 | End: 2023-12-28

## 2023-12-25 RX ORDER — FUROSEMIDE 10 MG/ML
20 INJECTION INTRAMUSCULAR; INTRAVENOUS ONCE
Status: COMPLETED | OUTPATIENT
Start: 2023-12-25 | End: 2023-12-25

## 2023-12-25 NOTE — PLAN OF CARE
Patient alert and oriented x4 but forgetful. Patient educated on diuretics and not to mess with IV. IV diuretics increased per cardiology. IV antibiotics continued. Patient refusing to use urinal or hat in toilet. Patient educated on importance of measuring urine output. Plan is to continue to diuresis. Patient updated on plan of care. Problem: Patient Centered Care  Goal: Patient preferences are identified and integrated in the patient's plan of care  Description: Interventions:  - What would you like us to know as we care for you? I live with my Grandson  - Provide timely, complete, and accurate information to patient/family  - Incorporate patient and family knowledge, values, beliefs, and cultural backgrounds into the planning and delivery of care  - Encourage patient/family to participate in care and decision-making at the level they choose  - Honor patient and family perspectives and choices  Outcome: Progressing     Problem: CARDIOVASCULAR - ADULT  Goal: Maintains optimal cardiac output and hemodynamic stability  Description: INTERVENTIONS:  - Monitor vital signs, rhythm, and trends  - Monitor for bleeding, hypotension and signs of decreased cardiac output  - Evaluate effectiveness of vasoactive medications to optimize hemodynamic stability  - Monitor arterial and/or venous puncture sites for bleeding and/or hematoma  - Assess quality of pulses, skin color and temperature  - Assess for signs of decreased coronary artery perfusion - ex.  Angina  - Evaluate fluid balance, assess for edema, trend weights  Outcome: Progressing  Goal: Absence of cardiac arrhythmias or at baseline  Description: INTERVENTIONS:  - Continuous cardiac monitoring, monitor vital signs, obtain 12 lead EKG if indicated  - Evaluate effectiveness of antiarrhythmic and heart rate control medications as ordered  - Initiate emergency measures for life threatening arrhythmias  - Monitor electrolytes and administer replacement therapy as ordered  Outcome: Progressing     Problem: RESPIRATORY - ADULT  Goal: Achieves optimal ventilation and oxygenation  Description: INTERVENTIONS:  - Assess for changes in respiratory status  - Assess for changes in mentation and behavior  - Position to facilitate oxygenation and minimize respiratory effort  - Oxygen supplementation based on oxygen saturation or ABGs  - Provide Smoking Cessation handout, if applicable  - Encourage broncho-pulmonary hygiene including cough, deep breathe, Incentive Spirometry  - Assess the need for suctioning and perform as needed  - Assess and instruct to report SOB or any respiratory difficulty  - Respiratory Therapy support as indicated  - Manage/alleviate anxiety  - Monitor for signs/symptoms of CO2 retention  Outcome: Progressing     Problem: METABOLIC/FLUID AND ELECTROLYTES - ADULT  Goal: Glucose maintained within prescribed range  Description: INTERVENTIONS:  - Monitor Blood Glucose as ordered  - Assess for signs and symptoms of hyperglycemia and hypoglycemia  - Administer ordered medications to maintain glucose within target range  - Assess barriers to adequate nutritional intake and initiate nutrition consult as needed  - Instruct patient on self management of diabetes  Outcome: Progressing  Goal: Electrolytes maintained within normal limits  Description: INTERVENTIONS:  - Monitor labs and rhythm and assess patient for signs and symptoms of electrolyte imbalances  - Administer electrolyte replacement as ordered  - Monitor response to electrolyte replacements, including rhythm and repeat lab results as appropriate  - Fluid restriction as ordered  - Instruct patient on fluid and nutrition restrictions as appropriate  Outcome: Progressing  Goal: Hemodynamic stability and optimal renal function maintained  Description: INTERVENTIONS:  - Monitor labs and assess for signs and symptoms of volume excess or deficit  - Monitor intake, output and patient weight  - Monitor urine specific gravity, serum osmolarity and serum sodium as indicated or ordered  - Monitor response to interventions for patient's volume status, including labs, urine output, blood pressure (other measures as available)  - Encourage oral intake as appropriate  - Instruct patient on fluid and nutrition restrictions as appropriate  Outcome: Progressing

## 2023-12-25 NOTE — PROGRESS NOTES
Called all team Leads on staff, there are no ultrasound guided IV trained staff on shift tonight. Notified bedside RN.

## 2023-12-26 ENCOUNTER — TELEPHONE (OUTPATIENT)
Dept: INTERNAL MEDICINE CLINIC | Facility: CLINIC | Age: 73
End: 2023-12-26

## 2023-12-26 PROBLEM — R09.02 HYPOXIA: Status: ACTIVE | Noted: 2023-12-26

## 2023-12-26 LAB
ANION GAP SERPL CALC-SCNC: 7 MMOL/L (ref 0–18)
ATRIAL RATE: 68 BPM
BUN BLD-MCNC: 12 MG/DL (ref 9–23)
BUN/CREAT SERPL: 14.8 (ref 10–20)
CALCIUM BLD-MCNC: 9 MG/DL (ref 8.7–10.4)
CHLORIDE SERPL-SCNC: 105 MMOL/L (ref 98–112)
CO2 SERPL-SCNC: 24 MMOL/L (ref 21–32)
CREAT BLD-MCNC: 0.81 MG/DL
EGFRCR SERPLBLD CKD-EPI 2021: 94 ML/MIN/1.73M2 (ref 60–?)
GLUCOSE BLD-MCNC: 166 MG/DL (ref 70–99)
GLUCOSE BLDC GLUCOMTR-MCNC: 180 MG/DL (ref 70–99)
GLUCOSE BLDC GLUCOMTR-MCNC: 241 MG/DL (ref 70–99)
GLUCOSE BLDC GLUCOMTR-MCNC: 317 MG/DL (ref 70–99)
GLUCOSE BLDC GLUCOMTR-MCNC: 334 MG/DL (ref 70–99)
INR BLD: 2.37 (ref 0.8–1.2)
OSMOLALITY SERPL CALC.SUM OF ELEC: 286 MOSM/KG (ref 275–295)
P AXIS: 36 DEGREES
P-R INTERVAL: 174 MS
POTASSIUM SERPL-SCNC: 3.7 MMOL/L (ref 3.5–5.1)
POTASSIUM SERPL-SCNC: 3.7 MMOL/L (ref 3.5–5.1)
PROTHROMBIN TIME: 27.3 SECONDS (ref 11.6–14.8)
Q-T INTERVAL: 506 MS
QRS DURATION: 154 MS
QTC CALCULATION (BEZET): 538 MS
R AXIS: -60 DEGREES
SODIUM SERPL-SCNC: 136 MMOL/L (ref 136–145)
T AXIS: 44 DEGREES
VENTRICULAR RATE: 68 BPM

## 2023-12-26 PROCEDURE — 99232 SBSQ HOSP IP/OBS MODERATE 35: CPT | Performed by: INTERNAL MEDICINE

## 2023-12-26 RX ORDER — POTASSIUM CHLORIDE 20 MEQ/1
40 TABLET, EXTENDED RELEASE ORAL ONCE
Qty: 2 TABLET | Refills: 0 | Status: COMPLETED | OUTPATIENT
Start: 2023-12-26 | End: 2023-12-26

## 2023-12-26 RX ORDER — MORPHINE SULFATE 2 MG/ML
2 INJECTION, SOLUTION INTRAMUSCULAR; INTRAVENOUS ONCE
Status: COMPLETED | OUTPATIENT
Start: 2023-12-26 | End: 2023-12-26

## 2023-12-26 RX ORDER — PREDNISONE 20 MG/1
20 TABLET ORAL
Status: DISCONTINUED | OUTPATIENT
Start: 2023-12-27 | End: 2023-12-28

## 2023-12-26 NOTE — PLAN OF CARE
Pt up ambulating in room by self. Complaints of pain, PRN medication given. Safety precautions maintained and in place. Plan to continue abx and diurese, discharge once medically cleared. Problem: Patient Centered Care  Goal: Patient preferences are identified and integrated in the patient's plan of care  Description: Interventions:  - What would you like us to know as we care for you? From home w/ grandson  - Provide timely, complete, and accurate information to patient/family  - Incorporate patient and family knowledge, values, beliefs, and cultural backgrounds into the planning and delivery of care  - Encourage patient/family to participate in care and decision-making at the level they choose  - Honor patient and family perspectives and choices  Outcome: Progressing     Problem: CARDIOVASCULAR - ADULT  Goal: Maintains optimal cardiac output and hemodynamic stability  Description: INTERVENTIONS:  - Monitor vital signs, rhythm, and trends  - Monitor for bleeding, hypotension and signs of decreased cardiac output  - Evaluate effectiveness of vasoactive medications to optimize hemodynamic stability  - Monitor arterial and/or venous puncture sites for bleeding and/or hematoma  - Assess quality of pulses, skin color and temperature  - Assess for signs of decreased coronary artery perfusion - ex.  Angina  - Evaluate fluid balance, assess for edema, trend weights  Outcome: Progressing  Goal: Absence of cardiac arrhythmias or at baseline  Description: INTERVENTIONS:  - Continuous cardiac monitoring, monitor vital signs, obtain 12 lead EKG if indicated  - Evaluate effectiveness of antiarrhythmic and heart rate control medications as ordered  - Initiate emergency measures for life threatening arrhythmias  - Monitor electrolytes and administer replacement therapy as ordered  Outcome: Progressing     Problem: RESPIRATORY - ADULT  Goal: Achieves optimal ventilation and oxygenation  Description: INTERVENTIONS:  - Assess for changes in respiratory status  - Assess for changes in mentation and behavior  - Position to facilitate oxygenation and minimize respiratory effort  - Oxygen supplementation based on oxygen saturation or ABGs  - Provide Smoking Cessation handout, if applicable  - Encourage broncho-pulmonary hygiene including cough, deep breathe, Incentive Spirometry  - Assess the need for suctioning and perform as needed  - Assess and instruct to report SOB or any respiratory difficulty  - Respiratory Therapy support as indicated  - Manage/alleviate anxiety  - Monitor for signs/symptoms of CO2 retention  Outcome: Progressing     Problem: METABOLIC/FLUID AND ELECTROLYTES - ADULT  Goal: Glucose maintained within prescribed range  Description: INTERVENTIONS:  - Monitor Blood Glucose as ordered  - Assess for signs and symptoms of hyperglycemia and hypoglycemia  - Administer ordered medications to maintain glucose within target range  - Assess barriers to adequate nutritional intake and initiate nutrition consult as needed  - Instruct patient on self management of diabetes  Outcome: Progressing  Goal: Electrolytes maintained within normal limits  Description: INTERVENTIONS:  - Monitor labs and rhythm and assess patient for signs and symptoms of electrolyte imbalances  - Administer electrolyte replacement as ordered  - Monitor response to electrolyte replacements, including rhythm and repeat lab results as appropriate  - Fluid restriction as ordered  - Instruct patient on fluid and nutrition restrictions as appropriate  Outcome: Progressing  Goal: Hemodynamic stability and optimal renal function maintained  Description: INTERVENTIONS:  - Monitor labs and assess for signs and symptoms of volume excess or deficit  - Monitor intake, output and patient weight  - Monitor urine specific gravity, serum osmolarity and serum sodium as indicated or ordered  - Monitor response to interventions for patient's volume status, including labs, urine output, blood pressure (other measures as available)  - Encourage oral intake as appropriate  - Instruct patient on fluid and nutrition restrictions as appropriate  Outcome: Progressing

## 2023-12-26 NOTE — DIABETES ED
[facility] HOSP - [facility]    Diabetes Education  Note    Nathanael Ni. Patient Status:  Inpatient   3/23/1950 MRN [id number]  Location [facility] 3W/SW Attending Judge Mai MD  Hosp Day # 3 PCP Anca Deng MD      Labs:    HgbA1C (%)   Date Value   2023 8.5 (H)         Reason for Visit:Elevated A1c value. Met with patient in room to discuss diabetes management at home. He reports he consumes fast food type meals and drinks regular soda at home. He also indicates he is very thirsty at times. Instructed on Balanced Plate method of meal planning and recommend he eliminate sugary drinks. He states he drinks water throughout the day due to thirsty type symptoms. Provided him with diabetes education and meal planning handout and encouraged him to review. He reports he administers 1/2 tablet of metformin twice per day and sometimes he increased the dose to 1 tablet due to symptoms of thirst.  He currently does not have glucometer at home and would benefit form monitoring his blood sugar. Encouraged him to discus with PCP at next appointment. Education Provided:  Basic Diet Guidelines  Importance of good BG control to prevent short and long term complications  Importance of close follow up with PCP and medical team    Patient verbalized understanding and was receptive to information provided.       Recommendations:  Attend outpatient diabetes education        Monica Mcnamara [facility]  Diabetes Educator  2023  1:03 PM

## 2023-12-27 LAB
ANION GAP SERPL CALC-SCNC: 6 MMOL/L (ref 0–18)
ATRIAL RATE: 81 BPM
BUN BLD-MCNC: 25 MG/DL (ref 9–23)
BUN/CREAT SERPL: 26.9 (ref 10–20)
CALCIUM BLD-MCNC: 9.8 MG/DL (ref 8.7–10.4)
CHLORIDE SERPL-SCNC: 103 MMOL/L (ref 98–112)
CO2 SERPL-SCNC: 27 MMOL/L (ref 21–32)
CREAT BLD-MCNC: 0.93 MG/DL
EGFRCR SERPLBLD CKD-EPI 2021: 88 ML/MIN/1.73M2 (ref 60–?)
GLUCOSE BLD-MCNC: 188 MG/DL (ref 70–99)
GLUCOSE BLDC GLUCOMTR-MCNC: 219 MG/DL (ref 70–99)
GLUCOSE BLDC GLUCOMTR-MCNC: 243 MG/DL (ref 70–99)
GLUCOSE BLDC GLUCOMTR-MCNC: 299 MG/DL (ref 70–99)
GLUCOSE BLDC GLUCOMTR-MCNC: 319 MG/DL (ref 70–99)
GLUCOSE BLDC GLUCOMTR-MCNC: 333 MG/DL (ref 70–99)
INR BLD: 2.28 (ref 0.8–1.2)
OSMOLALITY SERPL CALC.SUM OF ELEC: 291 MOSM/KG (ref 275–295)
P AXIS: 42 DEGREES
P-R INTERVAL: 158 MS
POTASSIUM SERPL-SCNC: 3.5 MMOL/L (ref 3.5–5.1)
POTASSIUM SERPL-SCNC: 3.5 MMOL/L (ref 3.5–5.1)
POTASSIUM SERPL-SCNC: 4.7 MMOL/L (ref 3.5–5.1)
PROTHROMBIN TIME: 26.5 SECONDS (ref 11.6–14.8)
Q-T INTERVAL: 450 MS
QRS DURATION: 152 MS
QTC CALCULATION (BEZET): 522 MS
R AXIS: -67 DEGREES
SODIUM SERPL-SCNC: 136 MMOL/L (ref 136–145)
T AXIS: 68 DEGREES
VENTRICULAR RATE: 81 BPM

## 2023-12-27 PROCEDURE — 99232 SBSQ HOSP IP/OBS MODERATE 35: CPT | Performed by: INTERNAL MEDICINE

## 2023-12-27 RX ORDER — GABAPENTIN 400 MG/1
400 CAPSULE ORAL 3 TIMES DAILY
Status: DISCONTINUED | OUTPATIENT
Start: 2023-12-27 | End: 2023-12-28

## 2023-12-27 RX ORDER — POTASSIUM CHLORIDE 20 MEQ/1
40 TABLET, EXTENDED RELEASE ORAL EVERY 4 HOURS
Qty: 4 TABLET | Refills: 0 | Status: COMPLETED | OUTPATIENT
Start: 2023-12-27 | End: 2023-12-27

## 2023-12-27 NOTE — PLAN OF CARE
Patient independent. Pain meds given. Cardiac/carbohydrate-controlled diet teaching reinforced. Call light and belongings within reach. Problem: Patient Centered Care  Goal: Patient preferences are identified and integrated in the patient's plan of care  Description: Interventions:  - What would you like us to know as we care for you? From home w/ grandson  - Provide timely, complete, and accurate information to patient/family  - Incorporate patient and family knowledge, values, beliefs, and cultural backgrounds into the planning and delivery of care  - Encourage patient/family to participate in care and decision-making at the level they choose  - Honor patient and family perspectives and choices  Outcome: Progressing     Problem: CARDIOVASCULAR - ADULT  Goal: Maintains optimal cardiac output and hemodynamic stability  Description: INTERVENTIONS:  - Monitor vital signs, rhythm, and trends  - Monitor for bleeding, hypotension and signs of decreased cardiac output  - Evaluate effectiveness of vasoactive medications to optimize hemodynamic stability  - Monitor arterial and/or venous puncture sites for bleeding and/or hematoma  - Assess quality of pulses, skin color and temperature  - Assess for signs of decreased coronary artery perfusion - ex.  Angina  - Evaluate fluid balance, assess for edema, trend weights  Outcome: Progressing  Goal: Absence of cardiac arrhythmias or at baseline  Description: INTERVENTIONS:  - Continuous cardiac monitoring, monitor vital signs, obtain 12 lead EKG if indicated  - Evaluate effectiveness of antiarrhythmic and heart rate control medications as ordered  - Initiate emergency measures for life threatening arrhythmias  - Monitor electrolytes and administer replacement therapy as ordered  Outcome: Progressing     Problem: RESPIRATORY - ADULT  Goal: Achieves optimal ventilation and oxygenation  Description: INTERVENTIONS:  - Assess for changes in respiratory status  - Assess for changes in mentation and behavior  - Position to facilitate oxygenation and minimize respiratory effort  - Oxygen supplementation based on oxygen saturation or ABGs  - Provide Smoking Cessation handout, if applicable  - Encourage broncho-pulmonary hygiene including cough, deep breathe, Incentive Spirometry  - Assess the need for suctioning and perform as needed  - Assess and instruct to report SOB or any respiratory difficulty  - Respiratory Therapy support as indicated  - Manage/alleviate anxiety  - Monitor for signs/symptoms of CO2 retention  Outcome: Progressing     Problem: METABOLIC/FLUID AND ELECTROLYTES - ADULT  Goal: Electrolytes maintained within normal limits  Description: INTERVENTIONS:  - Monitor labs and rhythm and assess patient for signs and symptoms of electrolyte imbalances  - Administer electrolyte replacement as ordered  - Monitor response to electrolyte replacements, including rhythm and repeat lab results as appropriate  - Fluid restriction as ordered  - Instruct patient on fluid and nutrition restrictions as appropriate  Outcome: Progressing  Goal: Hemodynamic stability and optimal renal function maintained  Description: INTERVENTIONS:  - Monitor labs and assess for signs and symptoms of volume excess or deficit  - Monitor intake, output and patient weight  - Monitor urine specific gravity, serum osmolarity and serum sodium as indicated or ordered  - Monitor response to interventions for patient's volume status, including labs, urine output, blood pressure (other measures as available)  - Encourage oral intake as appropriate  - Instruct patient on fluid and nutrition restrictions as appropriate  Outcome: Progressing     Problem: METABOLIC/FLUID AND ELECTROLYTES - ADULT  Goal: Glucose maintained within prescribed range  Description: INTERVENTIONS:  - Monitor Blood Glucose as ordered  - Assess for signs and symptoms of hyperglycemia and hypoglycemia  - Administer ordered medications to maintain glucose within target range  - Assess barriers to adequate nutritional intake and initiate nutrition consult as needed  - Instruct patient on self management of diabetes  Outcome: Not Progressing

## 2023-12-27 NOTE — PLAN OF CARE
Patient independent, call light and belongings within reach. Diabetic education reinforced. Problem: Patient Centered Care  Goal: Patient preferences are identified and integrated in the patient's plan of care  Description: Interventions:  - What would you like us to know as we care for you? From home w/ grandson  - Provide timely, complete, and accurate information to patient/family  - Incorporate patient and family knowledge, values, beliefs, and cultural backgrounds into the planning and delivery of care  - Encourage patient/family to participate in care and decision-making at the level they choose  - Honor patient and family perspectives and choices  Outcome: Progressing     Problem: CARDIOVASCULAR - ADULT  Goal: Maintains optimal cardiac output and hemodynamic stability  Description: INTERVENTIONS:  - Monitor vital signs, rhythm, and trends  - Monitor for bleeding, hypotension and signs of decreased cardiac output  - Evaluate effectiveness of vasoactive medications to optimize hemodynamic stability  - Monitor arterial and/or venous puncture sites for bleeding and/or hematoma  - Assess quality of pulses, skin color and temperature  - Assess for signs of decreased coronary artery perfusion - ex.  Angina  - Evaluate fluid balance, assess for edema, trend weights  Outcome: Progressing  Goal: Absence of cardiac arrhythmias or at baseline  Description: INTERVENTIONS:  - Continuous cardiac monitoring, monitor vital signs, obtain 12 lead EKG if indicated  - Evaluate effectiveness of antiarrhythmic and heart rate control medications as ordered  - Initiate emergency measures for life threatening arrhythmias  - Monitor electrolytes and administer replacement therapy as ordered  Outcome: Progressing     Problem: RESPIRATORY - ADULT  Goal: Achieves optimal ventilation and oxygenation  Description: INTERVENTIONS:  - Assess for changes in respiratory status  - Assess for changes in mentation and behavior  - Position to facilitate oxygenation and minimize respiratory effort  - Oxygen supplementation based on oxygen saturation or ABGs  - Provide Smoking Cessation handout, if applicable  - Encourage broncho-pulmonary hygiene including cough, deep breathe, Incentive Spirometry  - Assess the need for suctioning and perform as needed  - Assess and instruct to report SOB or any respiratory difficulty  - Respiratory Therapy support as indicated  - Manage/alleviate anxiety  - Monitor for signs/symptoms of CO2 retention  Outcome: Progressing     Problem: METABOLIC/FLUID AND ELECTROLYTES - ADULT  Goal: Glucose maintained within prescribed range  Description: INTERVENTIONS:  - Monitor Blood Glucose as ordered  - Assess for signs and symptoms of hyperglycemia and hypoglycemia  - Administer ordered medications to maintain glucose within target range  - Assess barriers to adequate nutritional intake and initiate nutrition consult as needed  - Instruct patient on self management of diabetes  Outcome: Progressing  Goal: Electrolytes maintained within normal limits  Description: INTERVENTIONS:  - Monitor labs and rhythm and assess patient for signs and symptoms of electrolyte imbalances  - Administer electrolyte replacement as ordered  - Monitor response to electrolyte replacements, including rhythm and repeat lab results as appropriate  - Fluid restriction as ordered  - Instruct patient on fluid and nutrition restrictions as appropriate  Outcome: Progressing  Goal: Hemodynamic stability and optimal renal function maintained  Description: INTERVENTIONS:  - Monitor labs and assess for signs and symptoms of volume excess or deficit  - Monitor intake, output and patient weight  - Monitor urine specific gravity, serum osmolarity and serum sodium as indicated or ordered  - Monitor response to interventions for patient's volume status, including labs, urine output, blood pressure (other measures as available)  - Encourage oral intake as appropriate  - Instruct patient on fluid and nutrition restrictions as appropriate  Outcome: Progressing

## 2023-12-27 NOTE — PLAN OF CARE
Patient continues to have shortness of breath with exertion. On room air. IV diuretics continued. Antibiotics discontinued per pulmonology. Plan is to continue to diuresis. Patient updated on plan of care. Problem: Patient Centered Care  Goal: Patient preferences are identified and integrated in the patient's plan of care  Description: Interventions:  - What would you like us to know as we care for you? From home w/ grandson  - Provide timely, complete, and accurate information to patient/family  - Incorporate patient and family knowledge, values, beliefs, and cultural backgrounds into the planning and delivery of care  - Encourage patient/family to participate in care and decision-making at the level they choose  - Honor patient and family perspectives and choices  Outcome: Progressing     Problem: CARDIOVASCULAR - ADULT  Goal: Maintains optimal cardiac output and hemodynamic stability  Description: INTERVENTIONS:  - Monitor vital signs, rhythm, and trends  - Monitor for bleeding, hypotension and signs of decreased cardiac output  - Evaluate effectiveness of vasoactive medications to optimize hemodynamic stability  - Monitor arterial and/or venous puncture sites for bleeding and/or hematoma  - Assess quality of pulses, skin color and temperature  - Assess for signs of decreased coronary artery perfusion - ex.  Angina  - Evaluate fluid balance, assess for edema, trend weights  Outcome: Progressing  Goal: Absence of cardiac arrhythmias or at baseline  Description: INTERVENTIONS:  - Continuous cardiac monitoring, monitor vital signs, obtain 12 lead EKG if indicated  - Evaluate effectiveness of antiarrhythmic and heart rate control medications as ordered  - Initiate emergency measures for life threatening arrhythmias  - Monitor electrolytes and administer replacement therapy as ordered  Outcome: Progressing     Problem: RESPIRATORY - ADULT  Goal: Achieves optimal ventilation and oxygenation  Description: INTERVENTIONS:  - Assess for changes in respiratory status  - Assess for changes in mentation and behavior  - Position to facilitate oxygenation and minimize respiratory effort  - Oxygen supplementation based on oxygen saturation or ABGs  - Provide Smoking Cessation handout, if applicable  - Encourage broncho-pulmonary hygiene including cough, deep breathe, Incentive Spirometry  - Assess the need for suctioning and perform as needed  - Assess and instruct to report SOB or any respiratory difficulty  - Respiratory Therapy support as indicated  - Manage/alleviate anxiety  - Monitor for signs/symptoms of CO2 retention  Outcome: Progressing     Problem: METABOLIC/FLUID AND ELECTROLYTES - ADULT  Goal: Glucose maintained within prescribed range  Description: INTERVENTIONS:  - Monitor Blood Glucose as ordered  - Assess for signs and symptoms of hyperglycemia and hypoglycemia  - Administer ordered medications to maintain glucose within target range  - Assess barriers to adequate nutritional intake and initiate nutrition consult as needed  - Instruct patient on self management of diabetes  Outcome: Progressing  Goal: Electrolytes maintained within normal limits  Description: INTERVENTIONS:  - Monitor labs and rhythm and assess patient for signs and symptoms of electrolyte imbalances  - Administer electrolyte replacement as ordered  - Monitor response to electrolyte replacements, including rhythm and repeat lab results as appropriate  - Fluid restriction as ordered  - Instruct patient on fluid and nutrition restrictions as appropriate  Outcome: Progressing  Goal: Hemodynamic stability and optimal renal function maintained  Description: INTERVENTIONS:  - Monitor labs and assess for signs and symptoms of volume excess or deficit  - Monitor intake, output and patient weight  - Monitor urine specific gravity, serum osmolarity and serum sodium as indicated or ordered  - Monitor response to interventions for patient's volume status, including labs, urine output, blood pressure (other measures as available)  - Encourage oral intake as appropriate  - Instruct patient on fluid and nutrition restrictions as appropriate  Outcome: Progressing

## 2023-12-28 VITALS
OXYGEN SATURATION: 93 % | TEMPERATURE: 98 F | HEIGHT: 69 IN | RESPIRATION RATE: 20 BRPM | DIASTOLIC BLOOD PRESSURE: 69 MMHG | BODY MASS INDEX: 39.55 KG/M2 | SYSTOLIC BLOOD PRESSURE: 112 MMHG | WEIGHT: 267 LBS | HEART RATE: 89 BPM

## 2023-12-28 LAB
ANION GAP SERPL CALC-SCNC: 5 MMOL/L (ref 0–18)
BUN BLD-MCNC: 32 MG/DL (ref 9–23)
BUN/CREAT SERPL: 30.2 (ref 10–20)
CALCIUM BLD-MCNC: 9.5 MG/DL (ref 8.7–10.4)
CHLORIDE SERPL-SCNC: 103 MMOL/L (ref 98–112)
CO2 SERPL-SCNC: 26 MMOL/L (ref 21–32)
CREAT BLD-MCNC: 1.06 MG/DL
EGFRCR SERPLBLD CKD-EPI 2021: 75 ML/MIN/1.73M2 (ref 60–?)
GLUCOSE BLD-MCNC: 180 MG/DL (ref 70–99)
GLUCOSE BLDC GLUCOMTR-MCNC: 188 MG/DL (ref 70–99)
GLUCOSE BLDC GLUCOMTR-MCNC: 260 MG/DL (ref 70–99)
INR BLD: 2.06 (ref 0.8–1.2)
OSMOLALITY SERPL CALC.SUM OF ELEC: 289 MOSM/KG (ref 275–295)
POTASSIUM SERPL-SCNC: 4 MMOL/L (ref 3.5–5.1)
PROTHROMBIN TIME: 24.5 SECONDS (ref 11.6–14.8)
SODIUM SERPL-SCNC: 134 MMOL/L (ref 136–145)

## 2023-12-28 PROCEDURE — 99239 HOSP IP/OBS DSCHRG MGMT >30: CPT | Performed by: INTERNAL MEDICINE

## 2023-12-28 PROCEDURE — 99232 SBSQ HOSP IP/OBS MODERATE 35: CPT | Performed by: INTERNAL MEDICINE

## 2023-12-28 RX ORDER — PREDNISONE 10 MG/1
10 TABLET ORAL
Status: DISCONTINUED | OUTPATIENT
Start: 2023-12-28 | End: 2023-12-28

## 2023-12-28 RX ORDER — IPRATROPIUM BROMIDE AND ALBUTEROL SULFATE 2.5; .5 MG/3ML; MG/3ML
3 SOLUTION RESPIRATORY (INHALATION) EVERY 6 HOURS PRN
Status: DISCONTINUED | OUTPATIENT
Start: 2023-12-28 | End: 2023-12-28

## 2023-12-28 RX ORDER — IPRATROPIUM BROMIDE AND ALBUTEROL SULFATE 2.5; .5 MG/3ML; MG/3ML
3 SOLUTION RESPIRATORY (INHALATION)
Status: DISCONTINUED | OUTPATIENT
Start: 2023-12-28 | End: 2023-12-28

## 2023-12-28 RX ORDER — CARVEDILOL 6.25 MG/1
6.25 TABLET ORAL 2 TIMES DAILY WITH MEALS
Qty: 60 TABLET | Refills: 0 | Status: SHIPPED | OUTPATIENT
Start: 2023-12-28

## 2023-12-28 RX ORDER — SPIRONOLACTONE 25 MG/1
25 TABLET ORAL DAILY
Qty: 30 TABLET | Refills: 0 | Status: SHIPPED | OUTPATIENT
Start: 2023-12-29

## 2023-12-28 RX ORDER — TORSEMIDE 20 MG/1
20 TABLET ORAL DAILY
Status: DISCONTINUED | OUTPATIENT
Start: 2023-12-28 | End: 2023-12-28

## 2023-12-28 RX ORDER — TORSEMIDE 20 MG/1
20 TABLET ORAL DAILY
Qty: 30 TABLET | Refills: 0 | Status: SHIPPED | OUTPATIENT
Start: 2023-12-29

## 2023-12-28 NOTE — PLAN OF CARE
Patient discharged home to self care. Patient in stable condition. Problem: Patient Centered Care  Goal: Patient preferences are identified and integrated in the patient's plan of care  Description: Interventions:  - What would you like us to know as we care for you? From home w/ grandson  - Provide timely, complete, and accurate information to patient/family  - Incorporate patient and family knowledge, values, beliefs, and cultural backgrounds into the planning and delivery of care  - Encourage patient/family to participate in care and decision-making at the level they choose  - Honor patient and family perspectives and choices  Outcome: Adequate for Discharge     Problem: CARDIOVASCULAR - ADULT  Goal: Maintains optimal cardiac output and hemodynamic stability  Description: INTERVENTIONS:  - Monitor vital signs, rhythm, and trends  - Monitor for bleeding, hypotension and signs of decreased cardiac output  - Evaluate effectiveness of vasoactive medications to optimize hemodynamic stability  - Monitor arterial and/or venous puncture sites for bleeding and/or hematoma  - Assess quality of pulses, skin color and temperature  - Assess for signs of decreased coronary artery perfusion - ex.  Angina  - Evaluate fluid balance, assess for edema, trend weights  Outcome: Adequate for Discharge  Goal: Absence of cardiac arrhythmias or at baseline  Description: INTERVENTIONS:  - Continuous cardiac monitoring, monitor vital signs, obtain 12 lead EKG if indicated  - Evaluate effectiveness of antiarrhythmic and heart rate control medications as ordered  - Initiate emergency measures for life threatening arrhythmias  - Monitor electrolytes and administer replacement therapy as ordered  Outcome: Adequate for Discharge     Problem: RESPIRATORY - ADULT  Goal: Achieves optimal ventilation and oxygenation  Description: INTERVENTIONS:  - Assess for changes in respiratory status  - Assess for changes in mentation and behavior  - Position to facilitate oxygenation and minimize respiratory effort  - Oxygen supplementation based on oxygen saturation or ABGs  - Provide Smoking Cessation handout, if applicable  - Encourage broncho-pulmonary hygiene including cough, deep breathe, Incentive Spirometry  - Assess the need for suctioning and perform as needed  - Assess and instruct to report SOB or any respiratory difficulty  - Respiratory Therapy support as indicated  - Manage/alleviate anxiety  - Monitor for signs/symptoms of CO2 retention  Outcome: Adequate for Discharge     Problem: METABOLIC/FLUID AND ELECTROLYTES - ADULT  Goal: Glucose maintained within prescribed range  Description: INTERVENTIONS:  - Monitor Blood Glucose as ordered  - Assess for signs and symptoms of hyperglycemia and hypoglycemia  - Administer ordered medications to maintain glucose within target range  - Assess barriers to adequate nutritional intake and initiate nutrition consult as needed  - Instruct patient on self management of diabetes  Outcome: Adequate for Discharge  Goal: Electrolytes maintained within normal limits  Description: INTERVENTIONS:  - Monitor labs and rhythm and assess patient for signs and symptoms of electrolyte imbalances  - Administer electrolyte replacement as ordered  - Monitor response to electrolyte replacements, including rhythm and repeat lab results as appropriate  - Fluid restriction as ordered  - Instruct patient on fluid and nutrition restrictions as appropriate  Outcome: Adequate for Discharge  Goal: Hemodynamic stability and optimal renal function maintained  Description: INTERVENTIONS:  - Monitor labs and assess for signs and symptoms of volume excess or deficit  - Monitor intake, output and patient weight  - Monitor urine specific gravity, serum osmolarity and serum sodium as indicated or ordered  - Monitor response to interventions for patient's volume status, including labs, urine output, blood pressure (other measures as available)  - Encourage oral intake as appropriate  - Instruct patient on fluid and nutrition restrictions as appropriate  Outcome: Adequate for Discharge

## 2023-12-28 NOTE — CM/SW NOTE
12/28/23 1400   Discharge disposition   Expected discharge disposition Home or [facility] 178 services after discharge None   Discharge transportation [address]     The patient received a MDO for discharge. The patient will be transported home via [facility] at 5:00 pm.  PCS Complete. The patient's Medicaid will cover transportation cost.    Social work informed the patient's daughter. RN to inform patient. SW/CM to remain available for support and/or discharge planning.      Gamaliel VENEGAS, [facility]  Discharge Planner [id number]

## 2023-12-28 NOTE — PLAN OF CARE
Patient independent. Pain managed with medication and heat packs. Afebrile. Call light and belongings within reach. IV lasix continued. Problem: Patient Centered Care  Goal: Patient preferences are identified and integrated in the patient's plan of care  Description: Interventions:  - What would you like us to know as we care for you? From home w/ grandson  - Provide timely, complete, and accurate information to patient/family  - Incorporate patient and family knowledge, values, beliefs, and cultural backgrounds into the planning and delivery of care  - Encourage patient/family to participate in care and decision-making at the level they choose  - Honor patient and family perspectives and choices  Outcome: Progressing     Problem: CARDIOVASCULAR - ADULT  Goal: Maintains optimal cardiac output and hemodynamic stability  Description: INTERVENTIONS:  - Monitor vital signs, rhythm, and trends  - Monitor for bleeding, hypotension and signs of decreased cardiac output  - Evaluate effectiveness of vasoactive medications to optimize hemodynamic stability  - Monitor arterial and/or venous puncture sites for bleeding and/or hematoma  - Assess quality of pulses, skin color and temperature  - Assess for signs of decreased coronary artery perfusion - ex.  Angina  - Evaluate fluid balance, assess for edema, trend weights  Outcome: Progressing  Goal: Absence of cardiac arrhythmias or at baseline  Description: INTERVENTIONS:  - Continuous cardiac monitoring, monitor vital signs, obtain 12 lead EKG if indicated  - Evaluate effectiveness of antiarrhythmic and heart rate control medications as ordered  - Initiate emergency measures for life threatening arrhythmias  - Monitor electrolytes and administer replacement therapy as ordered  Outcome: Progressing     Problem: RESPIRATORY - ADULT  Goal: Achieves optimal ventilation and oxygenation  Description: INTERVENTIONS:  - Assess for changes in respiratory status  - Assess for changes in mentation and behavior  - Position to facilitate oxygenation and minimize respiratory effort  - Oxygen supplementation based on oxygen saturation or ABGs  - Provide Smoking Cessation handout, if applicable  - Encourage broncho-pulmonary hygiene including cough, deep breathe, Incentive Spirometry  - Assess the need for suctioning and perform as needed  - Assess and instruct to report SOB or any respiratory difficulty  - Respiratory Therapy support as indicated  - Manage/alleviate anxiety  - Monitor for signs/symptoms of CO2 retention  Outcome: Progressing     Problem: METABOLIC/FLUID AND ELECTROLYTES - ADULT  Goal: Glucose maintained within prescribed range  Description: INTERVENTIONS:  - Monitor Blood Glucose as ordered  - Assess for signs and symptoms of hyperglycemia and hypoglycemia  - Administer ordered medications to maintain glucose within target range  - Assess barriers to adequate nutritional intake and initiate nutrition consult as needed  - Instruct patient on self management of diabetes  Outcome: Progressing  Goal: Electrolytes maintained within normal limits  Description: INTERVENTIONS:  - Monitor labs and rhythm and assess patient for signs and symptoms of electrolyte imbalances  - Administer electrolyte replacement as ordered  - Monitor response to electrolyte replacements, including rhythm and repeat lab results as appropriate  - Fluid restriction as ordered  - Instruct patient on fluid and nutrition restrictions as appropriate  Outcome: Progressing  Goal: Hemodynamic stability and optimal renal function maintained  Description: INTERVENTIONS:  - Monitor labs and assess for signs and symptoms of volume excess or deficit  - Monitor intake, output and patient weight  - Monitor urine specific gravity, serum osmolarity and serum sodium as indicated or ordered  - Monitor response to interventions for patient's volume status, including labs, urine output, blood pressure (other measures as available)  - Encourage oral intake as appropriate  - Instruct patient on fluid and nutrition restrictions as appropriate  Outcome: Progressing

## 2023-12-28 NOTE — CM/SW NOTE
12/28/23 1400   Discharge disposition   Expected discharge disposition Home or [facility] 178 services after discharge None   Discharge transportation Private car     The patient received a MDO for discharge. The patient will be transported home by family via private car. The patient has no questions or concerns at this time. RICHAR/CM to remain available for support and/or discharge planning.      Violet VENEGAS, [facility]  Discharge Planner [id number]

## 2023-12-29 ENCOUNTER — PATIENT OUTREACH (OUTPATIENT)
Dept: CASE MANAGEMENT | Age: 73
End: 2023-12-29

## 2023-12-29 ENCOUNTER — TELEPHONE (OUTPATIENT)
Dept: ENDOCRINOLOGY CLINIC | Facility: CLINIC | Age: 73
End: 2023-12-29

## 2023-12-29 ENCOUNTER — TELEPHONE (OUTPATIENT)
Dept: PULMONOLOGY | Facility: CLINIC | Age: 73
End: 2023-12-29

## 2023-12-29 DIAGNOSIS — J44.1 COPD EXACERBATION (HCC): Primary | ICD-10-CM

## 2023-12-29 DIAGNOSIS — Z02.9 ENCOUNTERS FOR UNSPECIFIED ADMINISTRATIVE PURPOSE: ICD-10-CM

## 2023-12-29 PROCEDURE — 1111F DSCHRG MED/CURRENT MED MERGE: CPT

## 2023-12-29 NOTE — TELEPHONE ENCOUNTER
I am not in the office for a large part of Jan 2024  Can please call and book with MARIBEL or with Dr Peña Sayed  Thanks

## 2023-12-29 NOTE — TELEPHONE ENCOUNTER
Patient is being followed by Dr. Barahona for hypothyroidism and low T (2/7/22 LOV).  Reviewed hospital note and it looks like patient is also diabetic.  A1C 8.5%. Discharge summary says to follow up with Dr. Barahona 1 week for diabetes follow up.     Dr. Barahona - please advise

## 2023-12-29 NOTE — PROGRESS NOTES
Initial Post Discharge Follow Up   Discharge Date: 12/28/23  Contact Date: 12/29/2023    Consent Verification:  Assessment Completed With: Patient  HIPAA Verified? Yes    Discharge Dx:   COPD exacerbation    General:   How have you been since your discharge from the hospital? Pt feeling better, since hospital discharge--still fatigued, but improving. Pt reports appetite adequate, ambulating independently at home. Patient denies fever, chills, nausea, vomiting, diarrhea, LE edema, chest pain or shortness of breath at this time. Do you have any pain since discharge? No    How well was your pain managed while in the hospital?   On a scale of 1-5   1- Very Poor and 5- Very well   Very Well  When you were leaving the hospital were your discharge instructions reviewed with you? Yes  How well were your discharge instructions explained to you? On a scale of 1-5   1- Very Poor and 5- Very well   Very Well  Do you have any questions about your discharge instructions? No  Before leaving the hospital was your diagnoses explained to you? Yes  Do you have any questions about your diagnoses? No  Are you able to perform normal daily activities of living as you have prior to your hospital stay (dressing, bathing, ambulating to the bathroom, etc)? yes  (NCM) Was patient given a different diet per AVS? yes  If so, which diet? CHF, diabetic diet  Are there any barriers to following this diet? no    Medications:   Current Outpatient Medications   Medication Sig Dispense Refill    carvedilol 6.25 MG Oral Tab Take 1 tablet (6.25 mg total) by mouth 2 (two) times daily with meals. 60 tablet 0    torsemide 20 MG Oral Tab Take 1 tablet (20 mg total) by mouth daily. 30 tablet 0    spironolactone 25 MG Oral Tab Take 1 tablet (25 mg total) by mouth daily. 30 tablet 0    atorvastatin 80 MG Oral Tab Take 1 tablet (80 mg total) by mouth daily.  90 tablet 0    levothyroxine 150 MCG Oral Tab Take 1 tablet (150 mcg total) by mouth before breakfast. 90 tablet 0    metFORMIN HCl 1000 MG Oral Tab Take 0.5 tablets (500 mg total) by mouth 2 (two) times daily with meals. 180 tablet 1    MOVANTIK 25 MG Oral Tab TAKE 1 TABLET BY MOUTH DAILY 1 -2 HOURS AFTER MEAL      empagliflozin (JARDIANCE) 10 MG Oral Tab Take 1 tablet (10 mg total) by mouth daily. 90 tablet 2    fluticasone-umeclidin-vilant (TRELEGY ELLIPTA) 276-96.9-71 MCG/ACT Inhalation Aerosol Powder, Breath Activated Inhale 1 puff into the lungs daily. 180 each 3    albuterol (VENTOLIN HFA) 108 (90 Base) MCG/ACT Inhalation Aero Soln Inhale 2 puffs into the lungs every 4 (four) hours as needed for Wheezing. 54 g 0    clopidogrel 75 MG Oral Tab Take 1 tablet (75 mg total) by mouth daily. 90 tablet 3    bimatoprost (LUMIGAN) 0.01 % Ophthalmic Solution Place 1 drop into both eyes every evening. Blood Glucose Monitoring Suppl Does not apply Kit Please use to check blood sugar twice daily. 1 kit 0    Blood Glucose Monitoring Suppl Does not apply Misc Please use to check blood sugar twice daily 200 each 3    Blood Glucose Monitoring Suppl Does not apply Misc Please use to check blood sugar twice daily 200 each 0    ENTRESTO 24-26 MG Oral Tab Take 1 tablet by mouth 2 (two) times daily. 180 tablet 0    oxyCODONE-acetaminophen  MG Oral Tab Take 1 tablet by mouth 4 (four) times daily as needed. DULoxetine 30 MG Oral Cap DR Particles Take 1 capsule (30 mg total) by mouth daily. warfarin 5 MG Oral Tab Take 2 tablets (10 mg total) by mouth nightly. 7.5mg on Sunday and Wednesday  10mg on all other days      pregabalin 300 MG Oral Cap Take 1 capsule (300 mg total) by mouth 2 (two) times daily. 30 capsule 0    aspirin 81 MG Oral Tab EC Take 1 tablet (81 mg total) by mouth daily.  30 tablet 2     Were there any changes to your current medication(s) noted on the AVS? Yes  START taking:  spironolactone (Aldactone)  Start taking on: December 29, 2023  CHANGE how you take:  carvedilol (Coreg)  torsemide (Demadex)  STOP taking:  gabapentin 300 MG Caps (Neurontin)  latanoprost 0.005 % Soln (Xalatan)  melatonin 5 MG Caps  Potassium Chloride ER 20 MEQ Tbcr  If so, were these medication changes discussed with you prior to leaving the hospital? Yes  If a new medication was prescribed:    Was the new medication's purpose & side effects reviewed? No  Do you have any questions about your new medication? No  Did you  your discharge medications when you left the hospital?  Picking up  today  Let's go over your medications together to make sure we are not missing anything. Medications Reviewed  Are there any reasons that keep you from taking your medication as prescribed? No  Are you having any concerns with constipation? No  Did patient receive their flu shot (Sept-March)? No    Discharge medications reviewed/discussed/and reconciled against outpatient medications with patient. Any changes or updates to medications sent to PCP. Patient Acknowledged     Referrals/orders at D/C:  Referrals/orders placed at D/C? yes  What services:   CHF/COPD clinic  Except for [address] mentioned above, have you scheduled these other services? Yes  If yes, have you started these services? no--appt 1/15/2024  DME ordered at D/C? No  Discharge orders, AVS reviewed and discussed with patient. Any changes or updates to orders sent to PCP. Patient Acknowledged      SDOH:   Transportation:    Transportation Needs: No Transportation Needs (12/29/2023)    Transportation Needs     Lack of Transportation: No     Financial Strain:   Financial Resource Strain: Low Risk  (12/29/2023)    Financial Resource Strain     Difficulty of Paying Living Expenses: Not very hard     Med Affordability: No       Diagnosis specifics:   CHF:  CHF:   With your CHF diagnosis weighing yourself is very important  How often are you weighing yourself? Not weighing   Is there any reason you are unable to weigh yourself daily?    Pt doesn't own scale   What was your weight yesterday? 267 lb in hospital   Today? Did not weigh  Were you told about any fluid restrictions? Yes  Have you noticed any shortness of breath or waking up short of breath? no  Since discharge do you feel you are urinating more or less?  more    Are you urinating more at night? no    Do you notice any pain or swelling in your abdomen? no      Ankles or Legs?   no  Questions/Concerns: none  . TCMCHFTEMP  COPD:COPD:  Have you participated in a pulmonary rehab program?   no   Would you like more information? no  We reviewed your medications/inhalers, how often are you using your inhaler? As prescribed  Are you currently on oxygen? no   Are you familiar with the signs and symptoms of worsening COPD? Yes       Who do you call with worsening COPD symptoms? Dr. Jen Verma   Do you know when to call with COPD symptoms? Yes   Do you have any of the following potential risk factors for COPD in your home environment? Primary or secondary tobacco smoke   yes  Occupational dusts and chemicals organic and inorganic    no  Indoor air pollution from heating and cooking with poor ventilation   no    Mold      no    Pets        no    Extreme heat no    Warfarin/Coumadin: Warfarin/Coumadin:   Next PT/INR Date: 12/29/2023, per discharge instructions   Current Warfarin Dose:   Does patient know who to follow-up with Warfarin/Coumadin management? yes     Who manages Warfarin/Coumadin? [facility]    Who is monitoring PT/INR?  (Coumadin Clinic, HH, PCP, cardiology, etc)  NCM Reviewed next PT/INR appointment with pt:  Yes    Follow up appointments:    Please have INR checked 12/29    Start   Ordered   12/28/23 0902  Follow up with [facility] Failure Clinic  ([address] [place] [address])  Once     Completed     Priority: Routine   Question Answer Comment   When should patient follow up? Within 7 days    Where to follow-up?  [address]        12/28/23 0901   12/26/23 0842  Follow up for COPD/Pneumonia  (Post-Discharge Follow-Up Orders)  Once     Completed     Priority: Routine   Question Answer Comment   Patient to be seen for: COPD    Where to follow-up for COPD/Pneumonia? [address]        12/26/23 7763         This patient's most recent A1C was >= 8%; please consider scheduling a follow-up appointment for Diabetes  Last A1C Value: 8.5% Date: [12/23/2023]     Follow-up Information    Follow up With Specialties Details Why Contact Info Additional Information   Emerson Mustafa MD ENDOCRINOLOGY Follow up in 1 week(s) You have requested to schedule your own appointment for your Diabetes follow-up. Please call Dr Keyanna Gipson office as soon as possible to schedule an appointment for 1-2 weeks. [address] [facility]    Adrienne Carwtright MD Interventional, Cardiology, CARDIOLOGY Follow up Office will call to schedule follow up [address] [facility]  Analia Otoniel Baker MD PULMONARY DISEASES, Internal Medicine, Critical Care Follow up in 3 week(s)  [address]  [facility] [address] [facility] [id number]  [facility] Cardiology Schedule an appointment as soon as possible for a visit  [address]  [address]  Your appointment is located at [address] [facility] in [facility]. Please park in the [address] [place] [address] Avenue and go through the [facility] entrance. Then proceed to [address]. Masks are optional for all patients and visitors, unless otherwise indicated.       Date & Time Appointment Department [facility])    Rafa 15, 2024 11:00 AM CST [address] with Bibiana Shipley, NP [address] ([address] [facility] [address])              [address]  [address] [facility]  TCC  Was TCC ordered: No    PCP (If no TCC appointment)  Does patient already have a PCP appointment scheduled? No  NCM Scheduled PCP office TCM appointment with patient 1/03/2024    Specialist    Does the patient have any other follow up appointment(s) needing to be scheduled? Yes  If yes: NCM reviewed upcoming specialist appointment with patient: Yes  Does the patient need assistance scheduling appointment(s): No  Is there any reason as to why you cannot make your appointment(s)? No     Needs post D/C:   Now that you are home, are there any needs or concerns you need addressed before your next visit with your PCP?  (DME, meds, questions, etc.): No    Interventions by NCM:   Discussed diet, activity, medications and need for f/u visits. Pt confirms cardiology appt 1/10/2024, but unsure who his appt is with, and 1/15/2024 [facility] clinic appt. This NCM made TCM appt 1/03/2024 with Dr. Amaris Sofia at [facility] office. This NCM spoke with Madiha Ch at [facility] confirms f/u with Soo Burton 1/10/2024 at 1:30 p.m. and she will make sure [facility] follows up with pt for next INR draw--they did try to reach him this morning and left message for him to call them back. Also contacted pt's CCM, Irma--she will f/u with pt, as well. Patient aware when to contact PCP/specialists and when to seek emergency care. No further questions/concerns at this time. This NCM also spoke with Allyson Saenz at ext [phone] for f/u appts with endocrinology and pulmonology--see today's appt request encounters. Overall Rating:    How would you rate the care you received while in the hospital? good    CCM referral placed:    No--currently enrolled    BOOK BY DATE: 1/11/2024

## 2023-12-29 NOTE — PROGRESS NOTES
Attempted to reach pt for condition update since discharge--mailbox full, not accepting new messages at this time, no option for SMS notification. NCM to try again, at another time.     Discharge Dx:    copd exacerbation     Future Appointments   Date Time Provider Andrea Brown   1/15/2024 11:00 AM Dustin Gaines NP [facility] SPCIALTY [facility]

## 2023-12-29 NOTE — TELEPHONE ENCOUNTER
Pt needs to schedule hosp follow up appt but he is unsure whether he needs to see Dr. Martinez or Kota.  Please call.

## 2024-01-02 ENCOUNTER — PATIENT OUTREACH (OUTPATIENT)
Dept: CASE MANAGEMENT | Age: 74
End: 2024-01-02

## 2024-01-02 NOTE — TELEPHONE ENCOUNTER
Spoke with patient. Appointment scheduled for 1/11/24 at 12:45PM. Verified date, time, place, and where to park. Patient verbalized understanding.

## 2024-01-02 NOTE — PROGRESS NOTES
HIPPA verified for [facility] monthly outreach.   I called patient and no answer. Mailbox full.   I will follow up with patient at a later time.      Medical record reviewed including recent office visits and test results    Future Appointments   Date Time Provider Department Center   1/3/2024  4:30 PM Nilda Argueta MD [id number]   1/15/2024 11:00 AM Eva Gaines NP [facility] SPCIALTY EM [facility]

## 2024-01-02 NOTE — TELEPHONE ENCOUNTER
Consult completed by Dr. Escudero. Patient discharged on 12/28/23.     Dr. Escudero: When would you like to see patient?

## 2024-01-03 ENCOUNTER — NURSE TRIAGE (OUTPATIENT)
Dept: INTERNAL MEDICINE CLINIC | Facility: CLINIC | Age: 74
End: 2024-01-03

## 2024-01-03 NOTE — PROGRESS NOTES
Called patient to remind him of TCM visit for this afternoon.    Patient states he is not feeling well. Having GI symptoms and unable to leave the house. Requesting follow up next week..       Future Appointments   Date Time Provider Department Center   1/10/2024  1:45 PM Nilda Argueta MD [id number] [place] [id number]   1/11/2024 12:45 PM Callum Escudero DO ECWMOPULM [facility]   1/15/2024 11:00 AM Eva Gaines NP [facility] SPCIALTY [facility]

## 2024-01-03 NOTE — TELEPHONE ENCOUNTER
Attempted to call pt no answer.   Pt is not mychart active.       See pt outreach encounter 1-2-24.            Irma Hernandez RMA   to Em Rn Triage       1/3/24 12:54 PM  FYI  Irma Hernandez RMA     AD    1/3/24 12:54 PM  Note  Called patient to remind him of TCM visit for this afternoon.     Patient states he is not feeling well. Having GI symptoms and unable to leave the house. Requesting follow up next week..                Future Appointments   Date Time Provider Department Center   1/10/2024  1:45 PM Nilda Argueta MD [id number] [place] [id number]   1/11/2024 12:45 PM Callum Escudero DO [facility]WMOPULM [facility]   1/15/2024 11:00 AM Eva Gaines NP [facility] SPCIALTY [facility]

## 2024-01-04 NOTE — TELEPHONE ENCOUNTER
Action Requested: Summary for Provider     []  Critical Lab, Recommendations Needed  [] Need Additional Advice  [x]   FYI    []   Need Orders  [] Need Medications Sent to Pharmacy  []  Other     SUMMARY: Patient stated that he has not been feeling well since yesterday. Has a cough, sweating, short of breath- states has shallow breathing. No chest pain. Did not do a COVID test but he does not feel he has COVID. No fevers. No other symptoms. Advised patient that he needs to be evaluated now. Declined office visit/no appointments available. Advised patient to go to urgent care in [place] for an evaluation now. Patient indicated that will see how he feels in an hour and will then go.     Reason for call: Condition Update and Cough (Cough, short of breath, sweating)  Onset: 1/3/2024     Reason for Disposition   MILD difficulty breathing (e.g., minimal/no SOB at rest, SOB with walking, pulse <100) and still present when not coughing    Protocols used: Cough-A-OH

## 2024-01-06 NOTE — TELEPHONE ENCOUNTER
Follow up call placed to patient.  States he is feeling better today, breathing has improved.  Rest and hydration advised, continue to monitor symptoms.  If no better by Monday call for appointment, IC or ER this weekend for any progressing symptoms.  He verbalized understanding and compliance.

## 2024-01-10 ENCOUNTER — TELEPHONE (OUTPATIENT)
Dept: INTERNAL MEDICINE CLINIC | Facility: CLINIC | Age: 74
End: 2024-01-10

## 2024-01-11 ENCOUNTER — TELEPHONE (OUTPATIENT)
Dept: CARDIOLOGY CLINIC | Facility: HOSPITAL | Age: 74
End: 2024-01-11

## 2024-01-11 DIAGNOSIS — I50.20 HFREF (HEART FAILURE WITH REDUCED EJECTION FRACTION) (HCC): Primary | ICD-10-CM

## 2024-01-17 NOTE — TELEPHONE ENCOUNTER
Pt scheduled with Dr. Pérez tomorrow 1/18/24 at 3;40 pt asking for blood test to be put in the system. If blood test isnt ordered he rather not come to appt. Please advise if blood test are being ordered. Pt requesting call back with update if orders will be put in or not.

## 2024-01-18 ENCOUNTER — HOSPITAL ENCOUNTER (INPATIENT)
Facility: HOSPITAL | Age: 74
LOS: 3 days | Discharge: HOME | End: 2024-01-21
Attending: EMERGENCY MEDICINE | Admitting: INTERNAL MEDICINE
Payer: MEDICARE

## 2024-01-18 ENCOUNTER — APPOINTMENT (OUTPATIENT)
Dept: GENERAL RADIOLOGY | Facility: HOSPITAL | Age: 74
End: 2024-01-18
Attending: EMERGENCY MEDICINE
Payer: MEDICARE

## 2024-01-18 ENCOUNTER — OFFICE VISIT (OUTPATIENT)
Dept: CARDIOLOGY CLINIC | Facility: HOSPITAL | Age: 74
End: 2024-01-18
Attending: NURSE PRACTITIONER
Payer: MEDICARE

## 2024-01-18 VITALS
BODY MASS INDEX: 43 KG/M2 | SYSTOLIC BLOOD PRESSURE: 109 MMHG | WEIGHT: 290 LBS | OXYGEN SATURATION: 92 % | HEART RATE: 88 BPM | DIASTOLIC BLOOD PRESSURE: 69 MMHG

## 2024-01-18 DIAGNOSIS — I50.20 HFREF (HEART FAILURE WITH REDUCED EJECTION FRACTION) (HCC): ICD-10-CM

## 2024-01-18 DIAGNOSIS — Z72.0 TOBACCO ABUSE: ICD-10-CM

## 2024-01-18 DIAGNOSIS — J44.1 COPD EXACERBATION (HCC): ICD-10-CM

## 2024-01-18 DIAGNOSIS — I25.119 CORONARY ARTERY DISEASE INVOLVING NATIVE CORONARY ARTERY OF NATIVE HEART WITH ANGINA PECTORIS (HCC): ICD-10-CM

## 2024-01-18 DIAGNOSIS — R07.89 OTHER CHEST PAIN: ICD-10-CM

## 2024-01-18 DIAGNOSIS — I50.23 ACUTE ON CHRONIC SYSTOLIC CONGESTIVE HEART FAILURE (HCC): Primary | ICD-10-CM

## 2024-01-18 DIAGNOSIS — I50.23 ACUTE ON CHRONIC HFREF (HEART FAILURE WITH REDUCED EJECTION FRACTION) (HCC): Primary | ICD-10-CM

## 2024-01-18 DIAGNOSIS — R07.9 CHEST PAIN WITH HIGH RISK FOR CARDIAC ETIOLOGY: ICD-10-CM

## 2024-01-18 DIAGNOSIS — J96.01 ACUTE HYPOXIC RESPIRATORY FAILURE (HCC): ICD-10-CM

## 2024-01-18 LAB
ANION GAP SERPL CALC-SCNC: 7 MMOL/L (ref 0–18)
ANION GAP SERPL CALC-SCNC: 8 MMOL/L (ref 0–18)
APTT PPP: 36.7 SECONDS (ref 23.3–35.6)
ATRIAL RATE: 84 BPM
BASOPHILS # BLD AUTO: 0.09 X10(3) UL (ref 0–0.2)
BASOPHILS NFR BLD AUTO: 0.7 %
BNP SERPL-MCNC: 81 PG/ML
BUN BLD-MCNC: 27 MG/DL (ref 9–23)
BUN BLD-MCNC: 28 MG/DL (ref 9–23)
BUN/CREAT SERPL: 16.9 (ref 10–20)
BUN/CREAT SERPL: 18.7 (ref 10–20)
CALCIUM BLD-MCNC: 9.1 MG/DL (ref 8.7–10.4)
CALCIUM BLD-MCNC: 9.2 MG/DL (ref 8.7–10.4)
CHLORIDE SERPL-SCNC: 101 MMOL/L (ref 98–112)
CHLORIDE SERPL-SCNC: 102 MMOL/L (ref 98–112)
CHOLEST SERPL-MCNC: 129 MG/DL (ref ?–200)
CO2 SERPL-SCNC: 24 MMOL/L (ref 21–32)
CO2 SERPL-SCNC: 26 MMOL/L (ref 21–32)
CREAT BLD-MCNC: 1.5 MG/DL
CREAT BLD-MCNC: 1.6 MG/DL
DEPRECATED RDW RBC AUTO: 52 FL (ref 35.1–46.3)
EGFRCR SERPLBLD CKD-EPI 2021: 46 ML/MIN/1.73M2 (ref 60–?)
EGFRCR SERPLBLD CKD-EPI 2021: 49 ML/MIN/1.73M2 (ref 60–?)
EOSINOPHIL # BLD AUTO: 0.58 X10(3) UL (ref 0–0.7)
EOSINOPHIL NFR BLD AUTO: 4.3 %
ERYTHROCYTE [DISTWIDTH] IN BLOOD BY AUTOMATED COUNT: 19.9 % (ref 11–15)
FASTING STATUS PATIENT QL REPORTED: NO
GLUCOSE BLD-MCNC: 369 MG/DL (ref 70–99)
GLUCOSE BLD-MCNC: 471 MG/DL (ref 70–99)
GLUCOSE BLDC GLUCOMTR-MCNC: 328 MG/DL (ref 70–99)
GLUCOSE BLDC GLUCOMTR-MCNC: 347 MG/DL (ref 70–99)
HCT VFR BLD AUTO: 34.9 %
HDLC SERPL-MCNC: 36 MG/DL (ref 40–59)
HGB BLD-MCNC: 10.4 G/DL
IMM GRANULOCYTES # BLD AUTO: 0.06 X10(3) UL (ref 0–1)
IMM GRANULOCYTES NFR BLD: 0.4 %
INR BLD: 2.74 (ref 0.8–1.2)
LDLC SERPL CALC-MCNC: 56 MG/DL (ref ?–100)
LYMPHOCYTES # BLD AUTO: 2.42 X10(3) UL (ref 1–4)
LYMPHOCYTES NFR BLD AUTO: 18 %
MCH RBC QN AUTO: 22.1 PG (ref 26–34)
MCHC RBC AUTO-ENTMCNC: 29.8 G/DL (ref 31–37)
MCV RBC AUTO: 74.1 FL
MONOCYTES # BLD AUTO: 1.15 X10(3) UL (ref 0.1–1)
MONOCYTES NFR BLD AUTO: 8.6 %
NEUTROPHILS # BLD AUTO: 9.12 X10 (3) UL (ref 1.5–7.7)
NEUTROPHILS # BLD AUTO: 9.12 X10(3) UL (ref 1.5–7.7)
NEUTROPHILS NFR BLD AUTO: 68 %
NONHDLC SERPL-MCNC: 93 MG/DL (ref ?–130)
OSMOLALITY SERPL CALC.SUM OF ELEC: 299 MOSM/KG (ref 275–295)
OSMOLALITY SERPL CALC.SUM OF ELEC: 304 MOSM/KG (ref 275–295)
P AXIS: 48 DEGREES
P-R INTERVAL: 166 MS
PLATELET # BLD AUTO: 362 10(3)UL (ref 150–450)
POTASSIUM SERPL-SCNC: 4 MMOL/L (ref 3.5–5.1)
POTASSIUM SERPL-SCNC: 4.2 MMOL/L (ref 3.5–5.1)
PROTHROMBIN TIME: 30.7 SECONDS (ref 11.6–14.8)
Q-T INTERVAL: 426 MS
QRS DURATION: 154 MS
QTC CALCULATION (BEZET): 503 MS
R AXIS: -66 DEGREES
RBC # BLD AUTO: 4.71 X10(6)UL
SODIUM SERPL-SCNC: 134 MMOL/L (ref 136–145)
SODIUM SERPL-SCNC: 134 MMOL/L (ref 136–145)
T AXIS: 39 DEGREES
TRIGL SERPL-MCNC: 230 MG/DL (ref 30–149)
TROPONIN I SERPL HS-MCNC: 285 NG/L
VENTRICULAR RATE: 84 BPM
VLDLC SERPL CALC-MCNC: 34 MG/DL (ref 0–30)
WBC # BLD AUTO: 13.4 X10(3) UL (ref 4–11)

## 2024-01-18 PROCEDURE — 71045 X-RAY EXAM CHEST 1 VIEW: CPT | Performed by: EMERGENCY MEDICINE

## 2024-01-18 PROCEDURE — 99205 OFFICE O/P NEW HI 60 MIN: CPT | Performed by: NURSE PRACTITIONER

## 2024-01-18 RX ORDER — SENNOSIDES 8.6 MG
17.2 TABLET ORAL NIGHTLY PRN
Status: DISCONTINUED | OUTPATIENT
Start: 2024-01-18 | End: 2024-01-21

## 2024-01-18 RX ORDER — GUAIFENESIN 600 MG/1
600 TABLET, EXTENDED RELEASE ORAL 2 TIMES DAILY
Status: DISCONTINUED | OUTPATIENT
Start: 2024-01-18 | End: 2024-01-21

## 2024-01-18 RX ORDER — ALBUTEROL SULFATE 90 UG/1
2 AEROSOL, METERED RESPIRATORY (INHALATION) EVERY 4 HOURS PRN
Status: DISCONTINUED | OUTPATIENT
Start: 2024-01-18 | End: 2024-01-21

## 2024-01-18 RX ORDER — NICOTINE POLACRILEX 4 MG
30 LOZENGE BUCCAL
Status: DISCONTINUED | OUTPATIENT
Start: 2024-01-18 | End: 2024-01-21

## 2024-01-18 RX ORDER — LEVOTHYROXINE SODIUM 0.15 MG/1
150 TABLET ORAL
Status: DISCONTINUED | OUTPATIENT
Start: 2024-01-19 | End: 2024-01-21

## 2024-01-18 RX ORDER — TORSEMIDE 20 MG/1
20 TABLET ORAL DAILY
Status: DISCONTINUED | OUTPATIENT
Start: 2024-01-18 | End: 2024-01-21

## 2024-01-18 RX ORDER — BENZONATATE 100 MG/1
200 CAPSULE ORAL 3 TIMES DAILY PRN
Status: DISCONTINUED | OUTPATIENT
Start: 2024-01-18 | End: 2024-01-21

## 2024-01-18 RX ORDER — OXYCODONE AND ACETAMINOPHEN 10; 325 MG/1; MG/1
1 TABLET ORAL 4 TIMES DAILY PRN
Status: DISCONTINUED | OUTPATIENT
Start: 2024-01-18 | End: 2024-01-21

## 2024-01-18 RX ORDER — HYDROCODONE BITARTRATE AND ACETAMINOPHEN 10; 325 MG/1; MG/1
1 TABLET ORAL ONCE
Status: COMPLETED | OUTPATIENT
Start: 2024-01-18 | End: 2024-01-18

## 2024-01-18 RX ORDER — IPRATROPIUM BROMIDE AND ALBUTEROL SULFATE 2.5; .5 MG/3ML; MG/3ML
3 SOLUTION RESPIRATORY (INHALATION) ONCE
Status: COMPLETED | OUTPATIENT
Start: 2024-01-18 | End: 2024-01-18

## 2024-01-18 RX ORDER — IPRATROPIUM BROMIDE AND ALBUTEROL SULFATE 2.5; .5 MG/3ML; MG/3ML
3 SOLUTION RESPIRATORY (INHALATION) EVERY 6 HOURS PRN
Status: DISCONTINUED | OUTPATIENT
Start: 2024-01-18 | End: 2024-01-21

## 2024-01-18 RX ORDER — ATORVASTATIN CALCIUM 80 MG/1
80 TABLET, FILM COATED ORAL DAILY
Status: DISCONTINUED | OUTPATIENT
Start: 2024-01-18 | End: 2024-01-21

## 2024-01-18 RX ORDER — LATANOPROST 50 UG/ML
1 SOLUTION/ DROPS OPHTHALMIC NIGHTLY
Status: DISCONTINUED | OUTPATIENT
Start: 2024-01-18 | End: 2024-01-21

## 2024-01-18 RX ORDER — NICOTINE 21 MG/24HR
21 PATCH, TRANSDERMAL 24 HOURS TRANSDERMAL EVERY 24 HOURS
COMMUNITY
End: 2024-01-21

## 2024-01-18 RX ORDER — CLOPIDOGREL BISULFATE 75 MG/1
75 TABLET ORAL DAILY
Status: DISCONTINUED | OUTPATIENT
Start: 2024-01-18 | End: 2024-01-21

## 2024-01-18 RX ORDER — SPIRONOLACTONE 25 MG/1
25 TABLET ORAL DAILY
Status: DISCONTINUED | OUTPATIENT
Start: 2024-01-19 | End: 2024-01-21

## 2024-01-18 RX ORDER — TRAZODONE HYDROCHLORIDE 50 MG/1
50 TABLET ORAL NIGHTLY
Status: DISCONTINUED | OUTPATIENT
Start: 2024-01-18 | End: 2024-01-21

## 2024-01-18 RX ORDER — DULOXETIN HYDROCHLORIDE 30 MG/1
30 CAPSULE, DELAYED RELEASE ORAL DAILY
Status: DISCONTINUED | OUTPATIENT
Start: 2024-01-18 | End: 2024-01-21

## 2024-01-18 RX ORDER — ONDANSETRON 2 MG/ML
4 INJECTION INTRAMUSCULAR; INTRAVENOUS EVERY 6 HOURS PRN
Status: DISCONTINUED | OUTPATIENT
Start: 2024-01-18 | End: 2024-01-21

## 2024-01-18 RX ORDER — BISACODYL 10 MG
10 SUPPOSITORY, RECTAL RECTAL
Status: DISCONTINUED | OUTPATIENT
Start: 2024-01-18 | End: 2024-01-21

## 2024-01-18 RX ORDER — PREGABALIN 75 MG/1
300 CAPSULE ORAL 2 TIMES DAILY
Status: DISCONTINUED | OUTPATIENT
Start: 2024-01-18 | End: 2024-01-21

## 2024-01-18 RX ORDER — ENEMA 19; 7 G/133ML; G/133ML
1 ENEMA RECTAL ONCE AS NEEDED
Status: DISCONTINUED | OUTPATIENT
Start: 2024-01-18 | End: 2024-01-21

## 2024-01-18 RX ORDER — DEXTROSE MONOHYDRATE 25 G/50ML
50 INJECTION, SOLUTION INTRAVENOUS
Status: DISCONTINUED | OUTPATIENT
Start: 2024-01-18 | End: 2024-01-21

## 2024-01-18 RX ORDER — ZOLPIDEM TARTRATE 5 MG/1
5 TABLET ORAL NIGHTLY PRN
COMMUNITY

## 2024-01-18 RX ORDER — ASPIRIN 81 MG/1
81 TABLET ORAL DAILY
Status: DISCONTINUED | OUTPATIENT
Start: 2024-01-18 | End: 2024-01-21

## 2024-01-18 RX ORDER — MELATONIN
3 NIGHTLY PRN
Status: DISCONTINUED | OUTPATIENT
Start: 2024-01-18 | End: 2024-01-21

## 2024-01-18 RX ORDER — CARVEDILOL 6.25 MG/1
6.25 TABLET ORAL 2 TIMES DAILY WITH MEALS
Status: DISCONTINUED | OUTPATIENT
Start: 2024-01-18 | End: 2024-01-21

## 2024-01-18 RX ORDER — FUROSEMIDE 10 MG/ML
40 INJECTION INTRAMUSCULAR; INTRAVENOUS ONCE
Status: COMPLETED | OUTPATIENT
Start: 2024-01-18 | End: 2024-01-18

## 2024-01-18 RX ORDER — NICOTINE 21 MG/24HR
1 PATCH, TRANSDERMAL 24 HOURS TRANSDERMAL DAILY
Status: DISCONTINUED | OUTPATIENT
Start: 2024-01-18 | End: 2024-01-21

## 2024-01-18 RX ORDER — WARFARIN SODIUM 5 MG/1
10 TABLET ORAL NIGHTLY
Status: DISCONTINUED | OUTPATIENT
Start: 2024-01-18 | End: 2024-01-21

## 2024-01-18 RX ORDER — ECHINACEA PURPUREA EXTRACT 125 MG
1 TABLET ORAL
Status: DISCONTINUED | OUTPATIENT
Start: 2024-01-18 | End: 2024-01-21

## 2024-01-18 RX ORDER — NICOTINE POLACRILEX 4 MG
15 LOZENGE BUCCAL
Status: DISCONTINUED | OUTPATIENT
Start: 2024-01-18 | End: 2024-01-21

## 2024-01-18 RX ORDER — ASPIRIN 81 MG/1
324 TABLET, CHEWABLE ORAL ONCE
Status: COMPLETED | OUTPATIENT
Start: 2024-01-18 | End: 2024-01-18

## 2024-01-18 RX ORDER — METOCLOPRAMIDE HYDROCHLORIDE 5 MG/ML
10 INJECTION INTRAMUSCULAR; INTRAVENOUS EVERY 8 HOURS PRN
Status: DISCONTINUED | OUTPATIENT
Start: 2024-01-18 | End: 2024-01-21

## 2024-01-18 RX ORDER — POLYETHYLENE GLYCOL 3350 17 G/17G
17 POWDER, FOR SOLUTION ORAL DAILY PRN
Status: DISCONTINUED | OUTPATIENT
Start: 2024-01-18 | End: 2024-01-21

## 2024-01-18 RX ORDER — ACETAMINOPHEN 500 MG
500 TABLET ORAL EVERY 4 HOURS PRN
Status: DISCONTINUED | OUTPATIENT
Start: 2024-01-18 | End: 2024-01-21

## 2024-01-18 NOTE — ED QUICK NOTES
EKG from approx 1:45pm in heart failure clinic reviewed by MD Koehler. Patient not having any active chest pain. Dr. Koehler declines repeat EKG for patient at this time.

## 2024-01-18 NOTE — PROGRESS NOTES
[facility]    Caleb Rausch Jr. Patient Status:  No patient class for patient encounter    3/23/1950 MRN [id number]   Location [facility] MD Dr. Anival MEMBRENO Dr., Jr. is a 73 year old male who presents to clinic for assessment and management after hospitalization for acute exacerbation of COPD and acute on chronic HFrEF.     Admitted -23 with worsening cough and sob with hypoxemia. Improved with IV /oral abx, steroids, nebs. 02.  Diuresed 37 pounds with IV diuretics.  Patient left AMA. Outpatient sleep study recommended.    Problem List:  Chest pain   Acute exacerbation of COPD  Acute on chronic respiratory failure with hypoxemia  Viral URI  Acute on chronic HFrEF, EF 35 to 40%, NYHA a 3, stage C  DM type II on insulin  CAD with stent to the LAD 2023  HLD  HTN  Hypothyroidism  Hx PE  DIRK , without cpap  Tobacco abuse disorder  Lumbar stenosis  Hx PE in , on warfarin    Subjective:  Here with son, arrives in wheelchair.  Lives with dtr and son    C/o L chest pain and arm pain with exertion with lite house work, lifting, in  last 2 -3 days, took two full asa and pain stopped.   Denies chest pain now but doesn't feel good  Fatigued  Lightheaded with standing and bending.   Losing balance. No falls  Denies orthopnea but sleeps in recliner, son sleeps in bed.     Review of Systems:  Constitutional: negative for fatigue, chills or fever  Respiratory:  negative for cough, hemoptysis and wheezing  Cardiovascular: negative for chest pain, exertional chest pressure/discomfort, near-syncope, orthopnea and palpitations  Gastrointestinal: negative for abdominal pain, diarrhea, melena, nausea and vomiting  Hematologic/lymphatic: negative  Musculoskeletal: negative for muscle weakness and myalgias    Objective:  Lab Results   Component Value Date/Time    WBC 13.3 (H) 2023 06:32 AM    HGB 10.0 (L) 2023 06:32 AM    HCT  35.4 (L) 12/25/2023 06:32 AM    .0 12/25/2023 06:32 AM    CREATSERUM 1.60 (H) 01/18/2024 01:29 PM    BUN 27 (H) 01/18/2024 01:29 PM     (L) 01/18/2024 01:29 PM    K 4.2 01/18/2024 01:29 PM     01/18/2024 01:29 PM    CO2 24.0 01/18/2024 01:29 PM     (HH) 01/18/2024 01:29 PM    CA 9.2 01/18/2024 01:29 PM    ALB 3.9 12/24/2023 06:00 AM    ALKPHO 91 12/24/2023 06:00 AM    BILT 0.2 12/24/2023 06:00 AM    TP 6.4 12/24/2023 06:00 AM    AST 12 12/24/2023 06:00 AM    ALT 25 12/24/2023 06:00 AM    PTT 38.4 (H) 09/22/2023 04:16 PM    INR 2.06 (H) 12/28/2023 07:51 AM    PTP 24.5 (H) 12/28/2023 07:51 AM    T4F 1.0 09/29/2023 11:34 AM    TSH 3.879 12/25/2023 06:57 AM    PSA 0.42 07/01/2021 01:02 PM    DDIMER 0.92 (H) 12/23/2023 08:54 AM    ESRML 11 07/14/2018 05:36 AM    CRP <0.29 01/19/2022 05:08 AM    MG 2.1 12/24/2023 06:00 AM    PHOS 2.6 12/25/2023 06:32 AM    TROP <0.045 11/20/2020 03:56 PM    CK 65 01/08/2022 09:05 PM    B12 351 06/29/2020 09:59 AM    PGLU 260 (H) 12/28/2023 11:44 AM       Labs drawn by lab: ESTELLA,results reviewed with patient, independently interpreted results    /69 (BP Location: Right arm, Patient Position: Sitting, Cuff Size: large)   Pulse 88   Wt 290 lb (131.5 kg)   SpO2 92%   BMI 42.83 kg/m²       Date  Clinic wt Home wt MCI /pcp wt DC wt IV med  PO med   9/22/23   300      12/28/23    267     1/18/24 290                       Clinical weights: 1) 290  DC wt:  12/28/23 @ 267  Home weights:  1) -    General appearance: alert, appears stated age and cooperative  Neck: no JVD  Lungs: clear to auscultation bilaterally  Heart: S1, S2 normal, no murmur, click, rub or gallop, regular rate and rhythm  Abdomen: soft, non-tender; bowel sounds normal; no masses,  no abdominal distension  Extremities: extremities normal, atraumatic, no cyanosis or edema  Pulses: 2+ and symmetric  Neurologic: Grossly normal    Diagnostics:  ECG:   Today 1/18/24 SR 84 bpm, with RBBB, old ant/lat  mi, no significant changes  12/27/2023 NSR 81 bpm, RBBB    ECHO: 12/23/23  EF 35-40%, possible hypokinesis of the inferior lateral and inferior walls.  Moderate LVH.  Normal RV function.    LHC: 5/19/23  LVEDP 12 mmhg. LHC, COR, LV and Laser atherectomy, cutting balloon, shockwave lithotripsy and angioplasty LAD, PCI diagonal     CXR: 12/23/23  Mild streaky interstitial opacities throughout both lungs are unchanged since comparison chest radiograph from September, 2023.  Findings likely relate to scarring.  No new or worsening focal airspace disease.     PFT's: none    MMMR: 3    Vaccines:   Pneumonia :Pneumovax 23 2/20 , Prevnar 13  -2/18 , Prevnar 20 -none  Flu : 1/12/23  Covid vaccines:  x3  RSV: none       Assessment:  Acute exacerbation of COPD  -cough decreased, non productive   -taking trelegy    Acute on chronic HFrEF  -EF 30-35%  ICM, GABRIELA to LAD and Diag with in-stent restenosis, s/p laser atherectomy and lithotripsy to LAD 5/19/23.   - diuresed 53 lbs at last admission (12/28/23)  -diuretics: torsemide 20 mg daily  -GDMT: coreg, entresto, jardiance, spironolactone  -renal function cr: 27/1.6<-32/1.06, K 4.2, Na+ 134  -BNP on admission 363  -increased cody, intermittent cp with exertion x 2-3 days, lightheadedness, worsening fatigue, wt up 23 lbs in 3 weeks,  -hypervolemia with exertional chest pain  -recommending ED evaluation, pt agreeable    CAD  -GABRIELA to LAD and Diag with in-stent restenosis, s/p laser atherectomy and lithotripsy to LAD 5/19/23  -reports taking asa, plavix, statin, BB  -Left chest pain with left arm pain on and off for the last 2 to 3 days with exertion.  Denies chest pain today.  -was two taking extra full dose asa with chest pain in the last 2 days, pain resolves  -EKG today, SR with RBBB, old and anterior lateral infarct    Acute on chronic respiratory failure   - no hypoxemia today on RA at rest    Tobacco use disorder   - continues smoking 1 ppk    Plan:     Patient Instructions   ED  evaluation and possible admission   Transported pt to ED per wheelchair, son accompanied.  Report given to triage RN and MEL CARRANZA.      Current Outpatient Medications:     atorvastatin 80 MG Oral Tab, Take 1 tablet (80 mg total) by mouth daily., Disp: 90 tablet, Rfl: 0    albuterol (VENTOLIN HFA) 108 (90 Base) MCG/ACT Inhalation Aero Soln, Inhale 2 puffs into the lungs every 4 (four) hours as needed for Wheezing., Disp: 54 g, Rfl: 0    clopidogrel 75 MG Oral Tab, Take 1 tablet (75 mg total) by mouth daily., Disp: 90 tablet, Rfl: 3    bimatoprost (LUMIGAN) 0.01 % Ophthalmic Solution, Place 1 drop into both eyes every evening., Disp: , Rfl:     DULoxetine 30 MG Oral Cap DR Particles, Take 1 capsule (30 mg total) by mouth daily., Disp: , Rfl:     aspirin 81 MG Oral Tab EC, Take 1 tablet (81 mg total) by mouth daily., Disp: 30 tablet, Rfl: 2    carvedilol 6.25 MG Oral Tab, Take 1 tablet (6.25 mg total) by mouth 2 (two) times daily with meals., Disp: 60 tablet, Rfl: 0    torsemide 20 MG Oral Tab, Take 1 tablet (20 mg total) by mouth daily., Disp: 30 tablet, Rfl: 0    spironolactone 25 MG Oral Tab, Take 1 tablet (25 mg total) by mouth daily., Disp: 30 tablet, Rfl: 0    levothyroxine 150 MCG Oral Tab, Take 1 tablet (150 mcg total) by mouth before breakfast., Disp: 90 tablet, Rfl: 0    metFORMIN HCl 1000 MG Oral Tab, Take 0.5 tablets (500 mg total) by mouth 2 (two) times daily with meals., Disp: 180 tablet, Rfl: 1    MOVANTIK 25 MG Oral Tab, TAKE 1 TABLET BY MOUTH DAILY 1 -2 HOURS AFTER MEAL, Disp: , Rfl:     empagliflozin (JARDIANCE) 10 MG Oral Tab, Take 1 tablet (10 mg total) by mouth daily., Disp: 90 tablet, Rfl: 2    fluticasone-umeclidin-vilant (TRELEGY ELLIPTA) 200-62.5-25 MCG/ACT Inhalation Aerosol Powder, Breath Activated, Inhale 1 puff into the lungs daily., Disp: 180 each, Rfl: 3    Blood Glucose Monitoring Suppl Does not apply Kit, Please use to check blood sugar twice daily., Disp: 1 kit, Rfl: 0    Blood Glucose  Monitoring Suppl Does not apply Misc, Please use to check blood sugar twice daily, Disp: 200 each, Rfl: 3    Blood Glucose Monitoring Suppl Does not apply Misc, Please use to check blood sugar twice daily, Disp: 200 each, Rfl: 0    ENTRESTO 24-26 MG Oral Tab, Take 1 tablet by mouth 2 (two) times daily., Disp: 180 tablet, Rfl: 0    oxyCODONE-acetaminophen  MG Oral Tab, Take 1 tablet by mouth 4 (four) times daily as needed., Disp: , Rfl:     warfarin 5 MG Oral Tab, Take 2 tablets (10 mg total) by mouth nightly. 7.5mg on Sunday and Wednesday 10mg on all other days, Disp: , Rfl:     pregabalin 300 MG Oral Cap, Take 1 capsule (300 mg total) by mouth 2 (two) times daily., Disp: 30 capsule, Rfl: 0    NIKKIE ROSS NP  1/18/24       60 minutes spent on patient education, chart review and documentation:  Patient instructed regarding clinic procedures, hours, purpose of clinic visits, sodium restricted diet, low sodium foods, fluid restrictions, daily weights, medication regimen s/s of copd exacerbation and when to call APN/clinic. COPD action plan reviewed and given to patient.  Provided patient counseling, coordination of care and education given. Patient receptive.

## 2024-01-18 NOTE — ED INITIAL ASSESSMENT (HPI)
Here for exertional sob, chest pain and weight increase of 23lbs. Blood sugar 471 in heart failure clinic pta. Recent admit 12/23-12/28/23 for CHF

## 2024-01-18 NOTE — HISTORICAL OFFICE NOTE
Facility Logo [facility]  [address], [address], [place] [address]  [phone]      Calbe Rausch  Progress Note  Demographics:  Name: Caleb Rausch YOB: 1950  Age: 73, Male Medical Record No: [id number]  Visited Date/Time: 12/04/2023 02:40 PM    Chief Complaints  F/U  SOB upon exertion  History of Present Illness  73-year-old male being followed for extensive coronary disease, mildly reduced ejection fraction, history of PE February 2022.    Now having recurrence of shortness of breath similar to previous anginal equivalent, history of long LAD stented segment, multiple bifurcation lesions in the diagonal.    No chest pain with exertion.  No palpitations or syncope.  No lower extremity PINKY, orthopnea or PND.  Cardiac risk factors Heavy tobacco smoker  Past Medical History  1.Pre-op testing  2.CAD (coronary artery disease), native coronary artery  3.PE (pulmonary embolism)  4.History of PTCA  5.Pure hypercholesterolemia  6.Hypertension (HTN), primary  7.Postprocedural hematoma of a circulatory system organ or structure following a cardiac catheterization  8.Tobacco abuse  Family History  1. Brother - Hypertension (HTN), primary  Social History  Smoking status Heavy tobacco smoker since 03/21/1965  Tobacco usage - Yes (Heavy cigarette smoker (20-39 cigs/day) (finding))  Review of systems  Cardiovascular No history of Chest pain, CRUM, Palpitations, Syncope, PND, Orthopnea, Edema and Claudication  Respiratory No history of SOB, Wheezing and Sputum  Hem/Lymphatic No history of Easy bruising, Blood clots, Hx of blood transfusion, Anemia and Bleeding problems  Physical Examination  Constitutional 94% o2 RA  Vitals Left Arm Sitting  / 76 mmHg, Pulse rate 85 bpm, Height in 5' 9\", BMI: 44.3, Weight in 300 lbs (or) 136 kgs and BSA : 2.64 cc/m²  General Appearance No Acute Distress and Well groomed  Cardiovascular   EKG/Other abnormalities  General: NAD  HEENT: Normocephalic,  anicteric sclera, neck supple.  Neck: No JVD, carotids 2+, no bruits.  Cardiac: Regular rate and rhythm. S1, S2 normal. No murmur, pericardial rub, S3.  Lungs: Clear without wheezes, rales, rhonchi or dullness.  Normal excursions and effort.  Abdomen: Soft, non-tender. BS-present.  Extremities: Without clubbing, cyanosis or edema.  Peripheral pulses are 2+.  Neurologic: Non-focal  Skin: Warm and dry.  Allergies  No medication allergies noted.  Medications (Info obtained by: Verbal)  1.albuterol (VENTOLIN HFA) 108 (90 Base) MCG/ACT inhaler, INHALE 2 PUFFS INTO THE LUNGS EVERY 6 HOURS AS NEEDED FOR WHEEZING  2.aspirin 81 MG EC tablet, Take 1 tablet by mouth daily.  3.atorvastatin 80 mg tablet, Take 1 tablet orally once a day.  4.carvediloL 12.5 mg tablet, Take 1 tablet orally 2 times a day.  5.CLOPIDOGREL 75MG TABLETS, TAKE 1 TABLET BY MOUTH EVERY DAY  6.DULoxetine 30 mg capsule,delayed release, Take 1 capsule orally once a day.  7.Entresto 24 mg-26 mg tablet, Take 1 tablet orally 2 times a day.  8.Jardiance 10 mg tablet, Take 1 tablet orally once a day.  9.levothyroxine 137 mcg tablet, Take 1 tablet orally once a day.  10.melatonin 5 mg capsule, Take 1 capsule orally once a day.  11.metFORMIN 500 mg tablet, Take 1 tablet orally 2 times a day.  12.oxyCODONE-acetaminophen (PERCOCET)  MG per tablet, Take 1 tablet by mouth.  13.potassium chloride ER 20 mEq tablet,extended release, Take 1 tablet orally once a day.  14.pregabalin 300 mg capsule, Take 1 capsule orally 2 times a day as needed.  15.torsemide 10 mg tablet, Take 1 tablet orally once a day.  16.TylenoL 325 mg tablet, Take 1 tablet orally once a day.  17.Vitamin D2 1,250 mcg (50,000 unit) capsule, Take 1 capsule orally once a week  18.warfarin 5 mg tablet, Take 1.5 - 2 tablets orally once in the evening as directed by warfarin clinic [phone].  Impression  1.CAD (coronary artery disease), native coronary artery  2.PE (pulmonary embolism)  3.History of  PTCA  4.Pure hypercholesterolemia  5.Hypertension (HTN), primary  6.Tobacco abuse  Cardiac History:  PE/syncope admission Feb 2022:  Felt SOB prior to passing out, found to have right pulmonary embolus.  -Echo without RV strain, placed on coumadin  -LE duplex without DVT    CAD s/p sequential LAD-diagonal PCI previous, presented with angina and recommended bypass surgery which he declined and so he had laser atherectomy of LAD, diagonal 1 and diagonal 2 stents, second layer of stents in LAD   -PET stress Mar 2022 reversible anterolateral, apical perfusion defect    HFrEF - ischemic, echo Nov 2018 EF 45-50%, no significant valve disease.   -Echo Feb 2022 during PE/syncope admission:  Normal EF, normal RV size and function    Cardiac risk factors: HTN, HPL (Feb 2020 72), obesity  Assessment & Plan  Pulmonary embolus: February 2022, has since been on Coumadin due to cost of NOACs.  Echocardiogram at [facility] without RV strain therefore no catheter directed thrombolytics.  No DVT.  Continues to have residual shortness of breath which is multifactorial including likely microvascular and macro epicardial disease however continues to smoke, continues to gain weight and so obesity and tobacco related as well.    CAD: Shortness of breath likely anginal equivalent given extensive LAD-diagonal bifurcation stents, last angiogram April 2022 with laser atherectomy, shockwave lithotripsy of LAD, angioplasty diagonal.  Repeat angiogram May 2023 with laser atherectomy 1.4 mm catheter, shockwave lithotripsy mid LAD, Cutting Balloon remaining of the LAD, PCI diagonal.  Despite this intervention he continues to smoke, does not take care of his diabetes and now has shortness of breath.  Discussed that he needs to start exercising, quit smoking and see his primary regarding his diabetes prior to doing a repeat angiogram.  -Consider diet changes which may lower your cardiovascular risk which include Mediterranean diet  (proteins from lean meat such as fish or chicken or lentils, ample vegetables, nonprocessed carbs) or intermittent fasting (consuming all calories with an 8-hour window such as noon to 8 PM, no calories in the other 16 hours which include artificial sweeteners)  -Start heart rate directed exercise  -Defined as maintaining your target heart rate for 30-40 minutes per session  -Your target heart rate: 110    HFrEF: In remission, ejection fraction normal on echo February 2022.  No changes cardiac meds at this time.    Hypertension:  Controlled on current regimen, no changes    Hyperlipidemia:  On high intensity statin for secondary prevention, no changes.    Diabetes per primary    Follow-up:  Clinic: 3 months  Testing/intervention: Diet and exercise changes, smoking cessation  Future appointments  1.Referral Visit - Nilda Argueta ([email or web address]) : (Today)  2.Follow up visit - Anival Hammer MD (3 Months)  Miscellaneous  1.Smoking Cessation reviewed with the patient  2.Tobacco Cessation reviewed with the patient (Smoking cessation drug therapy (regime/therapy))  3.Reviewed trans thoracic echocardiogram, nuclear pet with the patient.  4.Weight monitoring (regime/therapy)  Nurses documentation  Assistance devices: None    Refill: None    Upcoming surgeries: None    EKG: None       Patient instructions  Follow-up:  Clinic: 3 months  Testing/intervention: Diet and exercise changes, smoking cessation  Lab Details  INR  11/15/2023 12:00:00 AM  INR 2.91 2-3 RATIO N F  HEPATIC FUNCTION PANEL (7)  06/20/2023 11:11:53 PM  AST 28 15-37 U/L  F  ALT 44 16-61 U/L  F  ALKALINE PHOSPHATASE 83  U/L  F  BILIRUBIN, TOTAL 0.3 0.1-2.0 mg/dL  F  BILIRUBIN, DIRECT <0.1 0.0-0.2 mg/dL  F  TOTAL PROTEIN 6.8 6.4-8.2 g/dL  F  ALBUMIN 3.0 3.4-5.0 g/dL L F  TROPONIN I HIGH SENSITIVITY  06/20/2023 06:07:50 PM  TROPONIN I HIGH SENSITIVITY 39 <=79 ng/L  F  AMMONIA, PLASMA  06/20/2023 06:03:52 PM  AMMONIA <10 11-32  umol/L L F  PROTHROMBIN TIME (PT)  05/19/2023 08:40:19 AM  PROTIME 11.1 11.6-14.8 seconds L F  INR 0.81 0.85-1.16 L F  POCT GLUCOSE  05/19/2023 08:34:51 AM  POCT GLUCOSE 178 70-99 mg/dL H F  BASIC METABOLIC PANEL (8)  05/17/2023 03:24:09 PM  GLUCOSE 167 70-99 mg/dL H F  SODIUM 136 136-145 mmol/L  F  POTASSIUM 4.0 3.5-5.1 mmol/L  F  CHLORIDE 110  mmol/L  F  CO2 22.0 21.0-32.0 mmol/L  F  ANION GAP 4 0-18 mmol/L  F  BUN 31 7-18 mg/dL H F  CREATININE 1.26 0.70-1.30 mg/dL  F  BUN/ CREAT RATIO 24.6 10.0-20.0 H F  CALCIUM 9.6 8.5-10.1 mg/dL  F  OSMOLALITY CALCULATED 292 275-295 mOsm/kg  F  E GFR CR 61 >=60 mL/min/1.73m2  F  FASTING PATIENT BMP ANSWER No   F  CBC, PLATELET; NO DIFFERENTIAL  05/17/2023 03:00:46 PM  WBC 11.4 4.0-11.0 x10(3) uL H F  RED BLOOD COUNT 5.00 3.80-5.80 x10(6)uL  F  HGB 12.6 13.0-17.5 g/dL L F  HCT 42.5 39.0-53.0 %  F  MEAN CELL VOLUME 85.0 80.0-100.0 fL  F  MEAN CORPUSCULAR HEMOGLOBIN 25.2 26.0-34.0 pg L F  MEAN CORPUSCULAR HGB CONC 29.6 31.0-37.0 g/dL L F  RED CELL DISTRIBUTION WIDTH CV 15.7 11.0-15.0 % H F  RED CELL DISTRIBUTION WIDTH-SD 48.4 35.1-46.3 fL H F  PLATELETS 388.0 150.0-450.0 10(3)uL  F  Diagnostics Details  Trans Thoracic Echocardiogram 04/25/2023  1.The left ventricle is normal in size. Moderate left ventricular hypertrophy is noted. Global left ventricular systolic function is normal. The left ventricular ejection fraction is 54%. Left ventricular diastolic function is normal.    Nuclear PET 04/25/2023  1.Stress EKG is non-diagnostic secondary to resting EKG changes.    2.Pt denied chest pain.    3.Rare PAC's.    1.This is an equivocal perfusion study.    2.Small fixed perfusion abnormality of moderate intensity in the apical segment.    3.The left ventricular cavity is noted to be normal on the stress studies. The stress left ventricular ejection fraction was calculated to be 42% and left ventricular global function is mildly reduced. The rest left ventricular cavity is noted  to be normal. The rest left ventricular ejection fraction was calculated to be 78% and rest left ventricular global function is normal.    Care Providers: Anival Hammer MD, Yudith NICHOLAS, Dorene NICHOLAS and Kimberly Toussaint  Electronically Authenticated by  Anival Hammer MD  12/04/2023 04:53:56 PM  Disclaimer: Components of this note were documented using voice recognition system and are subject to errors not corrected at proofreading. Contact the author of this note for any clarifications.

## 2024-01-18 NOTE — PATIENT INSTRUCTIONS
ED evaluation and possible admission   Transported pt to ED per wheelchair, son accompanied.  Report given to triage RN and [facility] AP[facility].

## 2024-01-18 NOTE — CONSULTS
[facility] - CARDIOLOGY CONSULT NOTE    Caleb Rausch Jr. Patient Status:  Emergency    3/23/1950 MRN [id number]   Location [facility] EMERGENCY DEPARTMENT Attending Ana Maria Hoang MD   Hosp Day # 0 PCP JULITO AG MD     Date of Admission:  2024  Date of Consult:  2024  I was asked by Ana Maria Hoang MD to provide recommendations for evaluation and management of cardiac issues.  Impression:     73-year-old male with extensive cardiac and medical history, COPD, CAD, smoking, and moderate cardiomyopathy.    Recommendations:  1.  Shortness of breath  History of moderate LV dysfunction does not particularly look volume overloaded on exam no need for IV diuretics continue home dose of torsemide 20 mg daily.  BMP normal.  Suspect symptoms mostly secondary to COPD exacerbation on Trelegy further management per primary team.  2.  History of CAD multiple stents in the LAD and diagonal with in-stent restenosis and laser atherectomy.  Continue aspirin and Plavix combination  First troponin 285 continue to trend already on Coumadin no need for IV heparin.  Further plans depending on the troponin trend.  3.  Hyperlipidemia  Continue atorvastatin 80 mg daily  4.  Cardiomyopathy  Continue Coreg and Entresto twice daily.  On Jardiance 10 mg daily.  5.  Diabetes mellitus  Management as per primary metformin and Jardiance.  6.  History of PE  Unable to afford DOAC on warfarin.  INR goal 2.7.        L5 consult will follow.    Reason for Consultation:     Shortness of breath, dizziness, chest pain    History of Present Illness:   Patient is a 73 year old male who was admitted to the hospital for several complaints was recommended to go to ER by CHF nurse practitioner.  Known to cardiology service sees Dr. Hammer extensive CAD previous to long stented segment of the LAD multiple bifurcation lesions and diagonal. Last angiogram 2022 with laser atherectomy, shockwave lithotripsy of LAD, angioplasty  diagonal.  Repeat angiogram May 2023 with laser atherectomy 1.4 mm catheter, shockwave lithotripsy mid LAD, Cutting Balloon remaining of the LAD, PCI diagonal.   Reports last several days has had increasing shortness of breath.  No orthopnea no lower extremity edema.  No fevers or chills.  Also reports chest pain currently pain-free last episode 2 days ago always has pain in his chest on and off.  Also reports feeling dizzy.  Recent hospitalization from 12 23-12 28 diuresed 37 pounds at that time and then left AMA.  Continues to smoke a pack a day.  Consulted for shortness of breath and chest pain.  Sees Dr. Hammer in clinic.    Cardiac tests:    Past Medical History  Past Medical History:   Diagnosis Date    Anxiety disorder, unspecified 01/22/2022    Atherosclerosis of coronary artery     stents x 6    Chronic low back pain with bilateral sciatica, left worse than right 11/28/2017    Chronic obstructive pulmonary disease, unspecified ([facility]) 01/22/2022    Coronary atherosclerosis     Diabetes ([facility])     borderline     Disorder of thyroid     Essential hypertension     Glaucoma 05/10/2023    first visit with IRMA, patient was taking Timolol OU BID for 20 years, has not used gtts in over 1 year because he ran out of gtts and had no refills, fundus photos taken today    Heart attack ([facility])     High blood pressure     High cholesterol     Hyperlipidemia     Kidney stone 07/2020    Left Sided Neck pain, acute 11/28/2017    Lumbar stenosis with neurogenic claudication 05/31/2018    Major depressive disorder, recurrent, unspecified (HCC) 01/22/2022    Morbid obesity with BMI of 40.0-44.9, adult ([facility])     Pneumonia due to COVID-19 virus 01/09/2022    Thyroid disease        Past Surgical History  Past Surgical History:   Procedure Laterality Date    CATARACT EXTRACTION EXTRACAPSULAR W/ INTRAOCULAR LENS IMPLANTATION Right 2018    [facility] BARE METAL STENT (BMS)      CIRCUMCISION,OTHR  08/04/2020    Dr. Bonilla @ [facility]     CYSTO/URETERO W/LITHOTRIPSY Left 08/04/2020    Dr. 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     IRIDOTOMY/IRIDECTOMY BY LASER      unknown Dr    OTHER      cardiac stents       Family History  Family History   Problem Relation Age of Onset    Heart Attack Father     Hypertension Brother     Glaucoma Neg     Macular degeneration Neg        Social History  Pediatric History   Patient Parents    Not on file     Other Topics Concern    Caffeine Concern Yes    Exercise No    Seat Belt Not Asked    Special Diet Not Asked    Stress Concern Not Asked    Weight Concern Not Asked     Service Not Asked    Blood Transfusions Not Asked    Occupational Exposure Not Asked    Hobby Hazards Not Asked    Sleep Concern Not Asked    Back Care Not Asked    Bike Helmet Not Asked    Self-Exams Not Asked   Social History Narrative    The patient uses the following assistive device(s):  single-point cane.      The patient does live in a home with stairs.         Lives with his son back with a 20+ year history of smoking.  Current Medications:  No current facility-administered medications for this encounter.     (Not in a hospital admission)      Allergies  No Known Allergies    Review of Systems:   10 pt ROS performed, separate from HPI  Review of Systems:  GENERAL: no fevers, chills, sweats  HEENT: no visual or hearing changes  SKIN: denies any unusual skin lesions or rashes  RESPIRATORY: shortness of breath with exertion  CARDIOVASCULAR: no active chest pain, no claudication  GI: denies abdominal pain and denies heartburn  : no dysuria or hematuria  NEURO: denies headaches, focal weaknesses or paresthesias  All other systems reviewed and negative.  fatigue is present  All other systems were reviewed are negative  Physical Exam:   Blood pressure 113/73, pulse 79, temperature 97.7 °F (36.5 °C), temperature source Temporal, resp. rate 18, height 5' 9\" (1.753 m), weight 290 lb  (131.5 kg), SpO2 94%.    Scheduled Meds:       Physical Exam:    General: Alert and oriented. No apparent distress. No respiratory or constitutional distress.  Morbidly obese  male.  HEENT: Normocephalic, anicteric sclera, neck supple  Neck: No JVD, carotids 2+, supple  Cardiac: Regular rate. No pathologic murmur.  Lungs: Poor air entry bilaterally otherwise no crackles clear.  Normal excursions and effort.  Abdomen: Soft, non-tender. BS-present.  Extremities: Without clubbing, cyanosis.  Peripheral pulsespresent.  Neurologic: Alert and oriented, normal affect. Motor ok.  Skin: Warm and dry.     Results:     Laboratory Data:  Lab Results   Component Value Date    WBC 13.4 (H) 01/18/2024    HGB 10.4 (L) 01/18/2024    HCT 34.9 (L) 01/18/2024    .0 01/18/2024    CREATSERUM 1.60 (H) 01/18/2024    BUN 27 (H) 01/18/2024     (L) 01/18/2024    K 4.2 01/18/2024     01/18/2024    CO2 24.0 01/18/2024     (HH) 01/18/2024    CA 9.2 01/18/2024    ALB 3.9 12/24/2023    ALKPHO 91 12/24/2023    TP 6.4 12/24/2023    AST 12 12/24/2023    ALT 25 12/24/2023    PTT 36.7 (H) 01/18/2024    INR 2.74 (H) 01/18/2024    PTP 30.7 (H) 01/18/2024    T4F 1.0 09/29/2023    TSH 3.879 12/25/2023    PSA 0.42 07/01/2021    DDIMER 0.92 (H) 12/23/2023    ESRML 11 07/14/2018    CRP <0.29 01/19/2022    MG 2.1 12/24/2023    PHOS 2.6 12/25/2023    TROP <0.045 11/20/2020    CK 65 01/08/2022    B12 351 06/29/2020         Thank you for allowing me to participate in the care of your patient.    Benito Perrin MD  [facility]  Interventional Cardiology  1/18/2024

## 2024-01-18 NOTE — PROGRESS NOTES
Caleb arrived by wheelchair with his son. He reports he has to [employer] places, hasn't driven for at least 3 years. Lives less than 5 miles from [facility]. [employer] flyer provided to patient.    Subjective: reports 4-5 days ago while doing some chores around the house he bent and had chest pain. Took ASA and resolved. This occurred again yesterday. He states he had a heart attack in the past and thinks he had another heart attack.  Smokes 1 pack a day. Denies lightheadedness, or dizziness, but reports getting light headed and dizzy with exertion today, denies chest pain at this moment, can feel palpitations on occasion. Reports having 10 stents. Denies swelling. Reports shortness of breath with exertion.  Reports being diabetic and drinking a lot, water and small cans of soda.    Home equipment:  oxygen NO   inhalers YES  Intervention:  6MW deferred at this time  Nebulizer treatment NO   Weight:  Today 290   Home none Last visit 267 lb  Labs completed:Labs BMP by RN  Device:  CardioMEMS Interrogation N/A  IV placed:  no  Measurements: RLE Calf: 16\" Ankle: 9\" LLE Calf: 15.5 lb Ankle: 8.5\"    12 lead EKG performed  Was not sure about all of the medications taken, states he takes what they tell him to but wasn't sure if he picked them up from pharmacy.    Contacted pharmacy  Coreg last picked up 12/31 30 day supply  Plavix last picked up 10/16  90 day supply  Jardiance last picked up 12/6  90 day supply  Entresto last picked up 5/30 90 day supply  Spironolactone last picked up 12/31 30 day supply  Torsemide last picked up 12/31 30 day supply    Patient and medication assessed. Discussed with APN. Treatments completed venipuncture, 12 lead EKG.  Medications given NONE. Education of disease management including carrying a current list of medications, watching sodium content in diet.

## 2024-01-18 NOTE — ED PROVIDER NOTES
Patient Seen in: [facility] Emergency Department      History     Chief Complaint   Patient presents with    Chest Pain Angina    Difficulty Breathing     Stated Complaint: CHF, hyperglycemia    Subjective:   HPI  73 yoM with COPD, CHF with EF of 35 to 40%, type 2 diabetes, CAD with multiple stents, hypertension, hyperlipidemia, who presents for evaluation of chest pain, difficulty breathing and lightheadedness.  He has had 4 to 5 days of intermittent chest pain which she described comes on when he is stressed out or cleaning the house.  Additionally he has a 23 pound weight gain in the past 2 to 3 weeks.  He also has lightheadedness with standing and bending.  He has not fallen.      Objective:   Past Medical History:   Diagnosis Date    Anxiety disorder, unspecified 01/22/2022    Atherosclerosis of coronary artery     stents x 6    Chronic low back pain with bilateral sciatica, left worse than right 11/28/2017    Chronic obstructive pulmonary disease, unspecified (HCC) 01/22/2022    Coronary atherosclerosis     Diabetes ([facility])     borderline     Disorder of thyroid     Essential hypertension     Glaucoma 05/10/2023    first visit with IRMA, patient was taking Timolol OU BID for 20 years, has not used gtts in over 1 year because he ran out of gtts and had no refills, fundus photos taken today    Heart attack ([facility])     High blood pressure     High cholesterol     Hyperlipidemia     Kidney stone 07/2020    Left Sided Neck pain, acute 11/28/2017    Lumbar stenosis with neurogenic claudication 05/31/2018    Major depressive disorder, recurrent, unspecified (HCC) 01/22/2022    Morbid obesity with BMI of 40.0-44.9, adult ([facility])     Pneumonia due to COVID-19 virus 01/09/2022    Thyroid disease               Past Surgical History:   Procedure Laterality Date    CATARACT EXTRACTION EXTRACAPSULAR W/ INTRAOCULAR LENS IMPLANTATION Right 2018    [facility] BARE METAL STENT (BMS)      CIRCUMCISION,OTHR  08/04/2020     Dr. Bonilla @ [facility]    CYSTO/URETERO W/LITHOTRIPSY Left 08/04/2020    Dr. 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     IRIDOTOMY/IRIDECTOMY BY LASER      unknown Dr    OTHER      cardiac stents                Social History     Socioeconomic History    Marital status: Single   Tobacco Use    Smoking status: Every Day     Packs/day: 1.5     Types: Cigarettes    Smokeless tobacco: Former    Tobacco comments:     per pt, quit in 1994   Vaping Use    Vaping Use: Never used   Substance and Sexual Activity    Alcohol use: No     Comment: quit in 1994    Drug use: No   Other Topics Concern    Caffeine Concern Yes    Exercise No   Social History Narrative    The patient uses the following assistive device(s):  single-point cane.      The patient does live in a home with stairs.     Social Determinants of Health     Financial Resource Strain: Low Risk  (12/29/2023)    Financial Resource Strain     Difficulty of Paying Living Expenses: Not very hard     Med Affordability: No   Food Insecurity: No Food Insecurity (12/23/2023)    Food Insecurity     Food Insecurity: Never true   Transportation Needs: No Transportation Needs (12/29/2023)    Transportation Needs     Lack of Transportation: No   Housing Stability: Low Risk  (12/23/2023)    Housing Stability     Housing Instability: No              Review of Systems    Positive for stated complaint: CHF FU, hyperglycemia  Other systems are as noted in HPI.  Constitutional and vital signs reviewed.      All other systems reviewed and negative except as noted above.    Physical Exam     ED Triage Vitals [01/18/24 1427]   /73   Pulse 79   Resp 18   Temp 97.7 °F (36.5 °C)   Temp src Temporal   SpO2 94 %   O2 Device None (Room air)       Current:/73   Pulse 79   Temp 97.7 °F (36.5 °C) (Temporal)   Resp 18   Ht 175.3 cm (5' 9\")   Wt 131.5 kg   SpO2 94%   BMI 42.83 kg/m²         Physical Exam  Vitals and nursing  note reviewed.   Constitutional:       Appearance: He is well-developed.   HENT:      Head: Normocephalic and atraumatic.   Eyes:      Extraocular Movements: Extraocular movements intact.   Cardiovascular:      Rate and Rhythm: Normal rate and regular rhythm.      Heart sounds: Normal heart sounds.   Pulmonary:      Effort: Pulmonary effort is normal.      Breath sounds: Wheezing present.   Abdominal:      General: There is no distension.      Palpations: Abdomen is soft.      Tenderness: There is no abdominal tenderness.   Musculoskeletal:         General: Normal range of motion.      Cervical back: Normal range of motion.      Right lower leg: Edema present.      Left lower leg: Edema present.   Skin:     General: Skin is warm.      Capillary Refill: Capillary refill takes less than 2 seconds.   Neurological:      Mental Status: He is alert.      Comments: No focal deficits       Differential diagnosis includes but is not limited to heart failure with volume overload, COPD exacerbation, ACS/MI, pneumonia    ED Course     Labs Reviewed   BASIC METABOLIC PANEL (8) - Abnormal; Notable for the following components:       Result Value    Glucose 369 (*)     Sodium 134 (*)     BUN 28 (*)     Creatinine 1.50 (*)     Calculated Osmolality 299 (*)     eGFR-Cr 49 (*)     All other components within normal limits   TROPONIN I HIGH SENSITIVITY - Abnormal; Notable for the following components:    Troponin I (High Sensitivity) 285 (*)     All other components within normal limits   PROTHROMBIN TIME (PT) - Abnormal; Notable for the following components:    PT 30.7 (*)     INR 2.74 (*)     All other components within normal limits   PTT, ACTIVATED - Abnormal; Notable for the following components:    PTT 36.7 (*)     All other components within normal limits   LIPID PANEL - Abnormal; Notable for the following components:    HDL Cholesterol 36 (*)     Triglycerides 230 (*)     VLDL 34 (*)     All other components within normal  limits   CBC W/ DIFFERENTIAL - Abnormal; Notable for the following components:    WBC 13.4 (*)     HGB 10.4 (*)     HCT 34.9 (*)     MCV 74.1 (*)     MCH 22.1 (*)     MCHC 29.8 (*)     RDW-SD 52.0 (*)     RDW 19.9 (*)     Neutrophil Absolute Prelim 9.12 (*)     Neutrophil Absolute 9.12 (*)     Monocyte Absolute 1.15 (*)     All other components within normal limits   BNP (B TYPE NATRIURETIC PEPTIDE) - Normal   CBC WITH DIFFERENTIAL WITH PLATELET    Narrative:     The following orders were created for panel order CBC With Differential With Platelet.  Procedure                               Abnormality         Status                     ---------                               -----------         ------                     CBC W/ DIFFERENTIAL[[id number]]          Abnormal            Final result                 Please view results for these tests on the individual orders.        I reviewed EKG from 1/18/2024 at 1346: Sinus rhythm, rate 84 bpm, right bundle branch block and left anterior fascicular block present, LVH present     XR CHEST AP PORTABLE  (CPT=71045)    Result Date: 1/18/2024  CONCLUSION:  1. No acute disease in the chest.    Dictated by (CST): Romero Mills MD on 1/18/2024 at 3:36 PM     Finalized by (CST): Romero Mills MD on 1/18/2024 at 3:37 PM                  [facility]        Admission disposition: 1/18/2024  4:34 PM               I spent a total of 45 minutes of critical care time in obtaining history, performing a physical exam, bedside monitoring of interventions, collecting and interpreting tests and discussion with consultants but not including time spent performing procedures.                           Medical Decision Making  Vitals are stable in the ED.  Chemistry shows CHRISTELLE with creatinine 1.50 today compared to 1.06 on 12/20/2023.  Additionally high-sensitivity troponin is elevated to 285 which is increased compared to prior.  INR therapeutic.  proBNP actually normal.  Chest x-ray without  significant pulmonary edema.  Given patient's age, risk factors and ongoing symptoms he will be admitted to the hospital for further management.  Spoke with MEL Patino for consultation and Dr. Argueta for admission.    Problems Addressed:  Acute on chronic systolic congestive heart failure (HCC): acute illness or injury with systemic symptoms  Chest pain with high risk for cardiac etiology: complicated acute illness or injury with systemic symptoms that poses a threat to life or bodily functions  COPD exacerbation (HCC): acute illness or injury with systemic symptoms    Amount and/or Complexity of Data Reviewed  External Data Reviewed: labs and notes.     Details: I reviewed cardiology clinic note from earlier today where patient was instructed to come to the ED. high-sensitivity troponin from 12/23/2023 was 63.  Labs: ordered. Decision-making details documented in ED Course.  Radiology: ordered and independent interpretation performed. Decision-making details documented in ED Course.  ECG/medicine tests: independent interpretation performed. Decision-making details documented in ED Course.  Discussion of management or test interpretation with external provider(s): As above    Risk  Decision regarding hospitalization.        Disposition and Plan     Clinical Impression:  1. Acute on chronic systolic congestive heart failure (HCC)    2. COPD exacerbation (HCC)    3. Chest pain with high risk for cardiac etiology         Disposition:  Admit  1/18/2024  4:34 pm    Follow-up:  No follow-up provider specified.  We recommend that you schedule follow up care with a primary care provider within the next three months to obtain basic health screening including reassessment of your blood pressure.      Medications Prescribed:  Current Discharge Medication List                            Hospital Problems       Present on Admission  Date Reviewed: 1/18/2024            ICD-10-CM Noted POA    * (Principal) Acute on chronic  systolic congestive heart failure (HCC) I50.23 1/18/2024 Unknown

## 2024-01-19 LAB
ANION GAP SERPL CALC-SCNC: 2 MMOL/L (ref 0–18)
BASOPHILS # BLD AUTO: 0.1 X10(3) UL (ref 0–0.2)
BASOPHILS NFR BLD AUTO: 0.7 %
BILIRUB UR QL: NEGATIVE
BUN BLD-MCNC: 26 MG/DL (ref 9–23)
BUN/CREAT SERPL: 19.8 (ref 10–20)
CALCIUM BLD-MCNC: 9.9 MG/DL (ref 8.7–10.4)
CHLORIDE SERPL-SCNC: 102 MMOL/L (ref 98–112)
CLARITY UR: CLEAR
CO2 SERPL-SCNC: 30 MMOL/L (ref 21–32)
COLOR UR: COLORLESS
CREAT BLD-MCNC: 1.31 MG/DL
DEPRECATED RDW RBC AUTO: 52.3 FL (ref 35.1–46.3)
EGFRCR SERPLBLD CKD-EPI 2021: 58 ML/MIN/1.73M2 (ref 60–?)
EOSINOPHIL # BLD AUTO: 0.56 X10(3) UL (ref 0–0.7)
EOSINOPHIL NFR BLD AUTO: 3.7 %
ERYTHROCYTE [DISTWIDTH] IN BLOOD BY AUTOMATED COUNT: 20.4 % (ref 11–15)
GLUCOSE BLD-MCNC: 303 MG/DL (ref 70–99)
GLUCOSE BLDC GLUCOMTR-MCNC: 332 MG/DL (ref 70–99)
GLUCOSE BLDC GLUCOMTR-MCNC: 442 MG/DL (ref 70–99)
GLUCOSE BLDC GLUCOMTR-MCNC: 542 MG/DL (ref 70–99)
GLUCOSE BLDC GLUCOMTR-MCNC: 565 MG/DL (ref 70–99)
GLUCOSE UR-MCNC: >1000 MG/DL
HCT VFR BLD AUTO: 41.1 %
HGB BLD-MCNC: 11.9 G/DL
HGB UR QL STRIP.AUTO: NEGATIVE
IMM GRANULOCYTES # BLD AUTO: 0.07 X10(3) UL (ref 0–1)
IMM GRANULOCYTES NFR BLD: 0.5 %
INR BLD: 2.22 (ref 0.8–1.2)
KETONES UR-MCNC: NEGATIVE MG/DL
LEUKOCYTE ESTERASE UR QL STRIP.AUTO: NEGATIVE
LYMPHOCYTES # BLD AUTO: 2.45 X10(3) UL (ref 1–4)
LYMPHOCYTES NFR BLD AUTO: 16.2 %
MAGNESIUM SERPL-MCNC: 2.2 MG/DL (ref 1.6–2.6)
MCH RBC QN AUTO: 21.8 PG (ref 26–34)
MCHC RBC AUTO-ENTMCNC: 29 G/DL (ref 31–37)
MCV RBC AUTO: 75.3 FL
MONOCYTES # BLD AUTO: 1.53 X10(3) UL (ref 0.1–1)
MONOCYTES NFR BLD AUTO: 10.1 %
NEUTROPHILS # BLD AUTO: 10.39 X10 (3) UL (ref 1.5–7.7)
NEUTROPHILS # BLD AUTO: 10.39 X10(3) UL (ref 1.5–7.7)
NEUTROPHILS NFR BLD AUTO: 68.8 %
NITRITE UR QL STRIP.AUTO: NEGATIVE
OSMOLALITY SERPL CALC.SUM OF ELEC: 294 MOSM/KG (ref 275–295)
PH UR: 5.5 [PH] (ref 5–8)
PLATELET # BLD AUTO: 363 10(3)UL (ref 150–450)
POTASSIUM SERPL-SCNC: 4.8 MMOL/L (ref 3.5–5.1)
PROCALCITONIN SERPL-MCNC: 0.15 NG/ML (ref ?–0.05)
PROT UR-MCNC: NEGATIVE MG/DL
PROTHROMBIN TIME: 26 SECONDS (ref 11.6–14.8)
RBC # BLD AUTO: 5.46 X10(6)UL
SODIUM SERPL-SCNC: 134 MMOL/L (ref 136–145)
SP GR UR STRIP: 1.02 (ref 1–1.03)
TROPONIN I SERPL HS-MCNC: 175 NG/L
TROPONIN I SERPL HS-MCNC: 206 NG/L
UROBILINOGEN UR STRIP-ACNC: NORMAL
WBC # BLD AUTO: 15.1 X10(3) UL (ref 4–11)

## 2024-01-19 PROCEDURE — 99222 1ST HOSP IP/OBS MODERATE 55: CPT | Performed by: INTERNAL MEDICINE

## 2024-01-19 NOTE — PLAN OF CARE
VSS. On oral diuretics. Percocet for back pain with fair result.     Problem: Patient Centered Care  Goal: Patient preferences are identified and integrated in the patient's plan of care  Description: Interventions:  - What would you like us to know as we care for you? From home with son  - Provide timely, complete, and accurate information to patient/family  - Incorporate patient and family knowledge, values, beliefs, and cultural backgrounds into the planning and delivery of care  - Encourage patient/family to participate in care and decision-making at the level they choose  - Honor patient and family perspectives and choices  Outcome: Progressing     Problem: PAIN - ADULT  Goal: Verbalizes/displays adequate comfort level or patient's stated pain goal  Description: INTERVENTIONS:  - Encourage pt to monitor pain and request assistance  - Assess pain using appropriate pain scale  - Administer analgesics based on type and severity of pain and evaluate response  - Implement non-pharmacological measures as appropriate and evaluate response  - Consider cultural and social influences on pain and pain management  - Manage/alleviate anxiety  - Utilize distraction and/or relaxation techniques  - Monitor for opioid side effects  - Notify MD/LIP if interventions unsuccessful or patient reports new pain  - Anticipate increased pain with activity and pre-medicate as appropriate  Outcome: Progressing     Problem: SAFETY ADULT - FALL  Goal: Free from fall injury  Description: INTERVENTIONS:  - Assess pt frequently for physical needs  - Identify cognitive and physical deficits and behaviors that affect risk of falls.  - [place] fall precautions as indicated by assessment.  - Educate pt/family on patient safety including physical limitations  - Instruct pt to call for assistance with activity based on assessment  - Modify environment to reduce risk of injury  - Provide assistive devices as appropriate  - Consider OT/PT consult to  assist with strengthening/mobility  - Encourage toileting schedule  Outcome: Progressing     Problem: DISCHARGE PLANNING  Goal: Discharge to home or other facility with appropriate resources  Description: INTERVENTIONS:  - Identify barriers to discharge w/pt and caregiver  - Include patient/family/discharge partner in discharge planning  - Arrange for needed discharge resources and transportation as appropriate  - Identify discharge learning needs (meds, wound care, etc)  - Arrange for interpreters to assist at discharge as needed  - Consider post-discharge preferences of patient/family/discharge partner  - Complete POLST form as appropriate  - Assess patient's ability to be responsible for managing their own health  - Refer to Case Management Department for coordinating discharge planning if the patient needs post-hospital services based on physician/LIP order or complex needs related to functional status, cognitive ability or social support system  Outcome: Progressing     Problem: CARDIOVASCULAR - ADULT  Goal: Maintains optimal cardiac output and hemodynamic stability  Description: INTERVENTIONS:  - Monitor vital signs, rhythm, and trends  - Monitor for bleeding, hypotension and signs of decreased cardiac output  - Evaluate effectiveness of vasoactive medications to optimize hemodynamic stability  - Monitor arterial and/or venous puncture sites for bleeding and/or hematoma  - Assess quality of pulses, skin color and temperature  - Assess for signs of decreased coronary artery perfusion - ex. Angina  - Evaluate fluid balance, assess for edema, trend weights  Outcome: Progressing  Goal: Absence of cardiac arrhythmias or at baseline  Description: INTERVENTIONS:  - Continuous cardiac monitoring, monitor vital signs, obtain 12 lead EKG if indicated  - Evaluate effectiveness of antiarrhythmic and heart rate control medications as ordered  - Initiate emergency measures for life threatening arrhythmias  - Monitor  electrolytes and administer replacement therapy as ordered  Outcome: Progressing     Problem: METABOLIC/FLUID AND ELECTROLYTES - ADULT  Goal: Glucose maintained within prescribed range  Description: INTERVENTIONS:  - Monitor Blood Glucose as ordered  - Assess for signs and symptoms of hyperglycemia and hypoglycemia  - Administer ordered medications to maintain glucose within target range  - Assess barriers to adequate nutritional intake and initiate nutrition consult as needed  - Instruct patient on self management of diabetes  Outcome: Progressing  Goal: Electrolytes maintained within normal limits  Description: INTERVENTIONS:  - Monitor labs and rhythm and assess patient for signs and symptoms of electrolyte imbalances  - Administer electrolyte replacement as ordered  - Monitor response to electrolyte replacements, including rhythm and repeat lab results as appropriate  - Fluid restriction as ordered  - Instruct patient on fluid and nutrition restrictions as appropriate  Outcome: Progressing  Goal: Hemodynamic stability and optimal renal function maintained  Description: INTERVENTIONS:  - Monitor labs and assess for signs and symptoms of volume excess or deficit  - Monitor intake, output and patient weight  - Monitor urine specific gravity, serum osmolarity and serum sodium as indicated or ordered  - Monitor response to interventions for patient's volume status, including labs, urine output, blood pressure (other measures as available)  - Encourage oral intake as appropriate  - Instruct patient on fluid and nutrition restrictions as appropriate  Outcome: Progressing

## 2024-01-19 NOTE — PLAN OF CARE
Patient came up from ED. Med rec completed. Patient is A&Ox4, room air, stand by assist. Plan for cardiac consult. Call light within reach and fall precautions in place.     Problem: Patient Centered Care  Goal: Patient preferences are identified and integrated in the patient's plan of care  Description: Interventions:  - What would you like us to know as we care for you? From home with son  - Provide timely, complete, and accurate information to patient/family  - Incorporate patient and family knowledge, values, beliefs, and cultural backgrounds into the planning and delivery of care  - Encourage patient/family to participate in care and decision-making at the level they choose  - Honor patient and family perspectives and choices  Outcome: Progressing     Problem: Patient/Family Goals  Goal: Patient/Family Long Term Goal  Description: Patient's Long Term Goal: To go back home    Interventions:  - Follow medical regimen  - See additional Care Plan goals for specific interventions  Outcome: Progressing  Goal: Patient/Family Short Term Goal  Description: Patient's Short Term Goal: To not have SOB    Interventions:   - Follow medical regimen  - See additional Care Plan goals for specific interventions  Outcome: Progressing

## 2024-01-19 NOTE — H&P
[facility]    History and Physical    Caleb Rausch Jr. Patient Status:  Inpatient    3/23/1950 MRN [id number]   Location [facility] 3W/SW Attending Nilda Argueta MD   Hosp Day # 1 PCP NILDA ARGUETA MD     Date:  2024  Date of Admission:  2024    History provided by:patient  HPI:     Chief Complaint   Patient presents with    Chest Pain Angina    Difficulty Breathing     HPI 73 yoM with COPD, CHF with EF of 35 to 40%, type 2 diabetes, CAD with multiple stents, hypertension, hyperlipidemia, who presents for evaluation of chest pain, difficulty breathing and lightheadedness.  He has had 4 to 5 days of intermittent chest pain which she described comes on when he is stressed out or cleaning the house.  Additionally he has a 23 pound weight gain in the past 2 to 3 weeks.  He also has lightheadedness with standing and bending.  Is any fever or chills.  Denies any UTI symptoms.  He has cough on and off.  History     Past Medical History:   Diagnosis Date    Anxiety disorder, unspecified 2022    Atherosclerosis of coronary artery     stents x 6    Chronic low back pain with bilateral sciatica, left worse than right 2017    Chronic obstructive pulmonary disease, unspecified (HCC) 2022    Coronary atherosclerosis     Diabetes ([facility])     borderline     Disorder of thyroid     Essential hypertension     Glaucoma 05/10/2023    first visit with IRMA, patient was taking Timolol OU BID for 20 years, has not used gtts in over 1 year because he ran out of gtts and had no refills, fundus photos taken today    Heart attack ([facility])     High blood pressure     High cholesterol     Hyperlipidemia     Kidney stone 2020    Left Sided Neck pain, acute 2017    Lumbar stenosis with neurogenic claudication 2018    Major depressive disorder, recurrent, unspecified (HCC) 2022    Morbid obesity with BMI of 40.0-44.9, adult ([facility])     Pneumonia due to COVID-19 virus 2022     Thyroid disease      Past Surgical History:   Procedure Laterality Date    CATARACT EXTRACTION EXTRACAPSULAR W/ INTRAOCULAR LENS IMPLANTATION Right 2018    [place]    CATH BARE METAL STENT ([facility])      CIRCUMCISION,OTHR  08/04/2020    Dr. Bonilla @ [facility]    CYSTO/URETERO W/LITHOTRIPSY Left 08/04/2020    Dr. 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     IRIDOTOMY/IRIDECTOMY BY LASER      unknown Dr    OTHER      cardiac stents     Family History   Problem Relation Age of Onset    Heart Attack Father     Hypertension Brother     Glaucoma Neg     Macular degeneration Neg      Social History:  Social History     Socioeconomic History    Marital status: Single   Tobacco Use    Smoking status: Every Day     Packs/day: 1.5     Types: Cigarettes    Smokeless tobacco: Former    Tobacco comments:     per pt, quit in 1994   Vaping Use    Vaping Use: Never used   Substance and Sexual Activity    Alcohol use: No     Comment: quit in 1994    Drug use: No   Other Topics Concern    Caffeine Concern Yes    Exercise No   Social History Narrative    The patient uses the following assistive device(s):  single-point cane.      The patient does live in a home with stairs.     Social Determinants of Health     Financial Resource Strain: Low Risk  (12/29/2023)    Financial Resource Strain     Difficulty of Paying Living Expenses: Not very hard     Med Affordability: No   Food Insecurity: No Food Insecurity (1/18/2024)    Food Insecurity     Food Insecurity: Never true   Transportation Needs: No Transportation Needs (1/18/2024)    Transportation Needs     Lack of Transportation: No   Housing Stability: Low Risk  (1/18/2024)    Housing Stability     Housing Instability: No     Allergies/Medications:   Allergies: No Known Allergies  Medications Prior to Admission   Medication Sig    nicotine 21 MG/24HR Transdermal Patch 24 Hr Place 21 patches onto the skin daily.    zolpidem 5 MG Oral Tab  Take 1 tablet (5 mg total) by mouth nightly as needed for Sleep.    carvedilol 6.25 MG Oral Tab Take 1 tablet (6.25 mg total) by mouth 2 (two) times daily with meals.    torsemide 20 MG Oral Tab Take 1 tablet (20 mg total) by mouth daily.    spironolactone 25 MG Oral Tab Take 1 tablet (25 mg total) by mouth daily.    atorvastatin 80 MG Oral Tab Take 1 tablet (80 mg total) by mouth daily.    levothyroxine 150 MCG Oral Tab Take 1 tablet (150 mcg total) by mouth before breakfast.    metFORMIN HCl 1000 MG Oral Tab Take 0.5 tablets (500 mg total) by mouth 2 (two) times daily with meals.    MOVANTIK 25 MG Oral Tab TAKE 1 TABLET BY MOUTH DAILY 1 -2 HOURS AFTER MEAL    empagliflozin (JARDIANCE) 10 MG Oral Tab Take 1 tablet (10 mg total) by mouth daily.    fluticasone-umeclidin-vilant (TRELEGY ELLIPTA) 200-62.5-25 MCG/ACT Inhalation Aerosol Powder, Breath Activated Inhale 1 puff into the lungs daily.    albuterol (VENTOLIN HFA) 108 (90 Base) MCG/ACT Inhalation Aero Soln Inhale 2 puffs into the lungs every 4 (four) hours as needed for Wheezing.    clopidogrel 75 MG Oral Tab Take 1 tablet (75 mg total) by mouth daily.    bimatoprost (LUMIGAN) 0.01 % Ophthalmic Solution Place 1 drop into both eyes every evening.    ENTRESTO 24-26 MG Oral Tab Take 1 tablet by mouth 2 (two) times daily.    oxyCODONE-acetaminophen  MG Oral Tab Take 1 tablet by mouth 4 (four) times daily as needed.    DULoxetine 30 MG Oral Cap DR Particles Take 1 capsule (30 mg total) by mouth daily.    warfarin 5 MG Oral Tab Take 2 tablets (10 mg total) by mouth nightly. 7.5mg on Sunday and Wednesday  10mg on all other days    pregabalin 300 MG Oral Cap Take 1 capsule (300 mg total) by mouth 2 (two) times daily.    aspirin 81 MG Oral Tab EC Take 1 tablet (81 mg total) by mouth daily.    Blood Glucose Monitoring Suppl Does not apply Kit Please use to check blood sugar twice daily.    Blood Glucose Monitoring Suppl Does not apply Misc Please use to check blood  sugar twice daily    Blood Glucose Monitoring Suppl Does not apply Misc Please use to check blood sugar twice daily       Review of Systems:     Constitutional: Negative.    HENT: Negative.     Eyes: Negative.    Respiratory:  Positive for shortness of breath.    Cardiovascular:  Positive for chest pain.   Gastrointestinal: Negative.    Musculoskeletal: Negative.    Neurological: Negative.    Hematological: Negative.    Psychiatric/Behavioral: Negative.         Physical Exam:   Vital Signs:  Blood pressure 105/74, pulse 86, temperature 98.1 °F (36.7 °C), temperature source Axillary, resp. rate 14, height 5' 9\" (1.753 m), weight 288 lb 3.2 oz (130.7 kg), SpO2 94%.  Physical Exam  Vitals and nursing note reviewed.   Constitutional:       Appearance: Normal appearance.   HENT:      Head: Normocephalic and atraumatic.   Cardiovascular:      Rate and Rhythm: Normal rate and regular rhythm.      Pulses: Normal pulses.      Heart sounds: Normal heart sounds.   Pulmonary:      Effort: Pulmonary effort is normal.      Breath sounds: Normal breath sounds.   Abdominal:      Palpations: Abdomen is soft.   Musculoskeletal:         General: Normal range of motion.      Cervical back: Normal range of motion and neck supple.   Skin:     General: Skin is warm.   Neurological:      Mental Status: He is alert. Mental status is at baseline.           Results:     Lab Results   Component Value Date    WBC 15.1 (H) 01/19/2024    HGB 11.9 (L) 01/19/2024    HCT 41.1 01/19/2024    .0 01/19/2024    CREATSERUM 1.31 (H) 01/19/2024    BUN 26 (H) 01/19/2024     (L) 01/19/2024    K 4.8 01/19/2024     01/19/2024    CO2 30.0 01/19/2024     (H) 01/19/2024    CA 9.9 01/19/2024    ALB 3.9 12/24/2023    ALKPHO 91 12/24/2023    BILT 0.2 12/24/2023    TP 6.4 12/24/2023    AST 12 12/24/2023    ALT 25 12/24/2023    PTT 36.7 (H) 01/18/2024    INR 2.22 (H) 01/19/2024    T4F 1.0 09/29/2023    TSH 3.879 12/25/2023    PSA 0.42  07/01/2021    DDIMER 0.92 (H) 12/23/2023    ESRML 11 07/14/2018    CRP <0.29 01/19/2022    MG 2.2 01/19/2024    PHOS 2.6 12/25/2023    TROP <0.045 11/20/2020    TROPHS 285 (HH) 01/18/2024    CK 65 01/08/2022    B12 351 06/29/2020     XR CHEST AP PORTABLE  (CPT=71045)    Result Date: 1/18/2024  CONCLUSION:  1. No acute disease in the chest.    Dictated by (CST): Romero Mills MD on 1/18/2024 at 3:36 PM     Finalized by (CST): Romero Mills MD on 1/18/2024 at 3:37 PM             Assessment/Plan:     Acute on chronic systolic congestive heart failure (HCC)  Continue with diuretics, monitor input output, daily weight, cardiology is following  Elevated WBC-will check UA, chest x-ray noted, check procalcitonin  Copd-does not appear in COPD exacerbation there is no wheezing on exam, continue with inhaler    History of coronary artery disease status post multiple stent in LAD and diagonal, with in-stent restenosis continue with aspirin and Plavix, cardiology is following    Diabetes type 2 continue with Accu-Chek sliding scale insulin hypoglycemia protocol    History of PE continue with Coumadin INR noted    Cardiomyopathy continue with current medication               Patient will require inpatient services that will reasonably be expected to span two midnight's based on the clinical documentation in H+P.   Based on patients current state of illness, I anticipate that, after discharge, patient will require TBD.        JULITO AG MD  1/19/2024

## 2024-01-19 NOTE — PROGRESS NOTES
Progress Note  Caleb Rausch JrJoan Patient Status:  Inpatient    3/23/1950 MRN [id number]   Location [facility] 3W/SW Attending Nilda Argueta MD   Hosp Day # 1 PCP NILDA ARGUETA MD     Subjective:  Pt denies any chest pain during night time or today. Pt stated he had some chest pain couple days back. Still gets SOB with activities. Now received neb treatment and SOB improved.     Pt stated he has not been following low sodium diet and has been drinking lots of carbonated drinks. Stated he has not been checking his weight at home since he doesn't have any weighing machine at home.     Objective:  /68 (BP Location: Right arm)   Pulse 84   Temp 98.3 °F (36.8 °C) (Oral)   Resp 15   Ht 5' 9\" (1.753 m)   Wt 288 lb 3.2 oz (130.7 kg)   SpO2 94%   BMI 42.56 kg/m²     Telemetry: NSR, BBB      Intake/Output:    Intake/Output Summary (Last 24 hours) at 2024 0948  Last data filed at 2024 0800  Gross per 24 hour   Intake 1170 ml   Output --   Net 1170 ml       Last 3 Weights   24 0507 288 lb 3.2 oz (130.7 kg)   24 1846 290 lb 8 oz (131.8 kg)   24 1427 290 lb (131.5 kg)   24 1309 290 lb (131.5 kg)   23 0521 267 lb (121.1 kg)   23 0545 268 lb 14.4 oz (122 kg)   23 0419 290 lb 8 oz (131.8 kg)   23 0416 291 lb 6.4 oz (132.2 kg)   23 0429 296 lb 12.8 oz (134.6 kg)   23 1023 (!) 304 lb 6.4 oz (138.1 kg)   23 0400 (!) 320 lb (145.2 kg)       Labs:  Recent Labs   Lab 24  1329 24  1559 24  0618   * 369* 303*   BUN 27* 28* 26*   CREATSERUM 1.60* 1.50* 1.31*   EGFRCR 46* 49* 58*   CA 9.2 9.1 9.9   * 134* 134*   K 4.2 4.0 4.8    101 102   CO2 24.0 26.0 30.0     Recent Labs   Lab 24  1559 24  0618   RBC 4.71 5.46   HGB 10.4* 11.9*   HCT 34.9* 41.1   MCV 74.1* 75.3*   MCH 22.1* 21.8*   MCHC 29.8* 29.0*   RDW 19.9* 20.4*   NEPRELIM 9.12* 10.39*   WBC 13.4* 15.1*   .0 363.0         Recent Labs    Lab 01/18/24  1559   TROPHS 285*     Lab Results   Component Value Date    INR 2.22 (H) 01/19/2024    INR 2.74 (H) 01/18/2024    INR 2.06 (H) 12/28/2023       Diagnostics:       Review of Systems   Respiratory:  Positive for shortness of breath. Negative for cough, hemoptysis, sputum production and wheezing.    Cardiovascular: Negative.        Physical Exam:    Physical Exam  Vitals reviewed.   Constitutional:       General: He is not in acute distress.     Appearance: He is obese.   HENT:      Head: Normocephalic and atraumatic.   Neck:      Vascular: No JVD.   Cardiovascular:      Rate and Rhythm: Normal rate and regular rhythm.      Pulses: Normal pulses.      Heart sounds: Normal heart sounds. No murmur heard.  Pulmonary:      Effort: Pulmonary effort is normal. No respiratory distress.      Breath sounds: No stridor. Decreased breath sounds present. No wheezing, rhonchi or rales.   Musculoskeletal:      Cervical back: Normal range of motion.      Right lower leg: No edema.      Left lower leg: No edema.   Skin:     General: Skin is warm and dry.   Neurological:      Mental Status: He is alert and oriented to person, place, and time.   Psychiatric:         Mood and Affect: Mood normal.         Medications:   aspirin  81 mg Oral Daily    atorvastatin  80 mg Oral Daily    latanoprost  1 drop Both Eyes Nightly    carvedilol  6.25 mg Oral BID with meals    clopidogrel  75 mg Oral Daily    DULoxetine  30 mg Oral Daily    sacubitril-valsartan  1 tablet Oral BID    fluticasone-umeclidin-vilant  1 puff Inhalation Daily    levothyroxine  150 mcg Oral Before breakfast    [Held by provider] Naloxegol Oxalate  25 mg Oral Daily    pregabalin  300 mg Oral BID    spironolactone  25 mg Oral Daily    warfarin  10 mg Oral Nightly    torsemide  20 mg Oral Daily    guaiFENesin ER  600 mg Oral BID    insulin aspart  1-11 Units Subcutaneous TID CC    traZODone  50 mg Oral Nightly    nicotine  1 patch Transdermal Daily          Assessment/Plan:    Acute on chronic HFrEF/right sided heart failure  - admitted with SOB  -BNP normal at : 81  -last echo 12/2023 with LVEF 35-40%  -pt with 21lb weight gain from previous admission  - on torsemide, coreg, Entresto  - pt's today's weight of 286lb on standing scale  - pt's urine output has not been getting measured.      Type II NSTEMI  -Troponin elevated 285, pt on warfarin, will trend trop.  -h/o extensive LAD to diag stents with in stent stenosis, last coronary angiogram in 5/2023 with later atherectomy, shock wave lithotripsy to LAD  - on ASA, plavix, Atorvastatin, coreg  -LDL 56    HLD  - LDL 56  - on atorvastatin    Cardiomyopathy    Diabetes Mellitus  -management per primary on Jardiance at home    History of PE  - on warfarin   -INR : 2.22 today    HTN:   normotensive on current regimen    Morbid obesity  - may need to evaluate for DIRK as outpatient.    History of smoking  - educated on smoking cessation    Plan:    - trend trop  -strict intake and output  -continue GDMT- entresto, coreg, torsemide  - continue Asprin, Plavix, statin  - discuss in detail about smoking cessation   - may need evaluation for DIRK as outpatient.  - Euvolemic on exam, no need for IV lasix at this time.  -symptoms mostly secondary to COPD exacerbation-management per primary    Resmi MARIBEL Malloy  [facility]  1/19/2024  9:48 AM    PM rounds addendum: Agree appears euvolemic, type II NSTEMI secondary to COPD exacerbation.  Further workup and management per pulmonary.    Patient seen and examined independently.  Agree with exam.  Labs reviewed.  Changes to assessment and plan as above.    Anival Hammer MD  Interventional Cardiology  [facility]

## 2024-01-19 NOTE — PLAN OF CARE
Patient is A&Ox4, room air, stand by assist. Pt c/o of pain PRN meds given. Pt had hyperglycemic episodes MD notified see chart for orders. Strict I&O's. Call light within reach and fall precautions in place.     Problem: Patient Centered Care  Goal: Patient preferences are identified and integrated in the patient's plan of care  Description: Interventions:  - What would you like us to know as we care for you? From home with son  - Provide timely, complete, and accurate information to patient/family  - Incorporate patient and family knowledge, values, beliefs, and cultural backgrounds into the planning and delivery of care  - Encourage patient/family to participate in care and decision-making at the level they choose  - Honor patient and family perspectives and choices  Outcome: Progressing     Problem: Patient/Family Goals  Goal: Patient/Family Long Term Goal  Description: Patient's Long Term Goal: To go back home    Interventions:  - Follow medical regimen  - See additional Care Plan goals for specific interventions  Outcome: Progressing  Goal: Patient/Family Short Term Goal  Description: Patient's Short Term Goal: To not have SOB    Interventions:   - Follow medical regimen  - See additional Care Plan goals for specific interventions  Outcome: Progressing     Problem: PAIN - ADULT  Goal: Verbalizes/displays adequate comfort level or patient's stated pain goal  Description: INTERVENTIONS:  - Encourage pt to monitor pain and request assistance  - Assess pain using appropriate pain scale  - Administer analgesics based on type and severity of pain and evaluate response  - Implement non-pharmacological measures as appropriate and evaluate response  - Consider cultural and social influences on pain and pain management  - Manage/alleviate anxiety  - Utilize distraction and/or relaxation techniques  - Monitor for opioid side effects  - Notify MD/LIP if interventions unsuccessful or patient reports new pain  - Anticipate  increased pain with activity and pre-medicate as appropriate  Outcome: Progressing     Problem: SAFETY ADULT - FALL  Goal: Free from fall injury  Description: INTERVENTIONS:  - Assess pt frequently for physical needs  - Identify cognitive and physical deficits and behaviors that affect risk of falls.  - [place] fall precautions as indicated by assessment.  - Educate pt/family on patient safety including physical limitations  - Instruct pt to call for assistance with activity based on assessment  - Modify environment to reduce risk of injury  - Provide assistive devices as appropriate  - Consider OT/PT consult to assist with strengthening/mobility  - Encourage toileting schedule  Outcome: Progressing

## 2024-01-20 PROBLEM — I26.99 PULMONARY EMBOLUS (HCC): Status: ACTIVE | Noted: 2024-01-20

## 2024-01-20 LAB
BASOPHILS # BLD AUTO: 0.08 X10(3) UL (ref 0–0.2)
BASOPHILS NFR BLD AUTO: 0.7 %
DEPRECATED RDW RBC AUTO: 51.3 FL (ref 35.1–46.3)
EOSINOPHIL # BLD AUTO: 0.57 X10(3) UL (ref 0–0.7)
EOSINOPHIL NFR BLD AUTO: 4.9 %
ERYTHROCYTE [DISTWIDTH] IN BLOOD BY AUTOMATED COUNT: 19.9 % (ref 11–15)
GLUCOSE BLDC GLUCOMTR-MCNC: 304 MG/DL (ref 70–99)
GLUCOSE BLDC GLUCOMTR-MCNC: 332 MG/DL (ref 70–99)
GLUCOSE BLDC GLUCOMTR-MCNC: 422 MG/DL (ref 70–99)
GLUCOSE BLDC GLUCOMTR-MCNC: 463 MG/DL (ref 70–99)
HCT VFR BLD AUTO: 36.2 %
HGB BLD-MCNC: 10.9 G/DL
IMM GRANULOCYTES # BLD AUTO: 0.05 X10(3) UL (ref 0–1)
IMM GRANULOCYTES NFR BLD: 0.4 %
INR BLD: 2.28 (ref 0.8–1.2)
LYMPHOCYTES # BLD AUTO: 1.82 X10(3) UL (ref 1–4)
LYMPHOCYTES NFR BLD AUTO: 15.6 %
MCH RBC QN AUTO: 22.1 PG (ref 26–34)
MCHC RBC AUTO-ENTMCNC: 30.1 G/DL (ref 31–37)
MCV RBC AUTO: 73.4 FL
MONOCYTES # BLD AUTO: 0.98 X10(3) UL (ref 0.1–1)
MONOCYTES NFR BLD AUTO: 8.4 %
NEUTROPHILS # BLD AUTO: 8.19 X10 (3) UL (ref 1.5–7.7)
NEUTROPHILS # BLD AUTO: 8.19 X10(3) UL (ref 1.5–7.7)
NEUTROPHILS NFR BLD AUTO: 70 %
PLATELET # BLD AUTO: 311 10(3)UL (ref 150–450)
PROTHROMBIN TIME: 26.5 SECONDS (ref 11.6–14.8)
RBC # BLD AUTO: 4.93 X10(6)UL
WBC # BLD AUTO: 11.7 X10(3) UL (ref 4–11)

## 2024-01-20 PROCEDURE — 99232 SBSQ HOSP IP/OBS MODERATE 35: CPT | Performed by: INTERNAL MEDICINE

## 2024-01-20 RX ORDER — IPRATROPIUM BROMIDE AND ALBUTEROL SULFATE 2.5; .5 MG/3ML; MG/3ML
3 SOLUTION RESPIRATORY (INHALATION)
Status: DISCONTINUED | OUTPATIENT
Start: 2024-01-20 | End: 2024-01-21

## 2024-01-20 NOTE — PLAN OF CARE
Strict I&O's, daily standing weight. Fluid restriction 1800. Patient c/o of pain PRN meds given. Pt also c/o of SOB PRN neb treatments given. Pt had hyperglycemic episode MD notified and treated. Plan to monitor blood sugars and hope to go home tomorrow. Call light within reach and fall precautions in place.     Problem: Patient Centered Care  Goal: Patient preferences are identified and integrated in the patient's plan of care  Description: Interventions:  - What would you like us to know as we care for you? From home with son  - Provide timely, complete, and accurate information to patient/family  - Incorporate patient and family knowledge, values, beliefs, and cultural backgrounds into the planning and delivery of care  - Encourage patient/family to participate in care and decision-making at the level they choose  - Honor patient and family perspectives and choices  Outcome: Progressing     Problem: Patient/Family Goals  Goal: Patient/Family Long Term Goal  Description: Patient's Long Term Goal: To go back home    Interventions:  - Follow medical regimen  - See additional Care Plan goals for specific interventions  Outcome: Progressing  Goal: Patient/Family Short Term Goal  Description: Patient's Short Term Goal: To not have SOB    Interventions:   - Follow medical regimen  - See additional Care Plan goals for specific interventions  Outcome: Progressing     Problem: PAIN - ADULT  Goal: Verbalizes/displays adequate comfort level or patient's stated pain goal  Description: INTERVENTIONS:  - Encourage pt to monitor pain and request assistance  - Assess pain using appropriate pain scale  - Administer analgesics based on type and severity of pain and evaluate response  - Implement non-pharmacological measures as appropriate and evaluate response  - Consider cultural and social influences on pain and pain management  - Manage/alleviate anxiety  - Utilize distraction and/or relaxation techniques  - Monitor for opioid  side effects  - Notify MD/LIP if interventions unsuccessful or patient reports new pain  - Anticipate increased pain with activity and pre-medicate as appropriate  Outcome: Progressing     Problem: SAFETY ADULT - FALL  Goal: Free from fall injury  Description: INTERVENTIONS:  - Assess pt frequently for physical needs  - Identify cognitive and physical deficits and behaviors that affect risk of falls.  - [place] fall precautions as indicated by assessment.  - Educate pt/family on patient safety including physical limitations  - Instruct pt to call for assistance with activity based on assessment  - Modify environment to reduce risk of injury  - Provide assistive devices as appropriate  - Consider OT/PT consult to assist with strengthening/mobility  - Encourage toileting schedule  Outcome: Progressing     Problem: DISCHARGE PLANNING  Goal: Discharge to home or other facility with appropriate resources  Description: INTERVENTIONS:  - Identify barriers to discharge w/pt and caregiver  - Include patient/family/discharge partner in discharge planning  - Arrange for needed discharge resources and transportation as appropriate  - Identify discharge learning needs (meds, wound care, etc)  - Arrange for interpreters to assist at discharge as needed  - Consider post-discharge preferences of patient/family/discharge partner  - Complete POLST form as appropriate  - Assess patient's ability to be responsible for managing their own health  - Refer to Case Management Department for coordinating discharge planning if the patient needs post-hospital services based on physician/LIP order or complex needs related to functional status, cognitive ability or social support system  Outcome: Progressing

## 2024-01-20 NOTE — PLAN OF CARE
Problem: Patient Centered Care  Goal: Patient preferences are identified and integrated in the patient's plan of care  Description: Interventions:  - What would you like us to know as we care for you? From home with son  - Provide timely, complete, and accurate information to patient/family  - Incorporate patient and family knowledge, values, beliefs, and cultural backgrounds into the planning and delivery of care  - Encourage patient/family to participate in care and decision-making at the level they choose  - Honor patient and family perspectives and choices  Outcome: Progressing     Problem: Patient/Family Goals  Goal: Patient/Family Long Term Goal  Description: Patient's Long Term Goal: To go back home    Interventions:  - Follow medical regimen  - See additional Care Plan goals for specific interventions  Outcome: Progressing  Goal: Patient/Family Short Term Goal  Description: Patient's Short Term Goal: To not have SOB    Interventions:   - Follow medical regimen  - See additional Care Plan goals for specific interventions  Outcome: Progressing     Problem: PAIN - ADULT  Goal: Verbalizes/displays adequate comfort level or patient's stated pain goal  Description: INTERVENTIONS:  - Encourage pt to monitor pain and request assistance  - Assess pain using appropriate pain scale  - Administer analgesics based on type and severity of pain and evaluate response  - Implement non-pharmacological measures as appropriate and evaluate response  - Consider cultural and social influences on pain and pain management  - Manage/alleviate anxiety  - Utilize distraction and/or relaxation techniques  - Monitor for opioid side effects  - Notify MD/LIP if interventions unsuccessful or patient reports new pain  - Anticipate increased pain with activity and pre-medicate as appropriate  Outcome: Progressing     Problem: SAFETY ADULT - FALL  Goal: Free from fall injury  Description: INTERVENTIONS:  - Assess pt frequently for physical  needs  - Identify cognitive and physical deficits and behaviors that affect risk of falls.  - [place] fall precautions as indicated by assessment.  - Educate pt/family on patient safety including physical limitations  - Instruct pt to call for assistance with activity based on assessment  - Modify environment to reduce risk of injury  - Provide assistive devices as appropriate  - Consider OT/PT consult to assist with strengthening/mobility  - Encourage toileting schedule  Outcome: Progressing     Problem: DISCHARGE PLANNING  Goal: Discharge to home or other facility with appropriate resources  Description: INTERVENTIONS:  - Identify barriers to discharge w/pt and caregiver  - Include patient/family/discharge partner in discharge planning  - Arrange for needed discharge resources and transportation as appropriate  - Identify discharge learning needs (meds, wound care, etc)  - Arrange for interpreters to assist at discharge as needed  - Consider post-discharge preferences of patient/family/discharge partner  - Complete POLST form as appropriate  - Assess patient's ability to be responsible for managing their own health  - Refer to Case Management Department for coordinating discharge planning if the patient needs post-hospital services based on physician/LIP order or complex needs related to functional status, cognitive ability or social support system  Outcome: Progressing     Problem: CARDIOVASCULAR - ADULT  Goal: Maintains optimal cardiac output and hemodynamic stability  Description: INTERVENTIONS:  - Monitor vital signs, rhythm, and trends  - Monitor for bleeding, hypotension and signs of decreased cardiac output  - Evaluate effectiveness of vasoactive medications to optimize hemodynamic stability  - Monitor arterial and/or venous puncture sites for bleeding and/or hematoma  - Assess quality of pulses, skin color and temperature  - Assess for signs of decreased coronary artery perfusion - ex. Angina  - Evaluate  fluid balance, assess for edema, trend weights  Outcome: Progressing  Goal: Absence of cardiac arrhythmias or at baseline  Description: INTERVENTIONS:  - Continuous cardiac monitoring, monitor vital signs, obtain 12 lead EKG if indicated  - Evaluate effectiveness of antiarrhythmic and heart rate control medications as ordered  - Initiate emergency measures for life threatening arrhythmias  - Monitor electrolytes and administer replacement therapy as ordered  Outcome: Progressing     Problem: METABOLIC/FLUID AND ELECTROLYTES - ADULT  Goal: Glucose maintained within prescribed range  Description: INTERVENTIONS:  - Monitor Blood Glucose as ordered  - Assess for signs and symptoms of hyperglycemia and hypoglycemia  - Administer ordered medications to maintain glucose within target range  - Assess barriers to adequate nutritional intake and initiate nutrition consult as needed  - Instruct patient on self management of diabetes  Outcome: Progressing  Goal: Electrolytes maintained within normal limits  Description: INTERVENTIONS:  - Monitor labs and rhythm and assess patient for signs and symptoms of electrolyte imbalances  - Administer electrolyte replacement as ordered  - Monitor response to electrolyte replacements, including rhythm and repeat lab results as appropriate  - Fluid restriction as ordered  - Instruct patient on fluid and nutrition restrictions as appropriate  Outcome: Progressing  Goal: Hemodynamic stability and optimal renal function maintained  Description: INTERVENTIONS:  - Monitor labs and assess for signs and symptoms of volume excess or deficit  - Monitor intake, output and patient weight  - Monitor urine specific gravity, serum osmolarity and serum sodium as indicated or ordered  - Monitor response to interventions for patient's volume status, including labs, urine output, blood pressure (other measures as available)  - Encourage oral intake as appropriate  - Instruct patient on fluid and nutrition  restrictions as appropriate  Outcome: Progressing     Patient alert and oriented x4.  PRN Percocet given for pain.  Call light and belongings within reach.  Trazodone and melatonin for sleep.  Started on SQ Levemir and 11 units novolog given for hyperglycemia.  Safety precautions in place.

## 2024-01-20 NOTE — PROGRESS NOTES
Progress Note  Caleb Rausch  Patient Status:  Inpatient    3/23/1950 MRN [id number]   Location [facility] 3W/SW Attending Nilda Argueta MD   Hosp Day # 2 PCP NILDA ARGUETA MD     Subjective:  Pt seen/examined - doing well  Asking to go home    Objective:  /53 (BP Location: Right arm)   Pulse 82   Temp 97.7 °F (36.5 °C) (Axillary)   Resp 20   Ht 5' 9\" (1.753 m)   Wt 288 lb 6.4 oz (130.8 kg)   SpO2 91%   BMI 42.59 kg/m²     Telemetry: NSR, BBB      Intake/Output:    Intake/Output Summary (Last 24 hours) at 2024 1058  Last data filed at 2024 0822  Gross per 24 hour   Intake 1690 ml   Output 2300 ml   Net -610 ml       Last 3 Weights   24 0605 288 lb 6.4 oz (130.8 kg)   24 1036 286 lb 1.6 oz (129.8 kg)   24 0507 288 lb 3.2 oz (130.7 kg)   24 1846 290 lb 8 oz (131.8 kg)   24 1427 290 lb (131.5 kg)   24 1309 290 lb (131.5 kg)   23 0521 267 lb (121.1 kg)   23 0545 268 lb 14.4 oz (122 kg)   23 0419 290 lb 8 oz (131.8 kg)   23 0416 291 lb 6.4 oz (132.2 kg)   23 0429 296 lb 12.8 oz (134.6 kg)   23 1023 (!) 304 lb 6.4 oz (138.1 kg)   23 0400 (!) 320 lb (145.2 kg)       Labs:  Recent Labs   Lab 24  1329 24  1559 24  0618   * 369* 303*   BUN 27* 28* 26*   CREATSERUM 1.60* 1.50* 1.31*   EGFRCR 46* 49* 58*   CA 9.2 9.1 9.9   * 134* 134*   K 4.2 4.0 4.8    101 102   CO2 24.0 26.0 30.0     Recent Labs   Lab 24  1559 24  0618 24  0809   RBC 4.71 5.46 4.93   HGB 10.4* 11.9* 10.9*   HCT 34.9* 41.1 36.2*   MCV 74.1* 75.3* 73.4*   MCH 22.1* 21.8* 22.1*   MCHC 29.8* 29.0* 30.1*   RDW 19.9* 20.4* 19.9*   NEPRELIM 9.12* 10.39* 8.19*   WBC 13.4* 15.1* 11.7*   .0 363.0 311.0         Recent Labs   Lab 24  1559 24  1019 24  1638   TROPHS 285* 206* 175*     Lab Results   Component Value Date    INR 2.28 (H) 2024    INR 2.22 (H) 2024    INR  2.74 (H) 01/18/2024       Diagnostics:       Review of Systems   Respiratory:  Positive for shortness of breath. Negative for cough, hemoptysis, sputum production and wheezing.    Cardiovascular: Negative.        Physical Exam:    Physical Exam  Vitals reviewed.   Constitutional:       General: He is not in acute distress.     Appearance: He is obese.   HENT:      Head: Normocephalic and atraumatic.   Neck:      Vascular: No JVD.   Cardiovascular:      Rate and Rhythm: Normal rate and regular rhythm.      Pulses: Normal pulses.      Heart sounds: Normal heart sounds. No murmur heard.  Pulmonary:      Effort: Pulmonary effort is normal. No respiratory distress.      Breath sounds: No stridor. Decreased breath sounds present. No wheezing, rhonchi or rales.   Musculoskeletal:      Cervical back: Normal range of motion.      Right lower leg: No edema.      Left lower leg: No edema.   Skin:     General: Skin is warm and dry.   Neurological:      Mental Status: He is alert and oriented to person, place, and time.   Psychiatric:         Mood and Affect: Mood normal.         Medications:   insulin detemir  5 Units Subcutaneous Once    ipratropium-albuterol  3 mL Nebulization 6 times per day    insulin aspart  5 Units Subcutaneous TID CC    insulin detemir  5 Units Subcutaneous Nightly    insulin aspart  1-11 Units Subcutaneous TID CC and HS    aspirin  81 mg Oral Daily    atorvastatin  80 mg Oral Daily    latanoprost  1 drop Both Eyes Nightly    carvedilol  6.25 mg Oral BID with meals    clopidogrel  75 mg Oral Daily    DULoxetine  30 mg Oral Daily    sacubitril-valsartan  1 tablet Oral BID    fluticasone-umeclidin-vilant  1 puff Inhalation Daily    levothyroxine  150 mcg Oral Before breakfast    [Held by provider] Naloxegol Oxalate  25 mg Oral Daily    pregabalin  300 mg Oral BID    spironolactone  25 mg Oral Daily    warfarin  10 mg Oral Nightly    torsemide  20 mg Oral Daily    guaiFENesin ER  600 mg Oral BID     traZODone  50 mg Oral Nightly    nicotine  1 patch Transdermal Daily         Assessment/Plan:    Acute on chronic HFrEF/right sided heart failure  - admitted with SOB  -BNP normal at : 81  -last echo 12/2023 with LVEF 35-40%  -pt with 21lb weight gain from previous admission  - on torsemide, coreg, Entresto  - cont current cardiac meds, stable from cardiac standpoint for discharge     Type II NSTEMI  -Troponin elevated 285, pt on warfarin, will trend trop.  -h/o extensive LAD to diag stents with in stent stenosis, last coronary angiogram in 5/2023 with later atherectomy, shock wave lithotripsy to LAD  - on ASA, plavix, Atorvastatin, coreg  -LDL 56    HLD  - LDL 56  - on atorvastatin    Cardiomyopathy    Diabetes Mellitus  -management per primary on Jardiance at home    History of PE  - on warfarin   -INR : therapeutic    HTN:   BP ok    Morbid obesity  - may need to evaluate for DIRK as outpatient.    History of smoking  - educated on smoking cessation    Plan:  - Pt stable from cardiac standpoint  - Reinforce salt restriction, fluid 1800cc restriction  - ok to HDC from cardiology - cont current cardiac meds    L3    Thank you for allowing me to take part in the care of Caleb Rausch Jr.. Please call with any questions of concerns.    Dr. Kathy Ramírez

## 2024-01-20 NOTE — PROGRESS NOTES
[facility]    Progress Note    Caleb Rausch Jr. Patient Status:  Inpatient    3/23/1950 MRN [id number]   Location [facility] 3W/SW Attending Nilda Argueta MD   Hosp Day # 2 PCP NILDA ARGUETA MD     Chief Complaint: Chest pain and shortness of breath    Subjective:   Subjective:  I saw patient today morning.  He was resting comfortably.  Blood sugars were elevated.  He reports noncompliance with diet.  No more recurrence of chest pain.  Breathing is better.  Objective:   Blood pressure 106/53, pulse 82, temperature 97.7 °F (36.5 °C), temperature source Axillary, resp. rate 20, height 5' 9\" (1.753 m), weight 288 lb 6.4 oz (130.8 kg), SpO2 91%.  Physical Exam  Vitals and nursing note reviewed.   Constitutional:       Appearance: Normal appearance.   HENT:      Head: Normocephalic and atraumatic.   Cardiovascular:      Rate and Rhythm: Normal rate and regular rhythm.      Pulses: Normal pulses.      Heart sounds: Normal heart sounds.   Pulmonary:      Effort: Pulmonary effort is normal.      Breath sounds: Normal breath sounds.   Abdominal:      Palpations: Abdomen is soft.  Bowel sounds present.  Obese  Musculoskeletal:       neck supple.   Skin:     General: Skin is warm.   Neurological:      Mental Status: He is alert. Mental status is at baseline.   Results:   Lab Results   Component Value Date    WBC 11.7 (H) 2024    HGB 10.9 (L) 2024    HCT 36.2 (L) 2024    .0 2024    CREATSERUM 1.31 (H) 2024    BUN 26 (H) 2024     (L) 2024    K 4.8 2024     2024    CO2 30.0 2024     (H) 2024    CA 9.9 2024    ALB 3.9 2023    ALKPHO 91 2023    BILT 0.2 2023    TP 6.4 2023    AST 12 2023    ALT 25 2023    PTT 36.7 (H) 2024    INR 2.28 (H) 2024    T4F 1.0 2023    TSH 3.879 2023    PSA 0.42 2021    DDIMER 0.92 (H) 2023    ESRML 11  07/14/2018    CRP <0.29 01/19/2022    MG 2.2 01/19/2024    PHOS 2.6 12/25/2023    TROP <0.045 11/20/2020    TROPHS 175 (HH) 01/19/2024    CK 65 01/08/2022    B12 351 06/29/2020       No results found.        Assessment & Plan:   Acute on chronic systolic congestive heart failure (HCC)-appreciate cardiology input.  Even though weight gain present no significant fluid overload syndrome.  Resume cardiac medications.    COPD-will do scheduled  nebs for another day.  Will hold off prednisone because of elevated blood sugars     History of coronary artery disease status post PCI-stable     Diabetes type 2 blood sugars were elevated.  Medications adjusted.  Discussed diet.     History of PE continue with Coumadin      Dyslipidemia-continue statins    Morbid obesity-May need to consider referral to bariatrics outpatient    Hypothyroidism-continue supplementation    Neuropathy-resume home medications    DVT prophylaxis INR therapeutic    Plan  Appreciate cardiology input  Nebs scheduled for another day  Continue Coumadin  Increase activity  Hopefully home in the morning  I called daughter with patient's permission.  Left voicemail.            Eduardo Herbert MD  1/20/2024

## 2024-01-21 VITALS
SYSTOLIC BLOOD PRESSURE: 101 MMHG | WEIGHT: 291.88 LBS | TEMPERATURE: 98 F | BODY MASS INDEX: 43.23 KG/M2 | DIASTOLIC BLOOD PRESSURE: 62 MMHG | HEART RATE: 89 BPM | RESPIRATION RATE: 18 BRPM | HEIGHT: 69 IN | OXYGEN SATURATION: 92 %

## 2024-01-21 LAB
GLUCOSE BLDC GLUCOMTR-MCNC: 303 MG/DL (ref 70–99)
GLUCOSE BLDC GLUCOMTR-MCNC: 321 MG/DL (ref 70–99)
GLUCOSE BLDC GLUCOMTR-MCNC: 331 MG/DL (ref 70–99)
GLUCOSE BLDC GLUCOMTR-MCNC: 373 MG/DL (ref 70–99)
INR BLD: 2.18 (ref 0.8–1.2)
PROTHROMBIN TIME: 25.6 SECONDS (ref 11.6–14.8)

## 2024-01-21 PROCEDURE — 99239 HOSP IP/OBS DSCHRG MGMT >30: CPT | Performed by: INTERNAL MEDICINE

## 2024-01-21 RX ORDER — INSULIN DETEMIR 100 [IU]/ML
10 INJECTION, SOLUTION SUBCUTANEOUS DAILY
Qty: 10 ML | Refills: 0 | Status: SHIPPED | OUTPATIENT
Start: 2024-01-21

## 2024-01-21 NOTE — DISCHARGE PLANNING
Patient cleared for discharge. Patient given after visit summary. Patient educated on medications, follow up care and when to seek care. Patient educated on insulin pen, checking blood glucose and recording data. All current questions answered. Patient to discharge via medicar. All belongings with patient.

## 2024-01-21 NOTE — DISCHARGE SUMMARY
[facility]    Discharge Summary    Caleb Rausch Jr. Patient Status:  Inpatient    3/23/1950 MRN [id number]   Location [facility] 3W/SW Attending Nilda Argueta MD   Hosp Day # 3 PCP NILDA ARGUETA MD     Date of Admission: 2024   Date of Discharge: 2024    Admitting Diagnosis: COPD exacerbation (HCC) [J44.1]  Acute on chronic systolic congestive heart failure (HCC) [I50.23]  Chest pain with high risk for cardiac etiology [R07.9]    Disposition: Home    Discharge Diagnosis: .Principal Problem:    Acute on chronic systolic congestive heart failure (HCC)  Active Problems:    COPD exacerbation (HCC)    Chest pain with high risk for cardiac etiology    Pulmonary embolus (HCC)      Hospital Course:   Reason for Admission: Shortness of breath    Hospital Course: Admission history from admission summary  73 yoM with COPD, CHF with EF of 35 to 40%, type 2 diabetes, CAD with multiple stents, hypertension, hyperlipidemia, who presents for evaluation of chest pain, difficulty breathing and lightheadedness.  He has had 4 to 5 days of intermittent chest pain which she described comes on when he is stressed out or cleaning the house.  Additionally he has a 23 pound weight gain in the past 2 to 3 weeks.  He also has lightheadedness with standing and bending.  Is any fever or chills.  Denies any UTI symptoms.  He has cough on and off.         Acute on chronic systolic congestive heart failure (HCC)-resolved.  Patient ambulated in the hallway.  Cardiology cleared him yesterday.     COPD-significantly better.  Ambulated in the hallway.  Currently on room air.     History of coronary artery disease status post PCI-stable     Diabetes type 2 this is primarily from noncompliance.  I discussed with the patient at length regarding importance of diet and behavioral modifications.  Because of elevated blood sugars, I will start him on Levemir 10 units daily.  Will provide diabetic education.  Outpatient  follow-up with endocrinologist.  Follow-up with the PCP in 3 days with the blood sugar numbers.     History of PE continue with Coumadin      Dyslipidemia-continue statins     Morbid obesity-May need to consider referral to bariatrics outpatient     Hypothyroidism-continue supplementation     Neuropathy-resume home medications     DVT prophylaxis INR therapeutic     Plan  Okay to go home  Start Levemir 10 units daily  Discussed dietary and behavioral modifications  Dietitian to see  RN to teach insulin injections  Follow-up with the PCP in 3 days with the blood sugar numbers  Follow-up with endocrinology    Discharge Physical Exam:  Vital Signs:  Blood pressure 90/74, pulse 91, temperature 97.9 °F (36.6 °C), temperature source Oral, resp. rate 18, height 5' 9\" (1.753 m), weight 291 lb 14.4 oz (132.4 kg), SpO2 92%.     Constitutional:       Appearance: Normal appearance.   HENT:      Head: Normocephalic and atraumatic.   Cardiovascular:      Rate and Rhythm: Normal rate and regular rhythm.      Pulses: Normal pulses.      Heart sounds: Normal heart sounds.   Pulmonary:      Effort: Pulmonary effort is normal.      Breath sounds: Normal breath sounds.   Abdominal:      Palpations: Abdomen is soft.  Bowel sounds present.  Obese  Musculoskeletal:       neck supple.   Skin:     General: Skin is warm.   Neurological:      Mental Status: He is alert. Mental status is at baseline.        Complications: None    Consultants         Provider   Role Specialty     Nilda Argueta MD  Consulting Physician Internal Medicine     Anival Hammer MD  Consulting Physician Interventional, Cardiology                Discharge Plan:   Discharge Condition: Stable    Current Discharge Medication List        New Orders    Details   insulin detemir (LEVEMIR FLEXTOUCH) 100 UNIT/ML Subcutaneous Solution Pen-injector Inject 10 Units into the skin daily.           Home Meds - Unchanged    Details   zolpidem 5 MG Oral Tab Take 1 tablet (5 mg total)  by mouth nightly as needed for Sleep.      carvedilol 6.25 MG Oral Tab Take 1 tablet (6.25 mg total) by mouth 2 (two) times daily with meals.      torsemide 20 MG Oral Tab Take 1 tablet (20 mg total) by mouth daily.      spironolactone 25 MG Oral Tab Take 1 tablet (25 mg total) by mouth daily.      atorvastatin 80 MG Oral Tab Take 1 tablet (80 mg total) by mouth daily.      levothyroxine 150 MCG Oral Tab Take 1 tablet (150 mcg total) by mouth before breakfast.      metFORMIN HCl 1000 MG Oral Tab Take 0.5 tablets (500 mg total) by mouth 2 (two) times daily with meals.      MOVANTIK 25 MG Oral Tab TAKE 1 TABLET BY MOUTH DAILY 1 -2 HOURS AFTER MEAL      empagliflozin (JARDIANCE) 10 MG Oral Tab Take 1 tablet (10 mg total) by mouth daily.      fluticasone-umeclidin-vilant (TRELEGY ELLIPTA) 200-62.5-25 MCG/ACT Inhalation Aerosol Powder, Breath Activated Inhale 1 puff into the lungs daily.      albuterol (VENTOLIN HFA) 108 (90 Base) MCG/ACT Inhalation Aero Soln Inhale 2 puffs into the lungs every 4 (four) hours as needed for Wheezing.      clopidogrel 75 MG Oral Tab Take 1 tablet (75 mg total) by mouth daily.      bimatoprost (LUMIGAN) 0.01 % Ophthalmic Solution Place 1 drop into both eyes every evening.      ENTRESTO 24-26 MG Oral Tab Take 1 tablet by mouth 2 (two) times daily.      oxyCODONE-acetaminophen  MG Oral Tab Take 1 tablet by mouth 4 (four) times daily as needed.      DULoxetine 30 MG Oral Cap DR Particles Take 1 capsule (30 mg total) by mouth daily.      warfarin 5 MG Oral Tab Take 2 tablets (10 mg total) by mouth nightly. 7.5mg on Sunday and Wednesday  10mg on all other days      pregabalin 300 MG Oral Cap Take 1 capsule (300 mg total) by mouth 2 (two) times daily.      aspirin 81 MG Oral Tab EC Take 1 tablet (81 mg total) by mouth daily.      Blood Glucose Monitoring Suppl Does not apply Kit Please use to check blood sugar twice daily.      !! Blood Glucose Monitoring Suppl Does not apply Misc Please  use to check blood sugar twice daily      !! Blood Glucose Monitoring Suppl Does not apply Misc Please use to check blood sugar twice daily       !! - Potential duplicate medications found. Please discuss with provider.              Discharge Diet: Diabetic diet    Discharge Activity: As tolerated    Follow up:      Follow-up Information       Ben Pérez MD. Schedule an appointment as soon as possible for a visit.    Specialty: ENDOCRINOLOGY  Why: Diabetes follow up  Contact information:  [address]   [address]  [place] [id number] [phone]               Anival Hammer MD Follow up.    Specialties: Interventional, Cardiology, CARDIOLOGY  Why: Office will call to schedule follow up  Contact information:  [address]   [address]  [place] [address]  [phone]               Nilda Argueta MD Follow up in 3 day(s).    Specialty: Internal Medicine  Why: to check blood sugars  Contact information:  [address]  [place] [address] [phone]                             Follow up Labs: Blood sugar numbers, INR in 3 days        Hospital Discharge Diagnoses:  Acute systolic heart failure, diabetes uncontrolled    Lace+ Score: 82  59-90 High Risk  29-58 Medium Risk  0-28   Low Risk.    TCM Follow-Up Recommendation:  LACE > 58: High Risk of readmission after discharge from the hospital.    Time spent:  30 to 74 minutes, more than 50% of the time was spend on counseling and coordination of care.    Eduardo Herbert MD  1/21/2024

## 2024-01-21 NOTE — PLAN OF CARE
Patient educated on monitoring blood glucose and insulin. Patient denies shortness of breath. Plan is for discharge home this afternoon. Patient updated on plan of care.    Problem: Patient Centered Care  Goal: Patient preferences are identified and integrated in the patient's plan of care  Description: Interventions:  - What would you like us to know as we care for you? From home with son  - Provide timely, complete, and accurate information to patient/family  - Incorporate patient and family knowledge, values, beliefs, and cultural backgrounds into the planning and delivery of care  - Encourage patient/family to participate in care and decision-making at the level they choose  - Honor patient and family perspectives and choices  Outcome: Progressing     Problem: Patient/Family Goals  Goal: Patient/Family Long Term Goal  Description: Patient's Long Term Goal: To go back home    Interventions:  - Follow medical regimen  - See additional Care Plan goals for specific interventions  Outcome: Progressing  Goal: Patient/Family Short Term Goal  Description: Patient's Short Term Goal: To not have SOB    Interventions:   - Follow medical regimen  - See additional Care Plan goals for specific interventions  Outcome: Progressing     Problem: PAIN - ADULT  Goal: Verbalizes/displays adequate comfort level or patient's stated pain goal  Description: INTERVENTIONS:  - Encourage pt to monitor pain and request assistance  - Assess pain using appropriate pain scale  - Administer analgesics based on type and severity of pain and evaluate response  - Implement non-pharmacological measures as appropriate and evaluate response  - Consider cultural and social influences on pain and pain management  - Manage/alleviate anxiety  - Utilize distraction and/or relaxation techniques  - Monitor for opioid side effects  - Notify MD/LIP if interventions unsuccessful or patient reports new pain  - Anticipate increased pain with activity and  pre-medicate as appropriate  Outcome: Progressing     Problem: SAFETY ADULT - FALL  Goal: Free from fall injury  Description: INTERVENTIONS:  - Assess pt frequently for physical needs  - Identify cognitive and physical deficits and behaviors that affect risk of falls.  - [place] fall precautions as indicated by assessment.  - Educate pt/family on patient safety including physical limitations  - Instruct pt to call for assistance with activity based on assessment  - Modify environment to reduce risk of injury  - Provide assistive devices as appropriate  - Consider OT/PT consult to assist with strengthening/mobility  - Encourage toileting schedule  Outcome: Progressing     Problem: DISCHARGE PLANNING  Goal: Discharge to home or other facility with appropriate resources  Description: INTERVENTIONS:  - Identify barriers to discharge w/pt and caregiver  - Include patient/family/discharge partner in discharge planning  - Arrange for needed discharge resources and transportation as appropriate  - Identify discharge learning needs (meds, wound care, etc)  - Arrange for interpreters to assist at discharge as needed  - Consider post-discharge preferences of patient/family/discharge partner  - Complete POLST form as appropriate  - Assess patient's ability to be responsible for managing their own health  - Refer to Case Management Department for coordinating discharge planning if the patient needs post-hospital services based on physician/LIP order or complex needs related to functional status, cognitive ability or social support system  Outcome: Progressing     Problem: CARDIOVASCULAR - ADULT  Goal: Maintains optimal cardiac output and hemodynamic stability  Description: INTERVENTIONS:  - Monitor vital signs, rhythm, and trends  - Monitor for bleeding, hypotension and signs of decreased cardiac output  - Evaluate effectiveness of vasoactive medications to optimize hemodynamic stability  - Monitor arterial and/or venous  puncture sites for bleeding and/or hematoma  - Assess quality of pulses, skin color and temperature  - Assess for signs of decreased coronary artery perfusion - ex. Angina  - Evaluate fluid balance, assess for edema, trend weights  Outcome: Progressing  Goal: Absence of cardiac arrhythmias or at baseline  Description: INTERVENTIONS:  - Continuous cardiac monitoring, monitor vital signs, obtain 12 lead EKG if indicated  - Evaluate effectiveness of antiarrhythmic and heart rate control medications as ordered  - Initiate emergency measures for life threatening arrhythmias  - Monitor electrolytes and administer replacement therapy as ordered  Outcome: Progressing     Problem: METABOLIC/FLUID AND ELECTROLYTES - ADULT  Goal: Glucose maintained within prescribed range  Description: INTERVENTIONS:  - Monitor Blood Glucose as ordered  - Assess for signs and symptoms of hyperglycemia and hypoglycemia  - Administer ordered medications to maintain glucose within target range  - Assess barriers to adequate nutritional intake and initiate nutrition consult as needed  - Instruct patient on self management of diabetes  Outcome: Progressing  Goal: Electrolytes maintained within normal limits  Description: INTERVENTIONS:  - Monitor labs and rhythm and assess patient for signs and symptoms of electrolyte imbalances  - Administer electrolyte replacement as ordered  - Monitor response to electrolyte replacements, including rhythm and repeat lab results as appropriate  - Fluid restriction as ordered  - Instruct patient on fluid and nutrition restrictions as appropriate  Outcome: Progressing  Goal: Hemodynamic stability and optimal renal function maintained  Description: INTERVENTIONS:  - Monitor labs and assess for signs and symptoms of volume excess or deficit  - Monitor intake, output and patient weight  - Monitor urine specific gravity, serum osmolarity and serum sodium as indicated or ordered  - Monitor response to interventions for  patient's volume status, including labs, urine output, blood pressure (other measures as available)  - Encourage oral intake as appropriate  - Instruct patient on fluid and nutrition restrictions as appropriate  Outcome: Progressing

## 2024-01-21 NOTE — PLAN OF CARE
Patient report of chronic back pain - PRN percocet administered. CRUM still noted while ambulating. Up independently walking on the hallway with walker. RA. Neb treatment. Nicotine patch to the right arm. Blood glucose remained elevated this evening. Dr. Herbert notified. Additional insulin provided. Patient need frequent reinforcement regarding glucose control and fluid intake. Plan to continue monitor blood glucose, discharge home once medically clear. Call light within reach.    Problem: Patient Centered Care  Goal: Patient preferences are identified and integrated in the patient's plan of care  Description: Interventions:  - What would you like us to know as we care for you? From home with son  - Provide timely, complete, and accurate information to patient/family  - Incorporate patient and family knowledge, values, beliefs, and cultural backgrounds into the planning and delivery of care  - Encourage patient/family to participate in care and decision-making at the level they choose  - Honor patient and family perspectives and choices  Outcome: Progressing     Problem: Patient/Family Goals  Goal: Patient/Family Long Term Goal  Description: Patient's Long Term Goal: To go back home    Interventions:  - Follow medical regimen  - See additional Care Plan goals for specific interventions  Outcome: Progressing  Goal: Patient/Family Short Term Goal  Description: Patient's Short Term Goal: To not have SOB    Interventions:   - Follow medical regimen  - See additional Care Plan goals for specific interventions  Outcome: Progressing     Problem: PAIN - ADULT  Goal: Verbalizes/displays adequate comfort level or patient's stated pain goal  Description: INTERVENTIONS:  - Encourage pt to monitor pain and request assistance  - Assess pain using appropriate pain scale  - Administer analgesics based on type and severity of pain and evaluate response  - Implement non-pharmacological measures as appropriate and evaluate response  -  Consider cultural and social influences on pain and pain management  - Manage/alleviate anxiety  - Utilize distraction and/or relaxation techniques  - Monitor for opioid side effects  - Notify MD/LIP if interventions unsuccessful or patient reports new pain  - Anticipate increased pain with activity and pre-medicate as appropriate  Outcome: Progressing     Problem: SAFETY ADULT - FALL  Goal: Free from fall injury  Description: INTERVENTIONS:  - Assess pt frequently for physical needs  - Identify cognitive and physical deficits and behaviors that affect risk of falls.  - [place] fall precautions as indicated by assessment.  - Educate pt/family on patient safety including physical limitations  - Instruct pt to call for assistance with activity based on assessment  - Modify environment to reduce risk of injury  - Provide assistive devices as appropriate  - Consider OT/PT consult to assist with strengthening/mobility  - Encourage toileting schedule  Outcome: Progressing     Problem: CARDIOVASCULAR - ADULT  Goal: Maintains optimal cardiac output and hemodynamic stability  Description: INTERVENTIONS:  - Monitor vital signs, rhythm, and trends  - Monitor for bleeding, hypotension and signs of decreased cardiac output  - Evaluate effectiveness of vasoactive medications to optimize hemodynamic stability  - Monitor arterial and/or venous puncture sites for bleeding and/or hematoma  - Assess quality of pulses, skin color and temperature  - Assess for signs of decreased coronary artery perfusion - ex. Angina  - Evaluate fluid balance, assess for edema, trend weights  Outcome: Progressing  Goal: Absence of cardiac arrhythmias or at baseline  Description: INTERVENTIONS:  - Continuous cardiac monitoring, monitor vital signs, obtain 12 lead EKG if indicated  - Evaluate effectiveness of antiarrhythmic and heart rate control medications as ordered  - Initiate emergency measures for life threatening arrhythmias  - Monitor  electrolytes and administer replacement therapy as ordered  Outcome: Progressing

## 2024-01-21 NOTE — CM/SW NOTE
RICHAR followed up on DC planning.     SW received message from RN stating pt is ready for DC today    SW ordered transportation of a [facility] through [facility]. [facility] will transport the pt at 4:00 PM back home. PCS form/ flow sheet completed. RN to attach and print out with After Visit Summary Packet.     [facility] [phone]    PLAN: home, self care, [facility] to transport    Ananya López, YOLIW, MSW ext. [phone]

## 2024-01-22 ENCOUNTER — PATIENT OUTREACH (OUTPATIENT)
Dept: CASE MANAGEMENT | Age: 74
End: 2024-01-22

## 2024-01-22 NOTE — PROGRESS NOTES
NCM attempted to reach the patient to complete a TCM/HFU call. Patient answered the phone and stated he is getting in an uber right now to go to the store and the pharmacy. He is requesting a call back tomorrow morning. NCM advised patient will follow up.          Jadon Almonte(Attending)

## 2024-01-23 ENCOUNTER — TELEPHONE (OUTPATIENT)
Dept: INTERNAL MEDICINE CLINIC | Facility: CLINIC | Age: 74
End: 2024-01-23

## 2024-01-23 ENCOUNTER — PATIENT OUTREACH (OUTPATIENT)
Dept: CASE MANAGEMENT | Age: 74
End: 2024-01-23

## 2024-01-23 NOTE — TELEPHONE ENCOUNTER
NCM attempted to contact the patient for a TCM call however no response. Patient does not have an appointment scheduled at this time.  TCM appointment recommended by 02/03/2024 as patient is a High risk for readmission.  Please advise.    BOOK BY DATE (last date for TCM): 02/03/2024    Clinical staff:  Please follow-up with patient and try to get them to schedule as patient would greatly benefit from TCM appointment.  Thank you!

## 2024-01-23 NOTE — PROGRESS NOTES
NCM attempted to reach the patient to complete a TCM/HFU call. The patient's mailbox is full and NCM unable to leave a vm at this time. NCM called two times and each time the phone would ring and no option to leave vm as mailbox is full. A TE has been sent to the PCP office as the patient is a high risk for readmission.

## 2024-01-23 NOTE — PROGRESS NOTES
[facility] / DM apt request (discharged 01/21)    [facility]  [facility]  [address]  [address]  [facility] [address]  [phone]  Mon 02/05 @3:00pm    Dr Ben Pérez  ENDOCRINOLOGY  [facility]  [address]  [address]  [facility] [address]  [phone]  Apt made:  Fri 01/26 @10:40am  Confirmed w/pt   Pt asked when his apt w/[facility] was - Wed 01/31 @10:30am  Closing encounter

## 2024-01-25 NOTE — PHYSICAL THERAPY NOTE
PHYSICAL THERAPY EVALUATION - INPATIENT     Room Number: 306/306-A  Evaluation Date: 1/21/2024  Type of Evaluation: Initial   Physician Order: PT Eval and Treat    Presenting Problem: HPI 73 yoM with COPD, CHF with EF of 35 to 40%, type 2 diabetes, CAD with multiple stents, hypertension, hyperlipidemia, who presents for evaluation of chest pain, difficulty breathing and lightheadedness.  He has had 4 to 5 days of intermittent chest pain which she described comes on when he is stressed out or cleaning the house.  Additionally he has a 23 pound weight gain in the past 2 to 3 weeks.  He also has lightheadedness with standing and bending.  Is any fever or chills.  Denies any UTI symptoms.  He has cough on and off.     Reason for Therapy: Mobility Dysfunction and Discharge Planning    PHYSICAL THERAPY ASSESSMENT     Patient is a 73 year old male admitted 1/18/2024 for chest pain, weight gain.  Patient's current functional deficits include impaired strength, impaired ROM, impaired standing balance, impaired ambulation tolerance, increased level of physical assistance for performance of ADL's and functional mobility, which are below the patient's pre-admission status.      The patient's Approx Degree of Impairment: 28.97% has been calculated based on documentation in the [facility] '6 clicks' Inpatient Basic Mobility Short Form.  Research supports that patients with this level of impairment may benefit from Home with no services, however would benefit from Home PT for endurance training and safety.    Patient will benefit from continued IP PT services to address these deficits in preparation for discharge.    DISCHARGE RECOMMENDATIONS  PT Discharge Recommendations: Intermittent Supervision;Home with home health PT/OT    PLAN  PT Treatment Plan: Body mechanics;Bed mobility;Endurance;Energy conservation;Strengthening;Stair training;Balance training;Transfer training  Rehab Potential : Fair  Frequency (Obs): 3-5x/week    Discussed options in general terms [place] or [facility] program.        PHYSICAL THERAPY MEDICAL/SOCIAL HISTORY     History related to current admission: 73 yoM with COPD, CHF with EF of 35 to 40%, type 2 diabetes, CAD with multiple stents, hypertension, hyperlipidemia, who presents for evaluation of chest pain, difficulty breathing and lightheadedness.  He has had 4 to 5 days of intermittent chest pain which she described comes on when he is stressed out or cleaning the house.  Additionally he has a 23 pound weight gain in the past 2 to 3 weeks.  He also has lightheadedness with standing and bending.  Is any fever or chills.  Denies any UTI symptoms.  He has cough on and off.      Problem List  Principal Problem:    Acute on chronic systolic congestive heart failure (HCC)  Active Problems:    COPD exacerbation (HCC)    Chest pain with high risk for cardiac etiology    Pulmonary embolus (HCC)      HOME SITUATION  Home Situation  Type of Home: Apartment  Home Layout: One level  Stairs to Enter : 0  Lives With: Son  Drives: No  Patient Regularly Uses: None     Prior Level of [place]: Prior to admission patient was independent with performance of all functional mobility with intermittent use of rolling walker. Able to dress/bathe himself. Patient reports he was receiving caregiver assistance for cooking/cleaning activities.    SUBJECTIVE  \"I want to go home\"    PHYSICAL THERAPY EXAMINATION     OBJECTIVE  Precautions: Bed/chair alarm  Fall Risk: Standard fall risk    WEIGHT BEARING RESTRICTION  Weight Bearing Restriction: None    PAIN ASSESSMENT  Ratin     COGNITION  Overall Cognitive Status:  WFL - within functional limits  Arousal/Alertness:  appropriate responses to stimuli  Orientation Level:  oriented x4  Safety Judgement:  decreased awareness of need for safety    RANGE OF MOTION AND STRENGTH ASSESSMENT  Upper extremity ROM and strength are within functional limits BUE.  Lower extremity ROM is within functional limits BLE.  Lower extremity strength is within functional  limits BLE.    BALANCE  Static Sitting: Good  Dynamic Sitting: Fair  Static Standing: Fair  Dynamic Standing: Fair    AM-PAC '6-Clicks' INPATIENT SHORT FORM - BASIC MOBILITY  How much difficulty does the patient currently have...  Patient Difficulty: Turning over in bed (including adjusting bedclothes, sheets and blankets)?: None   Patient Difficulty: Sitting down on and standing up from a chair with arms (e.g., wheelchair, bedside commode, etc.): None   Patient Difficulty: Moving from lying on back to sitting on the side of the bed?: None   How much help from another person does the patient currently need...   Help from Another: Moving to and from a bed to a chair (including a wheelchair)?: A Little   Help from Another: Need to walk in hospital room?: A Little   Help from Another: Climbing 3-5 steps with a railing?: A Little     AM-PAC Score:  Raw Score: 21   Approx Degree of Impairment: 28.97%   Standardized Score (AM-PAC Scale): 50.25   CMS Modifier (G-Code): CJ    FUNCTIONAL ABILITY STATUS  Functional Mobility/Gait Assessment  Gait Assistance: Supervision  Distance (ft): ~80 feet  Assistive Device: Rolling walker  Pattern:  (decreased mora, decreased step length, forward flexed posture, narrow base of support, verbal cues for sequencing and rolling walker management.)  Stairs:  (Deferred as not a goal for safe discharge.)    Bed Mobility: Not performed as patient sitting in bedside recliner at initiation of session.    Transfers: Patient performs sit to stand transfers with supervision.    Exercise/Education Provided:  Bed mobility  Body mechanics  Gait training  Neuromuscular re-educate  Posture  Strengthening  Transfer training    Patient End of Session: Up in chair;Needs met;Call light within reach;Alarm set;RN aware of session/findings    CURRENT GOALS    Goals to be met by: 1/31/24  Patient Goal Patient's self-stated goal is: to return home   Goal #1 Patient is able to demonstrate supine - sit EOB @  level: modified independent     Goal #1   Current Status    Goal #2 Patient is able to demonstrate transfers Sit to/from Stand at assistance level: modified independent with  least restrictive device     Goal #2  Current Status    Goal #3 Patient is able to ambulate >150 feet with assist device:  least restrictive device  at assistance level: modified independent   Goal #3   Current Status    Goal #4 Patient will negotiate 2 stairs/one curb w/ assistive device and supervision   Goal #4   Current Status    Goal #5 Patient to demonstrate independence with home activity/exercise instructions provided to patient in preparation for discharge.   Goal #5   Current Status    Goal #6    Goal #6  Current Status      Patient Evaluation Complexity Level:   History High - 3 or more personal factors and/or co-morbidities   Examination of body systems Moderate - addressing a total of 3 or more elements   Clinical Presentation Moderate - Evolving   Clinical Decision Making Moderate Complexity     Gait Training: 10 minutes     Kimi Powell PT, DPT

## 2024-01-26 ENCOUNTER — OFFICE VISIT (OUTPATIENT)
Facility: CLINIC | Age: 74
End: 2024-01-26

## 2024-01-26 VITALS
DIASTOLIC BLOOD PRESSURE: 60 MMHG | WEIGHT: 294 LBS | HEART RATE: 82 BPM | BODY MASS INDEX: 43 KG/M2 | SYSTOLIC BLOOD PRESSURE: 92 MMHG

## 2024-01-26 DIAGNOSIS — I10 PRIMARY HYPERTENSION: ICD-10-CM

## 2024-01-26 DIAGNOSIS — E07.9 THYROID DISEASE: ICD-10-CM

## 2024-01-26 DIAGNOSIS — Z79.4 TYPE 2 DIABETES MELLITUS WITH HYPERGLYCEMIA, WITH LONG-TERM CURRENT USE OF INSULIN (HCC): ICD-10-CM

## 2024-01-26 DIAGNOSIS — E11.65 HYPERGLYCEMIA DUE TO DIABETES MELLITUS (HCC): ICD-10-CM

## 2024-01-26 DIAGNOSIS — E55.9 VITAMIN D DEFICIENCY: ICD-10-CM

## 2024-01-26 DIAGNOSIS — E03.9 HYPOTHYROIDISM, UNSPECIFIED TYPE: Primary | ICD-10-CM

## 2024-01-26 DIAGNOSIS — E11.65 UNCONTROLLED TYPE 2 DIABETES MELLITUS WITH HYPERGLYCEMIA (HCC): ICD-10-CM

## 2024-01-26 DIAGNOSIS — R79.89 LOW TESTOSTERONE: ICD-10-CM

## 2024-01-26 DIAGNOSIS — R73.9 HYPERGLYCEMIA: ICD-10-CM

## 2024-01-26 DIAGNOSIS — E66.01 MORBID OBESITY WITH BMI OF 40.0-44.9, ADULT (HCC): ICD-10-CM

## 2024-01-26 DIAGNOSIS — I10 HYPERTENSION, UNSPECIFIED TYPE: ICD-10-CM

## 2024-01-26 DIAGNOSIS — E11.9 TYPE 2 DIABETES MELLITUS WITHOUT RETINOPATHY (HCC): ICD-10-CM

## 2024-01-26 DIAGNOSIS — E78.5 HYPERLIPIDEMIA LDL GOAL <70: ICD-10-CM

## 2024-01-26 DIAGNOSIS — I25.119 CORONARY ARTERY DISEASE INVOLVING NATIVE CORONARY ARTERY OF NATIVE HEART WITH ANGINA PECTORIS (HCC): ICD-10-CM

## 2024-01-26 DIAGNOSIS — R73.09 HIGH GLUCOSE LEVEL: ICD-10-CM

## 2024-01-26 DIAGNOSIS — E11.65 TYPE 2 DIABETES MELLITUS WITH HYPERGLYCEMIA, WITH LONG-TERM CURRENT USE OF INSULIN (HCC): ICD-10-CM

## 2024-01-26 LAB
GLUCOSE BLOOD: 309
TEST STRIP LOT #: NORMAL NUMERIC

## 2024-01-26 PROCEDURE — 82947 ASSAY GLUCOSE BLOOD QUANT: CPT | Performed by: INTERNAL MEDICINE

## 2024-01-26 PROCEDURE — 99215 OFFICE O/P EST HI 40 MIN: CPT | Performed by: INTERNAL MEDICINE

## 2024-01-26 RX ORDER — BLOOD-GLUCOSE SENSOR
1 EACH MISCELLANEOUS
Qty: 6 EACH | Refills: 1 | Status: SHIPPED | OUTPATIENT
Start: 2024-01-26 | End: 2024-04-25

## 2024-01-26 RX ORDER — BLOOD-GLUCOSE,RECEIVER,CONT
1 EACH MISCELLANEOUS ONCE
Qty: 1 EACH | Refills: 0 | Status: SHIPPED | OUTPATIENT
Start: 2024-01-26 | End: 2024-01-26

## 2024-01-26 RX ORDER — SEMAGLUTIDE 0.68 MG/ML
0.5 INJECTION, SOLUTION SUBCUTANEOUS WEEKLY
Qty: 9 ML | Refills: 1 | Status: SHIPPED | OUTPATIENT
Start: 2024-01-26 | End: 2024-07-24

## 2024-01-26 RX ORDER — EMPAGLIFLOZIN 25 MG/1
1 TABLET, FILM COATED ORAL DAILY
Qty: 90 TABLET | Refills: 1 | Status: SHIPPED | OUTPATIENT
Start: 2024-01-26 | End: 2024-04-25

## 2024-01-26 NOTE — PROGRESS NOTES
Follow up for DM, hypothyroid, HF .  Requesting Physician:   ..JULITO AG MD      CHIEF COMPLAINT:    Chief Complaint   Patient presents with    Follow - Up     Hospital follow up for diabetes. Last A1c taken 12/23/23, result 8.5%. Patient currently takes 10 units of levemir around 6pm, and metformin 1000mg bid. Blood glucose in office 309.         HISTORY OF PRESENT ILLNESS:   Caleb Rausch is a 73 year old male who presents with   Pt was seen before by Dr Barahona   I reviewed notes, labs and w/u  They were late 19 minutes to the visit today   Was seen with family - daughter and grandson. He lives alone. His daughter was concerned he cannot place CGM. Also was concerned Trulicity will cause low BG . We discussed in length.   Pt is poor historian. He asked about meds on TV cure DM in 3 days     He is on  Metfromin 500 mg bid   Levemir 10 units     Does not have BG machine at home     He sleeps late at night and  snacks at night       Lab Results   Component Value Date    A1C 8.5 (H) 12/23/2023    A1C 9.0 (H) 09/22/2023    A1C 7.9 (H) 06/20/2023    A1C 9.1 (H) 01/12/2023    A1C 7.4 (H) 04/22/2022      He has multiple admissions for HF and fluid overload.       DM quality measures:  A1C/Blood pressure: as reported above   Nephropathy screening:   continue ace /arb rx.   LIPID screening:  . yes statin rx.   Last dilated eye exam: No data recorded   Exam shows retinopathy? No data recorded  Last diabetic foot exam: No data recorded  Dentist : recommend every 6m  Neuropathy: ?  CAD/ASCVD/PAD/CVA: HF       ASSESSMENT AND PLAN:  Uncontrolled complicated DM , hypothyroid, HF, obese, DLP  Very high risk for complications given his poor understanding of his diease and lives alone.   He was given Jardaince but has not started it       PlanCDE consult in 2 weeks   F/u with me in 4 weeks   Will start ozempic 0.25 mg weekly   Will start Jardiance 25 mg daily   Will increase metformin to 1000 mg twice a day   Continue levemir  10 units daily   Sent CGM - freestyle sindhu   Annual eye exam and foot exam   Let us know if meds are not covered so we might get preAuth or change to preferred agent    GLP-1 agonists:  No personal or family history of MEN syndrome   No personal history pancreatitis   Patient counselled regarding side effects including injection site reactions, nausea, vomiting, diarrhea, pancreatitis, gastroparesis and rare side effect carlos Richard syndrome.    SGLT2 inhibitors  No UTI or yeast infection   Discussed side effects including UTI and fungal infections.   Discussed the importance of hydration.        Check blood sugar fasting, before meals and before bedtime.   If you have low blood sugar <70, take 15 grams of carb (8 oz juice or regular soda) and recheck in 15 minutes.    Follow up with podiatry and eye doctor annually.   Patients need to wear covered shoes all the time and check feet daily.   Bring your meter/BG log to the next visit.    We reviewed a very large amount of complicated data including BG target range, basal insulin doses, meal and correction related insulin boluses, time of meal, size of meal, time of BG testing and insulin administration in relations to meals. We also reviewed self-testing BG results if available. We discussed these in detail with the patient and negotiated which of numerous possible changes in the in the treatment plan that would be acceptable to them. The patient remains at ongoing is at high risk for complications related to uncontrolled diabetes and treatment.  The patient requires a great deal of self-management and support. We expect the patient's risk to be reduced with the changes to the treatment plan that we recommended today.             PAST MEDICAL HISTORY:   Past Medical History:   Diagnosis Date    Anxiety disorder, unspecified 01/22/2022    Atherosclerosis of coronary artery     stents x 6    Chronic low back pain with bilateral sciatica, left worse than right  11/28/2017    Chronic obstructive pulmonary disease, unspecified (HCC) 01/22/2022    Coronary atherosclerosis     Diabetes ([facility])     borderline     Disorder of thyroid     Essential hypertension     Glaucoma 05/10/2023    first visit with IRMA, patient was taking Timolol OU BID for 20 years, has not used gtts in over 1 year because he ran out of gtts and had no refills, fundus photos taken today    Heart attack ([facility])     High blood pressure     High cholesterol     Hyperlipidemia     Kidney stone 07/2020    Left Sided Neck pain, acute 11/28/2017    Lumbar stenosis with neurogenic claudication 05/31/2018    Major depressive disorder, recurrent, unspecified (HCC) 01/22/2022    Morbid obesity with BMI of 40.0-44.9, adult ([facility])     Pneumonia due to COVID-19 virus 01/09/2022    Thyroid disease        PAST SURGICAL HISTORY:   Past Surgical History:   Procedure Laterality Date    CATARACT EXTRACTION EXTRACAPSULAR W/ INTRAOCULAR LENS IMPLANTATION Right 2018    [facility] BARE METAL STENT (BMS)      CIRCUMCISION,OTHR  08/04/2020    Dr. Bonilla @ [facility]    CYSTO/URETERO W/LITHOTRIPSY Left 08/04/2020    Dr. 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     IRIDOTOMY/IRIDECTOMY BY LASER      unknown Dr    OTHER      cardiac stents       CURRENT MEDICATIONS:    Current Outpatient Medications   Medication Sig Dispense Refill    Continuous Blood Gluc Sensor (FREESTYLE NANCY 3 SENSOR) Does not apply Misc 1 each every 14 (fourteen) days. 6 each 1    Continuous Blood Gluc  (FREESTYLE NANCY 3 READER) Does not apply Misc 1 each once for 1 dose. 1 each 0    semaglutide (OZEMPIC, 0.25 OR 0.5 MG/DOSE,) 2 MG/3ML Subcutaneous Solution Pen-injector Inject 0.5 mg into the skin once a week. 9 mL 1    Empagliflozin (JARDIANCE) 25 MG Oral Tab Take 1 each by mouth daily. 90 tablet 1    insulin detemir (LEVEMIR FLEXTOUCH) 100 UNIT/ML Subcutaneous Solution Pen-injector Inject 10 Units  into the skin daily. 10 mL 0    zolpidem 5 MG Oral Tab Take 1 tablet (5 mg total) by mouth nightly as needed for Sleep.      carvedilol 6.25 MG Oral Tab Take 1 tablet (6.25 mg total) by mouth 2 (two) times daily with meals. 60 tablet 0    torsemide 20 MG Oral Tab Take 1 tablet (20 mg total) by mouth daily. 30 tablet 0    spironolactone 25 MG Oral Tab Take 1 tablet (25 mg total) by mouth daily. 30 tablet 0    atorvastatin 80 MG Oral Tab Take 1 tablet (80 mg total) by mouth daily. 90 tablet 0    levothyroxine 150 MCG Oral Tab Take 1 tablet (150 mcg total) by mouth before breakfast. 90 tablet 0    metFORMIN HCl 1000 MG Oral Tab Take 0.5 tablets (500 mg total) by mouth 2 (two) times daily with meals. (Patient taking differently: Take 1 tablet (1,000 mg total) by mouth 2 (two) times daily with meals.) 180 tablet 1    MOVANTIK 25 MG Oral Tab TAKE 1 TABLET BY MOUTH DAILY 1 -2 HOURS AFTER MEAL      fluticasone-umeclidin-vilant (TRELEGY ELLIPTA) 200-62.5-25 MCG/ACT Inhalation Aerosol Powder, Breath Activated Inhale 1 puff into the lungs daily. 180 each 3    albuterol (VENTOLIN HFA) 108 (90 Base) MCG/ACT Inhalation Aero Soln Inhale 2 puffs into the lungs every 4 (four) hours as needed for Wheezing. 54 g 0    clopidogrel 75 MG Oral Tab Take 1 tablet (75 mg total) by mouth daily. 90 tablet 3    bimatoprost (LUMIGAN) 0.01 % Ophthalmic Solution Place 1 drop into both eyes every evening.      Blood Glucose Monitoring Suppl Does not apply Kit Please use to check blood sugar twice daily. 1 kit 0    Blood Glucose Monitoring Suppl Does not apply Misc Please use to check blood sugar twice daily 200 each 3    Blood Glucose Monitoring Suppl Does not apply Misc Please use to check blood sugar twice daily 200 each 0    ENTRESTO 24-26 MG Oral Tab Take 1 tablet by mouth 2 (two) times daily. 180 tablet 0    oxyCODONE-acetaminophen  MG Oral Tab Take 1 tablet by mouth 4 (four) times daily as needed.      DULoxetine 30 MG Oral Cap   Particles Take 1 capsule (30 mg total) by mouth daily.      warfarin 5 MG Oral Tab Take 2 tablets (10 mg total) by mouth nightly. 7.5mg on Sunday and Wednesday  10mg on all other days      pregabalin 300 MG Oral Cap Take 1 capsule (300 mg total) by mouth 2 (two) times daily. 30 capsule 0    aspirin 81 MG Oral Tab EC Take 1 tablet (81 mg total) by mouth daily. 30 tablet 2       ALLERGIES:  No Known Allergies    SOCIAL HISTORY:    Social History     Socioeconomic History    Marital status: Single   Tobacco Use    Smoking status: Every Day     Packs/day: 1.5     Types: Cigarettes    Smokeless tobacco: Former    Tobacco comments:     per pt, quit in 1994   Vaping Use    Vaping Use: Never used   Substance and Sexual Activity    Alcohol use: No     Comment: quit in 1994    Drug use: No   Other Topics Concern    Caffeine Concern Yes    Exercise No       FAMILY HISTORY:   Family History   Problem Relation Age of Onset    Heart Attack Father     Hypertension Brother     Glaucoma Neg     Macular degeneration Neg           REVIEW OF SYSTEMS:  All negative other than HPI      PHYSICAL EXAM:   Height: --  Weight: 294 lb (133.4 kg) (01/26 1200)  BSA (Calculated - sq m): --  Pulse: 82 (01/26 1200)  BP: 92/60 (01/26 1200)  Temp: --  Do Not Use - Resp Rate: --  SpO2: --      CONSTITUTIONAL:  Awake and alert. Age appropriate, good hygiene not in acute distress. Well nourished and well developed. no acute distress   PSYCH:   Orientated to time, place, person & situation, Normal mood and affect, memory intact, normal insight and judgment, cooperative  Neuro: speech is clear. Awake, alert, no aphasia, no facial asymmetry, no nuchal rigidity  EYES:  No proptosis, no ptosis, conjunctiva normal  ENT:  Normocephalic, atraumatic  Eye: EOMI, normal lids, no discharge, no conjunctival erythema. No exophthalmos/proptosis, Ptosis negative   No rhinorrhea, moist oral mucosa  Neck: full range of motion  Neck/Thyroid: neck inspection: normal, No  scar, No goiter   LUNGS:  No acute respiratory distress, non-labored respiration. Speaking full sentences  CARDIOVASCULAR:  regular rate   ABDOMEN:  No abdominal pain.   SKIN:  no bruising or bleeding, no rashes and no lesions, Skin is dry, no obvious rashes or lesions  EXTREMITIES: no gross abnormality   MSK: Moves extremities spontaneously. full range of motion in all major joints      DATA:     Pertinent data reviewed              No results for input(s): \"TSH\", \"T4F\", \"T3F\", \"THYP\" in the last 72 hours.  No results found.        Orders Placed This Encounter   Procedures    POC HemoCue Glucose 201 (Finger stick glucose)    Microalb/Creat Ratio, Random Urine    Lipid Panel    Comp Metabolic Panel (14)    TSH and Free T4    Hemoglobin A1C     Orders Placed This Encounter    POC HemoCue Glucose 201 (Finger stick glucose)     Order Specific Question:   Release to patient     Answer:   Immediate    Microalb/Creat Ratio, Random Urine     Standing Status:   Future     Standing Expiration Date:   2025     Order Specific Question:   Release to patient     Answer:   Immediate    Lipid Panel     Standing Status:   Future     Standing Expiration Date:   2025     Order Specific Question:   Release to patient     Answer:   Immediate    Comp Metabolic Panel (14)     Standing Status:   Future     Standing Expiration Date:   2025    TSH and Free T4     Standing Status:   Future     Standing Expiration Date:   2025     Order Specific Question:   Release to patient     Answer:   Immediate    Hemoglobin A1C     Standing Status:   Future     Standing Expiration Date:   2025    Continuous Blood Gluc Sensor (FREESTYLE NANCY 3 SENSOR) Does not apply Misc     Si each every 14 (fourteen) days.     Dispense:  6 each     Refill:  1    Continuous Blood Gluc  (FREESTYLE NANCY 3 READER) Does not apply Misc     Si each once for 1 dose.     Dispense:  1 each     Refill:  0    semaglutide (OZEMPIC, 0.25 OR  0.5 MG/DOSE,) 2 MG/3ML Subcutaneous Solution Pen-injector     Sig: Inject 0.5 mg into the skin once a week.     Dispense:  9 mL     Refill:  1    Empagliflozin (JARDIANCE) 25 MG Oral Tab     Sig: Take 1 each by mouth daily.     Dispense:  90 tablet     Refill:  1          This is a specialized patient consultation in endocrinology and required comprehensive review of prior records, as well as current evaluation, with time required for consideration of complex endocrine issues and consultation. For this visit, I personally interviewed the patient, and family member if accompanied, performed the pertinent parts of the history and physical examination. ROS included screening for appropriate endocrine conditions.   Today's diagnosis and plan were reviewed in detail with the patient who states understanding and agrees with plan. I discussed with the patient possible diagnosis, differential diagnosis, need for work up , treatment options, alternatives and side effects.     Please see note for details about time spent which includes:   · pre-visit preparation  · reviewing records  · face to face time with the patient   · timely documentation of the encounter  · ordering medications/tests  · communication with care team  · care coordination    I appreciate the opportunity to be part of your patient's medical care and will keep you, as the referring and primary physicians, informed about the care of your patient, including possible future surgery and pathology findings. Please feel free to contact me should you have any questions.    Total time 40 minutes   Ben Pérez MD

## 2024-01-26 NOTE — PATIENT INSTRUCTIONS
CDE consult in 2 weeks   F/u with me in 4 weeks   Will start ozempic 0.25 mg weekly   Will start Jardiance 25 mg daily   Will increase metformin to 1000 mg twice a day   Continue levemir 10 units daily   Sent CGM - freestgenna [place]   Annual eye exam and foot exam   Let us know if meds are not covered so we might get preAuth or change to preferred agent

## 2024-01-27 ENCOUNTER — TELEPHONE (OUTPATIENT)
Dept: ENDOCRINOLOGY CLINIC | Facility: CLINIC | Age: 74
End: 2024-01-27

## 2024-01-27 ENCOUNTER — TELEPHONE (OUTPATIENT)
Dept: INTERNAL MEDICINE CLINIC | Facility: CLINIC | Age: 74
End: 2024-01-27

## 2024-01-27 NOTE — TELEPHONE ENCOUNTER
Current Outpatient Medications   Medication Sig Dispense Refill    semaglutide (OZEMPIC, 0.25 OR 0.5 MG/DOSE,) 2 MG/3ML Subcutaneous Solution Pen-injector Inject 0.5 mg into the skin once a week. 9 mL 1     KEY: [id number]    Current Outpatient Medications   Medication Sig Dispense Refill    Continuous Blood Gluc Sensor (FREESTYLE NANCY 3 SENSOR) Does not apply Misc 1 each every 14 (fourteen) days. 6 each 1     KEY:[id number]

## 2024-01-29 NOTE — TELEPHONE ENCOUNTER
Medication  CGM  pump supply Requested: semaglutide (OZEMPIC, 0.25 OR 0.5 MG/DOSE,) 2 MG/3ML Subcutaneous Solution Pen-injector                                                            CoverMyMeds Used:    Key: [id number]     Sig: Inject 0.5 mg into the skin once a week.     DX Code: E11.65, Z79.4                                        Case Number/Pending Ref#:     -------------------------------------------------------------    Medication  CGM  pump supply Requested:     Continuous Blood Gluc Sensor (FREESTYLE NANCY 3 SENSOR) Does not apply Misc                                                              CoverMyMeds Used:    Key: [id number]     Si each every 14 (fourteen) days.     DX Code: E11.65, Z79.4                                        Case Number/Pending Ref#:

## 2024-01-30 NOTE — TELEPHONE ENCOUNTER
(Message Delivered)   D E L I V E R I E S :  2024 12:00p           CIERA         Delivered  ======================================================================  Paging    Message # [phone]         2024 01:18p   [MEGANT]  To:  From:  CONSOLE  Primary MD:  Phone#:  ----------------------------------------------------------------------  SHELLEY GATES [phone] RE PT JESSICA HARTLEY [birth year] RE RX    Paged at number :  PAGE: [phone] at :  13:18

## 2024-02-01 ENCOUNTER — PATIENT OUTREACH (OUTPATIENT)
Dept: CASE MANAGEMENT | Age: 74
End: 2024-02-01

## 2024-02-01 ENCOUNTER — TELEPHONE (OUTPATIENT)
Dept: CARDIOLOGY CLINIC | Facility: HOSPITAL | Age: 74
End: 2024-02-01

## 2024-02-01 DIAGNOSIS — I50.23 ACUTE ON CHRONIC SYSTOLIC CONGESTIVE HEART FAILURE (HCC): Primary | ICD-10-CM

## 2024-02-01 DIAGNOSIS — I50.23 ACUTE ON CHRONIC HFREF (HEART FAILURE WITH REDUCED EJECTION FRACTION) (HCC): ICD-10-CM

## 2024-02-01 DIAGNOSIS — J96.01 ACUTE HYPOXIC RESPIRATORY FAILURE (HCC): Primary | ICD-10-CM

## 2024-02-01 DIAGNOSIS — E11.65 TYPE 2 DIABETES MELLITUS WITH HYPERGLYCEMIA, WITH LONG-TERM CURRENT USE OF INSULIN (HCC): ICD-10-CM

## 2024-02-01 DIAGNOSIS — H25.12 AGE-RELATED NUCLEAR CATARACT OF LEFT EYE: ICD-10-CM

## 2024-02-01 DIAGNOSIS — J44.1 COPD EXACERBATION (HCC): ICD-10-CM

## 2024-02-01 DIAGNOSIS — Z79.4 TYPE 2 DIABETES MELLITUS WITH HYPERGLYCEMIA, WITH LONG-TERM CURRENT USE OF INSULIN (HCC): ICD-10-CM

## 2024-02-01 DIAGNOSIS — I50.23 ACUTE ON CHRONIC SYSTOLIC CONGESTIVE HEART FAILURE (HCC): ICD-10-CM

## 2024-02-01 DIAGNOSIS — H40.2290 CHRONIC PRIMARY ANGLE-CLOSURE GLAUCOMA, UNSPECIFIED GLAUCOMA STAGE, UNSPECIFIED LATERALITY: ICD-10-CM

## 2024-02-01 NOTE — PROGRESS NOTES
Spoke to Caleb for CCM.      Updates to patient care team/comments: UTD  Patient reported changes in medications: reports changes   Med Adherence  Comment: reports adherence      Health Maintenance:   Health Maintenance   Topic Date Due    Zoster Vaccines (1 of 2) Never done    Colorectal Cancer Screening  06/11/2019    PSA  07/01/2023    COVID-19 Vaccine (4 - 2023-24 season) 09/01/2023    Diabetes Care Foot Exam (Annual)  01/01/2024    Diabetes Care: Microalb/Creat Ratio  01/12/2024    Annual Physical  01/12/2024    Diabetes Care A1C  03/23/2024    Diabetes Care Dilated Eye Exam  05/10/2024    Tobacco Cessation Counseling 1 Year  01/18/2025    Diabetes Care: GFR  01/19/2025    Influenza Vaccine  Completed    Annual Depression Screening  Completed    Fall Risk Screening (Annual)  Completed    Pneumococcal Vaccine: 65+ Years  Completed       Patient updates/concerns:   Spoke with patient.  Patient relates doing ok. Mood good/stable.  Patient was hospitalized last month. Missed cardiology and PCP follow up. Aware needs to reschedule. Declined me scheduling and he will call.  Breathing stable. Denies any recent falls. Patient reports feeling weak today and having lower back right hip pain. Using walker to ambulate. Also feels he has a dry mouth. States he is staying hydrated. FBS unknown. Did not  meter but plans on getting today. States he is watching his diet. Avoiding sugars and carbs.  Seeing endo. Last A1c value was 8.5% done 12/23/2023. Weight unknown. Encourged patient to get a scale and he should be weighing daily to monitor fluid. Vision stable.  Blood pressure and pulse unknown not checking but denies symptoms. Denies any urinary issues at this time.  No other questions or concerns.    Encouraged patient:   Self care: Take the time to do the things you love.   Nutrition:  Good nutrition helps us to maintain our weight, fight off infection, and help reduce the risk of developing other chronic issues.    Physical activity:  Physical activity is important to help maintain independence and improve quality of life.     Goals/Action Plan:    Active goal from previous outreach: Staying healthy, maintain independence, and stability.    Patient reported progress towards goals: stable                - What: needs to follow up on health conditions            - Where/When/How: seen endo but needs to monitor FBS. NEeds to see cardio and PCP.   Patient Reported Barriers and Concerns: as per above                    - Plan for overcoming barriers: encourged patient to call with any questions or concerns     Care Managers Interventions: Continue to provide encouragement and education for healthy coping and diagnosis.      Future Appointments:   Future Appointments   Date Time Provider Department Center   2/5/2024  3:00 PM Eva Gaines NP [facility] SPCIALTY EM [facility]         Next Care Manager Follow Up Date: 1 month     Reason For Follow Up: review progress and or barriers towards patient's goals.     Time Spent This Encounter Total: 20 min medical record review, telephone communication, care plan updates where needed, education, goals, and action plan recreation/update. Provided acknowledgment and validation to patient's concerns.   Monthly Minute Total including today: 20 min Physical assessment, complete health history, and need for CCM established by JULITO AG MD.

## 2024-02-01 NOTE — TELEPHONE ENCOUNTER
Medication  CGM  pump supply Requested: semaglutide (OZEMPIC, 0.25 OR 0.5 MG/DOSE,) 2 MG/3ML Subcutaneous Solution Pen-injector                                                            CoverMyMeds Used: Yes     Key: [id number]     Sig: Inject 0.5 mg into the skin once a week.      DX Code: E11.65, Z79.4                                        Case Number/Pending Ref#:       CMM submitted, LOV 1/26 and A1C 9/22  Awaiting determination

## 2024-02-01 NOTE — TELEPHONE ENCOUNTER
Appointment reminder given and requested to come 30 minutes early for lab draw.  Lab order entered and scheduled.

## 2024-02-01 NOTE — TELEPHONE ENCOUNTER
Medication  CGM  pump supply Requested:   Continuous Blood Gluc Sensor (FREESTYLE NANCY 3 SENSOR) Does not apply Misc   CoverMyMeds Used: Yes     Key: [id number]     Si each every 14 (fourteen) days.      DX Code: E11.65, Z79.4                                        Case Number/Pending Ref      CMM submitted, LOV  and A1C   Awaiting determination

## 2024-02-02 NOTE — TELEPHONE ENCOUNTER
Received an approval letter from [facility] for the following medication :    semaglutide (OZEMPIC, 0.25 OR 0.5 MG/DOSE,) 2 MG/3ML Subcutaneous Solution Pen-injector    Approval Is for 01/26/2024 until further notice.  ID# [id number]     Spoke to patient, verbalized understanding and knowledged. Letter was sent to scanning.

## 2024-02-02 NOTE — TELEPHONE ENCOUNTER
Received a denial letter from [facility] for the following medication    :Continuous Blood Gluc Sensor (FREESTYLE NANCY 3 SENSOR)     Reason: [facility] states they don't cover request under Medicare part D. Because it is not directly related to the delivery of insulin. An appeal form was attached.    Form was placed in denial folder for review.     Called pt to rely message. Pt stated understanding.

## 2024-02-21 RX ORDER — SPIRONOLACTONE 25 MG/1
25 TABLET ORAL DAILY
Qty: 90 TABLET | Refills: 3 | Status: SHIPPED
Start: 2024-02-21

## 2024-02-21 NOTE — TELEPHONE ENCOUNTER
Refill passed per [facility] protocol.  Medication is listed as patient reported.    Requested Prescriptions   Pending Prescriptions Disp Refills    SPIRONOLACTONE 25 MG Oral Tab [Pharmacy Med Name: Spironolactone 25 Mg Tab Nort] 30 tablet 0     Sig: Take 1 tablet (25 mg total) by mouth daily.       Hypertension Medications Protocol Passed - 2/19/2024 11:43 AM        Passed - CMP or BMP in past 12 months        Passed - Last BP reading less than 140/90     BP Readings from Last 1 Encounters:   01/26/24 92/60               Passed - In person appointment or virtual visit in the past 12 mos or appointment in next 3 mos     Recent Outpatient Visits              3 weeks ago Hypothyroidism, unspecified type    [facility]    Office Visit    1 month ago Acute on chronic HFrEF (heart failure with reduced ejection fraction) ([facility])    [facility] Eva Gaines, MEG    Office Visit    4 months ago DIRK (obstructive sleep apnea)    [facility] Nilda Argueta MD    Office Visit    9 months ago Glaucoma suspect of both eyes    [facility]    Nurse Only    9 months ago Type 2 diabetes mellitus without retinopathy ([facility])    [facility] Boogie Velasquez MD    Office Visit                      Passed - EGFRCR or GFRNAA > 50     GFR Evaluation  EGFRCR: 58 , resulted on 1/19/2024             Recent Outpatient Visits              3 weeks ago Hypothyroidism, unspecified type    [facility], Ben Dickerson MD    Office Visit    1 month ago Acute on chronic HFrEF (heart failure with reduced ejection fraction) ([facility])    [facility] Eva Gaines NP    Office Visit    4 months ago DIRK (obstructive sleep apnea)    [facility]  Mercedes Quezada Arlinda, MD    Office Visit    9 months ago Glaucoma suspect of both eyes    [facility] [address], [place]    Nurse Only    9 months ago Type 2 diabetes mellitus without retinopathy [facility]    [facility] Visit

## 2024-02-22 RX ORDER — SPIRONOLACTONE 25 MG/1
25 TABLET ORAL DAILY
Qty: 30 TABLET | Refills: 0 | OUTPATIENT
Start: 2024-02-22

## 2024-03-01 ENCOUNTER — TELEPHONE (OUTPATIENT)
Facility: CLINIC | Age: 74
End: 2024-03-01

## 2024-03-01 ENCOUNTER — PATIENT OUTREACH (OUTPATIENT)
Dept: CASE MANAGEMENT | Age: 74
End: 2024-03-01

## 2024-03-01 DIAGNOSIS — H40.2290 CHRONIC PRIMARY ANGLE-CLOSURE GLAUCOMA, UNSPECIFIED GLAUCOMA STAGE, UNSPECIFIED LATERALITY: Primary | ICD-10-CM

## 2024-03-01 DIAGNOSIS — E11.65 TYPE 2 DIABETES MELLITUS WITH HYPERGLYCEMIA, WITH LONG-TERM CURRENT USE OF INSULIN (HCC): ICD-10-CM

## 2024-03-01 DIAGNOSIS — I10 HYPERTENSION, UNSPECIFIED TYPE: ICD-10-CM

## 2024-03-01 DIAGNOSIS — Z79.4 TYPE 2 DIABETES MELLITUS WITH HYPERGLYCEMIA, WITH LONG-TERM CURRENT USE OF INSULIN (HCC): ICD-10-CM

## 2024-03-01 DIAGNOSIS — J44.1 CHRONIC OBSTRUCTIVE PULMONARY DISEASE WITH ACUTE EXACERBATION (HCC): ICD-10-CM

## 2024-03-01 NOTE — TELEPHONE ENCOUNTER
Spoke to patient for monthly CMC.    Just a FYI patient cancelled RDE OV.    FBS today-did not check  2/ 2/28 137

## 2024-03-01 NOTE — PROGRESS NOTES
Spoke to Caleb for CCM.      Updates to patient care team/comments: UTD  Patient reported changes in medications: reports changes   Med Adherence  Comment: reports adherence      Health Maintenance:   Health Maintenance   Topic Date Due    Zoster Vaccines (1 of 2) Never done    Colorectal Cancer Screening  06/11/2019    Diabetes Care Foot Exam  02/19/2020    PSA  07/01/2023    COVID-19 Vaccine (4 - 2023-24 season) 09/01/2023    Diabetes Care: Microalb/Creat Ratio  01/12/2024    Annual Physical  01/12/2024    Diabetes Care A1C  03/23/2024    Diabetes Care Dilated Eye Exam  05/10/2024    Tobacco Cessation Counseling 1 Year  01/18/2025    Diabetes Care: GFR  01/19/2025    Influenza Vaccine  Completed    Annual Depression Screening  Completed    Fall Risk Screening (Annual)  Completed    Pneumococcal Vaccine: 65+ Years  Completed       Patient updates/concerns:   Spoke with patient.  Patient relates doing well. Mood good/stable.  Denies any recent falls. No longer using walker to ambulate feels more stable. Diabetes managed by Endo. FBS this morning unknown did not check, 2/ 2/. States he is taking is medications as prescribed. Noticed ozempic makes him nauseas and not want to eat. Will continue to monitor.  Avoiding sugars and carbs.  Reminded patient of upcoming RDE OV. Patient requested for this to be cancelled he has another visit that day. Will call back to reschedule. Reminded patient he is due for labs this month and endo follow up. Will call to schedule. TE sent to endo as FYI. Discussed increasing physical activity. Even if its stretching and walking around home. Agreed and will incorporate more movement into his day. Weight unknown. Encourged patient to get a scale and he should be weighing daily to monitor fluid. Vision stable. Needs to have DM eye exam.  Blood pressure and pulse unknown not checking but denies symptoms. Denies any edema. Getting PT/INR checked. Needs to follow up with cardiology.   Denies any urinary issues at this time.  No other questions or concerns.      Encouraged patient:   Self care: Take the time to do the things you love.   Nutrition:  Good nutrition helps us to maintain our weight, fight off infection, and help reduce the risk of developing other chronic issues.   Physical activity:  Physical activity is important to help maintain independence and improve quality of life.     Goals/Action Plan:    Active goal from previous outreach: Staying healthy, maintain independence, and stability.    Patient reported progress towards goals: progressing                - What: following up on health conditions            - Where/When/How: managing DM seeing endo.   Patient Reported Barriers and Concerns: n/a                   - Plan for overcoming barriers: encourged patient to call with any questions or concerns.     Care Managers Interventions: Continue to provide encouragement and education for healthy coping and diagnosis.      Future Appointments:   Future Appointments   Date Time Provider Department Center   3/11/2024  3:00 PM Stella Parisi RD [facility] DIABETES [facility]         Next Care Manager Follow Up Date: 1 month    Reason For Follow Up: review progress and or barriers towards patient's goals.     Time Spent This Encounter Total: 21 min medical record review, telephone communication, care plan updates where needed, education, goals, and action plan recreation/update. Provided acknowledgment and validation to patient's concerns.   Monthly Minute Total including today: 21 min   Physical assessment, complete health history, and need for CCM established by JULITO AG MD.

## 2024-03-03 NOTE — TELEPHONE ENCOUNTER
Please review. Protocol failed / No Protocol.    Requested Prescriptions   Pending Prescriptions Disp Refills    POTASSIUM CHLORIDE ER 20 MEQ Oral Tab CR [Pharmacy Med Name: Potassium Chloride Er 20 Meq Tab Adva] 90 tablet 0     Sig: TAKE ONE TABLET BY MOUTH ONE TIME DAILY       There is no refill protocol information for this order

## 2024-03-04 RX ORDER — POTASSIUM CHLORIDE 1500 MG/1
1 TABLET, EXTENDED RELEASE ORAL DAILY
Qty: 90 TABLET | Refills: 0 | Status: SHIPPED | OUTPATIENT
Start: 2024-03-04

## 2024-03-05 RX ORDER — TORSEMIDE 20 MG/1
20 TABLET ORAL DAILY
Qty: 30 TABLET | Refills: 0 | Status: SHIPPED | OUTPATIENT
Start: 2024-03-05

## 2024-03-05 RX ORDER — CARVEDILOL 6.25 MG/1
6.25 TABLET ORAL 2 TIMES DAILY WITH MEALS
Qty: 60 TABLET | Refills: 0 | Status: SHIPPED | OUTPATIENT
Start: 2024-03-05

## 2024-03-05 NOTE — TELEPHONE ENCOUNTER
Refill passed per [facility] protocol.    Medications listed as patient reported.    Requested Prescriptions   Pending Prescriptions Disp Refills    TORSEMIDE 20 MG Oral Tab [Pharmacy Med Name: Torsemide 20 Mg Tab Teva] 30 tablet 0     Sig: Take 1 tablet (20 mg total) by mouth daily.       Hypertension Medications Protocol Passed - 3/2/2024 12:15 PM        Passed - CMP or BMP in past 12 months        Passed - Last BP reading less than 140/90     BP Readings from Last 1 Encounters:   01/26/24 92/60               Passed - In person appointment or virtual visit in the past 12 mos or appointment in next 3 mos     Recent Outpatient Visits              1 month ago Hypothyroidism, unspecified type    [facility] Ben Pérez MD    Office Visit    1 month ago Acute on chronic HFrEF (heart failure with reduced ejection fraction) ([facility])    [facility] Eva Gaines NP    Office Visit    4 months ago DIRK (obstructive sleep apnea)    [facility] Nilda Argueta MD    Office Visit    9 months ago Glaucoma suspect of both eyes    [facility]    Nurse Only    10 months ago Type 2 diabetes mellitus without retinopathy ([facility])    [facility] Boogie Velasquez MD    Office Visit                      Passed - EGFRCR or GFRNAA > 50     GFR Evaluation  EGFRCR: 58 , resulted on 1/19/2024            CARVEDILOL 6.25 MG Oral Tab [Pharmacy Med Name: Carvedilol 6.25 Mg Tab Adva] 60 tablet 0     Sig: Take 1 tablet (6.25 mg total) by mouth 2 (two) times daily with meals.       Hypertension Medications Protocol Passed - 3/2/2024 12:15 PM        Passed - CMP or BMP in past 12 months        Passed - Last BP reading less than 140/90     BP Readings from Last 1 Encounters:   01/26/24 92/60               Passed - In person appointment or virtual  visit in the past 12 mos or appointment in next 3 mos     Recent Outpatient Visits              1 month ago Hypothyroidism, unspecified type    [facility], Ben Dickerson MD    Office Visit    1 month ago Acute on chronic HFrEF (heart failure with reduced ejection fraction) ([facility])    [facility] Eva Gaines, MEG    Office Visit    4 months ago DIRK (obstructive sleep apnea)    [facility] Nilda Mcdaniels MD    Office Visit    9 months ago Glaucoma suspect of both eyes    [facility] [place]    Nurse Only    10 months ago Type 2 diabetes mellitus without retinopathy ([facility])    [facility] [place] [facility]    Office Visit                      Passed - EGFRCR or GFRNAA > 50     GFR Evaluation  EGFRCR: 58 , resulted on 1/19/2024             Recent Outpatient Visits              1 month ago Hypothyroidism, unspecified type    [facility], Ben Dickerson MD    Office Visit    1 month ago Acute on chronic HFrEF (heart failure with reduced ejection fraction) ([facility])    [facility] Eva Gaines, MEG    Office Visit    4 months ago DIRK (obstructive sleep apnea)    [facility]    Office Visit    9 months ago Glaucoma suspect of both eyes    [facility] [place]    Nurse Only    10 months ago Type 2 diabetes mellitus without retinopathy ([facility])    [facility] [place]    Office Visit

## 2024-03-21 PROBLEM — J06.9 VIRAL URI WITH COUGH: Status: RESOLVED | Noted: 2023-12-23 | Resolved: 2024-03-21

## 2024-03-27 RX ORDER — TORSEMIDE 10 MG/1
10 TABLET ORAL DAILY
Qty: 90 TABLET | Refills: 0 | OUTPATIENT
Start: 2024-03-27

## 2024-03-27 NOTE — TELEPHONE ENCOUNTER
Refusing Torsemide 10mg dose changed  Office visit 01/18/2024: Eva Gaines NP  Acute on chronic HFrEF  -EF 30-35%  ICM, GABRIELA to LAD and Diag with in-stent restenosis, s/p laser atherectomy and lithotripsy to LAD 5/19/23.   - diuresed 53 lbs at last admission (12/28/23)  -diuretics: torsemide 20 mg daily

## 2024-03-29 ENCOUNTER — HOSPITAL ENCOUNTER (OUTPATIENT)
Facility: HOSPITAL | Age: 74
Setting detail: OBSERVATION
LOS: 1 days | Discharge: HOME HEALTH | End: 2024-03-31
Attending: EMERGENCY MEDICINE | Admitting: INTERNAL MEDICINE
Payer: MEDICARE

## 2024-03-29 DIAGNOSIS — R55 SYNCOPE AND COLLAPSE: Primary | ICD-10-CM

## 2024-03-29 DIAGNOSIS — Z79.01 SUBTHERAPEUTIC ANTICOAGULATION: ICD-10-CM

## 2024-03-29 DIAGNOSIS — Z51.81 SUBTHERAPEUTIC ANTICOAGULATION: ICD-10-CM
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
## 2024-03-29 LAB
BASOPHILS # BLD AUTO: 0.09 X10(3) UL (ref 0–0.2)
BASOPHILS NFR BLD AUTO: 0.7 %
DEPRECATED RDW RBC AUTO: 49.1 FL (ref 35.1–46.3)
EOSINOPHIL # BLD AUTO: 0.64 X10(3) UL (ref 0–0.7)
EOSINOPHIL NFR BLD AUTO: 4.7 %
ERYTHROCYTE [DISTWIDTH] IN BLOOD BY AUTOMATED COUNT: 20.3 % (ref 11–15)
GLUCOSE BLDC GLUCOMTR-MCNC: 144 MG/DL (ref 70–99)
HCT VFR BLD AUTO: 41.2 %
HGB BLD-MCNC: 11.4 G/DL
IMM GRANULOCYTES # BLD AUTO: 0.06 X10(3) UL (ref 0–1)
IMM GRANULOCYTES NFR BLD: 0.4 %
LYMPHOCYTES # BLD AUTO: 2.8 X10(3) UL (ref 1–4)
LYMPHOCYTES NFR BLD AUTO: 20.4 %
MCH RBC QN AUTO: 19.6 PG (ref 26–34)
MCHC RBC AUTO-ENTMCNC: 27.7 G/DL (ref 31–37)
MCV RBC AUTO: 70.8 FL
MONOCYTES # BLD AUTO: 1.1 X10(3) UL (ref 0.1–1)
MONOCYTES NFR BLD AUTO: 8 %
NEUTROPHILS # BLD AUTO: 9.06 X10 (3) UL (ref 1.5–7.7)
NEUTROPHILS # BLD AUTO: 9.06 X10(3) UL (ref 1.5–7.7)
NEUTROPHILS NFR BLD AUTO: 65.8 %
PLATELET # BLD AUTO: 587 10(3)UL (ref 150–450)
RBC # BLD AUTO: 5.82 X10(6)UL
WBC # BLD AUTO: 13.8 X10(3) UL (ref 4–11)

## 2024-03-30 ENCOUNTER — APPOINTMENT (OUTPATIENT)
Dept: CT IMAGING | Facility: HOSPITAL | Age: 74
End: 2024-03-30
Attending: EMERGENCY MEDICINE
Payer: MEDICARE
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
## 2024-03-30 PROBLEM — J41.0 SIMPLE CHRONIC BRONCHITIS (HCC): Status: ACTIVE | Noted: 2024-03-30

## 2024-03-30 PROBLEM — Z51.81 SUBTHERAPEUTIC ANTICOAGULATION: Status: ACTIVE | Noted: 2024-03-30

## 2024-03-30 PROBLEM — F33.41 RECURRENT MAJOR DEPRESSIVE DISORDER, IN PARTIAL REMISSION: Status: ACTIVE | Noted: 2022-01-22

## 2024-03-30 PROBLEM — F17.200 TOBACCO USE DISORDER: Status: ACTIVE | Noted: 2024-03-30

## 2024-03-30 PROBLEM — I50.22 CHRONIC HFREF (HEART FAILURE WITH REDUCED EJECTION FRACTION) (HCC): Status: ACTIVE | Noted: 2024-03-30

## 2024-03-30 PROBLEM — Z79.01 SUBTHERAPEUTIC ANTICOAGULATION: Status: ACTIVE | Noted: 2024-03-30

## 2024-03-30 PROBLEM — F33.41 RECURRENT MAJOR DEPRESSIVE DISORDER, IN PARTIAL REMISSION (HCC): Status: ACTIVE | Noted: 2022-01-22

## 2024-03-30 LAB
ALBUMIN SERPL-MCNC: 4 G/DL (ref 3.2–4.8)
ALBUMIN/GLOB SERPL: 1.4 {RATIO} (ref 1–2)
ALP LIVER SERPL-CCNC: 89 U/L
ALT SERPL-CCNC: 36 U/L
ANION GAP SERPL CALC-SCNC: 7 MMOL/L (ref 0–18)
AST SERPL-CCNC: 22 U/L (ref ?–34)
ATRIAL RATE: 95 BPM
BILIRUB SERPL-MCNC: 0.2 MG/DL (ref 0.2–1.1)
BNP SERPL-MCNC: 94 PG/ML
BUN BLD-MCNC: 26 MG/DL (ref 9–23)
BUN/CREAT SERPL: 23.9 (ref 10–20)
CALCIUM BLD-MCNC: 9.3 MG/DL (ref 8.7–10.4)
CHLORIDE SERPL-SCNC: 111 MMOL/L (ref 98–112)
CO2 SERPL-SCNC: 25 MMOL/L (ref 21–32)
CREAT BLD-MCNC: 1.09 MG/DL
D DIMER PPP FEU-MCNC: 0.35 UG/ML FEU (ref ?–0.72)
EGFRCR SERPLBLD CKD-EPI 2021: 72 ML/MIN/1.73M2 (ref 60–?)
EST. AVERAGE GLUCOSE BLD GHB EST-MCNC: 192 MG/DL (ref 68–126)
GLOBULIN PLAS-MCNC: 2.9 G/DL (ref 2.8–4.4)
GLUCOSE BLD-MCNC: 143 MG/DL (ref 70–99)
GLUCOSE BLDC GLUCOMTR-MCNC: 134 MG/DL (ref 70–99)
GLUCOSE BLDC GLUCOMTR-MCNC: 135 MG/DL (ref 70–99)
GLUCOSE BLDC GLUCOMTR-MCNC: 140 MG/DL (ref 70–99)
GLUCOSE BLDC GLUCOMTR-MCNC: 193 MG/DL (ref 70–99)
GLUCOSE BLDC GLUCOMTR-MCNC: 236 MG/DL (ref 70–99)
HBA1C MFR BLD: 8.3 % (ref ?–5.7)
INR BLD: 1.3 (ref 0.8–1.2)
OSMOLALITY SERPL CALC.SUM OF ELEC: 303 MOSM/KG (ref 275–295)
P AXIS: 70 DEGREES
P-R INTERVAL: 174 MS
POTASSIUM SERPL-SCNC: 3.3 MMOL/L (ref 3.5–5.1)
POTASSIUM SERPL-SCNC: 5.1 MMOL/L (ref 3.5–5.1)
PROT SERPL-MCNC: 6.9 G/DL (ref 5.7–8.2)
PROTHROMBIN TIME: 17 SECONDS (ref 11.6–14.8)
Q-T INTERVAL: 430 MS
QRS DURATION: 168 MS
QTC CALCULATION (BEZET): 540 MS
R AXIS: -69 DEGREES
SODIUM SERPL-SCNC: 143 MMOL/L (ref 136–145)
T AXIS: 76 DEGREES
TROPONIN I SERPL HS-MCNC: 20 NG/L
VENTRICULAR RATE: 95 BPM

## 2024-03-30 PROCEDURE — 99223 1ST HOSP IP/OBS HIGH 75: CPT | Performed by: INTERNAL MEDICINE

## 2024-03-30 PROCEDURE — 70450 CT HEAD/BRAIN W/O DYE: CPT | Performed by: EMERGENCY MEDICINE

## 2024-03-30 PROCEDURE — 99499 UNLISTED E&M SERVICE: CPT | Performed by: INTERNAL MEDICINE

## 2024-03-30 RX ORDER — ZOLPIDEM TARTRATE 5 MG/1
5 TABLET ORAL NIGHTLY PRN
Status: DISCONTINUED | OUTPATIENT
Start: 2024-03-30 | End: 2024-03-31

## 2024-03-30 RX ORDER — NICOTINE 21 MG/24HR
1 PATCH, TRANSDERMAL 24 HOURS TRANSDERMAL DAILY
Status: DISCONTINUED | OUTPATIENT
Start: 2024-03-30 | End: 2024-03-31

## 2024-03-30 RX ORDER — CLOPIDOGREL BISULFATE 75 MG/1
75 TABLET ORAL DAILY
Status: DISCONTINUED | OUTPATIENT
Start: 2024-03-30 | End: 2024-03-31

## 2024-03-30 RX ORDER — POTASSIUM CHLORIDE 20 MEQ/1
40 TABLET, EXTENDED RELEASE ORAL EVERY 4 HOURS
Status: COMPLETED | OUTPATIENT
Start: 2024-03-30 | End: 2024-03-30

## 2024-03-30 RX ORDER — ASPIRIN 81 MG/1
81 TABLET ORAL DAILY
Status: DISCONTINUED | OUTPATIENT
Start: 2024-03-30 | End: 2024-03-31

## 2024-03-30 RX ORDER — BISACODYL 10 MG
10 SUPPOSITORY, RECTAL RECTAL
Status: DISCONTINUED | OUTPATIENT
Start: 2024-03-30 | End: 2024-03-31

## 2024-03-30 RX ORDER — NICOTINE POLACRILEX 4 MG
30 LOZENGE BUCCAL
Status: DISCONTINUED | OUTPATIENT
Start: 2024-03-30 | End: 2024-03-31

## 2024-03-30 RX ORDER — LATANOPROST 50 UG/ML
1 SOLUTION/ DROPS OPHTHALMIC NIGHTLY
Status: DISCONTINUED | OUTPATIENT
Start: 2024-03-30 | End: 2024-03-31

## 2024-03-30 RX ORDER — OXYCODONE AND ACETAMINOPHEN 10; 325 MG/1; MG/1
1 TABLET ORAL 4 TIMES DAILY PRN
Status: DISCONTINUED | OUTPATIENT
Start: 2024-03-30 | End: 2024-03-31

## 2024-03-30 RX ORDER — ALBUTEROL SULFATE 2.5 MG/3ML
2.5 SOLUTION RESPIRATORY (INHALATION) EVERY 6 HOURS PRN
Status: DISCONTINUED | OUTPATIENT
Start: 2024-03-30 | End: 2024-03-31

## 2024-03-30 RX ORDER — TORSEMIDE 20 MG/1
20 TABLET ORAL DAILY
Status: DISCONTINUED | OUTPATIENT
Start: 2024-03-30 | End: 2024-03-31

## 2024-03-30 RX ORDER — NICOTINE POLACRILEX 4 MG
15 LOZENGE BUCCAL
Status: DISCONTINUED | OUTPATIENT
Start: 2024-03-30 | End: 2024-03-31

## 2024-03-30 RX ORDER — PREGABALIN 75 MG/1
300 CAPSULE ORAL 2 TIMES DAILY
Status: DISCONTINUED | OUTPATIENT
Start: 2024-03-30 | End: 2024-03-31

## 2024-03-30 RX ORDER — METOCLOPRAMIDE HYDROCHLORIDE 5 MG/ML
10 INJECTION INTRAMUSCULAR; INTRAVENOUS EVERY 8 HOURS PRN
Status: DISCONTINUED | OUTPATIENT
Start: 2024-03-30 | End: 2024-03-31

## 2024-03-30 RX ORDER — SENNOSIDES 8.6 MG
17.2 TABLET ORAL NIGHTLY PRN
Status: DISCONTINUED | OUTPATIENT
Start: 2024-03-30 | End: 2024-03-31

## 2024-03-30 RX ORDER — POTASSIUM CHLORIDE 1.5 G/1.58G
20 POWDER, FOR SOLUTION ORAL DAILY
Status: DISCONTINUED | OUTPATIENT
Start: 2024-03-30 | End: 2024-03-31

## 2024-03-30 RX ORDER — CARVEDILOL 6.25 MG/1
6.25 TABLET ORAL 2 TIMES DAILY WITH MEALS
Status: DISCONTINUED | OUTPATIENT
Start: 2024-03-30 | End: 2024-03-31

## 2024-03-30 RX ORDER — ONDANSETRON 2 MG/ML
4 INJECTION INTRAMUSCULAR; INTRAVENOUS EVERY 6 HOURS PRN
Status: DISCONTINUED | OUTPATIENT
Start: 2024-03-30 | End: 2024-03-31

## 2024-03-30 RX ORDER — POLYETHYLENE GLYCOL 3350 17 G/17G
17 POWDER, FOR SOLUTION ORAL DAILY PRN
Status: DISCONTINUED | OUTPATIENT
Start: 2024-03-30 | End: 2024-03-31

## 2024-03-30 RX ORDER — ENEMA 19; 7 G/133ML; G/133ML
1 ENEMA RECTAL ONCE AS NEEDED
Status: DISCONTINUED | OUTPATIENT
Start: 2024-03-30 | End: 2024-03-31

## 2024-03-30 RX ORDER — ALBUTEROL SULFATE 90 UG/1
2 AEROSOL, METERED RESPIRATORY (INHALATION) EVERY 4 HOURS PRN
Status: DISCONTINUED | OUTPATIENT
Start: 2024-03-30 | End: 2024-03-31

## 2024-03-30 RX ORDER — SPIRONOLACTONE 25 MG/1
25 TABLET ORAL DAILY
Status: DISCONTINUED | OUTPATIENT
Start: 2024-03-30 | End: 2024-03-31

## 2024-03-30 RX ORDER — WARFARIN SODIUM 5 MG/1
10 TABLET ORAL NIGHTLY
Status: DISCONTINUED | OUTPATIENT
Start: 2024-03-30 | End: 2024-03-31

## 2024-03-30 RX ORDER — DULOXETIN HYDROCHLORIDE 30 MG/1
30 CAPSULE, DELAYED RELEASE ORAL DAILY
Status: DISCONTINUED | OUTPATIENT
Start: 2024-03-30 | End: 2024-03-31

## 2024-03-30 RX ORDER — GUAIFENESIN 600 MG/1
600 TABLET, EXTENDED RELEASE ORAL 2 TIMES DAILY
Status: DISCONTINUED | OUTPATIENT
Start: 2024-03-30 | End: 2024-03-31

## 2024-03-30 RX ORDER — LEVOTHYROXINE SODIUM 0.15 MG/1
150 TABLET ORAL
Status: DISCONTINUED | OUTPATIENT
Start: 2024-03-30 | End: 2024-03-31

## 2024-03-30 RX ORDER — MELATONIN
3 NIGHTLY PRN
Status: DISCONTINUED | OUTPATIENT
Start: 2024-03-30 | End: 2024-03-31

## 2024-03-30 RX ORDER — DEXTROSE MONOHYDRATE 25 G/50ML
50 INJECTION, SOLUTION INTRAVENOUS
Status: DISCONTINUED | OUTPATIENT
Start: 2024-03-30 | End: 2024-03-31

## 2024-03-30 RX ORDER — BENZONATATE 200 MG/1
200 CAPSULE ORAL 3 TIMES DAILY PRN
Status: DISCONTINUED | OUTPATIENT
Start: 2024-03-30 | End: 2024-03-31

## 2024-03-30 RX ORDER — ATORVASTATIN CALCIUM 80 MG/1
80 TABLET, FILM COATED ORAL DAILY
Status: DISCONTINUED | OUTPATIENT
Start: 2024-03-30 | End: 2024-03-31

## 2024-03-30 NOTE — PLAN OF CARE
Patient alert and oriented x 3-4. Vitals stable on 2L O2. Patient's pain managed with PO medication. Plan to continue to monitor for syncopal episodes and discharge once medically cleared. Call light within reach.    Problem: Patient Centered Care  Goal: Patient preferences are identified and integrated in the patient's plan of care  Description: Interventions:  - What would you like us to know as we care for you? From home with grandson  - Provide timely, complete, and accurate information to patient/family  - Incorporate patient and family knowledge, values, beliefs, and cultural backgrounds into the planning and delivery of care  - Encourage patient/family to participate in care and decision-making at the level they choose  - Honor patient and family perspectives and choices  Outcome: Progressing     Problem: Patient/Family Goals  Goal: Patient/Family Long Term Goal  Description: Patient's Long Term Goal: Be able to discharge    Interventions:  - Orthostatic BP  - Monitor vitals  - See additional Care Plan goals for specific interventions  Outcome: Progressing  Goal: Patient/Family Short Term Goal  Description: Patient's Short Term Goal: Reduce pain    Interventions:   - Pain meds PRN  - See additional Care Plan goals for specific interventions  Outcome: Progressing     Problem: Diabetes/Glucose Control  Goal: Glucose maintained within prescribed range  Description: INTERVENTIONS:  - Monitor Blood Glucose as ordered  - Assess for signs and symptoms of hyperglycemia and hypoglycemia  - Administer ordered medications to maintain glucose within target range  - Assess barriers to adequate nutritional intake and initiate nutrition consult as needed  - Instruct patient on self management of diabetes  Outcome: Progressing

## 2024-03-30 NOTE — PHYSICAL THERAPY NOTE
PHYSICAL THERAPY EVALUATION - INPATIENT     Room Number: 330/330-A  Evaluation Date: 3/30/2024  Type of Evaluation: Initial   Physician Order: PT Eval and Treat    Presenting Problem: syncope and collapse     Reason for Therapy: Mobility Dysfunction and Discharge Planning    PHYSICAL THERAPY ASSESSMENT   Patient is a 74 year old male admitted 3/29/2024 for syncope and collapse.  Prior to admission, patient's baseline is ambulatory with intermittent DME use, pt is independent at baseline.  Patient is currently functioning near baseline with transfers and gait.  Patient is requiring stand-by assist and contact guard assist as a result of the following impairments: decreased functional strength, decreased endurance/aerobic capacity, and medical status.  Physical Therapy will continue to follow for duration of hospitalization.    Patient will benefit from continued skilled PT Services at discharge to promote functional independence and safety with additional support and return home with home health PT.    PLAN  PT Treatment Plan: Bed mobility;Body mechanics;Endurance;Energy conservation;Family education;Patient education;Gait training;Range of motion;Stair training;Transfer training;Balance training  Rehab Potential : Good  Frequency (Obs): 5x/week    PHYSICAL THERAPY MEDICAL/SOCIAL HISTORY   History related to current admission: syncope      Problem List  Principal Problem:    Syncope and collapse  Active Problems:    Subtherapeutic anticoagulation    Fall, initial encounter      HOME SITUATION  Home Situation  Type of Home: Apartment  Home Layout: One level  Lives With: Son (age 15)  Drives: Yes  Patient Owned Equipment: Rolling walker     SUBJECTIVE  \"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.\"     PHYSICAL THERAPY EXAMINATION   OBJECTIVE  Precautions: Bed/chair alarm  Fall Risk: High fall risk    WEIGHT BEARING RESTRICTION  Weight Bearing Restriction: None                PAIN ASSESSMENT      Location: chronic back pain       COGNITION  Overall Cognitive Status:  oriented x 4, impulsive, poor insight into deficits     BALANCE  Static Sitting: Good  Dynamic Sitting: Fair +  Static Standing: Fair -  Dynamic Standing: Fair -    ACTIVITY TOLERANCE           BP: 131/73  BP Location: Right arm  BP Method: Automatic  Patient Position: Lying    O2 WALK       AM-PAC '6-Clicks' INPATIENT SHORT FORM - BASIC MOBILITY  How much difficulty does the patient currently have...  Patient Difficulty: Turning over in bed (including adjusting bedclothes, sheets and blankets)?: A Little   Patient Difficulty: Sitting down on and standing up from a chair with arms (e.g., wheelchair, bedside commode, etc.): A Little   Patient Difficulty: Moving from lying on back to sitting on the side of the bed?: A Little   How much help from another person does the patient currently need...   Help from Another: Moving to and from a bed to a chair (including a wheelchair)?: A Little   Help from Another: Need to walk in hospital room?: A Little   Help from Another: Climbing 3-5 steps with a railing?: A Little     AM-PAC Score:  Raw Score: 18   Approx Degree of Impairment: 46.58%   Standardized Score (AM-PAC Scale): 43.63   CMS Modifier (G-Code): CK    FUNCTIONAL ABILITY STATUS  Functional Mobility/Gait Assessment  Gait Assistance: Contact guard assist  Distance (ft): 15', 50'  Assistive Device: Rolling walker  Pattern: Shuffle  Sit to Stand: contact guard assist    Exercise/Education Provided:  Body mechanics  Functional activity tolerated  Gait training  Transfer training    The patient's Approx Degree of Impairment: 46.58% has been calculated based on documentation in the [facility] '6 clicks' Inpatient Basic Mobility Short Form.  Research supports that patients with this level of impairment may benefit from home with home care.  Final disposition will be made by interdisciplinary medical team.    Patient End of Session: Up in chair;Needs  met;Call light within reach;RN aware of session/findings;All patient questions and concerns addressed;Alarm set    CURRENT GOALS  Goals to be met by: 4/30  Patient Goal Patient's self-stated goal is: unstated    Goal #1 Patient is able to demonstrate supine - sit EOB @ level: independent     Goal #1   Current Status    Goal #2 Patient is able to demonstrate transfers Sit to/from Stand at assistance level: supervision with none     Goal #2  Current Status    Goal #3 Patient is able to ambulate 150 feet with assist device: none at assistance level: supervision   Goal #3   Current Status    Goal #4 Patient will negotiate 1 stairs/one curb w/ assistive device and supervision   Goal #4   Current Status    Goal #5 Patient to demonstrate independence with home activity/exercise instructions provided to patient in preparation for discharge.   Goal #5   Current Status    Goal #6    Goal #6  Current Status      Patient Evaluation Complexity Level:  History Moderate - 1 or 2 personal factors and/or co-morbidities   Examination of body systems Moderate - addressing a total of 3 or more elements   Clinical Presentation  Moderate - Evolving   Clinical Decision Making  Moderate Complexity     Gait Training: 15 minutes  Therapeutic Activity:  8 minutes

## 2024-03-30 NOTE — PLAN OF CARE
Pt A/Ox4, Max assist. CT head completed, results pending. K replaced. PRN Percocet given for pain.     Problem: Patient Centered Care  Goal: Patient preferences are identified and integrated in the patient's plan of care  Description: Interventions:  - What would you like us to know as we care for you? I would like to return home.   - Provide timely, complete, and accurate information to patient/family  - Incorporate patient and family knowledge, values, beliefs, and cultural backgrounds into the planning and delivery of care  - Encourage patient/family to participate in care and decision-making at the level they choose  - Honor patient and family perspectives and choices  Outcome: Progressing     Problem: Patient/Family Goals  Goal: Patient/Family Long Term Goal  Description: Patient's Long Term Goal: Get stronger.     Interventions:  - Work with PT/OT.   - See additional Care Plan goals for specific interventions  Outcome: Progressing  Goal: Patient/Family Short Term Goal  Description: Patient's Short Term Goal: Ask frequent questions.     Interventions:   - Work with staff on ways to improve overall health status.   - See additional Care Plan goals for specific interventions  Outcome: Progressing     Problem: Diabetes/Glucose Control  Goal: Glucose maintained within prescribed range  Description: INTERVENTIONS:  - Monitor Blood Glucose as ordered  - Assess for signs and symptoms of hyperglycemia and hypoglycemia  - Administer ordered medications to maintain glucose within target range  - Assess barriers to adequate nutritional intake and initiate nutrition consult as needed  - Instruct patient on self management of diabetes  Outcome: Progressing

## 2024-03-30 NOTE — H&P
[facility]  part of [facility]    History & Physical    Caleb Rausch Jr. Patient Status:  Inpatient    3/23/1950 MRN [id number]   Location [facility] 3W/SW Attending Nilda Argueta MD   Hosp Day # 0 PCP NILDA ARGUETA MD     Date:  3/30/2024  Date of Admission:  3/29/2024    History provided by:patient  Chief Complaint:     Chief Complaint   Patient presents with    Syncope         HPI:   Caleb Rausch Jr. is a(n) 74 year old  gentleman with past medical history of PE on Coumadin, CAD status post stents on DAPT, HTN, type II DM, HLD, hypothyroidism, multiple admissions presented to the emergency room with complaint of syncopal episode.    Patient reported the day of admission he was watching TV and got up to go to the kitchen but woke up to find himself on the floor.  Reports that on that day he had multiple episodes of lightheadedness.  Has not 2 episodes of diarrhea earlier that day.  No abdominal pain.  No further episodes of diarrhea.  No GI bleeding.  No nausea or vomiting..  Denied chest pain or shortness of breath but reported lightheadedness.  No fever or chills.      In the emergency room, he was hemodynamically stable.  Blood work shows a subtherapeutic INR.  However D-dimer was within normal limits and thus a CT PE was not done.  BMP with mild hypokalemia 3.3 and otherwise unremarkable.  Hemoglobin is at his baseline of 11.4.  Normal troponin and BNP.  CT brain in the emergency room with no acute findings.  Patient was admitted for further management and monitoring on telemetry.      Patient seen comfortably in the chair.  No acute events overnight.  Sipping Sprite and when told this it is not good for his DM, he did not seem really concerned    History     Past Medical History:   Diagnosis Date    Anxiety disorder, unspecified 2022    Anxiety state     Atherosclerosis of coronary artery     stents x 6    Chronic low back pain with bilateral sciatica, left worse than right  11/28/2017    Chronic obstructive pulmonary disease, unspecified (HCC) 01/22/2022    Coronary atherosclerosis     Depression     Diabetes ([facility])     borderline     Disorder of thyroid     Essential hypertension     Glaucoma 05/10/2023    first visit with IRMA, patient was taking Timolol OU BID for 20 years, has not used gtts in over 1 year because he ran out of gtts and had no refills, fundus photos taken today    Heart attack ([facility])     High blood pressure     High cholesterol     Hyperlipidemia     Kidney stone 07/2020    Left Sided Neck pain, acute 11/28/2017    Lumbar stenosis with neurogenic claudication 05/31/2018    Major depressive disorder, recurrent, unspecified (HCC) 01/22/2022    Morbid obesity with BMI of 40.0-44.9, adult ([facility])     Neuropathy     Pneumonia due to COVID-19 virus 01/09/2022    Thyroid disease      Past Surgical History:   Procedure Laterality Date    CATARACT EXTRACTION EXTRACAPSULAR W/ INTRAOCULAR LENS IMPLANTATION Right 2018    [facility] BARE METAL STENT (BMS)      CIRCUMCISION,OTHR  08/04/2020    Dr. Bonilla @ [facility]    CYSTO/URETERO W/LITHOTRIPSY Left 08/04/2020    Dr. 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     IRIDOTOMY/IRIDECTOMY BY LASER      unknown Dr    OTHER      cardiac stents     Family History   Problem Relation Age of Onset    Heart Attack Father     Hypertension Brother     Glaucoma Neg     Macular degeneration Neg      Social History     Socioeconomic History    Marital status: Single   Tobacco Use    Smoking status: Every Day     Packs/day: 1     Types: Cigarettes    Smokeless tobacco: Former    Tobacco comments:     per pt, quit in 1994   Vaping Use    Vaping Use: Never used   Substance and Sexual Activity    Alcohol use: No     Comment: quit in 1994    Drug use: No   Other Topics Concern    Caffeine Concern Yes    Exercise No   Social History Narrative    The patient uses the following assistive device(s):   single-point cane.      The patient does live in a home with stairs.     Social Determinants of Health     Financial Resource Strain: Low Risk  (12/29/2023)    Financial Resource Strain     Difficulty of Paying Living Expenses: Not very hard     Med Affordability: No   Food Insecurity: No Food Insecurity (3/30/2024)    Food Insecurity     Food Insecurity: Never true   Transportation Needs: No Transportation Needs (3/30/2024)    Transportation Needs     Lack of Transportation: No   Housing Stability: Low Risk  (3/30/2024)    Housing Stability     Housing Instability: No       Allergies/Medications:   Allergies: No Known Allergies  Medications Prior to Admission   Medication Sig    torsemide 20 MG Oral Tab Take 1 tablet (20 mg total) by mouth daily.    carvedilol 6.25 MG Oral Tab Take 1 tablet (6.25 mg total) by mouth 2 (two) times daily with meals.    Potassium Chloride ER 20 MEQ Oral Tab CR Take 1 tablet by mouth daily.    spironolactone 25 MG Oral Tab Take 1 tablet (25 mg total) by mouth daily.    Insulin Pen Needle 32G X 4 MM Does not apply Misc Use daily    Continuous Blood Gluc Sensor (FREESTYLE NANCY 3 SENSOR) Does not apply Misc 1 each every 14 (fourteen) days.    semaglutide (OZEMPIC, 0.25 OR 0.5 MG/DOSE,) 2 MG/3ML Subcutaneous Solution Pen-injector Inject 0.5 mg into the skin once a week.    Empagliflozin (JARDIANCE) 25 MG Oral Tab Take 1 each by mouth daily.    insulin detemir (LEVEMIR FLEXTOUCH) 100 UNIT/ML Subcutaneous Solution Pen-injector Inject 10 Units into the skin daily.    zolpidem 5 MG Oral Tab Take 1 tablet (5 mg total) by mouth nightly as needed for Sleep.    atorvastatin 80 MG Oral Tab Take 1 tablet (80 mg total) by mouth daily.    levothyroxine 150 MCG Oral Tab Take 1 tablet (150 mcg total) by mouth before breakfast.    metFORMIN HCl 1000 MG Oral Tab Take 0.5 tablets (500 mg total) by mouth 2 (two) times daily with meals. (Patient taking differently: Take 1 tablet (1,000 mg total) by mouth 2  (two) times daily with meals.)    MOVANTIK 25 MG Oral Tab TAKE 1 TABLET BY MOUTH DAILY 1 -2 HOURS AFTER MEAL    albuterol (VENTOLIN HFA) 108 (90 Base) MCG/ACT Inhalation Aero Soln Inhale 2 puffs into the lungs every 4 (four) hours as needed for Wheezing.    clopidogrel 75 MG Oral Tab Take 1 tablet (75 mg total) by mouth daily.    bimatoprost (LUMIGAN) 0.01 % Ophthalmic Solution Place 1 drop into both eyes every evening.    Blood Glucose Monitoring Suppl Does not apply Kit Please use to check blood sugar twice daily.    Blood Glucose Monitoring Suppl Does not apply Misc Please use to check blood sugar twice daily    Blood Glucose Monitoring Suppl Does not apply Misc Please use to check blood sugar twice daily    ENTRESTO 24-26 MG Oral Tab Take 1 tablet by mouth 2 (two) times daily.    oxyCODONE-acetaminophen  MG Oral Tab Take 1 tablet by mouth 4 (four) times daily as needed.    DULoxetine 30 MG Oral Cap DR Particles Take 1 capsule (30 mg total) by mouth daily.    warfarin 5 MG Oral Tab Take 2 tablets (10 mg total) by mouth nightly. 7.5mg on  and Wednesday  10mg on all other days    pregabalin 300 MG Oral Cap Take 1 capsule (300 mg total) by mouth 2 (two) times daily.    aspirin 81 MG Oral Tab EC Take 1 tablet (81 mg total) by mouth daily.    [] Continuous Blood Gluc  (FREESTYLE NANCY 3 READER) Does not apply Misc 1 each once for 1 dose.    fluticasone-umeclidin-vilant (TRELEGY ELLIPTA) 200-62.5-25 MCG/ACT Inhalation Aerosol Powder, Breath Activated Inhale 1 puff into the lungs daily.       Review of Systems:   Review of Systems   Constitutional: Negative.    HENT: Negative.     Eyes: Negative.    Respiratory:  Positive for shortness of breath (Baseline). Negative for cough.    Cardiovascular:  Positive for leg swelling (Baseline).   Gastrointestinal: Negative.    Genitourinary: Negative.    Musculoskeletal:  Positive for back pain, joint pain and myalgias.   Skin: Negative.    Neurological:  Negative.    Endo/Heme/Allergies: Negative.    Psychiatric/Behavioral: Negative.         Physical Exam:   Vital Signs:  Blood pressure 131/73, pulse 86, temperature 97.6 °F (36.4 °C), temperature source Oral, resp. rate 20, weight 261 lb 12.8 oz (118.8 kg), SpO2 94%.     Physical Exam  Constitutional:       General: He is not in acute distress.     Appearance: Normal appearance. He is not ill-appearing.   HENT:      Head: Normocephalic and atraumatic.   Eyes:      General:         Right eye: No discharge.         Left eye: No discharge.   Cardiovascular:      Rate and Rhythm: Normal rate and regular rhythm.      Heart sounds: Normal heart sounds.   Pulmonary:      Effort: No respiratory distress.      Breath sounds: Normal breath sounds. No wheezing or rales.   Abdominal:      Palpations: Abdomen is soft.      Tenderness: There is no abdominal tenderness. There is no guarding.   Musculoskeletal:      Right lower leg: Edema present.      Left lower leg: Edema present.   Neurological:      Mental Status: He is alert and oriented to person, place, and time.   Psychiatric:         Mood and Affect: Mood normal.         Behavior: Behavior normal.         Thought Content: Thought content normal.         Judgment: Judgment normal.               Results:     Lab Results   Component Value Date    WBC 13.8 (H) 03/29/2024    HGB 11.4 (L) 03/29/2024    HCT 41.2 03/29/2024    .0 (H) 03/29/2024    CREATSERUM 1.09 03/29/2024    BUN 26 (H) 03/29/2024     03/29/2024    K 5.1 03/30/2024     03/29/2024    CO2 25.0 03/29/2024     (H) 03/29/2024    CA 9.3 03/29/2024    ALB 4.0 03/29/2024    ALKPHO 89 03/29/2024    BILT 0.2 03/29/2024    TP 6.9 03/29/2024    AST 22 03/29/2024    ALT 36 03/29/2024    PTT 36.7 (H) 01/18/2024    INR 1.30 (H) 03/29/2024    T4F 1.0 09/29/2023    TSH 3.879 12/25/2023    PSA 0.42 07/01/2021    DDIMER 0.35 03/29/2024    ESRML 11 07/14/2018    CRP <0.29 01/19/2022    MG 2.2 01/19/2024     PHOS 2.6 12/25/2023    TROP <0.045 11/20/2020    CK 65 01/08/2022    B12 351 06/29/2020       CT BRAIN OR HEAD (12455)    Result Date: 3/30/2024  CONCLUSION:  1. No acute intracranial finding. 2. No major discrepancy with preliminary [facility] radiology report.    Dictated by (CST): Froilan Pineda MD on 3/30/2024 at 7:14 AM     Finalized by (CST): Froilan Pineda MD on 3/30/2024 at 7:18 AM         EKG 12 Lead    Result Date: 3/30/2024  Normal sinus rhythm Right bundle branch block Left anterior fascicular block - Bifascicular block - LVH with repolarization abnormality ( R in aVL , Romhilt-Gustafson ) Anterolateral infarct (cited on or before 30-JAN-2023) Abnormal ECG When compared with ECG of 18-JAN-2024 13:46, T wave inversion less evident in Anterior leads     Assessment/Plan:     Syncope and collapse    Fall, initial encounter  -CT head without acute findings.  Normal D-dimer and troponin.  Rest of labs largely unremarkable.  -Unclear etiology  -Blood pressure seems to be good at this time  -Check orthostatic vital signs  -Will consult cardiology to see if they have any concerns given his complex cardiac history.  -Continue to monitor on telemetry      Hypothyroidism  -Continue levothyroxine      Type 2 diabetes mellitus with hyperglycemia, with long-term current use of insulin ([facility])  -Hemoglobin A1c 8.3.  -Concern for medication noncompliance  -Accu-Chek and sliding scale insulin  -Continue statin      CAD (coronary artery disease), native coronary artery  -Extensive history with LAD in-stent restenosis.  Currently no ischemic symptoms  -DAPT  -statin, BB, Entresto       Chronic HFrEF (heart failure with reduced ejection fraction) ([facility])  -Not in acute exacerbation  -BNP WNL  -Continue GDMT      HTN (hypertension)  -Continue home meds      History of pulmonary embolus (PE)    Subtherapeutic anticoagulation  -Continue warfarin and check INR      Recurrent major depressive disorder, in partial remission ([facility])  Plan  continue duloxetine      Simple chronic bronchitis (HCC)  -Continue home inhalers and DuoNeb    Lumbar stenosis with neurogenic claudication  -Continue pregabalin and duloxetine      Tobacco use disorder  -Nicotine patches

## 2024-03-30 NOTE — OCCUPATIONAL THERAPY NOTE
OCCUPATIONAL THERAPY EVALUATION - INPATIENT     Room Number: 330/330-A  Evaluation Date: 3/30/2024  Type of Evaluation: Initial       Physician Order: IP Consult to Occupational Therapy  Reason for Therapy: ADL/IADL Dysfunction and Discharge Planning    OCCUPATIONAL THERAPY ASSESSMENT     Patient is a 74 year old male admitted 3/29/2024 for syncope and collapse.  Prior to admission, pt was independent.  Patient is currently functioning at baseline for ADLs and functional transfers at RW level.  No dizziness; Pt with mild SOB. VSS    RN cleared pt for participation in OT session, which was completed in collaboration with PT. Upon arrival, pt was seated in bedside chair  and agreeable to activity. No visitors present during session. Gait belt used. Pt was left in chair and alarm was activated. Call light and all needs left in reach. Handoff given to RN.    Education provided  Educated pt about role of OT and hospital therapy process as well as proper safety techniques. Pt verbalized/demonstrated good carryover.    OT to sign off.      OCCUPATIONAL THERAPY MEDICAL/SOCIAL HISTORY     Problem List  Principal Problem:    Syncope and collapse  Active Problems:    Hypothyroidism    Type 2 diabetes mellitus with hyperglycemia, with long-term current use of insulin ([facility])    CAD (coronary artery disease), native coronary artery    HTN (hypertension)    History of pulmonary embolus (PE)    Recurrent major depressive disorder, in partial remission ([facility])    Subtherapeutic anticoagulation    Fall, initial encounter    Simple chronic bronchitis ([facility])    Tobacco use disorder    Chronic HFrEF (heart failure with reduced ejection fraction) ([facility])      Past Medical History  Past Medical History:   Diagnosis Date    Anxiety disorder, unspecified 01/22/2022    Anxiety state     Atherosclerosis of coronary artery     stents x 6    Chronic low back pain with bilateral sciatica, left worse than right 11/28/2017    Chronic obstructive  pulmonary disease, unspecified (HCC) 01/22/2022    Coronary atherosclerosis     Depression     Diabetes ([facility])     borderline     Disorder of thyroid     Essential hypertension     Glaucoma 05/10/2023    first visit with IRMA, patient was taking Timolol OU BID for 20 years, has not used gtts in over 1 year because he ran out of gtts and had no refills, fundus photos taken today    Heart attack ([facility])     High blood pressure     High cholesterol     Hyperlipidemia     Kidney stone 07/2020    Left Sided Neck pain, acute 11/28/2017    Lumbar stenosis with neurogenic claudication 05/31/2018    Major depressive disorder, recurrent, unspecified (HCC) 01/22/2022    Morbid obesity with BMI of 40.0-44.9, adult ([facility])     Neuropathy     Pneumonia due to COVID-19 virus 01/09/2022    Thyroid disease        Past Surgical History  Past Surgical History:   Procedure Laterality Date    CATARACT EXTRACTION EXTRACAPSULAR W/ INTRAOCULAR LENS IMPLANTATION Right 2018    [place]    CATH BARE METAL STENT (BMS)      CIRCUMCISION,OTHR  08/04/2020    Dr. Bonilla @ [facility]    CYSTO/URETERO W/LITHOTRIPSY Left 08/04/2020    Dr. 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     IRIDOTOMY/IRIDECTOMY BY LASER      unknown Dr    OTHER      cardiac stents       HOME SITUATION  Type of Home: Apartment  Home Layout: One level  Lives With: Son (age 15)                      Drives: Yes       SUBJECTIVE  \"I just walked two blocks but I was out of breath.\"    OCCUPATIONAL THERAPY EXAMINATION      OBJECTIVE  Precautions: Bed/chair alarm  Fall Risk: High fall risk    PAIN ASSESSMENT  Rating: Unable to rate  Location: low back; chronic            RANGE OF MOTION   Upper extremity ROM is within functional limits     STRENGTH ASSESSMENT  Upper extremity strength is within functional limits     COORDINATION  Gross Motor: WFL   Fine Motor: WFL     ACTIVITIES OF DAILY LIVING ASSESSMENT  AM-PAC ‘6-Clicks’ Inpatient  Daily Activity Short Form  How much help from another person does the patient currently need…  -   Putting on and taking off regular lower body clothing?: None  -   Bathing (including washing, rinsing, drying)?: None  -   Toileting, which includes using toilet, bedpan or urinal? : None  -   Putting on and taking off regular upper body clothing?: None  -   Taking care of personal grooming such as brushing teeth?: None  -   Eating meals?: None    AM-PAC Score:  Score: 24  Approx Degree of Impairment: 0%  Standardized Score (AM-PAC Scale): 57.54  CMS Modifier (G-Code): CH         FUNCTIONAL TRANSFER ASSESSMENT        Sit to stand: ind  Toilet Transfer: ind  Chair Transfer: ind    FUNCTIONAL ADL ASSESSMENT  ind    The patient's Approx Degree of Impairment: 0% has been calculated based on documentation in the [facility] '6 clicks' Inpatient Daily Activity Short Form.  Research supports that patients with this level of impairment may benefit from home. Final disposition will be made by interdisciplinary medical team.       Patient Evaluation Complexity Level:   Occupational Profile/Medical History LOW - Brief history including review of medical or therapy records    Specific performance deficits impacting engagement in ADL/IADL LOW  1 - 3 performance deficits    Client Assessment/Performance Deficits LOW - No comorbidities nor modifications of tasks    Clinical Decision Making LOW - Analysis of occupational profile, problem-focused assessments, limited treatment options    Overall Complexity LOW     OT Session Time: 20 minutes  Self-Care Home Management: 10 minutes           Mari Morales OT  [facility]  Inpatient Rehabilitation  Occupational Therapy  [phone]

## 2024-03-30 NOTE — ED INITIAL ASSESSMENT (HPI)
Multiple syncopal episodes today, refused c collar on plavix and coumadin. +loc. Currently alert and oriented.

## 2024-03-30 NOTE — CONSULTS
[facility] - CARDIOLOGY CONSULT NOTE    Caleb Rausch Jr. Patient Status:  Observation    3/23/1950 MRN [id number]   Location [facility] 3W/SW Attending Nilda Argueta MD   Hosp Day # 1 PCP NILDA ARGUETA MD     Date of Admission:  3/29/2024  Date of Consult:  3/30/2024  I was asked by Nilda Argueta MD to provide recommendations for evaluation and management of cardiac issues.  Impression:   74-year-old male extensive history of CAD, hypertension, hyperlipidemia, diabetes now with syncopal episode.      Recommendations:  1.  Syncope  Normal D-dimer low suspicion for pulmonary embolism already on anticoagulation.  Head CT no acute findings.  Check orthostatics.  Low suspicion for cardiac event.  2.  Cardiomyopathy last echo 35 to 40%  no evidence of CHF on exam.  3.  Diabetes mellitus  Poorly controlled due to noncompliance A1c 8.3.  4.  History of CAD  As above extensive restenosis in the past no active ischemic symptoms continue dual antiplatelet agent along with blood pressure and lipid control.  5.  Hypertension  Continue home medications  6.  History of pulmonary embolism continue warfarin  INR subtherapeutic 1.3 continue Coumadin  7 tobacco use  Cessation discussed patient is interested in starting the patch currently placed while in the hospital, please give him a prescription for patch upon discharge.       L5 consult, will sign off    Reason for Consultation:     Syncope.    History of Present Illness:   Patient is a 74 year old male who was admitted to the hospital for syncopal episode. Past medical history of PE on Coumadin, CAD status post stents on DAPT, HTN, type II DM, HLD, hypothyroidism, multiple admissions presented to the emergency room with complaint of syncopal episode. Patient reported the day of admission he was watching TV and got up to go to the kitchen but woke up to find himself on the floor.  Reports that on that day he had multiple episodes of lightheadedness.   Has not 2 episodes of diarrhea earlier that day.  No abdominal pain.  No further episodes of diarrhea.  No GI bleeding.  No nausea or vomiting..  Denied chest pain or shortness of breath but reported lightheadedness.  No fever or chills.  No palpitations.  Admits to not being compliant with his low-fat low sugar diet    Pulmonary embolus: February 2022, has since been on Coumadin due to cost of NOACs. Echocardiogram at [facility] without RV strain therefore no catheter directed thrombolytics. No DVT. Continues to have residual shortness of breath which is multifactorial including likely microvascular and macro epicardial disease however continues to smoke, continues to gain weight and so obesity and tobacco related as well.CAD: Shortness of breath likely anginal equivalent given extensive LAD-diagonal bifurcation stents, last angiogram April 2022 with laser atherectomy, shockwave lithotripsy of LAD, angioplasty diagonal. Repeat angiogram May 2023 with laser atherectomy 1.4 mm catheter, shockwave lithotripsy mid LAD, Cutting Balloon remaining of the LAD, PCI diagonal.     Cardiac tests:    Past Medical History  Past Medical History:   Diagnosis Date    Anxiety disorder, unspecified 01/22/2022    Anxiety state     Atherosclerosis of coronary artery     stents x 6    Chronic low back pain with bilateral sciatica, left worse than right 11/28/2017    Chronic obstructive pulmonary disease, unspecified (HCC) 01/22/2022    Coronary atherosclerosis     Depression     Diabetes ([facility])     borderline     Disorder of thyroid     Essential hypertension     Glaucoma 05/10/2023    first visit with IRMA, patient was taking Timolol OU BID for 20 years, has not used gtts in over 1 year because he ran out of gtts and had no refills, fundus photos taken today    Heart attack (HCC)     High blood pressure     High cholesterol     Hyperlipidemia     Kidney stone 07/2020    Left Sided Neck pain, acute 11/28/2017    Lumbar  stenosis with neurogenic claudication 05/31/2018    Major depressive disorder, recurrent, unspecified (HCC) 01/22/2022    Morbid obesity with BMI of 40.0-44.9, adult ([facility])     Neuropathy     Pneumonia due to COVID-19 virus 01/09/2022    Thyroid disease        Past Surgical History  Past Surgical History:   Procedure Laterality Date    CATARACT EXTRACTION EXTRACAPSULAR W/ INTRAOCULAR LENS IMPLANTATION Right 2018    [facility] BARE METAL STENT (BMS)      CIRCUMCISION,OTHR  08/04/2020    Dr. Bonilla @ [facility]    CYSTO/URETERO W/LITHOTRIPSY Left 08/04/2020    Dr. 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     IRIDOTOMY/IRIDECTOMY BY LASER      unknown     OTHER      cardiac stents       Family History  Family History   Problem Relation Age of Onset    Heart Attack Father     Hypertension Brother     Glaucoma Neg     Macular degeneration Neg        Social History  Pediatric History   Patient Parents    Not on file     Other Topics Concern    Caffeine Concern Yes    Exercise No    Seat Belt Not Asked    Special Diet Not Asked    Stress Concern Not Asked    Weight Concern Not Asked     Service Not Asked    Blood Transfusions Not Asked    Occupational Exposure Not Asked    Hobby Hazards Not Asked    Sleep Concern Not Asked    Back Care Not Asked    Bike Helmet Not Asked    Self-Exams Not Asked   Social History Narrative    The patient uses the following assistive device(s):  single-point cane.      The patient does live in a home with stairs.       Smokes 1 pack a day, quit drinking in 1994 no drug use.    Current Medications:  Current Facility-Administered Medications   Medication Dose Route Frequency    fluticasone-umeclidin-vilant (Trelegy Ellipta) 200-62.5-25 MCG/ACT inhaler 1 puff  1 puff Inhalation Daily    albuterol (Ventolin HFA) 108 (90 Base) MCG/ACT inhaler 2 puff  2 puff Inhalation Q4H PRN    aspirin  tab 81 mg  81 mg Oral Daily    atorvastatin  (Lipitor) tab 80 mg  80 mg Oral Daily    latanoprost (Xalatan) 0.005 % ophthalmic solution 1 drop  1 drop Both Eyes Nightly    carvedilol (Coreg) tab 6.25 mg  6.25 mg Oral BID with meals    clopidogrel (Plavix) tab 75 mg  75 mg Oral Daily    DULoxetine (Cymbalta) DR cap 30 mg  30 mg Oral Daily    sacubitril-valsartan (Entresto) 24-26 MG per tab 1 tablet  1 tablet Oral BID    levothyroxine (Synthroid) tab 150 mcg  150 mcg Oral Before breakfast    oxyCODONE-acetaminophen (Percocet)  MG per tab 1 tablet  1 tablet Oral QID PRN    potassium chloride (Klor-Con) 20 MEQ oral powder 20 mEq  20 mEq Oral Daily    pregabalin (Lyrica) cap 300 mg  300 mg Oral BID    spironolactone (Aldactone) tab 25 mg  25 mg Oral Daily    torsemide (Demadex) tab 20 mg  20 mg Oral Daily    warfarin (Coumadin) tab 10 mg  10 mg Oral Nightly    zolpidem (Ambien) tab 5 mg  5 mg Oral Nightly PRN    glucose (Dex4) 15 GM/59ML oral liquid 15 g  15 g Oral Q15 Min PRN    Or    glucose (Glutose) 40% oral gel 15 g  15 g Oral Q15 Min PRN    Or    glucose-vitamin C (Dex-4) chewable tab 4 tablet  4 tablet Oral Q15 Min PRN    Or    dextrose 50% injection 50 mL  50 mL Intravenous Q15 Min PRN    Or    glucose (Dex4) 15 GM/59ML oral liquid 30 g  30 g Oral Q15 Min PRN    Or    glucose (Glutose) 40% oral gel 30 g  30 g Oral Q15 Min PRN    Or    glucose-vitamin C (Dex-4) chewable tab 8 tablet  8 tablet Oral Q15 Min PRN    insulin aspart (NovoLOG) 100 Units/mL FlexPen 1-11 Units  1-11 Units Subcutaneous TID CC    melatonin tab 3 mg  3 mg Oral Nightly PRN    polyethylene glycol (PEG 3350) (Miralax) 17 g oral packet 17 g  17 g Oral Daily PRN    sennosides (Senokot) tab 17.2 mg  17.2 mg Oral Nightly PRN    bisacodyl (Dulcolax) 10 MG rectal suppository 10 mg  10 mg Rectal Daily PRN    fleet enema (Fleet) 7-19 GM/118ML rectal enema 133 mL  1 enema Rectal Once PRN    ondansetron (Zofran) 4 MG/2ML injection 4 mg  4 mg Intravenous Q6H PRN    metoclopramide (Reglan) 5  mg/mL injection 10 mg  10 mg Intravenous Q8H PRN    guaiFENesin ER (Mucinex) 12 hr tab 600 mg  600 mg Oral BID    benzonatate (Tessalon) cap 200 mg  200 mg Oral TID PRN    guaiFENesin (Robitussin) 100 MG/5 ML oral liquid 200 mg  200 mg Oral Q4H PRN    nicotine (Nicoderm CQ) 14 MG/24HR patch 1 patch  1 patch Transdermal Daily    albuterol (Ventolin) (2.5 MG/3ML) 0.083% nebulizer solution 2.5 mg  2.5 mg Nebulization Q6H PRN     Medications Prior to Admission   Medication Sig    torsemide 20 MG Oral Tab Take 1 tablet (20 mg total) by mouth daily.    carvedilol 6.25 MG Oral Tab Take 1 tablet (6.25 mg total) by mouth 2 (two) times daily with meals.    Potassium Chloride ER 20 MEQ Oral Tab CR Take 1 tablet by mouth daily.    spironolactone 25 MG Oral Tab Take 1 tablet (25 mg total) by mouth daily.    Insulin Pen Needle 32G X 4 MM Does not apply Misc Use daily    Continuous Blood Gluc Sensor (Humble BundleSTYLE NANCY 3 SENSOR) Does not apply Misc 1 each every 14 (fourteen) days.    semaglutide (OZEMPIC, 0.25 OR 0.5 MG/DOSE,) 2 MG/3ML Subcutaneous Solution Pen-injector Inject 0.5 mg into the skin once a week.    Empagliflozin (JARDIANCE) 25 MG Oral Tab Take 1 each by mouth daily.    insulin detemir (LEVEMIR FLEXTOUCH) 100 UNIT/ML Subcutaneous Solution Pen-injector Inject 10 Units into the skin daily.    zolpidem 5 MG Oral Tab Take 1 tablet (5 mg total) by mouth nightly as needed for Sleep.    atorvastatin 80 MG Oral Tab Take 1 tablet (80 mg total) by mouth daily.    levothyroxine 150 MCG Oral Tab Take 1 tablet (150 mcg total) by mouth before breakfast.    metFORMIN HCl 1000 MG Oral Tab Take 0.5 tablets (500 mg total) by mouth 2 (two) times daily with meals. (Patient taking differently: Take 1 tablet (1,000 mg total) by mouth 2 (two) times daily with meals.)    MOVANTIK 25 MG Oral Tab TAKE 1 TABLET BY MOUTH DAILY 1 -2 HOURS AFTER MEAL    albuterol (VENTOLIN HFA) 108 (90 Base) MCG/ACT Inhalation Aero Soln Inhale 2 puffs into the lungs  every 4 (four) hours as needed for Wheezing.    clopidogrel 75 MG Oral Tab Take 1 tablet (75 mg total) by mouth daily.    bimatoprost (LUMIGAN) 0.01 % Ophthalmic Solution Place 1 drop into both eyes every evening.    Blood Glucose Monitoring Suppl Does not apply Kit Please use to check blood sugar twice daily.    Blood Glucose Monitoring Suppl Does not apply Misc Please use to check blood sugar twice daily    Blood Glucose Monitoring Suppl Does not apply Misc Please use to check blood sugar twice daily    ENTRESTO 24-26 MG Oral Tab Take 1 tablet by mouth 2 (two) times daily.    oxyCODONE-acetaminophen  MG Oral Tab Take 1 tablet by mouth 4 (four) times daily as needed.    DULoxetine 30 MG Oral Cap DR Particles Take 1 capsule (30 mg total) by mouth daily.    warfarin 5 MG Oral Tab Take 2 tablets (10 mg total) by mouth nightly. 7.5mg on  and Wednesday  10mg on all other days    pregabalin 300 MG Oral Cap Take 1 capsule (300 mg total) by mouth 2 (two) times daily.    aspirin 81 MG Oral Tab EC Take 1 tablet (81 mg total) by mouth daily.    [] Continuous Blood Gluc  (FREESTYLE NANCY 3 READER) Does not apply Misc 1 each once for 1 dose.    fluticasone-umeclidin-vilant (TRELEGY ELLIPTA) 200-62.5-25 MCG/ACT Inhalation Aerosol Powder, Breath Activated Inhale 1 puff into the lungs daily.       Allergies  No Known Allergies    Review of Systems:   10 pt ROS performed, separate from HPI  Review of Systems:  GENERAL: no fevers, chills, sweats  HEENT: no visual or hearing changes  SKIN: denies any unusual skin lesions or rashes  RESPIRATORY:  shortness of breath with exertion  CARDIOVASCULAR: no active chest pain, no claudication  GI: denies abdominal pain and denies heartburn  : no dysuria or hematuria  NEURO: denies headaches, focal weaknesses or paresthesias positive for back pain and joint pain and myalgias.  All other systems reviewed and negative.  fatigue is present  All other systems were  reviewed are negative  Physical Exam:   Blood pressure 131/73, pulse 86, temperature 97.6 °F (36.4 °C), temperature source Oral, resp. rate 20, weight 261 lb 12.8 oz (118.8 kg), SpO2 94%.    Scheduled Meds:    fluticasone-umeclidin-vilant  1 puff Inhalation Daily    aspirin  81 mg Oral Daily    atorvastatin  80 mg Oral Daily    latanoprost  1 drop Both Eyes Nightly    carvedilol  6.25 mg Oral BID with meals    clopidogrel  75 mg Oral Daily    DULoxetine  30 mg Oral Daily    sacubitril-valsartan  1 tablet Oral BID    levothyroxine  150 mcg Oral Before breakfast    potassium chloride  20 mEq Oral Daily    pregabalin  300 mg Oral BID    spironolactone  25 mg Oral Daily    torsemide  20 mg Oral Daily    warfarin  10 mg Oral Nightly    insulin aspart  1-11 Units Subcutaneous TID CC    guaiFENesin ER  600 mg Oral BID    nicotine  1 patch Transdermal Daily         Physical Exam:    General: Alert and oriented. No apparent distress. No respiratory or constitutional distress.  Overweight male  HEENT: Normocephalic, anicteric sclera, neck supple  Neck: No JVD, carotids 2+, supple  Cardiac: Regular rate. No pathologic murmur.  Lungs: Clear with normal effort.  Normal excursions and effort.  Abdomen: Soft, non-tender. BS-present.  Extremities: Without clubbing, cyanosis.  Peripheral pulsespresent.  Neurologic: Alert and oriented, normal affect. Motor ok.  Skin: Warm and dry.     Results:     Laboratory Data:  Lab Results   Component Value Date    WBC 13.8 (H) 03/29/2024    HGB 11.4 (L) 03/29/2024    HCT 41.2 03/29/2024    .0 (H) 03/29/2024    CREATSERUM 1.09 03/29/2024    BUN 26 (H) 03/29/2024     03/29/2024    K 5.1 03/30/2024     03/29/2024    CO2 25.0 03/29/2024     (H) 03/29/2024    CA 9.3 03/29/2024    ALB 4.0 03/29/2024    ALKPHO 89 03/29/2024    TP 6.9 03/29/2024    AST 22 03/29/2024    ALT 36 03/29/2024    PTT 36.7 (H) 01/18/2024    INR 1.30 (H) 03/29/2024    PTP 17.0 (H) 03/29/2024    T4F 1.0  09/29/2023    TSH 3.879 12/25/2023    PSA 0.42 07/01/2021    DDIMER 0.35 03/29/2024    ESRML 11 07/14/2018    CRP <0.29 01/19/2022    MG 2.2 01/19/2024    PHOS 2.6 12/25/2023    TROP <0.045 11/20/2020    CK 65 01/08/2022    B12 351 06/29/2020         Thank you for allowing me to participate in the care of your patient.    Benito Perrin MD  [facility]  Interventional Cardiology  3/30/2024

## 2024-03-30 NOTE — ED QUICK NOTES
Orders for admission, patient is aware of plan and ready to go upstairs. Any questions, please call ED LAYNE villafana at extension [phone].     Patient Covid vaccination status: Fully vaccinated     COVID Test Ordered in ED: None    COVID Suspicion at Admission: N/A    Running Infusions:  None    Mental Status/LOC at time of transport: ao x 4    Other pertinent information:   CIWA score: N/A   NIH score:  N/A

## 2024-03-30 NOTE — HISTORICAL OFFICE NOTE
[facility]  Outside Information  Continuity of Care Document  12/3/2023  Caleb Rausch - 73 y.o. Male; born [birth year]Summary of episode note, generated on Dec. 11, 2023December 11, 2023   CHIEF COMPLAINT    CHIEF COMPLAINT  Reason for Visit/Chief Complaint   F/U  SOB upon exertion   73-year-old male being followed for extensive coronary disease, mildly reduced ejection fraction, history of PE February 2022.Now having recurrence of shortness of breath similar to previous anginal equivalent, history of long LAD stented segment, multiple bifurcation lesions in the diagonal.No chest pain with exertion. No palpitations or syncope. No lower extremity PINKY, orthopnea or PND.     PROBLEMS  Reconcile with Patient's ChartPROBLEMS  Problem Effective Dates Date resolved Problem Status   Pre-op testing, [SNOMED-CT: 548332609] 5/3/2023 - Active   CAD (coronary artery disease), native coronary artery, [SNOMED-CT: 191134951] 11/12/2019 - Active   PE (pulmonary embolism), [SNOMED-CT: 23574707] 3/4/2022 - Active   History of PTCA, [SNOMED-CT: 686156169] 11/9/2020 - Active   Pure hypercholesterolemia, [SNOMED-CT: 961912331] 11/12/2019 - Active   Hypertension (HTN), primary, [SNOMED-CT: 11747323] 11/12/2019 - Active   Postprocedural hematoma of a circulatory system organ or structure following a cardiac catheterization, [SNOMED-CT: 169511811] 11/12/2019 - Active   Tobacco abuse, [SNOMED-CT: 686230122] 11/12/2019 - Active     ENCOUNTER DIAGNOSIS    ENCOUNTER DIAGNOSIS  Problem Effective Dates Date resolved Problem Status   CAD (coronary artery disease), native coronary artery, [SNOMED-CT: 535531769] 11/12/2019 - Active   PE (pulmonary embolism), [SNOMED-CT: 76976942] 3/4/2022 - Active   History of PTCA, [SNOMED-CT: 945806319] 11/9/2020 - Active   Pure hypercholesterolemia, [SNOMED-CT: 726568308] 11/12/2019 - Active   Hypertension (HTN), primary, [SNOMED-CT: 42268158] 11/12/2019 - Active   Tobacco  abuse, [SNOMED-CT: 624105742] 11/12/2019 - Active     VITAL SIGNS    VITAL SIGNS  Date / Time: 12/4/2023   BP Systolic 122 mmHg   BP Diastolic 76 mmHg   Height 69 inches   Weight 300 lbs   Pulse Rate 85 bpm   BSA (Body Surface Area) 2.6 cc/m2   BMI (Body Mass Index) 44.3 cc/m2   Blood Pressure 122 / 76 mmHg     PHYSICAL EXAMINATION    PHYSICAL EXAMINATION  Header Details   Constitutional 94% o2 RA   Vitals Left Arm Sitting  / 76 mmHg, Pulse rate 85 bpm, Height in 5' 9\", BMI: 44.3, Weight in 299.83 lbs (or) 136 kgs, BSA : 2.64 cc/m²   General Appearance No Acute Distress, Well groomed   Cardiovascular      ALLERGIES, ADVERSE REACTIONS, ALERTS    No data available    MEDICATIONS ADMINISTERED DURING VISIT    No data available    MEDICATIONS  Reconcile with Patient's ChartMEDICATIONS  Medication Start Date Route/Frequency Status   albuterol (VENTOLIN HFA) 108 (90 Base) MCG/ACT inhaler, [RxNorm: 729142] 7/17/2021 INHALE 2 PUFFS INTO THE LUNGS EVERY 6 HOURS AS NEEDED FOR WHEEZING Active   aspirin 81 MG EC tablet, [RxNorm: 950204] 7/17/2021 Take 1 tablet by mouth daily. Active   atorvastatin 80 mg tablet, [RxNorm: 911222] 6/15/2022 Take 1 tablet orally once a day. Active   carvediloL 12.5 mg tablet, [RxNorm: 332827] 4/7/2022 Take 1 tablet orally 2 times a day. Active   CLOPIDOGREL 75MG TABLETS, [RxNorm: 788392] - TAKE 1 TABLET BY MOUTH EVERY DAY Active   DULoxetine 30 mg capsule,delayed release, [RxNorm: 512783] 5/3/2023 Take 1 capsule orally once a day. Active   Entresto 24 mg-26 mg tablet, [RxNorm: 8541444] 5/3/2023 Take 1 tablet orally 2 times a day. Active   Jardiance 10 mg tablet, [RxNorm: 1778114] 5/3/2023 Take 1 tablet orally once a day. Active   levothyroxine 137 mcg tablet, [RxNorm: 486085] 5/3/2023 Take 1 tablet orally once a day. Active   melatonin 5 mg capsule, [RxNorm: 200926] 2/28/2022 Take 1 capsule orally once a day. Active   metFORMIN 500 mg tablet, [RxNorm: 223902] 5/3/2023 Take 1 tablet orally 2  times a day. Active   oxyCODONE-acetaminophen (PERCOCET)  MG per tablet, [RxNorm: 2848634] 7/17/2021 Take 1 tablet by mouth. Active   potassium chloride ER 20 mEq tablet,extended release, [RxNorm: 210511] 5/3/2023 Take 1 tablet orally once a day. Active   pregabalin 300 mg capsule, [RxNorm: 868509] 5/3/2023 Take 1 capsule orally 2 times a day as needed. Active   torsemide 10 mg tablet, [RxNorm: 722687] 5/3/2023 Take 1 tablet orally once a day. Active   TylenoL 325 mg tablet, [RxNorm: 831057] 2/28/2022 Take 1 tablet orally once a day. Active   Vitamin D2 1,250 mcg (50,000 unit) capsule, [RxNorm: 1425608] 5/3/2023 Take 1 capsule orally once a week Active   warfarin 5 mg tablet, [RxNorm: 020672] 8/30/2023 Take 1.5 - 2 tablets orally once in the evening as directed by warfarin clinic [phone]. Active     ASSESSMENT    Pulmonary embolus: February 2022, has since been on Coumadin due to cost of NOACs. Echocardiogram at [facility] without RV strain therefore no catheter directed thrombolytics. No DVT. Continues to have residual shortness of breath which is multifactorial including likely microvascular and macro epicardial disease however continues to smoke, continues to gain weight and so obesity and tobacco related as well.CAD: Shortness of breath likely anginal equivalent given extensive LAD-diagonal bifurcation stents, last angiogram April 2022 with laser atherectomy, shockwave lithotripsy of LAD, angioplasty diagonal. Repeat angiogram May 2023 with laser atherectomy 1.4 mm catheter, shockwave lithotripsy mid LAD, Cutting Balloon remaining of the LAD, PCI diagonal. Despite this intervention he continues to smoke, does not take care of his diabetes and now has shortness of breath. Discussed that he needs to start exercising, quit smoking and see his primary regarding his diabetes prior to doing a repeat angiogram.  -Consider diet changes which may lower your cardiovascular risk which include  Mediterranean diet (proteins from lean meat such as fish or chicken or lentils, ample vegetables, nonprocessed carbs) or intermittent fasting (consuming all calories with an 8-hour window such as noon to 8 PM, no calories in the other 16 hours which include artificial sweeteners)  -Start heart rate directed exercise  -Defined as maintaining your target heart rate for 30-40 minutes per session  -Your target heart rate: 110HFrEF: In remission, ejection fraction normal on echo February 2022. No changes cardiac meds at this time.Hypertension: Controlled on current regimen, no changesHyperlipidemia: On high intensity statin for secondary prevention, no changes.Diabetes per primaryFollow-up:  Clinic: 3 months  Testing/intervention: Diet and exercise changes, smoking cessation     FAMILY HISTORY    FAMILY HISTORY  Relationship Age Diagnosis   Brother 0 Hypertension (HTN), primary     GENERAL STATUS    No data available    PAST MEDICAL HISTORY    PAST MEDICAL HISTORY  Problem Date diagonsed Date resolved Status   Pre-op testing, [SNOMED-CT: 200599740] 5/3/2023 - Active   CAD (coronary artery disease), native coronary artery, [SNOMED-CT: 746663166] 11/12/2019 - Active   PE (pulmonary embolism), [SNOMED-CT: 42644104] 3/4/2022 - Active   History of PTCA, [SNOMED-CT: 249580648] 11/9/2020 - Active   Pure hypercholesterolemia, [SNOMED-CT: 120973235] 11/12/2019 - Active   Hypertension (HTN), primary, [SNOMED-CT: 18859203] 11/12/2019 - Active   Postprocedural hematoma of a circulatory system organ or structure following a cardiac catheterization, [SNOMED-CT: 386671958] 11/12/2019 - Active   Tobacco abuse, [SNOMED-CT: 072490199] 11/12/2019 - Active     HISTORY OF PRESENT ILLNESS    73-year-old male being followed for extensive coronary disease, mildly reduced ejection fraction, history of PE February 2022.Now having recurrence of shortness of breath similar to previous anginal equivalent, history of long LAD stented segment,  multiple bifurcation lesions in the diagonal.No chest pain with exertion. No palpitations or syncope. No lower extremity PINKY, orthopnea or PND.     IMMUNIZATIONS    No data available    PLAN OF CARE    PLAN OF CARE  Planned Care Date   Referral Visit - Nilda Argueta ([email or web address]) : 1/1/1900   Follow up visit - Anival Hammer MD 1/1/1900     PROCEDURES    No data available    RESULTS    RESULTS  Name Result Date Location - Ordered By   INR [LOINC: INR] 2.91 RATIO [Normal] 11/15/2023 12:00:00 AM   Address: tel:   AST [LOINC: 1920-8] 28 U/L 06/20/2023 09:36:00 AM [facility] ([facility])  Address: [address] [facility]  [address]  tel:   ALT [LOINC: 1742-6] 44 U/L 06/20/2023 09:36:00 AM [facility] ([facility])  Address: [address] [facility]  [address]  tel:   ALKALINE PHOSPHATASE [LOINC: 1779-8] 83 U/L 06/20/2023 09:36:00 AM [facility] ([facility])  Address: [address] [facility]  [address]  tel:   BILIRUBIN, TOTAL [LOINC: 1975-2] 0.3 mg/dL 06/20/2023 09:36:00 AM [facility] ([facility])  Address: [address] [facility]  [address]  tel:   BILIRUBIN, DIRECT [LOINC: 1968-7] <0.1 mg/dL 06/20/2023 09:36:00 AM [facility] ([facility])  Address: [address] [facility]  [address]  tel:   TOTAL PROTEIN [LOINC: 2885-2] 6.8 g/dL 06/20/2023 09:36:00 AM [facility] ([facility])  Address: [address]  [facility]  [address]  tel:   ALBUMIN [LOINC: 1751-7] 3.0 g/dL [Low] 06/20/2023 09:36:00 AM [facility] ([facility])  Address: [address]  [facility]  [address]  tel:   TROPONIN I HIGH SENSITIVITY [LOINC: 45603615] 39 ng/L 06/20/2023 05:41:00 PM [facility] ([facility])  Address: [address]  [facility]  [address]  tel:   AMMONIA [LOINC: 73833-7] <10 umol/L [Low] 06/20/2023  05:41:00 PM [facility] ([facility])  Address: [address]  [facility]  [address]  tel:   PROTIME [LOINC: 5902-2] 11.1 seconds [Low] 05/19/2023 08:07:00 AM [facility] ([facility])  Address: [address]  [facility]  [address]  tel:   INR [LOINC: 59579-8] 0.81 [Low] 05/19/2023 08:07:00 AM [facility] ([facility])  Address: [address]  [facility]  [address]  tel:   POCT GLUCOSE [LOINC: 35873-4] 178 mg/dL [High] 05/19/2023 08:24:00 AM [facility] ([facility])  Address: [address]  [facility]  [address]  tel:   GLUCOSE [LOINC: 2339-0] 167 mg/dL [High] 05/17/2023 02:31:00 PM [facility] ([facility])  Address: [address]  [facility]  [address]  tel:   SODIUM [LOINC: 2951-2] 136 mmol/L 05/17/2023 02:31:00 PM [facility] ([facility])  Address: [address]  [facility]  [address]  tel:   POTASSIUM [LOINC: 2823-3] 4.0 mmol/L 05/17/2023 02:31:00 PM [facility] ([facility])  Address: [address]  [facility]  [address]  tel:   CHLORIDE [LOINC: 2075-0] 110 mmol/L 05/17/2023 02:31:00 PM [facility] ([facility])  Address: [address]  [facility]  [address]  tel:   CO2 [LOINC: 2028-9] 22.0 mmol/L 05/17/2023 02:31:00 PM [facility] ([facility])  Address: [address]  [facility]  [address]  tel:   ANION GAP [LOINC: 33037-3] 4 mmol/L 05/17/2023 02:31:00 PM [facility] ([facility])  Address: [address]  [facility]  [address]  tel:   BUN [LOINC: 6299-2] 31 mg/dL [High] 05/17/2023 02:31:00 PM [facility] ([facility])  Address: [address]  [facility]  [address]  tel:   CREATININE [LOINC: 88576-7] 1.26 mg/dL 05/17/2023 02:31:00 PM [facility] ([facility])  Address: [address]  [facility]  [address]  tel:   BUN/ CREAT RATIO [LOINC: 3097-3] 24.6 [High] 05/17/2023 02:31:00 PM [facility] ([facility])  Address: [address]  [facility]  [address]  tel:   CALCIUM [LOINC: 15352-5] 9.6 mg/dL 05/17/2023 02:31:00 PM [facility] ([facility])  Address: [address]  [facility]  [address]  tel:   OSMOLALITY CALCULATED [LOINC: 00670-3] 292 mOsm/kg 05/17/2023 02:31:00 PM [facility] ([facility])  Address: [address]  [facility]  [address]  tel:   E GFR CR [LOINC: 46596-4] 61 mL/min/1.73m2 05/17/2023 02:31:00 PM [facility] ([facility])  Address: [address]  [facility]  [address]  tel:   FASTING PATIENT BMP ANSWER [LOINC: 69564-0] No 05/17/2023 02:31:00 PM [facility] ([facility])  Address: [address]  [facility]  [address]  tel:   WBC [LOINC: 6690-2] 11.4 x10(3) uL [High] 05/17/2023 02:31:00 PM [facility] ([facility])  Address: [address]  [facility]  [address]  tel:   RED BLOOD COUNT [LOINC: 789-8] 5.00 x10(6)uL 05/17/2023 02:31:00 PM [facility] ([facility])  Address: [address]  [facility]  [address]  tel:   HGB [LOINC: 718-7] 12.6 g/dL [Low] 05/17/2023 02:31:00 PM [facility] ([facility])  Address: [address]  [facility]  [address]  tel:   HCT [LOINC: 4544-3] 42.5 % 05/17/2023 02:31:00 PM [facility] ([facility])  Address: [address]  [facility]  [address]  tel:   MEAN CELL VOLUME [LOINC: 787-2] 85.0 fL 05/17/2023 02:31:00 PM [facility] ([facility])  Address: [address]  [facility]  [address]  tel:   MEAN CORPUSCULAR HEMOGLOBIN [LOINC: 785-6] 25.2 pg [Low] 05/17/2023 02:31:00 PM [facility] ([facility])  Address: [address]  [facility]  [address]  tel:   MEAN CORPUSCULAR HGB  CONC [LOINC: 786-4] 29.6 g/dL [Low] 05/17/2023 02:31:00 PM [facility] ([facility])  Address: [address]  [facility]  [address]  tel:   RED CELL DISTRIBUTION WIDTH CV [LOINC: 788-0] 15.7 % [High] 05/17/2023 02:31:00 PM [facility] ([facility])  Address: [address]  [facility]  [address]  tel:   RED CELL DISTRIBUTION WIDTH-SD [LOINC: 60041-8] 48.4 fL [High] 05/17/2023 02:31:00 PM [facility] ([facility])  Address: [address]  [facility]  [address]  tel:   PLATELETS [LOINC: 777-3] 388.0 10(3)uL 05/17/2023 02:31:00 PM [facility] ([facility])  Address: [address]  [facility]  [address]  tel:   Nuclear PET 1.Stress EKG is non-diagnostic secondary to resting EKG changes. 2.Pt denied chest pain.3.Rare PAC's.1.This is an equivocal perfusion study. 2.Small fixed perfusion abnormality of moderate intensity in the apical segment. 3.The left ventricular cavity is noted to be normal on the stress studies. The stress left ventricular ejection fraction was calculated to be 42% and left ventricular global function is mildly reduced. The rest left ventricular cavity is noted to be normal. The rest left ventricular ejection fraction was calculated to be 78% and rest left ventricular global function is normal. 4/25/2023 1:30:00 PM Anival Hammer MD   Trans Thoracic Echocardiogram 1.The left ventricle is normal in size. Moderate left ventricular hypertrophy is noted. Global left ventricular systolic function is normal. The left ventricular ejection fraction is 54%. Left ventricular diastolic function is normal. 4/25/2023 3:30:00 PM Anival Hammer MD     REVIEW OF SYSTEMS    REVIEW OF SYSTEMS  Header Details   Cardiovascular No history of Chest pain, CRUM, Palpitations, Syncope, PND, Orthopnea, Edema, Claudication   Respiratory No history of SOB, Wheezing, Sputum   Hem/Lymphatic No history of Easy bruising, Blood clots, Hx of  blood transfusion, Anemia, Bleeding problems     SOCIAL HISTORY    SOCIAL HISTORY  Social History Element Description Effective Dates   Smoking status Heavy tobacco smoker 3/21/1965     FUNCTIONAL STATUS    No data available    MEDICAL EQUIPMENT    No data available    Goals Sections    No data available    REASON FOR REFERRAL             Health Concerns Section    No data available    COGNITIVE/MENTAL STATUS    No data available    Patient Demographics    Patient Demographics  Patient Address Communication Language Race / Ethnicity Marital Status   [address], [address] [place] [address] [phone] (Home) Unknown - Spoken Black or  / Unknown      Document Information    Primary Care Provider Other Service Providers Document Coverage Dates   Anival Hammer  NPI: [id number]  [phone] (Work)  [address], [address], [place] [address]  [place] [address]  Interpreting Physicians  [facility]  [phone] (Work)  [address] [place] [address] Yudith Garrett  NPI: [id number]  [phone] (Work)  [address], [address], [place] [address]  [place] [address]  Nurses     Dorene Rios  NPI: [id number]  [phone] (Work)  [address], [address], [place] [address]  [place] [address]  Nurses     Kimberly Toussaint  NPI: [id number]  [phone] (Work)  [address], [address], [place] [address]  [place] [address]  Nurses Dec. 04, 2023December 04, 2023      Organization   [place] [phone] (Work)  [address] [place] [address], [address], [place] [address]  [place] [address]     Encounter Providers Encounter Date    Dec. 04, 2023December 04, 2023     Legal Authenticator    Anival Hammer  NPI: [id number]  [phone] (Work)  [address], [address], [place] [address]  [place] [address]

## 2024-03-30 NOTE — ED PROVIDER NOTES
Patient Seen in: [facility] Emergency Department    History     Chief Complaint   Patient presents with    Syncope       HPI    History is provided by patient/independent historian: patient, EMS  74 year old male with history of CAD, diabetes, hypertension, hyperlipidemia, lumbar stenosis, here with multiple syncopal episodes earlier today.  Patient reports episodes of blacking out and waking up on the ground.  He does normally use a walker, but cannot use a walker around the house because  is places too small.  He has been feeling dizzy.  Not related to positional changes.  No preceding chest pain or shortness of breath.  EMS reports patient was ambulatory, [place] stroke scale was negative    History reviewed.   Past Medical History:   Diagnosis Date    Anxiety disorder, unspecified 01/22/2022    Atherosclerosis of coronary artery     stents x 6    Chronic low back pain with bilateral sciatica, left worse than right 11/28/2017    Chronic obstructive pulmonary disease, unspecified (HCC) 01/22/2022    Coronary atherosclerosis     Diabetes ([facility])     borderline     Disorder of thyroid     Essential hypertension     Glaucoma 05/10/2023    first visit with IRMA, patient was taking Timolol OU BID for 20 years, has not used gtts in over 1 year because he ran out of gtts and had no refills, fundus photos taken today    Heart attack (HCC)     High blood pressure     High cholesterol     Hyperlipidemia     Kidney stone 07/2020    Left Sided Neck pain, acute 11/28/2017    Lumbar stenosis with neurogenic claudication 05/31/2018    Major depressive disorder, recurrent, unspecified (HCC) 01/22/2022    Morbid obesity with BMI of 40.0-44.9, adult ([facility])     Pneumonia due to COVID-19 virus 01/09/2022    Thyroid disease          History reviewed.   Past Surgical History:   Procedure Laterality Date    CATARACT EXTRACTION EXTRACAPSULAR W/ INTRAOCULAR LENS IMPLANTATION Right 2018    [facility] BARE METAL STENT (BMS)       CIRCUMCISION,OTHR  08/04/2020    Dr. Bonilla @ [facility]    CYSTO/URETERO W/LITHOTRIPSY Left 08/04/2020    Dr. 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     IRIDOTOMY/IRIDECTOMY BY LASER      unknown Dr    OTHER      cardiac stents         Home Medications reviewed :  (Not in a hospital admission)        History reviewed.   Social History     Socioeconomic History    Marital status: Single   Tobacco Use    Smoking status: Every Day     Packs/day: 1.5     Types: Cigarettes    Smokeless tobacco: Former    Tobacco comments:     per pt, quit in 1994   Vaping Use    Vaping Use: Never used   Substance and Sexual Activity    Alcohol use: No     Comment: quit in 1994    Drug use: No   Other Topics Concern    Caffeine Concern Yes    Exercise No         ROS  Review of Systems   Respiratory:  Negative for shortness of breath.    Cardiovascular:  Negative for chest pain.   Neurological:  Positive for dizziness and syncope.   All other systems reviewed and are negative.     All other pertinent organ systems are reviewed and are negative.      Physical Exam     ED Triage Vitals   BP 03/29/24 2336 107/68   Pulse 03/29/24 2336 96   Resp 03/29/24 2336 20   Temp 03/29/24 2337 98 °F (36.7 °C)   Temp src 03/29/24 2337 Temporal   SpO2 03/29/24 2336 93 %   O2 Device 03/29/24 2336 None (Room air)     Vital signs reviewed.      Physical Exam  Vitals and nursing note reviewed.   Neck:      Comments: No C-spine tenderness, no step-offs noted  Cardiovascular:      Pulses: Normal pulses.   Pulmonary:      Effort: No respiratory distress.   Abdominal:      General: There is no distension.   Neurological:      Mental Status: He is alert.         ED Course       Labs:     Labs Reviewed   COMP METABOLIC PANEL (14) - Abnormal; Notable for the following components:       Result Value    Glucose 143 (*)     Potassium 3.3 (*)     BUN 26 (*)     BUN/CREA Ratio 23.9 (*)     Calculated Osmolality 303  (*)     All other components within normal limits   PROTHROMBIN TIME (PT) - Abnormal; Notable for the following components:    PT 17.0 (*)     INR 1.30 (*)     All other components within normal limits   POCT GLUCOSE - Abnormal; Notable for the following components:    POC Glucose  144 (*)     All other components within normal limits   CBC W/ DIFFERENTIAL - Abnormal; Notable for the following components:    WBC 13.8 (*)     RBC 5.82 (*)     HGB 11.4 (*)     MCV 70.8 (*)     MCH 19.6 (*)     MCHC 27.7 (*)     RDW-SD 49.1 (*)     RDW 20.3 (*)     .0 (*)     Neutrophil Absolute Prelim 9.06 (*)     Neutrophil Absolute 9.06 (*)     Monocyte Absolute 1.10 (*)     All other components within normal limits   TROPONIN I HIGH SENSITIVITY - Normal   BNP (B TYPE NATRIURETIC PEPTIDE) - Normal   D-DIMER - Normal   CBC WITH DIFFERENTIAL WITH PLATELET    Narrative:     The following orders were created for panel order CBC With Differential With Platelet.                  Procedure                               Abnormality         Status                                     ---------                               -----------         ------                                     CBC W/ DIFFERENTIAL[[id number]]          Abnormal            Final result                                                 Please view results for these tests on the individual orders.   RAINBOW DRAW LAVENDER   RAINBOW DRAW LIGHT GREEN   RAINBOW DRAW BLUE   RAINBOW DRAW GOLD         My EKG Interpretation: EKG    Rate, intervals and axes as noted on EKG Report.  Rate: 95  Rhythm: Sinus Rhythm with RBBB  Reading: normal for rate, abnormal for rhythm, no acute ST changes           As reviewed and Interpreted by me      Imaging Results Available and Reviewed while in ED:   No results found.  CT HEAD WITHOUT CONTRAST    Comparison: September 25, 2023      IMPRESSION:    No acute intracranial abnormality.    No evidence of intracranial mass or hemorrhage or  mass-effect.    No calvarial abnormality.    Mucosal thickening in the right maxillary sinus and within the ethmoid air cells.    Involutional changes.    Report sent at 1:45 AM Eastern time.      Zeb Martin MD  This report has been electronically signed and verified by the Radiologist whose name is printed above.    DD:  03/30/2024/DT:  03/30/2024  My review and independent interpretation of CT images: no ICH. Radiology report corroborates this in addition to other details as reported by them.      Decision rules/scores evaluated: none      Diagnostic labs/tests considered but not ordered: ddimer    ED Medications Administered: Medications - No data to display             MDM       Medical Decision Making      Differential Diagnosis: After obtaining the patient's history, performing the physical exam and reviewing the diagnostics, multiple initial diagnoses were considered based on the presenting problem including PE, ACS, arrhythmia, anemia, CHRISTELLE    External document review: I personally reviewed available external medical records for any recent pertinent discharge summaries, testing, and procedures - the findings are as follows: 1/18/24 admission for CHF exacerbation    Complicating Factors: The patient already  has a past medical history of Anxiety disorder, unspecified (01/22/2022), Atherosclerosis of coronary artery, Chronic low back pain with bilateral sciatica, left worse than right (11/28/2017), Chronic obstructive pulmonary disease, unspecified (HCC) (01/22/2022), Coronary atherosclerosis, Diabetes ([facility]), Disorder of thyroid, Essential hypertension, Glaucoma (05/10/2023), Heart attack ([facility]), High blood pressure, High cholesterol, Hyperlipidemia, Kidney stone (07/2020), Left Sided Neck pain, acute (11/28/2017), Lumbar stenosis with neurogenic claudication (05/31/2018), Major depressive disorder, recurrent, unspecified (HCC) (01/22/2022), Morbid obesity with BMI of 40.0-44.9, adult ([facility]), Pneumonia  due to COVID-19 virus (01/09/2022), and Thyroid disease. to contribute to the complexity of this ED evaluation.    Procedures performed: none    Discussed management with physician/appropriate source: Dr. Bella    Considered admission/deescalation of care for: syncope    Social determinants of health affecting patient care: none    Prescription medications considered: discussed continuing current medication regimen    The patient requires continuous monitoring for: syncope    Shared decision making: discussed possible admission        Disposition and Plan     Clinical Impression:  1. Syncope and collapse    2. Subtherapeutic anticoagulation    3. Fall, initial encounter        Disposition:  Admit    Follow-up:  No follow-up provider specified.    Medications Prescribed:  Current Discharge Medication List          Hospital Problems       Present on Admission  Date Reviewed: 1/26/2024            ICD-10-CM Noted POA    * (Principal) Syncope and collapse R55 9/22/2023 Unknown

## 2024-03-30 NOTE — CM/SW NOTE
Department  notified of request for HHC, aidin referrals started. Assigned CM/SW to follow up with pt/family on further discharge planning.      Mary Diaz  DSC

## 2024-03-31 VITALS
WEIGHT: 278.81 LBS | BODY MASS INDEX: 41 KG/M2 | DIASTOLIC BLOOD PRESSURE: 66 MMHG | TEMPERATURE: 97 F | HEART RATE: 89 BPM | RESPIRATION RATE: 18 BRPM | SYSTOLIC BLOOD PRESSURE: 109 MMHG | OXYGEN SATURATION: 94 %

## 2024-03-31 LAB
ANION GAP SERPL CALC-SCNC: 8 MMOL/L (ref 0–18)
BASOPHILS # BLD AUTO: 0.15 X10(3) UL (ref 0–0.2)
BASOPHILS NFR BLD AUTO: 1.1 %
BUN BLD-MCNC: 24 MG/DL (ref 9–23)
BUN/CREAT SERPL: 23.5 (ref 10–20)
CALCIUM BLD-MCNC: 9.6 MG/DL (ref 8.7–10.4)
CHLORIDE SERPL-SCNC: 107 MMOL/L (ref 98–112)
CO2 SERPL-SCNC: 23 MMOL/L (ref 21–32)
CREAT BLD-MCNC: 1.02 MG/DL
DEPRECATED RDW RBC AUTO: 49.9 FL (ref 35.1–46.3)
EGFRCR SERPLBLD CKD-EPI 2021: 78 ML/MIN/1.73M2 (ref 60–?)
EOSINOPHIL # BLD AUTO: 0.53 X10(3) UL (ref 0–0.7)
EOSINOPHIL NFR BLD AUTO: 3.7 %
ERYTHROCYTE [DISTWIDTH] IN BLOOD BY AUTOMATED COUNT: 20.5 % (ref 11–15)
GLUCOSE BLD-MCNC: 163 MG/DL (ref 70–99)
GLUCOSE BLDC GLUCOMTR-MCNC: 178 MG/DL (ref 70–99)
GLUCOSE BLDC GLUCOMTR-MCNC: 181 MG/DL (ref 70–99)
HCT VFR BLD AUTO: 41.5 %
HGB BLD-MCNC: 11.4 G/DL
IMM GRANULOCYTES # BLD AUTO: 0.08 X10(3) UL (ref 0–1)
IMM GRANULOCYTES NFR BLD: 0.6 %
LYMPHOCYTES # BLD AUTO: 2.16 X10(3) UL (ref 1–4)
LYMPHOCYTES NFR BLD AUTO: 15.3 %
MCH RBC QN AUTO: 19.7 PG (ref 26–34)
MCHC RBC AUTO-ENTMCNC: 27.5 G/DL (ref 31–37)
MCV RBC AUTO: 71.7 FL
MONOCYTES # BLD AUTO: 1.09 X10(3) UL (ref 0.1–1)
MONOCYTES NFR BLD AUTO: 7.7 %
NEUTROPHILS # BLD AUTO: 10.13 X10 (3) UL (ref 1.5–7.7)
NEUTROPHILS # BLD AUTO: 10.13 X10(3) UL (ref 1.5–7.7)
NEUTROPHILS NFR BLD AUTO: 71.6 %
OSMOLALITY SERPL CALC.SUM OF ELEC: 294 MOSM/KG (ref 275–295)
PLATELET # BLD AUTO: 527 10(3)UL (ref 150–450)
POTASSIUM SERPL-SCNC: 4.2 MMOL/L (ref 3.5–5.1)
RBC # BLD AUTO: 5.79 X10(6)UL
SODIUM SERPL-SCNC: 138 MMOL/L (ref 136–145)
WBC # BLD AUTO: 14.1 X10(3) UL (ref 4–11)

## 2024-03-31 PROCEDURE — 99239 HOSP IP/OBS DSCHRG MGMT >30: CPT | Performed by: INTERNAL MEDICINE

## 2024-03-31 PROCEDURE — 99499 UNLISTED E&M SERVICE: CPT | Performed by: INTERNAL MEDICINE

## 2024-03-31 RX ORDER — SACUBITRIL AND VALSARTAN 24; 26 MG/1; MG/1
1 TABLET, FILM COATED ORAL DAILY
Qty: 30 TABLET | Refills: 0 | Status: SHIPPED | OUTPATIENT
Start: 2024-04-01

## 2024-03-31 NOTE — CM/SW NOTE
03/31/24 1300   Discharge disposition   Expected discharge disposition Home-Health   Patient Declines Recommended Services Yes  (declined HH)   Discharge transportation Private car     CM spoke w/patient on HH. Pt has hx w/HH a year ago.  However, wants to decline at this time.  He was instructed to contact Dr Argueta office if he changes his mind.  Patient verbalized understanding of holding off now at discharge for home health.    RN notified of above via epic secure chat.    Norma GORDON, RN, [facility]  Nurse   Ext. [phone]

## 2024-03-31 NOTE — PLAN OF CARE
Pt up ambulating to bathroom standby assist. Complaints of pain, PRN percocet given. Safety precautions maintained and in place. Plan to discharge home pending medical clearance.    Problem: Patient Centered Care  Goal: Patient preferences are identified and integrated in the patient's plan of care  Description: Interventions:  - What would you like us to know as we care for you? From home w/ grandson  - Provide timely, complete, and accurate information to patient/family  - Incorporate patient and family knowledge, values, beliefs, and cultural backgrounds into the planning and delivery of care  - Encourage patient/family to participate in care and decision-making at the level they choose  - Honor patient and family perspectives and choices  Outcome: Progressing     Problem: Patient/Family Goals  Goal: Patient/Family Long Term Goal  Description: Patient's Long Term Goal: Go home    Interventions:  - Monitor labs  - Diagnostic testing  - See additional Care Plan goals for specific interventions  Outcome: Progressing  Goal: Patient/Family Short Term Goal  Description: Patient's Short Term Goal: Decreased pain    Interventions:   - Diagnostic testing  - Pain management  - See additional Care Plan goals for specific interventions  Outcome: Progressing     Problem: Diabetes/Glucose Control  Goal: Glucose maintained within prescribed range  Description: INTERVENTIONS:  - Monitor Blood Glucose as ordered  - Assess for signs and symptoms of hyperglycemia and hypoglycemia  - Administer ordered medications to maintain glucose within target range  - Assess barriers to adequate nutritional intake and initiate nutrition consult as needed  - Instruct patient on self management of diabetes  Outcome: Progressing

## 2024-03-31 NOTE — DISCHARGE SUMMARY
[facility]  Hospitalist Discharge Summary    Caleb Rausch Jr. Patient Status:  Observation    3/23/1950 MRN [id number]   Location [facility] 3W/SW Attending Nilda Argueta MD   Hosp Day # 1 PCP NILDA ARGUETA MD     Date of Admission: 3/29/2024  Date of Discharge: 24  Discharge Disposition: Home or Self Care    Admitting Diagnosis:   Syncope and collapse [R55]  Fall, initial encounter [W19.XXXA]  Subtherapeutic anticoagulation [Z51.81, Z79.01]    Hospital Discharge Diagnoses:  Syncope, fall, hypothyroidism, type II DM, CAD, chronic HFrEF, HTN    Lace+ Score: 81  59-90 High Risk  29-58 Medium Risk  0-28   Low Risk.    TCM Follow-Up Recommendation:  LACE > 58: High Risk of readmission after discharge from the hospital.    Risk of readmission: Caleb Rausch Jr. has High Risk of readmission after discharge from the hospital.    Discharge Diagnosis:   Syncope, fall, hypothyroidism, type II DM, CAD, chronic HFrEF, HTN    History of Present Illness:   As per H&P note  Caleb Rausch Jr. is a(n) 74 year old  gentleman with past medical history of PE on Coumadin, CAD status post stents on DAPT, HTN, type II DM, HLD, hypothyroidism, multiple admissions presented to the emergency room with complaint of syncopal episode.     Patient reported the day of admission he was watching TV and got up to go to the kitchen but woke up to find himself on the floor.  Reports that on that day he had multiple episodes of lightheadedness.  Has not 2 episodes of diarrhea earlier that day.  No abdominal pain.  No further episodes of diarrhea.  No GI bleeding.  No nausea or vomiting..  Denied chest pain or shortness of breath but reported lightheadedness.  No fever or chills.            Brief Synopsis:     In the emergency room, he was hemodynamically stable.  Blood work shows a subtherapeutic INR.  However D-dimer was within normal limits and thus a CT PE was not done.  BMP with mild hypokalemia 3.3 and otherwise unremarkable.   Hemoglobin is at his baseline of 11.4.  Normal troponin and BNP.  CT brain in the emergency room with no acute findings.  Patient was admitted for further management and monitoring on telemetry.    Patient was monitored on telemetry.  No significant events were found.  Cardiology were consulted and they were not concerned about cardiac cause of his syncope.  His blood pressure was found to be softer.  His Entresto dose was changed from twice daily to once daily.  Patient was able to ambulate safely around the floor and was cleared for discharge.    Procedures during hospitalization:   CT brain    Incidental or significant findings and recommendations (brief descriptions):  N/A    Lab/Test results pending at Discharge:   N/A    Consultants:  Benito Perrin MD-cardiology    Discharge Medication List:     Discharge Medications        CHANGE how you take these medications        Instructions Prescription details   Entresto 24-26 MG Tabs  Generic drug: sacubitril-valsartan  Start taking on: April 1, 2024  What changed: when to take this      Take 1 tablet by mouth daily.   Quantity: 30 tablet  Refills: 0            CONTINUE taking these medications        Instructions Prescription details   albuterol 108 (90 Base) MCG/ACT Aers  Commonly known as: Ventolin HFA      Inhale 2 puffs into the lungs every 4 (four) hours as needed for Wheezing.   Quantity: 54 g  Refills: 0     aspirin 81 MG Tbec      Take 1 tablet (81 mg total) by mouth daily.   Quantity: 30 tablet  Refills: 2     atorvastatin 80 MG Tabs  Commonly known as: Lipitor      Take 1 tablet (80 mg total) by mouth daily.   Quantity: 90 tablet  Refills: 0     Blood Glucose Monitoring Suppl Kit      Please use to check blood sugar twice daily.   Quantity: 1 kit  Refills: 0     Blood Glucose Monitoring Suppl Misc      Please use to check blood sugar twice daily   Quantity: 200 each  Refills: 3     Blood Glucose Monitoring Suppl Misc      Please use to check blood sugar  twice daily   Quantity: 200 each  Refills: 0     carvedilol 6.25 MG Tabs  Commonly known as: Coreg      Take 1 tablet (6.25 mg total) by mouth 2 (two) times daily with meals.   Quantity: 60 tablet  Refills: 0     clopidogrel 75 MG Tabs  Commonly known as: Plavix      Take 1 tablet (75 mg total) by mouth daily.   Quantity: 90 tablet  Refills: 3     DULoxetine 30 MG Cpep  Commonly known as: Cymbalta      Take 1 capsule (30 mg total) by mouth daily.   Refills: 0     FreeStyle Jolanta 3 Sensor Misc      1 each every 14 (fourteen) days.   Stop taking on: April 25, 2024  Quantity: 6 each  Refills: 1     Insulin Pen Needle 32G X 4 MM Misc      Use daily   Quantity: 30 each  Refills: 2     Jardiance 25 MG Tabs  Generic drug: Empagliflozin      Take 1 each by mouth daily.   Stop taking on: April 25, 2024  Quantity: 90 tablet  Refills: 1     Levemir FlexTouch 100 UNIT/ML Sopn  Generic drug: insulin detemir      Inject 10 Units into the skin daily.   Quantity: 10 mL  Refills: 0     levothyroxine 150 MCG Tabs  Commonly known as: Synthroid      Take 1 tablet (150 mcg total) by mouth before breakfast.   Quantity: 90 tablet  Refills: 0     Lumigan 0.01 % Soln  Generic drug: bimatoprost      Place 1 drop into both eyes every evening.   Refills: 0     metFORMIN HCl 1000 MG Tabs  Commonly known as: GLUCOPHAGE      Take 0.5 tablets (500 mg total) by mouth 2 (two) times daily with meals.   Quantity: 180 tablet  Refills: 1     Movantik 25 MG Tabs  Generic drug: Naloxegol Oxalate      TAKE 1 TABLET BY MOUTH DAILY 1 -2 HOURS AFTER MEAL   Refills: 0     oxyCODONE-acetaminophen  MG Tabs  Commonly known as: Percocet      Take 1 tablet by mouth 4 (four) times daily as needed.   Refills: 0     Ozempic (0.25 or 0.5 MG/DOSE) 2 MG/3ML Sopn  Generic drug: semaglutide      Inject 0.5 mg into the skin once a week.   Quantity: 9 mL  Refills: 1     Potassium Chloride ER 20 MEQ Tbcr      Take 1 tablet by mouth daily.   Quantity: 90 tablet  Refills:  0     pregabalin 300 MG Caps  Commonly known as: LYRICA      Take 1 capsule (300 mg total) by mouth 2 (two) times daily.   Quantity: 30 capsule  Refills: 0     spironolactone 25 MG Tabs  Commonly known as: Aldactone      Take 1 tablet (25 mg total) by mouth daily.   Quantity: 90 tablet  Refills: 3     torsemide 20 MG Tabs  Commonly known as: Demadex      Take 1 tablet (20 mg total) by mouth daily.   Quantity: 30 tablet  Refills: 0     Trelegy Ellipta 200-62.5-25 MCG/ACT Aepb  Generic drug: fluticasone-umeclidin-vilant      Inhale 1 puff into the lungs daily.   Quantity: 180 each  Refills: 3     warfarin 5 MG Tabs  Commonly known as: Coumadin      Take 2 tablets (10 mg total) by mouth nightly. 7.5mg on Sunday and Wednesday  10mg on all other days   Refills: 0     zolpidem 5 MG Tabs  Commonly known as: Ambien      Take 1 tablet (5 mg total) by mouth nightly as needed for Sleep.   Refills: 0            ASK your doctor about these medications        Instructions Prescription details   FreeStyle Jolanta 3 [place] Misc  Ask about: Should I take this medication?      1 each once for 1 dose.   Stop taking on: January 26, 2024  Quantity: 1 each  Refills: 0               Where to Get Your Medications        These medications were sent to [id number] - [address] [phone], [phone]  [address] [place] [id number]      Phone: [phone]   Entresto 24-26 MG Tabs         Follow-up appointment:   No follow-up provider specified.    Vital signs:  Temp:  [97.4 °F (36.3 °C)-98.5 °F (36.9 °C)] 97.4 °F (36.3 °C)  Pulse:  [78-96] 89  Resp:  [18-20] 18  BP: ()/(59-78) 109/66  SpO2:  [94 %-95 %] 94 %    Physical Exam:    Constitutional:       General: He is not in acute distress.     Appearance: Normal appearance. He is not ill-appearing.   HENT:      Head: Normocephalic and atraumatic.   Eyes:      General:         Right eye: No discharge.         Left eye: No discharge.    Cardiovascular:      Rate and Rhythm: Normal rate and regular rhythm.      Heart sounds: Normal heart sounds.   Pulmonary:      Effort: No respiratory distress.      Breath sounds: Normal breath sounds. No wheezing or rales.   Abdominal:      Palpations: Abdomen is soft.      Tenderness: There is no abdominal tenderness. There is no guarding.   Musculoskeletal:      Right lower leg: Edema present.      Left lower leg: Edema present.   Neurological:      Mental Status: He is alert and oriented to person, place, and time.   Psychiatric:         Mood and Affect: Mood normal.         Behavior: Behavior normal.         Thought Content: Thought content normal.         Judgment: Judgment normal.   -----------------------------------------------------------------------------------------------  PATIENT DISCHARGE INSTRUCTIONS: See electronic chart    Salbador Bella MD 3/31/2024    Time spent:  > 30 minutes

## 2024-04-01 ENCOUNTER — PATIENT OUTREACH (OUTPATIENT)
Dept: CASE MANAGEMENT | Age: 74
End: 2024-04-01

## 2024-04-01 DIAGNOSIS — I50.22 CHRONIC HFREF (HEART FAILURE WITH REDUCED EJECTION FRACTION) (HCC): ICD-10-CM

## 2024-04-01 DIAGNOSIS — I10 PRIMARY HYPERTENSION: ICD-10-CM

## 2024-04-01 DIAGNOSIS — J44.1 CHRONIC OBSTRUCTIVE PULMONARY DISEASE WITH ACUTE EXACERBATION (HCC): ICD-10-CM

## 2024-04-01 DIAGNOSIS — Z79.4 TYPE 2 DIABETES MELLITUS WITH HYPERGLYCEMIA, WITH LONG-TERM CURRENT USE OF INSULIN (HCC): Primary | ICD-10-CM

## 2024-04-01 DIAGNOSIS — F17.200 TOBACCO USE DISORDER: ICD-10-CM

## 2024-04-01 DIAGNOSIS — F33.41 RECURRENT MAJOR DEPRESSIVE DISORDER, IN PARTIAL REMISSION (HCC): ICD-10-CM

## 2024-04-01 DIAGNOSIS — E11.65 TYPE 2 DIABETES MELLITUS WITH HYPERGLYCEMIA, WITH LONG-TERM CURRENT USE OF INSULIN (HCC): Primary | ICD-10-CM

## 2024-04-01 NOTE — PROGRESS NOTES
Spoke to Caleb for CCM.      Updates to patient care team/comments: UTD  Patient reported changes in medications: reports no changes   Med Adherence  Comment: reports adherence      Health Maintenance:   Health Maintenance   Topic Date Due    Zoster Vaccines (1 of 2) Never done    Colorectal Cancer Screening  06/11/2019    Diabetes Care Foot Exam  02/19/2020    PSA  07/01/2023    COVID-19 Vaccine (4 - 2023-24 season) 09/01/2023    Diabetes Care: Microalb/Creat Ratio  01/12/2024    Annual Physical  01/12/2024    Diabetes Care Dilated Eye Exam  05/10/2024    Diabetes Care A1C  06/30/2024    Tobacco Cessation Counseling 1 Year  01/18/2025    Diabetes Care: GFR  03/31/2025    Influenza Vaccine  Completed    Annual Depression Screening  Completed    Fall Risk Screening (Annual)  Completed    Pneumococcal Vaccine: 65+ Years  Completed       Patient updates/concerns:   Spoke with patient.  Patient relates doing well. Mood good/stable.  Recently hospitalized for syncope episode.  Discharged home yesterday and denies any symptoms or anymore episodes. PCP follow up scheduled. Patient would like nicotine (Nicoderm CQ) 14 MG/24HR patches to be filled. Were used in hospital. Also interested in doing outpatient PT. Diabetes managed by Endo. FBS running around 130's,Continues to take taking medications as prescribed.Avoiding sugars and carbs.  Weight has dropped. Due for endo follow up will call to schedule. Discussed increasing physical activity. Even if its stretching and walking around home. Agreed and will incorporate more movement into his day. Vision stable. Needs to have DM eye exam.  Blood pressure and pulse unknown not checking but denies symptoms. States all clear by cardiology. Denies any edema. Getting PT/INR checked. Needs to follow up with cardiology.  Denies any urinary issues at this time. Breathing stable. States this has been better controlled.   No other questions or concerns.    Encouraged patient:   Self care:  Take the time to do the things you love.   Nutrition:  Good nutrition helps us to maintain our weight, fight off infection, and help reduce the risk of developing other chronic issues.   Physical activity:  Physical activity is important to help maintain independence and improve quality of life.       Goals/Action Plan:    Active goal from previous outreach: Staying healthy, maintain independence, and stability.    Patient reported progress towards goals: progressing                - What: taking control of DM            - Where/When/How: watching diet, checking sugar, complying with medications.   Patient Reported Barriers and Concerns: as per above                    - Plan for overcoming barriers: encourged patient to call with any questions or concerns    Care Managers Interventions: Continue to provide encouragement and education for healthy coping and diagnosis.      Future Appointments:   Future Appointments   Date Time Provider Department Center   4/8/2024  2:00 PM Nilda Argueta MD [id number] [place] [address]         Next Care Manager Follow Up Date: 1 month     Reason For Follow Up: review progress and or barriers towards patient's goals.     Time Spent This Encounter Total: 27 min medical record review, telephone communication, care plan updates where needed, education, goals, and action plan recreation/update. Provided acknowledgment and validation to patient's concerns.   Monthly Minute Total including today: 27 min   Physical assessment, complete health history, and need for CCM established by NILDA ARGUETA MD.

## 2024-04-02 ENCOUNTER — PATIENT OUTREACH (OUTPATIENT)
Dept: CASE MANAGEMENT | Age: 74
End: 2024-04-02

## 2024-04-02 DIAGNOSIS — Z02.9 ENCOUNTERS FOR UNSPECIFIED ADMINISTRATIVE PURPOSE: Primary | ICD-10-CM

## 2024-04-02 NOTE — PROGRESS NOTES
Initial Post Discharge Follow Up   Discharge Date: 3/31/24  Contact Date: 4/2/2024    Consent Verification:  Assessment Completed With: Patient  HIPAA Verified?  Yes    Discharge Dx:   Syncope,   Fall  Hypothyroidism  Type II DM      General:   How have you been since your discharge from the hospital? Pt reports he is feeling pretty good. Pt does not have a BP cuff at home, plans to look into getting one. Pt denies chest pain, shortness of breath, dizziness, weakness, confusion, LE swelling, HA, feeling faint, fevers, n/v/d and pain. Pt does not check daily weights at home as he does not have a scale. Discussed the importance of checking daily weight to monitor for fluid retention/CHF guidelines. Pt does usually check his glucose at home but feel his meter might be broken. Pt was encouraged to start checking glucose at home again if his meter is working. Advised pt to bring his meter to his TCM appt with PCP to see if it works properly. Pt declined looking into a new glucose meter before the appt. Pt is eating and drinking well. Pt stated he has his son that lives with him.   Do you have any pain since discharge?  No    How well was your pain managed while in the hospital?   On a scale of 1-5   1- Very Poor and 5- Very well   Very Well  When you were leaving the hospital were your discharge instructions reviewed with you? Yes  How well were your discharge instructions explained to you?   On a scale of 1-5   1- Very Poor and 5- Very well   Very Well  Do you have any questions about your discharge instructions?  No  Before leaving the hospital was your diagnoses explained to you? Yes  Do you have any questions about your diagnoses? No  Are you able to perform normal daily activities of living as you have prior to your hospital stay (dressing, bathing, ambulating to the bathroom, etc)? yes  (NCM) Was patient given a different diet per AVS? yes  If so, which diet?  Heart Healthy Diet, CHF guidelines.  Are there any  barriers to following this diet? no    Medications:   Current Outpatient Medications   Medication Sig Dispense Refill    sacubitril-valsartan (ENTRESTO) 24-26 MG Oral Tab Take 1 tablet by mouth daily. 30 tablet 0    torsemide 20 MG Oral Tab Take 1 tablet (20 mg total) by mouth daily. 30 tablet 0    carvedilol 6.25 MG Oral Tab Take 1 tablet (6.25 mg total) by mouth 2 (two) times daily with meals. 60 tablet 0    Potassium Chloride ER 20 MEQ Oral Tab CR Take 1 tablet by mouth daily. 90 tablet 0    spironolactone 25 MG Oral Tab Take 1 tablet (25 mg total) by mouth daily. 90 tablet 3    Insulin Pen Needle 32G X 4 MM Does not apply Misc Use daily 30 each 2    Continuous Blood Gluc Sensor (SkillSlateSTYLE NANCY 3 SENSOR) Does not apply Misc 1 each every 14 (fourteen) days. 6 each 1    semaglutide (OZEMPIC, 0.25 OR 0.5 MG/DOSE,) 2 MG/3ML Subcutaneous Solution Pen-injector Inject 0.5 mg into the skin once a week. 9 mL 1    Empagliflozin (JARDIANCE) 25 MG Oral Tab Take 1 each by mouth daily. 90 tablet 1    insulin detemir (LEVEMIR FLEXTOUCH) 100 UNIT/ML Subcutaneous Solution Pen-injector Inject 10 Units into the skin daily. 10 mL 0    zolpidem 5 MG Oral Tab Take 1 tablet (5 mg total) by mouth nightly as needed for Sleep.      atorvastatin 80 MG Oral Tab Take 1 tablet (80 mg total) by mouth daily. 90 tablet 0    levothyroxine 150 MCG Oral Tab Take 1 tablet (150 mcg total) by mouth before breakfast. 90 tablet 0    metFORMIN HCl 1000 MG Oral Tab Take 0.5 tablets (500 mg total) by mouth 2 (two) times daily with meals. (Patient taking differently: Take 1 tablet (1,000 mg total) by mouth 2 (two) times daily with meals.) 180 tablet 1    MOVANTIK 25 MG Oral Tab TAKE 1 TABLET BY MOUTH DAILY 1 -2 HOURS AFTER MEAL      fluticasone-umeclidin-vilant (TRELEGY ELLIPTA) 200-62.5-25 MCG/ACT Inhalation Aerosol Powder, Breath Activated Inhale 1 puff into the lungs daily. 180 each 3    albuterol (VENTOLIN HFA) 108 (90 Base) MCG/ACT Inhalation Aero  Soln Inhale 2 puffs into the lungs every 4 (four) hours as needed for Wheezing. 54 g 0    clopidogrel 75 MG Oral Tab Take 1 tablet (75 mg total) by mouth daily. 90 tablet 3    bimatoprost (LUMIGAN) 0.01 % Ophthalmic Solution Place 1 drop into both eyes every evening.      Blood Glucose Monitoring Suppl Does not apply Kit Please use to check blood sugar twice daily. 1 kit 0    Blood Glucose Monitoring Suppl Does not apply Misc Please use to check blood sugar twice daily 200 each 3    Blood Glucose Monitoring Suppl Does not apply Misc Please use to check blood sugar twice daily 200 each 0    oxyCODONE-acetaminophen  MG Oral Tab Take 1 tablet by mouth 4 (four) times daily as needed.      DULoxetine 30 MG Oral Cap DR Particles Take 1 capsule (30 mg total) by mouth daily.      warfarin 5 MG Oral Tab Take 2 tablets (10 mg total) by mouth nightly. 7.5mg on Sunday and Wednesday  10mg on all other days      pregabalin 300 MG Oral Cap Take 1 capsule (300 mg total) by mouth 2 (two) times daily. 30 capsule 0    aspirin 81 MG Oral Tab EC Take 1 tablet (81 mg total) by mouth daily. 30 tablet 2     Were there any changes to your current medication(s) noted on the AVS? Yes  If so, were these medication changes discussed with you prior to leaving the hospital? Yes  If a new medication was prescribed:    Was the new medication's purpose & side effects reviewed? Yes  Do you have any questions about your new medication? No  Did you  your discharge medications when you left the hospital? Yes  Let's go over your medications together to make sure we are not missing anything. Patient Declined, explain: Pt declined, stated he knows about his medications and did not have questions.   Are there any reasons that keep you from taking your medication as prescribed? No  Are you having any concerns with constipation? No    Discharge medications reviewed/discussed/and reconciled against outpatient medications with patient.  Any changes  or updates to medications sent to PCP.  Patient Declined     Referrals/orders at D/C:  Referrals/orders placed at D/C? no- pt declined need for HHC.     DME ordered at D/C? No      Discharge orders, AVS reviewed and discussed with patient. Any changes or updates to orders sent to PCP.  Patient Acknowledged      SDOH:     Transportation Needs: No Transportation Needs (4/2/2024)    Transportation Needs     Lack of Transportation: No     Financial Resource Strain: Low Risk  (4/2/2024)    Financial Resource Strain     Difficulty of Paying Living Expenses: Not very hard     Med Affordability: No           Follow up appointments:  Reviewed recommended follow up appointment. Pt denies any barriers to keeping/getting to HFU appts.     Your appointments       Date & Time Appointment Department ([place])    Apr 08, 2024  2:00 PM CDT Exam - Established with Nilda Argueta MD [facility] ([place] [address])              [address] [address]  [phone]            TCC  Was TCC ordered: No      PCP (If no TCC appointment)  Does patient already have a PCP appointment scheduled? Yes  NCM Confirmed PCP office TCM appointment with patient on 4/8/24. Pt declined a sooner appt.       Specialist    Does the patient have any other follow up appointment(s) needing to be scheduled? No- recommended pt FU with cardiology as well. Pt plans to call for an appt, declined assistance.     Is there any reason as to why you cannot make your appointment(s)?  No     Needs post D/C:   Now that you are home, are there any needs or concerns you need addressed before your next visit with your PCP?  (DME, meds, questions, etc.): No    Interventions by NCM:   All d/c instructions reviewed with the pt. Reviewed when to call MD vs when to call 911 or go the ED. Reviewed CHF guidelines. Reviewed the importance of  monitoring BP, daily weight and glucose at home, pt plans to talk to PCP about this in detail. Educated pt on the importance of taking all meds as prescribed as well as close f/u with PCP/specialists. Pt verbalized understanding and will contact the office with any further questions or concerns.       Overall Rating:   How would you rate the care you received while in the hospital? good    CCM referral placed:    No

## 2024-04-02 NOTE — PROGRESS NOTES
JAQUELINE spoke with patient briefly- JAQUELINE introduced herself and reason for calling. Patient stated he is waiting for his uber right now to take him to a doctor's appointment in [place]- he states his pain specialist. Patient stated he will be home this evening. NCM offered to call back another time and the patient agreed with this.

## 2024-04-18 RX ORDER — CARVEDILOL 6.25 MG/1
6.25 TABLET ORAL 2 TIMES DAILY WITH MEALS
Qty: 180 TABLET | Refills: 3 | Status: SHIPPED | OUTPATIENT
Start: 2024-04-18

## 2024-04-18 RX ORDER — TORSEMIDE 20 MG/1
20 TABLET ORAL DAILY
Qty: 90 TABLET | Refills: 3 | Status: SHIPPED | OUTPATIENT
Start: 2024-04-18

## 2024-04-18 NOTE — TELEPHONE ENCOUNTER
Refill passed per [facility] protocol.  Requested Prescriptions   Pending Prescriptions Disp Refills    TORSEMIDE 20 MG Oral Tab [Pharmacy Med Name: Torsemide 20 Mg Tab Camb] 30 tablet 0     Sig: TAKE ONE TABLET BY MOUTH ONE TIME DAILY       Hypertension Medications Protocol Passed - 4/17/2024  4:03 PM        Passed - CMP or BMP in past 12 months        Passed - Last BP reading less than 140/90     BP Readings from Last 1 Encounters:   03/31/24 109/66               Passed - In person appointment or virtual visit in the past 12 mos or appointment in next 3 mos     Recent Outpatient Visits              2 months ago Hypothyroidism, unspecified type    [facility] [place] Ben Pérez MD    Office Visit    3 months ago Acute on chronic HFrEF (heart failure with reduced ejection fraction) ([facility])    [facility] Eva Gaines NP    Office Visit    6 months ago DIRK (obstructive sleep apnea)    [facility] Nilda Argueta MD    Office Visit    11 months ago Glaucoma suspect of both eyes    [facility]    Nurse Only    11 months ago Type 2 diabetes mellitus without retinopathy ([facility])    [facility] Boogie Velasquez MD    Office Visit                      Passed - EGFRCR or GFRNAA > 50     GFR Evaluation  EGFRCR: 78 , resulted on 3/31/2024            CARVEDILOL 6.25 MG Oral Tab [Pharmacy Med Name: Carvedilol 6.25 Mg Tab Adva] 60 tablet 0     Sig: TAKE ONE TABLET BY MOUTH TWICE A DAY WITH MEALS       Hypertension Medications Protocol Passed - 4/17/2024  4:03 PM        Passed - CMP or BMP in past 12 months        Passed - Last BP reading less than 140/90     BP Readings from Last 1 Encounters:   03/31/24 109/66               Passed - In person appointment or virtual visit in the past 12 mos or appointment in next 3 mos     Recent  Outpatient Visits              2 months ago Hypothyroidism, unspecified type    [facility] [place]    Office Visit    3 months ago Acute on chronic HFrEF (heart failure with reduced ejection fraction) ([facility])    [facility] Eva Gaines NP    Office Visit    6 months ago [facility]IRK (obstructive [facility] apnea)    [facility]    [facility]    11 months ago Glaucoma suspect of both eyes    [facility] [place]    Nurse Only    11 months ago Type 2 diabetes mellitus without retinopathy ([facility])    [facility] [place] [facility]    Office Visit                      Passed - EGFRCR or GFRNAA > 50     GFR Evaluation  EGFRCR: 78 , resulted on 3/31/2024             Recent Outpatient Visits              2 months ago Hypothyroidism, unspecified type    [facility], Ben Dickerson MD    Office Visit    3 months ago Acute on chronic HFrEF (heart failure with reduced ejection fraction) ([facility])    [facility] Eva Gaines NP    Office Visit    6 months ago DIRK (obstructive sleep apnea)    [facility] [place]    Office Visit    11 months ago Glaucoma suspect of both eyes    [facility] [place]    Nurse Only    11 months ago Type 2 diabetes mellitus without retinopathy ([facility])    [facility]    Office Visit

## 2024-04-22 RX ORDER — INSULIN DETEMIR 100 [IU]/ML
10 INJECTION, SOLUTION SUBCUTANEOUS DAILY
Qty: 15 ML | Refills: 0 | Status: SHIPPED | OUTPATIENT
Start: 2024-04-22

## 2024-04-22 NOTE — TELEPHONE ENCOUNTER
Please review. Rx failed/no protocol.    Please advise: Patient has been no show/canceling appointments since 10/12/2023  Patient was last seen by endocrinology Jan 2024    Requested Prescriptions   Pending Prescriptions Disp Refills    LEVEMIR FLEXPEN 100 UNIT/ML Subcutaneous Solution Pen-injector [Pharmacy Med Name: Levemir Flexpen 100 Unit/Ml Inj Rachel] 15 mL 0     Sig: Inject 10 Units into the skin daily.       Diabetes Medication Protocol Failed - 4/19/2024 10:52 AM        Failed - Last A1C < 7.5 and within past 6 months     Lab Results   Component Value Date    A1C 8.3 (H) 03/30/2024             Failed - In person appointment or virtual visit in the past 6 mos or appointment in next 3 mos     Recent Outpatient Visits              2 months ago [facility], [facility] type    [facility]    [facility]    3 months ago Acute on chronic HFrEF (heart failure with reduced ejection fraction) ([facility])    [facility] Eva Gianes NP    Office Visit    6 months ago DIRK (obstructive sleep apnea)    [facility] Nilda Argueta MD    Office Visit    11 months ago Glaucoma suspect of both eyes    [facility]    Nurse Only    11 months ago Type 2 diabetes mellitus without retinopathy ([facility])    [facility] Boogie Velasquez MD    Office Visit                      Failed - Microalbumin procedure in past 12 months or taking ACE/ARB        Passed - EGFRCR or GFRNAA > 50     GFR Evaluation  EGFRCR: 78 , resulted on 3/31/2024          Passed - GFR in the past 12 months               Recent Outpatient Visits              2 months ago Hypothyroidism, unspecified type    [facility], Ben Dickerson MD    Office Visit    3 months ago Acute on chronic HFrEF (heart failure  with reduced ejection fraction) ([facility])    [facility] Eva Gaines NP    Office Visit    6 months ago DIRK (obstructive sleep apnea)    [facility] [place] Nilda Argueta MD    Office Visit    11 months ago Glaucoma suspect of both eyes    [facility] [place] [facility]    Nurse Only    11 months ago Type 2 diabetes mellitus without retinopathy ([facility])    [facility] Boogie Velasquez MD    Office Visit

## 2024-05-02 ENCOUNTER — PATIENT OUTREACH (OUTPATIENT)
Dept: CASE MANAGEMENT | Age: 74
End: 2024-05-02

## 2024-05-02 NOTE — PROGRESS NOTES
Spoke to Caleb for CCM.      Updates to patient care team/comments: UTD   Patient reported changes in medications: reports no changes as far as he knows   Med Adherence  Comment: reports adherence      Health Maintenance:   Health Maintenance   Topic Date Due    Zoster Vaccines (1 of 2) Never done    Colorectal Cancer Screening  06/11/2019    Diabetes Care Foot Exam  02/19/2020    PSA  07/01/2023    COVID-19 Vaccine (4 - 2023-24 season) 09/01/2023    Diabetes Care: Microalb/Creat Ratio  01/12/2024    Annual Physical  01/12/2024    Diabetes Care Dilated Eye Exam  05/10/2024    Diabetes Care A1C  06/30/2024    Tobacco Cessation Counseling 1 Year  01/18/2025    Diabetes Care: GFR  03/31/2025    Influenza Vaccine  Completed    Annual Depression Screening  Completed    Fall Risk Screening (Annual)  Completed    Pneumococcal Vaccine: 65+ Years  Completed       Patient updates/concerns:   Spoke with patient.  Patient relates doing ok. Mood good/stable.  Denies any recent syncope episodes. Denies any recent falls. Discharged home yesterday and denies any symptoms or anymore episodes. Continues to feel fatigued. Breathing stable but increased SOB. Patient wants to be tested for cancer feels something is wrong! PCP follow up scheduled for next week,  Diabetes managed by Endo. FBS unknown. States machine is not working. Continues to take taking medications as prescribed. Avoiding sugars and carbs. Encourged patient to bring with to visit to have it checked.  Weight unknown not weighing. Due for endo follow up will call to schedule. Discussed increasing physical activity. Even if its stretching and walking around home. Agreed and will incorporate more movement into his day. Vision stable. Needs to have DM eye exam.  Blood pressure and pulse unknown not checking but denies symptoms..Denies any edema. Getting PT/INR checked. Needs to follow up with cardiology.  Denies any urinary issues at this time. Breathing stable. States this  has been better controlled. No other questions or concerns.       Encouraged patient:   Self care: Take the time to do the things you love.   Nutrition:  Good nutrition helps us to maintain our weight, fight off infection, and help reduce the risk of developing other chronic issues.   Physical activity:  Physical activity is important to help maintain independence and improve quality of life.     Goals/Action Plan:    Active goal from previous outreach: Staying healthy, maintain independence, and stability.     Patient reported progress towards goals: stable                - What: has been taking DM medications            - Where/When/How: daily, needs PCP follow up. Scheduled for next week.   Patient Reported Barriers and Concerns: as per above                    - Plan for overcoming barriers: encouraged patient to call with any questions or concerns.     Care Managers Interventions: Continue to provide encouragement and education for healthy coping and diagnosis.      Future Appointments:   Future Appointments   Date Time Provider Department Center   5/8/2024  1:00 PM Nilda Argueta MD [id number] [place] [address]         Next Care Manager Follow Up Date: 1 month     Reason For Follow Up: review progress and or barriers towards patient's goals.     Time Spent This Encounter Total: 18 min medical record review, telephone communication, care plan updates where needed, education, goals, and action plan recreation/update. Provided acknowledgment and validation to patient's concerns.   Monthly Minute Total including today: 18 min   Physical assessment, complete health history, and need for CCM established by NILDA ARGUETA MD.

## 2024-05-09 ENCOUNTER — TELEPHONE (OUTPATIENT)
Dept: INTERNAL MEDICINE CLINIC | Facility: CLINIC | Age: 74
End: 2024-05-09

## 2024-05-09 NOTE — TELEPHONE ENCOUNTER
he can take MiraLAX over-the-counter, drink fluids, prune juice can help, if any nausea vomiting and not feeling better he needs to go to ER to make sure there is no obstruction

## 2024-05-09 NOTE — TELEPHONE ENCOUNTER
Patient came in thinking his appointment was today at 1:00 pm. He states he was coming in for  constipation and psa blood test. He states he been having constipation for the last 6 days no bowel movement he tried over counter medication with no relief . He was unable to stay and see Dr. Landin  because his ride came.  He set up an appointment for Monday but he is asking for medication prescribe to help with constipation until then. Please advice

## 2024-05-09 NOTE — TELEPHONE ENCOUNTER
Called patient (name and  verified) and instructed on providers message below. Patient verbalized understanding and states that he already tried the miralax last week with no relief. Patient is asking for a prescription strength laxative. Patient states that he purchased OTC suppositories.    Patient denies abd pain, vomiting, nausea.   States he has not had a bowel movement in \"3 weeks\" but he denies nay pain or discomfort. States he is urinating okay, no concerns but does also want his prostate checked.    Patient was instructed that if symptoms worsen to be seen in the ER/IC for evaluation. Verbalized understanding.      Changed appt to tomorrow for patient.  Future Appointments   Date Time Provider Department Center   5/10/2024 11:30 AM Mushtaq Argueta MD [id number] [place] [id number]

## 2024-05-10 ENCOUNTER — OFFICE VISIT (OUTPATIENT)
Dept: INTERNAL MEDICINE CLINIC | Facility: CLINIC | Age: 74
End: 2024-05-10

## 2024-05-10 VITALS
HEIGHT: 69 IN | WEIGHT: 277 LBS | HEART RATE: 71 BPM | BODY MASS INDEX: 41.03 KG/M2 | TEMPERATURE: 98 F | OXYGEN SATURATION: 91 % | SYSTOLIC BLOOD PRESSURE: 130 MMHG | DIASTOLIC BLOOD PRESSURE: 82 MMHG | RESPIRATION RATE: 19 BRPM

## 2024-05-10 DIAGNOSIS — Z12.5 SCREENING FOR PROSTATE CANCER: ICD-10-CM

## 2024-05-10 DIAGNOSIS — K59.00 CONSTIPATION, UNSPECIFIED CONSTIPATION TYPE: Primary | ICD-10-CM

## 2024-05-10 LAB — COMPLEXED PSA SERPL-MCNC: 0.97 NG/ML (ref ?–4)

## 2024-05-10 PROCEDURE — 99213 OFFICE O/P EST LOW 20 MIN: CPT | Performed by: INTERNAL MEDICINE

## 2024-05-10 PROCEDURE — 36415 COLL VENOUS BLD VENIPUNCTURE: CPT | Performed by: INTERNAL MEDICINE

## 2024-05-10 RX ORDER — POLYETHYLENE GLYCOL 3350 17 G/17G
17 POWDER, FOR SOLUTION ORAL DAILY
Qty: 5 EACH | Refills: 0 | Status: SHIPPED | OUTPATIENT
Start: 2024-05-10

## 2024-05-10 RX ORDER — DOCUSATE SODIUM 100 MG/1
100 CAPSULE, LIQUID FILLED ORAL DAILY PRN
Qty: 30 CAPSULE | Refills: 0 | Status: SHIPPED | OUTPATIENT
Start: 2024-05-10

## 2024-05-10 RX ORDER — DOCUSATE SODIUM 100 MG/1
100 CAPSULE, LIQUID FILLED ORAL 2 TIMES DAILY PRN
Qty: 30 CAPSULE | Refills: 0 | Status: SHIPPED | OUTPATIENT
Start: 2024-05-10

## 2024-05-11 ENCOUNTER — TELEPHONE (OUTPATIENT)
Dept: INTERNAL MEDICINE CLINIC | Facility: CLINIC | Age: 74
End: 2024-05-11

## 2024-05-11 NOTE — TELEPHONE ENCOUNTER
Patient called and is checking the status of his test results he got done yesterday, once reviewed please notify patient.

## 2024-05-12 NOTE — PROGRESS NOTES
Subjective:   Patient ID: Caleb Rausch Jr. is a 74 year old male.    HPI  Patient comes in today with complaint of constipation he says he has not moved his bowels for about 3 weeks now he does pass gas he felt like he feels like he is going to move bowels but he has not these tried MiraLAX over-the-counter few days back but did not help  History/Other:   Review of Systems   Constitutional: Negative.  Negative for fatigue and fever.   HENT: Negative.  Negative for congestion.    Eyes: Negative.    Respiratory: Negative.  Negative for cough, shortness of breath and wheezing.    Cardiovascular: Negative.  Negative for chest pain, palpitations and leg swelling.   Gastrointestinal:  Positive for abdominal distention and constipation. Negative for abdominal pain.   Endocrine: Negative for cold intolerance and heat intolerance.   Genitourinary: Negative.  Negative for dysuria, flank pain and hematuria.   Musculoskeletal: Negative.  Negative for arthralgias, back pain and myalgias.   Skin: Negative.    Neurological: Negative.  Negative for dizziness, tremors, syncope, weakness and headaches.   Psychiatric/Behavioral: Negative.  Negative for agitation, behavioral problems and suicidal ideas. The patient is not nervous/anxious.      Current Outpatient Medications   Medication Sig Dispense Refill    docusate sodium 100 MG Oral Cap Take 1 capsule (100 mg total) by mouth 2 (two) times daily as needed for constipation. 30 capsule 0    docusate sodium (DULCOLAX STOOL SOFTENER) 100 MG Oral Cap Take 1 capsule (100 mg total) by mouth daily as needed for constipation. 30 capsule 0    Polyethylene Glycol 3350 (MIRALAX) 17 g Oral Powd Pack Take 17 g by mouth daily. 5 each 0    insulin detemir (LEVEMIR FLEXPEN) 100 UNIT/ML Subcutaneous Solution Pen-injector Inject 10 Units into the skin daily. 15 mL 0    torsemide 20 MG Oral Tab Take 1 tablet (20 mg total) by mouth daily. 90 tablet 3    carvedilol 6.25 MG Oral Tab Take 1 tablet (6.25 mg  total) by mouth 2 (two) times daily with meals. 180 tablet 3    sacubitril-valsartan (ENTRESTO) 24-26 MG Oral Tab Take 1 tablet by mouth daily. 30 tablet 0    Potassium Chloride ER 20 MEQ Oral Tab CR Take 1 tablet by mouth daily. 90 tablet 0    spironolactone 25 MG Oral Tab Take 1 tablet (25 mg total) by mouth daily. 90 tablet 3    Insulin Pen Needle 32G X 4 MM Does not apply Misc Use daily 30 each 2    semaglutide (OZEMPIC, 0.25 OR 0.5 MG/DOSE,) 2 MG/3ML Subcutaneous Solution Pen-injector Inject 0.5 mg into the skin once a week. 9 mL 1    zolpidem 5 MG Oral Tab Take 1 tablet (5 mg total) by mouth nightly as needed for Sleep.      atorvastatin 80 MG Oral Tab Take 1 tablet (80 mg total) by mouth daily. 90 tablet 0    levothyroxine 150 MCG Oral Tab Take 1 tablet (150 mcg total) by mouth before breakfast. 90 tablet 0    metFORMIN HCl 1000 MG Oral Tab Take 0.5 tablets (500 mg total) by mouth 2 (two) times daily with meals. (Patient taking differently: Take 1 tablet (1,000 mg total) by mouth 2 (two) times daily with meals.) 180 tablet 1    MOVANTIK 25 MG Oral Tab TAKE 1 TABLET BY MOUTH DAILY 1 -2 HOURS AFTER MEAL      fluticasone-umeclidin-vilant (TRELEGY ELLIPTA) 200-62.5-25 MCG/ACT Inhalation Aerosol Powder, Breath Activated Inhale 1 puff into the lungs daily. 180 each 3    albuterol (VENTOLIN HFA) 108 (90 Base) MCG/ACT Inhalation Aero Soln Inhale 2 puffs into the lungs every 4 (four) hours as needed for Wheezing. 54 g 0    clopidogrel 75 MG Oral Tab Take 1 tablet (75 mg total) by mouth daily. 90 tablet 3    bimatoprost (LUMIGAN) 0.01 % Ophthalmic Solution Place 1 drop into both eyes every evening.      Blood Glucose Monitoring Suppl Does not apply Kit Please use to check blood sugar twice daily. 1 kit 0    Blood Glucose Monitoring Suppl Does not apply Misc Please use to check blood sugar twice daily 200 each 3    Blood Glucose Monitoring Suppl Does not apply Misc Please use to check blood sugar twice daily 200 each 0     oxyCODONE-acetaminophen  MG Oral Tab Take 1 tablet by mouth 4 (four) times daily as needed.      DULoxetine 30 MG Oral Cap DR Particles Take 1 capsule (30 mg total) by mouth daily.      warfarin 5 MG Oral Tab Take 2 tablets (10 mg total) by mouth nightly. 7.5mg on Sunday and Wednesday  10mg on all other days      pregabalin 300 MG Oral Cap Take 1 capsule (300 mg total) by mouth 2 (two) times daily. 30 capsule 0    aspirin 81 MG Oral Tab EC Take 1 tablet (81 mg total) by mouth daily. 30 tablet 2     Allergies:No Known Allergies    Objective:   Physical Exam  Constitutional:       Appearance: He is well-developed.   HENT:      Head: Normocephalic and atraumatic.      Right Ear: External ear normal.      Left Ear: External ear normal.      Nose: Nose normal.   Eyes:      Conjunctiva/sclera: Conjunctivae normal.      Pupils: Pupils are equal, round, and reactive to light.   Cardiovascular:      Rate and Rhythm: Normal rate and regular rhythm.      Heart sounds: Normal heart sounds.   Pulmonary:      Effort: Pulmonary effort is normal.      Breath sounds: Normal breath sounds.   Abdominal:      General: Bowel sounds are normal. There is distension.      Palpations: Abdomen is soft.      Tenderness: There is no abdominal tenderness. There is no guarding or rebound.   Genitourinary:     Penis: Normal.       Prostate: Normal.      Rectum: Normal.   Musculoskeletal:         General: Normal range of motion.      Cervical back: Normal range of motion and neck supple.   Skin:     General: Skin is warm and dry.   Neurological:      Mental Status: He is alert and oriented to person, place, and time.      Deep Tendon Reflexes: Reflexes are normal and symmetric.         Assessment & Plan:   1. Constipation, unspecified constipation type patient is passing gas and no vomiting no nausea will treat with anticonstipation medication monitor if not better or worsening to go to ER    2. Screening for prostate cancer we will check  PSA       Orders Placed This Encounter   Procedures    PSA Total, Screen       Meds This Visit:  Requested Prescriptions     Signed Prescriptions Disp Refills    docusate sodium 100 MG Oral Cap 30 capsule 0     Sig: Take 1 capsule (100 mg total) by mouth 2 (two) times daily as needed for constipation.    docusate sodium (DULCOLAX STOOL SOFTENER) 100 MG Oral Cap 30 capsule 0     Sig: Take 1 capsule (100 mg total) by mouth daily as needed for constipation.    Polyethylene Glycol 3350 (MIRALAX) 17 g Oral Powd Pack 5 each 0     Sig: Take 17 g by mouth daily.       Imaging & Referrals:  None

## 2024-05-13 NOTE — TELEPHONE ENCOUNTER
Dr Argueta, please advise    Patient has called 3 times for test results.  Last office visit was 5/10/24  Noted PSA lab test completed.

## 2024-05-14 RX ORDER — GABAPENTIN 300 MG/1
300 CAPSULE ORAL 3 TIMES DAILY
Qty: 270 CAPSULE | Refills: 0 | Status: SHIPPED | OUTPATIENT
Start: 2024-05-14

## 2024-05-14 NOTE — TELEPHONE ENCOUNTER
Please review.  Protocol failed / Has no protocol.     The original prescription was discontinued on 12/28/2023 by Nilda Argueta MD for the following reason:  Stop Taking at Discharge. Renewing this prescription may not be appropriate.   Patient is asking for refill.    Requested Prescriptions   Pending Prescriptions Disp Refills    gabapentin 300 MG Oral Cap capsule      Sig: Take 1 capsule (300 mg total) by mouth 3 (three) times daily.       Neurology Medications Passed - 5/14/2024 10:31 AM        Passed - In person appointment or virtual visit in the past 6 mos or appointment in next 3 mos     Recent Outpatient Visits              4 days ago Constipation, unspecified constipation type    [facility] Mushtaq Argueta MD    Office Visit    3 months ago Hypothyroidism, unspecified type    [facility]    [facility]    3 months ago Acute on chronic HFrEF (heart failure with reduced ejection fraction) ([facility])    [facility] Eva Gaines NP    Office Visit    7 months ago DIRK (obstructive sleep apnea)    [facility] Nilda Argueta MD    Office Visit    12 months ago Glaucoma suspect of both eyes    [facility]    Nurse Only                           Recent Outpatient Visits              4 days ago Constipation, unspecified constipation type    [facility]    [facility]    3 months ago Hypothyroidism, unspecified type    [facility] Ben Dickerson MD    Office Visit    3 months ago Acute on chronic HFrEF (heart failure with reduced ejection fraction) ([facility])    [facility] Eva Gaines NP    Office Visit    7 months ago DIRK (obstructive sleep apnea)    [facility] [place] [facility]    Office Visit    12 months ago Glaucoma suspect of both eyes    [facility] [place]    Nurse Only

## 2024-05-14 NOTE — TELEPHONE ENCOUNTER
Refill passed per [facility] protocol.    Padilla Alanis8 minutes ago (10:23 AM)     RD  Patient calling to request a refill of his      Gabapentin 300 MG Oral Cap     Which is marked as discontinued 4/22/22     He wants the medication to be sent to his pharmacy - [facility] in [place]     Please call patient to discuss and let him know if the script has been sent.            Requested Prescriptions   Pending Prescriptions Disp Refills    gabapentin 300 MG Oral Cap 270 capsule 3     Sig: Take 1 capsule (300 mg total) by mouth 3 (three) times daily.       Neurology Medications Passed - 5/14/2024 10:31 AM        Passed - In person appointment or virtual visit in the past 6 mos or appointment in next 3 mos     Recent Outpatient Visits              4 days ago Constipation, unspecified constipation type    [facility]    [facility]    3 months ago Hypothyroidism, unspecified type    [facility] [place] Ben Pérez MD    Office Visit    3 months ago Acute on chronic HFrEF (heart failure with reduced ejection fraction) ([facility])    [facility] Eva Gaines NP    Office Visit    7 months ago DIRK (obstructive sleep apnea)    [facility] Nilda Argueta MD    Office Visit    12 months ago Glaucoma suspect of both eyes    [facility] [place]    Nurse Only                         Recent Outpatient Visits              4 days ago Constipation, unspecified constipation type    [facility]    [facility]    3 months ago Hypothyroidism, unspecified type    [facility]    Office Visit    3 months ago Acute on chronic HFrEF (heart failure with reduced ejection fraction) ([facility])    [facility] Eva Gaines, MEG    Office Visit    7 months ago DIRK (obstructive sleep apnea)    [facility] [place] Nilda Argueta MD    Office Visit    12 months ago Glaucoma suspect of both eyes    [facility] [place]    Nurse Only

## 2024-05-14 NOTE — TELEPHONE ENCOUNTER
Patient calling to request a refill of his     Gabapentin 300 MG Oral Cap    Which is marked as discontinued 4/22/22    He wants the medication to be sent to his pharmacy - [facility] in [place]    Please call patient to discuss and let him know if the script has been sent.

## 2024-05-16 ENCOUNTER — TELEPHONE (OUTPATIENT)
Dept: INTERNAL MEDICINE CLINIC | Facility: CLINIC | Age: 74
End: 2024-05-16

## 2024-05-16 NOTE — TELEPHONE ENCOUNTER
Spoke with patient, Date of Birth verified.he stated   He stated pharmacy never received rx refill for gabapentin 5-14-24.  [facility]         gabapentin 300 MG Oral Cap 270 capsule 0 5/14/2024 --    Sig - Route: Take 1 capsule (300 mg total) by mouth 3 (three) times daily. - Oral    Sent to pharmacy as: Gabapentin 300 MG Oral Capsule (Neurontin)    E-Prescribing Status: Receipt confirmed by pharmacy (5/14/2024 12:05 PM CDT)

## 2024-05-16 NOTE — TELEPHONE ENCOUNTER
Spoke with Dusty Beckford, Date of Birth verified.  Medication for gabapentin was called in to [facility], she will inform patient.

## 2024-05-16 NOTE — TELEPHONE ENCOUNTER
Patient following up on request for gabapentin.  Informed Patient Prescription was sent to [facility].  Dr. Argueta approved request.  Patient will go to [facility] and  medication.  Advised to call back for any concerns.

## 2024-06-03 RX ORDER — ATORVASTATIN CALCIUM 80 MG/1
80 TABLET, FILM COATED ORAL DAILY
Qty: 90 TABLET | Refills: 1 | Status: SHIPPED | OUTPATIENT
Start: 2024-06-03

## 2024-06-03 NOTE — TELEPHONE ENCOUNTER
Refill passed per [facility] protocol.   Requested Prescriptions   Pending Prescriptions Disp Refills    ATORVASTATIN 80 MG Oral Tab [Pharmacy Med Name: Atorvastatin Calcium 80 Mg Tab Nort] 90 tablet 0     Sig: Take 1 tablet (80 mg total) by mouth daily.       Cholesterol Medication Protocol Passed - 5/31/2024  4:27 PM        Passed - ALT < 80     Lab Results   Component Value Date    ALT 36 03/29/2024             Passed - ALT resulted within past year        Passed - Lipid panel within past 12 months     Lab Results   Component Value Date    CHOLEST 129 01/18/2024    TRIG 230 (H) 01/18/2024    HDL 36 (L) 01/18/2024    LDL 56 01/18/2024    VLDL 34 (H) 01/18/2024    TCHDLRATIO 5.76 (H) 08/24/2017    NONHDLC 93 01/18/2024             Passed - In person appointment or virtual visit in the past 12 mos or appointment in next 3 mos     Recent Outpatient Visits              3 weeks ago Constipation, unspecified constipation type    [facility]    [facility]    4 months ago [facility], unspecified type    [facility]    [facility]    4 months ago Acute on chronic HFrEF (heart failure with reduced ejection fraction) ([facility]    [facility] Eva Gaines NP    Office Visit    7 months ago DIRK (obstructive sleep apnea)    [facility] Nilda Argueta MD    Office Visit    1 year ago Glaucoma suspect of both eyes    [facility]    Nurse Only                          Recent Outpatient Visits              3 weeks ago Constipation, unspecified constipation type    [facility]    [facility] Visit    4 months ago Hypothyroidism, unspecified type    [facility] Ben Dickerson MD    Office  Visit    4 months ago Acute on chronic [facility]EF [facility] failure with reduced [facility]    [facility] Eva Gaines NP    Office Visit    7 months ago DIRK (obstructive sleep apnea)    [facility] Nilda Argueta MD    Office Visit    1 year ago Glaucoma suspect of both eyes    [facility]    Nurse Only

## 2024-06-04 ENCOUNTER — PATIENT OUTREACH (OUTPATIENT)
Dept: CASE MANAGEMENT | Age: 74
End: 2024-06-04

## 2024-06-04 ENCOUNTER — TELEPHONE (OUTPATIENT)
Facility: CLINIC | Age: 74
End: 2024-06-04

## 2024-06-04 DIAGNOSIS — E03.8 OTHER SPECIFIED HYPOTHYROIDISM: ICD-10-CM

## 2024-06-04 DIAGNOSIS — H40.2290 CHRONIC PRIMARY ANGLE-CLOSURE GLAUCOMA, UNSPECIFIED GLAUCOMA STAGE, UNSPECIFIED LATERALITY: ICD-10-CM

## 2024-06-04 DIAGNOSIS — J44.1 COPD EXACERBATION (HCC): Primary | ICD-10-CM

## 2024-06-04 DIAGNOSIS — H25.12 AGE-RELATED NUCLEAR CATARACT OF LEFT EYE: ICD-10-CM

## 2024-06-04 DIAGNOSIS — E78.00 PURE HYPERCHOLESTEROLEMIA: ICD-10-CM

## 2024-06-04 DIAGNOSIS — Z79.4 TYPE 2 DIABETES MELLITUS WITH HYPERGLYCEMIA, WITH LONG-TERM CURRENT USE OF INSULIN (HCC): ICD-10-CM

## 2024-06-04 DIAGNOSIS — I10 PRIMARY HYPERTENSION: ICD-10-CM

## 2024-06-04 DIAGNOSIS — E11.9 TYPE 2 DIABETES MELLITUS WITHOUT RETINOPATHY (HCC): ICD-10-CM

## 2024-06-04 DIAGNOSIS — E11.65 TYPE 2 DIABETES MELLITUS WITH HYPERGLYCEMIA, WITH LONG-TERM CURRENT USE OF INSULIN (HCC): ICD-10-CM

## 2024-06-04 RX ORDER — ZOLPIDEM TARTRATE 5 MG/1
5 TABLET ORAL NIGHTLY PRN
Qty: 30 TABLET | Refills: 0 | Status: CANCELLED | OUTPATIENT
Start: 2024-06-04

## 2024-06-04 RX ORDER — CLOPIDOGREL BISULFATE 75 MG/1
75 TABLET ORAL DAILY
Qty: 90 TABLET | Refills: 3 | Status: CANCELLED | OUTPATIENT
Start: 2024-06-04

## 2024-06-04 RX ORDER — LEVOTHYROXINE SODIUM 137 UG/1
137 TABLET ORAL
COMMUNITY
Start: 2024-04-16

## 2024-06-04 RX ORDER — EMPAGLIFLOZIN 10 MG/1
TABLET, FILM COATED ORAL
COMMUNITY
Start: 2024-03-25

## 2024-06-04 RX ORDER — GLUCAGON INJECTION, SOLUTION 1 MG/.2ML
INJECTION, SOLUTION SUBCUTANEOUS
COMMUNITY
Start: 2024-04-24

## 2024-06-04 NOTE — TELEPHONE ENCOUNTER
Spoke with patient, Date of Birth verified  She was informed of MD recommendation, patient stated understanding.  He just took ex lax just right now, no outcome yet.   He will go to [facility] when he finds someone to take him, he will call his daughter.       RAMESH

## 2024-06-04 NOTE — TELEPHONE ENCOUNTER
Spoke to patient for monthly CCM.    Patient states at LOV 5/10 he was complaining of constipation. Has used OTC products with minimal relief. Requesting a prescription to improve his constipation.    Patient also requesting clopidogrel and zolpidem    Please contact patient to advise     Thank you

## 2024-06-04 NOTE — PROGRESS NOTES
Spoke to Caleb for [facility].      Updates to patient care team/comments: UTD   Patient reported changes in medications: reports no changes   Med Adherence  Comment: reports adherence     Health Maintenance:   Health Maintenance   Topic Date Due    Zoster Vaccines (1 of 2) Never done    Colorectal Cancer Screening  06/11/2019    Diabetes Care Foot Exam  02/19/2020    COVID-19 Vaccine (4 - 2023-24 season) 09/01/2023    Tobacco Cessation Counseling  Never done    Diabetes Care: Microalb/Creat Ratio  01/12/2024    Annual Physical  01/12/2024    Diabetes Care Dilated Eye Exam  05/10/2024    Diabetes Care A1C  06/30/2024    Diabetes Care: GFR  03/31/2025    PSA  05/10/2026    Influenza Vaccine  Completed    Annual Depression Screening  Completed    Fall Risk Screening (Annual)  Completed    Pneumococcal Vaccine: 65+ Years  Completed       Patient updates/concerns:   Spoke with patient.  Patient relates doing ok. Mood good/stable.  Denies any recent syncope episodes. Denies any recent falls. Continues to feel fatigued. Breathing stable. Continues to smoke 1 PPD. Encouraged patient to quit. Diabetes managed by Endo. FBS unknown. States machine is not working. Will take to pharmacy today to have checked.  Continues to take taking medications as prescribed. Avoiding sugars and carbs.  Weight down 277 lbs praised patient. Discussed increasing physical activity. Even if its stretching and walking around home. Agreed and will incorporate more movement into his day. Vision stable. Needs to have DM eye exam. Attempted to scheduled or patient and booking in December. Patient states he will find else where.  Blood pressure and pulse unknown not checking but denies symptoms..Denies any edema. Getting PT/INR checked. Needs to follow up with cardiology.  Denies any urinary issues at this time. Trouble sleeping OTC meds not working requesting zolpidem refill. TE sent to PCP.  No other questions or concerns.    Encouraged patient:   Self care:  Take the time to do the things you love.   Nutrition:  Good nutrition helps us to maintain our weight, fight off infection, and help reduce the risk of developing other chronic issues.   Physical activity:  Physical activity is important to help maintain independence and improve quality of life.     Goals/Action Plan:    Active goal from previous outreach: Staying healthy, maintain independence, and stability.     Patient reported progress towards goals: progressing                - What: following up on health conditions            - Where/When/How: recently seen PCP  Patient Reported Barriers and Concerns: as per above                    - Plan for overcoming barriers: encouraged patient to call with any questions or concerns.     Care Managers Interventions: Continue to provide encouragement and education for healthy coping and diagnosis.      Future Appointments: No future appointments.      Next Care Manager Follow Up Date: 1 month     Reason For Follow Up: review progress and or barriers towards patient's goals.     Time Spent This Encounter Total: 22 min medical record review, telephone communication, care plan updates where needed, education, goals, and action plan recreation/update. Provided acknowledgment and validation to patient's concerns.   Monthly Minute Total including today: 22 min   Physical assessment, complete health history, and need for CCM established by JULITO AG MD.

## 2024-06-04 NOTE — TELEPHONE ENCOUNTER
Dr. Argueta patient continues to complain of increased constipation and has tried the recommended over the counter medications from when he last saw you on 5/10. Did advise patient to go to  ER. Patient declined and was something prescribed. Please advise. Thank you    Has been taking Ex-Lax maximum strength for 3 days   Last BM 8-9 days ago  Is passing gas  Denies nausea    Did have an upset stomach 3 days ago with clear eemesis x2.

## 2024-06-30 DIAGNOSIS — I10 HYPERTENSION, UNSPECIFIED TYPE: ICD-10-CM

## 2024-06-30 DIAGNOSIS — R73.9 HYPERGLYCEMIA: ICD-10-CM

## 2024-06-30 DIAGNOSIS — E11.9 TYPE 2 DIABETES MELLITUS WITHOUT RETINOPATHY (HCC): ICD-10-CM

## 2024-06-30 DIAGNOSIS — E03.9 HYPOTHYROIDISM, UNSPECIFIED TYPE: ICD-10-CM

## 2024-06-30 DIAGNOSIS — Z79.4 TYPE 2 DIABETES MELLITUS WITH HYPERGLYCEMIA, WITH LONG-TERM CURRENT USE OF INSULIN (HCC): ICD-10-CM

## 2024-06-30 DIAGNOSIS — E78.5 HYPERLIPIDEMIA LDL GOAL <70: ICD-10-CM

## 2024-06-30 DIAGNOSIS — R73.09 HIGH GLUCOSE LEVEL: ICD-10-CM

## 2024-06-30 DIAGNOSIS — R79.89 LOW TESTOSTERONE: ICD-10-CM

## 2024-06-30 DIAGNOSIS — E11.65 TYPE 2 DIABETES MELLITUS WITH HYPERGLYCEMIA, WITH LONG-TERM CURRENT USE OF INSULIN (HCC): ICD-10-CM

## 2024-06-30 DIAGNOSIS — E11.65 HYPERGLYCEMIA DUE TO DIABETES MELLITUS (HCC): ICD-10-CM

## 2024-06-30 DIAGNOSIS — E55.9 VITAMIN D DEFICIENCY: ICD-10-CM

## 2024-06-30 DIAGNOSIS — I10 PRIMARY HYPERTENSION: ICD-10-CM

## 2024-06-30 DIAGNOSIS — I25.119 CORONARY ARTERY DISEASE INVOLVING NATIVE CORONARY ARTERY OF NATIVE HEART WITH ANGINA PECTORIS (HCC): ICD-10-CM

## 2024-06-30 DIAGNOSIS — E66.01 MORBID OBESITY WITH BMI OF 40.0-44.9, ADULT (HCC): ICD-10-CM

## 2024-06-30 DIAGNOSIS — E07.9 THYROID DISEASE: ICD-10-CM

## 2024-06-30 DIAGNOSIS — E11.65 UNCONTROLLED TYPE 2 DIABETES MELLITUS WITH HYPERGLYCEMIA (HCC): ICD-10-CM

## 2024-07-01 RX ORDER — SEMAGLUTIDE 0.68 MG/ML
0.5 INJECTION, SOLUTION SUBCUTANEOUS WEEKLY
Qty: 3 ML | Refills: 0 | Status: SHIPPED | OUTPATIENT
Start: 2024-07-01 | End: 2024-12-28

## 2024-07-01 NOTE — TELEPHONE ENCOUNTER
Endocrine Refill protocol for oral and injectable diabetic medications    Protocol Criteria: PASSED    -Appointment with Endocrinology completed in the last 6 months or scheduled in the next 3 months    -A1c result <8.5% in the past 6 months      Verify the above has been completed or scheduled in the appropriate timeline. If so can send a 90 day supply with 1 refill.     Last completed office visit: 1/26/2024 Ben Pérez MD   Next scheduled Follow up: No future appointments.   Last A1c result: Last A1c value was 8.3% done 3/30/2024.

## 2024-07-01 NOTE — TELEPHONE ENCOUNTER
Attempted to call patient to schedule a follow up appointment. No answer and unable to leave voicemail due to the box being full.    Mas - please attempt to call patient again.

## 2024-07-02 RX ORDER — POTASSIUM CHLORIDE 1500 MG/1
1 TABLET, EXTENDED RELEASE ORAL DAILY
Qty: 90 TABLET | Refills: 0 | Status: SHIPPED | OUTPATIENT
Start: 2024-07-02

## 2024-07-02 NOTE — TELEPHONE ENCOUNTER
Please review.  Protocol failed / Has no protocol.    Marked High Priority, patient states out of medication     Requested Prescriptions   Pending Prescriptions Disp Refills    POTASSIUM CHLORIDE ER 20 MEQ Oral Tab CR [Pharmacy Med Name: Potassium Chloride Er 20 Meq Tab Adva] 90 tablet 0     Sig: Take 1 tablet by mouth daily.       There is no refill protocol information for this order          Recent Outpatient Visits              1 month ago Constipation, unspecified constipation type    [facility] Mushtaq Argueta MD    Office Visit    5 months ago Hypothyroidism, unspecified type    [facility] [place] Ben Pérez MD    Office Visit    5 months ago Acute on chronic HFrEF (heart failure with reduced [facility] fraction) [facility]    [facility] Eva Gaines NP    Office Visit    8 months ago DIRK (obstructive sleep apnea)    [facility] Nilda Agrueta MD    Office Visit    1 year ago Glaucoma suspect of both eyes    [facility]    Nurse Only

## 2024-07-05 ENCOUNTER — PATIENT OUTREACH (OUTPATIENT)
Dept: CASE MANAGEMENT | Age: 74
End: 2024-07-05

## 2024-07-05 DIAGNOSIS — J44.1 COPD EXACERBATION (HCC): ICD-10-CM

## 2024-07-05 DIAGNOSIS — E11.9 TYPE 2 DIABETES MELLITUS WITHOUT RETINOPATHY (HCC): ICD-10-CM

## 2024-07-05 DIAGNOSIS — E78.00 PURE HYPERCHOLESTEROLEMIA: ICD-10-CM

## 2024-07-05 DIAGNOSIS — E03.8 OTHER SPECIFIED HYPOTHYROIDISM: ICD-10-CM

## 2024-07-05 DIAGNOSIS — I50.22 CHRONIC HFREF (HEART FAILURE WITH REDUCED EJECTION FRACTION) (HCC): Primary | ICD-10-CM

## 2024-07-05 DIAGNOSIS — H40.003 GLAUCOMA SUSPECT OF BOTH EYES: ICD-10-CM

## 2024-07-05 RX ORDER — INSULIN DETEMIR 100 [IU]/ML
10 INJECTION, SOLUTION SUBCUTANEOUS DAILY
Qty: 9 ML | Refills: 1 | Status: SHIPPED | OUTPATIENT
Start: 2024-07-05

## 2024-07-05 RX ORDER — INSULIN GLARGINE 100 [IU]/ML
10 INJECTION, SOLUTION SUBCUTANEOUS EVERY MORNING
Qty: 9 ML | Refills: 1 | Status: SHIPPED | OUTPATIENT
Start: 2024-07-05

## 2024-07-05 NOTE — TELEPHONE ENCOUNTER
Patient out of medication and states prior auth needed. Please contact patient to advise    thank you

## 2024-07-05 NOTE — TELEPHONE ENCOUNTER
I called the patient's pharmacy. He was able to  the ozempic on 7/2. The levemir order they received but requires PA. Reviewed patient chart. No known allergies or mention in LOV note of trial or failure of other long acting insulin brands.     Dr. Pérez, Ok to send script for lantus? This should be preferred by plan according to [place] medicaid 2024 preferred drug list.     I attempted to contact the patient again to schedule appt. No answer, voicemail box is full. No mychart message. Letter sent via regular mail to contact office and book appt.

## 2024-07-05 NOTE — TELEPHONE ENCOUNTER
Endocrine refill protocol for rapid acting, regular, intermediate, and mixed insulin:    Endocrine refill protocol for basal insulins     Protocol Criteria:  - Appointment with Endocrinology completed in the last 6 months or scheduled in the next 3 months    - A1c result completed in the last 6 months and is <8.5%     Verify appointment has been completed or scheduled in the appropriate timeline. If so can send a 90 day supply with 1 refill per provider protocol.    Verify A1c has been completed within the last 6 months and is below 8.5%     Last completed office visit:1/26/2024 Ben Pérez MD   Next scheduled Follow up: No future appointments.   Last A1c result: Last A1c value was 8.3% done 3/30/2024.

## 2024-07-05 NOTE — PROGRESS NOTES
Spoke to Caleb for Chronic Care Management.      Updates to patient care team/comments: UTD   Patient reported changes in medications: reports no changes   Med Adherence  Comment: reports adherence      Health Maintenance:   Health Maintenance   Topic Date Due    Zoster Vaccines (1 of 2) Never done    Colorectal Cancer Screening  06/11/2019    Diabetes Care Foot Exam  02/19/2020    COVID-19 Vaccine (4 - 2023-24 season) 09/01/2023    Tobacco Cessation Counseling  Never done    Diabetes Care: Microalb/Creat Ratio  01/12/2024    Annual Physical  01/12/2024    Diabetes Care Dilated Eye Exam  05/10/2024    Diabetes Care A1C  06/30/2024    Influenza Vaccine (1) 10/01/2024    Diabetes Care: GFR  03/31/2025    PSA  05/10/2026    Annual Depression Screening  Completed    Fall Risk Screening (Annual)  Completed    Pneumococcal Vaccine: 65+ Years  Completed       Patient updates/concerns:   Spoke with patient.  Patient relates doing ok. Mood good/stable.  Denies any recent syncope episodes. Denies any recent falls. Continues to feel fatigued. Breathing stable. Continues to smoke 1 PPD. Encouraged patient to quit. Diabetes managed by Endo. FBS unknown. Continues to not check them. Continues to take taking medications as prescribed. Currently out of insulin TE sent to endo. Avoiding sugars and carbs.Discussed increasing physical activity. Even if its stretching and walking around home. Agreed and will incorporate more movement into his day. Vision stable. Needs to have DM eye exam.  Blood pressure and pulse unknown not checking but denies symptoms..Denies any edema. Getting PT/INR checked. Needs to follow up with cardiology.  Denies any urinary issues at this time.No other questions or concerns.       Encouraged patient:   Self care: Take the time to do the things you love.   Nutrition:  Good nutrition helps us to maintain our weight, fight off infection, and help reduce the risk of developing other chronic issues.   Physical  activity:  Physical activity is important to help maintain independence and improve quality of life.       Goals/Action Plan:    Active goal from previous outreach: Staying healthy, maintain independence, and stability.     Patient reported progress towards goals: progressing                - What: continues to follow up on health conditions            - Where/When/How: doing labs needs PCP follow up   Patient Reported Barriers and Concerns: as per above                   - Plan for overcoming barriers: encouraged patient to call with any questions or concerns.     Care Managers Interventions: Continue to provide encouragement and education for healthy coping and diagnosis.      Future Appointments: No future appointments.      Next Care Manager Follow Up Date: 1 month     Reason For Follow Up: review progress and or barriers towards patient's goals.     Time Spent This Encounter Total: 21 min medical record review, telephone communication, care plan updates where needed, education, goals, and action plan recreation/update. Provided acknowledgment and validation to patient's concerns.   Monthly Minute Total including today: 21 min   Physical assessment, complete health history, and need for CCM established by JULITO AG MD.

## 2024-07-09 RX ORDER — SACUBITRIL AND VALSARTAN 24; 26 MG/1; MG/1
1 TABLET, FILM COATED ORAL DAILY
Qty: 30 TABLET | Refills: 0 | Status: SHIPPED | OUTPATIENT
Start: 2024-07-09

## 2024-07-09 RX ORDER — PEN NEEDLE, DIABETIC 32GX 5/32"
1 NEEDLE, DISPOSABLE MISCELLANEOUS DAILY
Qty: 30 EACH | Refills: 3 | Status: SHIPPED | OUTPATIENT
Start: 2024-07-09

## 2024-07-09 NOTE — TELEPHONE ENCOUNTER
REFILL PASSED PER [facility] PROTOCOLS    Requested Prescriptions   Pending Prescriptions Disp Refills    BD PEN NEEDLE AMY 2ND GEN 32G X 4 MM Does not apply Misc [Pharmacy Med Name: Bd Pen Needle/Amy 2nd Gen/32g X 5/32\" X 4 Mm Mis Embe]  0     Sig: Use daily       Diabetic Supplies Protocol Passed - 7/5/2024  4:02 PM        Passed - In person appointment or virtual visit in the past 12 mos or appointment in next 3 mos     Recent Outpatient Visits              2 months ago Constipation, unspecified constipation type    [facility], Mushtaq Mcdaniels MD    Office Visit    5 months ago Hypothyroidism, unspecified type    [facility] [place] Ben Pérez MD    Office Visit    5 months ago Acute on chronic HFrEF (heart failure with reduced ejection fraction) ([facility])    [facility] Eva Gaines, NP    Office Visit    9 months ago DIRK (obstructive sleep apnea)    [facility], Nilda Mcdaniels MD    Office Visit    1 year ago Glaucoma suspect of both eyes    [facility]    Nurse Only                             Recent Outpatient Visits              2 months ago Constipation, unspecified constipation type    [facility], Mushtaq Mcdaniels MD    Office Visit    5 months ago Hypothyroidism, unspecified type    [facility], [facility], Ben Dickerson MD    Office Visit    5 months ago Acute on chronic HFrEF (heart failure with reduced ejection fraction) ([facility])    [facility] Eva Gaines, MEG    Office Visit    9 months ago DIRK (obstructive sleep apnea)    [facility]    [facility] Visit    1 year ago Glaucoma suspect of both eyes    [facility],  [place]    Nurse Only

## 2024-07-12 DIAGNOSIS — I25.119 CORONARY ARTERY DISEASE INVOLVING NATIVE CORONARY ARTERY OF NATIVE HEART WITH ANGINA PECTORIS (HCC): ICD-10-CM

## 2024-07-12 DIAGNOSIS — E07.9 THYROID DISEASE: ICD-10-CM

## 2024-07-12 DIAGNOSIS — E11.9 TYPE 2 DIABETES MELLITUS WITHOUT RETINOPATHY (HCC): ICD-10-CM

## 2024-07-12 DIAGNOSIS — E55.9 VITAMIN D DEFICIENCY: ICD-10-CM

## 2024-07-12 DIAGNOSIS — R79.89 LOW TESTOSTERONE: ICD-10-CM

## 2024-07-12 DIAGNOSIS — E11.65 UNCONTROLLED TYPE 2 DIABETES MELLITUS WITH HYPERGLYCEMIA (HCC): ICD-10-CM

## 2024-07-12 DIAGNOSIS — E66.01 MORBID OBESITY WITH BMI OF 40.0-44.9, ADULT (HCC): ICD-10-CM

## 2024-07-12 DIAGNOSIS — Z79.4 TYPE 2 DIABETES MELLITUS WITH HYPERGLYCEMIA, WITH LONG-TERM CURRENT USE OF INSULIN (HCC): ICD-10-CM

## 2024-07-12 DIAGNOSIS — R73.9 HYPERGLYCEMIA: ICD-10-CM

## 2024-07-12 DIAGNOSIS — E11.65 HYPERGLYCEMIA DUE TO DIABETES MELLITUS (HCC): ICD-10-CM

## 2024-07-12 DIAGNOSIS — E11.65 TYPE 2 DIABETES MELLITUS WITH HYPERGLYCEMIA, WITH LONG-TERM CURRENT USE OF INSULIN (HCC): ICD-10-CM

## 2024-07-12 DIAGNOSIS — E78.5 HYPERLIPIDEMIA LDL GOAL <70: ICD-10-CM

## 2024-07-12 DIAGNOSIS — E03.9 HYPOTHYROIDISM, UNSPECIFIED TYPE: ICD-10-CM

## 2024-07-12 DIAGNOSIS — I10 HYPERTENSION, UNSPECIFIED TYPE: ICD-10-CM

## 2024-07-12 DIAGNOSIS — I10 PRIMARY HYPERTENSION: ICD-10-CM

## 2024-07-12 DIAGNOSIS — R73.09 HIGH GLUCOSE LEVEL: ICD-10-CM

## 2024-07-12 RX ORDER — EMPAGLIFLOZIN 25 MG/1
1 TABLET, FILM COATED ORAL DAILY
Qty: 90 TABLET | Refills: 0 | Status: SHIPPED | OUTPATIENT
Start: 2024-07-12

## 2024-07-12 NOTE — TELEPHONE ENCOUNTER
Endocrine Refill protocol for oral and injectable diabetic medications    Protocol Criteria:pass    -Appointment with Endocrinology completed in the last 6 months or scheduled in the next 3 months    -A1c result below 8.5% in the past 6 months      Verify the above has been completed or scheduled in the appropriate timeline. If so can send a 90 day supply with 1 refill.     Last completed office visit: 1/26/2024 Ben Pérez MD   Next scheduled Follow up: No future appointments.   Last A1c result: Last A1c value was 8.3% done 3/30/2024.

## 2024-07-15 RX ORDER — EMPAGLIFLOZIN 10 MG/1
10 TABLET, FILM COATED ORAL DAILY
Qty: 90 TABLET | Refills: 0 | OUTPATIENT
Start: 2024-07-15

## 2024-07-15 RX ORDER — CLOPIDOGREL BISULFATE 75 MG/1
75 TABLET ORAL DAILY
Qty: 90 TABLET | Refills: 3 | Status: SHIPPED | OUTPATIENT
Start: 2024-07-15

## 2024-07-15 NOTE — TELEPHONE ENCOUNTER
Please review. Protocol Failed; No Protocol    Requested Prescriptions   Pending Prescriptions Disp Refills    CLOPIDOGREL 75 MG Oral Tab [Pharmacy Med Name: Clopidogrel 75 Mg Tab Nort] 90 tablet 0     Sig: Take 1 tablet (75 mg total) by mouth daily.       There is no refill protocol information for this order              Recent Outpatient Visits              2 months ago Constipation, unspecified constipation type    [facility] Mushtaq Argueta MD    Office Visit    5 months ago Hypothyroidism, unspecified type    [facility] Ben Pérez MD    Office Visit    5 months ago Acute on chronic HFrEF (heart failure with reduced ejection fraction) ([facility])    [facility] Eav Gaines NP    Office Visit    9 months ago DIRK (obstructive sleep apnea)    [facility] Nilda Argueta MD    Office Visit    1 year ago Glaucoma suspect of both eyes    [facility]    Nurse Only

## 2024-07-16 ENCOUNTER — TELEPHONE (OUTPATIENT)
Dept: INTERNAL MEDICINE CLINIC | Facility: CLINIC | Age: 74
End: 2024-07-16

## 2024-07-16 RX ORDER — PEN NEEDLE, DIABETIC 32GX 5/32"
1 NEEDLE, DISPOSABLE MISCELLANEOUS DAILY
Qty: 90 EACH | Refills: 3 | Status: SHIPPED | OUTPATIENT
Start: 2024-07-16

## 2024-07-16 NOTE — TELEPHONE ENCOUNTER
Argelia from [employer] called and would like to clarify the instruction for BD pen .   RN re sent the revised prescription with the correct instruction.   See also refill encounter 7/5/24===passed protocol.       MEDICATION RECORD :    Disp Refills Start End     Insulin Pen Needle (BD PEN NEEDLE RODERICK 2ND GEN) 32G X 4 MM Does not apply Misc 30 each 3 7/9/2024 --    Sig - Route: Apply 1 Application topically daily. - External    Sent to pharmacy as: BD Pen Needle Roderick 2nd Gen 32G X 4 MM (Insulin Pen Needle)    Notes to Pharmacy: DX code E11.65    E-Prescribing Status: Receipt confirmed by pharmacy (7/9/2024  9:24 AM CDT)      Pharmacy    [id number] - [address] [phone], [phone]       Date/Time Action Taken User Additional Information   07/05/24 1602 Pend Interface, Srscrpts Retail In Pharmacy    07/09/24 0923 Sign Stephy Mcfadden CPhT Reorder from Order:[id number]; Ordering Mode: Per protocol: no cosign required   07/09/24 0923 Taking Flag Checked Stephy Mcfadden CPhT [phone]

## 2024-07-18 NOTE — TELEPHONE ENCOUNTER
Requesting GABAPENTIN refill.   States that he lost the whole bottle maybe in a  hotel or somewhere else.   He needs the refill NOW.    Preferred pharmacy verified.   RN called [facility], per pharmacy, patient picked up the prescription on 5/16/24 for 90 day supply.Today is day 63.  Needs a month supply.       MEDICATION RECORD :   Disp Refills Start End    gabapentin 300 MG Oral Cap 270 capsule 0 5/14/2024 --    Sig - Route: Take 1 capsule (300 mg total) by mouth 3 (three) times daily. - Oral    Sent to pharmacy as: Gabapentin 300 MG Oral Capsule (Neurontin)    E-Prescribing Status: Receipt confirmed by pharmacy (5/14/2024 12:05 PM CDT)      Medication Notes         ROBERTO LAMB May 16, 2024  3:23 PM  [facility] spoke with Dusty Beckford, See telephone encounter  5-16-24              Pharmacy    [id number] - [address] [phone], [phone]

## 2024-07-19 RX ORDER — GABAPENTIN 300 MG/1
300 CAPSULE ORAL 3 TIMES DAILY
Qty: 90 CAPSULE | Refills: 0 | Status: SHIPPED | OUTPATIENT
Start: 2024-07-19

## 2024-07-31 DIAGNOSIS — E11.65 TYPE 2 DIABETES MELLITUS WITH HYPERGLYCEMIA, WITH LONG-TERM CURRENT USE OF INSULIN (HCC): ICD-10-CM

## 2024-07-31 DIAGNOSIS — I10 PRIMARY HYPERTENSION: ICD-10-CM

## 2024-07-31 DIAGNOSIS — R79.89 LOW TESTOSTERONE: ICD-10-CM

## 2024-07-31 DIAGNOSIS — R73.09 HIGH GLUCOSE LEVEL: ICD-10-CM

## 2024-07-31 DIAGNOSIS — I25.119 CORONARY ARTERY DISEASE INVOLVING NATIVE CORONARY ARTERY OF NATIVE HEART WITH ANGINA PECTORIS (HCC): ICD-10-CM

## 2024-07-31 DIAGNOSIS — E78.5 HYPERLIPIDEMIA LDL GOAL <70: ICD-10-CM

## 2024-07-31 DIAGNOSIS — R73.9 HYPERGLYCEMIA: ICD-10-CM

## 2024-07-31 DIAGNOSIS — E11.9 TYPE 2 DIABETES MELLITUS WITHOUT RETINOPATHY (HCC): ICD-10-CM

## 2024-07-31 DIAGNOSIS — E11.65 HYPERGLYCEMIA DUE TO DIABETES MELLITUS (HCC): ICD-10-CM

## 2024-07-31 DIAGNOSIS — E07.9 THYROID DISEASE: ICD-10-CM

## 2024-07-31 DIAGNOSIS — I10 HYPERTENSION, UNSPECIFIED TYPE: ICD-10-CM

## 2024-07-31 DIAGNOSIS — Z79.4 TYPE 2 DIABETES MELLITUS WITH HYPERGLYCEMIA, WITH LONG-TERM CURRENT USE OF INSULIN (HCC): ICD-10-CM

## 2024-07-31 DIAGNOSIS — E66.01 MORBID OBESITY WITH BMI OF 40.0-44.9, ADULT (HCC): ICD-10-CM

## 2024-07-31 DIAGNOSIS — E11.65 UNCONTROLLED TYPE 2 DIABETES MELLITUS WITH HYPERGLYCEMIA (HCC): ICD-10-CM

## 2024-07-31 DIAGNOSIS — E55.9 VITAMIN D DEFICIENCY: ICD-10-CM

## 2024-07-31 DIAGNOSIS — E03.9 HYPOTHYROIDISM, UNSPECIFIED TYPE: ICD-10-CM

## 2024-08-02 RX ORDER — SEMAGLUTIDE 0.68 MG/ML
0.5 INJECTION, SOLUTION SUBCUTANEOUS WEEKLY
Qty: 3 ML | Refills: 0 | Status: SHIPPED | OUTPATIENT
Start: 2024-08-02 | End: 2025-01-29

## 2024-08-02 NOTE — TELEPHONE ENCOUNTER
Called and spoke to patient, appointment scheduled to see Dr. Pérez.     Dr. Pérez , please review and send refill if appropriate. Thank you.      Endocrine Refill protocol for oral and injectable diabetic medications    Protocol Criteria: Fail. Patient has not followed POC. LOV 1/26/24    -Appointment with Endocrinology completed in the last 6 months or scheduled in the next 3 months    -A1c result below 8.5% in the past 6 months      Verify the above has been completed or scheduled in the appropriate timeline. If so can send a 90 day supply with 1 refill.     Last completed office visit: 1/26/2024 Ben Pérez MD   Next scheduled Follow up: No future appointments.   Last A1c result: 3/30/24: 8.3%.

## 2024-08-05 ENCOUNTER — PATIENT OUTREACH (OUTPATIENT)
Dept: CASE MANAGEMENT | Age: 74
End: 2024-08-05

## 2024-08-05 DIAGNOSIS — E03.8 OTHER SPECIFIED HYPOTHYROIDISM: ICD-10-CM

## 2024-08-05 DIAGNOSIS — E11.65 TYPE 2 DIABETES MELLITUS WITH HYPERGLYCEMIA, WITH LONG-TERM CURRENT USE OF INSULIN (HCC): ICD-10-CM

## 2024-08-05 DIAGNOSIS — H25.12 AGE-RELATED NUCLEAR CATARACT OF LEFT EYE: Primary | ICD-10-CM

## 2024-08-05 DIAGNOSIS — J44.1 CHRONIC OBSTRUCTIVE PULMONARY DISEASE WITH ACUTE EXACERBATION (HCC): ICD-10-CM

## 2024-08-05 DIAGNOSIS — I50.22 CHRONIC HFREF (HEART FAILURE WITH REDUCED EJECTION FRACTION) (HCC): ICD-10-CM

## 2024-08-05 DIAGNOSIS — Z79.4 TYPE 2 DIABETES MELLITUS WITH HYPERGLYCEMIA, WITH LONG-TERM CURRENT USE OF INSULIN (HCC): ICD-10-CM

## 2024-08-05 NOTE — PROGRESS NOTES
Spoke to Caleb for Chronic Care Management.      Updates to patient care team/comments: UTD   Patient reported changes in medications: reports no changes   Med Adherence  Comment: reports adherence      Health Maintenance:   Health Maintenance   Topic Date Due    Zoster Vaccines (1 of 2) Never done    Colorectal Cancer Screening  06/11/2019    Diabetes Care Foot Exam  02/19/2020    COVID-19 Vaccine (4 - 2023-24 season) 09/01/2023    Tobacco Cessation Counseling  Never done    Diabetes Care: Microalb/Creat Ratio  01/12/2024    Annual Physical  01/12/2024    Diabetes Care Dilated Eye Exam  05/10/2024    Diabetes Care A1C  06/30/2024    Influenza Vaccine (1) 10/01/2024    Diabetes Care: GFR  03/31/2025    PSA  05/10/2026    Annual Depression Screening  Completed    Fall Risk Screening (Annual)  Completed    Pneumococcal Vaccine: 65+ Years  Completed       Patient updates/concerns:   Spoke with patient.  Patient relates doing well.. Mood good/stable.  Denies any recent syncope episodes. Denies any recent falls. Continues to feel fatigued. Breathing stable. Continues to smoke 1 PPD. Encouraged patient to quit. Diabetes managed by Endo. FBS unknown. Continues to not check them. Continues to take taking medications as prescribed. Avoiding sugars and carbs.Discussed increasing physical activity. Even if its stretching and walking around home. vision stable.  Blood pressure and pulse unknown not checking but denies symptoms..Denies any edema. Getting PT/INR checked. Recently checked today. Denies any urinary issues at this time.No other questions or concerns.     Encouraged patient:   Self care: Take the time to do the things you love.   Nutrition:  Good nutrition helps us to maintain our weight, fight off infection, and help reduce the risk of developing other chronic issues.   Physical activity:  Physical activity is important to help maintain independence and improve quality of life.     Goals/Action Plan:    Active goal  from previous outreach: Staying healthy, maintain independence, and stability.     Patient reported progress towards goals: progressing                - What: continuing to follow up on health conditions            - Where/When/How: seeing specialists  Patient Reported Barriers and Concerns: as per above                    - Plan for overcoming barriers: encouraged patient to call with any questions or concerns     Care Managers Interventions: Continue to provide encouragement and education for healthy coping and diagnosis.      Future Appointments:   Future Appointments   Date Time Provider Department Center   8/27/2024  8:30 AM Ben Pérez MD [id number]         Next Care Manager Follow Up Date: 1 month     Reason For Follow Up: review progress and or barriers towards patient's goals.     Time Spent This Encounter Total: 20 min medical record review, telephone communication, care plan updates where needed, education, goals, and action plan recreation/update. Provided acknowledgment and validation to patient's concerns.   Monthly Minute Total including today: 20 min   Physical assessment, complete health history, and need for CCM established by JULITO AG MD.

## 2024-08-08 ENCOUNTER — MED REC SCAN ONLY (OUTPATIENT)
Dept: INTERNAL MEDICINE CLINIC | Facility: CLINIC | Age: 74
End: 2024-08-08

## 2024-08-08 NOTE — TELEPHONE ENCOUNTER
Refill passed per [facility] protocol.      Sarmad Spencera53 minutes ago (3:24 PM)     BP  Patient is requesting a refill on his gabapentin medication. Per the patient he is out of medication. Pharmacy: [facility]/[place], [place] (Listed)           Please review pended refill request as unable to refill due to high/very high interaction warning copied here:      Current Warnings (1)  High  Duplicate Therapy: gabapentin, pregabalinGABAPENTIN AND RELATED. Abuse/Dependency Potential.  Details  Override reason        Requested Prescriptions   Pending Prescriptions Disp Refills    gabapentin 300 MG Oral Cap 270 capsule 0     Sig: Take 1 capsule (300 mg total) by mouth 3 (three) times daily.       Neurology Medications Passed - 8/8/2024  3:33 PM        Passed - In person appointment or virtual visit in the past 6 mos or appointment in next 3 mos     Recent Outpatient Visits              3 months ago Constipation, unspecified constipation type    [facility] Mushtaq Argueta MD    Office Visit    6 months ago Hypothyroidism, unspecified type    [facility] Ben Pérez MD    Office Visit    6 months ago Acute on chronic HFrEF (heart failure with reduced ejection fraction) ([facility])    [facility] Eva Gaines NP    Office Visit    10 months ago DIRK (obstructive sleep apnea)    [facility] Nilda Argueta MD    Office Visit    1 year ago Glaucoma suspect of both eyes    [facility]    Nurse Only          Future Appointments         Provider Department Appt Notes    In 1 week Nilda Argueta MD [facility] follow up on meds/policy informed to pt    In 2 weeks Ben Pérez MD [facility] follow up diabetes                        Recent Outpatient Visits              3 months ago Constipation, unspecified constipation type    [facility] Mushtaq Argueta MD    Office Visit    6 months ago Hypothyroidism, unspecified type    [facility] Ben Pérez MD    Office Visit    6 months ago Acute on chronic HFrEF (heart failure with reduced ejection fraction) ([facility])    [facility] Eva Gaines NP    Office Visit    10 months ago DIRK (obstructive sleep apnea)    [facility] Nilda Argueta MD    Office Visit    1 year ago Glaucoma suspect of both eyes    [facility]    Nurse Only          Future Appointments         Provider Department Appt Notes    In 1 week Nilda Argueta MD [facility] follow up on meds/policy informed to pt    In 2 weeks Ben Pérez MD [facility] follow up diabetes

## 2024-08-08 NOTE — TELEPHONE ENCOUNTER
Refill passed per [facility] protocol.  Requested Prescriptions   Pending Prescriptions Disp Refills    gabapentin 300 MG Oral Cap 180 capsule 1     Sig: Take 1 capsule (300 mg total) by mouth 3 (three) times daily.       Neurology Medications Passed - 8/8/2024  3:33 PM        Passed - In person appointment or virtual visit in the past 6 mos or appointment in next 3 mos     Recent Outpatient Visits              3 months ago Constipation, unspecified constipation type    [facility] Mushtaq Argueta MD    Office Visit    6 months ago Hypothyroidism, unspecified type    [facility] Ben Pérez MD    Office Visit    6 months ago Acute on chronic HFrEF (heart failure with reduced ejection fraction) ([facility])    [facility] Eva Gaines NP    Office Visit    10 months ago DIRK (obstructive sleep apnea)    [facility] Nilda Argueta MD    Office Visit    1 year ago Glaucoma suspect of both eyes    [facility]    Nurse Only          Future Appointments         Provider Department Appt Notes    In 1 week Nilda Argueta MD [facility] follow up on meds/policy informed to pt    In 2 weeks Ben Pérez MD [facility] follow up diabetes                       Recent Outpatient Visits              3 months ago Constipation, unspecified constipation type    [facility] Mushtaq Argueta MD    Office Visit    6 months ago Hypothyroidism, unspecified type    [facility]    Office Visit    6 months ago Acute on chronic HFrEF (heart failure with reduced ejection fraction) ([facility])    [facility] Eva Gaines,  NP    Office Visit    10 months ago DIRK (obstructive sleep apnea)    [facility] Nilda Argueta MD    Office Visit    1 year ago Glaucoma suspect of both eyes    [facility]    Nurse Only          Future Appointments         Provider Department Appt Notes    In 1 week Nilda Argueta MD [facility] follow up on meds/policy informed to pt    In 2 weeks Ben Pérez MD [facility] follow up diabetes

## 2024-08-08 NOTE — TELEPHONE ENCOUNTER
Patient is requesting a refill on his gabapentin medication. Per the patient he is out of medication. Pharmacy: [facility]/[place], [place] (Listed)     Current Outpatient Medications   Medication Sig Dispense Refill    GABAPENTIN 300 MG Oral Cap TAKE ONE CAPSULE BY MOUTH THREE TIMES DAILY 90 capsule 0

## 2024-08-09 RX ORDER — GABAPENTIN 300 MG/1
300 CAPSULE ORAL 3 TIMES DAILY
Qty: 270 CAPSULE | Refills: 0 | OUTPATIENT
Start: 2024-08-09

## 2024-08-13 NOTE — TELEPHONE ENCOUNTER
Dear nursing staff,    Please call patient and make sure patient is not taking gabapentin and pregabalin together.     Duplicate therapy.     Pharmacy is requesting refills for Gabapentin 300 mg three times daily.    Gabapentin 300 mg three times daily is being prescribed by Dr. Nilda Argueta  and   Pregabalin 300 mg once daily is being prescribed by Pain Management, Albina Zamora APRN     Patient picked up his gabapentin on 7/22/2024  Patient picked up his pregabalin on 7/23/2024    **Please discontinue appropriate medication**    Requested Prescriptions     Pending Prescriptions Disp Refills    gabapentin 300 MG Oral Cap [Pharmacy Med Name: Gabapentin 300 Mg Cap Nort] 270 capsule 1     Sig: Take 1 capsule (300 mg total) by mouth 3 (three) times daily.

## 2024-08-14 RX ORDER — GABAPENTIN 300 MG/1
300 CAPSULE ORAL 3 TIMES DAILY
Qty: 270 CAPSULE | Refills: 1 | OUTPATIENT
Start: 2024-08-14

## 2024-08-14 NOTE — TELEPHONE ENCOUNTER
Patient called back. Says that he is taking gabapentin but sometimes takes 4 or 5 a day. Not taking as directed on bottle.    Patient unable to tell me what he is currently taking for medications. Patient reports that he takes \"what is sent to the pharmacy by the doctor for him to take\".     Patient does not have pill bottles where he is now  He says he threw the empty bottles out and does not have them any more.    I asked if he was taking lyrica/pregablin and he responded yes but then said he was not so I was not able to confirm below question on if patient is taking both at the same time.    I advised if meds are not working that he needs visit to discuss. He does have appt set for below    Patient uncooperative in answering questions and said that we need to call his pharmacy and see what they have been filling and abruptly hung up the phone.     Please note, refill sent 7/19/24 with directions one 300mg cap  TID for 90 caps   This should get him to visit date but patient says he is out.

## 2024-08-23 ENCOUNTER — TELEPHONE (OUTPATIENT)
Dept: ENDOCRINOLOGY CLINIC | Facility: CLINIC | Age: 74
End: 2024-08-23

## 2024-08-23 DIAGNOSIS — E55.9 VITAMIN D DEFICIENCY: ICD-10-CM

## 2024-08-23 DIAGNOSIS — Z79.4 TYPE 2 DIABETES MELLITUS WITH HYPERGLYCEMIA, WITH LONG-TERM CURRENT USE OF INSULIN (HCC): ICD-10-CM

## 2024-08-23 DIAGNOSIS — E11.9 TYPE 2 DIABETES MELLITUS WITHOUT RETINOPATHY (HCC): ICD-10-CM

## 2024-08-23 DIAGNOSIS — E07.9 THYROID DISEASE: ICD-10-CM

## 2024-08-23 DIAGNOSIS — I10 PRIMARY HYPERTENSION: ICD-10-CM

## 2024-08-23 DIAGNOSIS — E11.65 HYPERGLYCEMIA DUE TO DIABETES MELLITUS (HCC): ICD-10-CM

## 2024-08-23 DIAGNOSIS — I10 HYPERTENSION, UNSPECIFIED TYPE: ICD-10-CM

## 2024-08-23 DIAGNOSIS — E78.5 HYPERLIPIDEMIA LDL GOAL <70: ICD-10-CM

## 2024-08-23 DIAGNOSIS — R73.09 HIGH GLUCOSE LEVEL: ICD-10-CM

## 2024-08-23 DIAGNOSIS — E11.65 UNCONTROLLED TYPE 2 DIABETES MELLITUS WITH HYPERGLYCEMIA (HCC): ICD-10-CM

## 2024-08-23 DIAGNOSIS — E11.65 TYPE 2 DIABETES MELLITUS WITH HYPERGLYCEMIA, WITH LONG-TERM CURRENT USE OF INSULIN (HCC): ICD-10-CM

## 2024-08-23 DIAGNOSIS — R73.9 HYPERGLYCEMIA: ICD-10-CM

## 2024-08-23 DIAGNOSIS — E66.01 MORBID OBESITY WITH BMI OF 40.0-44.9, ADULT (HCC): ICD-10-CM

## 2024-08-23 DIAGNOSIS — E03.9 HYPOTHYROIDISM, UNSPECIFIED TYPE: ICD-10-CM

## 2024-08-23 DIAGNOSIS — R79.89 LOW TESTOSTERONE: ICD-10-CM

## 2024-08-23 DIAGNOSIS — I25.119 CORONARY ARTERY DISEASE INVOLVING NATIVE CORONARY ARTERY OF NATIVE HEART WITH ANGINA PECTORIS (HCC): ICD-10-CM

## 2024-08-23 RX ORDER — SACUBITRIL AND VALSARTAN 24; 26 MG/1; MG/1
1 TABLET, FILM COATED ORAL DAILY
Qty: 30 TABLET | Refills: 0 | Status: CANCELLED | OUTPATIENT
Start: 2024-08-23

## 2024-08-23 RX ORDER — SEMAGLUTIDE 0.68 MG/ML
0.5 INJECTION, SOLUTION SUBCUTANEOUS WEEKLY
Qty: 3 ML | Refills: 0 | OUTPATIENT
Start: 2024-08-23 | End: 2025-02-19

## 2024-08-23 NOTE — TELEPHONE ENCOUNTER
Patient is requesting refill for sacubitril-valsartan (ENTRESTO) 24-26 MG Oral Tab and   OZEMPIC, 0.25 OR 0.5 MG/DOSE, 2 MG/3ML Subcutaneous Solution Pen-injector     [id number] - [place], [place] - [address] [place] [address] [phone], [phone]

## 2024-08-24 NOTE — TELEPHONE ENCOUNTER
Called patient, no answer.  Left message to call back on Monday to address questions.    Chief Complaint   Patient presents with    Joint Pain    Labs    Fatigue     HPI:  Baltazar Argueta is a 36 y.o.  female presents with complaints of joint pain and fatigue. Patient is complaining of excessive daytime sleepiness. Her spouse reports that she snores, gasps and suddenly wakes up from sleep. She insists that she wants some blood work done. Review of Systems  As per hpi    Past Medical History:   Diagnosis Date    Asthma     exercise / cold induced doesn't use inhaler    Interstitial cystitis      Past Surgical History:   Procedure Laterality Date    COLONOSCOPY N/A 1/18/2018    COLONOSCOPY performed by S[facility] at [facility] AMBULATORY OR    HX CHOLECYSTECTOMY       Social History     Social History    Marital status: SINGLE     Spouse name: N/A    Number of children: N/A    Years of education: N/A     Social History Main Topics    Smoking status: Never Smoker    Smokeless tobacco: Never Used    Alcohol use No    Drug use: No    Sexual activity: Yes     Partners: Male     Birth control/ protection: IUD     Other Topics Concern    None     Social History Narrative     Current Outpatient Prescriptions   Medication Sig Dispense Refill    hydroCHLOROthiazide (HYDRODIURIL) 25 mg tablet Take 1 Tab by mouth daily. Indications: Edema, hypertension 30 Tab 5    Cholecalciferol, Vitamin D3, (VITAMIN D3) 2,000 unit cap capsule Take 2,000 Units by mouth daily. Indications: VITAMIN D DEFICIENCY (HIGH DOSE THERAPY) 90 Cap 5    levothyroxine (SYNTHROID) 25 mcg tablet Take 1 Tab by mouth Daily (before breakfast). Indications: Hashimoto Thyroiditis 30 Tab 5    OTHER Take 2 Tabs by mouth daily. Pill form of cholestramine      cholestyramine-sucrose (QUESTRAN) 4 gram powder Take  by mouth daily.  Peak Flow Meter elma 1 Each by Does Not Apply route two (2) times daily as needed.  1 Device 0    albuterol (PROVENTIL HFA, VENTOLIN HFA, PROAIR HFA) 90 mcg/actuation inhaler Take 2 Puffs by inhalation every four (4) hours as needed for Wheezing. 1 Inhaler 0    cetirizine (ZYRTEC) 10 mg tablet Take 1 Tab by mouth daily. 30 Tab 4     Allergies   Allergen Reactions    Shellfish Derived Swelling     Wheezing    Banana Other (comments)     Abdominal pain       Objective:  Visit Vitals    /84    Pulse 81    Temp 97.3 °F (36.3 °C) (Oral)    Resp 20    Ht 5' 2\" (1.575 m)    Wt 241 lb (109.3 kg)    SpO2 99%    BMI 44.08 kg/m2     Physical Exam:   General appearance - alert, oriented X 5, obese but well appearing in no distress  EYE-PERRL, EOMI  Neck - supple, no significant adenopathy   Chest - clear to auscultation, no wheezes, rales or rhonchi  Heart - normal rate, regular rhythm, normal   Abdomen - soft, nontender, nondistended, no organomegaly  Ext-peripheral pulses normal, no pedal edema  Neuro -alert, oriented, normal speech, no focal findings  Back-full range of motion, no tenderness, palpable spasm or pain on motion   Right knee: No erythema, edema, or bruising, mild tenderness  Left knee: No erythema, edema, or bruising, mild tenderness    Assessment/Plan:  Diagnoses and all orders for this visit:    Pain in joints  -     RA + CCP ABS  -     SED RATE (ESR)  -     LYME AB, IGG & IGM BY WB    Muscle cramps  -     METABOLIC PANEL, COMPREHENSIVE    Elevated TSH  -     TSH 3RD GENERATION    Chronic fatigue  -     LYME AB, IGG & IGM BY WB    Other orders  -     Cancel: REFERRAL TO RHEUMATOLOGY      Patient Instructions          Muscle Cramps: Care Instructions  Your Care Instructions    A muscle cramp occurs when a muscle tightens up suddenly. A cramp often happens in the legs. A muscle cramp is also called a muscle spasm or a charley horse. Muscle cramps usually last less than a minute. However, the pain may last for several minutes. Leg cramps that occur at night may wake you up. Heavy exercise, dehydration, and being overweight can increase your risk of getting cramps.  An imbalance of certain chemicals in your blood, called electrolytes, can also lead to muscle cramps. Pregnant women sometimes get muscle cramps during sleep. Muscle cramps can be treated by stretching and massaging the muscle. If cramps keep coming back, your doctor may prescribe medicine that relaxes your muscles. Follow-up care is a key part of your treatment and safety. Be sure to make and go to all appointments, and call your doctor if you are having problems. It's also a good idea to know your test results and keep a list of the medicines you take. How can you care for yourself at home? · Drink plenty of fluids to prevent dehydration. Choose water and other caffeine-free clear liquids until you feel better. If you have kidney, heart, or liver disease and have to limit fluids, talk with your doctor before you increase the amount of fluids you drink. · Stretch your muscles every day, especially before and after exercise and at bedtime. Regular stretching can relax your muscles and may prevent cramps. · Do not suddenly increase the amount of exercise you get. Increase your exercise a little each week. · When you get a cramp, stretch and massage the muscle. You can also take a warm shower or bath to relax the muscle. A heating pad placed on the muscle can also help. · Take a daily multivitamin supplement. · Ask your doctor if you can take an over-the-counter pain medicine, such as acetaminophen (Tylenol), ibuprofen (Advil, Motrin), or naproxen (Aleve). Be safe with medicines. Read and follow all instructions on the label. When should you call for help? Watch closely for changes in your health, and be sure to contact your doctor if:  ? · You get muscle cramps often that do not go away after home treatment. ? · Your muscle cramps often wake you up at night. ? · You do not get better as expected. Where can you learn more? Go to http://jamin-neisha.info/.   Enter R737 in the search box to learn more about \"Muscle Cramps: Care Instructions. \"  Current as of: March 21, 2017  Content Version: 11.4  © 6075-2625 [employer]. Care instructions adapted under license by [employer] (which disclaims liability or warranty for this information). If you have questions about a medical condition or this instruction, always ask your healthcare professional. [place] [address] any warranty or liability for your use of this information. Follow-up Disposition:  Return 1 week, for f/u results.

## 2024-08-26 RX ORDER — SACUBITRIL AND VALSARTAN 24; 26 MG/1; MG/1
1 TABLET, FILM COATED ORAL DAILY
Qty: 90 TABLET | Refills: 0 | OUTPATIENT
Start: 2024-08-26

## 2024-08-26 RX ORDER — SEMAGLUTIDE 0.68 MG/ML
0.5 INJECTION, SOLUTION SUBCUTANEOUS WEEKLY
Qty: 3 ML | Refills: 0 | OUTPATIENT
Start: 2024-08-26 | End: 2025-02-22

## 2024-08-26 RX ORDER — SEMAGLUTIDE 0.68 MG/ML
0.5 INJECTION, SOLUTION SUBCUTANEOUS WEEKLY
Qty: 3 ML | Refills: 0 | Status: SHIPPED | OUTPATIENT
Start: 2024-08-26 | End: 2025-02-22

## 2024-08-26 NOTE — TELEPHONE ENCOUNTER
Please review; protocol failed/ has no protocol      Isamar Zimmerman3 days ago     YQ  Patient is requesting refill for sacubitril-valsartan (ENTRESTO) 24-26 MG Oral Tab   [id number] - [place], [place] - [facility] [facility] [phone], [phone]           Requested Prescriptions   Pending Prescriptions Disp Refills    sacubitril-valsartan (ENTRESTO) 24-26 MG Oral Tab 30 tablet 0     Sig: Take 1 tablet by mouth daily.       There is no refill protocol information for this order        Recent Outpatient Visits              3 months ago Constipation, unspecified constipation type    [facility] Mushtaq Argueta MD    Office Visit    7 months ago Hypothyroidism, unspecified type    [facility] Ben Pérez MD    Office Visit    7 months ago Acute on chronic HFrEF (heart failure with reduced ejection fraction) ([facility])    [facility] Eva Gaines NP    Office Visit    10 months ago DIRK (obstructive sleep apnea)    [facility] Nilda Argueta MD    Office Visit    1 year ago Glaucoma suspect of both eyes    [facility]    Nurse Only          Future Appointments         Provider Department Appt Notes    Today Nilda Argueta MD [facility] follow up on diabetes    Tomorrow Ben Pérez MD [facility] follow up diabetes

## 2024-08-26 NOTE — TELEPHONE ENCOUNTER
Isamar Zimmerman3 days ago     YQ  Patient is requesting refill for sacubitril-valsartan (ENTRESTO) 24-26 MG Oral Tab      [id number] - [address] [phone], [phone]

## 2024-08-29 ENCOUNTER — TELEPHONE (OUTPATIENT)
Dept: ENDOCRINOLOGY CLINIC | Facility: CLINIC | Age: 74
End: 2024-08-29

## 2024-08-29 DIAGNOSIS — E11.65 TYPE 2 DIABETES MELLITUS WITH HYPERGLYCEMIA, WITH LONG-TERM CURRENT USE OF INSULIN (HCC): Primary | ICD-10-CM

## 2024-08-29 DIAGNOSIS — Z79.4 TYPE 2 DIABETES MELLITUS WITH HYPERGLYCEMIA, WITH LONG-TERM CURRENT USE OF INSULIN (HCC): Primary | ICD-10-CM

## 2024-08-29 NOTE — TELEPHONE ENCOUNTER
Pharmacy requesting pa for       insulin glargine (LANTUS SOLOSTAR) 100 UNIT/ML Subcutaneous Solution Pen-injector, Inject 10 Units into the skin every morning., Disp: 9 mL, Rfl: 1    KEY: [id number]

## 2024-09-03 ENCOUNTER — TELEPHONE (OUTPATIENT)
Dept: INTERNAL MEDICINE CLINIC | Facility: CLINIC | Age: 74
End: 2024-09-03

## 2024-09-05 RX ORDER — INSULIN GLARGINE 100 [IU]/ML
10 INJECTION, SOLUTION SUBCUTANEOUS DAILY
Qty: 9 ML | Refills: 1 | Status: SHIPPED | OUTPATIENT
Start: 2024-09-05

## 2024-09-05 NOTE — TELEPHONE ENCOUNTER
Dr. Saldaña,  Spoke to pharmacist: patient now has Medicare part D - basaglar is preferred    RX for basaglar pended with same sig as lantus -thanks

## 2024-09-05 NOTE — TELEPHONE ENCOUNTER
Spoke to pharmacist - RX for basaglar ready for pickup with $0 co-pay  RX for lantus removed from profile

## 2024-09-06 ENCOUNTER — PATIENT OUTREACH (OUTPATIENT)
Dept: CASE MANAGEMENT | Age: 74
End: 2024-09-06

## 2024-09-06 ENCOUNTER — TELEPHONE (OUTPATIENT)
Dept: INTERNAL MEDICINE CLINIC | Facility: CLINIC | Age: 74
End: 2024-09-06

## 2024-09-06 NOTE — PROGRESS NOTES
JIE verified for CCM monthly outreach.   Patient confirmed he will be at PCP visit Monday.  I will follow up with patient at a later time.      Medical record reviewed including recent office visits and test results

## 2024-09-06 NOTE — TELEPHONE ENCOUNTER
Per refill encounter: 8/26/2024    Dr. Nilda Argueta noted that she has dismissed this patient due to missed appointments and no further refills or appointments to be scheduled.    Nilda Argueta MD   8/26/24 11:10 AM Note    He  missed couple of appointments I send the note that patient needs to be dismissed

## 2024-09-06 NOTE — TELEPHONE ENCOUNTER
Per patient, he needs refill on his Gabapentin and he is totally out of medication, patient has an appointment on Monday 09/09/2024.  Please advise and send to his pharmacy on [address].    Current Outpatient Medications   Medication Sig Dispense Refill                  GABAPENTIN 300 MG Oral Cap TAKE ONE CAPSULE BY MOUTH THREE TIMES DAILY 90 capsule 0

## 2024-09-24 NOTE — TELEPHONE ENCOUNTER
Spoke to patient and notified him of below. He was understanding and very thankful for the return call. He will  prescription on 5/3. 1.98 2

## 2024-10-02 DIAGNOSIS — E03.9 HYPOTHYROIDISM, UNSPECIFIED TYPE: ICD-10-CM

## 2024-10-02 DIAGNOSIS — I10 HYPERTENSION, UNSPECIFIED TYPE: ICD-10-CM

## 2024-10-02 DIAGNOSIS — E78.5 HYPERLIPIDEMIA LDL GOAL <70: ICD-10-CM

## 2024-10-02 DIAGNOSIS — E11.65 UNCONTROLLED TYPE 2 DIABETES MELLITUS WITH HYPERGLYCEMIA (HCC): ICD-10-CM

## 2024-10-02 DIAGNOSIS — R73.9 HYPERGLYCEMIA: ICD-10-CM

## 2024-10-02 DIAGNOSIS — R73.09 HIGH GLUCOSE LEVEL: ICD-10-CM

## 2024-10-02 DIAGNOSIS — I10 PRIMARY HYPERTENSION: ICD-10-CM

## 2024-10-02 DIAGNOSIS — R79.89 LOW TESTOSTERONE: ICD-10-CM

## 2024-10-02 DIAGNOSIS — Z79.4 TYPE 2 DIABETES MELLITUS WITH HYPERGLYCEMIA, WITH LONG-TERM CURRENT USE OF INSULIN (HCC): ICD-10-CM

## 2024-10-02 DIAGNOSIS — E55.9 VITAMIN D DEFICIENCY: ICD-10-CM

## 2024-10-02 DIAGNOSIS — E11.9 TYPE 2 DIABETES MELLITUS WITHOUT RETINOPATHY (HCC): ICD-10-CM

## 2024-10-02 DIAGNOSIS — E66.01 MORBID OBESITY WITH BMI OF 40.0-44.9, ADULT (HCC): ICD-10-CM

## 2024-10-02 DIAGNOSIS — I25.119 CORONARY ARTERY DISEASE INVOLVING NATIVE CORONARY ARTERY OF NATIVE HEART WITH ANGINA PECTORIS (HCC): ICD-10-CM

## 2024-10-02 DIAGNOSIS — E07.9 THYROID DISEASE: ICD-10-CM

## 2024-10-02 DIAGNOSIS — E11.65 HYPERGLYCEMIA DUE TO DIABETES MELLITUS (HCC): ICD-10-CM

## 2024-10-02 DIAGNOSIS — E11.65 TYPE 2 DIABETES MELLITUS WITH HYPERGLYCEMIA, WITH LONG-TERM CURRENT USE OF INSULIN (HCC): ICD-10-CM

## 2024-10-03 NOTE — TELEPHONE ENCOUNTER
Endocrine Refill protocol for oral and injectable diabetic medications    Protocol Criteria:  PASSED    If all below requirements are met, send a 90-day supply with 1 refill per provider protocol.    Verify appointment with Endocrinology completed in the last 6 months or scheduled in the next 3 months.  Verify A1C has been completed within the last 6 months and is below 8.5%     Last completed office visit: 1/26/2024 Ben Pérez MD   Next scheduled Follow up: 12/3/24  Last A1c result: Last A1c value was 8.3% done 3/30/2024.

## 2024-10-04 ENCOUNTER — PATIENT OUTREACH (OUTPATIENT)
Dept: CASE MANAGEMENT | Age: 74
End: 2024-10-04

## 2024-10-04 RX ORDER — SEMAGLUTIDE 0.68 MG/ML
0.5 INJECTION, SOLUTION SUBCUTANEOUS WEEKLY
Qty: 9 ML | Refills: 0 | Status: SHIPPED | OUTPATIENT
Start: 2024-10-04 | End: 2025-04-02

## 2024-10-04 NOTE — PROGRESS NOTES
Spoke to Caleb for Chronic Care Management.      Updates to patient care team/comments: UTD   Patient reported changes in medications: reports no change   Med Adherence  Comment: reports adherence sometimes      Health Maintenance:   Health Maintenance   Topic Date Due    Zoster Vaccines (1 of 2) Never done    Colorectal Cancer Screening  06/11/2019    Diabetes Care Foot Exam  02/19/2020    Tobacco Cessation Counseling  Never done    Diabetes Care: Microalb/Creat Ratio  01/12/2024    Annual Physical  01/12/2024    Diabetes Care Dilated Eye Exam  05/10/2024    Diabetes Care A1C  06/30/2024    COVID-19 Vaccine (4 - 2023-24 season) 09/01/2024    Influenza Vaccine (1) 10/01/2024    Diabetes Care: GFR  03/31/2025    PSA  05/10/2026    Annual Depression Screening  Completed    Fall Risk Screening (Annual)  Completed    Pneumococcal Vaccine: 65+ Years  Completed       Patient updates/concerns:   Spoke with patient.  Patient relates doing well.. Mood good/stable.  Denies any recent syncope episodes. Denies any recent falls. Continues to feel fatigued. Breathing stable. Diabetes managed by Endo. FBS unknown. Continues to not check them. Continues to take taking medications as prescribed. Avoiding sugars and carbs.Discussed increasing physical activity. Even if its stretching and walking around home. vision stable.  Blood pressure and pulse unknown not checking but denies symptoms..Denies any edema. Getting PT/INR Denies any urinary issues at this time. Aware needs to establish with a PCP. Referral line given. [phone]  scheduled and I will call patient to remind him of visit Monday. No other questions or concerns.     Encouraged patient:   Self care: Take the time to do the things you love.   Nutrition:  Good nutrition helps us to maintain our weight, fight off infection, and help reduce the risk of developing other chronic issues.   Physical activity:  Physical activity is important to help maintain independence and  improve quality of life.       Goals/Action Plan:    Active goal from previous outreach: Staying healthy, maintain independence, and stability.     Patient reported progress towards goals: progressing                - What: continues to follow up on health conditions            - Where/When/How: establishing with a PCP and seeing specialist   Patient Reported Barriers and Concerns: as per above                      - Plan for overcoming barriers: encouraged patient to call with any questions or concerns     Care Managers Interventions: Continue to provide encouragement and education for healthy coping and diagnosis.      Future Appointments:   Future Appointments   Date Time Provider Department Center   12/3/2024  2:30 PM Ben Pérez MD [id number]         Next Care Manager Follow Up Date: 1 month     Reason For Follow Up: review progress and or barriers towards patient's goals.     Time Spent This Encounter Total: 16 min medical record review, telephone communication, care plan updates where needed, education, goals, and action plan recreation/update. Provided acknowledgment and validation to patient's concerns.   Monthly Minute Total including today: 16 min   Physical assessment, complete health history, and need for CCM established by JULITO AG MD.

## 2024-10-07 ENCOUNTER — OFFICE VISIT (OUTPATIENT)
Dept: INTERNAL MEDICINE CLINIC | Facility: CLINIC | Age: 74
End: 2024-10-07

## 2024-10-07 VITALS
SYSTOLIC BLOOD PRESSURE: 108 MMHG | BODY MASS INDEX: 38.95 KG/M2 | WEIGHT: 263 LBS | OXYGEN SATURATION: 95 % | HEIGHT: 69 IN | HEART RATE: 78 BPM | DIASTOLIC BLOOD PRESSURE: 69 MMHG

## 2024-10-07 DIAGNOSIS — Z79.4 TYPE 2 DIABETES MELLITUS WITH OTHER CIRCULATORY COMPLICATION, WITH LONG-TERM CURRENT USE OF INSULIN (HCC): Primary | ICD-10-CM

## 2024-10-07 DIAGNOSIS — Z87.891 HISTORY OF SMOKING 30 OR MORE PACK YEARS: ICD-10-CM

## 2024-10-07 DIAGNOSIS — E66.01 CLASS 2 SEVERE OBESITY WITH SERIOUS COMORBIDITY AND BODY MASS INDEX (BMI) OF 38.0 TO 38.9 IN ADULT, UNSPECIFIED OBESITY TYPE (HCC): ICD-10-CM

## 2024-10-07 DIAGNOSIS — I50.22 CHRONIC HFREF (HEART FAILURE WITH REDUCED EJECTION FRACTION) (HCC): ICD-10-CM

## 2024-10-07 DIAGNOSIS — J42 CHRONIC BRONCHITIS, UNSPECIFIED CHRONIC BRONCHITIS TYPE (HCC): ICD-10-CM

## 2024-10-07 DIAGNOSIS — M48.062 LUMBAR STENOSIS WITH NEUROGENIC CLAUDICATION: ICD-10-CM

## 2024-10-07 DIAGNOSIS — U07.1 PULMONARY EMBOLISM ASSOCIATED WITH COVID-19 (HCC): ICD-10-CM

## 2024-10-07 DIAGNOSIS — G47.33 OSA (OBSTRUCTIVE SLEEP APNEA): ICD-10-CM

## 2024-10-07 DIAGNOSIS — I10 PRIMARY HYPERTENSION: ICD-10-CM

## 2024-10-07 DIAGNOSIS — E03.8 OTHER SPECIFIED HYPOTHYROIDISM: ICD-10-CM

## 2024-10-07 DIAGNOSIS — F33.0 MILD EPISODE OF RECURRENT MAJOR DEPRESSIVE DISORDER (HCC): ICD-10-CM

## 2024-10-07 DIAGNOSIS — E66.812 CLASS 2 SEVERE OBESITY WITH SERIOUS COMORBIDITY AND BODY MASS INDEX (BMI) OF 38.0 TO 38.9 IN ADULT, UNSPECIFIED OBESITY TYPE (HCC): ICD-10-CM

## 2024-10-07 DIAGNOSIS — E78.5 HYPERLIPIDEMIA LDL GOAL <70: ICD-10-CM

## 2024-10-07 DIAGNOSIS — E11.59 TYPE 2 DIABETES MELLITUS WITH OTHER CIRCULATORY COMPLICATION, WITH LONG-TERM CURRENT USE OF INSULIN (HCC): Primary | ICD-10-CM

## 2024-10-07 DIAGNOSIS — F17.200 CURRENT EVERY DAY SMOKER: ICD-10-CM

## 2024-10-07 DIAGNOSIS — I25.119 CORONARY ARTERY DISEASE INVOLVING NATIVE CORONARY ARTERY OF NATIVE HEART WITH ANGINA PECTORIS (HCC): ICD-10-CM

## 2024-10-07 DIAGNOSIS — I26.99 PULMONARY EMBOLISM ASSOCIATED WITH COVID-19 (HCC): ICD-10-CM

## 2024-10-07 RX ORDER — DULOXETIN HYDROCHLORIDE 30 MG/1
30 CAPSULE, DELAYED RELEASE ORAL DAILY
Qty: 90 CAPSULE | Refills: 3 | Status: SHIPPED | OUTPATIENT
Start: 2024-10-07

## 2024-10-07 RX ORDER — FLUTICASONE FUROATE, UMECLIDINIUM BROMIDE AND VILANTEROL TRIFENATATE 200; 62.5; 25 UG/1; UG/1; UG/1
1 POWDER RESPIRATORY (INHALATION) DAILY
Qty: 180 EACH | Refills: 3 | Status: SHIPPED | OUTPATIENT
Start: 2024-10-07

## 2024-10-07 RX ORDER — BLOOD SUGAR DIAGNOSTIC
1 STRIP MISCELLANEOUS 3 TIMES DAILY
COMMUNITY
Start: 2024-07-06

## 2024-10-07 RX ORDER — LATANOPROST 50 UG/ML
1 SOLUTION/ DROPS OPHTHALMIC NIGHTLY
COMMUNITY
Start: 2024-10-02

## 2024-10-07 RX ORDER — LANCETS
EACH MISCELLANEOUS 3 TIMES DAILY
COMMUNITY
Start: 2024-07-06

## 2024-10-07 NOTE — PATIENT INSTRUCTIONS
Please restart the duloxetine 30 mg once a day. This is for pain and for mood. This may also help you sleep.     Please restart the trelegy inhaler. This is for the COPD.     Please restart the torsemide 20 mg daily.     Please come back when you are fasting to have blood and urine tests done.     Please schedule pulmonary function testing. This is to see how your lungs are functioning.     Please see the hematologist. I have given you a referral. Please discuss ongoing anticoagulation with them.     Please see the eye doctor and foot doctor for eye and foot care.     Please make sure you keep your appointment with endocrinology.     Please come back to see me in 3 months.

## 2024-10-07 NOTE — PROGRESS NOTES
Caleb Rausch Jr. is a 74 year old male with complaints of:  Chief Complaint: Establish Care    HPI     Caleb Rausch Jr. is a(n) 74 year old male with a history of hypothyroidism, diabetes, CAD status post stents on dual antiplatelet therapy, heart failure with reduced ejection fraction, hyperlipidemia, COPD, hypertension, PE on anticoagulation, who presents to establish care.    Patient has a history of diabetes.  He is currently following with endocrinology, last seen 1/26/2024. He has an appointment on Dec 3, 2024.   A1C:   Lab Results   Component Value Date    A1C 8.3 (H) 03/30/2024    A1C 8.5 (H) 12/23/2023    A1C 9.0 (H) 09/22/2023   - Patient checks blood sugars every morning.   - Patient's fasting sugars have been 90s.  - Current regimen: Metformin 1000 mg twice daily, Ozempic 0.25 mg weekly, Jardiance 25 mg daily, Levemir 10 units daily.   - Patient reports compliance with medication and denies any adverse effects.   - The patient denies any symptoms of hypoglycemia, polyuria or polydipsia.   - He denies any recent weight change, or unusual fatigue.    - Last ophthalmology visit: Needs to see.  Has been dismissed from ophthalmologist practice due to no-shows.  Needs a new provider.  - Any diabetic compilations: Nephropathy, CAD, neuropathy  - Patient has been checking feet on a regular basis for infection and/or skin breakdown.   - Patient reports tingling of the hands or feet. He has been prescribed both gabapentin and pregabalin for this. Following w/ podiatry?  - ACE/ARB?  Yes on Entresto  - Statin?  Yes on atorvastatin 80 mg daily  Microalbumin/creatinine ratio:   Malb/Cre Calc   Date Value Ref Range Status   01/12/2023 241.5 (H) <=30.0 ug/mg Final     Comment:     <30 ug/mg creatinine       Normal     ug/mg creatinine   Microalbuminuria   >300 ug/mg creatinine      Albuminuria       07/01/2021 99.5 (H) <=30.0 ug/mg Final     Comment:     <30 ug/mg creatinine       Normal     ug/mg creatinine    Microalbuminuria   >300 ug/mg creatinine      Albuminuria       08/30/2018 160.8 (H) 0.0 - 20.0 Final     Comment:       Units for Ratio: ug Albumin/mg Creatinine   02/02/2018 50.9 (H) 0.0 - 20.0 Final     Comment:       Units for Ratio: ug Albumin/mg Creatinine       Heart failure with reduced ejection fraction.  Patient has a history of heart failure with reduced ejection fracture. Last echo was done through [facility] in July 2024.  Patient's ejection fraction 36%.  Regional wall motion analysis showed hypokinesis of anterior wall, septal wall, inferior wall, lateral wall.  Previous echo was done 12/23/2023.  At that time, EF was noted to be 35 to 40%, with possible hypokinesis of the inferolateral and inferior walls. His cardiologist is Dr. Anival Hammer. The etiology of the heart failure is considered to be CAD. Today, the patient appears relatively euvolemic. Patient denies chest pain, chest pressure, chest tightness shortness of breath, lower extremity swelling, paroxysmal nocturnal dyspnea, pillow orthopnea, palpitations, diaphoresis, headache, vision changes, lightheadedness, or dizziness. Was last admitted for CHF exacerbation in 12/2023.  GDMT is as follows:  - Beta-blocker: carvedilol 6.25 mg BID  - ACE/ARB/ARNI: entresto 24-26 mh BID  - SGLT2: Jardiance 25 mg daily  - Mineralocorticoid receptor antagonist: spironolactone 25 mg daily   - Diuretics: torsemide 20 mg daily. Has not been taking it.     CAD. Patient has a history of CAD and is currently on Plavix. He is on warfarin as well for history of PE.  Patient has an extensive history of coronary disease, especially in the LAD.  He has had diagonal bifurcation stents.  Initial angiogram was in April 2022 with laser atherectomy, shockwave lithotripsy of LAD, and angioplasty of the diagonal.  Repeat angiogram was in May 2023 with laser atherectomy, lithotripsy, Cutting Balloon remaining of the LAD.  PCI of the diagonal. He continues on  carvedilol as above. He is on atorvastatin 80 mg daily. He has been started on a GLP1 agonist, Ozempic, by his endocrinologist. Following with Dr. Anival Hammer.     Hypothyroidism. Patient continues on levothyroxine 137 mcg daily. No stigmata of hypo- or hyperthyroidism. No heat/cold intolerance, no weight changes, no diaphoresis, no palpitations, no skin or hair changes, no fatigue, no changes in bowel habits.     History of PE, on anticoagulation.  Patient was found to have a pulmonary embolism from severe COVID in January 2022.  He was initially started on Eliquis, and has since been changed to warfarin due to cost. He follows in the coumadin clinic.     Chronic bronchitis.  COPD.  Does not follow with pulmonology.  Not chronically on oxygen supplementation.  Currently on Trelegy.  Was admitted in 1/18/2020 for for COPD exacerbation.  The patient was also admitted in December 2023 for COPD and CHF exacerbation.     Patient has admitted multiple times for syncope and collapse.  Was recommended to undergo an event monitor upon discharge.  Unclear if this has been done.  He has been seen by neurology inpatient.  Head imaging has been reviewed, no high-grade stenosis was noted.  EEG was done at one of the admissions.  No seizures were noted.    Lumbar spinal stenosis.  Has been prescribed oxycodone in the past.  He has not been on duloxetine.  He has been prescribed both gabapentin and pregabalin, now he is only on pregabalin.  Following with orthopedics/spine?  Following with pain management?    Tobacco use disorder.  Continues to smoke 1 pack daily.    Recurrent major depressive disorder.  Has not been on duloxetine.    Hypertension.  Current regimen includes carvedilol, spironolactone, Entresto.    Hyperlipidemia.  Continues on atorvastatin 80 mg daily.    Obesity. On ozempic.     DIRK.  Has been recommended to undergo an outpatient sleep study.  Has not been done. Has been having insomnia.     Past Medical History      Past Medical History:    Anxiety disorder, unspecified    Anxiety state    Atherosclerosis of coronary artery    stents x 6    Chronic low back pain with bilateral sciatica, left worse than right    Chronic obstructive pulmonary disease, unspecified (HCC)    Coronary atherosclerosis    Depression    Diabetes (HCC)    borderline     Disorder of thyroid    Essential hypertension    Glaucoma    first visit with IRMA, patient was taking Timolol OU BID for 20 years, has not used gtts in over 1 year because he ran out of gtts and had no refills, fundus photos taken today    Heart attack (HCC)    High blood pressure    High cholesterol    Hyperlipidemia    Kidney stone    Left Sided Neck pain, acute    Lumbar stenosis with neurogenic claudication    Major depressive disorder, recurrent, unspecified (HCC)    Morbid obesity with BMI of 40.0-44.9, adult ([facility])    Neuropathy    Pneumonia due to COVID-19 virus    Thyroid disease        Past Surgical History     Past Surgical History:   Procedure Laterality Date    Cataract extraction extracapsular w/ intraocular lens implantation Right 2018    [facility] bare metal stent (bms)      Circumcision,othr  08/04/2020    Dr. Bonilla @ [facility]    Cysto/uretero w/lithotripsy Left 08/04/2020    Dr. 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     Iridotomy/iridectomy by laser      unknown Dr    Other      cardiac stents        Family History     Family History   Problem Relation Age of Onset    Heart Attack Father     Hypertension Brother     Glaucoma Neg     Macular degeneration Neg        Social History     Social History     Socioeconomic History    Marital status: Single   Tobacco Use    Smoking status: Every Day     Current packs/day: 1.00     Types: Cigarettes    Smokeless tobacco: Former    Tobacco comments:     per pt, quit in 1994   Vaping Use    Vaping status: Some Days   Substance and Sexual Activity    Alcohol use: No      Comment: quit in 1994    Drug use: No   Other Topics Concern    Caffeine Concern Yes    Exercise No   Social History Narrative    The patient uses the following assistive device(s):  single-point cane.      The patient does live in a home with stairs.     Social Determinants of Health     Financial Resource Strain: Low Risk  (4/2/2024)    Financial Resource Strain     Difficulty of Paying Living Expenses: Not very hard     Med Affordability: No   Food Insecurity: No Food Insecurity (3/30/2024)    Food Insecurity     Food Insecurity: Never true   Transportation Needs: No Transportation Needs (4/2/2024)    Transportation Needs     Lack of Transportation: No   Social Connections: Feeling Socially Integrated (2/16/2023)    [facility]    OASIS : Social Isolation     Frequency of experiencing loneliness or isolation: Never   Housing Stability: Low Risk  (3/30/2024)    Housing Stability     Housing Instability: No       Allergies     No Known Allergies    Current Medications     Current Outpatient Medications   Medication Sig Dispense Refill    semaglutide (OZEMPIC, 0.25 OR 0.5 MG/DOSE,) 2 MG/3ML Subcutaneous Solution Pen-injector Inject 0.5 mg into the skin once a week. 9 mL 0    insulin glargine (BASAGLAR KWIKPEN) 100 UNIT/ML Subcutaneous Solution Pen-injector Inject 10 Units into the skin daily. 9 mL 1    GABAPENTIN 300 MG Oral Cap TAKE ONE CAPSULE BY MOUTH THREE TIMES DAILY 90 capsule 0    Insulin Pen Needle (BD PEN NEEDLE RODERICK 2ND GEN) 32G X 4 MM Does not apply Misc Inject 1 each as directed daily. 90 each 3    clopidogrel 75 MG Oral Tab Take 1 tablet (75 mg total) by mouth daily. 90 tablet 3    JARDIANCE 25 MG Oral Tab TAKE ONE TABLET BY MOUTH ONE TIME DAILY 90 tablet 0    sacubitril-valsartan (ENTRESTO) 24-26 MG Oral Tab Take 1 tablet by mouth daily. 30 tablet 0    Potassium Chloride ER 20 MEQ Oral Tab CR Take 1 tablet by mouth daily. 90 tablet 0    GVOKE  HYPOPEN 2-PACK 1 MG/0.2ML Subcutaneous injection INJECT THE CONTENTS OF ONE DEVICE IN THE LOWER ABDOMEN, OUTER THIGH, OR OUTER UPPER ARM FOR SEVERELY LOW BLOOD SUGAR. IF NO RESPONSE, MAY REPEAT WITH A NEW DEVICE IN 15 MINUTES.      levothyroxine 137 MCG Oral Tab Take 137 mcg by mouth before breakfast.      atorvastatin 80 MG Oral Tab Take 1 tablet (80 mg total) by mouth daily. 90 tablet 1    docusate sodium 100 MG Oral Cap Take 1 capsule (100 mg total) by mouth 2 (two) times daily as needed for constipation. 30 capsule 0    docusate sodium (DULCOLAX STOOL SOFTENER) 100 MG Oral Cap Take 1 capsule (100 mg total) by mouth daily as needed for constipation. 30 capsule 0    Polyethylene Glycol 3350 (MIRALAX) 17 g Oral Powd Pack Take 17 g by mouth daily. 5 each 0    torsemide 20 MG Oral Tab Take 1 tablet (20 mg total) by mouth daily. 90 tablet 3    carvedilol 6.25 MG Oral Tab Take 1 tablet (6.25 mg total) by mouth 2 (two) times daily with meals. 180 tablet 3    spironolactone 25 MG Oral Tab Take 1 tablet (25 mg total) by mouth daily. 90 tablet 3    zolpidem 5 MG Oral Tab Take 1 tablet (5 mg total) by mouth nightly as needed for Sleep. (Patient not taking: Reported on 6/4/2024)      metFORMIN HCl 1000 MG Oral Tab Take 0.5 tablets (500 mg total) by mouth 2 (two) times daily with meals. (Patient taking differently: Take 1 tablet (1,000 mg total) by mouth 2 (two) times daily with meals.) 180 tablet 1    MOVANTIK 25 MG Oral Tab TAKE 1 TABLET BY MOUTH DAILY 1 -2 HOURS AFTER MEAL      fluticasone-umeclidin-vilant (TRELEGY ELLIPTA) 200-62.5-25 MCG/ACT Inhalation Aerosol Powder, Breath Activated Inhale 1 puff into the lungs daily. 180 each 3    albuterol (VENTOLIN HFA) 108 (90 Base) MCG/ACT Inhalation Aero Soln Inhale 2 puffs into the lungs every 4 (four) hours as needed for Wheezing. 54 g 0    bimatoprost (LUMIGAN) 0.01 % Ophthalmic Solution Place 1 drop into both eyes every evening.      Blood Glucose Monitoring Suppl Does not  apply Kit Please use to check blood sugar twice daily. 1 kit 0    Blood Glucose Monitoring Suppl Does not apply Misc Please use to check blood sugar twice daily 200 each 3    Blood Glucose Monitoring Suppl Does not apply Misc Please use to check blood sugar twice daily 200 each 0    oxyCODONE-acetaminophen  MG Oral Tab Take 1 tablet by mouth 4 (four) times daily as needed.      DULoxetine 30 MG Oral Cap DR Particles Take 1 capsule (30 mg total) by mouth daily.      warfarin 5 MG Oral Tab Take 2 tablets (10 mg total) by mouth nightly. 7.5mg on Sunday and Wednesday  10mg on all other days      pregabalin 300 MG Oral Cap Take 1 capsule (300 mg total) by mouth 2 (two) times daily. 30 capsule 0    aspirin 81 MG Oral Tab EC Take 1 tablet (81 mg total) by mouth daily. 30 tablet 2     No current facility-administered medications for this visit.       Review of Systems     GENERAL HEALTH: feels well otherwise  SKIN: denies any unusual skin lesions or rashes  RESPIRATORY: denies shortness of breath with exertion  CARDIOVASCULAR: denies chest pain on exertion  GI: denies abdominal pain and denies heartburn  : denies any burning with urination, urinary frequency or urgency  NEURO: denies headaches, numbness or tingling, mental status changes  PSYCH: denies depressed mood, anxiety  MUSC: denies muscle aches, joint pain    Physical Exam     /69 (BP Location: Right arm, Patient Position: Sitting, Cuff Size: large)   Pulse 78   Ht 5' 9\" (1.753 m)   Wt 263 lb (119.3 kg)   SpO2 95%   BMI 38.84 kg/m²     GENERAL: well developed, well nourished,in no apparent distress  SKIN: no rashes,no suspicious lesions  HEENT: atraumatic, normocephalic,ears and throat are clear  NECK: supple,no adenopathy,no bruits  LUNGS: Bibasilar crackles  CARDIO: RRR without murmur  GI: good BS's,no masses, HSM or tenderness  EXTREMITIES: no cyanosis, clubbing or edema    Assessment and Plan     Diagnoses and all orders for this  visit:    Type 2 diabetes mellitus with other circulatory complication, with long-term current use of insulin (HCC).  Historically uncontrolled.  Complications include neuropathy, nephropathy, and macrovascular disease (coronary artery disease).  Recheck A1c, CMP, cholesterol, urine microalbumin.  Continue medications as above.  Continue following with endocrinology.  New referrals to ophthalmology and podiatry given.  -     Ophthalmology Referral - In Network  -     Podiatry Referral - In Network  -     Comp Metabolic Panel (14) [E]; Future  -     Hemoglobin A1C [E]; Future  -     CHOLESTEROL, TOTAL [334] [Q]  -     Microalb/Creat Ratio, Random Urine; Future    Pulmonary embolism associated with COVID-19 (HCC).  Patient has had, to the best of my knowledge, unprovoked PE.  However, he has been on anticoagulation for over 2 years.  Referral to hematology has been given for consideration of stopping anticoagulation.  For now, continue warfarin.  Continue following with Coumadin clinic.  -     Oncology/Hematology Referral - In Network  -     Comp Metabolic Panel (14) [E]; Future  -     Hemoglobin A1C [E]; Future  -     CHOLESTEROL, TOTAL [334] [Q]  -     Microalb/Creat Ratio, Random Urine; Future    Chronic bronchitis, unspecified chronic bronchitis type (HCC).  Restart Trelegy.  Check PFTs.  Consider referral to pulmonology for management of COPD given multiple exacerbations in the past, and also for discussion of sleep apnea (see below).  -     fluticasone-umeclidin-vilant (TRELEGY ELLIPTA) 200-62.5-25 MCG/ACT Inhalation Aerosol Powder, Breath Activated; Inhale 1 puff into the lungs daily.  -     Complete PFT w/Bronchodilator/Albuterol 2.5; Future  -     Comp Metabolic Panel (14) [E]; Future  -     Hemoglobin A1C [E]; Future  -     CHOLESTEROL, TOTAL [334] [Q]  -     Microalb/Creat Ratio, Random Urine; Future    Hyperlipidemia LDL goal <70.  Continue atorvastatin.  -     Comp Metabolic Panel (14) [E]; Future  -      Hemoglobin A1C [E]; Future  -     CHOLESTEROL, TOTAL [334] [Q]  -     Microalb/Creat Ratio, Random Urine; Future    Primary hypertension. Well controlled today. Check labs to ensure stability of electrolytes and kidney function. Cont meds as per HPI.  -     Comp Metabolic Panel (14) [E]; Future  -     Hemoglobin A1C [E]; Future  -     CHOLESTEROL, TOTAL [334] [Q]  -     Microalb/Creat Ratio, Random Urine; Future    Chronic HFrEF (heart failure with reduced ejection fraction) ([facility]). Coronary artery disease involving native coronary artery of native heart with angina pectoris ([facility]).  Following with cardiology as above.  History and workup as above.  No angina or equivalent today.  Continue following with cardiology.  Goal-directed medical therapy per cardiology.  Patient has not been taking his diuretic.  He has bibasilar crackles on exam.  Therefore, the patient was recommended to restart diuretics.  Check kidney function and potassium.    Other specified hypothyroidism. Stable without clinical stigmata of hypo- or hyperthyroidism. Cont meds as per HPI. Follow up with TFTs and make adjustments to medication doses based on labs.   -     TSH W Reflex To Free T4 [E]; Future    Lumbar stenosis with neurogenic claudication.  Patient is not on gabapentin.  For now, he is on Lyrica.  Restart duloxetine 30 mg daily.  This can also help with his mood.  Follow with pain management.  Patient is also asking about a lumbar steroid injection.  Discussed that we would like to avoid this given the fact that the patient is on anticoagulation also with history of diabetes.  For now, would like to manage with medications.  -     DULoxetine 30 MG Oral Cap DR Particles; Take 1 capsule (30 mg total) by mouth daily.    Current every day smoker. History of smoking 30 or more pack years.  Discussed smoking cessation.  The patient knows that he needs to quit as this is making his health actively worse.  Can consider adding on bupropion to  help with cravings in the future.  Furthermore, also needs lung cancer screening.  His last CT chest was done in June 2023, so would be due for 2024.  However, we will defer this discussion to next physical.  He may also benefit from screening for abdominal aortic aneurysm.  Will also discuss this at next physical.    Mild episode of recurrent major depressive disorder (HCC).  Restart duloxetine..  Consider adding bupropion as above.  -     DULoxetine 30 MG Oral Cap DR Particles; Take 1 capsule (30 mg total) by mouth daily.    Class 2 severe obesity with serious comorbidity and body mass index (BMI) of 38.0 to 38.9 in adult, unspecified obesity type (HCC).  Continue Ozempic.    DIRK (obstructive sleep apnea).  Patient states that he has insomnia.  He is unable to sleep more than 3 to 4 hours a night.  Therefore, he is unconvinced about the efficacy of sleep apnea testing.  However, the patient would benefit from testing for sleep apnea in my opinion.  Can consider referring to pulmonology for further discussion of this.          latanoprost 0.005 % Ophthalmic Solution Place 1 drop into both eyes nightly.      Accu-Chek Softclix Lancets Does not apply Misc 3 (three) times daily.      Glucose Blood (ACCU-CHEK GUIDE) In Vitro Strip 1 strip by In Vitro route 3 (three) times daily.      fluticasone-umeclidin-vilant (TRELEGY ELLIPTA) 200-62.5-25 MCG/ACT Inhalation Aerosol Powder, Breath Activated Inhale 1 puff into the lungs daily. 180 each 3    DULoxetine 30 MG Oral Cap DR Particles Take 1 capsule (30 mg total) by mouth daily. 90 capsule 3    semaglutide (OZEMPIC, 0.25 OR 0.5 MG/DOSE,) 2 MG/3ML Subcutaneous Solution Pen-injector Inject 0.5 mg into the skin once a week. 9 mL 0    insulin glargine (BASAGLAR KWIKPEN) 100 UNIT/ML Subcutaneous Solution Pen-injector Inject 10 Units into the skin daily. 9 mL 1    clopidogrel 75 MG Oral Tab Take 1 tablet (75 mg total) by mouth daily. 90 tablet 3    JARDIANCE 25 MG Oral Tab TAKE  ONE TABLET BY MOUTH ONE TIME DAILY 90 tablet 0    Potassium Chloride ER 20 MEQ Oral Tab CR Take 1 tablet by mouth daily. 90 tablet 0    levothyroxine 137 MCG Oral Tab Take 137 mcg by mouth before breakfast.      atorvastatin 80 MG Oral Tab Take 1 tablet (80 mg total) by mouth daily. 90 tablet 1    torsemide 20 MG Oral Tab Take 1 tablet (20 mg total) by mouth daily. 90 tablet 3    carvedilol 6.25 MG Oral Tab Take 1 tablet (6.25 mg total) by mouth 2 (two) times daily with meals. 180 tablet 3    metFORMIN HCl 1000 MG Oral Tab Take 0.5 tablets (500 mg total) by mouth 2 (two) times daily with meals. (Patient taking differently: Take 1 tablet (1,000 mg total) by mouth 2 (two) times daily with meals.) 180 tablet 1    bimatoprost (LUMIGAN) 0.01 % Ophthalmic Solution Place 1 drop into both eyes every evening.      warfarin 5 MG Oral Tab Take 2 tablets (10 mg total) by mouth nightly. 7.5mg on Sunday and Wednesday  10mg on all other days      pregabalin 300 MG Oral Cap Take 1 capsule (300 mg total) by mouth 2 (two) times daily. 30 capsule 0       Requested Prescriptions     Signed Prescriptions Disp Refills    fluticasone-umeclidin-vilant (TRELEGY ELLIPTA) 200-62.5-25 MCG/ACT Inhalation Aerosol Powder, Breath Activated 180 each 3     Sig: Inhale 1 puff into the lungs daily.    DULoxetine 30 MG Oral Cap DR Particles 90 capsule 3     Sig: Take 1 capsule (30 mg total) by mouth daily.       Orders Placed This Encounter   Procedures    Comp Metabolic Panel (14) [E]     Standing Status:   Future     Standing Expiration Date:   10/7/2025     Order Specific Question:   Release to patient     Answer:   Immediate    Hemoglobin A1C [E]     Standing Status:   Future     Standing Expiration Date:   10/7/2025     Order Specific Question:   Release to patient     Answer:   Immediate    CHOLESTEROL, TOTAL [334] [Q]     Order Specific Question:   Release to patient     Answer:   Immediate    Microalb/Creat Ratio, Random Urine     Standing  Status:   Future     Standing Expiration Date:   10/7/2025     Order Specific Question:   Release to patient     Answer:   Immediate    TSH W Reflex To Free T4 [E]     Standing Status:   Future     Standing Expiration Date:   10/7/2025     Order Specific Question:   Release to patient     Answer:   Immediate       Return in about 3 months (around 1/7/2025).    The patient indicates understanding of these issues and agrees to the plan.    Electronically signed by Charlotte Rey MD 10/07/24

## 2024-10-09 ENCOUNTER — TELEPHONE (OUTPATIENT)
Dept: HEMATOLOGY/ONCOLOGY | Facility: HOSPITAL | Age: 74
End: 2024-10-09

## 2024-10-09 NOTE — TELEPHONE ENCOUNTER
I TRIED TO REACH PT MAILBOX FULL NEED TO [facility]. SPOKE WITH DTR PEACHES ASKED HER TO RELAY MSG TO PT. LESIA

## 2024-10-16 ENCOUNTER — TELEPHONE (OUTPATIENT)
Dept: HEMATOLOGY/ONCOLOGY | Facility: HOSPITAL | Age: 74
End: 2024-10-16

## 2024-10-16 NOTE — TELEPHONE ENCOUNTER
Writer called patient's phone first, in which phone rang 5x's then disconnected.    Writer called patient's daughter, in which she connected writer to patient via three way call. Appointment for 10/18 rescheduled to 10/21 with Dr. Ibarra. Date, time and location as well as spelling of physicians name verified with patient.

## 2024-10-17 ENCOUNTER — LAB ENCOUNTER (OUTPATIENT)
Dept: LAB | Age: 74
End: 2024-10-17
Attending: INTERNAL MEDICINE
Payer: MEDICARE

## 2024-10-17 DIAGNOSIS — Z79.4 TYPE 2 DIABETES MELLITUS WITH OTHER CIRCULATORY COMPLICATION, WITH LONG-TERM CURRENT USE OF INSULIN (HCC): ICD-10-CM

## 2024-10-17 DIAGNOSIS — U07.1 PULMONARY EMBOLISM ASSOCIATED WITH COVID-19 (HCC): ICD-10-CM

## 2024-10-17 DIAGNOSIS — I26.99 PULMONARY EMBOLISM ASSOCIATED WITH COVID-19 (HCC): ICD-10-CM

## 2024-10-17 DIAGNOSIS — I10 PRIMARY HYPERTENSION: ICD-10-CM

## 2024-10-17 DIAGNOSIS — E11.59 TYPE 2 DIABETES MELLITUS WITH OTHER CIRCULATORY COMPLICATION, WITH LONG-TERM CURRENT USE OF INSULIN (HCC): ICD-10-CM

## 2024-10-17 DIAGNOSIS — E03.8 OTHER SPECIFIED HYPOTHYROIDISM: ICD-10-CM

## 2024-10-17 DIAGNOSIS — J42 CHRONIC BRONCHITIS, UNSPECIFIED CHRONIC BRONCHITIS TYPE (HCC): ICD-10-CM

## 2024-10-17 DIAGNOSIS — E78.5 HYPERLIPIDEMIA LDL GOAL <70: ICD-10-CM

## 2024-10-17 LAB
ALBUMIN SERPL-MCNC: 4.5 G/DL (ref 3.2–4.8)
ALBUMIN/GLOB SERPL: 1.6 {RATIO} (ref 1–2)
ALP LIVER SERPL-CCNC: 77 U/L
ALT SERPL-CCNC: 12 U/L
ANION GAP SERPL CALC-SCNC: 7 MMOL/L (ref 0–18)
AST SERPL-CCNC: 13 U/L (ref ?–34)
BILIRUB SERPL-MCNC: 0.3 MG/DL (ref 0.2–1.1)
BUN BLD-MCNC: 14 MG/DL (ref 9–23)
BUN/CREAT SERPL: 14.9 (ref 10–20)
CALCIUM BLD-MCNC: 9.4 MG/DL (ref 8.7–10.4)
CHLORIDE SERPL-SCNC: 112 MMOL/L (ref 98–112)
CHOLEST SERPL-MCNC: 122 MG/DL (ref ?–200)
CO2 SERPL-SCNC: 22 MMOL/L (ref 21–32)
CREAT BLD-MCNC: 0.94 MG/DL
CREAT UR-SCNC: 112.6 MG/DL
EGFRCR SERPLBLD CKD-EPI 2021: 86 ML/MIN/1.73M2 (ref 60–?)
EST. AVERAGE GLUCOSE BLD GHB EST-MCNC: 143 MG/DL (ref 68–126)
FASTING STATUS PATIENT QL REPORTED: YES
GLOBULIN PLAS-MCNC: 2.9 G/DL (ref 2–3.5)
GLUCOSE BLD-MCNC: 112 MG/DL (ref 70–99)
HBA1C MFR BLD: 6.6 % (ref ?–5.7)
MICROALBUMIN UR-MCNC: 18.8 MG/DL
MICROALBUMIN/CREAT 24H UR-RTO: 167 UG/MG (ref ?–30)
OSMOLALITY SERPL CALC.SUM OF ELEC: 293 MOSM/KG (ref 275–295)
POTASSIUM SERPL-SCNC: 3.9 MMOL/L (ref 3.5–5.1)
PROT SERPL-MCNC: 7.4 G/DL (ref 5.7–8.2)
SODIUM SERPL-SCNC: 141 MMOL/L (ref 136–145)
T4 FREE SERPL-MCNC: 1.1 NG/DL (ref 0.8–1.7)
TSI SER-ACNC: 5.2 MIU/ML (ref 0.55–4.78)

## 2024-10-17 PROCEDURE — 36415 COLL VENOUS BLD VENIPUNCTURE: CPT | Performed by: INTERNAL MEDICINE

## 2024-10-17 PROCEDURE — 80053 COMPREHEN METABOLIC PANEL: CPT

## 2024-10-17 PROCEDURE — 82570 ASSAY OF URINE CREATININE: CPT

## 2024-10-17 PROCEDURE — 84443 ASSAY THYROID STIM HORMONE: CPT

## 2024-10-17 PROCEDURE — 82465 ASSAY BLD/SERUM CHOLESTEROL: CPT | Performed by: INTERNAL MEDICINE

## 2024-10-17 PROCEDURE — 84439 ASSAY OF FREE THYROXINE: CPT

## 2024-10-17 PROCEDURE — 82043 UR ALBUMIN QUANTITATIVE: CPT

## 2024-10-17 PROCEDURE — 83036 HEMOGLOBIN GLYCOSYLATED A1C: CPT

## 2024-10-18 ENCOUNTER — APPOINTMENT (OUTPATIENT)
Dept: HEMATOLOGY/ONCOLOGY | Facility: HOSPITAL | Age: 74
End: 2024-10-18
Attending: INTERNAL MEDICINE
Payer: MEDICARE

## 2024-10-18 RX ORDER — LEVOTHYROXINE SODIUM 137 UG/1
137 TABLET ORAL
Qty: 90 TABLET | Refills: 3 | Status: ON HOLD | OUTPATIENT
Start: 2024-10-18

## 2024-10-18 NOTE — TELEPHONE ENCOUNTER
Per office visit with Dr. Rey 10/7/2024:  Hypothyroidism. Patient continues on levothyroxine 137 mcg daily. No stigmata of hypo- or hyperthyroidism

## 2024-10-18 NOTE — TELEPHONE ENCOUNTER
Please review; protocol failed    Future Appointments   Date Time Provider Department Center   1/7/2025  1:00 PM Charlotte Rey MD [facility]     Last office visit: 10/7/2024 (Established Care)    Requested Prescriptions   Pending Prescriptions Disp Refills    LEVOTHYROXINE 137 MCG Oral Tab [Pharmacy Med Name: Levothyroxine Sodium 137 Mcg Tab Lupi] 90 tablet 0     Sig: Take 1 tablet (137 mcg) by mouth before breakfast.       Thyroid Medication Protocol Failed - 10/18/2024 11:30 AM        Failed - Last TSH value is normal     Lab Results   Component Value Date    TSH 5.202 (H) 10/17/2024                 Passed - TSH in past 12 months        Passed - In person appointment or virtual visit in the past 12 mos or appointment in next 3 mos     Recent Outpatient Visits              1 week ago Type 2 diabetes mellitus with other circulatory complication, with long-term current use of insulin ([facility])    [facility] Charlotte Rey MD    Office Visit    5 months ago Constipation, unspecified constipation type    [facility] Mushtaq Argueta MD    Office Visit    8 months ago Hypothyroidism, unspecified type    [facility] Ben Pérez MD    Office Visit    9 months ago Acute on chronic HFrEF (heart failure with reduced ejection fraction) ([facility])    [facility] Eva Gaines NP    Office Visit    1 year ago DIRK (obstructive sleep apnea)    [facility] Nilda Argueta MD    Office Visit          Future Appointments         Provider Department Appt Notes    In 3 days Sarmad Ibarra MD [facility] Hematology Oncology Pulmonary embolism associated with COVID-19    In 1 month Ben Pérez MD [facility]     In 2 months Charlotte Rey MD [facility]  [facility] 3 mth follow up    In 2 months Kimberly Fuentes DPM [facility] Est care  DM                         Future Appointments         Provider Department Appt Notes    In 3 days Sarmad Ibarra MD [facility] Hematology Oncology Pulmonary embolism associated with COVID-19    In 1 month Ben Pérez MD [facility]     In 2 months Charlotte Rey MD [facility] 3 mth follow up    In 2 months Kimberly Fuentes DPM [facility]  DM          Recent Outpatient Visits              1 week ago Type 2 diabetes mellitus with other circulatory complication, with long-term current use of insulin ([facility])    [facility] Charlotte Rey MD    Office Visit    5 months ago Constipation, unspecified constipation type    [facility] Mushtaq Argueta MD    Office Visit    8 months ago Hypothyroidism, unspecified type    [facility] Ben Pérez MD    Office Visit    9 months ago Acute on chronic HFrEF (heart failure with reduced ejection fraction) ([facility])    [facility] Eva Gaines NP    Office Visit    1 year ago DIRK (obstructive sleep apnea)    [facility] Nilda Argueta MD    Office Visit

## 2024-10-21 ENCOUNTER — HOSPITAL ENCOUNTER (INPATIENT)
Facility: HOSPITAL | Age: 74
LOS: 3 days | Discharge: HOME | End: 2024-10-24
Attending: EMERGENCY MEDICINE | Admitting: INTERNAL MEDICINE
Payer: MEDICARE

## 2024-10-21 ENCOUNTER — APPOINTMENT (OUTPATIENT)
Dept: GENERAL RADIOLOGY | Facility: HOSPITAL | Age: 74
End: 2024-10-21
Payer: MEDICARE

## 2024-10-21 ENCOUNTER — APPOINTMENT (OUTPATIENT)
Dept: HEMATOLOGY/ONCOLOGY | Facility: HOSPITAL | Age: 74
End: 2024-10-21
Attending: INTERNAL MEDICINE
Payer: MEDICARE

## 2024-10-21 DIAGNOSIS — J44.1 COPD EXACERBATION (HCC): Primary | ICD-10-CM

## 2024-10-21 LAB
ALBUMIN SERPL-MCNC: 4.4 G/DL (ref 3.2–4.8)
ALP LIVER SERPL-CCNC: 86 U/L
ALT SERPL-CCNC: 12 U/L
ANION GAP SERPL CALC-SCNC: 4 MMOL/L (ref 0–18)
APTT PPP: 48.6 SECONDS (ref 23–36)
AST SERPL-CCNC: 9 U/L (ref ?–34)
BASOPHILS # BLD AUTO: 0.1 X10(3) UL (ref 0–0.2)
BASOPHILS NFR BLD AUTO: 0.7 %
BILIRUB DIRECT SERPL-MCNC: 0.1 MG/DL (ref ?–0.3)
BILIRUB SERPL-MCNC: 0.4 MG/DL (ref 0.2–1.1)
BUN BLD-MCNC: 15 MG/DL (ref 9–23)
BUN/CREAT SERPL: 19.2 (ref 10–20)
CALCIUM BLD-MCNC: 9.5 MG/DL (ref 8.7–10.4)
CHLORIDE SERPL-SCNC: 113 MMOL/L (ref 98–112)
CO2 SERPL-SCNC: 25 MMOL/L (ref 21–32)
CREAT BLD-MCNC: 0.78 MG/DL
DEPRECATED RDW RBC AUTO: 55.2 FL (ref 35.1–46.3)
EGFRCR SERPLBLD CKD-EPI 2021: 95 ML/MIN/1.73M2 (ref 60–?)
EOSINOPHIL # BLD AUTO: 0.62 X10(3) UL (ref 0–0.7)
EOSINOPHIL NFR BLD AUTO: 4.3 %
ERYTHROCYTE [DISTWIDTH] IN BLOOD BY AUTOMATED COUNT: 20.8 % (ref 11–15)
FLUAV + FLUBV RNA SPEC NAA+PROBE: NEGATIVE
FLUAV + FLUBV RNA SPEC NAA+PROBE: NEGATIVE
GLUCOSE BLD-MCNC: 129 MG/DL (ref 70–99)
GLUCOSE BLDC GLUCOMTR-MCNC: 131 MG/DL (ref 70–99)
GLUCOSE BLDC GLUCOMTR-MCNC: 146 MG/DL (ref 70–99)
GLUCOSE BLDC GLUCOMTR-MCNC: 245 MG/DL (ref 70–99)
HCT VFR BLD AUTO: 42.2 %
HGB BLD-MCNC: 12 G/DL
IMM GRANULOCYTES # BLD AUTO: 0.09 X10(3) UL (ref 0–1)
IMM GRANULOCYTES NFR BLD: 0.6 %
INR BLD: 2 (ref 0.8–1.2)
LYMPHOCYTES # BLD AUTO: 1.29 X10(3) UL (ref 1–4)
LYMPHOCYTES NFR BLD AUTO: 8.9 %
MCH RBC QN AUTO: 21.4 PG (ref 26–34)
MCHC RBC AUTO-ENTMCNC: 28.4 G/DL (ref 31–37)
MCV RBC AUTO: 75.1 FL
MONOCYTES # BLD AUTO: 1.09 X10(3) UL (ref 0.1–1)
MONOCYTES NFR BLD AUTO: 7.5 %
NEUTROPHILS # BLD AUTO: 11.35 X10 (3) UL (ref 1.5–7.7)
NEUTROPHILS # BLD AUTO: 11.35 X10(3) UL (ref 1.5–7.7)
NEUTROPHILS NFR BLD AUTO: 78 %
NT-PROBNP SERPL-MCNC: 2123 PG/ML (ref ?–125)
OSMOLALITY SERPL CALC.SUM OF ELEC: 297 MOSM/KG (ref 275–295)
PLATELET # BLD AUTO: 347 10(3)UL (ref 150–450)
POTASSIUM SERPL-SCNC: 3.8 MMOL/L (ref 3.5–5.1)
PROT SERPL-MCNC: 7.2 G/DL (ref 5.7–8.2)
PROTHROMBIN TIME: 23.9 SECONDS (ref 11.6–14.8)
RBC # BLD AUTO: 5.62 X10(6)UL
RSV RNA SPEC NAA+PROBE: NEGATIVE
SARS-COV-2 RNA RESP QL NAA+PROBE: NOT DETECTED
SODIUM SERPL-SCNC: 142 MMOL/L (ref 136–145)
TROPONIN I SERPL HS-MCNC: 29 NG/L
WBC # BLD AUTO: 14.5 X10(3) UL (ref 4–11)

## 2024-10-21 PROCEDURE — 94799 UNLISTED PULMONARY SVC/PX: CPT

## 2024-10-21 PROCEDURE — 94644 CONT INHLJ TX 1ST HOUR: CPT

## 2024-10-21 PROCEDURE — 82962 GLUCOSE BLOOD TEST: CPT

## 2024-10-21 PROCEDURE — 96374 THER/PROPH/DIAG INJ IV PUSH: CPT

## 2024-10-21 PROCEDURE — 85610 PROTHROMBIN TIME: CPT | Performed by: EMERGENCY MEDICINE

## 2024-10-21 PROCEDURE — 99285 EMERGENCY DEPT VISIT HI MDM: CPT

## 2024-10-21 PROCEDURE — 83880 ASSAY OF NATRIURETIC PEPTIDE: CPT | Performed by: EMERGENCY MEDICINE

## 2024-10-21 PROCEDURE — 80048 BASIC METABOLIC PNL TOTAL CA: CPT | Performed by: EMERGENCY MEDICINE

## 2024-10-21 PROCEDURE — 85025 COMPLETE CBC W/AUTO DIFF WBC: CPT | Performed by: EMERGENCY MEDICINE

## 2024-10-21 PROCEDURE — 85730 THROMBOPLASTIN TIME PARTIAL: CPT | Performed by: EMERGENCY MEDICINE

## 2024-10-21 PROCEDURE — 94640 AIRWAY INHALATION TREATMENT: CPT

## 2024-10-21 PROCEDURE — 93005 ELECTROCARDIOGRAM TRACING: CPT

## 2024-10-21 PROCEDURE — 71045 X-RAY EXAM CHEST 1 VIEW: CPT | Performed by: EMERGENCY MEDICINE

## 2024-10-21 PROCEDURE — 93010 ELECTROCARDIOGRAM REPORT: CPT

## 2024-10-21 PROCEDURE — 94002 VENT MGMT INPAT INIT DAY: CPT

## 2024-10-21 PROCEDURE — 80076 HEPATIC FUNCTION PANEL: CPT | Performed by: EMERGENCY MEDICINE

## 2024-10-21 PROCEDURE — 0241U SARS-COV-2/FLU A AND B/RSV BY PCR (GENEXPERT): CPT | Performed by: EMERGENCY MEDICINE

## 2024-10-21 PROCEDURE — 84484 ASSAY OF TROPONIN QUANT: CPT | Performed by: EMERGENCY MEDICINE

## 2024-10-21 RX ORDER — POLYETHYLENE GLYCOL 3350 17 G/17G
17 POWDER, FOR SOLUTION ORAL DAILY PRN
Status: DISCONTINUED | OUTPATIENT
Start: 2024-10-21 | End: 2024-10-24

## 2024-10-21 RX ORDER — NICOTINE POLACRILEX 4 MG
30 LOZENGE BUCCAL
Status: DISCONTINUED | OUTPATIENT
Start: 2024-10-21 | End: 2024-10-24

## 2024-10-21 RX ORDER — IPRATROPIUM BROMIDE AND ALBUTEROL SULFATE 2.5; .5 MG/3ML; MG/3ML
3 SOLUTION RESPIRATORY (INHALATION)
Status: DISCONTINUED | OUTPATIENT
Start: 2024-10-21 | End: 2024-10-23

## 2024-10-21 RX ORDER — BISACODYL 10 MG
10 SUPPOSITORY, RECTAL RECTAL
Status: DISCONTINUED | OUTPATIENT
Start: 2024-10-21 | End: 2024-10-24

## 2024-10-21 RX ORDER — NICOTINE 21 MG/24HR
1 PATCH, TRANSDERMAL 24 HOURS TRANSDERMAL DAILY
Status: DISCONTINUED | OUTPATIENT
Start: 2024-10-21 | End: 2024-10-24

## 2024-10-21 RX ORDER — DULOXETIN HYDROCHLORIDE 30 MG/1
30 CAPSULE, DELAYED RELEASE ORAL DAILY
Status: DISCONTINUED | OUTPATIENT
Start: 2024-10-22 | End: 2024-10-24

## 2024-10-21 RX ORDER — LATANOPROST 50 UG/ML
1 SOLUTION/ DROPS OPHTHALMIC NIGHTLY
Status: DISCONTINUED | OUTPATIENT
Start: 2024-10-21 | End: 2024-10-24

## 2024-10-21 RX ORDER — SODIUM PHOSPHATE, DIBASIC AND SODIUM PHOSPHATE, MONOBASIC 7; 19 G/230ML; G/230ML
1 ENEMA RECTAL ONCE AS NEEDED
Status: DISCONTINUED | OUTPATIENT
Start: 2024-10-21 | End: 2024-10-24

## 2024-10-21 RX ORDER — CLOPIDOGREL BISULFATE 75 MG/1
75 TABLET ORAL DAILY
Status: DISCONTINUED | OUTPATIENT
Start: 2024-10-21 | End: 2024-10-24

## 2024-10-21 RX ORDER — METOCLOPRAMIDE HYDROCHLORIDE 5 MG/ML
10 INJECTION INTRAMUSCULAR; INTRAVENOUS EVERY 8 HOURS PRN
Status: DISCONTINUED | OUTPATIENT
Start: 2024-10-21 | End: 2024-10-24

## 2024-10-21 RX ORDER — POLYETHYLENE GLYCOL 3350 17 G/17G
17 POWDER, FOR SOLUTION ORAL DAILY
Status: DISCONTINUED | OUTPATIENT
Start: 2024-10-21 | End: 2024-10-21

## 2024-10-21 RX ORDER — PREDNISONE 20 MG/1
40 TABLET ORAL
Status: DISCONTINUED | OUTPATIENT
Start: 2024-10-22 | End: 2024-10-24

## 2024-10-21 RX ORDER — ACETAMINOPHEN 500 MG
500 TABLET ORAL EVERY 4 HOURS PRN
Status: DISCONTINUED | OUTPATIENT
Start: 2024-10-21 | End: 2024-10-24

## 2024-10-21 RX ORDER — ASPIRIN 81 MG/1
81 TABLET ORAL DAILY
Status: DISCONTINUED | OUTPATIENT
Start: 2024-10-22 | End: 2024-10-24

## 2024-10-21 RX ORDER — METHYLPREDNISOLONE SODIUM SUCCINATE 125 MG/2ML
125 INJECTION, POWDER, LYOPHILIZED, FOR SOLUTION INTRAMUSCULAR; INTRAVENOUS ONCE
Status: COMPLETED | OUTPATIENT
Start: 2024-10-21 | End: 2024-10-21

## 2024-10-21 RX ORDER — CARVEDILOL 6.25 MG/1
6.25 TABLET ORAL 2 TIMES DAILY WITH MEALS
Status: DISCONTINUED | OUTPATIENT
Start: 2024-10-21 | End: 2024-10-24

## 2024-10-21 RX ORDER — GUAIFENESIN 600 MG/1
600 TABLET, EXTENDED RELEASE ORAL 2 TIMES DAILY
Status: DISCONTINUED | OUTPATIENT
Start: 2024-10-21 | End: 2024-10-24

## 2024-10-21 RX ORDER — FLUTICASONE PROPIONATE AND SALMETEROL 500; 50 UG/1; UG/1
1 POWDER RESPIRATORY (INHALATION) 2 TIMES DAILY
Status: DISCONTINUED | OUTPATIENT
Start: 2024-10-21 | End: 2024-10-24

## 2024-10-21 RX ORDER — GABAPENTIN 300 MG/1
300 CAPSULE ORAL 3 TIMES DAILY
Status: DISCONTINUED | OUTPATIENT
Start: 2024-10-21 | End: 2024-10-21

## 2024-10-21 RX ORDER — NICOTINE POLACRILEX 4 MG
15 LOZENGE BUCCAL
Status: DISCONTINUED | OUTPATIENT
Start: 2024-10-21 | End: 2024-10-24

## 2024-10-21 RX ORDER — PREGABALIN 75 MG/1
300 CAPSULE ORAL 2 TIMES DAILY
Status: DISCONTINUED | OUTPATIENT
Start: 2024-10-21 | End: 2024-10-24

## 2024-10-21 RX ORDER — OXYCODONE AND ACETAMINOPHEN 10; 325 MG/1; MG/1
1 TABLET ORAL 4 TIMES DAILY PRN
Status: DISCONTINUED | OUTPATIENT
Start: 2024-10-21 | End: 2024-10-24

## 2024-10-21 RX ORDER — SENNOSIDES 8.6 MG
17.2 TABLET ORAL NIGHTLY PRN
Status: DISCONTINUED | OUTPATIENT
Start: 2024-10-21 | End: 2024-10-24

## 2024-10-21 RX ORDER — WARFARIN SODIUM 5 MG/1
10 TABLET ORAL
Status: DISCONTINUED | OUTPATIENT
Start: 2024-10-21 | End: 2024-10-24

## 2024-10-21 RX ORDER — ALBUTEROL SULFATE 0.83 MG/ML
SOLUTION RESPIRATORY (INHALATION)
Status: COMPLETED
Start: 2024-10-21 | End: 2024-10-21

## 2024-10-21 RX ORDER — ALBUTEROL SULFATE 0.83 MG/ML
10 SOLUTION RESPIRATORY (INHALATION) ONCE
Status: COMPLETED | OUTPATIENT
Start: 2024-10-21 | End: 2024-10-21

## 2024-10-21 RX ORDER — MELATONIN
3 NIGHTLY PRN
Status: DISCONTINUED | OUTPATIENT
Start: 2024-10-21 | End: 2024-10-24

## 2024-10-21 RX ORDER — INSULIN DEGLUDEC 100 U/ML
15 INJECTION, SOLUTION SUBCUTANEOUS NIGHTLY
Status: DISCONTINUED | OUTPATIENT
Start: 2024-10-21 | End: 2024-10-24

## 2024-10-21 RX ORDER — ATORVASTATIN CALCIUM 80 MG/1
80 TABLET, FILM COATED ORAL DAILY
Status: DISCONTINUED | OUTPATIENT
Start: 2024-10-22 | End: 2024-10-24

## 2024-10-21 RX ORDER — ONDANSETRON 2 MG/ML
4 INJECTION INTRAMUSCULAR; INTRAVENOUS EVERY 6 HOURS PRN
Status: DISCONTINUED | OUTPATIENT
Start: 2024-10-21 | End: 2024-10-24

## 2024-10-21 RX ORDER — FUROSEMIDE 10 MG/ML
40 INJECTION INTRAMUSCULAR; INTRAVENOUS
Status: DISCONTINUED | OUTPATIENT
Start: 2024-10-21 | End: 2024-10-23

## 2024-10-21 RX ORDER — DEXTROSE MONOHYDRATE 25 G/50ML
50 INJECTION, SOLUTION INTRAVENOUS
Status: DISCONTINUED | OUTPATIENT
Start: 2024-10-21 | End: 2024-10-24

## 2024-10-21 RX ORDER — SPIRONOLACTONE 25 MG/1
25 TABLET ORAL DAILY
Status: DISCONTINUED | OUTPATIENT
Start: 2024-10-21 | End: 2024-10-21

## 2024-10-21 NOTE — PLAN OF CARE
Patient admitted to the unit today. Poor historian of medication, RN called pharmacy and reviewed medications that family had brought in with him. Family also unable to tell RN specific med list as patient managed medications at home. Patient on 7L of oxygen. Pain medication given. All questions answered.     Problem: Patient Centered Care  Goal: Patient preferences are identified and integrated in the patient's plan of care  Description: Interventions:  - Provide timely, complete, and accurate information to patient/family  - Incorporate patient and family knowledge, values, beliefs, and cultural backgrounds into the planning and delivery of care  - Encourage patient/family to participate in care and decision-making at the level they choose  - Honor patient and family perspectives and choices  Outcome: Progressing     Problem: Diabetes/Glucose Control  Goal: Glucose maintained within prescribed range  Description: INTERVENTIONS:  - Monitor Blood Glucose as ordered  - Assess for signs and symptoms of hyperglycemia and hypoglycemia  - Administer ordered medications to maintain glucose within target range  - Assess barriers to adequate nutritional intake and initiate nutrition consult as needed  - Instruct patient on self management of diabetes  Outcome: Progressing     Problem: PAIN - ADULT  Goal: Verbalizes/displays adequate comfort level or patient's stated pain goal  Description: INTERVENTIONS:  - Encourage pt to monitor pain and request assistance  - Assess pain using appropriate pain scale  - Administer analgesics based on type and severity of pain and evaluate response  - Implement non-pharmacological measures as appropriate and evaluate response  - Consider cultural and social influences on pain and pain management  - Manage/alleviate anxiety  - Utilize distraction and/or relaxation techniques  - Monitor for opioid side effects  - Notify MD/LIP if interventions unsuccessful or patient reports new pain  -  Anticipate increased pain with activity and pre-medicate as appropriate  Outcome: Progressing     Problem: SAFETY ADULT - FALL  Goal: Free from fall injury  Description: INTERVENTIONS:  - Assess pt frequently for physical needs  - Identify cognitive and physical deficits and behaviors that affect risk of falls.  - [place] fall precautions as indicated by assessment.  - Educate pt/family on patient safety including physical limitations  - Instruct pt to call for assistance with activity based on assessment  - Modify environment to reduce risk of injury  - Provide assistive devices as appropriate  - Consider OT/PT consult to assist with strengthening/mobility  - Encourage toileting schedule  Outcome: Progressing     Problem: DISCHARGE PLANNING  Goal: Discharge to home or other facility with appropriate resources  Description: INTERVENTIONS:  - Identify barriers to discharge w/pt and caregiver  - Include patient/family/discharge partner in discharge planning  - Arrange for needed discharge resources and transportation as appropriate  - Identify discharge learning needs (meds, wound care, etc)  - Arrange for interpreters to assist at discharge as needed  - Consider post-discharge preferences of patient/family/discharge partner  - Complete POLST form as appropriate  - Assess patient's ability to be responsible for managing their own health  - Refer to Case Management Department for coordinating discharge planning if the patient needs post-hospital services based on physician/LIP order or complex needs related to functional status, cognitive ability or social support system  Outcome: Progressing     Problem: RESPIRATORY - ADULT  Goal: Achieves optimal ventilation and oxygenation  Description: INTERVENTIONS:  - Assess for changes in respiratory status  - Assess for changes in mentation and behavior  - Position to facilitate oxygenation and minimize respiratory effort  - Oxygen supplementation based on oxygen saturation  or ABGs  - Provide Smoking Cessation handout, if applicable  - Encourage broncho-pulmonary hygiene including cough, deep breathe, Incentive Spirometry  - Assess the need for suctioning and perform as needed  - Assess and instruct to report SOB or any respiratory difficulty  - Respiratory Therapy support as indicated  - Manage/alleviate anxiety  - Monitor for signs/symptoms of CO2 retention  Outcome: Progressing

## 2024-10-21 NOTE — ED INITIAL ASSESSMENT (HPI)
Patient aox3 to ed via [facility] x 2 days with cough denies fever denies chest pain. According to ems patient was in moderate resp distress, arrives to ed with cpap and duoneb

## 2024-10-21 NOTE — H&P
[facility] Hospitalist H&P       CC:   Chief Complaint   Patient presents with    Difficulty Breathing               PCP: Charlotte Rey MD    History of Present Illness: Patient is a 74 year old male with PMH HFrEF, ANNIKA, Chronic back pains, MDD, DM2, Obesity, Tob abuse, COPD, glaucoma , HLD, HTN, Neuropathy who presents with approximately 2 days of shortness of breath.  Upon further evaluation he reports weeks of dyspnea on exertion as well as sleeping on a couch and lower extremity edema.  Yesterday while sitting and watching TV he noted acute onset shortness of breath this got worse with activity he states he has been intermittently taking his diuretics sometimes does not take it if he is leaving the house but has been compliant with the rest of his medications.  He lives with his grandson who assists him at times but he is mostly independent.  He denies cough or sputum production but does note chronic wheezes which are acutely worse worsening lower extremity edema.   Department patient was given Solu-Medrol 125 mg x 1 and extensive treatment of nebulizer therapy for his breathing was briefly started on BiPAP and then he had removed ultimately weaned to 6 L of oxygen and being admitted to the hospital for consideration for COPD exacerbation versus CHF exacerbation.       PMH  Past Medical History:    Anxiety disorder, unspecified    Anxiety state    Atherosclerosis of coronary artery    stents x 6    Chronic low back pain with bilateral sciatica, left worse than right    Chronic obstructive pulmonary disease, unspecified (HCC)    Coronary atherosclerosis    Depression    Diabetes (HCC)    borderline     Disorder of thyroid    Essential hypertension    Glaucoma    first visit with IRMA, patient was taking Timolol OU BID for 20 years, has not used gtts in over 1 year because he ran out of gtts and had no refills, fundus photos taken today    Heart attack (HCC)    High blood pressure    High cholesterol     Hyperlipidemia    Kidney stone    Left Sided Neck pain, acute    Lumbar stenosis with neurogenic claudication    Major depressive disorder, recurrent, unspecified (HCC)    Morbid obesity with BMI of 40.0-44.9, adult (HCC)    Neuropathy    Pneumonia due to COVID-19 virus    Thyroid disease        PSH  Past Surgical History:   Procedure Laterality Date    Cataract extraction extracapsular w/ intraocular lens implantation Right 2018    [place]    Cath bare metal stent (bms)      Circumcision,othr  08/04/2020    Dr. Bonilla @ [facility]    Cysto/uretero w/lithotripsy Left 08/04/2020    Dr. Bonilla @ [facility] -- duplex left collecting system with both moieties joining in proximal ureter.     Iridotomy/iridectomy by laser      unknown Dr    Other      cardiac stents        ALL:  Allergies[1]     Home Medications:  Medications Taking[2]      Soc Hx  Social History     Tobacco Use    Smoking status: Every Day     Current packs/day: 1.00     Types: Cigarettes    Smokeless tobacco: Former    Tobacco comments:     per pt, quit in 1994   Substance Use Topics    Alcohol use: No     Comment: quit in 1994        Fam Hx  Family History   Problem Relation Age of Onset    Heart Attack Father     Hypertension Brother     Glaucoma Neg     Macular degeneration Neg        Review of Systems  Comprehensive ROS reviewed and negative except for what's stated above.     OBJECTIVE:  BP (!) 167/95 (BP Location: Right arm)   Pulse 90   Temp 97.8 °F (36.6 °C) (Oral)   Resp 20   Wt 257 lb 14.4 oz (117 kg)   SpO2 96%   BMI 38.09 kg/m²   General:  Alert, no distress   Head:  Normocephalic   Eyes:  Sclera anicteric   Nose: Nares normal. Septum midline   Throat: Lips, mucosa, and tongue normal   Neck: Supple, symmetrical.   Lungs:   bilateral bilateral coarse breath sounds diffuse wheezes diffuse crackles   Chest wall:  No tenderness or deformity.   Heart:  Regular rate and rhythm, S1, S2 normal   Abdomen:   Soft, non-tender. Bowel sounds normal    Extremities: 1+ B/L LE edema    Skin: Skin color, texture   Neurologic: Normal strength     Diagnostic Data:    CBC/Chem  Recent Labs   Lab 10/21/24  1207   WBC 14.5*   HGB 12.0*   MCV 75.1*   .0   INR 2.00*       Recent Labs   Lab 10/17/24  1153 10/21/24  1207    142   K 3.9 3.8    113*   CO2 22.0 25.0   BUN 14 15   CREATSERUM 0.94 0.78   * 129*   CA 9.4 9.5       Recent Labs   Lab 10/17/24  1153 10/21/24  1207   ALT 12 12   AST 13 9   ALB 4.5 4.4       No results for input(s): \"TROP\" in the last 168 hours.    Additional Diagnostics: ECG: RBBB, LAFB     CXR: image personally reviewed Pulm edema noted     Radiology: XR CHEST AP PORTABLE  (CPT=71045)    Result Date: 10/21/2024  CONCLUSION:   Interstitial and alveolar pulmonary edema.  Superimposed infection is a differential in the appropriate clinical setting.  Trace bilateral pleural effusions.      Dictated by (CST): Brenda Garcia MD on 10/21/2024 at 12:44 PM     Finalized by (CST): Brenda Garcia MD on 10/21/2024 at 12:47 PM             Echo:        LEFT VENTRICLE:  The cavity size is dilated. Wall thickness is mildly   increased. Systolic function is reduced. Global hypokinesis with severe   hypokinesis of the inferior wall.    The ejection fraction as measured by 3D   assessment is 45%. The ejection fraction is 40%. Doppler parameters are   consistent with abnormal left ventricular relaxation and increased filling   pressure (grade 2 diastolic dysfunction).     AORTIC VALVE:  Not well visualized. The annulus is normal. Unable to determine   morphology. The leaflets are normal thickness. Cusp separation is normal.   There is no stenosis.   There is no regurgitation. The peak systolic gradient   is 4mm Hg. The ratio of LVOT to aortic valve peak velocity is 0.85. The valve   area is 4.2cm^2. The valve area index is 1.61cm^2/m^2.     AORTA:   Aortic root: The root is normal-sized.     MITRAL VALVE:  The annulus is mildly calcified.  The leaflets are normal   thickness. Leaflet separation is normal.  There is no evidence for stenosis.   There is mild to moderate regurgitation. The peak diastolic gradient is 4mm   Hg. The valve area by pressure half-time is 4.3cm^2. The valve area index by   pressure half-time is 1.66cm^2/m^2.     LEFT ATRIUM:  The atrium is normal in size.     RIGHT VENTRICLE:  The cavity size is normal. Systolic function is normal.     PULMONIC VALVE:   Not well visualized. Velocity is within the normal range.   There is no evidence for stenosis. There is no significant regurgitation.     TRICUSPID VALVE:  The valve is structurally normal. Leaflet separation is   normal.  There is no evidence for stenosis.   There is trivial regurgitation.     PULMONARY ARTERIES:   Systolic pressure is indeterminate due to the absence of tricuspid   regurgitation.     RIGHT ATRIUM:  The atrium is normal in size.     PERICARDIUM:   There is no pericardial effusion.     SYSTEMIC VEINS:   Inferior vena cava: The IVC is normal-sized.     ASSESSMENT / PLAN:   Patient is a 74 year old male with PMH HFrEF, ANNIKA, Chronic back pains, MDD, DM2, Obesity, Tob abuse, COPD, glaucoma , HLD, HTN, Neuropathy who presents with approximately 2 days of shortness of breath. Seems consistent with COPD vs CHF exacerbation     Shortness of breath appears most consistent with acute on chronic decompensated systolic heart failure with an acute COPD exacerbation  -While patient does have diffuse expiratory wheezes and is a heavy smoker he does not report any changes in the color or consistency of his sputum  -Solu-Medrol 125 in the ED as well as DuoNeb will continue DuoNebs every 4 hours and prednisone 40 daily  -Given elevation in BNP chest x-ray with pulmonary edema crackles on exam as well as dyspnea on exertion orthopnea and lower extremity edema I am concerned for an acute exacerbation of chronic systolic heart failure will give 40 mg IV Lasix twice daily states he  is intermittently compliant with torsemide at home  Was given BiPAP in the emergency department weaned to nasal cannula need further BiPAP hold off for now  -Check iron studies as patient does have mild chronically low MCV  -Continue home inhalers    2.  DM2 diabetes with hyperglycemia and neuropathy  -Increase home glargine to 15 units a day give a deck tonight due to steroid effect I suspect hyperglycemia may be an issue while inpatient  -Low-dose correction factor  Hold home oral medications including metformin Jardiance  -Hold home Ozempic    3.  Chronic heart failure with reduced ejection fraction, CAD, HTN, HLD   Continue carvedilol continue spironolactone continue aspirin continue Lipitor continue Entresto, Plavix and Lipitor  -Consider cardiology consultation    4.  Hypothyroidism continue home Synthroid    5.  Generalized anxiety disorder major depressive disorder continue home duloxetine    6.  Low back pain continue Lyrica gabapentin and oxycodone as needed    7.  Secondary hypercoagulable state  PE seems related to COVID per patient  Continue home Coumadin daily INR  Given this was a reducible affect could consider stopping anticoagulation    8.  Hypothyroidism continue home Synthroid    9. Tob abuse  Smoking cessation counseling  Spent 3 minutes in discussion reviewing the deleterious health effects of ongoing tobacco abuse  -Patient reports he is smoking 1 pack of cigarettes per day we reviewed that chronic tobacco abuse leads to chronic lung disease worsening risk factors for cardiovascular and neurovascular disease and likely is the main contributing factor to his respiratory symptoms he seems to understand this does not overtly express a desire to quit smoking we offered nicotine replacement therapy at this time he has agreed a nicotine patch has been ordered      FN:  - IVF:None  - Diet:cardiac Diet     Full code confirmed with patient and daughter at bedside on admission    DVT Prophy:Home  coumadin   Lines:PIV     Dispo: pending clinical course    Outpatient records or previous hospital records reviewed.     Further recommendations pending patient's clinical course.  [facility] hospitalist to continue to follow patient while in house    Patient and/or patient's family given opportunity to ask questions and note understanding and agreeing with therapeutic plan as outlined    Thank You,  Bill Hendrickson DO    Hospitalist with [facility] Service number: [phone]    Patient not taking entreso hold for now, can also hold gabapentin          [1] No Known Allergies  [2]   No outpatient medications have been marked as taking for the 10/21/24 encounter (Hospital Encounter).

## 2024-10-21 NOTE — ED PROVIDER NOTES
Patient Seen in: [facility] Emergency Department      History     Chief Complaint   Patient presents with    Difficulty Breathing            Stated Complaint:     Subjective:   HPI    74 yoM with PE on warfarin, CAD with multiple stents, COPD, hypertension, CHF, presents for evaluation of difficulty breathing.  Symptoms started 2 days ago.  He is also coughing more than he usually does.  Denies fever or hemoptysis.  Denies chest pain.      Objective:     Past Medical History:    Anxiety disorder, unspecified    Anxiety state    Atherosclerosis of coronary artery    stents x 6    Chronic low back pain with bilateral sciatica, left worse than right    Chronic obstructive pulmonary disease, unspecified (HCC)    Coronary atherosclerosis    Depression    Diabetes (HCC)    borderline     Disorder of thyroid    Essential hypertension    Glaucoma    first visit with IRMA, patient was taking Timolol OU BID for 20 years, has not used gtts in over 1 year because he ran out of gtts and had no refills, fundus photos taken today    Heart attack (HCC)    High blood pressure    High cholesterol    Hyperlipidemia    Kidney stone    Left Sided Neck pain, acute    Lumbar stenosis with neurogenic claudication    Major depressive disorder, recurrent, unspecified (HCC)    Morbid obesity with BMI of 40.0-44.9, adult (HCC)    Neuropathy    Pneumonia due to COVID-19 virus    Thyroid disease              No pertinent past surgical history.              No pertinent social history.                Physical Exam     ED Triage Vitals   BP 10/21/24 1202 149/87   Pulse 10/21/24 1200 93   Resp 10/21/24 1200 25   Temp 10/21/24 1200 96.8 °F (36 °C)   Temp src 10/21/24 1200 Temporal   SpO2 10/21/24 1200 98 %   O2 Device 10/21/24 1200 CPAP       Current Vitals:   Vital Signs  BP: 149/87  Pulse: 93  Resp: 25  Temp: 96.8 °F (36 °C)  Temp src: Temporal    Oxygen Therapy  SpO2: 98 %  O2 Device: Bi-PAP  Mode: Spontaneous/Timed        Physical  Exam  Vitals and nursing note reviewed.   Constitutional:       Appearance: He is well-developed.   HENT:      Head: Normocephalic and atraumatic.   Eyes:      Extraocular Movements: Extraocular movements intact.   Cardiovascular:      Rate and Rhythm: Normal rate and regular rhythm.      Heart sounds: Normal heart sounds.   Pulmonary:      Comments: Patient is tachypneic with bilateral end expiratory wheezing with prolonged expiratory phase in all lung fields  Abdominal:      General: There is no distension.      Palpations: Abdomen is soft.      Tenderness: There is no abdominal tenderness.   Musculoskeletal:         General: Normal range of motion.      Cervical back: Normal range of motion.   Skin:     General: Skin is warm.      Capillary Refill: Capillary refill takes less than 2 seconds.   Neurological:      Mental Status: He is alert.      Comments: No focal deficits       Differential diagnosis includes but is not limited to COPD exacerbation, pneumonia, CHF exacerbation, PE,ACS/MI    ED Course     Labs Reviewed   BASIC METABOLIC PANEL (8) - Abnormal; Notable for the following components:       Result Value    Glucose 129 (*)     Chloride 113 (*)     Calculated Osmolality 297 (*)     All other components within normal limits   CBC WITH DIFFERENTIAL WITH PLATELET - Abnormal; Notable for the following components:    WBC 14.5 (*)     HGB 12.0 (*)     MCV 75.1 (*)     MCH 21.4 (*)     MCHC 28.4 (*)     RDW-SD 55.2 (*)     RDW 20.8 (*)     Neutrophil Absolute Prelim 11.35 (*)     Neutrophil Absolute 11.35 (*)     Monocyte Absolute 1.09 (*)     All other components within normal limits   PROTHROMBIN TIME (PT) - Abnormal; Notable for the following components:    PT 23.9 (*)     INR 2.00 (*)     All other components within normal limits   PTT, ACTIVATED - Abnormal; Notable for the following components:    PTT 48.6 (*)     All other components within normal limits   PRO BETA NATRIURETIC PEPTIDE - Abnormal; Notable  for the following components:    Pro-Beta Natriuretic Peptide 2,123 (*)     All other components within normal limits   HEPATIC FUNCTION PANEL (7) - Normal   TROPONIN I HIGH SENSITIVITY - Normal   SARS-COV-2/FLU A AND B/RSV BY PCR (GENEXPERT) - Normal    Narrative:     This test is intended for the qualitative detection and differentiation of SARS-CoV-2, influenza A, influenza B, and respiratory syncytial virus (RSV) viral RNA in nasopharyngeal or nares swabs from individuals suspected of respiratory viral infection consistent with COVID-19 by their healthcare provider. Signs and symptoms of respiratory viral infection due to SARS-CoV-2, influenza, and RSV can be similar.    Test performed using the Xpert Xpress SARS-CoV-2/FLU/RSV (real time RT-PCR)  assay on the [employer] instrument, [employer], [place] [address].   This test is being used under the Food and Drug Administration's Emergency Use Authorization.    The authorized Fact Sheet for Healthcare Providers for this assay is available upon request from the laboratory.   RAINBOW DRAW LAVENDER   RAINBOW DRAW LIGHT GREEN   RAINBOW DRAW BLUE     EKG    Rate, intervals and axes as noted on EKG Report.  Rate: 79  Rhythm: Sinus Rhythm  Reading: No STEMI, bifascicular block present, no ectopy         XR CHEST AP PORTABLE  (CPT=71045)    Result Date: 10/21/2024  CONCLUSION:   Interstitial and alveolar pulmonary edema.  Superimposed infection is a differential in the appropriate clinical setting.  Trace bilateral pleural effusions.      Dictated by (CST): Brenda Garcia MD on 10/21/2024 at 12:44 PM     Finalized by (CST): Brenda Garcia MD on 10/21/2024 at 12:47 PM                       MDM        I spent a total of 40 minutes of critical care time in obtaining history, performing a physical exam, bedside monitoring of interventions, collecting and interpreting tests and discussion with consultants but not including time spent performing procedures.      Admission  disposition: 10/21/2024  1:05 PM           Medical Decision Making  On arrival to the ED, patient was transitioned to BiPAP with inline hour-long nebulizer treatment.  After the breathing treatment, patient self removed his BiPAP due to intolerance and was transitioned to 6 L high flow nasal cannula oxygen.  There is no ongoing respiratory distress but he does have persistent bilateral end expiratory wheezing and diminished aeration.  Suspect COPD exacerbation and Solu-Medrol was initiated.  Per my independent interpretation of chest x-ray, there is some pulmonary edema, pneumonia felt to be less likely.  BNP is elevated to 2123.  LFTs normal, INR is therapeutic, CBC does show some leukocytosis.  Sepsis considered but felt to be less likely.    He will be admitted to the hospital for further management.  Discussed with hospitalist Dr. Hendrickson for admission.      Amount and/or Complexity of Data Reviewed  Independent Historian: EMS     Details: Medics report that patient was in respiratory distress, was not hypoxic, and CPAP was initiated en route  External Data Reviewed: radiology.     Details: Reviewed echo report from 12/23/2023 which showed EF of 35 to 40%  Labs: ordered. Decision-making details documented in ED Course.  Radiology: ordered and independent interpretation performed. Decision-making details documented in ED Course.  ECG/medicine tests: ordered and independent interpretation performed. Decision-making details documented in ED Course.    Risk  Decision regarding hospitalization.        Disposition and Plan     Clinical Impression:  1. COPD exacerbation (HCC)         Disposition:  Admit  10/21/2024  1:05 pm    Follow-up:  No follow-up provider specified.  We recommend that you schedule follow up care with a primary care provider within the next three months to obtain basic health screening including reassessment of your blood pressure.      Medications Prescribed:  Current Discharge Medication List               Supplementary Documentation:         Hospital Problems       Present on Admission  Date Reviewed: 10/7/2024            ICD-10-CM Noted POA    * (Principal) COPD exacerbation (HCC) J44.1 12/23/2023 Unknown

## 2024-10-21 NOTE — ED QUICK NOTES
Orders for admission, patient is aware of plan and ready to go upstairs. Any questions, please call ED RN STEVEN  at extension [phone].   Chief Complaint   Patient presents with    Difficulty Breathing              Patient AO x 3  Ambulation: WITH WALKER NORMALLY  Belongings: ACCOMPANYING PATIENT  Medications: SEE MAR   IV: 20G R HAND  Language: ENGLISH  COVID-19 suspicion level/status: NEGATIVE  CIWA SCORE: NA  NIH: NA  Other pertinent information:  NORMALLY ON RA CURRENTLY ON6L NC

## 2024-10-22 ENCOUNTER — APPOINTMENT (OUTPATIENT)
Dept: CV DIAGNOSTICS | Facility: HOSPITAL | Age: 74
End: 2024-10-22
Attending: INTERNAL MEDICINE
Payer: MEDICARE

## 2024-10-22 ENCOUNTER — TELEPHONE (OUTPATIENT)
Dept: ENDOCRINOLOGY CLINIC | Facility: CLINIC | Age: 74
End: 2024-10-22

## 2024-10-22 LAB
ANION GAP SERPL CALC-SCNC: 5 MMOL/L (ref 0–18)
ATRIAL RATE: 79 BPM
BUN BLD-MCNC: 25 MG/DL (ref 9–23)
BUN/CREAT SERPL: 25.8 (ref 10–20)
CALCIUM BLD-MCNC: 9.4 MG/DL (ref 8.7–10.4)
CHLORIDE SERPL-SCNC: 106 MMOL/L (ref 98–112)
CO2 SERPL-SCNC: 28 MMOL/L (ref 21–32)
CREAT BLD-MCNC: 0.97 MG/DL
DEPRECATED HBV CORE AB SER IA-ACNC: 23 NG/ML
DEPRECATED RDW RBC AUTO: 52.6 FL (ref 35.1–46.3)
EGFRCR SERPLBLD CKD-EPI 2021: 83 ML/MIN/1.73M2 (ref 60–?)
ERYTHROCYTE [DISTWIDTH] IN BLOOD BY AUTOMATED COUNT: 20.5 % (ref 11–15)
GLUCOSE BLD-MCNC: 195 MG/DL (ref 70–99)
GLUCOSE BLDC GLUCOMTR-MCNC: 137 MG/DL (ref 70–99)
GLUCOSE BLDC GLUCOMTR-MCNC: 199 MG/DL (ref 70–99)
GLUCOSE BLDC GLUCOMTR-MCNC: 209 MG/DL (ref 70–99)
GLUCOSE BLDC GLUCOMTR-MCNC: 252 MG/DL (ref 70–99)
HCT VFR BLD AUTO: 39.5 %
HGB BLD-MCNC: 11.9 G/DL
INR BLD: 2.36 (ref 0.8–1.2)
IRON SATN MFR SERPL: 3 %
IRON SERPL-MCNC: 14 UG/DL
MCH RBC QN AUTO: 22.1 PG (ref 26–34)
MCHC RBC AUTO-ENTMCNC: 30.1 G/DL (ref 31–37)
MCV RBC AUTO: 73.3 FL
OSMOLALITY SERPL CALC.SUM OF ELEC: 298 MOSM/KG (ref 275–295)
P AXIS: 66 DEGREES
P-R INTERVAL: 186 MS
PLATELET # BLD AUTO: 401 10(3)UL (ref 150–450)
POTASSIUM SERPL-SCNC: 3.6 MMOL/L (ref 3.5–5.1)
PROTHROMBIN TIME: 27.3 SECONDS (ref 11.6–14.8)
Q-T INTERVAL: 450 MS
QRS DURATION: 158 MS
QTC CALCULATION (BEZET): 516 MS
R AXIS: -65 DEGREES
RBC # BLD AUTO: 5.39 X10(6)UL
SODIUM SERPL-SCNC: 139 MMOL/L (ref 136–145)
T AXIS: 81 DEGREES
TIBC SERPL-MCNC: 426 UG/DL (ref 250–425)
TRANSFERRIN SERPL-MCNC: 286 MG/DL (ref 215–365)
TROPONIN I SERPL HS-MCNC: 21 NG/L
VENTRICULAR RATE: 79 BPM
WBC # BLD AUTO: 17.1 X10(3) UL (ref 4–11)

## 2024-10-22 PROCEDURE — 82962 GLUCOSE BLOOD TEST: CPT

## 2024-10-22 PROCEDURE — 97162 PT EVAL MOD COMPLEX 30 MIN: CPT

## 2024-10-22 PROCEDURE — 80048 BASIC METABOLIC PNL TOTAL CA: CPT | Performed by: INTERNAL MEDICINE

## 2024-10-22 PROCEDURE — 97165 OT EVAL LOW COMPLEX 30 MIN: CPT

## 2024-10-22 PROCEDURE — 84466 ASSAY OF TRANSFERRIN: CPT | Performed by: INTERNAL MEDICINE

## 2024-10-22 PROCEDURE — 82728 ASSAY OF FERRITIN: CPT | Performed by: INTERNAL MEDICINE

## 2024-10-22 PROCEDURE — 93306 TTE W/DOPPLER COMPLETE: CPT | Performed by: INTERNAL MEDICINE

## 2024-10-22 PROCEDURE — 94640 AIRWAY INHALATION TREATMENT: CPT

## 2024-10-22 PROCEDURE — 85610 PROTHROMBIN TIME: CPT | Performed by: INTERNAL MEDICINE

## 2024-10-22 PROCEDURE — 97116 GAIT TRAINING THERAPY: CPT

## 2024-10-22 PROCEDURE — 83540 ASSAY OF IRON: CPT | Performed by: INTERNAL MEDICINE

## 2024-10-22 PROCEDURE — 97535 SELF CARE MNGMENT TRAINING: CPT

## 2024-10-22 PROCEDURE — 85027 COMPLETE CBC AUTOMATED: CPT | Performed by: INTERNAL MEDICINE

## 2024-10-22 PROCEDURE — 84484 ASSAY OF TROPONIN QUANT: CPT | Performed by: INTERNAL MEDICINE

## 2024-10-22 NOTE — OCCUPATIONAL THERAPY NOTE
OCCUPATIONAL THERAPY EVALUATION - INPATIENT     Room Number: 504/504-A  Evaluation Date: 10/22/2024  Type of Evaluation: Initial       Physician Order: IP Consult to Occupational Therapy  Reason for Therapy: ADL/IADL Dysfunction and Discharge Planning    OCCUPATIONAL THERAPY ASSESSMENT   RN cleared pt for participation in OT session, which was completed in collaboration with PT. Upon arrival, pt was seated in bedside chair  and agreeable to activity. No visitors present during session. Pt was left in chair. Call light and all needs left in reach. Handoff given to RN.    Patient is a 74 year old male admitted 10/21/2024 for COPD exacerbation; no O2 at home; on 2L.  Prior to admission, pt was independent.  Patient is currently at baseline; able to don/doff B socks and shoes; son/grandson assist with IADLs PRN. No further OT skilled services recommended. O2 WFL on 2L; balance good with RW    ACTIVITY TOLERANCE  Pulse: 67        BP: 124/62      O2 95% on 2L during activity       Education provided  Educated pt about role of OT and hospital therapy process as well as proper safety techniques including proper hand placement, body mechanics, safety techniques use of RW and O2. Pt verbalized/demonstrated proper carryover.    DC OT      OCCUPATIONAL THERAPY MEDICAL/SOCIAL HISTORY     Problem List  Principal Problem:    COPD exacerbation (HCC)      Past Medical History  Past Medical History:    Anxiety disorder, unspecified    Anxiety state    Atherosclerosis of coronary artery    stents x 6    Chronic low back pain with bilateral sciatica, left worse than right    Chronic obstructive pulmonary disease, unspecified (HCC)    Coronary atherosclerosis    Depression    Diabetes (HCC)    borderline     Disorder of thyroid    Essential hypertension    Glaucoma    first visit with IRMA, patient was taking Timolol OU BID for 20 years, has not used gtts in over 1 year because he ran out of gtts and had no refills, fundus photos taken  today    Heart attack (HCC)    High blood pressure    High cholesterol    Hyperlipidemia    Kidney stone    Left Sided Neck pain, acute    Lumbar stenosis with neurogenic claudication    Major depressive disorder, recurrent, unspecified (HCC)    Morbid obesity with BMI of 40.0-44.9, adult (HCC)    Neuropathy    Pneumonia due to COVID-19 virus    Thyroid disease       Past Surgical History  Past Surgical History:   Procedure Laterality Date    Cataract extraction extracapsular w/ intraocular lens implantation Right 2018    [place]    Cath bare metal stent (bms)      Circumcision,othr  08/04/2020    Dr. Bonilla @ [facility]    Cysto/uretero w/lithotripsy Left 08/04/2020    Dr. 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     Iridotomy/iridectomy by laser      unknown Dr    Other      cardiac stents       HOME SITUATION  Type of Home: Apartment  Home Layout: One level  Lives With: Family                         Patient Regularly Uses: Rolling walker    SUBJECTIVE  \"They didn't send me home with oxygen last time.\"    OCCUPATIONAL THERAPY EXAMINATION      OBJECTIVE  Precautions: Bed/chair alarm  Fall Risk: High fall risk    PAIN ASSESSMENT  Rating: Unable to rate  Location: L toes and back       RANGE OF MOTION   Upper extremity ROM is within functional limits     STRENGTH ASSESSMENT  Upper extremity strength is within functional limits     COORDINATION  Gross Motor: WFL   Fine Motor: WFL     ACTIVITIES OF DAILY LIVING ASSESSMENT  AM-PAC ‘6-Clicks’ Inpatient Daily Activity Short Form  How much help from another person does the patient currently need…  -   Putting on and taking off regular lower body clothing?: None  -   Bathing (including washing, rinsing, drying)?: None  -   Toileting, which includes using toilet, bedpan or urinal? : None  -   Putting on and taking off regular upper body clothing?: None  -   Taking care of personal grooming such as brushing teeth?: None  -    Eating meals?: None    AM-PAC Score:  Score: 24  Approx Degree of Impairment: 0%  Standardized Score (AM-PAC Scale): 57.54  CMS Modifier (G-Code): CH      FUNCTIONAL TRANSFER ASSESSMENT  ind    FUNCTIONAL ADL ASSESSMENT  ind    The patient's Approx Degree of Impairment: 0% has been calculated based on documentation in the [facility] '6 clicks' Inpatient Daily Activity Short Form.  Research supports that patients with this level of impairment may benefit from home. Final disposition will be made by interdisciplinary medical team.         Patient Evaluation Complexity Level:   Occupational Profile/Medical History LOW - Brief history including review of medical or therapy records    Specific performance deficits impacting engagement in ADL/IADL LOW  1 - 3 performance deficits    Client Assessment/Performance Deficits LOW - No comorbidities nor modifications of tasks    Clinical Decision Making LOW - Analysis of occupational profile, problem-focused assessments, limited treatment options    Overall Complexity LOW     OT Session Time: 20 minutes  Self-Care Home Management: 10 minutes           Mari Morales OT  [facility]  Inpatient Rehabilitation  Occupational Therapy  [phone]

## 2024-10-22 NOTE — CM/SW NOTE
10/22/24 1000   CM/ Referral Data   Referral Source    Reason for Referral Discharge planning   Informant Patient   Medical Hx   Does patient have an established PCP? Yes  (Charlotte Rey MD)   Significant Past Medical/Mental Health Hx HFrEF, ANNIKA, Chronic back pains, MDD, DM2, Obesity, Tob abuse, COPD, glaucoma , HLD, HTN, Neuropathy   Patient Info   Patient's Current Mental Status at Time of Assessment Alert;Oriented   Patient's Home Environment Condo/Apt no elevator   Number of Levels in Home 1   Patient lives with Grandchild   Patient Status Prior to Admission   Independent with ADLs and Mobility No   Pt. requires assistance with Ambulating   Services in place prior to admission DME/Supplies at home   Type of DME/Supplies Wheeled Walker   Discharge Needs   Anticipated D/C needs Home health care;Medical equipment     CM self-referred for discharge planning.  Above assessment completed with patient at bedside.  Address on file and PCP verified.    Patient lives with his grandson in a one-level apartment.  Patient still drives, and reports he primarily ambulates with a walker.  Patient owns no additional DME at home currently.      Patient reports he is current with a home healthcare agency, but is unable to provide name of agency.  Per chart review, patient recently seen by PCP and no record of previous face to face or home healthcare arrangement.  CM sent tentative home healthcare referrals via [employer].  Orders and face to face entered.  CM will provide list and confirm preferred HHA with patient prior to discharge.    Patient is currently requiring supplemental oxygen with no history of home oxygen arrangement.  Patient discussed in nursing rounds. CM req RN Hayley perform and document walk test today/tomorrow morning to evaluate patient need for home oxygen. CM discussed with patient at bedside, and patient reports he \"wants\" oxygen at home.    1231 CM provided list of accepting HHA to patient at  bedside for review.  CM left VM with patient's daughter Peaches per patient request to verify if patient is active with a HHA currently - awaiting call back.    / to remain available for support and/or discharge planning.     Plan: Home with grandson and Home Healthcare (pending choice HHA), HME home oxygen (pending walk test, verbiage, signed order) - once medically cleared    Bianca Maynard RN, BSN  Nurse   [phone]

## 2024-10-22 NOTE — PROGRESS NOTES
[facility] Hospitalist Progress Note     CC: Hospital Follow up    PCP: Charlotte Rey MD       Assessment/Plan:     Principal Problem:    COPD exacerbation (HCC)      Patient is a 74 year old male with PMH HFrEF, ANNIKA, Chronic back pains, MDD, DM2, Obesity, Tob abuse, COPD, glaucoma , HLD, HTN, Neuropathy who presents with approximately 2 days of shortness of breath. Seems consistent with COPD vs CHF exacerbation      Shortness of breath appears most consistent with acute on chronic decompensated systolic heart failure with an acute COPD exacerbation  -Better overall today, wheezes and congestion have improved as has LE edema  -Will continue lasix 40 mg BID and oral pred 40 as well as scheduled nebs  PT eval today   Plan to resume GDMT tomorrow pending stability and labs, room in BP   No further need for BIPAP  Iron TIBC and ferritin pending   -Continue home inhalers     2.  DM2 diabetes with hyperglycemia and neuropathy  -Increased home glargine to 15 units a day due to oral steroids but resume home dose at discharge   -Low-dose correction factor  Hold home oral medications including metformin Jardiance  -Hold home Ozempic     3.  Chronic heart failure with reduced ejection fraction, CAD, HTN, HLD   Continue carvedilol   Per pharm D not taking pool so was held but can probably resume tomorrow  continue Lipitor  Per pharm was not taking Entresto consider resuming at discharge  Needs cards follow-up   Continue Plavix and Lipitor  -Consider cardiology consultation pending clinical course in patient, following with [facility] in past      4.  Hypothyroidism continue home Synthroid     5.  Generalized anxiety disorder major depressive disorder continue home duloxetine     6.  Low back pain continue Lyrica gabapentin and oxycodone as needed     7.  Secondary hypercoagulable state  PE seems related to COVID per patient  Continue home Coumadin daily INR  Given this was a resolved risk factor could consider stopping  anticoagulation     8.  Hypothyroidism continue home Synthroid     9. Tob abuse  Smoking cessation counseling done on admission         FN:  - IVF:None  - Diet:cardiac Diet   DVT INR > 2       Questions/concerns were discussed with patient and/or family by bedside.      Thank You,  Bill Hendrickson, DO    Hospitalist with [facility]     Subjective:     Feels coughing is improved, breathing is also improved, no fevers or chills, LE edema improved     OBJECTIVE:    Blood pressure 124/62, pulse 67, temperature 98.1 °F (36.7 °C), temperature source Oral, resp. rate 18, weight 257 lb 14.4 oz (117 kg), SpO2 93%.    Temp:  [97.7 °F (36.5 °C)-98.2 °F (36.8 °C)] 98.1 °F (36.7 °C)  Pulse:  [63-94] 67  Resp:  [13-22] 18  BP: (114-175)/(62-95) 124/62  SpO2:  [90 %-100 %] 93 %      Intake/Output:    Intake/Output Summary (Last 24 hours) at 10/22/2024 1244  Last data filed at 10/22/2024 0918  Gross per 24 hour   Intake 720 ml   Output --   Net 720 ml       Last 3 Weights   10/21/24 1407 257 lb 14.4 oz (117 kg)   10/07/24 1254 263 lb (119.3 kg)   05/10/24 1137 277 lb (125.6 kg)       Exam   Gen: No acute distress, alert and oriented x 3  Pulm: Scatter wheezes are improved , some crackles at bases , congestion noted   CV: Heart with regular rate and rhythm  Abd: Abdomen soft, nontender, nondistended, bowel sounds present  MSK: 1+ LE edema       Data Review:       Labs:     Recent Labs   Lab 10/21/24  1207 10/22/24  1153   RBC 5.62 5.39   HGB 12.0* 11.9*   HCT 42.2 39.5   MCV 75.1* 73.3*   MCH 21.4* 22.1*   MCHC 28.4* 30.1*   RDW 20.8* 20.5*   NEPRELIM 11.35*  --    WBC 14.5* 17.1*   .0 401.0         Recent Labs   Lab 10/17/24  1153 10/21/24  1207 10/22/24  1153   * 129* 195*   BUN 14 15 25*   CREATSERUM 0.94 0.78 0.97   EGFRCR 86 95 83   CA 9.4 9.5 9.4    142 139   K 3.9 3.8 3.6    113* 106   CO2 22.0 25.0 28.0       Recent Labs   Lab 10/17/24  1153 10/21/24  1207   ALT 12 12   AST 13 9   ALB 4.5 4.4          Imaging:  XR CHEST AP PORTABLE  (CPT=71045)    Result Date: 10/21/2024  CONCLUSION:   Interstitial and alveolar pulmonary edema.  Superimposed infection is a differential in the appropriate clinical setting.  Trace bilateral pleural effusions.      Dictated by (CST): Brenda Garcia MD on 10/21/2024 at 12:44 PM     Finalized by (CST): Brenda Garcia MD on 10/21/2024 at 12:47 PM             Meds:      aspirin  81 mg Oral Daily    atorvastatin  80 mg Oral Daily    latanoprost  1 drop Both Eyes Nightly    carvedilol  6.25 mg Oral BID with meals    clopidogrel  75 mg Oral Daily    DULoxetine  30 mg Oral Daily    fluticasone-salmeterol  1 puff Inhalation BID    umeclidinium bromide  1 puff Inhalation Daily    insulin degludec  15 Units Subcutaneous Nightly    levothyroxine  137 mcg Oral Before breakfast    pregabalin  300 mg Oral BID    warfarin  10 mg Oral Once per day on Monday Tuesday Thursday Friday Saturday    [START ON 10/23/2024] warfarin  7.5 mg Oral Once per day on Sunday Wednesday    ipratropium-albuterol  3 mL Nebulization 6 times per day    predniSONE  40 mg Oral Daily with breakfast    furosemide  40 mg Intravenous BID (Diuretic)    insulin aspart  1-5 Units Subcutaneous TID CC    guaiFENesin ER  600 mg Oral BID    nicotine  1 patch Transdermal Daily         oxyCODONE-acetaminophen    glucose **OR** glucose **OR** glucose-vitamin C **OR** dextrose **OR** glucose **OR** glucose **OR** glucose-vitamin C    acetaminophen    polyethylene glycol (PEG 3350)    sennosides    bisacodyl    fleet enema    melatonin    ondansetron    metoclopramide

## 2024-10-22 NOTE — DISCHARGE INSTRUCTIONS
In addition to seeing your pulmonary medicine doctor, it was requested that you also follow up at the [facility]. The [facility] care clinic provides ongoing care and support to help you live more fully with chronic obstructive pulmonary disease, asthma, or pneumonia. During clinic visits, they'll monitor your health, determine if you need changes to medication or other therapies, and identify any additional services that may be of benefit.    The clinic is located at [address] [place] [address] [address] (across from the lab in the [facility]) in  [facility]  Clinic Phone Number: [phone].    Therapy Referrals    [facility]  [facility]  [address]  [address]  [place] [address] [phone]    [place] [id number] [facility]  [address]  [place] [address] [phone]    [facility], [place] [address] [address] [place] [address]  [address]  [place] [address] [phone]

## 2024-10-22 NOTE — PHYSICAL THERAPY NOTE
PHYSICAL THERAPY EVALUATION - INPATIENT     Room Number: 504/504-A  Evaluation Date: 10/22/2024  Type of Evaluation: Initial   Physician Order: PT Eval and Treat    Presenting Problem: COPD exacerbation  Co-Morbidities : CAD with multiple stents, COPD, hypertension, CHF  Reason for Therapy: Mobility Dysfunction and Discharge Planning    PHYSICAL THERAPY ASSESSMENT   Patient is a 74 year old male admitted 10/21/2024 for COPD exacerbation.  Prior to admission, patient's baseline is independent at home with mobility with RW, son helps with IADLs.  Patient is currently functioning below baseline with bed mobility, transfers, and gait.  Patient is requiring contact guard assist and minimal assist as a result of the following impairments: decreased functional strength, decreased endurance/aerobic capacity, and medical status.  Physical Therapy will continue to follow for duration of hospitalization.    Patient will benefit from continued skilled PT Services at discharge to promote prior level of function and safety with additional support and return home with home health PT.    PLAN DURING HOSPITALIZATION  Nursing Mobility Recommendation : 1 Assist     PT Treatment Plan: Bed mobility;Body mechanics;Endurance;Energy conservation;Family education;Gait training;Range of motion;Stoop training;Stair training;Transfer training;Balance training  Rehab Potential : Fair  Frequency (Obs): 5x/week     PHYSICAL THERAPY MEDICAL/SOCIAL HISTORY   History related to current admission: COPD exacerbation     Problem List  Principal Problem:    COPD exacerbation (HCC)      HOME SITUATION  Type of Home: Apartment  Home Layout: One level  Stairs to Enter : 0        Lives With:  (Grandson age 15, has dropped out of school to care for pt; 18 year old grandson also helps)        Patient Regularly Uses: Rolling walker     Prior Level of [place]: independent     SUBJECTIVE  \"I do what I can.\"     PHYSICAL THERAPY EXAMINATION    OBJECTIVE  Precautions: Bed/chair alarm  Fall Risk: High fall risk    WEIGHT BEARING RESTRICTION       PAIN ASSESSMENT     Location: L foot pain neuropathy chronic       COGNITION  Overall Cognitive Status:  WFL - within functional limits    BALANCE  Static Sitting: Fair +  Dynamic Sitting: Fair  Static Standing: Fair -  Dynamic Standing: Fair -    ACTIVITY TOLERANCE  Pulse: 67        BP: 124/62  BP Location: Right arm  BP Method: Automatic  Patient Position: Sitting    O2 WALK  Oxygen Therapy  SPO2% on Oxygen at Rest: 94  At rest oxygen flow (liters per minute): 2    AM-PAC '6-Clicks' INPATIENT SHORT FORM - BASIC MOBILITY  How much difficulty does the patient currently have...  Patient Difficulty: Turning over in bed (including adjusting bedclothes, sheets and blankets)?: A Little   Patient Difficulty: Sitting down on and standing up from a chair with arms (e.g., wheelchair, bedside commode, etc.): A Little   Patient Difficulty: Moving from lying on back to sitting on the side of the bed?: A Little   How much help from another person does the patient currently need...   Help from Another: Moving to and from a bed to a chair (including a wheelchair)?: A Little   Help from Another: Need to walk in hospital room?: A Little   Help from Another: Climbing 3-5 steps with a railing?: A Little     AM-PAC Score:  Raw Score: 18   Approx Degree of Impairment: 46.58%   Standardized Score (AM-PAC Scale): 43.63   CMS Modifier (G-Code): CK    FUNCTIONAL ABILITY STATUS  Functional Mobility/Gait Assessment  Gait Assistance: Contact guard assist  Distance (ft): 35'  Assistive Device: Rolling walker  Pattern: Shuffle  Rolling: minimal assist  Supine to Sit: minimal assist  Sit to Supine: contact guard assist  Sit to Stand: contact guard assist    Exercise/Education Provided:  Bed mobility  Body mechanics  Functional activity tolerated  Gait training    The patient's Approx Degree of Impairment: 46.58% has been calculated based on  documentation in the [facility] '6 clicks' Inpatient Basic Mobility Short Form.  Research supports that patients with this level of impairment may benefit from home with home care.  Final disposition will be made by interdisciplinary medical team.    Patient End of Session: Up in chair;Needs met;Call light within reach;RN aware of session/findings;All patient questions and concerns addressed;Hospital anti-slip socks;Alarm set    CURRENT GOALS  Goals to be met by: 11/15  Patient Goal Patient's self-stated goal is: unstated    Goal #1 Patient is able to demonstrate supine - sit EOB @ level: supervision     Goal #1   Current Status    Goal #2 Patient is able to demonstrate transfers Sit to/from Stand at assistance level: supervision with walker - rolling     Goal #2  Current Status    Goal #3 Patient is able to ambulate 100 feet with assist device: walker - rolling at assistance level: supervision   Goal #3   Current Status    Goal #4 Patient will negotiate 1 stairs/one curb w/ assistive device and supervision   Goal #4   Current Status    Goal #5 Patient to demonstrate independence with home activity/exercise instructions provided to patient in preparation for discharge.   Goal #5   Current Status    Goal #6    Goal #6  Current Status      Patient Evaluation Complexity Level:  History Moderate - 1 or 2 personal factors and/or co-morbidities   Examination of body systems Moderate - addressing a total of 3 or more elements   Clinical Presentation  Moderate - Evolving   Clinical Decision Making  Moderate Complexity     Gait Trainin minutes

## 2024-10-22 NOTE — TELEPHONE ENCOUNTER
Dr. Porter, please see below and thank you!     Spoke to pt. Provided results written below by Dr. Pérez, pt aware to follow up with PCP.

## 2024-10-22 NOTE — TELEPHONE ENCOUNTER
Please ask pt to follow up with PCP for low iron treatment and more work up     Ref Range & Units 11:53 AM   Iron  65 - 175 ug/dL 14 Low    Transferrin  215 - 365 mg/dL 286   Total Iron Binding Capacity  250 - 425 ug/dL 426 High    % Saturation  20 - 50 % 3 Low

## 2024-10-22 NOTE — PLAN OF CARE
Problem: Patient Centered Care  Goal: Patient preferences are identified and integrated in the patient's plan of care  Description: Interventions:  - What would you like us to know as we care for you? I am from home with my son.   - Provide timely, complete, and accurate information to patient/family  - Incorporate patient and family knowledge, values, beliefs, and cultural backgrounds into the planning and delivery of care  - Encourage patient/family to participate in care and decision-making at the level they choose  - Honor patient and family perspectives and choices  Outcome: Progressing     Problem: Diabetes/Glucose Control  Goal: Glucose maintained within prescribed range  Description: INTERVENTIONS:  - Monitor Blood Glucose as ordered  - Assess for signs and symptoms of hyperglycemia and hypoglycemia  - Administer ordered medications to maintain glucose within target range  - Assess barriers to adequate nutritional intake and initiate nutrition consult as needed  - Instruct patient on self management of diabetes  Outcome: Progressing     Problem: Patient/Family Goals  Goal: Patient/Family Long Term Goal  Description: Patient's Long Term Goal: To go home    Interventions:  - Manage pain   -Rest  - See additional Care Plan goals for specific interventions  Outcome: Progressing  Goal: Patient/Family Short Term Goal  Description: Patient's Short Term Goal: To not be short of breath    Interventions:   - Wear oxygen as needed   -Report any pain so it can be managed  - See additional Care Plan goals for specific interventions  Outcome: Progressing     Problem: PAIN - ADULT  Goal: Verbalizes/displays adequate comfort level or patient's stated pain goal  Description: INTERVENTIONS:  - Encourage pt to monitor pain and request assistance  - Assess pain using appropriate pain scale  - Administer analgesics based on type and severity of pain and evaluate response  - Implement non-pharmacological measures as appropriate  and evaluate response  - Consider cultural and social influences on pain and pain management  - Manage/alleviate anxiety  - Utilize distraction and/or relaxation techniques  - Monitor for opioid side effects  - Notify MD/LIP if interventions unsuccessful or patient reports new pain  - Anticipate increased pain with activity and pre-medicate as appropriate  Outcome: Progressing     Problem: SAFETY ADULT - FALL  Goal: Free from fall injury  Description: INTERVENTIONS:  - Assess pt frequently for physical needs  - Identify cognitive and physical deficits and behaviors that affect risk of falls.  - [place] fall precautions as indicated by assessment.  - Educate pt/family on patient safety including physical limitations  - Instruct pt to call for assistance with activity based on assessment  - Modify environment to reduce risk of injury  - Provide assistive devices as appropriate  - Consider OT/PT consult to assist with strengthening/mobility  - Encourage toileting schedule  Outcome: Progressing     Problem: DISCHARGE PLANNING  Goal: Discharge to home or other facility with appropriate resources  Description: INTERVENTIONS:  - Identify barriers to discharge w/pt and caregiver  - Include patient/family/discharge partner in discharge planning  - Arrange for needed discharge resources and transportation as appropriate  - Identify discharge learning needs (meds, wound care, etc)  - Arrange for interpreters to assist at discharge as needed  - Consider post-discharge preferences of patient/family/discharge partner  - Complete POLST form as appropriate  - Assess patient's ability to be responsible for managing their own health  - Refer to Case Management Department for coordinating discharge planning if the patient needs post-hospital services based on physician/LIP order or complex needs related to functional status, cognitive ability or social support system  Outcome: Progressing     Problem: RESPIRATORY - ADULT  Goal:  Achieves optimal ventilation and oxygenation  Description: INTERVENTIONS:  - Assess for changes in respiratory status  - Assess for changes in mentation and behavior  - Position to facilitate oxygenation and minimize respiratory effort  - Oxygen supplementation based on oxygen saturation or ABGs  - Provide Smoking Cessation handout, if applicable  - Encourage broncho-pulmonary hygiene including cough, deep breathe, Incentive Spirometry  - Assess the need for suctioning and perform as needed  - Assess and instruct to report SOB or any respiratory difficulty  - Respiratory Therapy support as indicated  - Manage/alleviate anxiety  - Monitor for signs/symptoms of CO2 retention  Outcome: Progressing    Pt currently on 2L nasal cannula high flow,  reports SOB is improving.

## 2024-10-22 NOTE — PLAN OF CARE
Problem: Diabetes/Glucose Control  Goal: Glucose maintained within prescribed range  Description: INTERVENTIONS:  - Monitor Blood Glucose as ordered  - Assess for signs and symptoms of hyperglycemia and hypoglycemia  - Administer ordered medications to maintain glucose within target range  - Assess barriers to adequate nutritional intake and initiate nutrition consult as needed  - Instruct patient on self management of diabetes  Outcome: Progressing     Problem: PAIN - ADULT  Goal: Verbalizes/displays adequate comfort level or patient's stated pain goal  Description: INTERVENTIONS:  - Encourage pt to monitor pain and request assistance  - Assess pain using appropriate pain scale  - Administer analgesics based on type and severity of pain and evaluate response  - Implement non-pharmacological measures as appropriate and evaluate response  - Consider cultural and social influences on pain and pain management  - Manage/alleviate anxiety  - Utilize distraction and/or relaxation techniques  - Monitor for opioid side effects  - Notify MD/LIP if interventions unsuccessful or patient reports new pain  - Anticipate increased pain with activity and pre-medicate as appropriate  Outcome: Progressing     Problem: SAFETY ADULT - FALL  Goal: Free from fall injury  Description: INTERVENTIONS:  - Assess pt frequently for physical needs  - Identify cognitive and physical deficits and behaviors that affect risk of falls.  - [place] fall precautions as indicated by assessment.  - Educate pt/family on patient safety including physical limitations  - Instruct pt to call for assistance with activity based on assessment  - Modify environment to reduce risk of injury  - Provide assistive devices as appropriate  - Consider OT/PT consult to assist with strengthening/mobility  - Encourage toileting schedule  Outcome: Progressing     Problem: DISCHARGE PLANNING  Goal: Discharge to home or other facility with appropriate  resources  Description: INTERVENTIONS:  - Identify barriers to discharge w/pt and caregiver  - Include patient/family/discharge partner in discharge planning  - Arrange for needed discharge resources and transportation as appropriate  - Identify discharge learning needs (meds, wound care, etc)  - Arrange for interpreters to assist at discharge as needed  - Consider post-discharge preferences of patient/family/discharge partner  - Complete POLST form as appropriate  - Assess patient's ability to be responsible for managing their own health  - Refer to Case Management Department for coordinating discharge planning if the patient needs post-hospital services based on physician/LIP order or complex needs related to functional status, cognitive ability or social support system  Outcome: Progressing     Problem: RESPIRATORY - ADULT  Goal: Achieves optimal ventilation and oxygenation  Description: INTERVENTIONS:  - Assess for changes in respiratory status  - Assess for changes in mentation and behavior  - Position to facilitate oxygenation and minimize respiratory effort  - Oxygen supplementation based on oxygen saturation or ABGs  - Provide Smoking Cessation handout, if applicable  - Encourage broncho-pulmonary hygiene including cough, deep breathe, Incentive Spirometry  - Assess the need for suctioning and perform as needed  - Assess and instruct to report SOB or any respiratory difficulty  - Respiratory Therapy support as indicated  - Manage/alleviate anxiety  - Monitor for signs/symptoms of CO2 retention  Outcome: Progressing

## 2024-10-22 NOTE — TELEPHONE ENCOUNTER
Please make sure patient has an appointment with me to address this (and other health issues) after his discharge from the hospital. Thanks!

## 2024-10-22 NOTE — DIETARY NOTE
Brief Nutrition Note:    Pt admitted for COPD exacerbation. Pt screened at nutrition risk at admission by RN for decreased appetite and unintentional weight loss (MST 2). Chart reviewed , pt admitted with c/o BRYCE x 2 days with cough. Discussion with RN, no concerns. Intakes reviewed, % x 2 meals since admission (good). Pt visited, sitting in bedside chair and asking for leftover sandwich on table under TV. Pt reports good intake/appetite. Pt denies use of oral nutritional supplements (ONS). Pt reports about 40-50# weight loss but reports it as intentional weight loss. Pt reports was diuresed about 35# and is also on Ozempic at home which is assisting in weight loss (per pt, started about 3 months ago). Pt reports previously over 300# and now down to 257#. Current weight 257# 14 oz. EMR weight review, last known weight # on 10/7/24. Per EMR weight review, pt noted weighing 277# on 5/10/24 - 19.1# or 6.9% weight loss x 5 months (non-significant), 278# 13 oz on 3/31/24 -20.9# or 7.5% weight loss x 7 months (non-significant) and 305# on 10/9/23 - 47.1# or 15.5% weight loss x 1 year (non-significant). Pt appears well-nourished. Pt appears at no nutrition risk at this time given weight loss was intentional and pt reports good intake/appetite. F/u at length of stay (LOS) per protocol. Please consult nutrition services if earlier intervention is indicated.    Wt Readings from Last 6 Encounters:   10/21/24 117 kg (257 lb 14.4 oz)   10/07/24 119.3 kg (263 lb)   05/10/24 125.6 kg (277 lb)   03/31/24 126.5 kg (278 lb 12.8 oz)   01/26/24 133.4 kg (294 lb)   01/21/24 132.4 kg (291 lb 14.4 oz)     Patient Weight(s) for the past 336 hrs:   Weight   10/21/24 1407 117 kg (257 lb 14.4 oz)     Percent Meals Eaten (last 6 days)       Date/Time Percent Meals Eaten (%)    10/21/24 2135 70 %    10/22/24 0918 100 %          Val [place] MS, ANGEL, RDN, LDN  Clinical Dietitian  P: [phone]

## 2024-10-23 LAB
ANION GAP SERPL CALC-SCNC: 8 MMOL/L (ref 0–18)
BUN BLD-MCNC: 33 MG/DL (ref 9–23)
BUN/CREAT SERPL: 36.3 (ref 10–20)
CALCIUM BLD-MCNC: 9.4 MG/DL (ref 8.7–10.4)
CHLORIDE SERPL-SCNC: 105 MMOL/L (ref 98–112)
CO2 SERPL-SCNC: 30 MMOL/L (ref 21–32)
CREAT BLD-MCNC: 0.91 MG/DL
DEPRECATED RDW RBC AUTO: 51.7 FL (ref 35.1–46.3)
EGFRCR SERPLBLD CKD-EPI 2021: 90 ML/MIN/1.73M2 (ref 60–?)
ERYTHROCYTE [DISTWIDTH] IN BLOOD BY AUTOMATED COUNT: 20.5 % (ref 11–15)
GLUCOSE BLD-MCNC: 132 MG/DL (ref 70–99)
GLUCOSE BLDC GLUCOMTR-MCNC: 123 MG/DL (ref 70–99)
GLUCOSE BLDC GLUCOMTR-MCNC: 149 MG/DL (ref 70–99)
GLUCOSE BLDC GLUCOMTR-MCNC: 247 MG/DL (ref 70–99)
GLUCOSE BLDC GLUCOMTR-MCNC: 253 MG/DL (ref 70–99)
HCT VFR BLD AUTO: 41.1 %
HGB BLD-MCNC: 11.9 G/DL
INR BLD: 2.42 (ref 0.8–1.2)
MCH RBC QN AUTO: 21.1 PG (ref 26–34)
MCHC RBC AUTO-ENTMCNC: 29 G/DL (ref 31–37)
MCV RBC AUTO: 73 FL
OSMOLALITY SERPL CALC.SUM OF ELEC: 305 MOSM/KG (ref 275–295)
PLATELET # BLD AUTO: 414 10(3)UL (ref 150–450)
POTASSIUM SERPL-SCNC: 3.3 MMOL/L (ref 3.5–5.1)
PROTHROMBIN TIME: 27.8 SECONDS (ref 11.6–14.8)
RBC # BLD AUTO: 5.63 X10(6)UL
SODIUM SERPL-SCNC: 143 MMOL/L (ref 136–145)
WBC # BLD AUTO: 14.4 X10(3) UL (ref 4–11)

## 2024-10-23 PROCEDURE — 85027 COMPLETE CBC AUTOMATED: CPT | Performed by: INTERNAL MEDICINE

## 2024-10-23 PROCEDURE — 80048 BASIC METABOLIC PNL TOTAL CA: CPT | Performed by: INTERNAL MEDICINE

## 2024-10-23 PROCEDURE — 94799 UNLISTED PULMONARY SVC/PX: CPT

## 2024-10-23 PROCEDURE — 94640 AIRWAY INHALATION TREATMENT: CPT

## 2024-10-23 PROCEDURE — 82962 GLUCOSE BLOOD TEST: CPT

## 2024-10-23 PROCEDURE — 85610 PROTHROMBIN TIME: CPT | Performed by: INTERNAL MEDICINE

## 2024-10-23 RX ORDER — FUROSEMIDE 10 MG/ML
40 INJECTION INTRAMUSCULAR; INTRAVENOUS ONCE
Status: COMPLETED | OUTPATIENT
Start: 2024-10-23 | End: 2024-10-23

## 2024-10-23 RX ORDER — POTASSIUM CHLORIDE 1500 MG/1
40 TABLET, EXTENDED RELEASE ORAL ONCE
Status: COMPLETED | OUTPATIENT
Start: 2024-10-23 | End: 2024-10-23

## 2024-10-23 RX ORDER — GABAPENTIN 100 MG/1
100 CAPSULE ORAL 3 TIMES DAILY PRN
Status: DISCONTINUED | OUTPATIENT
Start: 2024-10-23 | End: 2024-10-24

## 2024-10-23 RX ORDER — IPRATROPIUM BROMIDE AND ALBUTEROL SULFATE 2.5; .5 MG/3ML; MG/3ML
3 SOLUTION RESPIRATORY (INHALATION)
Status: DISCONTINUED | OUTPATIENT
Start: 2024-10-23 | End: 2024-10-24

## 2024-10-23 RX ORDER — IPRATROPIUM BROMIDE AND ALBUTEROL SULFATE 2.5; .5 MG/3ML; MG/3ML
3 SOLUTION RESPIRATORY (INHALATION) EVERY 6 HOURS PRN
Status: DISCONTINUED | OUTPATIENT
Start: 2024-10-23 | End: 2024-10-24

## 2024-10-23 RX ORDER — TORSEMIDE 20 MG/1
20 TABLET ORAL DAILY
Status: DISCONTINUED | OUTPATIENT
Start: 2024-10-24 | End: 2024-10-24

## 2024-10-23 NOTE — PLAN OF CARE
A&Ox4. Pt ambulates standby assist with rolling walker, monitoring blood glucose levels per order- achs, voiding via urinal, tolerating cardiac diet, on room air, percocet provided as needed for pain. Frequent rounding by nursing staff. Safety precautions maintained/call light within reach. Plan to transition to oral diuretic 10/24, pending medical clearance.    Problem: Patient Centered Care  Goal: Patient preferences are identified and integrated in the patient's plan of care  Description: Interventions:  - What would you like us to know as we care for you? I'm from home with family  - Provide timely, complete, and accurate information to patient/family  - Incorporate patient and family knowledge, values, beliefs, and cultural backgrounds into the planning and delivery of care  - Encourage patient/family to participate in care and decision-making at the level they choose  - Honor patient and family perspectives and choices  Outcome: Progressing     Problem: Diabetes/Glucose Control  Goal: Glucose maintained within prescribed range  Description: INTERVENTIONS:  - Monitor Blood Glucose as ordered  - Assess for signs and symptoms of hyperglycemia and hypoglycemia  - Administer ordered medications to maintain glucose within target range  - Assess barriers to adequate nutritional intake and initiate nutrition consult as needed  - Instruct patient on self management of diabetes  Outcome: Progressing     Problem: Patient/Family Goals  Goal: Patient/Family Long Term Goal  Description: Patient's Long Term Goal: discharge    Interventions:  - lasix as ordered  - See additional Care Plan goals for specific interventions  Outcome: Progressing  Goal: Patient/Family Short Term Goal  Description: Patient's Short Term Goal: feel better    Interventions:   - manage pain with PRN meds as needed  - See additional Care Plan goals for specific interventions  Outcome: Progressing     Problem: PAIN - ADULT  Goal: Verbalizes/displays  adequate comfort level or patient's stated pain goal  Description: INTERVENTIONS:  - Encourage pt to monitor pain and request assistance  - Assess pain using appropriate pain scale  - Administer analgesics based on type and severity of pain and evaluate response  - Implement non-pharmacological measures as appropriate and evaluate response  - Consider cultural and social influences on pain and pain management  - Manage/alleviate anxiety  - Utilize distraction and/or relaxation techniques  - Monitor for opioid side effects  - Notify MD/LIP if interventions unsuccessful or patient reports new pain  - Anticipate increased pain with activity and pre-medicate as appropriate  Outcome: Progressing     Problem: SAFETY ADULT - FALL  Goal: Free from fall injury  Description: INTERVENTIONS:  - Assess pt frequently for physical needs  - Identify cognitive and physical deficits and behaviors that affect risk of falls.  - [place] fall precautions as indicated by assessment.  - Educate pt/family on patient safety including physical limitations  - Instruct pt to call for assistance with activity based on assessment  - Modify environment to reduce risk of injury  - Provide assistive devices as appropriate  - Consider OT/PT consult to assist with strengthening/mobility  - Encourage toileting schedule  Outcome: Progressing     Problem: DISCHARGE PLANNING  Goal: Discharge to home or other facility with appropriate resources  Description: INTERVENTIONS:  - Identify barriers to discharge w/pt and caregiver  - Include patient/family/discharge partner in discharge planning  - Arrange for needed discharge resources and transportation as appropriate  - Identify discharge learning needs (meds, wound care, etc)  - Arrange for interpreters to assist at discharge as needed  - Consider post-discharge preferences of patient/family/discharge partner  - Complete POLST form as appropriate  - Assess patient's ability to be responsible for managing  their own health  - Refer to Case Management Department for coordinating discharge planning if the patient needs post-hospital services based on physician/LIP order or complex needs related to functional status, cognitive ability or social support system  Outcome: Progressing     Problem: RESPIRATORY - ADULT  Goal: Achieves optimal ventilation and oxygenation  Description: INTERVENTIONS:  - Assess for changes in respiratory status  - Assess for changes in mentation and behavior  - Position to facilitate oxygenation and minimize respiratory effort  - Oxygen supplementation based on oxygen saturation or ABGs  - Provide Smoking Cessation handout, if applicable  - Encourage broncho-pulmonary hygiene including cough, deep breathe, Incentive Spirometry  - Assess the need for suctioning and perform as needed  - Assess and instruct to report SOB or any respiratory difficulty  - Respiratory Therapy support as indicated  - Manage/alleviate anxiety  - Monitor for signs/symptoms of CO2 retention  Outcome: Progressing

## 2024-10-23 NOTE — CONSULTS
[facility]    Caleb Rausch  Patient Status:  Inpatient    3/23/1950 MRN [id number]   Location 504/504-A PCP Charlotte Rey MD     Date of Admission: 10/21/2024    History of Present Illness: Pt is a 74 year old male consulted for dyspnea with h/o HFrEF, MDD, type 2 DM, COPD, HLD, HTN, neuropathy admitted with dyspnea x 2 days 10/21. Pt was maintained on trelegy 200. Pt has noted increasing dyspnea for weeks with orthopnea and LE edema He had noted dyspnea at rest and worse with activity. No cough or chest pain or wheezing. Notes he has not been completely compliant with his diuretics.   Notes some wheezing worsening with LE edema.   Treated with IV steroids    PMH:    Past Medical History:    Anxiety disorder, unspecified    Anxiety state    Atherosclerosis of coronary artery    stents x 6    Chronic low back pain with bilateral sciatica, left worse than right    Chronic obstructive pulmonary disease, unspecified (HCC)    Coronary atherosclerosis    Depression    Diabetes (HCC)    borderline     Disorder of thyroid    Essential hypertension    Glaucoma    first visit with IRMA, patient was taking Timolol OU BID for 20 years, has not used gtts in over 1 year because he ran out of gtts and had no refills, fundus photos taken today    Heart attack (HCC)    High blood pressure    High cholesterol    Hyperlipidemia    Kidney stone    Left Sided Neck pain, acute    Lumbar stenosis with neurogenic claudication    Major depressive disorder, recurrent, unspecified (HCC)    Morbid obesity with BMI of 40.0-44.9, adult (HCC)    Neuropathy    Pneumonia due to COVID-19 virus    Thyroid disease       Past Surgical History:   Procedure Laterality Date    Cataract extraction extracapsular w/ intraocular lens implantation Right     [facility] bare metal stent (bms)      Circumcision,othr  2020    Dr. Bonilla @ [facility]    Cysto/uretero w/lithotripsy Left 2020    Dr. Bonilla @ [facility] --  duplex left collecting system with both moieties joining in proximal ureter.     Iridotomy/iridectomy by laser      unknown Dr    Other      cardiac stents       Allergies: Allergies[1]    Medications:  Outpatient Medications:  Medications Ordered Prior to Encounter[2]    Inpatient Medications:  Scheduled Medications:     ipratropium-albuterol  3 mL Nebulization QID    sacubitril-valsartan  1 tablet Oral BID    sodium ferric gluconate  125 mg Intravenous Daily    aspirin  81 mg Oral Daily    atorvastatin  80 mg Oral Daily    latanoprost  1 drop Both Eyes Nightly    carvedilol  6.25 mg Oral BID with meals    clopidogrel  75 mg Oral Daily    DULoxetine  30 mg Oral Daily    fluticasone-salmeterol  1 puff Inhalation BID    umeclidinium bromide  1 puff Inhalation Daily    insulin degludec  15 Units Subcutaneous Nightly    levothyroxine  137 mcg Oral Before breakfast    pregabalin  300 mg Oral BID    warfarin  10 mg Oral Once per day on Monday Tuesday Thursday Friday Saturday    warfarin  7.5 mg Oral Once per day on Sunday Wednesday    predniSONE  40 mg Oral Daily with breakfast    furosemide  40 mg Intravenous BID (Diuretic)    insulin aspart  1-5 Units Subcutaneous TID CC    guaiFENesin ER  600 mg Oral BID    nicotine  1 patch Transdermal Daily     Infusing Medications:    PRN Medications:    ipratropium-albuterol    oxyCODONE-acetaminophen    glucose **OR** glucose **OR** glucose-vitamin C **OR** dextrose **OR** glucose **OR** glucose **OR** glucose-vitamin C    acetaminophen    polyethylene glycol (PEG 3350)    sennosides    bisacodyl    fleet enema    melatonin    ondansetron    metoclopramide    Social History:    History   Smoking Status    Every Day    Types: Cigarettes   Smokeless Tobacco    Former       History   Alcohol Use No     Comment: quit in 1994       History   Drug Use No     Work:Denies exposures.    TB: None  Asbestos: None      Family History   Problem Relation Age of Onset    Heart Attack Father      Hypertension Brother     Glaucoma Neg     Macular degeneration Neg       REVIEW OF SYSTEMS:   A comprehensive 10 point review of systems was completed.  Pertinent positives and negatives noted in the the HPI.    OBJECTIVE:  Temp (24hrs), Av.9 °F (36.6 °C), Min:97.6 °F (36.4 °C), Max:98.5 °F (36.9 °C)   /81 (BP Location: Right arm)   Pulse 76   Temp 97.6 °F (36.4 °C) (Oral)   Resp 20   Wt 257 lb 14.4 oz (117 kg)   SpO2 93%   BMI 38.09 kg/m²     Intake/Output Summary (Last 24 hours) at 10/23/2024 1110  Last data filed at 10/23/2024 0940  Gross per 24 hour   Intake 1052 ml   Output --   Net 1052 ml     O2: RA to 1 LPM  General: NAD.   Neuro: Alert, no focal deficits.    HEENT: PERRL  Neck : No LAD  CV: RRR, nl S1, S2, no S4, S3 or murmur.   Lungs: Clear bilaterally  Abd: Nontender, non distended.   Ext: No edema.   Skin: No rashes.     Labs:  Recent Labs   Lab 10/21/24  1207 10/22/24  1153 10/23/24  0455   WBC 14.5* 17.1* 14.4*   HGB 12.0* 11.9* 11.9*   HCT 42.2 39.5 41.1   .0 401.0 414.0     Recent Labs   Lab 10/17/24  1153 10/21/24  1207 10/22/24  1153 10/23/24  0455   * 129* 195* 132*   BUN 14 15 25* 33*   CREATSERUM 0.94 0.78 0.97 0.91   CA 9.4 9.5 9.4 9.4    142 139 143   K 3.9 3.8 3.6 3.3*    113* 106 105   CO2 22.0 25.0 28.0 30.0   AST 13 9  --   --    ALT 12 12  --   --    ALB 4.5 4.4  --   --      Recent Labs   Lab 10/21/24  1207 10/22/24  1153 10/23/24  0455   INR 2.00* 2.36* 2.42*   PTT 48.6*  --   --      No results for input(s): \"ABGPHT\", \"AYUUSB1O\", \"WJBPM5H\", \"ABGHCO3\", \"SITE\", \"DEV\", \"THGB\" in the last 168 hours.  Recent Labs   Lab 10/21/24  1207 10/22/24  1153   TROPHS 29 21     No results for input(s): \"BNP\" in the last 168 hours.  COVID-19 Lab Results    COVID-19  Lab Results   Component Value Date    COVID19 Not Detected 10/21/2024    COVID19 Not Detected 2023    COVID19 Not Detected 10/04/2022       Pro-Calcitonin  No results for input(s): \"PCT\" in  the last 168 hours.    Cardiac  Recent Labs   Lab 10/21/24  1207   PBNP 2,123*       Creatinine Kinase  No results for input(s): \"CK\" in the last 168 hours.    Inflammatory Markers  Recent Labs   Lab 10/22/24  1153   JÚNIOR 23*       Imaging:  CXR 10/21/24:  Interstitial and alveolar pulmonary edema.  Superimposed infection is a differential in the appropriate clinical setting.      Trace bilateral pleural effusions.         Chest images personally reviewed.     Echo 10/22  1. Left ventricle: The cavity size was increased. Wall thickness was normal.      Systolic function was reduced. The estimated ejection fraction was      35-40%, by 3D assessment. There was moderate diffuse hypokinesis with      regional variations. There is severe hypokinesis of the apex. Features      are consistent with a pseudonormal left ventricular filling pattern, with      concomitant abnormal relaxation and increased filling pressure - grade 2      diastolic dysfunction. The wall mass index (3D) is 131g/m^2.   2. Right ventricle: The cavity size was increased. Systolic function was      reduced.   3. Aortic root: The aortic root was 3.9cm diameter.   4. Mitral valve: There was mild regurgitation.   5. Tricuspid valve: There was mild regurgitation.   6. Pericardium, extracardiac: There was no pericardial effusion.   Impressions:  This study is compared with previous dated 12/23/23:     Assessment/Plan   Dyspnea secondary to COPD exac with h/o CHF and COPD  O2 protocol   Diuresis   Prednisone burst.   Trelegy 200  BD protocol   Outpt PFT's.   PE provoked with COVID 1/2022   Treated with doac and changed to coumadin due to cost of med.   Heme eval as outpt to discuss withdrawal of anticoagulation.   CV: CHF  Diuresis per cardiology  Proph  Anticoagulated    My best regards,         Kel Layne MD  [facility] Pulmonary, Critical Care and Sleep Medicine                     [1] No Known Allergies  [2]   No current facility-administered  medications on file prior to encounter.     Current Outpatient Medications on File Prior to Encounter   Medication Sig Dispense Refill    levothyroxine 137 MCG Oral Tab Take 137 mcg by mouth before breakfast. 90 tablet 3    latanoprost 0.005 % Ophthalmic Solution Place 1 drop into both eyes nightly.      fluticasone-umeclidin-vilant (TRELEGY ELLIPTA) 200-62.5-25 MCG/ACT Inhalation Aerosol Powder, Breath Activated Inhale 1 puff into the lungs daily. 180 each 3    DULoxetine 30 MG Oral Cap DR Particles Take 1 capsule (30 mg total) by mouth daily. 90 capsule 3    semaglutide (OZEMPIC, 0.25 OR 0.5 MG/DOSE,) 2 MG/3ML Subcutaneous Solution Pen-injector Inject 0.5 mg into the skin once a week. 9 mL 0    insulin glargine (BASAGLAR KWIKPEN) 100 UNIT/ML Subcutaneous Solution Pen-injector Inject 10 Units into the skin daily. 9 mL 1    clopidogrel 75 MG Oral Tab Take 1 tablet (75 mg total) by mouth daily. 90 tablet 3    JARDIANCE 25 MG Oral Tab TAKE ONE TABLET BY MOUTH ONE TIME DAILY 90 tablet 0    Potassium Chloride ER 20 MEQ Oral Tab CR Take 1 tablet by mouth daily. 90 tablet 0    atorvastatin 80 MG Oral Tab Take 1 tablet (80 mg total) by mouth daily. 90 tablet 1    torsemide 20 MG Oral Tab Take 1 tablet (20 mg total) by mouth daily. 90 tablet 3    carvedilol 6.25 MG Oral Tab Take 1 tablet (6.25 mg total) by mouth 2 (two) times daily with meals. 180 tablet 3    metFORMIN HCl 1000 MG Oral Tab Take 0.5 tablets (500 mg total) by mouth 2 (two) times daily with meals. (Patient taking differently: Take 1 tablet (1,000 mg total) by mouth 2 (two) times daily with meals.) 180 tablet 1    albuterol (VENTOLIN HFA) 108 (90 Base) MCG/ACT Inhalation Aero Soln Inhale 2 puffs into the lungs every 4 (four) hours as needed for Wheezing. 54 g 0    oxyCODONE-acetaminophen  MG Oral Tab Take 1 tablet by mouth 4 (four) times daily as needed.      warfarin 5 MG Oral Tab Take 2 tablets (10 mg total) by mouth nightly. 7.5mg on Sunday and  Wednesday  10mg on all other days      pregabalin 300 MG Oral Cap Take 1 capsule (300 mg total) by mouth 2 (two) times daily. 30 capsule 0    aspirin 81 MG Oral Tab EC Take 1 tablet (81 mg total) by mouth daily. 30 tablet 2    Accu-Chek Softclix Lancets Does not apply Misc 3 (three) times daily.      Glucose Blood (ACCU-CHEK GUIDE) In Vitro Strip 1 strip by In Vitro route 3 (three) times daily.      GABAPENTIN 300 MG Oral Cap TAKE ONE CAPSULE BY MOUTH THREE TIMES DAILY 90 capsule 0    Insulin Pen Needle (BD PEN NEEDLE RODERICK 2ND GEN) 32G X 4 MM Does not apply Misc Inject 1 each as directed daily. 90 each 3    sacubitril-valsartan (ENTRESTO) 24-26 MG Oral Tab Take 1 tablet by mouth daily. 30 tablet 0    GVOKE HYPOPEN 2-PACK 1 MG/0.2ML Subcutaneous injection INJECT THE CONTENTS OF ONE DEVICE IN THE LOWER ABDOMEN, OUTER THIGH, OR OUTER UPPER ARM FOR SEVERELY LOW BLOOD SUGAR. IF NO RESPONSE, MAY REPEAT WITH A NEW DEVICE IN 15 MINUTES.      docusate sodium 100 MG Oral Cap Take 1 capsule (100 mg total) by mouth 2 (two) times daily as needed for constipation. (Patient not taking: Reported on 10/7/2024) 30 capsule 0    docusate sodium (DULCOLAX STOOL SOFTENER) 100 MG Oral Cap Take 1 capsule (100 mg total) by mouth daily as needed for constipation. (Patient not taking: Reported on 10/7/2024) 30 capsule 0    Polyethylene Glycol 3350 (MIRALAX) 17 g Oral Powd Pack Take 17 g by mouth daily. 5 each 0    spironolactone 25 MG Oral Tab Take 1 tablet (25 mg total) by mouth daily. 90 tablet 3    zolpidem 5 MG Oral Tab Take 1 tablet (5 mg total) by mouth nightly as needed for Sleep. (Patient not taking: Reported on 6/4/2024)      MOVANTIK 25 MG Oral Tab TAKE 1 TABLET BY MOUTH DAILY 1 -2 HOURS AFTER MEAL      bimatoprost (LUMIGAN) 0.01 % Ophthalmic Solution Place 1 drop into both eyes every evening.      Blood Glucose Monitoring Suppl Does not apply Kit Please use to check blood sugar twice daily. 1 kit 0    Blood Glucose Monitoring Suppl  Does not apply Misc Please use to check blood sugar twice daily 200 each 3    Blood Glucose Monitoring Suppl Does not apply Misc Please use to check blood sugar twice daily 200 each 0

## 2024-10-23 NOTE — CM/SW NOTE
CM req LAYNE Vasquez perform and document walk test to evaluate patient need for home oxygen.    The following verbiage will need to be included in physician progress note after walk test has been documented for patient to qualify: I had a face to face visit with this patient and I evaluated the patient's O2 saturations.  The patient is mobile in their home and is in need of a portable oxygen tank.  The home oxygen will improve the patient's condition.      CM will enter order for home oxygen re physician co-sign, pending walk test results.    CM sent tentative HME referral via [employer].  Walk test, physician verbiage, and signed order will be uploaded to referral once received.    1130 LAYNE Vasquez documented walk test indicating patient does not need home O2 arrangement.  Patient successfully weaned to room air.    CM met with patient at bedside to discuss home healthcare/follow up on HHA choice.  Patient is declining home healthcare arrangement at this time.    / to remain available for support and/or discharge planning.     Plan: Home with grandson, pending medical clearance    Bianca Maynard RN, BSN  Nurse   [phone]

## 2024-10-23 NOTE — PROGRESS NOTES
10/23/24 1103   Mobility   O2 walk? Yes   SPO2% on Room Air at Rest 95   SPO2% on Oxygen at Rest 94   At rest oxygen flow (liters per minute) 1   SPO2% Ambulation on Room Air 94

## 2024-10-23 NOTE — PROGRESS NOTES
[facility] Hospitalist Progress Note     CC: Hospital Follow up    PCP: Charlotte Rey MD       Assessment/Plan:     Principal Problem:    COPD exacerbation (HCC)      Patient is a 74 year old male with PMH HFrEF, ANNIKA, Chronic back pains, MDD, DM2, Obesity, Tob abuse, COPD, glaucoma , HLD, HTN, Neuropathy who presents with approximately 2 days of shortness of breath. Seems consistent with COPD vs CHF exacerbation        CHFrEF acute on chronic  - on lasix 40 iv bid-   - sees Dr Hammer outpatient [facility] heart will see in house   - 10/22 echo revliewed EF 35-45- GDMT:  - entresto restrted, pool as able   - follow insouts, daily weights bmp, mg tele  - see below re iron/anemia    COPD w exac  - on steroid (pred) BD nebs  - pulm to see in house  - significant wheeze on exam today will dw pulm re back on iv steroid?    Iron def anemia  - start IV iron exac w chf  - d/w patient no prior colonscopy no overt s/sx bleeding and at ths time declines pursuing workup   - may need heme outpatient f/u w ongoing AC as noted below     DM  - cont basal/ bolus Ssi prn monitor w steroids  - Hold home oral medications including metformin Jardiance  -Hold home Ozempic     CAD, HTN, HLD   - Continue carvedilol , statin plavix      Hypothyroid- synthroid  ANNIKA/ MDD back pain- cymbalta / home meds- [facility] seeing in house resources given    Hx PE 3 yrs ago  - provoked sp COVID  - d/w DR Layne- will review re possible stopping may need heme input (outpatient) as well     Tob abuse  Smoking cessation counseling done on admission         Dispo pending diuresis    Boaz Villalba, Hospitalist    Subjective:     Course reviewd reports less SOB than admission     OBJECTIVE:    Blood pressure 137/81, pulse 75, temperature 97.6 °F (36.4 °C), temperature source Oral, resp. rate 20, weight 257 lb 14.4 oz (117 kg), SpO2 95%.    Temp:  [97.6 °F (36.4 °C)-98.5 °F (36.9 °C)] 97.6 °F (36.4 °C)  Pulse:  [68-93] 75  Resp:  [20] 20  BP: (127-150)/(59-81)  137/81  SpO2:  [90 %-100 %] 95 %      Intake/Output:    Intake/Output Summary (Last 24 hours) at 10/23/2024 1058  Last data filed at 10/23/2024 0940  Gross per 24 hour   Intake 1052 ml   Output --   Net 1052 ml       Last 3 Weights   10/21/24 1407 257 lb 14.4 oz (117 kg)   10/07/24 1254 263 lb (119.3 kg)   05/10/24 1137 277 lb (125.6 kg)       Exam   GEN- NAD, AAO  HEENT- sclera antiicteric, OP- MMM  NECK- supple, trachea midline   CV- RRR, trace bl LE edema  LUNGS- course, bl rhonchi and wheezes noted   ABD- soft, NT/ND, NABS, no HSM  NEURO- non focal deficits, AAO  PSYCH- normal affect, normal mentation  SKIN- no rashes no lesions     Data Review:       Labs:     Recent Labs   Lab 10/21/24  1207 10/22/24  1153 10/23/24  0455   RBC 5.62 5.39 5.63   HGB 12.0* 11.9* 11.9*   HCT 42.2 39.5 41.1   MCV 75.1* 73.3* 73.0*   MCH 21.4* 22.1* 21.1*   MCHC 28.4* 30.1* 29.0*   RDW 20.8* 20.5* 20.5*   NEPRELIM 11.35*  --   --    WBC 14.5* 17.1* 14.4*   .0 401.0 414.0         Recent Labs   Lab 10/21/24  1207 10/22/24  1153 10/23/24  0455   * 195* 132*   BUN 15 25* 33*   CREATSERUM 0.78 0.97 0.91   EGFRCR 95 83 90   CA 9.5 9.4 9.4    139 143   K 3.8 3.6 3.3*   * 106 105   CO2 25.0 28.0 30.0       Recent Labs   Lab 10/17/24  1153 10/21/24  1207   ALT 12 12   AST 13 9   ALB 4.5 4.4         Imaging:  XR CHEST AP PORTABLE  (CPT=71045)    Result Date: 10/21/2024  CONCLUSION:   Interstitial and alveolar pulmonary edema.  Superimposed infection is a differential in the appropriate clinical setting.  Trace bilateral pleural effusions.      Dictated by (CST): Brenda Garcia MD on 10/21/2024 at 12:44 PM     Finalized by (CST): Brenda Garcia MD on 10/21/2024 at 12:47 PM             Meds:      ipratropium-albuterol  3 mL Nebulization QID    sacubitril-valsartan  1 tablet Oral BID    sodium ferric gluconate  125 mg Intravenous Daily    aspirin  81 mg Oral Daily    atorvastatin  80 mg Oral Daily    latanoprost  1  drop Both Eyes Nightly    carvedilol  6.25 mg Oral BID with meals    clopidogrel  75 mg Oral Daily    DULoxetine  30 mg Oral Daily    fluticasone-salmeterol  1 puff Inhalation BID    umeclidinium bromide  1 puff Inhalation Daily    insulin degludec  15 Units Subcutaneous Nightly    levothyroxine  137 mcg Oral Before breakfast    pregabalin  300 mg Oral BID    warfarin  10 mg Oral Once per day on Monday Tuesday Thursday Friday Saturday    warfarin  7.5 mg Oral Once per day on Sunday Wednesday    predniSONE  40 mg Oral Daily with breakfast    furosemide  40 mg Intravenous BID (Diuretic)    insulin aspart  1-5 Units Subcutaneous TID CC    guaiFENesin ER  600 mg Oral BID    nicotine  1 patch Transdermal Daily         ipratropium-albuterol    oxyCODONE-acetaminophen    glucose **OR** glucose **OR** glucose-vitamin C **OR** dextrose **OR** glucose **OR** glucose **OR** glucose-vitamin C    acetaminophen    polyethylene glycol (PEG 3350)    sennosides    bisacodyl    fleet enema    melatonin    ondansetron    metoclopramide

## 2024-10-23 NOTE — PLAN OF CARE
Problem: Patient Centered Care  Goal: Patient preferences are identified and integrated in the patient's plan of care  Description: Interventions:  - What would you like us to know as we care for you? I am from home with my son.   - Provide timely, complete, and accurate information to patient/family  - Incorporate patient and family knowledge, values, beliefs, and cultural backgrounds into the planning and delivery of care  - Encourage patient/family to participate in care and decision-making at the level they choose  - Honor patient and family perspectives and choices  Outcome: Progressing     Problem: Diabetes/Glucose Control  Goal: Glucose maintained within prescribed range  Description: INTERVENTIONS:  - Monitor Blood Glucose as ordered  - Assess for signs and symptoms of hyperglycemia and hypoglycemia  - Administer ordered medications to maintain glucose within target range  - Assess barriers to adequate nutritional intake and initiate nutrition consult as needed  - Instruct patient on self management of diabetes  Outcome: Progressing     Problem: Patient/Family Goals  Goal: Patient/Family Long Term Goal  Description: Patient's Long Term Goal: To go home    Interventions:  - Receive respiratory neb treatments   -Continue to follow plan of care   - See additional Care Plan goals for specific interventions  Outcome: Progressing  Goal: Patient/Family Short Term Goal  Description: Patient's Short Term Goal: To not feel short of breath    Interventions:   - Stay on oxygen and lower O2 as tolerated.   - Receive neb treatments from respiratory.   - See additional Care Plan goals for specific interventions  Outcome: Progressing     Problem: PAIN - ADULT  Goal: Verbalizes/displays adequate comfort level or patient's stated pain goal  Description: INTERVENTIONS:  - Encourage pt to monitor pain and request assistance  - Assess pain using appropriate pain scale  - Administer analgesics based on type and severity of pain  and evaluate response  - Implement non-pharmacological measures as appropriate and evaluate response  - Consider cultural and social influences on pain and pain management  - Manage/alleviate anxiety  - Utilize distraction and/or relaxation techniques  - Monitor for opioid side effects  - Notify MD/LIP if interventions unsuccessful or patient reports new pain  - Anticipate increased pain with activity and pre-medicate as appropriate  Outcome: Progressing     Problem: SAFETY ADULT - FALL  Goal: Free from fall injury  Description: INTERVENTIONS:  - Assess pt frequently for physical needs  - Identify cognitive and physical deficits and behaviors that affect risk of falls.  - [place] fall precautions as indicated by assessment.  - Educate pt/family on patient safety including physical limitations  - Instruct pt to call for assistance with activity based on assessment  - Modify environment to reduce risk of injury  - Provide assistive devices as appropriate  - Consider OT/PT consult to assist with strengthening/mobility  - Encourage toileting schedule  Outcome: Progressing     Problem: DISCHARGE PLANNING  Goal: Discharge to home or other facility with appropriate resources  Description: INTERVENTIONS:  - Identify barriers to discharge w/pt and caregiver  - Include patient/family/discharge partner in discharge planning  - Arrange for needed discharge resources and transportation as appropriate  - Identify discharge learning needs (meds, wound care, etc)  - Arrange for interpreters to assist at discharge as needed  - Consider post-discharge preferences of patient/family/discharge partner  - Complete POLST form as appropriate  - Assess patient's ability to be responsible for managing their own health  - Refer to Case Management Department for coordinating discharge planning if the patient needs post-hospital services based on physician/LIP order or complex needs related to functional status, cognitive ability or social  support system  Outcome: Progressing     Problem: RESPIRATORY - ADULT  Goal: Achieves optimal ventilation and oxygenation  Description: INTERVENTIONS:  - Assess for changes in respiratory status  - Assess for changes in mentation and behavior  - Position to facilitate oxygenation and minimize respiratory effort  - Oxygen supplementation based on oxygen saturation or ABGs  - Provide Smoking Cessation handout, if applicable  - Encourage broncho-pulmonary hygiene including cough, deep breathe, Incentive Spirometry  - Assess the need for suctioning and perform as needed  - Assess and instruct to report SOB or any respiratory difficulty  - Respiratory Therapy support as indicated  - Manage/alleviate anxiety  - Monitor for signs/symptoms of CO2 retention  Outcome: Progressing   Pt on 1L nasal cannula baseline is room air.

## 2024-10-23 NOTE — HISTORICAL OFFICE NOTE
Continuity of Care Document  7/15/2024  Caleb Rausch - 74 y.o. Male; born [birth year]Summary of episode note, generated on Oct. 23, 2024October 23, 2024   CHIEF COMPLAINT    CHIEF COMPLAINT  Reason for Visit/Chief Complaint   F/U   74-year-old male being followed for extensive coronary disease, mildly reduced ejection fraction, history of PE February 2022.Now having recurrence of shortness of breath similar to previous anginal equivalent, history of long LAD stented segment, multiple bifurcation lesions in the diagonal.No chest pain with exertion. No palpitations or syncope. No lower extremity PINKY, orthopnea or PND.     PROBLEMS  Reconcile with Patient's ChartPROBLEMS  Problem Effective Dates Date resolved Problem Status   Pre-op testing, [SNOMED-CT: 803045073] 5/3/2023 - Active   CAD (coronary artery disease), native coronary artery, [SNOMED-CT: 508900921] 11/12/2019 - Active   PE (pulmonary embolism), [SNOMED-CT: 13589070] 3/4/2022 - Active   History of PTCA, [SNOMED-CT: 393900125] 11/9/2020 - Active   Pure hypercholesterolemia, [SNOMED-CT: 351305176] 11/12/2019 - Active   Hypertension (HTN), primary, [SNOMED-CT: 66341960] 11/12/2019 - Active   Postprocedural hematoma of a circulatory system organ or structure following a cardiac catheterization, [SNOMED-CT: 883628745] 11/12/2019 - Active   Tobacco abuse, [SNOMED-CT: 299832225] 11/12/2019 - Active     ENCOUNTER DIAGNOSIS    ENCOUNTER DIAGNOSIS  Problem Effective Dates Date resolved Problem Status   CAD (coronary artery disease), native coronary artery, [SNOMED-CT: 237454494] 11/12/2019 - Active   PE (pulmonary embolism), [SNOMED-CT: 98995084] 3/4/2022 - Active   History of PTCA, [SNOMED-CT: 507056719] 11/9/2020 - Active   Pure hypercholesterolemia, [SNOMED-CT: 024760915] 11/12/2019 - Active   Hypertension (HTN), primary, [SNOMED-CT: 11290757] 11/12/2019 - Active   Tobacco abuse, [SNOMED-CT: 438692519] 11/12/2019 - Active     VITAL SIGNS    VITAL  SIGNS  Date / Time: 7/15/2024   BP Systolic 118 mmHg   BP Diastolic 80 mmHg   Height 69 inches   Weight 269 lbs   Pulse Rate 84 bpm   BSA (Body Surface Area) 2.5 cc/m2   BMI (Body Mass Index) 39.7 cc/m2   Blood Pressure 118 / 80 mmHg     PHYSICAL EXAMINATION    PHYSICAL EXAMINATION  Header Details   Constitutional 96% O2 RA   Vitals Left Arm Sitting  / 80 mmHg, Pulse rate 84 bpm, Height in 5' 9\", BMI: 39.7, Weight in 268.96 lbs (or) 122 kgs, BSA : 2.49 cc/m²   General Appearance No Acute Distress, Well groomed   Cardiovascular      ALLERGIES, ADVERSE REACTIONS, ALERTS    No data available    MEDICATIONS ADMINISTERED DURING VISIT    No data available    MEDICATIONS  Reconcile with Patient's ChartMEDICATIONS  Medication Start Date Route/Frequency Status   albuterol (VENTOLIN HFA) 108 (90 Base) MCG/ACT inhaler, [RxNorm: 885555] 7/17/2021 INHALE 2 PUFFS INTO THE LUNGS EVERY 6 HOURS AS NEEDED FOR WHEEZING Active   aspirin 81 MG EC tablet, [RxNorm: 801561] 7/17/2021 Take 1 tablet by mouth daily. Active   atorvastatin 80 mg tablet, [RxNorm: 951548] 6/15/2022 Take 1 tablet orally once a day. Active   carvediloL 12.5 mg tablet, [RxNorm: 791619] 4/7/2022 Take 1 tablet orally 2 times a day. Active   CLOPIDOGREL 75MG TABLETS, [RxNorm: 582243] - TAKE 1 TABLET BY MOUTH EVERY DAY Active   DULoxetine 30 mg capsule,delayed release, [RxNorm: 328559] 5/3/2023 Take 1 capsule orally once a day. Active   Entresto 24 mg-26 mg tablet, [RxNorm: 2421250] 5/3/2023 Take 1 tablet orally 2 times a day. Active   Jardiance 10 mg tablet, [RxNorm: 9711564] 5/3/2023 Take 1 tablet orally once a day. Active   levothyroxine 137 mcg tablet, [RxNorm: 326836] 5/3/2023 Take 1 tablet orally once a day. Active   melatonin 5 mg capsule, [RxNorm: 877573] 2/28/2022 Take 1 capsule orally once a day. Active   metFORMIN 500 mg tablet, [RxNorm: 181270] 5/3/2023 Take 1 tablet orally 2 times a day. Active   oxyCODONE-acetaminophen (PERCOCET)  MG per  tablet, [RxNorm: 9491859] 7/17/2021 Take 1 tablet by mouth. Active   potassium chloride ER 20 mEq tablet,extended release, [RxNorm: 440340] 5/3/2023 Take 1 tablet orally once a day. Active   pregabalin 300 mg capsule, [RxNorm: 413333] 5/3/2023 Take 1 capsule orally 2 times a day as needed. Active   torsemide 10 mg tablet, [RxNorm: 597841] 5/3/2023 Take 1 tablet orally once a day. Active   TylenoL 325 mg tablet, [RxNorm: 223946] 2/28/2022 Take 1 tablet orally once a day. Active   Vitamin D2 1,250 mcg (50,000 unit) capsule, [RxNorm: 7201685] 5/3/2023 Take 1 capsule orally once a week Active   warfarin 5 mg tablet, [RxNorm: 069837] 3/15/2024 Take 1 tablet orally once in the evening and as directed by [facility] [phone] Active     ASSESSMENT    Pulmonary embolus: February 2022, has since been on Coumadin due to cost of NOACs. Echocardiogram at [facility] without RV strain therefore no catheter directed thrombolytics. No DVT. Continues to have residual shortness of breath which is multifactorial including likely microvascular and macro epicardial disease however continues to smoke, continues to gain weight and so obesity and tobacco related as well.CAD: Shortness of breath likely anginal equivalent given extensive LAD-diagonal bifurcation stents, last angiogram April 2022 with laser atherectomy, shockwave lithotripsy of LAD, angioplasty diagonal. Repeat angiogram May 2023 with laser atherectomy 1.4 mm catheter, shockwave lithotripsy mid LAD, Cutting Balloon remaining of the LAD, PCI diagonal. New CRUM despite losing weight. Unable to walk due to ortho issues.  -PET stress  -EchoHFrEF: In remission, ejection fraction normal on echo February 2022. No changes cardiac meds at this time.Hypertension: Controlled on current regimen, no changesHyperlipidemia: On high intensity statin for secondary prevention, no changes.Diabetes per primaryFollow-up:  Clinic: 1-2 weeks after testing  Testing/intervention:  echo and stress     FAMILY HISTORY    FAMILY HISTORY  Relationship Age Diagnosis   Brother 0 Hypertension (HTN), primary     GENERAL STATUS    No data available    PAST MEDICAL HISTORY    PAST MEDICAL HISTORY  Problem Date diagonsed Date resolved Status   Pre-op testing, [SNOMED-CT: 534703577] 5/3/2023 - Active   CAD (coronary artery disease), native coronary artery, [SNOMED-CT: 523725831] 11/12/2019 - Active   PE (pulmonary embolism), [SNOMED-CT: 77931184] 3/4/2022 - Active   History of PTCA, [SNOMED-CT: 570805489] 11/9/2020 - Active   Pure hypercholesterolemia, [SNOMED-CT: 151478867] 11/12/2019 - Active   Hypertension (HTN), primary, [SNOMED-CT: 09929659] 11/12/2019 - Active   Postprocedural hematoma of a circulatory system organ or structure following a cardiac catheterization, [SNOMED-CT: 458965991] 11/12/2019 - Active   Tobacco abuse, [SNOMED-CT: 597606274] 11/12/2019 - Active     HISTORY OF PRESENT ILLNESS    74-year-old male being followed for extensive coronary disease, mildly reduced ejection fraction, history of PE February 2022.Now having recurrence of shortness of breath similar to previous anginal equivalent, history of long LAD stented segment, multiple bifurcation lesions in the diagonal.No chest pain with exertion. No palpitations or syncope. No lower extremity PINKY, orthopnea or PND.     IMMUNIZATIONS    No data available    PLAN OF CARE    PLAN OF CARE  Planned Care Date   Cardiac PET/CT Scan (92770) 1/1/1900   Echocardiography - Complete 1/1/1900   Referral Visit - Nilda Argueta [email or web address] : 1/1/1900   Follow up visit - Anival Hammer MD 1/1/1900     PROCEDURES    No data available    RESULTS    RESULTS  Name Result Date Location - Ordered By   INR [LOINC: INR] 2.55 RATIO [Normal] 04/03/2024 12:00:00 AM   Address: tel:   GLUCOSE [LOINC: 2339-0] 180 mg/dL [High] 12/28/2023 06:43:00 AM [facility] LAB ([facility])  [facility]  [address]  tel:   SODIUM [LOINC: 2951-2] 134 mmol/L [Low] 12/28/2023 06:43:00 AM [facility] LAB ([facility])  Address: [address]  [facility]  [address]  tel:   POTASSIUM [LOINC: 2823-3] 4.0 mmol/L 12/28/2023 06:43:00 AM [facility] LAB ([facility])  Address: [address]  [facility]  [address]  tel:   CHLORIDE [LOINC: 2075-0] 103 mmol/L 12/28/2023 06:43:00 AM [facility] LAB ([facility])  Address: [address]  [facility]  [address]  tel:   CO2 [LOINC: 2028-9] 26.0 mmol/L 12/28/2023 06:43:00 AM [facility] LAB ([facility])  Address: [address]  [facility]  [address]  tel:   ANION GAP [LOINC: 33037-3] 5 mmol/L 12/28/2023 06:43:00 AM [facility] LAB ([facility])  Address: [address]  [facility]  [address]  tel:   BUN [LOINC: 6299-2] 32 mg/dL [High] 12/28/2023 06:43:00 AM [facility] LAB ([facility])  Address: [address]  [facility]  [address]  tel:   CREATININE [LOINC: 22929-7] 1.06 mg/dL 12/28/2023 06:43:00 AM [facility] LAB ([facility])  Address: [address]  [facility]  [address]  tel:   BUN/ CREAT RATIO [LOINC: 3097-3] 30.2 [High] 12/28/2023 06:43:00 AM [facility] LAB ([facility])  Address: [address]  [facility]  [address]  tel:   CALCIUM [LOINC: 61702-9] 9.5 mg/dL 12/28/2023 06:43:00 AM [facility] LAB ([facility])  Address: [address]  [facility]  [address]  tel:   OSMOLALITY CALCULATED [LOINC: 78047-1] 289 mOsm/kg 12/28/2023 06:43:00 AM [facility] LAB ([facility])  Address: [address]  [facility]  [address]  tel:   E GFR CR [LOINC: 18477-1] 75 mL/min/1.73m2 12/28/2023 06:43:00 AM [facility] LAB ([facility])  Address: [address]  [facility]  [address]  tel:   TSH [LOINC: 55100-0] 3.879 mIU/mL 12/25/2023 06:57:00 AM  [facility] LAB ([facility])  Address: [address] [facility]  [address]  tel:   Nuclear PET 1.Stress EKG is non-diagnostic secondary to resting EKG changes. 2.Pt denied chest pain.3.Rare PAC's.1.This is an equivocal perfusion study. 2.Small fixed perfusion abnormality of moderate intensity in the apical segment. 3.The left ventricular cavity is noted to be normal on the stress studies. The stress left ventricular ejection fraction was calculated to be 42% and left ventricular global function is mildly reduced. The rest left ventricular cavity is noted to be normal. The rest left ventricular ejection fraction was calculated to be 78% and rest left ventricular global function is normal. 4/25/2023 1:30:00 PM Anival Hammer MD   Trans Thoracic Echocardiogram 1.The left ventricle is normal in size. Moderate left ventricular hypertrophy is noted. Global left ventricular systolic function is normal. The left ventricular ejection fraction is 54%. Left ventricular diastolic function is normal. 4/25/2023 3:30:00 PM Anival Hammer MD     REVIEW OF SYSTEMS    REVIEW OF SYSTEMS  Header Details   Cardiovascular No history of Chest pain, CRUM, Palpitations, Syncope, PND, Orthopnea, Edema, Claudication   Respiratory No history of SOB, Wheezing, Sputum   Hem/Lymphatic No history of Easy bruising, Blood clots, Hx of blood transfusion, Anemia, Bleeding problems     SOCIAL HISTORY    SOCIAL HISTORY  Social History Element Description Effective Dates   Smoking status Heavy tobacco smoker 3/21/1965     FUNCTIONAL STATUS    No data available    MEDICAL EQUIPMENT    No data available    Goals Sections    No data available    REASON FOR REFERRAL               Health Concerns Section    No data available    COGNITIVE/MENTAL STATUS    No data available    Patient Demographics    Patient Demographics  Patient Address Patient Name Communication   [address] [address]  [place] [address] Caleb Rausch [phone]  (Mobile)     Patient Demographics  Language Race / Ethnicity Marital Status   English - Spoken (Preferred) White /  or  Single     Document Information    Primary Care Provider Other Service Providers Document Coverage Dates   Anival Hammer  NPI: [id number]  [phone] (Work)  [address], [address]  [place] [address]  [place] [address]  [employer]  [phone] (Work)  [address] [place] [address] Kimberly Toussaint  NPI: [id number]  [phone] (Work)  [address] [place] [address], [address]  [place] [address]  [place] [address]  Nurses Jul. 15, 2024July 15, 2024      Organization   [place] [phone] (Work)  [address] [place] [address], [address]  [place] [address]  [place] [address]     Encounter Providers Encounter Date    Jul. 15, 2024July 15, 2024     Legal Authenticator    Anival Hammer  NPI: [id number]  [phone] (Work)  [address] [place] [address], [address]  [place] [address]  [place] [address]

## 2024-10-23 NOTE — CONSULTS
Cardiology Consult Note    Caleb Rausch Jr. Patient Status:  Inpatient    3/23/1950 MRN [id number]   Location [facility]5W Attending Boaz Villalba MD   Hosp Day # 2 PCP Charlotte Rey MD     HPI.  Caleb Rausch Jr. is a 74 year old male with a history of coronary artery disease with prior PCI, mild LV dysfunction EF 40 to 45%, PE, diabetes who presents emergency room 10/21 with worsening shortness of breath.  Patient was noted to be in respiratory distress and started on BiPAP.  Lab work pertinent for NT proBNP 2100, INR 2, WBC 14.5 and EKG shows sinus rhythm with no ischemic changes.  Patient is diagnosis COPD exacerbation, administered nebulizer treatment, Solu-Medrol IV and admitted for further management of COPD exacerbation.    Since admission, patient has been continued on nebulizer treatments, steroids.  There is concern for pulmonary edema on chest x-ray and examination, Lasix IV was also started.    Prior cardiac workup  Echocardiogram 10/22/2024  1. Left ventricle: The cavity size was increased. Wall thickness was normal.      Systolic function was reduced. The estimated ejection fraction was      35-40%, by 3D assessment. There was moderate diffuse hypokinesis with      regional variations. There is severe hypokinesis of the apex. Features      are consistent with a pseudonormal left ventricular filling pattern, with      concomitant abnormal relaxation and increased filling pressure - grade 2      diastolic dysfunction. The wall mass index (3D) is 131g/m^2.   2. Right ventricle: The cavity size was increased. Systolic function was      reduced.   3. Aortic root: The aortic root was 3.9cm diameter.   4. Mitral valve: There was mild regurgitation.   5. Tricuspid valve: There was mild regurgitation.   6. Pericardium, extracardiac: There was no pericardial effusion          --------------------------------------------------------------------------------------------------------------------------------  ROS 10 systems reviewed, pertinent findings above.  ROS    History:  Past Medical History:    Anxiety disorder, unspecified    Anxiety state    Atherosclerosis of coronary artery    stents x 6    Chronic low back pain with bilateral sciatica, left worse than right    Chronic obstructive pulmonary disease, unspecified ([facility])    Coronary atherosclerosis    Depression    Diabetes ([facility])    borderline     Disorder of thyroid    Essential hypertension    Glaucoma    first visit with IRMA, patient was taking Timolol OU BID for 20 years, has not used gtts in over 1 year because he ran out of gtts and had no refills, fundus photos taken today    Heart attack ([facility])    High blood pressure    High cholesterol    Hyperlipidemia    Kidney stone    Left Sided Neck pain, acute    Lumbar stenosis with neurogenic claudication    Major depressive disorder, recurrent, unspecified ([facility])    Morbid obesity with BMI of 40.0-44.9, adult ([facility])    Neuropathy    Pneumonia due to COVID-19 virus    Thyroid disease     Past Surgical History:   Procedure Laterality Date    Cataract extraction extracapsular w/ intraocular lens implantation Right 2018    [place]    Cath bare metal stent (bms)      Circumcision,othr  08/04/2020    Dr. Bonilla @ [facility]    Cysto/uretero w/lithotripsy Left 08/04/2020    Dr. 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     Iridotomy/iridectomy by laser      unknown Dr    Other      cardiac stents     Family History   Problem Relation Age of Onset    Heart Attack Father     Hypertension Brother     Glaucoma Neg     Macular degeneration Neg       reports that he has been smoking cigarettes. He has quit using smokeless tobacco. He reports that he does not drink alcohol and does not use drugs.    Objective:   Temp: 98.1 °F (36.7 °C)  Pulse:  78  Resp: 20  BP: 139/79    Intake/Output:     Intake/Output Summary (Last 24 hours) at 10/23/2024 1244  Last data filed at 10/23/2024 0940  Gross per 24 hour   Intake 1052 ml   Output --   Net 1052 ml       Physical Exam:     General: AOx3  HEENT: Normocephalic, anicteric sclera, neck supple.  Neck: No JVD, carotids 2+, no bruits.  Cardiac: Regular rate and rhythm. S1, S2 normal. No murmur, pericardial rub, S3.  Lungs: Clear without wheezes, rales, rhonchi or dullness.  Normal excursions and effort.  Abdomen: Soft, non-tender. BS-present.  Extremities: Without clubbing, cyanosis or edema.  Peripheral pulses are 2+.  Neurologic: Non-focal  Skin: Warm and dry.       Assessment:    Shortness of breath  COPD exacerbation  Heart failure  Coronary artery disease with prior RCA, LAD PCI      Plan:  32-year-old male present with shortness of breath admitted for management of acutely decompensated heart failure and COPD exacerbation.  Patient has improved significantly since admission.  He states he is feeling close to baseline in terms of his respiratory status.  He denies orthopnea, PND, lower extremity edema  Patient looks fairly euvolemic, has been receiving Lasix 40 mg IV push twice daily, will schedule last dose for tonight.  Resume home torsemide tomorrow.  Addition to oral diuretics tomorrow, anticipate discharge  soon  Slight drop in EF, no ischemic symptoms-can perform outpatient ischemic evaluation with Dr. Hammer.    Thank you for allowing me to take part in the care of Calbe Rausch Jr.. Please call with any questions of concerns.      Level of care: C5    Dr. Kathy Ramírez,   October 23, 2024  12:44 PM

## 2024-10-23 NOTE — BH PROGRESS NOTE
Care Coordinator Mental Health was consulted for PHQ-4 score = 7.     Writer met with pt at bedside. Pt calm and pleasant. Per chart pt has a hx of depression and anxiety. Pt reported fleeting feelings of feeling down and having little interest in doing things. Pt reports he enjoys going to grocery store and spending time with family. Pt denies SI. No safety concerns reported or observed. Pt currently reports no OP providers for psychiatry or therapy but is open to resources.     Pt agreeable to referrals; which are located in the discharge instructions. Pt had no further questions/concerns at this time.

## 2024-10-24 VITALS
WEIGHT: 255.63 LBS | RESPIRATION RATE: 20 BRPM | DIASTOLIC BLOOD PRESSURE: 67 MMHG | OXYGEN SATURATION: 95 % | HEART RATE: 85 BPM | TEMPERATURE: 98 F | BODY MASS INDEX: 38 KG/M2 | SYSTOLIC BLOOD PRESSURE: 129 MMHG

## 2024-10-24 LAB
ANION GAP SERPL CALC-SCNC: 8 MMOL/L (ref 0–18)
BUN BLD-MCNC: 34 MG/DL (ref 9–23)
BUN/CREAT SERPL: 41 (ref 10–20)
CALCIUM BLD-MCNC: 9.6 MG/DL (ref 8.7–10.4)
CHLORIDE SERPL-SCNC: 105 MMOL/L (ref 98–112)
CO2 SERPL-SCNC: 28 MMOL/L (ref 21–32)
CREAT BLD-MCNC: 0.83 MG/DL
DEPRECATED RDW RBC AUTO: 51.6 FL (ref 35.1–46.3)
EGFRCR SERPLBLD CKD-EPI 2021: 93 ML/MIN/1.73M2 (ref 60–?)
ERYTHROCYTE [DISTWIDTH] IN BLOOD BY AUTOMATED COUNT: 20.7 % (ref 11–15)
GLUCOSE BLD-MCNC: 118 MG/DL (ref 70–99)
GLUCOSE BLDC GLUCOMTR-MCNC: 119 MG/DL (ref 70–99)
GLUCOSE BLDC GLUCOMTR-MCNC: 194 MG/DL (ref 70–99)
HCT VFR BLD AUTO: 41.9 %
HGB BLD-MCNC: 12.7 G/DL
INR BLD: 2.62 (ref 0.8–1.2)
MCH RBC QN AUTO: 22.1 PG (ref 26–34)
MCHC RBC AUTO-ENTMCNC: 30.3 G/DL (ref 31–37)
MCV RBC AUTO: 73 FL
OSMOLALITY SERPL CALC.SUM OF ELEC: 301 MOSM/KG (ref 275–295)
PLATELET # BLD AUTO: 409 10(3)UL (ref 150–450)
POTASSIUM SERPL-SCNC: 3.4 MMOL/L (ref 3.5–5.1)
POTASSIUM SERPL-SCNC: 3.4 MMOL/L (ref 3.5–5.1)
PROTHROMBIN TIME: 29.6 SECONDS (ref 11.6–14.8)
RBC # BLD AUTO: 5.74 X10(6)UL
SODIUM SERPL-SCNC: 141 MMOL/L (ref 136–145)
WBC # BLD AUTO: 15.7 X10(3) UL (ref 4–11)

## 2024-10-24 PROCEDURE — 85027 COMPLETE CBC AUTOMATED: CPT | Performed by: INTERNAL MEDICINE

## 2024-10-24 PROCEDURE — 85610 PROTHROMBIN TIME: CPT | Performed by: INTERNAL MEDICINE

## 2024-10-24 PROCEDURE — 84132 ASSAY OF SERUM POTASSIUM: CPT | Performed by: HOSPITALIST

## 2024-10-24 PROCEDURE — 80048 BASIC METABOLIC PNL TOTAL CA: CPT | Performed by: INTERNAL MEDICINE

## 2024-10-24 PROCEDURE — 94799 UNLISTED PULMONARY SVC/PX: CPT

## 2024-10-24 PROCEDURE — 82962 GLUCOSE BLOOD TEST: CPT

## 2024-10-24 PROCEDURE — 94640 AIRWAY INHALATION TREATMENT: CPT

## 2024-10-24 RX ORDER — POTASSIUM CHLORIDE 1500 MG/1
40 TABLET, EXTENDED RELEASE ORAL EVERY 4 HOURS
Status: COMPLETED | OUTPATIENT
Start: 2024-10-24 | End: 2024-10-24

## 2024-10-24 RX ORDER — PREDNISONE 20 MG/1
40 TABLET ORAL
Qty: 6 TABLET | Refills: 0 | Status: SHIPPED | OUTPATIENT
Start: 2024-10-25 | End: 2024-10-28

## 2024-10-24 RX ORDER — FERROUS SULFATE 325(65) MG
325 TABLET, DELAYED RELEASE (ENTERIC COATED) ORAL
Qty: 30 TABLET | Refills: 0 | Status: SHIPPED | OUTPATIENT
Start: 2024-10-24

## 2024-10-24 NOTE — PHYSICAL THERAPY NOTE
Chart reviewed and attempted treatment pt reported that he was feeling SOB. Pt was 90-93% on room air. Pt requested to cristal O2. O2 donned on 2L. Handed pt off to Respiratory therapist. Will attempted later if schedule permits and is appropriate. RN aware of pt's status.

## 2024-10-24 NOTE — DISCHARGE SUMMARY
[facility] [place]   Patient ID:  Caleb Rausch Jr.  [id number]  74 year old  3/23/1950    Admit date: 10/21/2024    Discharge date and time: 10/24/2024     Attending Physician: Boaz Villalba MD     Primary Care Physician: Charlotte Rey MD     Admit Dx: COPD exacerbation (HCC) [J44.1]      Discharge Diagnosis CHFrEF acute on chronic COPD w exacerbation, iron def anemia   DM CAD, HTN HL, current nicotine use  glacuoma    Discharged Condition: stable    Disposition: home    Important follow up:  [facility]  [address] [facility] [address]  [facility] [id number] [phone]  Schedule an appointment as soon as possible for a visit      Charlotte Rey MD  [address] [facility] [id number] [phone]    Schedule an appointment as soon as possible for a visit in 1 week(s)      Niels Mcneal MD  [address] [facility]  [address]  [facility] [id number] [phone]    Follow up in 1 week(s)          Please refer to prior H&P on admission date.    Hospital Course:   74 year old male with PMH HFrEF, ANNIKA, Chronic back pains, MDD, DM2, Obesity, Tob abuse, COPD, glaucoma , HLD, HTN, Neuropathy who presents with approximately 2 days of shortness of breath. Seems consistent with COPD vs CHF exacerbation         CHFrEF acute on chronic  - on lasix 40 iv bid- switched back to home dose for dc   - cards f/u after dc   - 10/22 echo revliewed EF 35-45- GDMT:  - entresto restrted, pool as able   - follow insouts, daily weights bmp, mg tele  - see below re iron/anemia  - pt high risk for readmission - med and diet non compliance on day of dc w chicken nugget meal brought in by daughter re- reviewed heart failure diet asked if they would like formal CHF education repeat course w daughter present- declined      COPD w exac  - on steroid (pred) BD nebs  - pulm to see in house switched to regimen for dc  - ongoing nicotine use addressed as below      Iron def anemia  - start IV iron exac w chf po  at dc   - d/w patient no prior colonscopy no overt s/sx bleeding and at ths time declines pursuing workup reiterated w daughter present today   - may need heme outpatient f/u w ongoing AC as noted below      DM  - cont basal/ bolus Ssi prn monitor w steroids  - Hold home oral medications including metformin Jardiance  -Hold home Ozempic  - resume home meds for dc      CAD, HTN, HLD   - Continue carvedilol , statin plavix        Hypothyroid- synthroid  ANNIKA/ MDD back pain- cymbalta / home meds- S seeing in house resources given     Hx PE 3 yrs ago  - provoked sp COVID  - d/w DR Layne- will review re possible stopping may need heme input (outpatient) as well- PCP to refer to heme can f/u for this and iron def anemia      Tob abuse  Smoking cessation counseling done on admission     Day of discharge Exam   Vitals:    10/24/24 1319   BP: 129/67   Pulse: 85   Resp: 20   Temp: 97.6 °F (36.4 °C)       Exam on day of discharge:  Gen: No acute distress  CV: RRR  Lungs: CTAB, good respiratory effort  Abdomen: s/nt/nd  Neuro: no focal deficits      Discharge meds     Medication List        START taking these medications      ferrous sulfate 325 (65 FE) MG Tbec  Take 1 tablet (325 mg total) by mouth daily with breakfast.     predniSONE 20 MG Tabs  Commonly known as: Deltasone  Take 2 tablets (40 mg total) by mouth daily with breakfast for 3 days.  Start taking on: October 25, 2024            CONTINUE taking these medications      Accu-Chek Guide Strp     Accu-Chek Softclix Lancets Misc     albuterol 108 (90 Base) MCG/ACT Aers  Commonly known as: Ventolin HFA  Inhale 2 puffs into the lungs every 4 (four) hours as needed for Wheezing.     aspirin 81 MG Tbec  Take 1 tablet (81 mg total) by mouth daily.     atorvastatin 80 MG Tabs  Commonly known as: Lipitor  Take 1 tablet (80 mg total) by mouth daily.     Basaglar KwikPen 100 UNIT/ML Sopn  Generic drug: insulin glargine  Inject 10 Units into the skin daily.     BD Pen Needle Amy  2nd Gen 32G X 4 MM Misc  Generic drug: Insulin Pen Needle  Inject 1 each as directed daily.     * Blood Glucose Monitoring Suppl Kit  Please use to check blood sugar twice daily.     * Blood Glucose Monitoring Suppl Misc  Please use to check blood sugar twice daily     * Blood Glucose Monitoring Suppl Misc  Please use to check blood sugar twice daily     carvedilol 6.25 MG Tabs  Commonly known as: Coreg  Take 1 tablet (6.25 mg total) by mouth 2 (two) times daily with meals.     clopidogrel 75 MG Tabs  Commonly known as: Plavix  Take 1 tablet (75 mg total) by mouth daily.     DULoxetine 30 MG Cpep  Commonly known as: Cymbalta  Take 1 capsule (30 mg total) by mouth daily.     Entresto 24-26 MG Tabs  Generic drug: sacubitril-valsartan  Take 1 tablet by mouth daily.     gabapentin 300 MG Caps  Commonly known as: Neurontin  TAKE ONE CAPSULE BY MOUTH THREE TIMES DAILY     Gvoke HypoPen 2-Pack 1 MG/0.2ML injection  Generic drug: glucagon     Jardiance 25 MG Tabs  Generic drug: empagliflozin  TAKE ONE TABLET BY MOUTH ONE TIME DAILY     latanoprost 0.005 % Soln  Commonly known as: Xalatan     levothyroxine 137 MCG Tabs  Commonly known as: Synthroid  Take 137 mcg by mouth before breakfast.     Lumigan 0.01 % Soln  Generic drug: bimatoprost     metFORMIN HCl 1000 MG Tabs  Commonly known as: GLUCOPHAGE  Take 1 tablet (1,000 mg total) by mouth 2 (two) times daily with meals.     Movantik 25 MG Tabs  Generic drug: Naloxegol Oxalate     oxyCODONE-acetaminophen  MG Tabs  Commonly known as: Percocet     Ozempic (0.25 or 0.5 MG/DOSE) 2 MG/3ML Sopn  Generic drug: semaglutide  Inject 0.5 mg into the skin once a week.     Polyethylene Glycol 3350 17 g Pack  Commonly known as: MiraLax  Take 17 g by mouth daily.     Potassium Chloride ER 20 MEQ Tbcr  Take 1 tablet by mouth daily.     pregabalin 300 MG Caps  Commonly known as: LYRICA  Take 1 capsule (300 mg total) by mouth 2 (two) times daily.     spironolactone 25 MG Tabs  Commonly  known as: Aldactone  Take 1 tablet (25 mg total) by mouth daily.     torsemide 20 MG Tabs  Commonly known as: Demadex  Take 1 tablet (20 mg total) by mouth daily.     Trelegy Ellipta 200-62.5-25 MCG/ACT Aepb  Generic drug: fluticasone-umeclidin-vilant  Inhale 1 puff into the lungs daily.     warfarin 5 MG Tabs  Commonly known as: Coumadin           * This list has 3 medication(s) that are the same as other medications prescribed for you. Read the directions carefully, and ask your doctor or other care provider to review them with you.                STOP taking these medications      docusate sodium 100 MG Caps  Commonly known as: Dulcolax Stool Softener     zolpidem 5 MG Tabs  Commonly known as: Ambien               Where to Get Your Medications        These medications were sent to [id number] - [address] [phone], [phone]  [facility] [facility] [address]      Phone: [phone]   ferrous sulfate 325 (65 FE) MG Tbec  metFORMIN HCl 1000 MG Tabs  predniSONE 20 MG Tabs         Consults: NURSING CONSULT TO DIETITIAN  IP CONSULT TO SOCIAL WORK  IP CONSULT TO PULMONOLOGY  IP CONSULT TO CARDIOLOGY    Radiology: CARD ECHO 2D DOPPLER CONTRAST (CPT=93306)    Result Date: 10/22/2024  Transthoracic Echocardiogram Name:Caleb Rausch Date: 10/22/2024 :  1950 Ht:  (69in)  BP: 124 / 62 MRN:  [id number]    Age:  74years    Wt:  (257lb) HR: 56bpm Loc:  [facility]       Gndr: M          BSA: 2.3m^2 Sonographer: Steffany MCMILLAN Ordering:    Bill Hendrickson ---------------------------------------------------------------------------- History/Indications:   Dyspnea.  Elevated B-Type Natriuretic Peptide.  Heart Failure with reduced ejection fraction.  Chronic Obstructive Pulmonary Disease.  Risk factors:  Hypertension. Diabetes mellitus. Dyslipidemia. Smoker. ---------------------------------------------------------------------------- Procedure information:  A transthoracic complete 2D  study was performed. Additional evaluation included M-mode, complete spectral Doppler, and color Doppler.  Patient status:  Inpatient.  Location:  Bedside.    Comparison was made to the study of 03/23/2023.    This was a routine study. Transthoracic echocardiography for ventricular function evaluation and assessment of valvular function. Image quality was suboptimal. The study was technically limited due to poor acoustic window availability and body habitus. Intravenous contrast (Definity) was administered to evaluate left ventricular function. ECG rhythm:   Sinus bradycardia ---------------------------------------------------------------------------- Conclusions: 1. Left ventricle: The cavity size was increased. Wall thickness was normal.    Systolic function was reduced. The estimated ejection fraction was    35-40%, by 3D assessment. There was moderate diffuse hypokinesis with    regional variations. There is severe hypokinesis of the apex. Features    are consistent with a pseudonormal left ventricular filling pattern, with    concomitant abnormal relaxation and increased filling pressure - grade 2    diastolic dysfunction. The wall mass index (3D) is 131g/m^2. 2. Right ventricle: The cavity size was increased. Systolic function was    reduced. 3. Aortic root: The aortic root was 3.9cm diameter. 4. Mitral valve: There was mild regurgitation. 5. Tricuspid valve: There was mild regurgitation. 6. Pericardium, extracardiac: There was no pericardial effusion. Impressions:  This study is compared with previous dated 12/23/23: * ---------------------------------------------------------------------------- * Findings: Left ventricle:  The cavity size was increased. Wall thickness was normal. Systolic function was reduced. The estimated ejection fraction was 35-40%, by 3D assessment. There was moderate diffuse hypokinesis with regional variations. There is severe hypokinesis of the apex. Regional wall motion:   There is  hypokinesis of the apex.   Features are consistent with a pseudonormal left ventricular filling pattern, with concomitant abnormal relaxation and increased filling pressure - grade 2 diastolic dysfunction. Left atrium:  The left atrial volume was normal. Right ventricle:  The cavity size was increased. Systolic function was reduced. Right atrium:  The atrium was normal in size. Mitral valve:  The annulus was mildly calcified. Leaflet separation was normal.  Doppler:  Transvalvular velocity was within the normal range. There was no evidence for stenosis. There was mild regurgitation. Aortic valve:  Not well visualized. The valve was structurally normal. The valve was probably trileaflet. Cusp separation was normal.  Doppler: Transvalvular velocity was within the normal range. There was no evidence for stenosis. There was no significant regurgitation. Tricuspid valve:  The valve is structurally normal. Leaflet separation was normal.  Doppler:  Transvalvular velocity was within the normal range. There was no evidence for stenosis. There was mild regurgitation. Pulmonic valve:   Not well visualized. Cusp separation was normal.  Doppler:  Transvalvular velocity was within the normal range. There was no evidence for stenosis. There was no significant regurgitation. Pericardium:   There was no pericardial effusion. Aorta: Aortic root: The aortic root was 3.9cm diameter. Ascending aorta: The ascending aorta was 3.6cm diameter. Pulmonary arteries: Systolic pressure could not be accurately estimated. Systemic veins:  Central venous respirophasic diameter changes are in the normal range (>50%). Inferior vena cava: The IVC was normal-sized. ---------------------------------------------------------------------------- Measurements  Left ventricle         Value        Ref       12/23/2023  LV end-diastolic       227   ml     --------- ----------  volume, 3D  LV end-systolic        124   ml     --------- ----------  volume, 3D  LV  ejection        (L) 45    %      52 - 72   ----------  fraction, 3D  LV end-diastolic   (H) 99    ml/m^2 <=79      ----------  volume/bsa, 3D  LV end-systolic    (H) 54    ml/m^2 <=32      ----------  volume/bsa, 3D  LV wall mass, 3D       301   g      --------- ----------  LV wall mass/bsa,      131   g/m^2  --------- ----------  3D  IVS thickness, ED,     0.9   cm     0.6 - 1.0 1.5  PLAX  LV ID, ED, PLAX    (H) 6.8   cm     4.2 - 5.8 5.3  LV ID, ES, PLAX    (H) 5.3   cm     2.5 - 4.0 4.3  LV PW thickness,       1.0   cm     0.6 - 1.0 1.3  ED, PLAX  IVS/LV PW ratio,       0.90         --------- 1.15  ED, PLAX  LV PW/LV ID ratio,     0.15         --------- 0.25  ED, PLAX  LV ejection        (L) 44    %      52 - 72   38  fraction  LV e', lateral     (L) 9.2   cm/sec >=10.0    7.6  LV E/e', lateral       9            <=13      ----------  LV e', medial      (L) 6.9   cm/sec >=7.0     5.9  LV E/e', medial        12           --------- ----------  LV e', average         8.1   cm/sec --------- 6.7  LV E/e', average       11           <=14      ----------  Ascending aorta        Value        Ref       12/23/2023  Ascending aorta ID     3.6   cm     2.2 - 3.8 ----------  Left atrium            Value        Ref       12/23/2023  LA ID, A-P, ES     (H) 4.1   cm     3.0 - 4.0 ----------  LA volume, S           55    ml     18 - 58   48  LA volume/bsa, S       24    ml/m^2 16 - 34   20  LA volume, ES, 1-p     55    ml     18 - 58   44  A4C  LA volume, ES, 1-p     53    ml     18 - 58   50  A2C  LA volume, ES, A/L     58    ml     --------- 53  LA volume/bsa, ES,     25    ml/m^2 16 - 34   21  A/L  LA/aortic root         1.05         --------- ----------  ratio  Mitral valve           Value        Ref       12/23/2023  Mitral E-wave peak     0.85  m/sec  --------- ----------  velocity  Mitral A-wave peak     0.64  m/sec  --------- ----------  velocity  Mitral                 185   ms     --------- ----------  deceleration time   Mitral peak            3     mm Hg  --------- ----------  gradient, D  Mitral E/A ratio,      1.3          --------- ----------  peak  Inferior vena cava     Value        Ref       12/23/2023  ID                     1.4   cm     <=2.1     1.7  Right ventricle        Value        Ref       12/23/2023  TAPSE, MM          (L) 1.42  cm     >=1.70    2.15  RV s', lateral         9.8   cm/sec >=9.5     14.6 Legend: (L)  and  (H)  valentina values outside specified reference range. ---------------------------------------------------------------------------- Prepared and electronically signed by Faisal Ferrell MD 10/22/2024 12:33     XR CHEST AP PORTABLE  (CPT=71045)    Result Date: 10/21/2024  PROCEDURE: XR CHEST AP PORTABLE  (CPT=71045) TIME: 1222 hours.   COMPARISON: [facility], XR CHEST AP PORTABLE (CPT=71045), 12/23/2023, 4:24 AM.  [facility], XR CHEST AP PORTABLE (CPT=71045), 9/25/2023, 7:02 AM.  [facility], XR CHEST AP PORTABLE (CPT=71045), 1/18/2024, 3:20 PM.  INDICATIONS: Sever shortness of breath x today.  TECHNIQUE:   Single view.   FINDINGS:   DEVICES: None. CARDIOMEDIASTINAL:The cardiomediastinal silhouette is  mildly enlarged.  There is calcific atherosclerosis of the thoracic aorta. JAMES:     The hilar contours are within normal limits. LUNGS/PLEURA: There are increased interstitial markings bilaterally.  Mild hazy alveolar opacities of the mid to lower lungs.  Slight blunting of the costophrenic angles bilaterally.  BONES/OTHER:  There is multilevel degenerative disease of the spine.  Degenerative changes of the shoulders          CONCLUSION:   Interstitial and alveolar pulmonary edema.  Superimposed infection is a differential in the appropriate clinical setting.  Trace bilateral pleural effusions.      Dictated by (CST): Brenda Garcia MD on 10/21/2024 at 12:44 PM     Finalized by (CST): Brenda Garcia MD on 10/21/2024 at 12:47 PM             Operative  Procedures:        Total Time Coordinating Care: > than 30 minutes    Patient had opportunity to ask questions and state understand and agree with therapeutic plan as outlined      Boaz Villalba MD  [facility] Hospitalist  [phone]  10/24/2024  1:30 PM

## 2024-10-24 NOTE — PROGRESS NOTES
[facility] - CARDIOLOGY PROGRESS NOTE      Caleb Rausch Jr. Patient Status:  Inpatient    3/23/1950 MRN [id number]   Location [facility]5W Attending Boaz Villalba MD   Hosp Day # 3 PCP Charlotte Rey MD         Assessment and Plan:   Shortness of breath  Status post IV diuresis appears euvolemic on exam now.  Resume home dose diuretics.  COPD exacerbation  Stable no further wheezing pulmonary is following  Heart failure systolic  Resume home diuretics as above.  Continue home dose Entresto and torsemide.  Coronary artery disease with prior RCA, LAD PCI stable no active ischemia  Ischemic evaluation as outpatient EF 35 to 40% further management as per Dr. Hammer  Continue Plavix.  History of PE on Coumadin.  INR is therapeutic 2.6.      L3, will sign off okay for home discharge.      Subjective:   Caleb Rausch Jr. is a(n) 74 year old male with no new c/o today feels well wants to go home.  No events overnight.    Telemetry V paced 70 bpm    Objective:   Blood pressure 112/74, pulse 91, temperature 97.5 °F (36.4 °C), temperature source Oral, resp. rate 19, weight 255 lb 9.6 oz (115.9 kg), SpO2 91%.    Exam  Gen: No acute distress, alert and oriented obese male  Neck:supple,no JVD  Pulm: Lungs ok, normal respiratory effort no crackles.  CV: Heart with regular rate and rhythm,no pathologic murmur  Abd: Abdomen soft, nontender, nondistended,  bowel sounds present  Ext:  no clubbing, no cyanosis  Neuro: no focal deficits  Skin: no rashes or lesions      Scheduled Meds:    potassium chloride  40 mEq Oral Q4H    ipratropium-albuterol  3 mL Nebulization QID    sacubitril-valsartan  1 tablet Oral BID    sodium ferric gluconate  125 mg Intravenous Daily    torsemide  20 mg Oral Daily    aspirin  81 mg Oral Daily    atorvastatin  80 mg Oral Daily    latanoprost  1 drop Both Eyes Nightly    carvedilol  6.25 mg Oral BID with meals    clopidogrel  75 mg Oral Daily    DULoxetine  30 mg Oral Daily     fluticasone-salmeterol  1 puff Inhalation BID    umeclidinium bromide  1 puff Inhalation Daily    insulin degludec  15 Units Subcutaneous Nightly    levothyroxine  137 mcg Oral Before breakfast    pregabalin  300 mg Oral BID    warfarin  10 mg Oral Once per day on Monday Tuesday Thursday Friday Saturday    warfarin  7.5 mg Oral Once per day on Sunday Wednesday    predniSONE  40 mg Oral Daily with breakfast    insulin aspart  1-5 Units Subcutaneous TID CC    guaiFENesin ER  600 mg Oral BID    nicotine  1 patch Transdermal Daily       Continuous Infusions:     Results:     Lab Results   Component Value Date    WBC 15.7 (H) 10/24/2024    HGB 12.7 (L) 10/24/2024    HCT 41.9 10/24/2024    .0 10/24/2024    CREATSERUM 0.83 10/24/2024    BUN 34 (H) 10/24/2024     10/24/2024    K 3.4 (L) 10/24/2024    K 3.4 (L) 10/24/2024     10/24/2024    CO2 28.0 10/24/2024     (H) 10/24/2024    CA 9.6 10/24/2024    ALB 4.4 10/21/2024    ALKPHO 86 10/21/2024    BILT 0.4 10/21/2024    TP 7.2 10/21/2024    AST 9 10/21/2024    ALT 12 10/21/2024    PTT 48.6 (H) 10/21/2024    INR 2.62 (H) 10/24/2024    T4F 1.1 10/17/2024    TSH 5.202 (H) 10/17/2024    PSA 0.42 07/01/2021    DDIMER 0.35 03/29/2024    ESRML 11 07/14/2018    CRP <0.29 01/19/2022    MG 2.2 01/19/2024    PHOS 2.6 12/25/2023    TROP <0.045 11/20/2020    CK 65 01/08/2022    B12 351 06/29/2020       No results found.          Benito Perrin MD  [facility]  Interventional Cardiology  10/24/2024

## 2024-10-24 NOTE — PROGRESS NOTES
[facility]    Caleb Rausch . Patient Status:  Inpatient    3/23/1950 MRN [id number]   Location [facility]5W Attending Boaz Villalba MD   Hosp Day # 3 PCP Charlotte Rey MD     Date of Admission: 10/21/2024 11:57 AM    Admission Diagnosis: COPD exacerbation (HCC) [J44.1]    S: Feeling better but pain with Iron infusion.     Scheduled Medications:     potassium chloride  40 mEq Oral Q4H    ipratropium-albuterol  3 mL Nebulization QID    sacubitril-valsartan  1 tablet Oral BID    sodium ferric gluconate  125 mg Intravenous Daily    torsemide  20 mg Oral Daily    aspirin  81 mg Oral Daily    atorvastatin  80 mg Oral Daily    latanoprost  1 drop Both Eyes Nightly    carvedilol  6.25 mg Oral BID with meals    clopidogrel  75 mg Oral Daily    DULoxetine  30 mg Oral Daily    fluticasone-salmeterol  1 puff Inhalation BID    umeclidinium bromide  1 puff Inhalation Daily    insulin degludec  15 Units Subcutaneous Nightly    levothyroxine  137 mcg Oral Before breakfast    pregabalin  300 mg Oral BID    warfarin  10 mg Oral Once per day on     warfarin  7.5 mg Oral Once per day on     predniSONE  40 mg Oral Daily with breakfast    insulin aspart  1-5 Units Subcutaneous TID CC    guaiFENesin ER  600 mg Oral BID    nicotine  1 patch Transdermal Daily       Infusing Medications:      PRN Medications:    ipratropium-albuterol    gabapentin    oxyCODONE-acetaminophen    glucose **OR** glucose **OR** glucose-vitamin C **OR** dextrose **OR** glucose **OR** glucose **OR** glucose-vitamin C    acetaminophen    polyethylene glycol (PEG 3350)    sennosides    bisacodyl    fleet enema    melatonin    ondansetron    metoclopramide    OBJECTIVE:  /74 (BP Location: Right arm)   Pulse 91   Temp 97.5 °F (36.4 °C) (Oral)   Resp 19   Wt 255 lb 9.6 oz (115.9 kg)   SpO2 91%   BMI 37.75 kg/m²    Temp (24hrs), Av.6 °F (36.4 °C), Min:97.5 °F  (36.4 °C), Max:98.1 °F (36.7 °C)         Wt Readings from Last 3 Encounters:   10/23/24 255 lb 9.6 oz (115.9 kg)   10/07/24 263 lb (119.3 kg)   05/10/24 277 lb (125.6 kg)       Intake/Output Summary (Last 24 hours) at 10/24/2024 0959  Last data filed at 10/24/2024 0953  Gross per 24 hour   Intake 1318 ml   Output 1150 ml   Net 168 ml     O2: RA  General: NAD.   Neuro: Alert, no focal deficits.    HEENT: PERRL  Neck : No LAD  CV: RRR, nl S1, S2, no S4, S3 or murmur.   Lungs: Clear bilaterally.   Abd: Nontender, non distended.   Ext: No edema.   Skin: No rashes.     Recent Labs   Lab 10/22/24  1153 10/23/24  0455 10/24/24  0456   WBC 17.1* 14.4* 15.7*   HGB 11.9* 11.9* 12.7*   HCT 39.5 41.1 41.9   .0 414.0 409.0     Recent Labs   Lab 10/17/24  1153 10/21/24  1207 10/22/24  1153 10/23/24  0455 10/24/24  0456   * 129* 195* 132* 118*   BUN 14 15 25* 33* 34*   CREATSERUM 0.94 0.78 0.97 0.91 0.83   CA 9.4 9.5 9.4 9.4 9.6    142 139 143 141   K 3.9 3.8 3.6 3.3* 3.4*  3.4*    113* 106 105 105   CO2 22.0 25.0 28.0 30.0 28.0   AST 13 9  --   --   --    ALT 12 12  --   --   --    ALB 4.5 4.4  --   --   --      Recent Labs   Lab 10/21/24  1207 10/22/24  1153 10/23/24  0455 10/24/24  0456   INR 2.00* 2.36* 2.42* 2.62*   PTT 48.6*  --   --   --      No results for input(s): \"ABGPHT\", \"TBUXKT4K\", \"FDIXS8J\", \"ABGHCO3\", \"SITE\", \"DEV\", \"THGB\" in the last 168 hours.    COVID-19 Lab Results    COVID-19  Lab Results   Component Value Date    COVID19 Not Detected 10/21/2024    COVID19 Not Detected 12/23/2023    COVID19 Not Detected 10/04/2022       Pro-Calcitonin  No results for input(s): \"PCT\" in the last 168 hours.    Cardiac  Recent Labs   Lab 10/21/24  1207   PBNP 2,123*       Creatinine Kinase  No results for input(s): \"CK\" in the last 168 hours.    Inflammatory Markers  Recent Labs   Lab 10/22/24  1153   JÚNIOR 23*       Imaging:     Assessment/Plan   Dyspnea secondary to COPD exac with h/o CHF and COPD  O2  protocol now off. Check home O2 eval.  Favor most of hypoxia secondary to CHF.   Diuresis   Prednisone burst.   Trelegy 200  BD protocol   Outpt PFT's.   PE provoked with COVID 1/2022   Treated with doac and changed to coumadin due to cost of med.   Heme eval as outpt to discuss withdrawal of anticoagulation.   CV: CHF  Diuresis per cardiology  Proph  Anticoagulated  Dispo  OK for d/c from pulm standpoint with outpt f/u in 1-2 weeks with APN.     My best regards,         Kel Layne MD  [facility]monary, Critical Care and Sleep Medicine

## 2024-10-24 NOTE — PLAN OF CARE
Problem: Diabetes/Glucose Control  Goal: Glucose maintained within prescribed range  Description: INTERVENTIONS:  - Monitor Blood Glucose as ordered  - Assess for signs and symptoms of hyperglycemia and hypoglycemia  - Administer ordered medications to maintain glucose within target range  - Assess barriers to adequate nutritional intake and initiate nutrition consult as needed  - Instruct patient on self management of diabetes  Outcome: Progressing     Problem: PAIN - ADULT  Goal: Verbalizes/displays adequate comfort level or patient's stated pain goal  Description: INTERVENTIONS:  - Encourage pt to monitor pain and request assistance  - Assess pain using appropriate pain scale  - Administer analgesics based on type and severity of pain and evaluate response  - Implement non-pharmacological measures as appropriate and evaluate response  - Consider cultural and social influences on pain and pain management  - Manage/alleviate anxiety  - Utilize distraction and/or relaxation techniques  - Monitor for opioid side effects  - Notify MD/LIP if interventions unsuccessful or patient reports new pain  - Anticipate increased pain with activity and pre-medicate as appropriate  Outcome: Progressing     Problem: SAFETY ADULT - FALL  Goal: Free from fall injury  Description: INTERVENTIONS:  - Assess pt frequently for physical needs  - Identify cognitive and physical deficits and behaviors that affect risk of falls.  - [place] fall precautions as indicated by assessment.  - Educate pt/family on patient safety including physical limitations  - Instruct pt to call for assistance with activity based on assessment  - Modify environment to reduce risk of injury  - Provide assistive devices as appropriate  - Consider OT/PT consult to assist with strengthening/mobility  - Encourage toileting schedule  Outcome: Progressing     Problem: DISCHARGE PLANNING  Goal: Discharge to home or other facility with appropriate  resources  Description: INTERVENTIONS:  - Identify barriers to discharge w/pt and caregiver  - Include patient/family/discharge partner in discharge planning  - Arrange for needed discharge resources and transportation as appropriate  - Identify discharge learning needs (meds, wound care, etc)  - Arrange for interpreters to assist at discharge as needed  - Consider post-discharge preferences of patient/family/discharge partner  - Complete POLST form as appropriate  - Assess patient's ability to be responsible for managing their own health  - Refer to Case Management Department for coordinating discharge planning if the patient needs post-hospital services based on physician/LIP order or complex needs related to functional status, cognitive ability or social support system  Outcome: Progressing     Problem: RESPIRATORY - ADULT  Goal: Achieves optimal ventilation and oxygenation  Description: INTERVENTIONS:  - Assess for changes in respiratory status  - Assess for changes in mentation and behavior  - Position to facilitate oxygenation and minimize respiratory effort  - Oxygen supplementation based on oxygen saturation or ABGs  - Provide Smoking Cessation handout, if applicable  - Encourage broncho-pulmonary hygiene including cough, deep breathe, Incentive Spirometry  - Assess the need for suctioning and perform as needed  - Assess and instruct to report SOB or any respiratory difficulty  - Respiratory Therapy support as indicated  - Manage/alleviate anxiety  - Monitor for signs/symptoms of CO2 retention  Outcome: Progressing

## 2024-10-24 NOTE — PLAN OF CARE
Walk test performed, pt refusing to walk further than 50ft in hallway, states that is more than he does at home. Pt okay to discharge home per MD, IV removed by RN. Pt assisted to lobby via wheelchair with RN staff, all belongings sent with pt. AVS reviewed with pt and daughter all questions answered.          Problem: Patient Centered Care  Goal: Patient preferences are identified and integrated in the patient's plan of care  Description: Interventions:  - What would you like us to know as we care for you?   - Provide timely, complete, and accurate information to patient/family  - Incorporate patient and family knowledge, values, beliefs, and cultural backgrounds into the planning and delivery of care  - Encourage patient/family to participate in care and decision-making at the level they choose  - Honor patient and family perspectives and choices  Outcome: Adequate for Discharge     Problem: Diabetes/Glucose Control  Goal: Glucose maintained within prescribed range  Description: INTERVENTIONS:  - Monitor Blood Glucose as ordered  - Assess for signs and symptoms of hyperglycemia and hypoglycemia  - Administer ordered medications to maintain glucose within target range  - Assess barriers to adequate nutritional intake and initiate nutrition consult as needed  - Instruct patient on self management of diabetes  Outcome: Adequate for Discharge     Problem: Patient/Family Goals  Goal: Patient/Family Long Term Goal  Description: Patient's Long Term Goal:     Interventions:  - See additional Care Plan goals for specific interventions  Outcome: Adequate for Discharge  Goal: Patient/Family Short Term Goal  Description: Patient's Short Term Goal:     Interventions:   - See additional Care Plan goals for specific interventions  Outcome: Adequate for Discharge     Problem: PAIN - ADULT  Goal: Verbalizes/displays adequate comfort level or patient's stated pain goal  Description: INTERVENTIONS:  - Encourage pt to monitor pain  and request assistance  - Assess pain using appropriate pain scale  - Administer analgesics based on type and severity of pain and evaluate response  - Implement non-pharmacological measures as appropriate and evaluate response  - Consider cultural and social influences on pain and pain management  - Manage/alleviate anxiety  - Utilize distraction and/or relaxation techniques  - Monitor for opioid side effects  - Notify MD/LIP if interventions unsuccessful or patient reports new pain  - Anticipate increased pain with activity and pre-medicate as appropriate  Outcome: Adequate for Discharge     Problem: SAFETY ADULT - FALL  Goal: Free from fall injury  Description: INTERVENTIONS:  - Assess pt frequently for physical needs  - Identify cognitive and physical deficits and behaviors that affect risk of falls.  - [place] fall precautions as indicated by assessment.  - Educate pt/family on patient safety including physical limitations  - Instruct pt to call for assistance with activity based on assessment  - Modify environment to reduce risk of injury  - Provide assistive devices as appropriate  - Consider OT/PT consult to assist with strengthening/mobility  - Encourage toileting schedule  Outcome: Adequate for Discharge     Problem: DISCHARGE PLANNING  Goal: Discharge to home or other facility with appropriate resources  Description: INTERVENTIONS:  - Identify barriers to discharge w/pt and caregiver  - Include patient/family/discharge partner in discharge planning  - Arrange for needed discharge resources and transportation as appropriate  - Identify discharge learning needs (meds, wound care, etc)  - Arrange for interpreters to assist at discharge as needed  - Consider post-discharge preferences of patient/family/discharge partner  - Complete POLST form as appropriate  - Assess patient's ability to be responsible for managing their own health  - Refer to Case Management Department for coordinating discharge planning  if the patient needs post-hospital services based on physician/LIP order or complex needs related to functional status, cognitive ability or social support system  Outcome: Adequate for Discharge     Problem: RESPIRATORY - ADULT  Goal: Achieves optimal ventilation and oxygenation  Description: INTERVENTIONS:  - Assess for changes in respiratory status  - Assess for changes in mentation and behavior  - Position to facilitate oxygenation and minimize respiratory effort  - Oxygen supplementation based on oxygen saturation or ABGs  - Provide Smoking Cessation handout, if applicable  - Encourage broncho-pulmonary hygiene including cough, deep breathe, Incentive Spirometry  - Assess the need for suctioning and perform as needed  - Assess and instruct to report SOB or any respiratory difficulty  - Respiratory Therapy support as indicated  - Manage/alleviate anxiety  - Monitor for signs/symptoms of CO2 retention  Outcome: Adequate for Discharge

## 2024-10-25 ENCOUNTER — PATIENT OUTREACH (OUTPATIENT)
Dept: CASE MANAGEMENT | Age: 74
End: 2024-10-25

## 2024-10-25 DIAGNOSIS — Z02.9 ENCOUNTERS FOR UNSPECIFIED ADMINISTRATIVE PURPOSE: Primary | ICD-10-CM

## 2024-10-25 NOTE — TELEPHONE ENCOUNTER
Noted, thanks.    Future Appointments   Date Time Provider Department Center   10/28/2024 11:00 AM Charlotte Rey MD [facility]

## 2024-10-25 NOTE — TELEPHONE ENCOUNTER
To reception staff, pls call patient for pos hosp appt per MD advised.   Thanks       Future Appointments   Date Time Provider Department Center   10/30/2024  3:00 PM Eva Gaines NP [facility]   11/4/2024 11:00 AM Sarmad Ibarra MD [facility] HEM ONC [facility]   12/3/2024  2:30 PM Ben Pérez MD [facility]   1/7/2025  1:00 PM Charlotte Rey MD [facility]   1/9/2025  2:20 PM Kimberly Fuentes DPM [facility]

## 2024-10-25 NOTE — PROGRESS NOTES
Attempted to reach the patient to complete a Transitional Care Management-Hospital follow up call. The phone rang multiple times without an answer. The voicemail box was full and nurse care manager was unable to leave a message.

## 2024-10-25 NOTE — PROGRESS NOTES
SCC-COPD appointment request (discharged 10/24)    [facility]  [address]  [place] [address]  [phone]  [address]  Apt made:  Wed 10/30 @3:00pm  Confirmed w/pt  Closing encounter

## 2024-10-28 ENCOUNTER — OFFICE VISIT (OUTPATIENT)
Dept: INTERNAL MEDICINE CLINIC | Facility: CLINIC | Age: 74
End: 2024-10-28

## 2024-10-28 VITALS
OXYGEN SATURATION: 93 % | DIASTOLIC BLOOD PRESSURE: 89 MMHG | HEART RATE: 78 BPM | HEIGHT: 69 IN | SYSTOLIC BLOOD PRESSURE: 144 MMHG | WEIGHT: 256.19 LBS | BODY MASS INDEX: 37.95 KG/M2

## 2024-10-28 DIAGNOSIS — E11.65 TYPE 2 DIABETES MELLITUS WITH HYPERGLYCEMIA, WITH LONG-TERM CURRENT USE OF INSULIN (HCC): Primary | ICD-10-CM

## 2024-10-28 DIAGNOSIS — J44.1 CHRONIC OBSTRUCTIVE PULMONARY DISEASE WITH ACUTE EXACERBATION (HCC): ICD-10-CM

## 2024-10-28 DIAGNOSIS — I50.23 ACUTE ON CHRONIC SYSTOLIC CONGESTIVE HEART FAILURE (HCC): ICD-10-CM

## 2024-10-28 DIAGNOSIS — Z79.4 TYPE 2 DIABETES MELLITUS WITH HYPERGLYCEMIA, WITH LONG-TERM CURRENT USE OF INSULIN (HCC): Primary | ICD-10-CM

## 2024-10-28 PROCEDURE — 99213 OFFICE O/P EST LOW 20 MIN: CPT | Performed by: INTERNAL MEDICINE

## 2024-10-28 RX ORDER — ALBUTEROL SULFATE 90 UG/1
2 INHALANT RESPIRATORY (INHALATION) EVERY 4 HOURS PRN
Qty: 54 G | Refills: 0 | Status: SHIPPED | OUTPATIENT
Start: 2024-10-28

## 2024-10-28 RX ORDER — INSULIN GLARGINE 100 [IU]/ML
10 INJECTION, SOLUTION SUBCUTANEOUS DAILY
Qty: 9 ML | Refills: 1 | Status: SHIPPED | OUTPATIENT
Start: 2024-10-28

## 2024-10-28 NOTE — PATIENT INSTRUCTIONS
Please use the trelegy 1 puff every day.r If you still have shortness of breath, then use the albuterol every 4-6 hours as needed.     Please restart long-acting insulin (basaglar) at 10 units every night.     Please check the sugars before breakfast, before lunch, before dinner, and before bedtime. Please write these values down in log. Please bring it in with you next time.    Please come back in 2 weeks.     Please follow up with cardiology.     Please make an appointment with pulmonology. A referral has been given to you today.

## 2024-10-28 NOTE — PROGRESS NOTES
Caleb Rausch Jr. is a 74 year old male with complaints of:  Chief Complaint: TCM (Transition Of Care Management) ([facility] hospital follow up for COPD exacerbation)    HPI     Caleb Rausch Jr. is a(n) 74 year old male with a history of hypothyroidism, diabetes, CAD status post stents on dual antiplatelet therapy, heart failure with reduced ejection fraction, hyperlipidemia, COPD, hypertension, PE on anticoagulation, who presents for hospital follow-up.    Patient was admitted on 10/21/2024 to [facility] for shortness of breath.  Considered to have a COPD exacerbation versus COPD exacerbation.  Patient was started on Lasix 40 IV twice daily, switch to oral before discharge.  Recommended to follow-up with cardiology after discharge.  Had an echo done on 10/22/2024, with ejection fraction 35 to 45%.  Goal-directed medical therapy: Entresto and spironolactone were restarted.  Patient continues on torsemide 20 mg daily.    Furthermore, he was treated for COPD exacerbation.  He was placed on steroids, and DuoNebs.  Pulmonology saw him in the hospital.  He was started on Trelegy.    Patient has been doing reasonably well at the hospital. He has cut back on smoking. He has not been using the trelegy.     He has not been using any insulin because he lost the pen. Last dose of Ozempic was 2-3 days ago.     Past Medical History     Past Medical History:    Anxiety disorder, unspecified    Anxiety state    Atherosclerosis of coronary artery    stents x 6    Chronic low back pain with bilateral sciatica, left worse than right    Chronic obstructive pulmonary disease, unspecified (HCC)    Coronary atherosclerosis    Depression    Diabetes (HCC)    borderline     Disorder of thyroid    Essential hypertension    Glaucoma    first visit with IRMA, patient was taking Timolol OU BID for 20 years, has not used gtts in over 1 year because he ran out of gtts and had no refills, fundus photos taken today    Heart attack (HCC)    High blood  pressure    High cholesterol    Hyperlipidemia    Kidney stone    Left Sided Neck pain, acute    Lumbar stenosis with neurogenic claudication    Major depressive disorder, recurrent, unspecified (HCC)    Morbid obesity with BMI of 40.0-44.9, adult (HCC)    Neuropathy    Pneumonia due to COVID-19 virus    Thyroid disease        Past Surgical History     Past Surgical History:   Procedure Laterality Date    Cataract extraction extracapsular w/ intraocular lens implantation Right 2018    [place]    Cath bare metal stent (bms)      Circumcision,othr  08/04/2020    Dr. Bonilla @ [facility]    Cysto/uretero w/lithotripsy Left 08/04/2020    Dr. 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     Iridotomy/iridectomy by laser      unknown Dr    Other      cardiac stents        Family History     Family History   Problem Relation Age of Onset    Heart Attack Father     Hypertension Brother     Glaucoma Neg     Macular degeneration Neg        Social History     Social History     Socioeconomic History    Marital status: Single   Tobacco Use    Smoking status: Every Day     Current packs/day: 1.00     Types: Cigarettes    Smokeless tobacco: Former    Tobacco comments:     per pt, quit in 1994   Vaping Use    Vaping status: Some Days   Substance and Sexual Activity    Alcohol use: No     Comment: quit in 1994    Drug use: No   Other Topics Concern    Caffeine Concern Yes    Exercise No   Social History Narrative    The patient uses the following assistive device(s):  single-point cane.      The patient does live in a home with stairs.     Social Drivers of Health     Financial Resource Strain: Low Risk  (4/2/2024)    Financial Resource Strain     Difficulty of Paying Living Expenses: Not very hard     Med Affordability: No   Food Insecurity: No Food Insecurity (10/21/2024)    Food Insecurity     Food Insecurity: Never true   Transportation Needs: No Transportation Needs (10/21/2024)     Transportation Needs     Lack of Transportation: No   Social Connections: Feeling Socially Integrated (2/16/2023)    [facility]    OASIS : Social Isolation     Frequency of experiencing loneliness or isolation: Never   Housing Stability: Low Risk  (10/21/2024)    Housing Stability     Housing Instability: No       Allergies     Allergies[1]    Current Medications     Current Outpatient Medications   Medication Sig Dispense Refill    predniSONE 20 MG Oral Tab Take 2 tablets (40 mg total) by mouth daily with breakfast for 3 days. 6 tablet 0    metFORMIN HCl 1000 MG Oral Tab Take 1 tablet (1,000 mg total) by mouth 2 (two) times daily with meals. 60 tablet 0    ferrous sulfate 325 (65 FE) MG Oral Tab EC Take 1 tablet (325 mg total) by mouth daily with breakfast. 30 tablet 0    levothyroxine 137 MCG Oral Tab Take 137 mcg by mouth before breakfast. 90 tablet 3    latanoprost 0.005 % Ophthalmic Solution Place 1 drop into both eyes nightly.      Accu-Chek Softclix Lancets Does not apply Misc 3 (three) times daily.      Glucose Blood (ACCU-CHEK GUIDE) In Vitro Strip 1 strip by In Vitro route 3 (three) times daily.      fluticasone-umeclidin-vilant (TRELEGY ELLIPTA) 200-62.5-25 MCG/ACT Inhalation Aerosol Powder, Breath Activated Inhale 1 puff into the lungs daily. 180 each 3    DULoxetine 30 MG Oral Cap DR Particles Take 1 capsule (30 mg total) by mouth daily. 90 capsule 3    semaglutide (OZEMPIC, 0.25 OR 0.5 MG/DOSE,) 2 MG/3ML Subcutaneous Solution Pen-injector Inject 0.5 mg into the skin once a week. 9 mL 0    insulin glargine (BASAGLAR KWIKPEN) 100 UNIT/ML Subcutaneous Solution Pen-injector Inject 10 Units into the skin daily. 9 mL 1    GABAPENTIN 300 MG Oral Cap TAKE ONE CAPSULE BY MOUTH THREE TIMES DAILY 90 capsule 0    Insulin Pen Needle (BD PEN NEEDLE RODERICK 2ND GEN) 32G X 4 MM Does not apply Misc Inject 1 each as directed daily. 90 each 3    clopidogrel 75 MG  Oral Tab Take 1 tablet (75 mg total) by mouth daily. 90 tablet 3    JARDIANCE 25 MG Oral Tab TAKE ONE TABLET BY MOUTH ONE TIME DAILY 90 tablet 0    sacubitril-valsartan (ENTRESTO) 24-26 MG Oral Tab Take 1 tablet by mouth daily. 30 tablet 0    Potassium Chloride ER 20 MEQ Oral Tab CR Take 1 tablet by mouth daily. 90 tablet 0    GVOKE HYPOPEN 2-PACK 1 MG/0.2ML Subcutaneous injection INJECT THE CONTENTS OF ONE DEVICE IN THE LOWER ABDOMEN, OUTER THIGH, OR OUTER UPPER ARM FOR SEVERELY LOW BLOOD SUGAR. IF NO RESPONSE, MAY REPEAT WITH A NEW DEVICE IN 15 MINUTES.      atorvastatin 80 MG Oral Tab Take 1 tablet (80 mg total) by mouth daily. 90 tablet 1    Polyethylene Glycol 3350 (MIRALAX) 17 g Oral Powd Pack Take 17 g by mouth daily. 5 each 0    torsemide 20 MG Oral Tab Take 1 tablet (20 mg total) by mouth daily. 90 tablet 3    carvedilol 6.25 MG Oral Tab Take 1 tablet (6.25 mg total) by mouth 2 (two) times daily with meals. 180 tablet 3    spironolactone 25 MG Oral Tab Take 1 tablet (25 mg total) by mouth daily. 90 tablet 3    MOVANTIK 25 MG Oral Tab TAKE 1 TABLET BY MOUTH DAILY 1 -2 HOURS AFTER MEAL      albuterol (VENTOLIN HFA) 108 (90 Base) MCG/ACT Inhalation Aero Soln Inhale 2 puffs into the lungs every 4 (four) hours as needed for Wheezing. 54 g 0    bimatoprost (LUMIGAN) 0.01 % Ophthalmic Solution Place 1 drop into both eyes every evening.      Blood Glucose Monitoring Suppl Does not apply Kit Please use to check blood sugar twice daily. 1 kit 0    Blood Glucose Monitoring Suppl Does not apply Misc Please use to check blood sugar twice daily 200 each 3    Blood Glucose Monitoring Suppl Does not apply Misc Please use to check blood sugar twice daily 200 each 0    oxyCODONE-acetaminophen  MG Oral Tab Take 1 tablet by mouth 4 (four) times daily as needed.      warfarin 5 MG Oral Tab Take 2 tablets (10 mg total) by mouth nightly. 7.5mg on Sunday and Wednesday  10mg on all other days      pregabalin 300 MG Oral Cap  Take 1 capsule (300 mg total) by mouth 2 (two) times daily. 30 capsule 0    aspirin 81 MG Oral Tab EC Take 1 tablet (81 mg total) by mouth daily. 30 tablet 2     No current facility-administered medications for this visit.       Review of Systems     GENERAL HEALTH: feels well otherwise  SKIN: denies any unusual skin lesions or rashes  RESPIRATORY: denies shortness of breath with exertion  CARDIOVASCULAR: denies chest pain on exertion  GI: denies abdominal pain and denies heartburn  : denies any burning with urination, urinary frequency or urgency  NEURO: denies headaches, numbness or tingling, mental status changes  PSYCH: denies depressed mood, anxiety  MUSC: denies muscle aches, joint pain    Physical Exam     /89 (BP Location: Right arm, Patient Position: Sitting, Cuff Size: large)   Pulse 78   Ht 5' 9\" (1.753 m)   Wt 256 lb 3.2 oz (116.2 kg)   SpO2 93%   BMI 37.83 kg/m²     GENERAL: well developed, well nourished,in no apparent distress  SKIN: no rashes,no suspicious lesions  HEENT: atraumatic, normocephalic,ears and throat are clear  NECK: supple,no adenopathy,no bruits  LUNGS: clear to auscultation  CARDIO: RRR without murmur  GI: good BS's,no masses, HSM or tenderness  EXTREMITIES: no cyanosis, clubbing or edema    Assessment and Plan     Diagnoses and all orders for this visit:    Type 2 diabetes mellitus with hyperglycemia, with long-term current use of insulin (HCC). Patient lost his basaglar pen. He last took Ozempis 2-3 days ago. He has not been checking his sugars. For now, will restart basaglar 10 units at bedtime. New rx sent to pharmacy.  Otherwise, the patient continues on metformin 1000 mg twice daily, and Jardiance 25 mg daily.  Patient is recommended to check his blood sugar 4 times a day, and follow-up in 2 weeks.  Will be seeing his endocrinologist in December.  -     insulin glargine (BASAGLAR KWIKPEN) 100 UNIT/ML Subcutaneous Solution Pen-injector; Inject 10 Units into the skin  daily.    Acute on chronic systolic congestive heart failure (HCC).  He is to see cardiology.  Per patient, he has an appointment.  For now, continue on goal-directed therapy as follows: Atorvastatin 80 mg daily, Entresto 24 to 26 mg twice daily, Jardiance 25 mg daily, carvedilol 6.25 mg twice daily, spironolactone milligrams daily.  He continues on torsemide 20 mg daily for fluid management.    Chronic obstructive pulmonary disease with acute exacerbation (HCC).  Has not been taking Trelegy daily.  Patient was recommended to restart taking Trelegy every day, and then albuterol as needed.  Pulmonology referral given.  -     albuterol (VENTOLIN HFA) 108 (90 Base) MCG/ACT Inhalation Aero Soln; Inhale 2 puffs into the lungs every 4 (four) hours as needed for Wheezing.  -     Pulmonary Referral - In Network          diclofenac 1 % External Gel       albuterol (VENTOLIN HFA) 108 (90 Base) MCG/ACT Inhalation Aero Soln Inhale 2 puffs into the lungs every 4 (four) hours as needed for Wheezing. 54 g 0    insulin glargine (BASAGLAR KWIKPEN) 100 UNIT/ML Subcutaneous Solution Pen-injector Inject 10 Units into the skin daily. 9 mL 1    predniSONE 20 MG Oral Tab Take 2 tablets (40 mg total) by mouth daily with breakfast for 3 days. 6 tablet 0    metFORMIN HCl 1000 MG Oral Tab Take 1 tablet (1,000 mg total) by mouth 2 (two) times daily with meals. 60 tablet 0    ferrous sulfate 325 (65 FE) MG Oral Tab EC Take 1 tablet (325 mg total) by mouth daily with breakfast. 30 tablet 0    levothyroxine 137 MCG Oral Tab Take 137 mcg by mouth before breakfast. 90 tablet 3    latanoprost 0.005 % Ophthalmic Solution Place 1 drop into both eyes nightly.      Accu-Chek Softclix Lancets Does not apply Misc 3 (three) times daily.      Glucose Blood (ACCU-CHEK GUIDE) In Vitro Strip 1 strip by In Vitro route 3 (three) times daily.      fluticasone-umeclidin-vilant (TRELEGY ELLIPTA) 200-62.5-25 MCG/ACT Inhalation Aerosol Powder, Breath Activated Inhale  1 puff into the lungs daily. 180 each 3    DULoxetine 30 MG Oral Cap DR Particles Take 1 capsule (30 mg total) by mouth daily. 90 capsule 3    semaglutide (OZEMPIC, 0.25 OR 0.5 MG/DOSE,) 2 MG/3ML Subcutaneous Solution Pen-injector Inject 0.5 mg into the skin once a week. 9 mL 0    Insulin Pen Needle (BD PEN NEEDLE RODERICK 2ND GEN) 32G X 4 MM Does not apply Misc Inject 1 each as directed daily. 90 each 3    clopidogrel 75 MG Oral Tab Take 1 tablet (75 mg total) by mouth daily. 90 tablet 3    JARDIANCE 25 MG Oral Tab TAKE ONE TABLET BY MOUTH ONE TIME DAILY 90 tablet 0    Potassium Chloride ER 20 MEQ Oral Tab CR Take 1 tablet by mouth daily. 90 tablet 0    GVOKE HYPOPEN 2-PACK 1 MG/0.2ML Subcutaneous injection INJECT THE CONTENTS OF ONE DEVICE IN THE LOWER ABDOMEN, OUTER THIGH, OR OUTER UPPER ARM FOR SEVERELY LOW BLOOD SUGAR. IF NO RESPONSE, MAY REPEAT WITH A NEW DEVICE IN 15 MINUTES.      atorvastatin 80 MG Oral Tab Take 1 tablet (80 mg total) by mouth daily. 90 tablet 1    torsemide 20 MG Oral Tab Take 1 tablet (20 mg total) by mouth daily. 90 tablet 3    carvedilol 6.25 MG Oral Tab Take 1 tablet (6.25 mg total) by mouth 2 (two) times daily with meals. 180 tablet 3    Blood Glucose Monitoring Suppl Does not apply Kit Please use to check blood sugar twice daily. 1 kit 0    Blood Glucose Monitoring Suppl Does not apply Misc Please use to check blood sugar twice daily 200 each 3    Blood Glucose Monitoring Suppl Does not apply Misc Please use to check blood sugar twice daily 200 each 0    oxyCODONE-acetaminophen  MG Oral Tab Take 1 tablet by mouth 4 (four) times daily as needed.      warfarin 5 MG Oral Tab Take 2 tablets (10 mg total) by mouth nightly. 7.5mg on Sunday and Wednesday  10mg on all other days      pregabalin 300 MG Oral Cap Take 1 capsule (300 mg total) by mouth 2 (two) times daily. 30 capsule 0    aspirin 81 MG Oral Tab EC Take 1 tablet (81 mg total) by mouth daily. 30 tablet 2       Requested  Prescriptions     Signed Prescriptions Disp Refills    albuterol (VENTOLIN HFA) 108 (90 Base) MCG/ACT Inhalation Aero Soln 54 g 0     Sig: Inhale 2 puffs into the lungs every 4 (four) hours as needed for Wheezing.    insulin glargine (BASAGLAR KWIKPEN) 100 UNIT/ML Subcutaneous Solution Pen-injector 9 mL 1     Sig: Inject 10 Units into the skin daily.       No orders of the defined types were placed in this encounter.      Return in about 2 weeks (around 11/11/2024).    The patient indicates understanding of these issues and agrees to the plan.    Electronically signed by Charlotte Rey MD 10/28/24             [1] No Known Allergies

## 2024-10-28 NOTE — PROGRESS NOTES
The patient was seen today, 10/28/2024, for a Transitional Care Management hospital follow up appointment with Dr. Rey. Nurse care manager closing encounter.

## 2024-10-29 ENCOUNTER — TELEPHONE (OUTPATIENT)
Dept: CARDIOLOGY CLINIC | Facility: HOSPITAL | Age: 74
End: 2024-10-29

## 2024-10-29 DIAGNOSIS — I50.23 ACUTE ON CHRONIC SYSTOLIC CONGESTIVE HEART FAILURE (HCC): Primary | ICD-10-CM

## 2024-10-29 NOTE — CDS QUERY
Dear Dr. Villalba,       Can the patient's initial respiratory status be further defined?  (  x) Acute hypoxic respiratory failure  (   ) Other - please specify:  _____________________________________________________________________________________  Clinical indicators:   10/21 EMS Report: Hypoxic - O2 sat 88% on room air. Lung sounds: wheezing bilateral. Placed on CPAP w/duoneb.     Initial VS: T 96.8, /87, P 93, R 25, O2 sat 98% on CPAP.  Tachypneic with bilateral end expiratory wheezing with prolonged expiratory phase in all lung fields   Placed on BiPAP w/ continuous neb.  Labs: WBC: 14.5, pro-BNP 2,123    H&P: Acute on chronic HFrEF  Acute COPD exacerbation  Weaned from BiPAP to 6L HFNC    Risk factors: Active smoker. Chronic COPD& HFrEF. Noncompliance with dietary restrictions.     Treatment: Lasix 40 mg IV twice daily. Solumedrol IV x I dose in ED, then Prednisone 40 mg po daily.  Nebulizer treatments.  BiPAP à HFNC.    For questions regarding this query, please contact Lead Clinical Documentation :   Malgorzata Marcos RN, CCDS          Cell# [phone]              Thank you!                                                                                                                                       THIS FORM IS A PERMANENT PART OF THE MEDICAL RECORD

## 2024-10-29 NOTE — TELEPHONE ENCOUNTER
No answer with Caleb, called chip Peres. Reviewed OV for tomorrow. She states he has resumed smoking and probably may not show up. She will call and remind him.

## 2024-11-06 ENCOUNTER — PATIENT OUTREACH (OUTPATIENT)
Dept: CASE MANAGEMENT | Age: 74
End: 2024-11-06

## 2024-11-06 DIAGNOSIS — I25.119 CORONARY ARTERY DISEASE INVOLVING NATIVE CORONARY ARTERY OF NATIVE HEART WITH ANGINA PECTORIS (HCC): ICD-10-CM

## 2024-11-06 DIAGNOSIS — M48.062 LUMBAR STENOSIS WITH NEUROGENIC CLAUDICATION: ICD-10-CM

## 2024-11-06 DIAGNOSIS — N17.9 ACUTE KIDNEY INJURY (HCC): ICD-10-CM

## 2024-11-06 DIAGNOSIS — E11.9 TYPE 2 DIABETES MELLITUS WITHOUT RETINOPATHY (HCC): ICD-10-CM

## 2024-11-06 DIAGNOSIS — I50.22 CHRONIC HFREF (HEART FAILURE WITH REDUCED EJECTION FRACTION) (HCC): Primary | ICD-10-CM

## 2024-11-06 NOTE — PROGRESS NOTES
Spoke to Caleb for Chronic Care Management.      Updates to patient care team/comments: UTD   Patient reported changes in medications: reports changes   Med Adherence  Comment: reports adherence      Health Maintenance:   Health Maintenance   Topic Date Due    Zoster Vaccines (1 of 2) Never done    Colorectal Cancer Screening  06/11/2019    Diabetes Care Foot Exam  02/19/2020    Tobacco Cessation Counseling  Never done    Annual Physical  01/12/2024    Diabetes Care Dilated Eye Exam  05/10/2024    COVID-19 Vaccine (4 - 2023-24 season) 09/01/2024    Influenza Vaccine (1) 10/01/2024    HTN: BP Follow-Up  11/28/2024    Diabetes Care A1C  04/17/2025    Diabetes Care: Microalb/Creat Ratio  10/17/2025    Diabetes Care: GFR  10/24/2025    PSA  05/10/2026    Annual Depression Screening  Completed    Fall Risk Screening (Annual)  Completed    Pneumococcal Vaccine: 65+ Years  Completed       Patient updates/concerns:   Spoke with patient.  Patient relates doing well. Got a car and is happy to have the freedom of transportation. Praised patient. Mood good/stable. Did have a few days of feeling down and lonely even when being around people but the weather was affecting him along with the politics. Denies any recent syncope episodes. Denies any recent falls. Continues to feel fatigued. Breathing stable. Diabetes managed by Endo. Seeing next month. I will call patient to remind him. FBS unknown doesn't check them. Last A1c value was 6.6% done 10/17/2024. Continues to take taking medications as prescribed. Avoiding sugars and carbs.Discussed increasing physical activity. Even if its stretching and walking around home. vision stable.  Blood pressure and pulse unknown not checking but denies symptoms..Denies any edema. Getting PT/INR Denies any urinary issues at this time. No other questions or concerns.    Encouraged patient:   Self care: Take the time to do the things you love.   Nutrition:  Good nutrition helps us to maintain our  weight, fight off infection, and help reduce the risk of developing other chronic issues.   Physical activity:  Physical activity is important to help maintain independence and improve quality of life.     Goals/Action Plan:    Active goal from previous outreach: Staying healthy, maintain independence, and stability.    Patient reported progress towards goals: progressing                - What: continues to follow up on health conditions            - Where/When/How: seeing specialist and PCP   Patient Reported Barriers and Concerns: as per above                    - Plan for overcoming barriers: encouraged patient to call with any questions or concerns.     Care Managers Interventions: Continue to provide encouragement and education for healthy coping and diagnosis.      Future Appointments:   Future Appointments   Date Time Provider Department Center   12/3/2024  2:30 PM Ben Pérez MD [id number]    1/7/2025  1:00 PM [facility]   1/9/2025  2:20 PM Kimberly Fuentes DPM [id number]         Next Care Manager Follow Up Date: 1 month     Reason For Follow Up: review progress and or barriers towards patient's goals.     Time Spent This Encounter Total: 20 min medical record review, telephone communication, care plan updates where needed, education, goals, and action plan recreation/update. Provided acknowledgment and validation to patient's concerns.   Monthly Minute Total including today: 20 min   Physical assessment, complete health history, and need for CCM established by Charlotte Rey MD.    Friendly reminder - 2025 Benefits Open Enrollment is here!   We encourage you to review your current Medicare coverage and explore your options during this period. We have developed a Medicare Information Page on our website to serve as a resource for guidance and answers to any questions you might have.   You can access it by visiting, [email or web address]

## 2024-12-02 RX ORDER — ATORVASTATIN CALCIUM 80 MG/1
80 TABLET, FILM COATED ORAL DAILY
Qty: 90 TABLET | Refills: 3 | Status: SHIPPED | OUTPATIENT
Start: 2024-12-02

## 2024-12-02 NOTE — TELEPHONE ENCOUNTER
Please review. Protocol Pass    Please advise if refill is appropriate.    Requested Prescriptions   Pending Prescriptions Disp Refills    METFORMIN HCL 1000 MG Oral Tab [Pharmacy Med Name: Metformin Hydrochloride 1,000 Mg Tab Nahun] 180 tablet 0     Sig: TAKE 1/2 TABLET (500 MG TOTAL) BY MOUTH 2 (TWO) TIMES DAILY WITH MEALS.       Diabetes Medication Protocol Passed - 12/2/2024 12:39 PM        Passed - Last A1C < 7.5 and within past 6 months     Lab Results   Component Value Date    A1C 6.6 (H) 10/17/2024             Passed - In person appointment or virtual visit in the past 6 mos or appointment in next 3 mos     Recent Outpatient Visits              1 month ago Type 2 diabetes mellitus with hyperglycemia, with long-term current use of insulin ([facility])    [facility] Charlotte Rey MD    Office Visit    1 month ago Type 2 diabetes mellitus with other circulatory complication, with long-term current use of insulin ([facility])    [facility] Charlotte Rey MD    Office Visit    6 months ago Constipation, unspecified constipation type    [facility] Mushtaq Argueta MD    Office Visit    10 months ago Hypothyroidism, unspecified type    [facility] Ben Pérez MD    Office Visit    10 months ago Acute on chronic HFrEF (heart failure with reduced ejection fraction) ([facility])    [facility] Eva Gaines NP    Office Visit          Future Appointments         Provider Department Appt Notes    Tomorrow Ben Pérez MD [facility]     In 1 month Charlotte Rey MD [facility] 3 mth follow up    In 1 month Kimberly Fuentes DPM [facility] care  DM                    Passed - Microalbumin  procedure in past 12 months or taking ACE/ARB        Passed - EGFRCR or GFRNAA > 50     GFR Evaluation  EGFRCR: 93 , resulted on 10/24/2024          Passed - GFR in the past 12 months         Signed Prescriptions Disp Refills    atorvastatin 80 MG Oral Tab 90 tablet 3     Sig: Take 1 tablet (80 mg total) by mouth daily.       Cholesterol Medication Protocol Passed - 12/2/2024 12:39 PM        Passed - ALT < 80     Lab Results   Component Value Date    ALT 12 10/21/2024             Passed - ALT resulted within past year        Passed - Lipid panel within past 12 months     Lab Results   Component Value Date    CHOLEST 122 10/17/2024    TRIG 230 (H) 01/18/2024    HDL 36 (L) 01/18/2024    LDL 56 01/18/2024    VLDL 34 (H) 01/18/2024    TCHDLRATIO 5.76 (H) 08/24/2017    NONHDLC 93 01/18/2024             Passed - In person appointment or virtual visit in the past 12 mos or appointment in next 3 mos     Recent Outpatient Visits              1 month ago Type 2 diabetes mellitus with hyperglycemia, with long-term current use of insulin ([facility])    [facility] Charlotte Ni MD    Office Visit    1 month ago Type 2 diabetes mellitus with other circulatory complication, with long-term current use of insulin ([facility])    [facility] Charlotte Ni MD    Office Visit    6 months ago Constipation, unspecified constipation type    [facility] Mushtaq Argueta MD    Office Visit    10 months ago Hypothyroidism, unspecified type    [facility] Ben Pérez MD    Office Visit    10 months ago Acute on chronic HFrEF (heart failure with reduced ejection fraction) ([facility])    [facility] Eva Gaines NP    Office Visit          Future Appointments         Provider Department Appt Notes    Tomorrow Ben Pérez MD [facility]  [facility]     In 1 month Charlotte Rey MD [facility] 3 mth follow up    In 1 month Kimberly Fuentes DPM [facility] care  DM                           Future Appointments         Provider Department Appt Notes    Tomorrow Ben Pérez MD [facility]     In 1 month Charlotte Rey MD [facility] 3 mth follow up    In 1 month Kimberly Fuentes DPM [facility] care  DM          Recent Outpatient Visits              1 month ago Type 2 diabetes mellitus with hyperglycemia, with long-term current use of insulin ([facility])    [facility] Charlotte Rey MD    Office Visit    1 month ago Type 2 diabetes mellitus with other circulatory complication, with long-term current use of insulin ([facility])    [facility] Charlotte Rey MD    Office Visit    6 months ago Constipation, unspecified constipation type    [facility] Mushtaq Argueta MD    Office Visit    10 months ago Hypothyroidism, unspecified type    [facility] Ben Pérez MD    Office Visit    10 months ago Acute on chronic HFrEF (heart failure with reduced ejection fraction) ([facility])    [facility] Eva Gaines NP    Office Visit

## 2024-12-03 ENCOUNTER — PATIENT OUTREACH (OUTPATIENT)
Dept: CASE MANAGEMENT | Age: 74
End: 2024-12-03

## 2024-12-03 DIAGNOSIS — F33.41 RECURRENT MAJOR DEPRESSIVE DISORDER, IN PARTIAL REMISSION (HCC): ICD-10-CM

## 2024-12-03 DIAGNOSIS — I50.22 CHRONIC HFREF (HEART FAILURE WITH REDUCED EJECTION FRACTION) (HCC): ICD-10-CM

## 2024-12-03 DIAGNOSIS — E11.65 TYPE 2 DIABETES MELLITUS WITH HYPERGLYCEMIA, WITH LONG-TERM CURRENT USE OF INSULIN (HCC): Primary | ICD-10-CM

## 2024-12-03 DIAGNOSIS — J44.1 CHRONIC OBSTRUCTIVE PULMONARY DISEASE WITH ACUTE EXACERBATION (HCC): ICD-10-CM

## 2024-12-03 DIAGNOSIS — I10 PRIMARY HYPERTENSION: ICD-10-CM

## 2024-12-03 DIAGNOSIS — Z79.4 TYPE 2 DIABETES MELLITUS WITH HYPERGLYCEMIA, WITH LONG-TERM CURRENT USE OF INSULIN (HCC): Primary | ICD-10-CM

## 2024-12-03 NOTE — PROGRESS NOTES
Spoke to Caleb for Chronic Care Management.      Updates to patient care team/comments: UTD  Patient reported changes in medications: reports no changes   Med Adherence  Comment: reports adherence      Health Maintenance:   Health Maintenance   Topic Date Due    Zoster Vaccines (1 of 2) Never done    Colorectal Cancer Screening  06/11/2019    Diabetes Care Foot Exam  02/19/2020    Tobacco Cessation Counseling  Never done    Annual Physical  01/12/2024    Diabetes Care Dilated Eye Exam  05/10/2024    COVID-19 Vaccine (4 - 2024-25 season) 09/01/2024    Influenza Vaccine (1) 10/01/2024    HTN: BP Follow-Up  11/28/2024    Diabetes Care A1C  04/17/2025    Diabetes Care: Microalb/Creat Ratio  10/17/2025    Diabetes Care: GFR  10/24/2025    PSA  05/10/2026    Annual Depression Screening  Completed    Fall Risk Screening (Annual)  Completed    Pneumococcal Vaccine: 65+ Years  Completed       Patient updates/concerns:   Spoke with patient.  Patient relates doing ok. Mood good/stable. Currently has a cold that started about 3 days ago. Has chills and body aches. Unable to attend Endo follow up today. I canceled and rescheduled. Patient aware to call if symptoms worsen. Will continue to rest and monitor. Denies any recent syncope episodes. Denies any recent falls. Continues to feel fatigued. Breathing stable. Diabetes managed by Endo. Continues to take taking medications as prescribed. Avoiding sugars and carbs.Discussed increasing physical activity. Even if its stretching and walking around home. vision stable.  Blood pressure and pulse unknown not checking but denies symptoms..Denies any edema. Getting PT/INR numbers unknown. Denies any urinary issues at this time. No other questions or concerns.    Encouraged patient:   Self care: Take the time to do the things you love.   Nutrition:  Good nutrition helps us to maintain our weight, fight off infection, and help reduce the risk of developing other chronic issues.   Physical  activity:  Physical activity is important to help maintain independence and improve quality of life.       Goals/Action Plan:    Active goal from previous outreach: Staying healthy, maintain independence, and stability.     Patient reported progress towards goals: progressing                - What: continues to follow up on health conditions            - Where/When/How: doing INR/ seeing PCP and specialist   Patient Reported Barriers and Concerns: as per above                    - Plan for overcoming barriers: encouraged patient to call with any questions or concerns.     Care Managers Interventions: 1 month     Future Appointments:   Future Appointments   Date Time Provider Department Center   1/7/2025  1:00 PM Charlotte Rey MD [facility]   1/9/2025  2:20 PM Kimberly Fuentes DPM [id number]   1/28/2025  2:00 PM Ben Pérez MD [id number] [place] [facility]         Next Care Manager Follow Up Date: 1 month     Reason For Follow Up: review progress and or barriers towards patient's goals.     Time Spent This Encounter Total: 20  min medical record review, telephone communication, care plan updates where needed, education, goals, and action plan recreation/update. Provided acknowledgment and validation to patient's concerns.   Monthly Minute Total including today: 20 min   Physical assessment, complete health history, and need for CCM established by Charlotte Rey MD.    Friendly reminder - 2025 Benefits Open Enrollment is here!   We encourage you to review your current Medicare coverage and explore your options during this period. We have developed a Medicare Information Page on our website to serve as a resource for guidance and answers to any questions you might have.   You can access it by visiting, [email or web address]

## 2024-12-05 ENCOUNTER — HOSPITAL ENCOUNTER (INPATIENT)
Facility: HOSPITAL | Age: 74
LOS: 1 days | Discharge: HOME | End: 2024-12-06
Attending: EMERGENCY MEDICINE | Admitting: HOSPITALIST
Payer: MEDICARE

## 2024-12-05 ENCOUNTER — APPOINTMENT (OUTPATIENT)
Dept: GENERAL RADIOLOGY | Facility: HOSPITAL | Age: 74
End: 2024-12-05
Attending: EMERGENCY MEDICINE
Payer: MEDICARE

## 2024-12-05 DIAGNOSIS — R06.00 DYSPNEA, UNSPECIFIED TYPE: Primary | ICD-10-CM

## 2024-12-05 PROBLEM — E87.5 HYPERKALEMIA: Status: ACTIVE | Noted: 2024-12-05

## 2024-12-05 PROBLEM — D72.829 LEUKOCYTOSIS: Status: ACTIVE | Noted: 2024-12-05

## 2024-12-05 LAB
ADENOVIRUS PCR:: NOT DETECTED
ALBUMIN SERPL-MCNC: 4.2 G/DL (ref 3.2–4.8)
ALP LIVER SERPL-CCNC: 74 U/L
ALT SERPL-CCNC: 12 U/L
ANION GAP SERPL CALC-SCNC: 7 MMOL/L (ref 0–18)
APTT PPP: 28.1 SECONDS (ref 23–36)
AST SERPL-CCNC: 33 U/L (ref ?–34)
ATRIAL RATE: 92 BPM
B PARAPERT DNA SPEC QL NAA+PROBE: NOT DETECTED
B PERT DNA SPEC QL NAA+PROBE: NOT DETECTED
BASOPHILS # BLD AUTO: 0.12 X10(3) UL (ref 0–0.2)
BASOPHILS NFR BLD AUTO: 0.7 %
BILIRUB DIRECT SERPL-MCNC: <0.1 MG/DL (ref ?–0.3)
BILIRUB SERPL-MCNC: 0.3 MG/DL (ref 0.2–1.1)
BNP SERPL-MCNC: 422 PG/ML (ref ?–100)
BUN BLD-MCNC: 16 MG/DL (ref 9–23)
BUN/CREAT SERPL: 16.7 (ref 10–20)
C PNEUM DNA SPEC QL NAA+PROBE: NOT DETECTED
CALCIUM BLD-MCNC: 9.6 MG/DL (ref 8.7–10.4)
CHLORIDE SERPL-SCNC: 112 MMOL/L (ref 98–112)
CO2 SERPL-SCNC: 24 MMOL/L (ref 21–32)
CORONAVIRUS 229E PCR:: NOT DETECTED
CORONAVIRUS HKU1 PCR:: NOT DETECTED
CORONAVIRUS NL63 PCR:: NOT DETECTED
CORONAVIRUS OC43 PCR:: NOT DETECTED
CREAT BLD-MCNC: 0.96 MG/DL
DEPRECATED RDW RBC AUTO: 65.1 FL (ref 35.1–46.3)
EGFRCR SERPLBLD CKD-EPI 2021: 84 ML/MIN/1.73M2 (ref 60–?)
EOSINOPHIL # BLD AUTO: 0.44 X10(3) UL (ref 0–0.7)
EOSINOPHIL NFR BLD AUTO: 2.6 %
ERYTHROCYTE [DISTWIDTH] IN BLOOD BY AUTOMATED COUNT: 23.7 % (ref 11–15)
FLUAV + FLUBV RNA SPEC NAA+PROBE: NEGATIVE
FLUAV + FLUBV RNA SPEC NAA+PROBE: NEGATIVE
FLUAV RNA SPEC QL NAA+PROBE: NOT DETECTED
FLUBV RNA SPEC QL NAA+PROBE: NOT DETECTED
GLUCOSE BLD-MCNC: 116 MG/DL (ref 70–99)
GLUCOSE BLDC GLUCOMTR-MCNC: 114 MG/DL (ref 70–99)
GLUCOSE BLDC GLUCOMTR-MCNC: 148 MG/DL (ref 70–99)
GLUCOSE BLDC GLUCOMTR-MCNC: 184 MG/DL (ref 70–99)
GLUCOSE BLDC GLUCOMTR-MCNC: 211 MG/DL (ref 70–99)
GLUCOSE BLDC GLUCOMTR-MCNC: 93 MG/DL (ref 70–99)
HCT VFR BLD AUTO: 43 %
HGB BLD-MCNC: 12.6 G/DL
IMM GRANULOCYTES # BLD AUTO: 0.07 X10(3) UL (ref 0–1)
IMM GRANULOCYTES NFR BLD: 0.4 %
INR BLD: 1.06 (ref 0.8–1.2)
LACTATE SERPL-SCNC: 1.8 MMOL/L (ref 0.5–2)
LYMPHOCYTES # BLD AUTO: 2.01 X10(3) UL (ref 1–4)
LYMPHOCYTES NFR BLD AUTO: 11.9 %
MCH RBC QN AUTO: 22.9 PG (ref 26–34)
MCHC RBC AUTO-ENTMCNC: 29.3 G/DL (ref 31–37)
MCV RBC AUTO: 78 FL
METAPNEUMOVIRUS PCR:: NOT DETECTED
MONOCYTES # BLD AUTO: 0.93 X10(3) UL (ref 0.1–1)
MONOCYTES NFR BLD AUTO: 5.5 %
MYCOPLASMA PNEUMONIA PCR:: NOT DETECTED
NEUTROPHILS # BLD AUTO: 13.28 X10 (3) UL (ref 1.5–7.7)
NEUTROPHILS # BLD AUTO: 13.28 X10(3) UL (ref 1.5–7.7)
NEUTROPHILS NFR BLD AUTO: 78.9 %
OSMOLALITY SERPL CALC.SUM OF ELEC: 298 MOSM/KG (ref 275–295)
P AXIS: 63 DEGREES
P-R INTERVAL: 192 MS
PARAINFLUENZA 1 PCR:: NOT DETECTED
PARAINFLUENZA 2 PCR:: NOT DETECTED
PARAINFLUENZA 3 PCR:: NOT DETECTED
PARAINFLUENZA 4 PCR:: NOT DETECTED
PLATELET # BLD AUTO: 461 10(3)UL (ref 150–450)
PLATELET MORPHOLOGY: NORMAL
PLATELETS.RETICULATED NFR BLD AUTO: 4.6 % (ref 0–7)
POTASSIUM SERPL-SCNC: 5.2 MMOL/L (ref 3.5–5.1)
PROCALCITONIN SERPL-MCNC: <0.04 NG/ML (ref ?–0.05)
PROT SERPL-MCNC: 6.8 G/DL (ref 5.7–8.2)
PROTHROMBIN TIME: 14.5 SECONDS (ref 11.6–14.8)
Q-T INTERVAL: 432 MS
QRS DURATION: 166 MS
QTC CALCULATION (BEZET): 534 MS
R AXIS: -63 DEGREES
RBC # BLD AUTO: 5.51 X10(6)UL
RHINOVIRUS/ENTERO PCR:: DETECTED
RSV RNA SPEC NAA+PROBE: NEGATIVE
RSV RNA SPEC QL NAA+PROBE: NOT DETECTED
SARS-COV-2 RNA NPH QL NAA+NON-PROBE: NOT DETECTED
SARS-COV-2 RNA RESP QL NAA+PROBE: NOT DETECTED
SODIUM SERPL-SCNC: 143 MMOL/L (ref 136–145)
T AXIS: 85 DEGREES
TROPONIN I SERPL HS-MCNC: 24 NG/L
VENTRICULAR RATE: 92 BPM
WBC # BLD AUTO: 16.9 X10(3) UL (ref 4–11)

## 2024-12-05 PROCEDURE — 84484 ASSAY OF TROPONIN QUANT: CPT | Performed by: STUDENT IN AN ORGANIZED HEALTH CARE EDUCATION/TRAINING PROGRAM

## 2024-12-05 PROCEDURE — 84145 PROCALCITONIN (PCT): CPT | Performed by: HOSPITALIST

## 2024-12-05 PROCEDURE — 87040 BLOOD CULTURE FOR BACTERIA: CPT | Performed by: EMERGENCY MEDICINE

## 2024-12-05 PROCEDURE — 80076 HEPATIC FUNCTION PANEL: CPT | Performed by: EMERGENCY MEDICINE

## 2024-12-05 PROCEDURE — 94799 UNLISTED PULMONARY SVC/PX: CPT

## 2024-12-05 PROCEDURE — 36415 COLL VENOUS BLD VENIPUNCTURE: CPT

## 2024-12-05 PROCEDURE — 85730 THROMBOPLASTIN TIME PARTIAL: CPT | Performed by: EMERGENCY MEDICINE

## 2024-12-05 PROCEDURE — 99291 CRITICAL CARE FIRST HOUR: CPT

## 2024-12-05 PROCEDURE — 80048 BASIC METABOLIC PNL TOTAL CA: CPT | Performed by: EMERGENCY MEDICINE

## 2024-12-05 PROCEDURE — 0202U NFCT DS 22 TRGT SARS-COV-2: CPT | Performed by: HOSPITALIST

## 2024-12-05 PROCEDURE — 94640 AIRWAY INHALATION TREATMENT: CPT

## 2024-12-05 PROCEDURE — 85610 PROTHROMBIN TIME: CPT | Performed by: EMERGENCY MEDICINE

## 2024-12-05 PROCEDURE — 83605 ASSAY OF LACTIC ACID: CPT | Performed by: EMERGENCY MEDICINE

## 2024-12-05 PROCEDURE — 71045 X-RAY EXAM CHEST 1 VIEW: CPT | Performed by: EMERGENCY MEDICINE

## 2024-12-05 PROCEDURE — 5A0935A ASSISTANCE WITH RESPIRATORY VENTILATION, LESS THAN 24 CONSECUTIVE HOURS, HIGH NASAL FLOW/VELOCITY: ICD-10-PCS | Performed by: EMERGENCY MEDICINE

## 2024-12-05 PROCEDURE — 93005 ELECTROCARDIOGRAM TRACING: CPT

## 2024-12-05 PROCEDURE — 96367 TX/PROPH/DG ADDL SEQ IV INF: CPT

## 2024-12-05 PROCEDURE — 82962 GLUCOSE BLOOD TEST: CPT

## 2024-12-05 PROCEDURE — 83880 ASSAY OF NATRIURETIC PEPTIDE: CPT | Performed by: EMERGENCY MEDICINE

## 2024-12-05 PROCEDURE — 0241U SARS-COV-2/FLU A AND B/RSV BY PCR (GENEXPERT): CPT | Performed by: EMERGENCY MEDICINE

## 2024-12-05 PROCEDURE — 93010 ELECTROCARDIOGRAM REPORT: CPT

## 2024-12-05 PROCEDURE — 85025 COMPLETE CBC W/AUTO DIFF WBC: CPT | Performed by: EMERGENCY MEDICINE

## 2024-12-05 PROCEDURE — 96365 THER/PROPH/DIAG IV INF INIT: CPT

## 2024-12-05 PROCEDURE — 94664 DEMO&/EVAL PT USE INHALER: CPT

## 2024-12-05 RX ORDER — SODIUM PHOSPHATE, DIBASIC AND SODIUM PHOSPHATE, MONOBASIC 7; 19 G/230ML; G/230ML
1 ENEMA RECTAL ONCE AS NEEDED
Status: DISCONTINUED | OUTPATIENT
Start: 2024-12-05 | End: 2024-12-06

## 2024-12-05 RX ORDER — ONDANSETRON 2 MG/ML
4 INJECTION INTRAMUSCULAR; INTRAVENOUS EVERY 6 HOURS PRN
Status: DISCONTINUED | OUTPATIENT
Start: 2024-12-05 | End: 2024-12-06

## 2024-12-05 RX ORDER — OXYCODONE AND ACETAMINOPHEN 10; 325 MG/1; MG/1
1 TABLET ORAL EVERY 6 HOURS PRN
Status: DISCONTINUED | OUTPATIENT
Start: 2024-12-05 | End: 2024-12-06

## 2024-12-05 RX ORDER — AZITHROMYCIN 250 MG/1
500 TABLET, FILM COATED ORAL
Status: DISCONTINUED | OUTPATIENT
Start: 2024-12-06 | End: 2024-12-06

## 2024-12-05 RX ORDER — INSULIN DEGLUDEC 100 U/ML
10 INJECTION, SOLUTION SUBCUTANEOUS DAILY
Status: DISCONTINUED | OUTPATIENT
Start: 2024-12-05 | End: 2024-12-06

## 2024-12-05 RX ORDER — NICOTINE POLACRILEX 4 MG
30 LOZENGE BUCCAL
Status: DISCONTINUED | OUTPATIENT
Start: 2024-12-05 | End: 2024-12-06

## 2024-12-05 RX ORDER — ACETAMINOPHEN 500 MG
500 TABLET ORAL EVERY 4 HOURS PRN
Status: DISCONTINUED | OUTPATIENT
Start: 2024-12-05 | End: 2024-12-06

## 2024-12-05 RX ORDER — ASPIRIN 81 MG/1
81 TABLET ORAL DAILY
Status: DISCONTINUED | OUTPATIENT
Start: 2024-12-05 | End: 2024-12-06

## 2024-12-05 RX ORDER — NICOTINE 21 MG/24HR
1 PATCH, TRANSDERMAL 24 HOURS TRANSDERMAL DAILY
Status: DISCONTINUED | OUTPATIENT
Start: 2024-12-05 | End: 2024-12-06

## 2024-12-05 RX ORDER — IPRATROPIUM BROMIDE AND ALBUTEROL SULFATE 2.5; .5 MG/3ML; MG/3ML
3 SOLUTION RESPIRATORY (INHALATION)
Status: DISCONTINUED | OUTPATIENT
Start: 2024-12-05 | End: 2024-12-06

## 2024-12-05 RX ORDER — FLUTICASONE PROPIONATE AND SALMETEROL 250; 50 UG/1; UG/1
1 POWDER RESPIRATORY (INHALATION) 2 TIMES DAILY
Status: DISCONTINUED | OUTPATIENT
Start: 2024-12-05 | End: 2024-12-06

## 2024-12-05 RX ORDER — SPIRONOLACTONE 25 MG/1
25 TABLET ORAL DAILY
Status: DISCONTINUED | OUTPATIENT
Start: 2024-12-05 | End: 2024-12-06

## 2024-12-05 RX ORDER — METOCLOPRAMIDE HYDROCHLORIDE 5 MG/ML
10 INJECTION INTRAMUSCULAR; INTRAVENOUS EVERY 8 HOURS PRN
Status: DISCONTINUED | OUTPATIENT
Start: 2024-12-05 | End: 2024-12-06

## 2024-12-05 RX ORDER — TORSEMIDE 20 MG/1
20 TABLET ORAL DAILY
Status: DISCONTINUED | OUTPATIENT
Start: 2024-12-05 | End: 2024-12-05

## 2024-12-05 RX ORDER — ENOXAPARIN SODIUM 150 MG/ML
1 INJECTION SUBCUTANEOUS EVERY 12 HOURS SCHEDULED
Status: DISCONTINUED | OUTPATIENT
Start: 2024-12-05 | End: 2024-12-06

## 2024-12-05 RX ORDER — ATORVASTATIN CALCIUM 80 MG/1
80 TABLET, FILM COATED ORAL NIGHTLY
Status: DISCONTINUED | OUTPATIENT
Start: 2024-12-05 | End: 2024-12-06

## 2024-12-05 RX ORDER — POLYETHYLENE GLYCOL 3350 17 G/17G
17 POWDER, FOR SOLUTION ORAL DAILY PRN
Status: DISCONTINUED | OUTPATIENT
Start: 2024-12-05 | End: 2024-12-06

## 2024-12-05 RX ORDER — IPRATROPIUM BROMIDE AND ALBUTEROL SULFATE 2.5; .5 MG/3ML; MG/3ML
3 SOLUTION RESPIRATORY (INHALATION) EVERY 6 HOURS PRN
Status: DISCONTINUED | OUTPATIENT
Start: 2024-12-05 | End: 2024-12-06

## 2024-12-05 RX ORDER — GABAPENTIN 300 MG/1
300 CAPSULE ORAL 3 TIMES DAILY
Status: DISCONTINUED | OUTPATIENT
Start: 2024-12-05 | End: 2024-12-05

## 2024-12-05 RX ORDER — ENOXAPARIN SODIUM 150 MG/ML
1 INJECTION SUBCUTANEOUS ONCE
Status: COMPLETED | OUTPATIENT
Start: 2024-12-05 | End: 2024-12-05

## 2024-12-05 RX ORDER — NICOTINE POLACRILEX 4 MG
15 LOZENGE BUCCAL
Status: DISCONTINUED | OUTPATIENT
Start: 2024-12-05 | End: 2024-12-06

## 2024-12-05 RX ORDER — CARVEDILOL 6.25 MG/1
6.25 TABLET ORAL 2 TIMES DAILY WITH MEALS
Status: DISCONTINUED | OUTPATIENT
Start: 2024-12-05 | End: 2024-12-06

## 2024-12-05 RX ORDER — PREGABALIN 25 MG/1
25 CAPSULE ORAL NIGHTLY PRN
Status: DISCONTINUED | OUTPATIENT
Start: 2024-12-05 | End: 2024-12-06

## 2024-12-05 RX ORDER — CLOPIDOGREL BISULFATE 75 MG/1
75 TABLET ORAL DAILY
Status: DISCONTINUED | OUTPATIENT
Start: 2024-12-05 | End: 2024-12-06

## 2024-12-05 RX ORDER — VANCOMYCIN HYDROCHLORIDE 125 MG/1
125 CAPSULE ORAL DAILY
Status: DISCONTINUED | OUTPATIENT
Start: 2024-12-05 | End: 2024-12-05

## 2024-12-05 RX ORDER — DEXTROSE MONOHYDRATE 25 G/50ML
50 INJECTION, SOLUTION INTRAVENOUS
Status: DISCONTINUED | OUTPATIENT
Start: 2024-12-05 | End: 2024-12-06

## 2024-12-05 RX ORDER — ACETAMINOPHEN 500 MG
1000 TABLET ORAL ONCE
Status: COMPLETED | OUTPATIENT
Start: 2024-12-05 | End: 2024-12-05

## 2024-12-05 RX ORDER — ALBUTEROL SULFATE 90 UG/1
2 INHALANT RESPIRATORY (INHALATION) EVERY 4 HOURS PRN
Status: DISCONTINUED | OUTPATIENT
Start: 2024-12-05 | End: 2024-12-06

## 2024-12-05 RX ORDER — LATANOPROST 50 UG/ML
1 SOLUTION/ DROPS OPHTHALMIC NIGHTLY
Status: DISCONTINUED | OUTPATIENT
Start: 2024-12-05 | End: 2024-12-06

## 2024-12-05 RX ORDER — FUROSEMIDE 10 MG/ML
20 INJECTION INTRAMUSCULAR; INTRAVENOUS
Status: DISCONTINUED | OUTPATIENT
Start: 2024-12-05 | End: 2024-12-06

## 2024-12-05 RX ORDER — BISACODYL 10 MG
10 SUPPOSITORY, RECTAL RECTAL
Status: DISCONTINUED | OUTPATIENT
Start: 2024-12-05 | End: 2024-12-06

## 2024-12-05 RX ORDER — DULOXETIN HYDROCHLORIDE 30 MG/1
30 CAPSULE, DELAYED RELEASE ORAL DAILY
Status: DISCONTINUED | OUTPATIENT
Start: 2024-12-05 | End: 2024-12-06

## 2024-12-05 RX ORDER — ENOXAPARIN SODIUM 150 MG/ML
1 INJECTION SUBCUTANEOUS EVERY 12 HOURS SCHEDULED
Status: DISCONTINUED | OUTPATIENT
Start: 2024-12-05 | End: 2024-12-05

## 2024-12-05 RX ORDER — LEVOTHYROXINE SODIUM 137 UG/1
137 TABLET ORAL
Status: DISCONTINUED | OUTPATIENT
Start: 2024-12-05 | End: 2024-12-06

## 2024-12-05 RX ORDER — WARFARIN SODIUM 10 MG/1
10 TABLET ORAL NIGHTLY
Status: DISCONTINUED | OUTPATIENT
Start: 2024-12-05 | End: 2024-12-06

## 2024-12-05 RX ORDER — SENNOSIDES 8.6 MG
17.2 TABLET ORAL NIGHTLY PRN
Status: DISCONTINUED | OUTPATIENT
Start: 2024-12-05 | End: 2024-12-06

## 2024-12-05 NOTE — H&P
HOSPITALIST HISTORY AND PHYSICAL     CC:   Chief Complaint   Patient presents with    Difficulty Breathing        PCP: Charlotte Rey MD    History of Present Illness:   Patient is a 74 year old male with PMH sig for CHFrEF, COPD, DM, CAD, HTN, HL, hypothyroidism, MDD, ANNIKA, back pain, PE dx 2022 thought provoked fm COVID on coumadin here w SOB. Reports sx 1-2 days SOB worse w lying flat and exertion associated sx include subjective fevers cough w green sputum, orthopnea - unsure re med diet compliance at home. W/u as noted below.       Past Medical History:    Anxiety disorder, unspecified    Anxiety state    Atherosclerosis of coronary artery    stents x 6    Chronic low back pain with bilateral sciatica, left worse than right    Chronic obstructive pulmonary disease, unspecified (HCC)    Coronary atherosclerosis    Depression    Diabetes (HCC)    borderline     Disorder of thyroid    Essential hypertension    Glaucoma    first visit with RJADDI, patient was taking Timolol OU BID for 20 years, has not used gtts in over 1 year because he ran out of gtts and had no refills, fundus photos taken today    Heart attack (HCC)    High blood pressure    High cholesterol    Hyperlipidemia    Kidney stone    Left Sided Neck pain, acute    Lumbar stenosis with neurogenic claudication    Major depressive disorder, recurrent, unspecified (HCC)    Morbid obesity with BMI of 40.0-44.9, adult (HCC)    Neuropathy    Pneumonia due to COVID-19 virus    Thyroid disease      Past Surgical History:   Procedure Laterality Date    Cataract extraction extracapsular w/ intraocular lens implantation Right 2018    [facility] bare metal stent (bms)      Circumcision,othr  08/04/2020    Dr. Bonilla @ [facility]    Cysto/uretero w/lithotripsy Left 08/04/2020    Dr. 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     Iridotomy/iridectomy by laser      unknown     Other      cardiac stents         ALL:  Allergies[1]     aspirin, 81 mg, Daily  atorvastatin, 80 mg, Nightly  carvedilol, 6.25 mg, BID with meals  clopidogrel, 75 mg, Daily  DULoxetine, 30 mg, Daily  fluticasone-salmeterol, 1 puff, BID  umeclidinium bromide, 1 puff, Daily  gabapentin, 300 mg, TID  insulin degludec, 10 Units, Daily  empagliflozin, 25 mg, Daily  latanoprost, 1 drop, Nightly  levothyroxine, 137 mcg, Before breakfast  torsemide, 20 mg, Daily  warfarin, 10 mg, Nightly  sacubitril-valsartan, 1 tablet, Daily  spironolactone, 25 mg, Daily  sodium chloride, 500 mL, Once  enoxaparin, 1 mg/kg, 2 times per day        Social History     Tobacco Use    Smoking status: Every Day     Current packs/day: 1.00     Types: Cigarettes    Smokeless tobacco: Former    Tobacco comments:     per pt, quit in 1994   Substance Use Topics    Alcohol use: No     Comment: quit in 1994        Fam Hx  Family History   Problem Relation Age of Onset    Heart Attack Father     Hypertension Brother     Glaucoma Neg     Macular degeneration Neg        Review of Systems  10 point Comprehensive ROS reviewed and negative except for what's stated above.     OBJECTIVE:  /89 (BP Location: Left arm)   Pulse 94   Temp 98.4 °F (36.9 °C) (Oral)   Resp 24   Ht 5' 9\" (1.753 m)   Wt 263 lb 1.6 oz (119.3 kg)   SpO2 94%   BMI 38.85 kg/m²     GEN: NAD, AAO  NECK: supple, no LAD  HEENT- sclera anti-icteric, OP- MMM  CV: rrr, +s1/s2, PMI non displaced  LUNGS: course bl   ABL soft, NT/ND, NABS, no HSC  EXT: no LE edema b/l , DP pulses 2+ b/l  Neuro: sensation intact, no focal deficits  SKIN- no rashes, no lesion  PSYCH- pleasant, forgetful       LABS:   Lab Results   Component Value Date    WBC 16.9 12/05/2024    HGB 12.6 12/05/2024    HCT 43.0 12/05/2024    .0 12/05/2024    CREATSERUM 0.96 12/05/2024    BUN 16 12/05/2024     12/05/2024    K 5.2 12/05/2024     12/05/2024    CO2 24.0 12/05/2024     12/05/2024    CA 9.6 12/05/2024    ALB 4.2  12/05/2024    ALKPHO 74 12/05/2024    BILT 0.3 12/05/2024    TP 6.8 12/05/2024    AST 33 12/05/2024    ALT 12 12/05/2024    PTT 28.1 12/05/2024    INR 1.06 12/05/2024    PTP 14.5 12/05/2024    PGLU 93 12/05/2024       Radiology: XR CHEST AP PORTABLE  (CPT=71045)    Result Date: 12/5/2024  PROCEDURE: XR CHEST AP PORTABLE  (CPT=71045) TIME: 4:20.   COMPARISON: [facility], XR CHEST AP PORTABLE (CPT=71045), 1/18/2024, 3:20 PM.  [facility], CT CHEST PE AORTA (IV ONLY) (CPT=71260), 6/20/2023, 4:02 PM.  [facility], XR CHEST AP PORTABLE (CPT=71045), 10/21/2024, 12:22 PM.  INDICATIONS: Shortness of breath.  TECHNIQUE:   Single view.   FINDINGS:  CARDIAC/VASC: The cardiac silhouette remains borderline enlarged.  There is redemonstration of central pulmonary venous congestion. MEDIAST/JAMES:   No visible mass or adenopathy. LUNGS/PLEURA: Perihilar predominant interstitial opacities in both lungs have worsened.  There is no pleural effusion or pneumothorax. BONES: There is stable moderate acromioclavicular joint osteoarthritis.  Spondylotic changes are seen in the thoracic spine. OTHER: Negative.          CONCLUSION:  Findings most consistent with mild CHF/fluid overload, slightly worse compared to 10/21/2024.   A preliminary report was issued by the [employer] [facility] teleradiology service. There are no major discrepancies.  [employer]  Dictated by (CST): Garth Monzon MD on 12/05/2024 at 5:59 AM     Finalized by (CST): Garth Monzon MD on 12/05/2024 at 6:00 AM               ASSESSMENT / PLAN:  74 year old male with PMH sig for CHFrEF, COPD, DM, CAD, HTN, HL, hypothyroidism, MDD, ANNIKA, back pain, PE dx 2022 thought provoked fm COVID on coumadin here w SOB    SOB/ hypoxia    CHFrEF  COPD  Iron deficiency anemia- rec outpatient GI f/u last admit  DM  CAD  HTN  HL  Hypothyroidism  ANNIKA  MDD  Chronic back pain  Hx PE provoked sp COVID  Nicotine use disorder    SOB/hypoxia  - tx  for bacterial PNA w WBC/ imaginging clinical hx/ findings- procal neg will repeat in am if neg dc abx  - check viral panel  - pBNP d/w cards re iv diuresis kacey input  - re VTE- reportedly on coumadin but INR here 1- resumed med but see below hold off on bridge   - no significant wheeze on exam hold on steroids but cont nebs w resp therapy protocol  - ischemic eval per cards  - wean O2 as able on room air at home  - hg improved fm prior see below    CHFrEF w possible mild exac  - reportedly on torsemide 20 but unclear if compliant with med or diet- noted last admit to have chicken nuggets brought in by daughter  - sees [facility] cardiology Dr Hammer   - kacey in house eval, started on lasix 40 IV BID reassess tomorrow re possible switch back to po  - monitor ins/ outs, daily weights       COPD  - no wheeze hold on this - can do nebulized BD/ resp protocol  - smoking cessation      Iron def anemia now improved   - sp course IV iron last admit also d/w patient + daughter prior admit- no prior colonscopy no overt s/sx bleeding - will need PCP to refer for GI eval outpatient if agreeable   - may need heme outpatient f/u w ongoing AC as noted below     Hx PE 2022  - provoked sp COVID  - d/w d/w pulm last admit re stopping AC- may need heme input (outpatient) as well- PCP to refer to heme can f/u for this and iron def anemia   - per patient believes he was scheduled to see heme Dec 3 but missed appt due to feeling unwell- rescheduled for Jan       DM  - cont basal/ bolus Ssi prn monitor w steroids  - Hold home oral medications including metformin Jardiance  -Hold home Ozempic  - resume home meds for dc   - per daughter not currently on gabapentin and  concerned re contributing to forgetfulness    Forgetfulness  - noted by daughter possible med related but may benefit fm outpatient neuro eval     CAD, HTN, HLD   - Continue carvedilol , statin plavix        Hypothyroid- synthroid  ANNIKA/ MDD back pain- cymbalta / home meds- B[facility] seeing in  house resources given          Tob abuse  Smoking cessation counseling done on prior admission     Est dc 1-2 days    JEAN hospitalist to continue to follow patient while in house      JEAN Fitzgerald Hospitalist  Pager [phone]    12/5/2024  10:14 AM         [1] No Known Allergies

## 2024-12-05 NOTE — ED INITIAL ASSESSMENT (HPI)
Patient arrived to ED with main c.o. SOB for the last two days - pt tachypnic RA - 88% RA - denies O2 use @ home - confirms tactile fevers at home and chills - denies CP, dizziness, N/V/D. Bilateral rhonchi noted on auscultation - received alb neb en route to hospital - O2 improved to 96% on RA - up to 93% on 3L NC.

## 2024-12-05 NOTE — DIETARY NOTE
Brief Nutrition Note:     Pt admitted for dyspnea. Pt screened at nutrition risk at admission by RN for decreased appetite and unintentional weight loss (note stating wt loss from medication per pt). Pt known to nutrition services from previous admissions - last seen 1022/24 and determined to be at no nutrition risk at that time. Per this RD note during that admission, pt reported weight loss as intentional weight loss - started on Ozempic about 3 months prior to last time seen and was being diuresed. Chart reviewed, pt admitted with c/o shortness of breath (SOB) over last two days. Discussion with RN, no concerns. Diet initiated, monitor intakes. Pt visited, breakfast tray at bedside - good intake noted. Pt report decreased po intake over last 3 days r/t not feeling well but good intake prior to symptoms starting. Pt reports gets food from [employer] and does minimal cooking. Pt reports occasionally using salt during cooking - discussed eliminating salt from cooking/table. Pt confirms using Ozempic prior to admission (PTA). Pt reports intentional weight loss over last year r/t diuresing and starting Ozempic. Current weight 263# 1.6 oz EMR weight review, last known weight prior to admission (PTA) 256# 3 oz on 10/28/24 - wt gain. Per EMR weight review, pt noted weighing 257# 14 oz on 10/21/24 - weight gain, 277# on 5/10/24 - 13.9# or 5.0% weight loss x 7 months (non-significant), 304# 6 oz on 12/23/23 - 41.3# or 13.6% weight loss x 1 year (non-significant). Pt appears well-nourished. Encouraged adequate energy and protein intake. Pt appears at no nutrition risk at this time. F/u at length of stay (LOS) per protocol. Please consult nutrition services if earlier intervention is indicated.    Wt Readings from Last 6 Encounters:   12/05/24 119.3 kg (263 lb 1.6 oz)   10/28/24 116.2 kg (256 lb 3.2 oz)   10/23/24 115.9 kg (255 lb 9.6 oz)   10/07/24 119.3 kg (263 lb)   05/10/24 125.6 kg (277 lb)   03/31/24 126.5 kg (278 lb  12.8 oz)     Patient Weight(s) for the past 336 hrs:   Weight   12/05/24 0701 119.3 kg (263 lb 1.6 oz)   12/05/24 0656 119.3 kg (263 lb 1.6 oz)   12/05/24 0305 113.4 kg (250 lb)     Val Darnell MS, ANGEL, RDN, LDN  Clinical Dietitian  P: [phone]

## 2024-12-05 NOTE — ED PROVIDER NOTES
[facility]  Emergency Department Attending Note     Chief Complaint:   Chief Complaint   Patient presents with    Difficulty Breathing     HISTORY OF PRESENT ILLNESS:   Caleb Rausch Jr. is a 74 year old male who presents to the ED with medical history including coronary artery disease with multiple stents, diabetes, hypertension, hyperlipidemia, PE on warfarin, heart failure with reduced ejection fraction presents with a complaint of worsening shortness of breath over the last 2 days.  Apparently was desaturating to 88% off of oxygen which is new for him, denies any oxygen use at home.  States that he had felt feverish over the last 3 days and developed chills today.  Has a cough productive of some yellow-green sputum     MEDICAL & SOCIAL HISTORY:   Past Medical History:    Anxiety disorder, unspecified    Anxiety state    Atherosclerosis of coronary artery    stents x 6    Chronic low back pain with bilateral sciatica, left worse than right    Chronic obstructive pulmonary disease, unspecified (HCC)    Coronary atherosclerosis    Depression    Diabetes (HCC)    borderline     Disorder of thyroid    Essential hypertension    Glaucoma    first visit with IRMA, patient was taking Timolol OU BID for 20 years, has not used gtts in over 1 year because he ran out of gtts and had no refills, fundus photos taken today    Heart attack (HCC)    High blood pressure    High cholesterol    Hyperlipidemia    Kidney stone    Left Sided Neck pain, acute    Lumbar stenosis with neurogenic claudication    Major depressive disorder, recurrent, unspecified (HCC)    Morbid obesity with BMI of 40.0-44.9, adult (HCC)    Neuropathy    Pneumonia due to COVID-19 virus    Thyroid disease      Past Surgical History:   Procedure Laterality Date    Cataract extraction extracapsular w/ intraocular lens implantation Right 2018    [facility] bare metal stent (bms)      Circumcision,othr  08/04/2020    Dr. Bonilla @ [facility]     Cysto/uretero w/lithotripsy Left 08/04/2020    Dr. 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     Iridotomy/iridectomy by laser      unknown Dr    Other      cardiac stents      Social History     Socioeconomic History    Marital status: Single   Tobacco Use    Smoking status: Every Day     Current packs/day: 1.00     Types: Cigarettes    Smokeless tobacco: Former    Tobacco comments:     per pt, quit in 1994   Vaping Use    Vaping status: Some Days   Substance and Sexual Activity    Alcohol use: No     Comment: quit in 1994    Drug use: No   Other Topics Concern    Caffeine Concern Yes    Exercise No   Social History Narrative    The patient uses the following assistive device(s):  single-point cane.      The patient does live in a home with stairs.     Social Drivers of Health     Financial Resource Strain: Low Risk  (4/2/2024)    Financial Resource Strain     Difficulty of Paying Living Expenses: Not very hard     Med Affordability: No   Food Insecurity: No Food Insecurity (10/21/2024)    Food Insecurity     Food Insecurity: Never true   Transportation Needs: No Transportation Needs (10/21/2024)    Transportation Needs     Lack of Transportation: No   Social Connections: Feeling Socially Integrated (2/16/2023)    [facility] [place]    OASIS : Social Isolation     Frequency of experiencing loneliness or isolation: Never   Housing Stability: Low Risk  (10/21/2024)    Housing Stability     Housing Instability: No    Allergies[1]   Current Outpatient Medications   Medication Sig Dispense Refill    atorvastatin 80 MG Oral Tab Take 1 tablet (80 mg total) by mouth daily. 90 tablet 3    metFORMIN HCl 1000 MG Oral Tab Take 0.5 tablets (500 mg total) by mouth 2 (two) times daily with meals. 90 tablet 3    diclofenac 1 % External Gel       albuterol (VENTOLIN HFA) 108 (90 Base) MCG/ACT Inhalation Aero Soln Inhale 2 puffs into  the lungs every 4 (four) hours as needed for Wheezing. 54 g 0    insulin glargine (BASAGLAR KWIKPEN) 100 UNIT/ML Subcutaneous Solution Pen-injector Inject 10 Units into the skin daily. 9 mL 1    ferrous sulfate 325 (65 FE) MG Oral Tab EC Take 1 tablet (325 mg total) by mouth daily with breakfast. 30 tablet 0    levothyroxine 137 MCG Oral Tab Take 137 mcg by mouth before breakfast. 90 tablet 3    latanoprost 0.005 % Ophthalmic Solution Place 1 drop into both eyes nightly.      Accu-Chek Softclix Lancets Does not apply Misc 3 (three) times daily.      Glucose Blood (ACCU-CHEK GUIDE) In Vitro Strip 1 strip by In Vitro route 3 (three) times daily.      fluticasone-umeclidin-vilant (TRELEGY ELLIPTA) 200-62.5-25 MCG/ACT Inhalation Aerosol Powder, Breath Activated Inhale 1 puff into the lungs daily. 180 each 3    DULoxetine 30 MG Oral Cap DR Particles Take 1 capsule (30 mg total) by mouth daily. 90 capsule 3    semaglutide (OZEMPIC, 0.25 OR 0.5 MG/DOSE,) 2 MG/3ML Subcutaneous Solution Pen-injector Inject 0.5 mg into the skin once a week. 9 mL 0    GABAPENTIN 300 MG Oral Cap TAKE ONE CAPSULE BY MOUTH THREE TIMES DAILY (Patient not taking: Reported on 10/28/2024) 90 capsule 0    Insulin Pen Needle (BD PEN NEEDLE RODERICK 2ND GEN) 32G X 4 MM Does not apply Misc Inject 1 each as directed daily. 90 each 3    clopidogrel 75 MG Oral Tab Take 1 tablet (75 mg total) by mouth daily. 90 tablet 3    JARDIANCE 25 MG Oral Tab TAKE ONE TABLET BY MOUTH ONE TIME DAILY 90 tablet 0    sacubitril-valsartan (ENTRESTO) 24-26 MG Oral Tab Take 1 tablet by mouth daily. (Patient not taking: Reported on 10/28/2024) 30 tablet 0    Potassium Chloride ER 20 MEQ Oral Tab CR Take 1 tablet by mouth daily. 90 tablet 0    GVOKE HYPOPEN 2-PACK 1 MG/0.2ML Subcutaneous injection INJECT THE CONTENTS OF ONE DEVICE IN THE LOWER ABDOMEN, OUTER THIGH, OR OUTER UPPER ARM FOR SEVERELY LOW BLOOD SUGAR. IF NO RESPONSE, MAY REPEAT WITH A NEW DEVICE IN 15 MINUTES.       Polyethylene Glycol 3350 (MIRALAX) 17 g Oral Powd Pack Take 17 g by mouth daily. (Patient not taking: Reported on 10/28/2024) 5 each 0    torsemide 20 MG Oral Tab Take 1 tablet (20 mg total) by mouth daily. 90 tablet 3    carvedilol 6.25 MG Oral Tab Take 1 tablet (6.25 mg total) by mouth 2 (two) times daily with meals. 180 tablet 3    spironolactone 25 MG Oral Tab Take 1 tablet (25 mg total) by mouth daily. (Patient not taking: Reported on 10/28/2024) 90 tablet 3    MOVANTIK 25 MG Oral Tab TAKE 1 TABLET BY MOUTH DAILY 1 -2 HOURS AFTER MEAL (Patient not taking: Reported on 10/28/2024)      bimatoprost (LUMIGAN) 0.01 % Ophthalmic Solution Place 1 drop into both eyes every evening. (Patient not taking: Reported on 10/28/2024)      Blood Glucose Monitoring Suppl Does not apply Kit Please use to check blood sugar twice daily. 1 kit 0    Blood Glucose Monitoring Suppl Does not apply Misc Please use to check blood sugar twice daily 200 each 3    Blood Glucose Monitoring Suppl Does not apply Misc Please use to check blood sugar twice daily 200 each 0    oxyCODONE-acetaminophen  MG Oral Tab Take 1 tablet by mouth 4 (four) times daily as needed.      warfarin 5 MG Oral Tab Take 2 tablets (10 mg total) by mouth nightly. 7.5mg on Sunday and Wednesday  10mg on all other days      pregabalin 300 MG Oral Cap Take 1 capsule (300 mg total) by mouth 2 (two) times daily. 30 capsule 0    aspirin 81 MG Oral Tab EC Take 1 tablet (81 mg total) by mouth daily. 30 tablet 2          REVIEW OF SYSTEMS   A 10 point review of systems was completed and is negative except as listed in history of present illness      PHYSICAL EXAM   Vitals: /81   Pulse 80   Temp 97.5 °F (36.4 °C) (Oral)   Resp 26   Ht 175.3 cm (5' 9\")   Wt 113.4 kg   SpO2 94%   BMI 36.92 kg/m²   /81   Pulse 80   Temp 97.5 °F (36.4 °C) (Oral)   Resp 26   Ht 175.3 cm (5' 9\")   Wt 113.4 kg   SpO2 94%   BMI 36.92 kg/m²     General: A&Ox3,  NAD  Constitutional: Well developed, well nourished, nontoxic  Head: atraumatic, normocephalic   Eyes: conjuctiva clear, no icterus, PERRL, EOMI, vision grossly normal  Ears: normal external appearance, no drainage  Nose:  Atraumatic, no swelling, no drainage, nares patent  Throat:  Moist pink mucous membranes, airway is patent  Neck:  Soft supple, no masses, no tracheal deviation, no stridor  Chest:  No bruising or abrasions, no tenderness, no deformity  Cardiac:  Regular rate and rhythm, no murmurs rubs or gallops.  Lung:  No distress, no retractions.  Faint wheezing throughout with some faint crackles  Abdomen:  Soft, nontender, nondistended, normal BS  Back: No stepoff/deformity  Extremities: FROM all ext, no deformities, intact equal peripheral pulses, no cyanosis or edema  Neuro: No facial droop, no slurred speech, moving all extremities freely, SILT to the bilateral upper and lower extremities  Psych: A&Ox3, normal affect, cooperative, calm  Skin: No rash, no petechiae/purpura, warm, dry      RESULTS  LABS:   Results for orders placed or performed during the hospital encounter of 12/05/24   EKG    Collection Time: 12/05/24  3:15 AM   Result Value Ref Range    Ventricular rate 92 BPM    Atrial rate 92 BPM    P-R Interval 192 ms    QRS Duration 166 ms    Q-T Interval 432 ms    QTC Calculation (Bezet) 534 ms    P Axis 63 degrees    R Axis -63 degrees    T Axis 85 degrees   CBC With Differential With Platelet    Collection Time: 12/05/24  3:17 AM   Result Value Ref Range    WBC 16.9 (H) 4.0 - 11.0 x10(3) uL    RBC 5.51 3.80 - 5.80 x10(6)uL    HGB 12.6 (L) 13.0 - 17.5 g/dL    HCT 43.0 39.0 - 53.0 %    MCV 78.0 (L) 80.0 - 100.0 fL    MCH 22.9 (L) 26.0 - 34.0 pg    MCHC 29.3 (L) 31.0 - 37.0 g/dL    RDW-SD 65.1 (H) 35.1 - 46.3 fL    RDW 23.7 (H) 11.0 - 15.0 %    .0 (H) 150.0 - 450.0 10(3)uL    Immature Platelet Fraction 4.6 0.0 - 7.0 %    Neutrophil Absolute Prelim 13.28 (H) 1.50 - 7.70 x10 (3) uL     Neutrophil Absolute 13.28 (H) 1.50 - 7.70 x10(3) uL    Lymphocyte Absolute 2.01 1.00 - 4.00 x10(3) uL    Monocyte Absolute 0.93 0.10 - 1.00 x10(3) uL    Eosinophil Absolute 0.44 0.00 - 0.70 x10(3) uL    Basophil Absolute 0.12 0.00 - 0.20 x10(3) uL    Immature Granulocyte Absolute 0.07 0.00 - 1.00 x10(3) uL    Neutrophil % 78.9 %    Lymphocyte % 11.9 %    Monocyte % 5.5 %    Eosinophil % 2.6 %    Basophil % 0.7 %    Immature Granulocyte % 0.4 %   Hepatic Function Panel (7)    Collection Time: 12/05/24  3:17 AM   Result Value Ref Range    AST 33 <34 U/L    ALT 12 10 - 49 U/L    Alkaline Phosphatase 74 45 - 117 U/L    Bilirubin, Total 0.3 0.2 - 1.1 mg/dL    Bilirubin, Direct <0.1 <=0.3 mg/dL    Total Protein 6.8 5.7 - 8.2 g/dL    Albumin 4.2 3.2 - 4.8 g/dL   Basic Metabolic Panel (8)    Collection Time: 12/05/24  3:17 AM   Result Value Ref Range    Glucose 116 (H) 70 - 99 mg/dL    Sodium 143 136 - 145 mmol/L    Potassium 5.2 (H) 3.5 - 5.1 mmol/L    Chloride 112 98 - 112 mmol/L    CO2 24.0 21.0 - 32.0 mmol/L    Anion Gap 7 0 - 18 mmol/L    BUN 16 9 - 23 mg/dL    Creatinine 0.96 0.70 - 1.30 mg/dL    BUN/CREA Ratio 16.7 10.0 - 20.0    Calcium, Total 9.6 8.7 - 10.4 mg/dL    Calculated Osmolality 298 (H) 275 - 295 mOsm/kg    eGFR-Cr 84 >=60 mL/min/1.73m2   RBC Morphology Scan    Collection Time: 12/05/24  3:17 AM   Result Value Ref Range    RBC Morphology See morphology below (A) Normal, Slide reviewed, see previous RBC morphology.    Platelet Morphology Normal Normal    Macrocytosis 1+      Polychromasia 1+      Ovalocytes 1+     Scan slide    Collection Time: 12/05/24  3:17 AM   Result Value Ref Range    Slide Review       Platelets reviewed on smear. No giant platelets or platelet clumps observed.   RAINBOW DRAW LAVENDER    Collection Time: 12/05/24  3:17 AM   Result Value Ref Range    Hold Lavender Auto Resulted    RAINBOW DRAW LIGHT GREEN    Collection Time: 12/05/24  3:17 AM   Result Value Ref Range    Hold Lt Green  Auto Resulted    RAINBOW DRAW GOLD    Collection Time: 12/05/24  3:17 AM   Result Value Ref Range    Hold Gold Auto Resulted    Lactic Acid, Plasma    Collection Time: 12/05/24  4:11 AM   Result Value Ref Range    Lactic Acid 1.8 0.5 - 2.0 mmol/L   BNP (Brain Natriuretic Peptide)    Collection Time: 12/05/24  4:12 AM   Result Value Ref Range    Beta Natriuretic Peptide 422 (H) <100 pg/mL   Prothrombin Time (PT)    Collection Time: 12/05/24  4:13 AM   Result Value Ref Range    PT 14.5 11.6 - 14.8 seconds    INR 1.06 0.80 - 1.20   PTT, Activated    Collection Time: 12/05/24  4:13 AM   Result Value Ref Range    PTT 28.1 23.0 - 36.0 seconds   SARS-CoV-2/Flu A and B/RSV by PCR (GeneXpert)    Collection Time: 12/05/24  4:13 AM    Specimen: Nares; Other   Result Value Ref Range    SARS-CoV-2 (COVID-19) - (GeneXpert) Not Detected Not Detected    Influenza A by PCR Negative Negative    Influenza B by PCR Negative Negative    RSV by PCR Negative Negative         IMAGING: No results found.    I personally reviewed the radiology study and my finding were no acute intrathoracic process      Procedures:   Procedures    EKG: Normal sinus rhythm with a normal rate of 92, normal intervals, right bundle branch block, nonspecific ST-T wave changes without overt signs of acute ischemia, no old immediately available for comparison     ED COURSE          Re-Evaluation: Improved      Disposition & Plan:   Clinical Impression/Final Diagnosis:   1. Dyspnea, unspecified type        Medical Decision Making: Patient did not receive 30ml/kg of fluids despite having: leukocytosis and hypoxia and tachypnea. The reason for doing so is: a concern for fluid overload. 500mL of fluids were given instead (500 is the amount of fluids given).    Has a productive cough, some leukocytosis and arrived with new hypoxia and oxygen requirement.  Given gentle fluid due to risk of fluid overload, will start empiric antibiotics to cover community-acquired  pneumonia.  Patient is much more comfortable on repeat examination, no wheezing, discussed with hospitalist who agrees with treatment plan, INR is subtherapeutic will give a dose of Lovenox here      Medical Decision Making  Amount and/or Complexity of Data Reviewed  Independent Historian: EMS  External Data Reviewed: notes.  Labs: ordered. Decision-making details documented in ED Course.  Radiology: ordered and independent interpretation performed. Decision-making details documented in ED Course.  ECG/medicine tests: ordered and independent interpretation performed. Decision-making details documented in ED Course.    Risk  OTC drugs.  Prescription drug management.  Decision regarding hospitalization.  Diagnosis or treatment significantly limited by social determinants of health.    Critical Care  Total time providing critical care: 42 minutes        Disposition: Admit  There are no disposition comments on file for this visit.     This note was generated in part using voice recognition dictation technology. The report was reviewed by this physician but still may have unintentional errors due to inherent limitations of voice recognition technology. All times are estimates.         [1] No Known Allergies

## 2024-12-05 NOTE — HISTORICAL OFFICE NOTE
CHIEF COMPLAINT    CHIEF COMPLAINT  Reason for Visit/Chief Complaint   F/U   74-year-old male being followed for extensive coronary disease, mildly reduced ejection fraction, history of PE February 2022.Now having recurrence of shortness of breath similar to previous anginal equivalent, history of long LAD stented segment, multiple bifurcation lesions in the diagonal.No chest pain with exertion. No palpitations or syncope. No lower extremity PINKY, orthopnea or PND.     PROBLEMS  Reconcile with Patient's ChartPROBLEMS  Problem Effective Dates Date resolved Problem Status   Pre-op testing, [SNOMED-CT: 439005379] 5/3/2023 - Active   CAD (coronary artery disease), native coronary artery, [SNOMED-CT: 898528336] 11/12/2019 - Active   PE (pulmonary embolism), [SNOMED-CT: 67482743] 3/4/2022 - Active   History of PTCA, [SNOMED-CT: 098038319] 11/9/2020 - Active   Pure hypercholesterolemia, [SNOMED-CT: 285886516] 11/12/2019 - Active   Hypertension (HTN), primary, [SNOMED-CT: 48870919] 11/12/2019 - Active   Postprocedural hematoma of a circulatory system organ or structure following a cardiac catheterization, [SNOMED-CT: 263372077] 11/12/2019 - Active   Tobacco abuse, [SNOMED-CT: 902478071] 11/12/2019 - Active     ENCOUNTER DIAGNOSIS    ENCOUNTER DIAGNOSIS  Problem Effective Dates Date resolved Problem Status   CAD (coronary artery disease), native coronary artery, [SNOMED-CT: 197358835] 11/12/2019 - Active   PE (pulmonary embolism), [SNOMED-CT: 47166743] 3/4/2022 - Active   History of PTCA, [SNOMED-CT: 523336167] 11/9/2020 - Active   Pure hypercholesterolemia, [SNOMED-CT: 780583218] 11/12/2019 - Active   Hypertension (HTN), primary, [SNOMED-CT: 28942550] 11/12/2019 - Active   Tobacco abuse, [SNOMED-CT: 500496785] 11/12/2019 - Active     VITAL SIGNS    VITAL SIGNS  Date / Time: 7/15/2024   BP Systolic 118 mmHg   BP Diastolic 80 mmHg   Height 69 inches   Weight 269 lbs   Pulse Rate 84 bpm   BSA (Body Surface Area) 2.5 cc/m2   BMI  (Body Mass Index) 39.7 cc/m2   Blood Pressure 118 / 80 mmHg     PHYSICAL EXAMINATION    PHYSICAL EXAMINATION  Header Details   Constitutional 96% O2 RA   Vitals Left Arm Sitting  / 80 mmHg, Pulse rate 84 bpm, Height in 5' 9\", BMI: 39.7, Weight in 268.96 lbs (or) 122 kgs, BSA : 2.49 cc/m²   General Appearance No Acute Distress, Well groomed   Cardiovascular      ALLERGIES, ADVERSE REACTIONS, ALERTS    No data available    MEDICATIONS ADMINISTERED DURING VISIT    No data available    MEDICATIONS  Reconcile with Patient's ChartMEDICATIONS  Medication Start Date Route/Frequency Status   albuterol (VENTOLIN HFA) 108 (90 Base) MCG/ACT inhaler, [RxNorm: 324266] 7/17/2021 INHALE 2 PUFFS INTO THE LUNGS EVERY 6 HOURS AS NEEDED FOR WHEEZING Active   aspirin 81 MG EC tablet, [RxNorm: 498160] 7/17/2021 Take 1 tablet by mouth daily. Active   atorvastatin 80 mg tablet, [RxNorm: 630879] 6/15/2022 Take 1 tablet orally once a day. Active   carvediloL 12.5 mg tablet, [RxNorm: 736877] 4/7/2022 Take 1 tablet orally 2 times a day. Active   CLOPIDOGREL 75MG TABLETS, [RxNorm: 301347] - TAKE 1 TABLET BY MOUTH EVERY DAY Active   DULoxetine 30 mg capsule,delayed release, [RxNorm: 776862] 5/3/2023 Take 1 capsule orally once a day. Active   Entresto 24 mg-26 mg tablet, [RxNorm: 5400649] 5/3/2023 Take 1 tablet orally 2 times a day. Active   Jardiance 10 mg tablet, [RxNorm: 8127400] 5/3/2023 Take 1 tablet orally once a day. Active   levothyroxine 137 mcg tablet, [RxNorm: 493327] 5/3/2023 Take 1 tablet orally once a day. Active   melatonin 5 mg capsule, [RxNorm: 360346] 2/28/2022 Take 1 capsule orally once a day. Active   metFORMIN 500 mg tablet, [RxNorm: 433703] 5/3/2023 Take 1 tablet orally 2 times a day. Active   oxyCODONE-acetaminophen (PERCOCET)  MG per tablet, [RxNorm: 1954317] 7/17/2021 Take 1 tablet by mouth. Active   potassium chloride ER 20 mEq tablet,extended release, [RxNorm: 055624] 5/3/2023 Take 1 tablet orally once a  day. Active   pregabalin 300 mg capsule, [RxNorm: 993036] 5/3/2023 Take 1 capsule orally 2 times a day as needed. Active   torsemide 10 mg tablet, [RxNorm: 574525] 5/3/2023 Take 1 tablet orally once a day. Active   TylenoL 325 mg tablet, [RxNorm: 236024] 2/28/2022 Take 1 tablet orally once a day. Active   Vitamin D2 1,250 mcg (50,000 unit) capsule, [RxNorm: 5850840] 5/3/2023 Take 1 capsule orally once a week Active   warfarin 5 mg tablet, [RxNorm: 388015] 3/15/2024 Take 1 tablet orally once in the evening and as directed by [facility] [phone] Active     ASSESSMENT    Pulmonary embolus: February 2022, has since been on Coumadin due to cost of NOACs. [facility] at [facility] without RV strain therefore no catheter directed thrombolytics. No DVT. Continues to have residual shortness of breath which is multifactorial including likely microvascular and macro epicardial disease however continues to smoke, continues to gain weight and so obesity and tobacco related as well.CAD: Shortness of breath likely anginal equivalent given extensive LAD-diagonal bifurcation stents, last angiogram April 2022 with laser atherectomy, shockwave lithotripsy of LAD, angioplasty diagonal. Repeat angiogram May 2023 with laser atherectomy 1.4 mm catheter, shockwave lithotripsy mid LAD, Cutting Balloon remaining of the LAD, PCI diagonal. New CRUM despite losing weight. Unable to walk due to ortho issues.  -PET stress  -EchoHFrEF: In remission, ejection fraction normal on echo February 2022. No changes cardiac meds at this time.Hypertension: Controlled on current regimen, no changesHyperlipidemia: On high intensity statin for secondary prevention, no changes.Diabetes per primaryFollow-up:  Clinic: 1-2 weeks after testing  Testing/intervention: echo and stress     FAMILY HISTORY    FAMILY HISTORY  Relationship Age Diagnosis   Brother 0 Hypertension (HTN), primary     GENERAL STATUS    No data available    PAST MEDICAL  HISTORY    PAST MEDICAL HISTORY  Problem Date diagonsed Date resolved Status   Pre-op testing, [SNOMED-CT: 664453518] 5/3/2023 - Active   CAD (coronary artery disease), native coronary artery, [SNOMED-CT: 875793847] 11/12/2019 - Active   PE (pulmonary embolism), [SNOMED-CT: 40472935] 3/4/2022 - Active   History of PTCA, [SNOMED-CT: 968477846] 11/9/2020 - Active   Pure hypercholesterolemia, [SNOMED-CT: 371805093] 11/12/2019 - Active   Hypertension (HTN), primary, [SNOMED-CT: 75931392] 11/12/2019 - Active   Postprocedural hematoma of a circulatory system organ or structure following a cardiac catheterization, [SNOMED-CT: 494541547] 11/12/2019 - Active   Tobacco abuse, [SNOMED-CT: 730286065] 11/12/2019 - Active     HISTORY OF PRESENT ILLNESS    74-year-old male being followed for extensive coronary disease, mildly reduced ejection fraction, history of PE February 2022.Now having recurrence of shortness of breath similar to previous anginal equivalent, history of long LAD stented segment, multiple bifurcation lesions in the diagonal.No chest pain with exertion. No palpitations or syncope. No lower extremity PINKY, orthopnea or PND.     IMMUNIZATIONS    No data available    PLAN OF CARE    PLAN OF CARE  Planned Care Date   Cardiac PET/CT Scan (25142) 1/1/1900   Echocardiography - Complete 1/1/1900   Referral Visit - Nilda Argueta ([email or web address]) : 1/1/1900   Follow up visit - Anival Hammer MD 1/1/1900     PROCEDURES    No data available    RESULTS    RESULTS  Name Result Date Location - Ordered By   INR [LOINC: INR] 2.55 RATIO [Normal] 04/03/2024 12:00:00 AM   Address: tel:   GLUCOSE [LOINC: 2339-0] 180 mg/dL [High] 12/28/2023 06:43:00 AM [facility] ([facility])  Address: [address] [facility]  [address]  tel:   SODIUM [LOINC: 2951-2] 134 mmol/L [Low] 12/28/2023 06:43:00 AM [facility] ([facility])  Address: [address] [facility]  [address]  tel:    POTASSIUM [LOINC: 2823-3] 4.0 mmol/L 12/28/2023 06:43:00 AM [facility] ([facility])  Address: [address]  [facility]  [address]  tel:   CHLORIDE [LOINC: 2075-0] 103 mmol/L 12/28/2023 06:43:00 AM [facility] ([facility])  Address: [address]  [facility]  [address]  tel:   CO2 [LOINC: 2028-9] 26.0 mmol/L 12/28/2023 06:43:00 AM [facility] ([facility])  Address: [address]  [facility]  [address]  tel:   ANION GAP [LOINC: 33037-3] 5 mmol/L 12/28/2023 06:43:00 AM [facility] ([facility])  Address: [address]  [facility]  [address]  tel:   BUN [LOINC: 6299-2] 32 mg/dL [High] 12/28/2023 06:43:00 AM [facility] ([facility])  Address: [address]  [facility]  [address]  tel:   CREATININE [LOINC: 98716-5] 1.06 mg/dL 12/28/2023 06:43:00 AM [facility] ([facility])  Address: [address]  [facility]  [address]  tel:   BUN/ CREAT RATIO [LOINC: 3097-3] 30.2 [High] 12/28/2023 06:43:00 AM [facility] ([facility])  Address: [address]  [facility]  [address]  tel:   CALCIUM [LOINC: 46840-7] 9.5 mg/dL 12/28/2023 06:43:00 AM [facility] ([facility])  Address: [address]  [facility]  [address]  tel:   OSMOLALITY CALCULATED [LOINC: 52377-8] 289 mOsm/kg 12/28/2023 06:43:00 AM [facility] ([facility])  Address: [address]  [facility]  [address]  tel:   E GFR CR [LOINC: 34360-1] 75 mL/min/1.73m2 12/28/2023 06:43:00 AM [facility] ([facility])  Address: [address]  [facility]  [address]  tel:   TSH [LOINC: 81837-8] 3.879 mIU/mL 12/25/2023 06:57:00 AM [facility] ([facility])  Address: [address]  [facility]  [address]  tel:   Nuclear PET 1.Stress EKG is non-diagnostic secondary to resting EKG changes. 2.Pt denied  chest pain.3.Rare PAC's.1.This is an equivocal perfusion study. 2.Small fixed perfusion abnormality of moderate intensity in the apical segment. 3.The left ventricular cavity is noted to be normal on the stress studies. The stress left ventricular ejection fraction was calculated to be 42% and left ventricular global function is mildly reduced. The rest left ventricular cavity is noted to be normal. The rest left ventricular ejection fraction was calculated to be 78% and rest left ventricular global function is normal. 4/25/2023 1:30:00 PM Anival Hammer MD   Trans Thoracic Echocardiogram 1.The left ventricle is normal in size. Moderate left ventricular hypertrophy is noted. Global left ventricular systolic function is normal. The left ventricular ejection fraction is 54%. Left ventricular diastolic function is normal. 4/25/2023 3:30:00 PM Anival Hammer MD     REVIEW OF SYSTEMS    REVIEW OF SYSTEMS  Header Details   Cardiovascular No history of Chest pain, CRUM, Palpitations, Syncope, PND, Orthopnea, Edema, Claudication   Respiratory No history of SOB, Wheezing, Sputum   Hem/Lymphatic No history of Easy bruising, Blood clots, Hx of blood transfusion, Anemia, Bleeding problems     SOCIAL HISTORY    SOCIAL HISTORY  Social History Element Description Effective Dates   Smoking status Heavy tobacco smoker 3/21/1965     FUNCTIONAL STATUS    No data available    MEDICAL EQUIPMENT    No data available    Goals Sections    No data available    REASON FOR REFERRAL               Health Concerns Section    No data available    COGNITIVE/MENTAL STATUS    No data available    Patient Demographics    Patient Demographics  Patient Address Patient Name Communication   [address] [place] [address] Caleb Rausch [phone] (Mobile)     Patient Demographics  Language Race / Ethnicity Marital Status   English - Spoken (Preferred) White /  or  Single     Document Information    Primary Care Provider Other  Service Providers Document Coverage Dates   Anival Hammer  NPI: [id number]  [phone] (Work)  [address], [address]  [place] [address]  [place] [address]  Interpreting Physicians  [facility]  [phone] (Work)  [address] [place] [address] Kimberly Toussaint  NPI: [id number]  [phone] (Work)  [address], [address]  [place] [address]  [place] [address]  Nurses Jul. 15, 2024July 15, 2024      Organization   [facility]  [phone] (Work)  [address], [address]  [place] [address]  [place] [address]     Encounter Providers Encounter Date    Jul. 15, 2024July 15, 2024     Legal Authenticator    Anival Hammer  NPI: [id number]  [phone] (Work)  [address], [address]  [place] [address]  [place] [address]

## 2024-12-05 NOTE — CONSULTS
[facility]  Cardiology Consultation    Caleb Rausch Jr. Patient Status:  Inpatient    3/23/1950 MRN [id number]   Location [facility]5W Attending Boaz Villalba MD   Hosp Day # 0 PCP Charlotte Rey MD     Date of Admission:  2024  Date of Consult:  2024  Reason for Consultation:   Dyspnea    History of Present Illness:   Patient is a 74-year-old male with a history of CAD s/p multiple PCI LAD and diagonal branch, HFrEF with an EF of 35-40%, prior history of PE in , COPD, HTN, HLD, DM, who presented with increased shortness of breath.  Several days ago developed fever, chills, frequent cough and increased shortness of breath.  His symptoms continue to worsen therefore presented to the hospital however no longer having fever/chills.  Recently hospitalized late October due to combination of COPD and heart failure exacerbation, received steroids and IV diuretics.  Discharged on torsemide 20 mg daily however is not compliant with oral diuretics at home.  Denies any change in lower extremity edema.  No palpitations.    WBC 16.9    Troponin 24    CXR-mild interstitial edema  ECG-sinus rhythm, left atrial enlargement, bifascicular block, LVH    Cardiac history:  CAD s/p multiple PCI LAD-D  HFrEF, EF 35-40%  Hx of PE   COPD  HTN  HLD  DMII    Past Medical History  Past Medical History:    Anxiety disorder, unspecified    Anxiety state    Atherosclerosis of coronary artery    stents x 6    Chronic low back pain with bilateral sciatica, left worse than right    Chronic obstructive pulmonary disease, unspecified (HCC)    Coronary atherosclerosis    Depression    Diabetes (HCC)    borderline     Disorder of thyroid    Essential hypertension    Glaucoma    first visit with IRMA, patient was taking Timolol OU BID for 20 years, has not used gtts in over 1 year because he ran out of gtts and had no refills, fundus photos taken today    Heart attack (HCC)    High blood pressure    High  cholesterol    Hyperlipidemia    Kidney stone    Left Sided Neck pain, acute    Lumbar stenosis with neurogenic claudication    Major depressive disorder, recurrent, unspecified (HCC)    Morbid obesity with BMI of 40.0-44.9, adult (HCC)    Neuropathy    Pneumonia due to COVID-19 virus    Thyroid disease     Past Surgical History  Past Surgical History:   Procedure Laterality Date    Cataract extraction extracapsular w/ intraocular lens implantation Right 2018    [place]    Cath bare metal stent (bms)      Circumcision,othr  08/04/2020    Dr. Bonilla @ [facility]    Cysto/uretero w/lithotripsy Left 08/04/2020    Dr. 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     Iridotomy/iridectomy by laser      unknown Dr    Other      cardiac stents     Family History  Family History   Problem Relation Age of Onset    Heart Attack Father     Hypertension Brother     Glaucoma Neg     Macular degeneration Neg      Social History  Lives at home with family.  Smokes 1 pack/day.  No alcohol use.  Pediatric History   Patient Parents    Not on file     Other Topics Concern    Caffeine Concern Yes    Exercise No    Seat Belt Not Asked    Special Diet Not Asked    Stress Concern Not Asked    Weight Concern Not Asked     Service Not Asked    Blood Transfusions Not Asked    Occupational Exposure Not Asked    Hobby Hazards Not Asked    Sleep Concern Not Asked    Back Care Not Asked    Bike Helmet Not Asked    Self-Exams Not Asked   Social History Narrative    The patient uses the following assistive device(s):  single-point cane.      The patient does live in a home with stairs.         Current Medications:  Current Facility-Administered Medications   Medication Dose Route Frequency    ipratropium-albuterol (Duoneb) 0.5-2.5 (3) MG/3ML inhalation solution 3 mL  3 mL Nebulization Q20 Min PRN    albuterol (Ventolin HFA) 108 (90 Base) MCG/ACT inhaler 2 puff  2 puff Inhalation Q4H PRN     aspirin DR tab 81 mg  81 mg Oral Daily    atorvastatin (Lipitor) tab 80 mg  80 mg Oral Nightly    carvedilol (Coreg) tab 6.25 mg  6.25 mg Oral BID with meals    clopidogrel (Plavix) tab 75 mg  75 mg Oral Daily    DULoxetine (Cymbalta) DR cap 30 mg  30 mg Oral Daily    fluticasone-salmeterol (Advair Diskus) 250-50 MCG/ACT inhaler 1 puff  1 puff Inhalation BID    umeclidinium bromide (Incruse Ellipta) 62.5 MCG/ACT inhaler 1 puff  1 puff Inhalation Daily    insulin degludec (Tresiba) 100 units/mL flextouch 10 Units  10 Units Subcutaneous Daily    latanoprost (Xalatan) 0.005 % ophthalmic solution 1 drop  1 drop Both Eyes Nightly    levothyroxine (Synthroid) tab 137 mcg  137 mcg Oral Before breakfast    torsemide (Demadex) tab 20 mg  20 mg Oral Daily    warfarin (Coumadin) tab 10 mg  10 mg Oral Nightly    acetaminophen (Tylenol Extra Strength) tab 500 mg  500 mg Oral Q4H PRN    melatonin tab 3 mg  3 mg Oral Nightly PRN    polyethylene glycol (PEG 3350) (Miralax) 17 g oral packet 17 g  17 g Oral Daily PRN    sennosides (Senokot) tab 17.2 mg  17.2 mg Oral Nightly PRN    bisacodyl (Dulcolax) 10 MG rectal suppository 10 mg  10 mg Rectal Daily PRN    fleet enema (Fleet) rectal enema 133 mL  1 enema Rectal Once PRN    ondansetron (Zofran) 4 MG/2ML injection 4 mg  4 mg Intravenous Q6H PRN    metoclopramide (Reglan) 5 mg/mL injection 10 mg  10 mg Intravenous Q8H PRN    ipratropium-albuterol (Duoneb) 0.5-2.5 (3) MG/3ML inhalation solution 3 mL  3 mL Nebulization Q6H PRN    sacubitril-valsartan (Entresto) 24-26 MG per tab 1 tablet  1 tablet Oral Daily    spironolactone (Aldactone) tab 25 mg  25 mg Oral Daily    enoxaparin (Lovenox) 120 MG/0.8ML SUBQ injection 120 mg  1 mg/kg Subcutaneous 2 times per day    influenza virus trivalent high dose PF (Fluzone HD) 0.5 mL injection (ages >/= 65 years) 0.5 mL  0.5 mL Intramuscular Prior to discharge    [START ON 12/6/2024] cefTRIAXone (Rocephin) 2 g in sodium chloride 0.9% 100 mL IVPB-ADDV   2 g Intravenous Q24H    [START ON 12/6/2024] azithromycin (Zithromax) tab 500 mg  500 mg Oral Daily    oxyCODONE-acetaminophen (Percocet)  MG per tab 1 tablet  1 tablet Oral Q6H PRN    glucose (Dex4) 15 GM/59ML oral liquid 15 g  15 g Oral Q15 Min PRN    Or    glucose (Glutose) 40% oral gel 15 g  15 g Oral Q15 Min PRN    Or    glucose-vitamin C (Dex-4) chewable tab 4 tablet  4 tablet Oral Q15 Min PRN    Or    dextrose 50% injection 50 mL  50 mL Intravenous Q15 Min PRN    Or    glucose (Dex4) 15 GM/59ML oral liquid 30 g  30 g Oral Q15 Min PRN    Or    glucose (Glutose) 40% oral gel 30 g  30 g Oral Q15 Min PRN    Or    glucose-vitamin C (Dex-4) chewable tab 8 tablet  8 tablet Oral Q15 Min PRN    insulin aspart (NovoLOG) 100 Units/mL FlexPen 1-5 Units  1-5 Units Subcutaneous TID CC    nicotine (Nicoderm CQ) 21 MG/24HR patch 1 patch  1 patch Transdermal Daily     Medications Prior to Admission   Medication Sig    atorvastatin 80 MG Oral Tab Take 1 tablet (80 mg total) by mouth daily.    metFORMIN HCl 1000 MG Oral Tab Take 0.5 tablets (500 mg total) by mouth 2 (two) times daily with meals.    insulin glargine (BASAGLAR KWIKPEN) 100 UNIT/ML Subcutaneous Solution Pen-injector Inject 10 Units into the skin daily.    ferrous sulfate 325 (65 FE) MG Oral Tab EC Take 1 tablet (325 mg total) by mouth daily with breakfast.    levothyroxine 137 MCG Oral Tab Take 137 mcg by mouth before breakfast.    latanoprost 0.005 % Ophthalmic Solution Place 1 drop into both eyes nightly.    fluticasone-umeclidin-vilant (TRELEGY ELLIPTA) 200-62.5-25 MCG/ACT Inhalation Aerosol Powder, Breath Activated Inhale 1 puff into the lungs daily.    DULoxetine 30 MG Oral Cap DR Particles Take 1 capsule (30 mg total) by mouth daily.    semaglutide (OZEMPIC, 0.25 OR 0.5 MG/DOSE,) 2 MG/3ML Subcutaneous Solution Pen-injector Inject 0.5 mg into the skin once a week.    clopidogrel 75 MG Oral Tab Take 1 tablet (75 mg total) by mouth daily.    torsemide 20  MG Oral Tab Take 1 tablet (20 mg total) by mouth daily.    carvedilol 6.25 MG Oral Tab Take 1 tablet (6.25 mg total) by mouth 2 (two) times daily with meals.    oxyCODONE-acetaminophen  MG Oral Tab Take 1 tablet by mouth 4 (four) times daily as needed.    warfarin 5 MG Oral Tab Take 2 tablets (10 mg total) by mouth nightly. 7.5mg on  and Wednesday  10mg on all other days    pregabalin 300 MG Oral Cap Take 1 capsule (300 mg total) by mouth 2 (two) times daily.    aspirin 81 MG Oral Tab EC Take 1 tablet (81 mg total) by mouth daily.    diclofenac 1 % External Gel     albuterol (VENTOLIN HFA) 108 (90 Base) MCG/ACT Inhalation Aero Soln Inhale 2 puffs into the lungs every 4 (four) hours as needed for Wheezing.    [] predniSONE 20 MG Oral Tab Take 2 tablets (40 mg total) by mouth daily with breakfast for 3 days.    Accu-Chek Softclix Lancets Does not apply Misc 3 (three) times daily.    Glucose Blood (ACCU-CHEK GUIDE) In Vitro Strip 1 strip by In Vitro route 3 (three) times daily.    Insulin Pen Needle (BD PEN NEEDLE RODERICK 2ND GEN) 32G X 4 MM Does not apply Misc Inject 1 each as directed daily.    JARDIANCE 25 MG Oral Tab TAKE ONE TABLET BY MOUTH ONE TIME DAILY    sacubitril-valsartan (ENTRESTO) 24-26 MG Oral Tab Take 1 tablet by mouth daily. (Patient not taking: Reported on 10/28/2024)    Potassium Chloride ER 20 MEQ Oral Tab CR Take 1 tablet by mouth daily.    GVOKE HYPOPEN 2-PACK 1 MG/0.2ML Subcutaneous injection INJECT THE CONTENTS OF ONE DEVICE IN THE LOWER ABDOMEN, OUTER THIGH, OR OUTER UPPER ARM FOR SEVERELY LOW BLOOD SUGAR. IF NO RESPONSE, MAY REPEAT WITH A NEW DEVICE IN 15 MINUTES.    Polyethylene Glycol 3350 (MIRALAX) 17 g Oral Powd Pack Take 17 g by mouth daily. (Patient not taking: Reported on 10/28/2024)    spironolactone 25 MG Oral Tab Take 1 tablet (25 mg total) by mouth daily. (Patient not taking: Reported on 10/28/2024)    bimatoprost (LUMIGAN) 0.01 % Ophthalmic Solution Place 1 drop  into both eyes every evening. (Patient not taking: Reported on 10/28/2024)    Blood Glucose Monitoring Suppl Does not apply Kit Please use to check blood sugar twice daily.    Blood Glucose Monitoring Suppl Does not apply Misc Please use to check blood sugar twice daily    Blood Glucose Monitoring Suppl Does not apply Misc Please use to check blood sugar twice daily     Allergies  Allergies[1]    Review of Systems:     14 point review of systems completed and negative except as noted.    Physical Exam:   Patient Vitals for the past 24 hrs:   BP Temp Temp src Pulse Resp SpO2 Height Weight   12/05/24 1329 105/71 97.7 °F (36.5 °C) Oral 88 22 92 % -- --   12/05/24 1012 135/89 -- -- 94 24 94 % -- --   12/05/24 0812 142/86 98.4 °F (36.9 °C) Oral 86 24 94 % -- --   12/05/24 0802 -- -- -- 89 -- -- -- --   12/05/24 0701 -- -- -- -- -- -- -- 263 lb 1.6 oz (119.3 kg)   12/05/24 0656 123/89 97.8 °F (36.6 °C) Oral 93 24 91 % -- 263 lb 1.6 oz (119.3 kg)   12/05/24 0630 134/83 -- -- 96 -- 90 % -- --   12/05/24 0629 -- -- -- -- 26 -- -- --   12/05/24 0553 123/83 -- -- 87 24 94 % -- --   12/05/24 0538 131/78 -- -- 80 26 94 % -- --   12/05/24 0536 133/81 -- -- 80 18 94 % -- --   12/05/24 0523 -- -- -- 91 22 94 % -- --   12/05/24 0521 139/81 -- -- 86 -- 99 % -- --   12/05/24 0506 127/75 -- -- 87 22 94 % -- --   12/05/24 0435 132/86 -- -- 93 24 93 % -- --   12/05/24 0335 125/76 -- -- 86 21 92 % -- --   12/05/24 0320 122/76 -- -- 83 (!) 27 92 % -- --   12/05/24 0309 -- -- -- 91 24 93 % -- --   12/05/24 0305 138/84 97.5 °F (36.4 °C) Oral 94 24 (!) 88 % 5' 9\" (1.753 m) 250 lb (113.4 kg)     Intake/Output:   Last 3 shifts:   Intake/Output                   12/03/24 0700 - 12/04/24 0659 (Not Admitted) 12/04/24 0700 - 12/05/24 0659 (Not Admitted) 12/05/24 0700 - 12/06/24 0659       Intake    IV PIGGYBACK  --  100  --    Volume (mL) (cefTRIAXone (Rocephin) 2 g in sodium chloride 0.9% 100 mL IVPB-ADDV) -- 100 --    Total Intake -- 100 --        Output    Total Output -- -- --       Net I/O     -- 100 --          Scheduled Meds:    aspirin  81 mg Oral Daily    atorvastatin  80 mg Oral Nightly    carvedilol  6.25 mg Oral BID with meals    clopidogrel  75 mg Oral Daily    DULoxetine  30 mg Oral Daily    fluticasone-salmeterol  1 puff Inhalation BID    umeclidinium bromide  1 puff Inhalation Daily    insulin degludec  10 Units Subcutaneous Daily    latanoprost  1 drop Both Eyes Nightly    levothyroxine  137 mcg Oral Before breakfast    torsemide  20 mg Oral Daily    warfarin  10 mg Oral Nightly    sacubitril-valsartan  1 tablet Oral Daily    spironolactone  25 mg Oral Daily    enoxaparin  1 mg/kg Subcutaneous 2 times per day    [START ON 12/6/2024] cefTRIAXone  2 g Intravenous Q24H    [START ON 12/6/2024] azithromycin  500 mg Oral Daily    insulin aspart  1-5 Units Subcutaneous TID CC    nicotine  1 patch Transdermal Daily     General: Alert and oriented x 3. No apparent distress.   HEENT: Normocephalic, anicteric sclera, neck supple, no thyromegaly or adenopathy.  Neck: No JVD, carotids 2+, no bruits.  Cardiac: Regular rate and rhythm. S1, S2 normal. No murmur, pericardial rub, S3, or extra cardiac sounds.  Lungs: Bilateral crackles.  Normal excursions and effort.  Abdomen: Soft, non-tender. No organosplenomegally, mass or rebound, BS-present.  Extremities: Without clubbing or cyanosis.  Trace edema.    Neurologic: Alert and oriented, normal affect. No focal defects  Skin: Warm and dry.     Results:   Laboratory Data:  Lab Results   Component Value Date    WBC 16.9 (H) 12/05/2024    HGB 12.6 (L) 12/05/2024    HCT 43.0 12/05/2024    .0 (H) 12/05/2024    CREATSERUM 0.96 12/05/2024    BUN 16 12/05/2024     12/05/2024    K 5.2 (H) 12/05/2024     12/05/2024    CO2 24.0 12/05/2024     (H) 12/05/2024    CA 9.6 12/05/2024    ALB 4.2 12/05/2024    ALKPHO 74 12/05/2024    TP 6.8 12/05/2024    AST 33 12/05/2024    ALT 12 12/05/2024    PTT 28.1  12/05/2024    INR 1.06 12/05/2024    PTP 14.5 12/05/2024    T4F 1.1 10/17/2024    TSH 5.202 (H) 10/17/2024    PSA 0.42 07/01/2021    DDIMER 0.35 03/29/2024    ESRML 11 07/14/2018    CRP <0.29 01/19/2022    MG 2.2 01/19/2024    PHOS 2.6 12/25/2023    TROP <0.045 11/20/2020    CK 65 01/08/2022    B12 351 06/29/2020         Recent Labs   Lab 12/05/24 0317   *   BUN 16   CREATSERUM 0.96   CA 9.6      K 5.2*      CO2 24.0     Recent Labs   Lab 12/05/24 0317   RBC 5.51   HGB 12.6*   HCT 43.0   MCV 78.0*   MCH 22.9*   MCHC 29.3*   RDW 23.7*   NEPRELIM 13.28*   WBC 16.9*   .0*       Recent Labs   Lab 12/05/24  0412   BNPML 422*       No results for input(s): \"TROP\", \"CK\" in the last 168 hours.    Imaging:  XR CHEST AP PORTABLE  (CPT=71045)    Result Date: 12/5/2024  CONCLUSION:  Findings most consistent with mild CHF/fluid overload, slightly worse compared to 10/21/2024.   A preliminary report was issued by the [facility] teleradiology service. There are no major discrepancies.  [facility]  Dictated by (CST): Garth Monzon MD on 12/05/2024 at 5:59 AM     Finalized by (CST): Garth Monzon MD on 12/05/2024 at 6:00 AM           Impression:   Assessment:  Possible viral syndrome with recent fever/chills/coughing  HFrEF, EF 35-40% with evidence of mild fluid retention  COPD, possible exacerbation  CAD s/p multiple PCI LAD-D  Hx of PE 2022  HTN  HLD  DMII    Plan:  - Patient already feels markedly better since admission however remains on 3 L O2, continues to have frequent coughing  - Does not appear markedly overloaded, minimal lower extremity edema, however noted some interstitial edema on chest x-ray  - Will give Lasix 40 mg IV twice daily, may be able to switch to oral diuretic tomorrow  - Continue antibiotics, nebulizers  - May benefit from ischemic evaluation, can be done as outpatient as previously scheduled  - Continue home cardiac medications    Thank you for allowing me to  participate in the care of your patient.    Chaparro Steele MD  [facility]  12/5/2024         [1] No Known Allergies

## 2024-12-05 NOTE — ED QUICK NOTES
Orders for admission, patient is aware of plan and ready to go upstairs. Any questions, please call ED RN Corazon at extension [phone].     Patient Covid vaccination status: Fully vaccinated     COVID Test Ordered in ED: SARS-CoV-2/Flu A and B/RSV by PCR (GeneXpert)    COVID Suspicion at Admission: N/A    Running Infusions:  antibiotics (see MAR)     Mental Status/LOC at time of transport: A+O x 4

## 2024-12-06 VITALS
TEMPERATURE: 98 F | RESPIRATION RATE: 20 BRPM | SYSTOLIC BLOOD PRESSURE: 118 MMHG | HEIGHT: 69 IN | HEART RATE: 96 BPM | WEIGHT: 261.81 LBS | DIASTOLIC BLOOD PRESSURE: 85 MMHG | BODY MASS INDEX: 38.78 KG/M2 | OXYGEN SATURATION: 94 %

## 2024-12-06 LAB
ANION GAP SERPL CALC-SCNC: 8 MMOL/L (ref 0–18)
BUN BLD-MCNC: 21 MG/DL (ref 9–23)
BUN/CREAT SERPL: 21.6 (ref 10–20)
CALCIUM BLD-MCNC: 9.6 MG/DL (ref 8.7–10.4)
CHLORIDE SERPL-SCNC: 107 MMOL/L (ref 98–112)
CO2 SERPL-SCNC: 26 MMOL/L (ref 21–32)
CREAT BLD-MCNC: 0.97 MG/DL
DEPRECATED RDW RBC AUTO: 63.8 FL (ref 35.1–46.3)
EGFRCR SERPLBLD CKD-EPI 2021: 83 ML/MIN/1.73M2 (ref 60–?)
ERYTHROCYTE [DISTWIDTH] IN BLOOD BY AUTOMATED COUNT: 23.6 % (ref 11–15)
GLUCOSE BLD-MCNC: 116 MG/DL (ref 70–99)
GLUCOSE BLDC GLUCOMTR-MCNC: 117 MG/DL (ref 70–99)
GLUCOSE BLDC GLUCOMTR-MCNC: 120 MG/DL (ref 70–99)
HCT VFR BLD AUTO: 43.5 %
HGB BLD-MCNC: 12.8 G/DL
INR BLD: 1.02 (ref 0.8–1.2)
MCH RBC QN AUTO: 22.6 PG (ref 26–34)
MCHC RBC AUTO-ENTMCNC: 29.4 G/DL (ref 31–37)
MCV RBC AUTO: 76.9 FL
OSMOLALITY SERPL CALC.SUM OF ELEC: 296 MOSM/KG (ref 275–295)
PLATELET # BLD AUTO: 445 10(3)UL (ref 150–450)
POTASSIUM SERPL-SCNC: 4 MMOL/L (ref 3.5–5.1)
PROCALCITONIN SERPL-MCNC: <0.04 NG/ML (ref ?–0.05)
PROTHROMBIN TIME: 14.1 SECONDS (ref 11.6–14.8)
RBC # BLD AUTO: 5.66 X10(6)UL
SODIUM SERPL-SCNC: 141 MMOL/L (ref 136–145)
WBC # BLD AUTO: 13.7 X10(3) UL (ref 4–11)

## 2024-12-06 PROCEDURE — 80048 BASIC METABOLIC PNL TOTAL CA: CPT | Performed by: STUDENT IN AN ORGANIZED HEALTH CARE EDUCATION/TRAINING PROGRAM

## 2024-12-06 PROCEDURE — 94640 AIRWAY INHALATION TREATMENT: CPT

## 2024-12-06 PROCEDURE — 84145 PROCALCITONIN (PCT): CPT | Performed by: HOSPITALIST

## 2024-12-06 PROCEDURE — 85027 COMPLETE CBC AUTOMATED: CPT | Performed by: STUDENT IN AN ORGANIZED HEALTH CARE EDUCATION/TRAINING PROGRAM

## 2024-12-06 PROCEDURE — 94799 UNLISTED PULMONARY SVC/PX: CPT

## 2024-12-06 PROCEDURE — 97530 THERAPEUTIC ACTIVITIES: CPT

## 2024-12-06 PROCEDURE — 85610 PROTHROMBIN TIME: CPT | Performed by: STUDENT IN AN ORGANIZED HEALTH CARE EDUCATION/TRAINING PROGRAM

## 2024-12-06 PROCEDURE — 82962 GLUCOSE BLOOD TEST: CPT

## 2024-12-06 PROCEDURE — 97162 PT EVAL MOD COMPLEX 30 MIN: CPT

## 2024-12-06 RX ORDER — TORSEMIDE 20 MG/1
20 TABLET ORAL DAILY
Status: DISCONTINUED | OUTPATIENT
Start: 2024-12-07 | End: 2024-12-06

## 2024-12-06 NOTE — CDS QUERY
Dear Dr Villalba    Can the hypoxia be further specified?        (x) Acute hypoxic respiratory failure    ( ) Hypoxia without respiratory failure    ( ) Other (please specify):     CLINICAL INDICATORS:   RA-88%  O3 3LNC-93%/PF DDESR=313   O2 UP TO 4LHF=91/93%  CXR-most consistent with mild CHF/fluid overload     RISK FACTORS:  COPD, CHRONIC SYSTOLIC CHF    TREATMENT:  O2 UP TO 4LNC  NEB TX  IV LASIX GIVEN    Use of terms such as suspected, possible, or probable (associated with a specific diagnosis that is being evaluated, monitored, or treated as if it exists) are acceptable and can be coded in the inpatient setting, when documented at the time of discharge.        If you have any questions, please contact Clinical :  Viv DYSON RN at [phone]     Thank You!    THIS FORM IS A PERMANENT PART OF THE MEDICAL RECORD

## 2024-12-06 NOTE — DISCHARGE SUMMARY
[facility] Discharge Summary   Patient ID:  Caleb Rausch Jr.  [id number]  74 year old  3/23/1950    Admit date: 12/5/2024    Discharge date and time: 12/6/2024     Attending Physician: Boaz Villalba MD     Primary Care Physician: Charlotte Rey MD     Admit Dx: Dyspnea, unspecified type [R06.00]      Discharge Diagnosis     Sepsis- resolved   rhinovirus  SOB/ hypoxia- resolved- improved      CHFrEF mild acute on chronic now improved  COPD  Iron deficiency anemia- rec outpatient GI f/u last admit- resolved   DM  CAD  HTN  HL  Hypothyroidism  ANNIKA  MDD  Chronic back pain  Hx PE provoked sp COVID 2022  Nicotine use disorder    Discharged Condition: stable    Disposition: home    Important follow up:  Charlotte Rey MD  [address] [facility] [id number] [phone]    Schedule an appointment as soon as possible for a visit in 1 week(s)  hospital follow up- referral to heme and neuro    Anival Hammer MD  [address] [facility] [address]  [facility] [id number] [phone]    Schedule an appointment as soon as possible for a visit          Please refer to prior H&P on admission date.    Hospital Course:     Sepsis- WBC and HR > 90 + rhinovirus  - procal remaining neg- dc abx  - supportive care  - sepsis improved         SOB/hypoxia  - stopped abx- procal neg x 2 and + viral source   - re CHF- inc BNP mild exac kacey cards  - re VTE- reportedly on coumadin but INR here 1- resumed med but see below hold off on bridge - d/w patient see below f/u with PCP and heme to discuss stopping med altogether as discussed last admit  - no significant wheeze on exam hold on steroids but cont nebs w resp therapy protocol  - ischemic eval per cards  - wean O2 as able on room air at home 1L this am - now on room air and passed walk test see notes   - hg improved fm prior see below     CHFrEF w possible mild exac  - reportedly on torsemide 20 but unclear if compliant with med or diet- noted last admit to have chicken nuggets brought in by  daughter  - sees  cardiology Dr Hammer   - kacey in house eval, started on lasix 40 IV BID reassess to12/6 re possible switch back to po  - monitor ins/ outs, daily weights  - back on torsemide at dc   - cards f/u after dc         COPD  - no wheeze hold on this - can do nebulized BD/ resp protocol  - smoking cessation      Iron def anemia now improved   - sp course IV iron last admit also d/w patient + daughter prior admit- no prior colonscopy no overt s/sx bleeding - will need PCP to refer for GI eval outpatient if agreeable   - hg normalized stop po iron at dc      Hx PE 2022  - provoked sp COVID  - d/w d/w pulm last admit re stopping AC- may need heme input (outpatient) as well- PCP to refer to heme can f/u for this and iron def anemia   - cont w coumadin in house but hold on bridge        DM  - cont basal/ bolus Ssi prn monitor w steroids  - Hold home oral medications including metformin Jardiance  -Hold home Ozempic  - resume home meds for dc   - per daughter not currently on gabapentin and  concerned re contributing to forgetfulness     Forgetfulness  - noted by daughter possible med related (concern for abusing lyrica at home) but may benefit fm outpatient neuro eval  - off lyrica for dc can do prn oxycodone for pain      CAD, HTN, HLD   - Continue carvedilol , statin plavix        Hypothyroid- synthroid  ANNIKA/ MDD back pain- cymbalta / home meds- S seeing in house resources given           Tob abuse  Smoking cessation counseling done on prior admission        Day of discharge Exam   Vitals:    12/06/24 1223   BP: 118/85   Pulse: 96   Resp: 20   Temp: 97.6 °F (36.4 °C)       Exam on day of discharge:  Gen: No acute distress  CV: RRR  Lungs: CTAB, good respiratory effort  Abdomen: s/nt/nd  Neuro: no focal deficits      Discharge meds     Medication List        START taking these medications      Entresto 24-26 MG Tabs  Generic drug: sacubitril-valsartan  Take 1 tablet by mouth daily.     spironolactone 25 MG  Tabs  Commonly known as: Aldactone  Take 1 tablet (25 mg total) by mouth daily.            CONTINUE taking these medications      Accu-Chek Guide Strp     Accu-Chek Softclix Lancets Misc     albuterol 108 (90 Base) MCG/ACT Aers  Commonly known as: Ventolin HFA  Inhale 2 puffs into the lungs every 4 (four) hours as needed for Wheezing.     aspirin 81 MG Tbec  Take 1 tablet (81 mg total) by mouth daily.     atorvastatin 80 MG Tabs  Commonly known as: Lipitor  Take 1 tablet (80 mg total) by mouth daily.     Basaglar KwikPen 100 UNIT/ML Sopn  Generic drug: insulin glargine  Inject 10 Units into the skin daily.     BD Pen Needle Amy 2nd Gen 32G X 4 MM Misc  Generic drug: Insulin Pen Needle  Inject 1 each as directed daily.     * Blood Glucose Monitoring Suppl Kit  Please use to check blood sugar twice daily.     * Blood Glucose Monitoring Suppl Misc  Please use to check blood sugar twice daily     * Blood Glucose Monitoring Suppl Misc  Please use to check blood sugar twice daily     carvedilol 6.25 MG Tabs  Commonly known as: Coreg  Take 1 tablet (6.25 mg total) by mouth 2 (two) times daily with meals.     clopidogrel 75 MG Tabs  Commonly known as: Plavix  Take 1 tablet (75 mg total) by mouth daily.     diclofenac 1 % Gel  Commonly known as: Voltaren     DULoxetine 30 MG Cpep  Commonly known as: Cymbalta  Take 1 capsule (30 mg total) by mouth daily.     Gvoke HypoPen 2-Pack 1 MG/0.2ML injection  Generic drug: glucagon     Jardiance 25 MG Tabs  Generic drug: empagliflozin  TAKE ONE TABLET BY MOUTH ONE TIME DAILY     latanoprost 0.005 % Soln  Commonly known as: Xalatan     levothyroxine 137 MCG Tabs  Commonly known as: Synthroid  Take 137 mcg by mouth before breakfast.     metFORMIN HCl 1000 MG Tabs  Commonly known as: GLUCOPHAGE  Take 0.5 tablets (500 mg total) by mouth 2 (two) times daily with meals.     oxyCODONE-acetaminophen  MG Tabs  Commonly known as: Percocet     Ozempic (0.25 or 0.5 MG/DOSE) 2 MG/3ML  Sopn  Generic drug: semaglutide  Inject 0.5 mg into the skin once a week.     Polyethylene Glycol 3350 17 g Pack  Commonly known as: MiraLax  Take 17 g by mouth daily.     Potassium Chloride ER 20 MEQ Tbcr  Take 1 tablet by mouth daily.     torsemide 20 MG Tabs  Commonly known as: Demadex  Take 1 tablet (20 mg total) by mouth daily.     Trelegy Ellipta 200-62.5-25 MCG/ACT Aepb  Generic drug: fluticasone-umeclidin-vilant  Inhale 1 puff into the lungs daily.     warfarin 5 MG Tabs  Commonly known as: Coumadin           * This list has 3 medication(s) that are the same as other medications prescribed for you. Read the directions carefully, and ask your doctor or other care provider to review them with you.                STOP taking these medications      ferrous sulfate 325 (65 FE) MG Tbec     Lumigan 0.01 % Soln  Generic drug: bimatoprost     predniSONE 20 MG Tabs  Commonly known as: Deltasone     pregabalin 300 MG Caps  Commonly known as: LYRICA              Consults: IP CONSULT TO CARDIOLOGY    Radiology: XR CHEST AP PORTABLE  (CPT=71045)    Result Date: 12/5/2024  PROCEDURE: XR CHEST AP PORTABLE  (CPT=71045) TIME: 4:20.   COMPARISON: [facility], XR CHEST AP PORTABLE (CPT=71045), 1/18/2024, 3:20 PM.  [facility], CT CHEST PE AORTA (IV ONLY) (CPT=71260), 6/20/2023, 4:02 PM.  [facility], XR CHEST AP PORTABLE (CPT=71045), 10/21/2024, 12:22 PM.  INDICATIONS: Shortness of breath.  TECHNIQUE:   Single view.   FINDINGS:  CARDIAC/VASC: The cardiac silhouette remains borderline enlarged.  There is redemonstration of central pulmonary venous congestion. MEDIAST/JAMES:   No visible mass or adenopathy. LUNGS/PLEURA: Perihilar predominant interstitial opacities in both lungs have worsened.  There is no pleural effusion or pneumothorax. BONES: There is stable moderate acromioclavicular joint osteoarthritis.  Spondylotic changes are seen in the thoracic spine. OTHER: Negative.           CONCLUSION:  Findings most consistent with mild CHF/fluid overload, slightly worse compared to 10/21/2024.   A preliminary report was issued by the [facility] teleradiology service. There are no major discrepancies.  [facility]  Dictated by (CST): Garth Monzon MD on 12/05/2024 at 5:59 AM     Finalized by (CST): Garth oMnzon MD on 12/05/2024 at 6:00 AM             Operative Procedures:        Total Time Coordinating Care: > than 30 minutes    Patient had opportunity to ask questions and state understand and agree with therapeutic plan as outlined      Boaz Villalba MD  [facility]  [phone]  12/6/2024  10:42 AM

## 2024-12-06 NOTE — PLAN OF CARE
Problem: Patient Centered Care  Goal: Patient preferences are identified and integrated in the patient's plan of care  Description: Interventions:  - What would you like us to know as we care for you? I live at home with my grand-son  - Provide timely, complete, and accurate information to patient/family  - Incorporate patient and family knowledge, values, beliefs, and cultural backgrounds into the planning and delivery of care  - Encourage patient/family to participate in care and decision-making at the level they choose  - Honor patient and family perspectives and choices  Outcome: Progressing     Problem: Diabetes/Glucose Control  Goal: Glucose maintained within prescribed range  Description: INTERVENTIONS:  - Monitor Blood Glucose as ordered  - Assess for signs and symptoms of hyperglycemia and hypoglycemia  - Administer ordered medications to maintain glucose within target range  - Assess barriers to adequate nutritional intake and initiate nutrition consult as needed  - Instruct patient on self management of diabetes  Outcome: Progressing     Problem: Patient/Family Goals  Goal: Patient/Family Long Term Goal  Description: Patient's Long Term Goal: Discharge from hospital    Interventions:  - Monitor vital signs  - Monitor appropriate labs  - Monitor blood glucose levels  - Pain management  - Oxygen and respiratory intervention as needed  - Administer medications per order  - Follow MD orders  - Diagnostics per order  - Update / inform patient and family on plan of care  - Discharge planning  - See additional Care Plan goals for specific interventions  Outcome: Progressing  Goal: Patient/Family Short Term Goal  Description: Patient's Short Term Goal: Improve breathing    Interventions:   - Monitor vital signs  - Monitor appropriate labs  - Monitor blood glucose levels  - Pain management  - Oxygen and respiratory intervention as needed  - Administer medications per order  - Follow MD orders  - Diagnostics per  order  - Update / inform patient and family on plan of care  - Discharge planning  - See additional Care Plan goals for specific interventions  Outcome: Progressing     Problem: PAIN - ADULT  Goal: Verbalizes/displays adequate comfort level or patient's stated pain goal  Description: INTERVENTIONS:  - Encourage pt to monitor pain and request assistance  - Assess pain using appropriate pain scale  - Administer analgesics based on type and severity of pain and evaluate response  - Implement non-pharmacological measures as appropriate and evaluate response  - Consider cultural and social influences on pain and pain management  - Manage/alleviate anxiety  - Utilize distraction and/or relaxation techniques  - Monitor for opioid side effects  - Notify MD/LIP if interventions unsuccessful or patient reports new pain  - Anticipate increased pain with activity and pre-medicate as appropriate  Outcome: Progressing     Problem: SAFETY ADULT - FALL  Goal: Free from fall injury  Description: INTERVENTIONS:  - Assess pt frequently for physical needs  - Identify cognitive and physical deficits and behaviors that affect risk of falls.  - [place] fall precautions as indicated by assessment.  - Educate pt/family on patient safety including physical limitations  - Instruct pt to call for assistance with activity based on assessment  - Modify environment to reduce risk of injury  - Provide assistive devices as appropriate  - Consider OT/PT consult to assist with strengthening/mobility  - Encourage toileting schedule  Outcome: Progressing     Problem: DISCHARGE PLANNING  Goal: Discharge to home or other facility with appropriate resources  Description: INTERVENTIONS:  - Identify barriers to discharge w/pt and caregiver  - Include patient/family/discharge partner in discharge planning  - Arrange for needed discharge resources and transportation as appropriate  - Identify discharge learning needs (meds, wound care, etc)  - Arrange for  interpreters to assist at discharge as needed  - Consider post-discharge preferences of patient/family/discharge partner  - Complete POLST form as appropriate  - Assess patient's ability to be responsible for managing their own health  - Refer to Case Management Department for coordinating discharge planning if the patient needs post-hospital services based on physician/LIP order or complex needs related to functional status, cognitive ability or social support system  Outcome: Progressing     Problem: RESPIRATORY - ADULT  Goal: Achieves optimal ventilation and oxygenation  Description: INTERVENTIONS:  - Assess for changes in respiratory status  - Assess for changes in mentation and behavior  - Position to facilitate oxygenation and minimize respiratory effort  - Oxygen supplementation based on oxygen saturation or ABGs  - Provide Smoking Cessation handout, if applicable  - Encourage broncho-pulmonary hygiene including cough, deep breathe, Incentive Spirometry  - Assess the need for suctioning and perform as needed  - Assess and instruct to report SOB or any respiratory difficulty  - Respiratory Therapy support as indicated  - Manage/alleviate anxiety  - Monitor for signs/symptoms of CO2 retention  Outcome: Progressing

## 2024-12-06 NOTE — PLAN OF CARE
Problem: Patient Centered Care  Goal: Patient preferences are identified and integrated in the patient's plan of care  Description: Interventions:  - What would you like us to know as we care for you?   - Provide timely, complete, and accurate information to patient/family  - Incorporate patient and family knowledge, values, beliefs, and cultural backgrounds into the planning and delivery of care  - Encourage patient/family to participate in care and decision-making at the level they choose  - Honor patient and family perspectives and choices  Outcome: Progressing     Problem: Diabetes/Glucose Control  Goal: Glucose maintained within prescribed range  Description: INTERVENTIONS:  - Monitor Blood Glucose as ordered  - Assess for signs and symptoms of hyperglycemia and hypoglycemia  - Administer ordered medications to maintain glucose within target range  - Assess barriers to adequate nutritional intake and initiate nutrition consult as needed  - Instruct patient on self management of diabetes  Outcome: Progressing        Problem: PAIN - ADULT  Goal: Verbalizes/displays adequate comfort level or patient's stated pain goal  Description: INTERVENTIONS:  - Encourage pt to monitor pain and request assistance  - Assess pain using appropriate pain scale  - Administer analgesics based on type and severity of pain and evaluate response  - Implement non-pharmacological measures as appropriate and evaluate response  - Consider cultural and social influences on pain and pain management  - Manage/alleviate anxiety  - Utilize distraction and/or relaxation techniques  - Monitor for opioid side effects  - Notify MD/LIP if interventions unsuccessful or patient reports new pain  - Anticipate increased pain with activity and pre-medicate as appropriate  Outcome: Progressing     Problem: SAFETY ADULT - FALL  Goal: Free from fall injury  Description: INTERVENTIONS:  - Assess pt frequently for physical needs  - Identify cognitive and  physical deficits and behaviors that affect risk of falls.  - [place] fall precautions as indicated by assessment.  - Educate pt/family on patient safety including physical limitations  - Instruct pt to call for assistance with activity based on assessment  - Modify environment to reduce risk of injury  - Provide assistive devices as appropriate  - Consider OT/PT consult to assist with strengthening/mobility  - Encourage toileting schedule  Outcome: Progressing     Problem: DISCHARGE PLANNING  Goal: Discharge to home or other facility with appropriate resources  Description: INTERVENTIONS:  - Identify barriers to discharge w/pt and caregiver  - Include patient/family/discharge partner in discharge planning  - Arrange for needed discharge resources and transportation as appropriate  - Identify discharge learning needs (meds, wound care, etc)  - Arrange for interpreters to assist at discharge as needed  - Consider post-discharge preferences of patient/family/discharge partner  - Complete POLST form as appropriate  - Assess patient's ability to be responsible for managing their own health  - Refer to Case Management Department for coordinating discharge planning if the patient needs post-hospital services based on physician/LIP order or complex needs related to functional status, cognitive ability or social support system  Outcome: Progressing     Problem: RESPIRATORY - ADULT  Goal: Achieves optimal ventilation and oxygenation  Description: INTERVENTIONS:  - Assess for changes in respiratory status  - Assess for changes in mentation and behavior  - Position to facilitate oxygenation and minimize respiratory effort  - Oxygen supplementation based on oxygen saturation or ABGs  - Provide Smoking Cessation handout, if applicable  - Encourage broncho-pulmonary hygiene including cough, deep breathe, Incentive Spirometry  - Assess the need for suctioning and perform as needed  - Assess and instruct to report SOB or any  respiratory difficulty  - Respiratory Therapy support as indicated  - Manage/alleviate anxiety  - Monitor for signs/symptoms of CO2 retention  Outcome: Progressing

## 2024-12-06 NOTE — PROGRESS NOTES
[facility]  Cardiology Progress Note    Caleb Rausch Jr. Patient Status:  Inpatient    3/23/1950 MRN [id number]   Location [facility]5W Attending Boaz Villalba MD   Hosp Day # 1 PCP Charlotte Rey MD     Subjective     Feeling much better today, no chest pain or edema.  Tested positive for rhinovirus.    Objective:   Patient Vitals for the past 24 hrs:   BP Temp Temp src Pulse Resp SpO2 Weight   24 0928 116/74 97.8 °F (36.6 °C) Oral 96 20 92 % --   24 0825 108/75 -- -- -- -- -- --   24 0613 124/81 98.6 °F (37 °C) Oral 88 16 95 % 261 lb 12.8 oz (118.8 kg)   247 -- -- -- 80 -- 94 % --   24 108/75 97.3 °F (36.3 °C) Oral 78 20 (!) 88 % --   24 2114 110/70 97.7 °F (36.5 °C) Oral 81 22 94 % --   24 1725 108/81 97.6 °F (36.4 °C) Oral 85 22 94 % --   24 1329 105/71 97.7 °F (36.5 °C) Oral 88 22 92 % --   24 1012 135/89 -- -- 94 24 94 % --     Intake/Output:   Last 3 shifts:   Intake/Output                   24 0700 - 24 0659 (Not Admitted) 24 0700 - 24 0659 24 0700 - 24 0659       Intake    P.O.  --  802  --    P.O. -- 802 --    I.V.  --  20  --    I.V. -- 20 --    IV PIGGYBACK  100  --  --    Volume (mL) (cefTRIAXone (Rocephin) 2 g in sodium chloride 0.9% 100 mL IVPB-ADDV) 100 -- --    Total Intake 100 822 --       Output    Urine  --  1550  --    Urine -- 1550 --    Urine Occurrence -- 4 x --    Total Output -- 1550 --       Net I/O     100 -458 --             Scheduled Meds:    [START ON 2024] torsemide  20 mg Oral Daily    aspirin  81 mg Oral Daily    atorvastatin  80 mg Oral Nightly    carvedilol  6.25 mg Oral BID with meals    clopidogrel  75 mg Oral Daily    DULoxetine  30 mg Oral Daily    fluticasone-salmeterol  1 puff Inhalation BID    umeclidinium bromide  1 puff Inhalation Daily    insulin degludec  10 Units Subcutaneous Daily    latanoprost  1 drop Both Eyes Nightly    levothyroxine   137 mcg Oral Before breakfast    warfarin  10 mg Oral Nightly    sacubitril-valsartan  1 tablet Oral Daily    spironolactone  25 mg Oral Daily    enoxaparin  1 mg/kg Subcutaneous 2 times per day    insulin aspart  1-5 Units Subcutaneous TID CC    nicotine  1 patch Transdermal Daily    ipratropium-albuterol  3 mL Nebulization 4 times per day     Results:     Lab Results   Component Value Date    WBC 13.7 (H) 12/06/2024    HGB 12.8 (L) 12/06/2024    HCT 43.5 12/06/2024    .0 12/06/2024    CREATSERUM 0.97 12/06/2024    BUN 21 12/06/2024     12/06/2024    K 4.0 12/06/2024     12/06/2024    CO2 26.0 12/06/2024     (H) 12/06/2024    CA 9.6 12/06/2024    ALB 4.2 12/05/2024    ALKPHO 74 12/05/2024    BILT 0.3 12/05/2024    TP 6.8 12/05/2024    AST 33 12/05/2024    ALT 12 12/05/2024    PTT 28.1 12/05/2024    INR 1.02 12/06/2024    T4F 1.1 10/17/2024    TSH 5.202 (H) 10/17/2024    PSA 0.42 07/01/2021    DDIMER 0.35 03/29/2024    ESRML 11 07/14/2018    CRP <0.29 01/19/2022    MG 2.2 01/19/2024    PHOS 2.6 12/25/2023    TROP <0.045 11/20/2020    CK 65 01/08/2022    B12 351 06/29/2020       Recent Labs   Lab 12/05/24  0317 12/06/24  0557   * 116*   BUN 16 21   CREATSERUM 0.96 0.97   CA 9.6 9.6    141   K 5.2* 4.0    107   CO2 24.0 26.0     Recent Labs   Lab 12/05/24  0317 12/06/24  0557   RBC 5.51 5.66   HGB 12.6* 12.8*   HCT 43.0 43.5   MCV 78.0* 76.9*   MCH 22.9* 22.6*   MCHC 29.3* 29.4*   RDW 23.7* 23.6*   NEPRELIM 13.28*  --    WBC 16.9* 13.7*   .0* 445.0       Recent Labs   Lab 12/05/24  0412   BNPML 422*       No results for input(s): \"TROP\", \"CK\" in the last 168 hours.    XR CHEST AP PORTABLE  (CPT=71045)    Result Date: 12/5/2024  CONCLUSION:  Findings most consistent with mild CHF/fluid overload, slightly worse compared to 10/21/2024.   A preliminary report was issued by the [facility] teleradiology service. There are no major discrepancies.  [facility]  Dictated  by (CST): Garth Monzon MD on 12/05/2024 at 5:59 AM     Finalized by (CST): Garth Monzon MD on 12/05/2024 at 6:00 AM                  Exam:     General: Alert and oriented x 3. No apparent distress.   HEENT: Normocephalic, anicteric sclera, neck supple, no thyromegaly or adenopathy.  Neck: No JVD, carotids 2+, no bruits.  Cardiac: Regular rate and rhythm. S1, S2 normal. No murmur, pericardial rub, S3, or extra cardiac sounds.  Lungs: Clear without wheezes, rales, rhonchi or dullness.  Normal excursions and effort.  Abdomen: Soft, non-tender. No organosplenomegally, mass or rebound, BS-present.  Extremities: Without clubbing or cyanosis. No edema.    Neurologic: Alert and oriented, normal affect. No focal defects  Skin: Warm and dry.     Assessment and Plan:   Assessment:  URI, rhinovirus (+)  HFrEF, EF 35-40% with evidence of mild fluid retention  COPD, possible exacerbation  CAD s/p multiple PCI LAD-D  Hx of PE 2022  HTN  HLD  DMII     Plan:  - Feeling markedly better, no longer on oxygen, no lower extremity edema  - Stop IV Lasix, resume torsemide 20 mg daily  - Management of viral syndrome per primary service  - Continue home cardiac medications    Possible discharge home today.  We will sign off, please contact with any questions.    Chaparro Steele MD  [facility]  12/6/2024

## 2024-12-06 NOTE — PROGRESS NOTES
12/06/24 1155 12/06/24 1156 12/06/24 1158   Results at rest   Resting SpO2 (minimum) 96 %  --   --    Resting HR (max) 88  --   --    Resting Oxygen Device None  --   --    Resting O2 flow (liters per minute) 0  --   --    Results with Ambulation   Ambulation SpO2 (minimum)  --  94 %  --    Ambulation HR (max)  --  110  --    Ambulation oxygen device  --  None  --    Ambulation oxygen flow (liters per minute)  --  0  --    Ambulation time (minutes)  --  2  --    Ambulation distance (feet)  --  200  --    Recovery Results   Minutes required to return to resting level  --   --  0   Recovery SpO2  --   --  97 %   Recovery HR  --   --  86   Recovery oxygen device  --   --  None   Recovery oxygen flow (liters per minute)  --   --  0

## 2024-12-06 NOTE — DISCHARGE INSTRUCTIONS
It's important to take the correct amount of the medications that your doctor prescribes you. Taking too much Lyrica, or more than your doctor prescribes, can cause altered mental status and other unexpected side effects. Please talk with your PCP about how much of your medications you are taking at home. Your PCP will need to reorder your Lyrica for you.     If you are interested in quitting smoking, you may want to ask your PCP about a prescription for a nicotine patch or nicotine gum during your visit.

## 2024-12-06 NOTE — CDS QUERY
Dear Dr Villalba    Please provide additional documentation in the patient's medical record supportive of your documented diagnosis of SEPSIS      (x)  Condition was ruled out and amended documentation provided in the medical record     ( )  Condition was evident because: ___________________________                (please document in your next progress note)       ( )  Other (please specify)___________________________  ____________________________________________________________________    CLINICAL INDICATORS:  Pt on prednisone  WBC 16.9  Afebrile, bp wnl   SIRS-2(WBC 16.9(on steroid, hr>90)  SOFA-0  +RSV, Lactic acid wnl, blood cx neg  Admit 12/5-dc 12/6      RISK FACTORS:  COPD, HTN, +RSV      TREATMENT:  Antibiotics d/c'd      Use of terms such as suspected, possible, or probable (associated with a specific diagnosis that is being evaluated, monitored, or treated as if it exists) are acceptable and can be coded in the inpatient setting, when documented at the time of discharge.     If you have any questions, please contact Clinical :  Viv DYSON RN at [phone]     Thank You!    THIS FORM IS A PERMANENT PART OF THE MEDICAL RECORD

## 2024-12-06 NOTE — PHYSICAL THERAPY NOTE
PHYSICAL THERAPY EVALUATION - INPATIENT     Room Number: 509/509-A  Evaluation Date: 12/6/2024  Type of Evaluation: Initial   Physician Order: PT Eval and Treat    Presenting Problem: dyspnea, rhinovirus     Reason for Therapy: Mobility Dysfunction and Discharge Planning    PHYSICAL THERAPY ASSESSMENT   Patient is a 74 year old male admitted 12/5/2024 for dyspnea.  Prior to admission, patient's baseline is ambulatory with RW, son and grandson help out as needed.  Patient is currently functioning near baseline with bed mobility, transfers, and gait.  Patient is requiring contact guard assist as a result of the following impairments: decreased functional strength, pain, and medical status.  Physical Therapy will continue to follow for duration of hospitalization.    Patient will benefit from continued skilled PT Services at discharge to promote prior level of function and safety with additional support and return home with home health PT.    PLAN DURING HOSPITALIZATION  Nursing Mobility Recommendation : 1 Assist  PT Device Recommendation: Rolling walker  PT Treatment Plan: Bed mobility;Body mechanics;Endurance;Energy conservation;Family education;Gait training;Range of motion;Stoop training;Transfer training;Stair training;Balance training  Rehab Potential : Fair  Frequency (Obs): 3-5x/week     PHYSICAL THERAPY MEDICAL/SOCIAL HISTORY   History related to current admission: neuropathy      Problem List  Principal Problem:    Dyspnea, unspecified type  Active Problems:    Hyperkalemia    Leukocytosis      HOME SITUATION  Type of Home: Apartment  Home Layout: One level                     Lives With: Son (age 16)    Drives: Yes   Patient Regularly Uses: Rolling walker     Prior Level of [place]: independent with RW     SUBJECTIVE  \"I go to the grocery store and walk a bit, I take the scooter in the store.\"     PHYSICAL THERAPY EXAMINATION   OBJECTIVE  Precautions: Bed/chair alarm  Fall Risk: High fall risk    WEIGHT  BEARING RESTRICTION       PAIN ASSESSMENT  Ratin  Location: neuropathic pain       COGNITION  Overall Cognitive Status:  WFL - within functional limits    BALANCE  Static Sitting: Good  Dynamic Sitting: Fair +  Static Standing: Fair -  Dynamic Standing: Fair -    ACTIVITY TOLERANCE           BP: 108/75  BP Location: Right arm  BP Method: Automatic  Patient Position: Sitting    O2 WALK       AM-PAC '6-Clicks' INPATIENT SHORT FORM - BASIC MOBILITY  How much difficulty does the patient currently have...  Patient Difficulty: Turning over in bed (including adjusting bedclothes, sheets and blankets)?: A Little   Patient Difficulty: Sitting down on and standing up from a chair with arms (e.g., wheelchair, bedside commode, etc.): A Little   Patient Difficulty: Moving from lying on back to sitting on the side of the bed?: A Little   How much help from another person does the patient currently need...   Help from Another: Moving to and from a bed to a chair (including a wheelchair)?: A Little   Help from Another: Need to walk in hospital room?: A Little   Help from Another: Climbing 3-5 steps with a railing?: A Lot     AM-PAC Score:  Raw Score: 17   Approx Degree of Impairment: 50.57%   Standardized Score (AM-PAC Scale): 42.13   CMS Modifier (G-Code): CK    FUNCTIONAL ABILITY STATUS  Functional Mobility/Gait Assessment  Gait Assistance: Contact guard assist  Distance (ft): 25'  Assistive Device: Rolling walker  Pattern: Shuffle  Sit to Stand: contact guard assist    Exercise/Education Provided:  Body mechanics  Functional activity tolerated  Gait training  Transfer training    The patient's Approx Degree of Impairment: 50.57% has been calculated based on documentation in the [facility] '6 clicks' Inpatient Basic Mobility Short Form.  Research supports that patients with this level of impairment may benefit from BARBARA.  Final disposition will be made by interdisciplinary medical team.    Patient End of Session: Up in chair;Needs  met;Call light within reach;RN aware of session/findings;All patient questions and concerns addressed;Hospital anti-slip socks;Discussed recommendations with /    CURRENT GOALS  Goals to be met by:   Patient Goal Patient's self-stated goal is: unstated    Goal #1 Patient is able to demonstrate supine - sit EOB @ level: supervision     Goal #1   Current Status    Goal #2 Patient is able to demonstrate transfers Sit to/from Stand at assistance level: supervision with walker - rolling     Goal #2  Current Status    Goal #3 Patient is able to ambulate 75 feet with assist device: walker - rolling at assistance level: supervision   Goal #3   Current Status    Goal #4 Patient will negotiate 1 stairs/one curb w/ assistive device and supervision   Goal #4   Current Status    Goal #5 Patient to demonstrate independence with home activity/exercise instructions provided to patient in preparation for discharge.   Goal #5   Current Status    Goal #6    Goal #6  Current Status      Patient Evaluation Complexity Level:  History Moderate - 1 or 2 personal factors and/or co-morbidities   Examination of body systems Moderate - addressing a total of 3 or more elements   Clinical Presentation  Moderate - Evolving   Clinical Decision Making  Moderate Complexity     Gait Trainin minutes

## 2024-12-06 NOTE — PLAN OF CARE
Problem: PAIN - ADULT  Goal: Verbalizes/displays adequate comfort level or patient's stated pain goal  Description: INTERVENTIONS:  - Encourage pt to monitor pain and request assistance  - Assess pain using appropriate pain scale  - Administer analgesics based on type and severity of pain and evaluate response  - Implement non-pharmacological measures as appropriate and evaluate response  - Consider cultural and social influences on pain and pain management  - Manage/alleviate anxiety  - Utilize distraction and/or relaxation techniques  - Monitor for opioid side effects  - Notify MD/LIP if interventions unsuccessful or patient reports new pain  - Anticipate increased pain with activity and pre-medicate as appropriate  Outcome: Adequate for Discharge     Problem: Patient Centered Care  Goal: Patient preferences are identified and integrated in the patient's plan of care  Description: Interventions:  - What would you like us to know as we care for you? From home  - Provide timely, complete, and accurate information to patient/family  - Incorporate patient and family knowledge, values, beliefs, and cultural backgrounds into the planning and delivery of care  - Encourage patient/family to participate in care and decision-making at the level they choose  - Honor patient and family perspectives and choices  Outcome: Completed     Problem: Diabetes/Glucose Control  Goal: Glucose maintained within prescribed range  Description: INTERVENTIONS:  - Monitor Blood Glucose as ordered  - Assess for signs and symptoms of hyperglycemia and hypoglycemia  - Administer ordered medications to maintain glucose within target range  - Assess barriers to adequate nutritional intake and initiate nutrition consult as needed  - Instruct patient on self management of diabetes  Outcome: Completed     Problem: Patient/Family Goals  Goal: Patient/Family Long Term Goal  Description: Patient's Long Term Goal: to return home    Interventions:  -  discharge planning  - See additional Care Plan goals for specific interventions  Outcome: Completed  Goal: Patient/Family Short Term Goal  Description: Patient's Short Term Goal: to breathe better    Interventions:   - plan of care  -diuresis  -oxygen therapy  - See additional Care Plan goals for specific interventions  Outcome: Completed     Problem: SAFETY ADULT - FALL  Goal: Free from fall injury  Description: INTERVENTIONS:  - Assess pt frequently for physical needs  - Identify cognitive and physical deficits and behaviors that affect risk of falls.  - [place] fall precautions as indicated by assessment.  - Educate pt/family on patient safety including physical limitations  - Instruct pt to call for assistance with activity based on assessment  - Modify environment to reduce risk of injury  - Provide assistive devices as appropriate  - Consider OT/PT consult to assist with strengthening/mobility  - Encourage toileting schedule  Outcome: Completed     Problem: DISCHARGE PLANNING  Goal: Discharge to home or other facility with appropriate resources  Description: INTERVENTIONS:  - Identify barriers to discharge w/pt and caregiver  - Include patient/family/discharge partner in discharge planning  - Arrange for needed discharge resources and transportation as appropriate  - Identify discharge learning needs (meds, wound care, etc)  - Arrange for interpreters to assist at discharge as needed  - Consider post-discharge preferences of patient/family/discharge partner  - Complete POLST form as appropriate  - Assess patient's ability to be responsible for managing their own health  - Refer to Case Management Department for coordinating discharge planning if the patient needs post-hospital services based on physician/LIP order or complex needs related to functional status, cognitive ability or social support system  Outcome: Completed     Problem: RESPIRATORY - ADULT  Goal: Achieves optimal ventilation and  oxygenation  Description: INTERVENTIONS:  - Assess for changes in respiratory status  - Assess for changes in mentation and behavior  - Position to facilitate oxygenation and minimize respiratory effort  - Oxygen supplementation based on oxygen saturation or ABGs  - Provide Smoking Cessation handout, if applicable  - Encourage broncho-pulmonary hygiene including cough, deep breathe, Incentive Spirometry  - Assess the need for suctioning and perform as needed  - Assess and instruct to report SOB or any respiratory difficulty  - Respiratory Therapy support as indicated  - Manage/alleviate anxiety  - Monitor for signs/symptoms of CO2 retention  Outcome: Completed     Patient on room air and remote telemetry monitoring. Walk test completed, see separate note. Ambulating independently. PRN medications given per MAR for chronic pain. Per Dr. Villalba and Dr. Steele, patient cleared for discharge home. PIVs x2 removed and remote telemetry monitor returned. All personal belongings returned. Discharge education and AVS provided to patient and daughter, Larisa, at bedside.   Patient discharged to home, transport to [facility] via nursing staff.

## 2024-12-09 ENCOUNTER — PATIENT OUTREACH (OUTPATIENT)
Dept: CASE MANAGEMENT | Age: 74
End: 2024-12-09

## 2024-12-09 DIAGNOSIS — J96.01 ACUTE HYPOXIC RESPIRATORY FAILURE (HCC): Primary | ICD-10-CM

## 2024-12-09 DIAGNOSIS — Z02.9 ENCOUNTERS FOR UNSPECIFIED ADMINISTRATIVE PURPOSE: ICD-10-CM

## 2024-12-09 PROCEDURE — 1111F DSCHRG MED/CURRENT MED MERGE: CPT

## 2024-12-09 NOTE — PROGRESS NOTES
The patient was originally scheduled for a Hospital follow up appointment today 12/09/2024 at 1:15 with Dr. Rey- the patient rescheduled appointment to 12/11/2024 at 1:30- TCM/HFU.     [facility] attempted to reach the patient to complete a [facility] follow up call. Left message to call back. [facility] provided direct contact info at [phone].     Future Appointments   Date Time Provider Department Center   12/11/2024  1:30 PM Charlotte Rey MD [facility]   1/7/2025  1:00 PM Charlotte Rey MD [facility]   1/9/2025  2:20 PM Kimberly Fuentes DPM [facility]   1/28/2025  2:00 PM Ben Pérez MD [facility]            Bi-Rhombic Flap Text: The defect edges were debeveled with a #15 scalpel blade.  Given the location of the defect and the proximity to free margins a bi-rhombic flap was deemed most appropriate.  Using a sterile surgical marker, an appropriate rhombic flap was drawn incorporating the defect. The area thus outlined was incised deep to adipose tissue with a #15 scalpel blade.  The skin margins were undermined to an appropriate distance in all directions utilizing iris scissors.

## 2024-12-09 NOTE — PROGRESS NOTES
A hospital follow up appointment is scheduled with Dr. Rey today, 12/9/2024, at 1:15. Nurse care manager contacted the primary care physician office for an appointment type review.     Nurse care manager will continue to follow.

## 2024-12-10 ENCOUNTER — TELEPHONE (OUTPATIENT)
Dept: CASE MANAGEMENT | Age: 74
End: 2024-12-10

## 2024-12-10 DIAGNOSIS — J44.1 CHRONIC OBSTRUCTIVE PULMONARY DISEASE WITH ACUTE EXACERBATION (HCC): ICD-10-CM

## 2024-12-10 RX ORDER — ALBUTEROL SULFATE 90 UG/1
2 INHALANT RESPIRATORY (INHALATION) EVERY 4 HOURS PRN
Qty: 54 G | Refills: 1 | Status: SHIPPED | OUTPATIENT
Start: 2024-12-10 | End: 2024-12-11

## 2024-12-10 NOTE — TELEPHONE ENCOUNTER
Please review. Protocol Failed; No Protocol    Requested Prescriptions   Pending Prescriptions Disp Refills    albuterol (VENTOLIN HFA) 108 (90 Base) MCG/ACT Inhalation Aero Soln 54 g 0     Sig: Inhale 2 puffs into the lungs every 4 (four) hours as needed for Wheezing.       Asthma & COPD Medication Protocol Passed - 12/10/2024  2:41 PM        Passed - Appointment in past 6 or next 3 months      Recent Outpatient Visits              1 month ago Type 2 diabetes mellitus with hyperglycemia, with long-term current use of insulin ([facility])    [facility] Charlotte Rey MD    Office Visit    2 months ago Type 2 diabetes mellitus with other circulatory complication, with long-term current use of insulin ([facility])    [facility] Charlotte Rey MD    Office Visit    7 months ago Constipation, unspecified constipation type    [facility] Mushtaq Argueta MD    Office Visit    10 months ago Hypothyroidism, unspecified type    [facility] Ben Pérez MD    Office Visit    10 months ago Acute on chronic HFrEF (heart failure with reduced ejection fraction) ([facility] Eva Gaines NP    Office Visit          Future Appointments         Provider Department Appt Notes    Tomorrow Charlotte Rey MD [facility] follow-up, scheduled by daughter, requesting that patient recieves a phonecall for a reminder    In 4 weeks Charlotte Rey MD [facility] 3 mth follow up    In 1 month Kimberly Fuentes DPM [facility] care  DM    In 1 month Ben Pérez MD [facility]                            Future Appointments         Provider  Department Appt Notes    Tomorrow Charlotte Rey MD [facility] follow-up, scheduled by daughter, requesting that patient recieves a phonecall for a reminder    In 4 weeks Charlotte Rey MD [facility] 3 mth follow up    In 1 month Kimberly Fuentes DPM [facility] care  DM    In 1 month Ben Pérez MD [facility]           Recent Outpatient Visits              1 month ago Type 2 diabetes mellitus with hyperglycemia, with long-term current use of insulin ([facility])    [facility] Charlotte Rey MD    Office Visit    2 months ago Type 2 diabetes mellitus with other circulatory complication, with long-term current use of insulin ([facility])    [facility] Charlotte Rey MD    Office Visit    7 months ago Constipation, unspecified constipation type    [facility] Mushtaq Argueta MD    Office Visit    10 months ago Hypothyroidism, unspecified type    [facility] Ben Pérez MD    Office Visit    10 months ago Acute on chronic HFrEF (heart failure with reduced ejection fraction) ([facility])    [facility] Eva Gaines NP    Office Visit

## 2024-12-10 NOTE — PROGRESS NOTES
Transitional Care Management   Discharge Date: 24  Contact Date: 12/10/2024    Assessment:  TCM Initial Assessment    General:  Assessment completed with: Patient  Patient Subjective: The patient has been doing well at home. The patient denies any chest pain, trouble breathing, shortness of breath, wheezing, rhinorrhea, confusion, fatigue, weakness, dizziness, fever, new/increased edema, irregular heart beats. The patient reported a continued non productive cough. The patient has not checked glucose since returning home. The patient reported a normal blood pressure reading this morning but the patient could not remember the value for the nurse care manager.  The patient also denies any hematuria, melena, hematochezia, or bleeding from the gums, nose or cuts.  Chief Complaint: Sepsis  rhinovirus  SOB/ hypoxia-  CHFrEF   COPD  Iron deficiency anemia  DM  CAD  HTN  HL  Hypothyroidism  ANNIKA  MDD  Chronic back pain  Hx PE provoked sp COVID   Nicotine use disorder  Verify patient name and  with patient/ caregiver: Yes    Hospital Stay/Discharge:  Tell me what you understand of why you were in the hospital or emergency department: I wasn't breathing well.  Prior to leaving the hospital were your Discharge Instructions reviewed with you?: Yes  Did you receive a copy of your written Discharge Instructions?: Yes  What questions do you have about your Discharge Instructions?: No  Do you feel better or worse since you left the hospital or emergency department?: Better    Follow - Up Appointment:  Do you have a follow-up appointment?: Yes  Date: 24  Physician: Dr. Rey  Are there any barriers to getting to your follow-up appointment?: No    Home Health/DME:  Prior to leaving the hospital was Home Health (HH) arranged for you?: N/A  Are HH needs identified by staff during the assessment?: No     Prior to leaving the hospital or emergency department was Durable Medical Equipment (DME), medical supplies, or  infusions arranged for you?: N/A  Are DME/medical supply/infusions needs identified by staff during this assessment?: No     Medications/Diet:  Did any of your medications change, during or after your hospital stay or ED visit?: Yes  Do you have your new or updated medications?: Yes  Do you understand what your medications are for and possible side effects?: Yes  Are there any reasons that keep you from taking your medication as prescribed?: No  Any concerns about medication refills?: Yes (The patient requested a refill for albuterol.)    Were you given a different diet per your Discharge Instructions?: No (Reviewed cardiac diet and heart failure management)     Questions/Concerns:  Do you have any questions or concerns that have not been discussed?: No       Nursing Interventions:      Patient symptoms were assessed, education was completed for signs/symptoms to monitor, and reviewed when to contact providers versus go to the emergency department/call 911.   Reviewed all discharge instructions with a focus on COPD/heart failure management.   All medication changes were reviewed and educated on the importance of taking the medications as directed.     A telephone encounter was sent for a refill request.     Appointments were reviewed and stressed the importance of a close follow up with providers.   The patient verbalized understanding and will contact the office with any further questions or concerns.       Medication Review:   NCM did confirm the patient has discontinued the following as directed:  ferrous sulfate 325 (65 FE) MG Tbec      Lumigan 0.01 % Soln  Generic drug: bimatoprost      predniSONE 20 MG Tabs  Commonly known as: Deltasone                   Current Outpatient Medications   Medication Sig Dispense Refill    atorvastatin 80 MG Oral Tab Take 1 tablet (80 mg total) by mouth daily. 90 tablet 3    metFORMIN HCl 1000 MG Oral Tab Take 0.5 tablets (500 mg total) by mouth 2 (two) times daily with meals. 90  tablet 3    diclofenac 1 % External Gel       albuterol (VENTOLIN HFA) 108 (90 Base) MCG/ACT Inhalation Aero Soln Inhale 2 puffs into the lungs every 4 (four) hours as needed for Wheezing. 54 g 0    insulin glargine (BASAGLAR KWIKPEN) 100 UNIT/ML Subcutaneous Solution Pen-injector Inject 10 Units into the skin daily. 9 mL 1    levothyroxine 137 MCG Oral Tab Take 137 mcg by mouth before breakfast. 90 tablet 3    latanoprost 0.005 % Ophthalmic Solution Place 1 drop into both eyes nightly.      Accu-Chek Softclix Lancets Does not apply Misc 3 (three) times daily.      Glucose Blood (ACCU-CHEK GUIDE) In Vitro Strip 1 strip by In Vitro route 3 (three) times daily.      fluticasone-umeclidin-vilant (TRELEGY ELLIPTA) 200-62.5-25 MCG/ACT Inhalation Aerosol Powder, Breath Activated Inhale 1 puff into the lungs daily. 180 each 3    DULoxetine 30 MG Oral Cap DR Particles Take 1 capsule (30 mg total) by mouth daily. 90 capsule 3    semaglutide (OZEMPIC, 0.25 OR 0.5 MG/DOSE,) 2 MG/3ML Subcutaneous Solution Pen-injector Inject 0.5 mg into the skin once a week. 9 mL 0    Insulin Pen Needle (BD PEN NEEDLE RODERICK 2ND GEN) 32G X 4 MM Does not apply Misc Inject 1 each as directed daily. 90 each 3    clopidogrel 75 MG Oral Tab Take 1 tablet (75 mg total) by mouth daily. 90 tablet 3    JARDIANCE 25 MG Oral Tab TAKE ONE TABLET BY MOUTH ONE TIME DAILY 90 tablet 0    sacubitril-valsartan (ENTRESTO) 24-26 MG Oral Tab Take 1 tablet by mouth daily. 30 tablet 0    Potassium Chloride ER 20 MEQ Oral Tab CR Take 1 tablet by mouth daily. 90 tablet 0    GVOKE HYPOPEN 2-PACK 1 MG/0.2ML Subcutaneous injection INJECT THE CONTENTS OF ONE DEVICE IN THE LOWER ABDOMEN, OUTER THIGH, OR OUTER UPPER ARM FOR SEVERELY LOW BLOOD SUGAR. IF NO RESPONSE, MAY REPEAT WITH A NEW DEVICE IN 15 MINUTES.      Polyethylene Glycol 3350 (MIRALAX) 17 g Oral Powd Pack Take 17 g by mouth daily. 5 each 0    torsemide 20 MG Oral Tab Take 1 tablet (20 mg total) by mouth daily. 90  tablet 3    carvedilol 6.25 MG Oral Tab Take 1 tablet (6.25 mg total) by mouth 2 (two) times daily with meals. 180 tablet 3    spironolactone 25 MG Oral Tab Take 1 tablet (25 mg total) by mouth daily. 90 tablet 3    Blood Glucose Monitoring Suppl Does not apply Kit Please use to check blood sugar twice daily. 1 kit 0    Blood Glucose Monitoring Suppl Does not apply Misc Please use to check blood sugar twice daily 200 each 3    Blood Glucose Monitoring Suppl Does not apply Misc Please use to check blood sugar twice daily 200 each 0    oxyCODONE-acetaminophen  MG Oral Tab Take 1 tablet by mouth 4 (four) times daily as needed.      warfarin 5 MG Oral Tab Take 2 tablets (10 mg total) by mouth nightly. 7.5mg on Sunday and Wednesday  10mg on all other days      aspirin 81 MG Oral Tab EC Take 1 tablet (81 mg total) by mouth daily. 30 tablet 2     Did patient review medications using current pill bottles and not just a medication list?  No  Discharge medications reviewed/discussed/and reconciled against outpatient medications with patient.  Any changes or updates to medications sent to primary care provider.  Patient Declined  The patient reported being completely out of albuterol. A telephone encounter was sent to the primary care physician office for a refill request.     The patient reported the pain specialist prescribed medication Lyrica 300mg twice daily at a visit today.    The patient declined full medication review but did confirm the patient picked up new  Entresto and spironolactone medications.     SDOH:   Transportation:   Transportation Needs: No Transportation Needs (12/10/2024)    Transportation Needs     Lack of Transportation: No     Car Seat: Not on file     Financial strain:   Financial Resource Strain: Low Risk  (12/10/2024)    Financial Resource Strain     Difficulty of Paying Living Expenses: Not hard at all     Med Affordability: No         Diagnosis specifics:  Warfarin/Coumadin:   Next PT/INR  Date: 12/11/2024 (in the morning)   Current Warfarin Dose: 10mg tonight   Does patient know who to follow-up with Warfarin/Coumadin management?  yes     Who manages Warfarin/Coumadin? Dr. Hammer    Who is monitoring PT/INR?  (C[facility]madin Clinic, , PCP, cardiology, etc) Coumadin [facility]  NCM Reviewed next PT/INR appointment with pt:  Yes        CHF:   With your CHF diagnosis weighing yourself is very important  How often are you weighing yourself? The patient does not check weight at home.   Is there any reason you are unable to weigh yourself daily?  Yes; The patient does not have a scale at home.   Were you told about any fluid restrictions? Yes  Have you noticed any shortness of breath or waking up short of breath? no  Since discharge do you feel you are urinating more or less?  no    Are you urinating more at night? no    Do you notice any pain or swelling in your abdomen?   no      Ankles or Legs?   no    COPD:  Have you participated in a pulmonary rehab program?   no   Would you like more information?  no  We reviewed your medications/inhalers, how often are you using your inhaler? The patient has continued the Trelegy inhaler daily as prescribed.   Are you currently on oxygen?no    Are you familiar with the signs and symptoms of worsening COPD?Yes       Who do you call with worsening COPD symptoms?Dr. Rey    Do you know when to call with COPD symptoms? Yes   Do you have any of the following potential risk factors for COPD in your home environment?  Primary or secondary tobacco smoke   yes  Occupational dusts and chemicals organic and inorganic    no  Indoor air pollution from heating and cooking with poor ventilation   no    Mold      no    Pets        no    Extreme heat no          Follow-up Appointments:  Your appointments       Date & Time Appointment Department (Center)    Dec 11, 2024 1:30 PM [facility] Follow Up with Charlotte Rey MD [facility] ([facility])        Jan 07, 2025 1:00 PM CST Follow Up Visit with Charlotte Rey MD [facility] ([facility])        Jan 09, 2025 2:20 PM CST Exam - New Patient with Kimberly Fuentes DPM [facility] ([facility])        Jan 28, 2025 2:00 PM CST Follow Up Visit with Ben Pérez MD [facility] ([facility])              [facility]  [address] [id number] [phone] [facility]  [address] [id number] [phone] [facility]  [address] [id number] [phone]            [facility]  Was TCC Ordered: No      Primary Care Provider (If no TCC appointment)  Does patient already have a PCP appointment scheduled? Yes  Nurse Care Manager Confirmed PCP office TCM appointment with patient on 12/11/2024 with Dr. Rey.     Specialist  Does the patient have any other follow-up appointment(s) that need to be scheduled? Yes   -If yes: Nurse Care Manager reviewed upcoming specialist appointments with patient: Yes    The patient completed an appointment with the pain specialist this morning.   The patient reported having an appointment with cardiology on 12/12/2024.    -Does the patient need assistance scheduling appointment(s): No    CCM referral placed:  No; The patient is currently enrolled.     Book By Date: 12/20/2024

## 2024-12-10 NOTE — TELEPHONE ENCOUNTER
Contacted patient to see how he was doing after ED and to remind of him PCP follow up visit tomorrow. No answer. LMTCB.    Future Appointments   Date Time Provider Department Center   12/11/2024  1:30 PM Charlotte Rey MD [facility]   1/7/2025  1:00 PM Charlotte Rey MD [facility]   1/9/2025  2:20 PM Kimberly Fuentes DPM [id number]   1/28/2025  2:00 PM Ben Pérez MD [id number]

## 2024-12-10 NOTE — TELEPHONE ENCOUNTER
Nurse care manager spoke to the patient today for Transitional Care Management. The patient is scheduled for a Transitional Care Management hospital follow up appointment with Dr. Rey on 12/11/2024.     The patient is requesting a refill for the albuterol inhaler and is completely out of the supply at home.     The patient would like the refill sent to the following pharmacy:   [id number] - [address] [phone], [phone]     Please refill the requested medication per protocol. Thank you.

## 2024-12-11 ENCOUNTER — OFFICE VISIT (OUTPATIENT)
Dept: INTERNAL MEDICINE CLINIC | Facility: CLINIC | Age: 74
End: 2024-12-11

## 2024-12-11 VITALS
DIASTOLIC BLOOD PRESSURE: 79 MMHG | WEIGHT: 263.19 LBS | SYSTOLIC BLOOD PRESSURE: 117 MMHG | BODY MASS INDEX: 38.98 KG/M2 | HEART RATE: 88 BPM | HEIGHT: 69 IN | OXYGEN SATURATION: 93 %

## 2024-12-11 DIAGNOSIS — J42 CHRONIC BRONCHITIS, UNSPECIFIED CHRONIC BRONCHITIS TYPE (HCC): ICD-10-CM

## 2024-12-11 DIAGNOSIS — I26.99 PULMONARY EMBOLISM ASSOCIATED WITH COVID-19 (HCC): ICD-10-CM

## 2024-12-11 DIAGNOSIS — Z79.4 TYPE 2 DIABETES MELLITUS WITH HYPERGLYCEMIA, WITH LONG-TERM CURRENT USE OF INSULIN (HCC): ICD-10-CM

## 2024-12-11 DIAGNOSIS — D50.9 IRON DEFICIENCY ANEMIA, UNSPECIFIED IRON DEFICIENCY ANEMIA TYPE: ICD-10-CM

## 2024-12-11 DIAGNOSIS — H40.9 GLAUCOMA, UNSPECIFIED GLAUCOMA TYPE, UNSPECIFIED LATERALITY: Primary | ICD-10-CM

## 2024-12-11 DIAGNOSIS — E11.65 TYPE 2 DIABETES MELLITUS WITH HYPERGLYCEMIA, WITH LONG-TERM CURRENT USE OF INSULIN (HCC): ICD-10-CM

## 2024-12-11 DIAGNOSIS — U07.1 PULMONARY EMBOLISM ASSOCIATED WITH COVID-19 (HCC): ICD-10-CM

## 2024-12-11 DIAGNOSIS — J44.1 CHRONIC OBSTRUCTIVE PULMONARY DISEASE WITH ACUTE EXACERBATION (HCC): ICD-10-CM

## 2024-12-11 DIAGNOSIS — I50.22 CHRONIC HFREF (HEART FAILURE WITH REDUCED EJECTION FRACTION) (HCC): ICD-10-CM

## 2024-12-11 PROCEDURE — 99214 OFFICE O/P EST MOD 30 MIN: CPT | Performed by: INTERNAL MEDICINE

## 2024-12-11 RX ORDER — PREGABALIN 300 MG/1
300 CAPSULE ORAL 2 TIMES DAILY PRN
COMMUNITY
Start: 2024-12-10

## 2024-12-11 RX ORDER — LATANOPROST 50 UG/ML
1 SOLUTION/ DROPS OPHTHALMIC NIGHTLY
Qty: 7.5 ML | Refills: 0 | Status: SHIPPED | OUTPATIENT
Start: 2024-12-11

## 2024-12-11 RX ORDER — ALBUTEROL SULFATE 90 UG/1
2 INHALANT RESPIRATORY (INHALATION) EVERY 4 HOURS PRN
Qty: 51 G | Refills: 1 | Status: SHIPPED | OUTPATIENT
Start: 2024-12-11

## 2024-12-11 NOTE — PROGRESS NOTES
Caleb Rausch Jr. is a 74 year old male with complaints of:  Chief Complaint: TCM (Transition Of Care Management) (Hospital follow up on 12/05 for Dyspnea, unspecified type)    HPI     Caleb Rausch Jr. is a(n) 74 year old male with a history of hypothyroidism, diabetes, CAD status post stents on dual antiplatelet therapy, heart failure with reduced ejection fraction, hyperlipidemia, COPD, hypertension, PE on anticoagulation, who presents for hospital follow-up.     Patient was admitted to [facility] on 12/5/2024, and discharged 12/6/2024.  He was initially admitted for sepsis due to rhinovirus.  He has shortness of breath and hypoxia, was initially on antibiotics, but stopped as Pro-Hans remained negative and was thought to have a viral source.  No significant wheezes on exam, so held on steroids, but continued on nebs.  Was seen by cardiology.  Started on Lasix 40 IV twice daily, and switch back to p.o. upon discharge.  Noted to have iron deficiency anemia, status post IV iron during his last admission.  Potentially may need eval by GI.  Has history of PE provoked by COVID in 2022.  May need heme input for this.  Was continued on Coumadin in house, but was not bridged.  Continue to be forgetful.  Lyrica was stopped.  Could potentially benefit from neurotesting.    Patient feels ok since discharge.     Past Medical History     Past Medical History:    Anxiety disorder, unspecified    Anxiety state    Atherosclerosis of coronary artery    stents x 6    Chronic low back pain with bilateral sciatica, left worse than right    Chronic obstructive pulmonary disease, unspecified (HCC)    Coronary atherosclerosis    Depression    Diabetes (HCC)    borderline     Disorder of thyroid    Essential hypertension    Glaucoma    first visit with IRMA, patient was taking Timolol OU BID for 20 years, has not used gtts in over 1 year because he ran out of gtts and had no refills, fundus photos taken today    Heart attack  (HCC)    High blood pressure    High cholesterol    Hyperlipidemia    Kidney stone    Left Sided Neck pain, acute    Lumbar stenosis with neurogenic claudication    Major depressive disorder, recurrent, unspecified (HCC)    Morbid obesity with BMI of 40.0-44.9, adult (HCC)    Neuropathy    Pneumonia due to COVID-19 virus    Thyroid disease        Past Surgical History     Past Surgical History:   Procedure Laterality Date    Cataract extraction extracapsular w/ intraocular lens implantation Right 2018    [place]    Cath bare metal stent (bms)      Circumcision,othr  08/04/2020    Dr. Bonilla @ [facility]    Cysto/uretero w/lithotripsy Left 08/04/2020    Dr. Bonilla @ [facility] -- duplex left collecting system with both moieties joining in proximal ureter.     Iridotomy/iridectomy by laser      unknown     Other      cardiac stents        Family History     Family History   Problem Relation Age of Onset    Heart Attack Father     Hypertension Brother     Glaucoma Neg     Macular degeneration Neg        Social History     Social History     Socioeconomic History    Marital status: Single   Tobacco Use    Smoking status: Every Day     Current packs/day: 1.00     Types: Cigarettes    Smokeless tobacco: Former    Tobacco comments:     per pt, quit in 1994   Vaping Use    Vaping status: Some Days   Substance and Sexual Activity    Alcohol use: No     Comment: quit in 1994    Drug use: No   Other Topics Concern    Caffeine Concern Yes    Exercise No   Social History Narrative    The patient uses the following assistive device(s):  single-point cane.      The patient does live in a home with stairs.     Social Drivers of Health     Financial Resource Strain: Low Risk  (12/10/2024)    Financial Resource Strain     Difficulty of Paying Living Expenses: Not hard at all     Med Affordability: No   Food Insecurity: No Food Insecurity (12/5/2024)    Food Insecurity     Food Insecurity: Never true   Transportation Needs: No  Transportation Needs (12/10/2024)    Transportation Needs     Lack of Transportation: No   Social Connections: Feeling Socially Integrated (2/16/2023)    [facility], [facility]    OASIS : Social Isolation     Frequency of experiencing loneliness or isolation: Never   Housing Stability: Low Risk  (12/5/2024)    Housing Stability     Housing Instability: No       Allergies     Allergies[1]    Current Medications     Current Outpatient Medications   Medication Sig Dispense Refill    albuterol (VENTOLIN HFA) 108 (90 Base) MCG/ACT Inhalation Aero Soln Inhale 2 puffs into the lungs every 4 (four) hours as needed for Wheezing. 54 g 1    atorvastatin 80 MG Oral Tab Take 1 tablet (80 mg total) by mouth daily. 90 tablet 3    metFORMIN HCl 1000 MG Oral Tab Take 0.5 tablets (500 mg total) by mouth 2 (two) times daily with meals. 90 tablet 3    diclofenac 1 % External Gel       insulin glargine (BASAGLAR KWIKPEN) 100 UNIT/ML Subcutaneous Solution Pen-injector Inject 10 Units into the skin daily. 9 mL 1    levothyroxine 137 MCG Oral Tab Take 137 mcg by mouth before breakfast. 90 tablet 3    latanoprost 0.005 % Ophthalmic Solution Place 1 drop into both eyes nightly.      Accu-Chek Softclix Lancets Does not apply Misc 3 (three) times daily.      Glucose Blood (ACCU-CHEK GUIDE) In Vitro Strip 1 strip by In Vitro route 3 (three) times daily.      fluticasone-umeclidin-vilant (TRELEGY ELLIPTA) 200-62.5-25 MCG/ACT Inhalation Aerosol Powder, Breath Activated Inhale 1 puff into the lungs daily. 180 each 3    DULoxetine 30 MG Oral Cap DR Particles Take 1 capsule (30 mg total) by mouth daily. 90 capsule 3    semaglutide (OZEMPIC, 0.25 OR 0.5 MG/DOSE,) 2 MG/3ML Subcutaneous Solution Pen-injector Inject 0.5 mg into the skin once a week. 9 mL 0    Insulin Pen Needle (BD PEN NEEDLE RODERICK 2ND GEN) 32G X 4 MM Does not apply Misc Inject 1 each as directed daily. 90 each 3    clopidogrel 75 MG Oral Tab  Take 1 tablet (75 mg total) by mouth daily. 90 tablet 3    JARDIANCE 25 MG Oral Tab TAKE ONE TABLET BY MOUTH ONE TIME DAILY 90 tablet 0    sacubitril-valsartan (ENTRESTO) 24-26 MG Oral Tab Take 1 tablet by mouth daily. 30 tablet 0    Potassium Chloride ER 20 MEQ Oral Tab CR Take 1 tablet by mouth daily. 90 tablet 0    GVOKE HYPOPEN 2-PACK 1 MG/0.2ML Subcutaneous injection INJECT THE CONTENTS OF ONE DEVICE IN THE LOWER ABDOMEN, OUTER THIGH, OR OUTER UPPER ARM FOR SEVERELY LOW BLOOD SUGAR. IF NO RESPONSE, MAY REPEAT WITH A NEW DEVICE IN 15 MINUTES.      Polyethylene Glycol 3350 (MIRALAX) 17 g Oral Powd Pack Take 17 g by mouth daily. 5 each 0    torsemide 20 MG Oral Tab Take 1 tablet (20 mg total) by mouth daily. 90 tablet 3    carvedilol 6.25 MG Oral Tab Take 1 tablet (6.25 mg total) by mouth 2 (two) times daily with meals. 180 tablet 3    spironolactone 25 MG Oral Tab Take 1 tablet (25 mg total) by mouth daily. 90 tablet 3    Blood Glucose Monitoring Suppl Does not apply Kit Please use to check blood sugar twice daily. 1 kit 0    Blood Glucose Monitoring Suppl Does not apply Misc Please use to check blood sugar twice daily 200 each 3    Blood Glucose Monitoring Suppl Does not apply Misc Please use to check blood sugar twice daily 200 each 0    oxyCODONE-acetaminophen  MG Oral Tab Take 1 tablet by mouth 4 (four) times daily as needed.      warfarin 5 MG Oral Tab Take 2 tablets (10 mg total) by mouth nightly. 7.5mg on Sunday and Wednesday  10mg on all other days      aspirin 81 MG Oral Tab EC Take 1 tablet (81 mg total) by mouth daily. 30 tablet 2     No current facility-administered medications for this visit.       Review of Systems     GENERAL HEALTH: feels well otherwise  SKIN: denies any unusual skin lesions or rashes  RESPIRATORY: denies shortness of breath with exertion  CARDIOVASCULAR: denies chest pain on exertion  GI: denies abdominal pain and denies heartburn  : denies any burning with urination,  urinary frequency or urgency  NEURO: denies headaches, numbness or tingling, mental status changes  PSYCH: denies depressed mood, anxiety  MUSC: denies muscle aches, joint pain    Physical Exam     /79 (BP Location: Right arm, Patient Position: Sitting, Cuff Size: large)   Pulse 88   Ht 5' 9\" (1.753 m)   Wt 263 lb 3.2 oz (119.4 kg)   SpO2 93%   BMI 38.87 kg/m²     GENERAL: well developed, well nourished,in no apparent distress  SKIN: no rashes,no suspicious lesions  HEENT: atraumatic, normocephalic,ears and throat are clear  NECK: supple,no adenopathy,no bruits  LUNGS: clear to auscultation  CARDIO: RRR without murmur  GI: good BS's,no masses, HSM or tenderness  EXTREMITIES: no cyanosis, clubbing or edema    Assessment and Plan     Diagnoses and all orders for this visit:    Glaucoma, unspecified glaucoma type, unspecified laterality. Needs to find a new optho. Referral given. Cont latanoprost.   -     OPHTHALMOLOGY - INTERNAL    Chronic obstructive pulmonary disease with acute exacerbation ([facility]). Chronic bronchitis, unspecified chronic bronchitis type ([facility]).  Needs to see pulm. Smoking cessation very strongly advised. Cont current meds.   -     albuterol (VENTOLIN HFA) 108 (90 Base) MCG/ACT Inhalation Aero Soln; Inhale 2 puffs into the lungs every 4 (four) hours as needed for Wheezing.    Type 2 diabetes mellitus with hyperglycemia, with long-term current use of insulin ([facility]). Needs to establish care w/ optho. Cont current meds. Follow w/ endo in Jan. Sugars at home have been reasonable.   -     OPHTHALMOLOGY - INTERNAL    Pulmonary embolism associated with COVID-19 ([facility]). Could potentially come off of warfarin. Follow with heme.   -     Cancel: Oncology/Hematology Referral - In Network  -     Oncology/Hematology Referral - In Network    Chronic HFrEF (heart failure with reduced ejection fraction) ([facility]). Follow with cardiology, has appt w/ Dr. Hammer.     Iron deficiency anemia, unspecified iron deficiency  anemia type. Iron studies c/w iron deficiency. Cont PO iron supplementation. Follow w/ GI to discuss EGD/colon.   -     Cancel: Oncology/Hematology Referral - In Network  -     Cancel: Gastro Referral - In Network  -     Oncology/Hematology Referral - In Network  -     Gastro Referral - In Network    Other orders  -     latanoprost 0.005 % Ophthalmic Solution; Place 1 drop into both eyes nightly.          pregabalin 300 MG Oral Cap Take 1 capsule (300 mg total) by mouth 2 (two) times daily as needed. WITH FOOD      latanoprost 0.005 % Ophthalmic Solution Place 1 drop into both eyes nightly. 7.5 mL 0    albuterol (VENTOLIN HFA) 108 (90 Base) MCG/ACT Inhalation Aero Soln Inhale 2 puffs into the lungs every 4 (four) hours as needed for Wheezing. 51 g 1    atorvastatin 80 MG Oral Tab Take 1 tablet (80 mg total) by mouth daily. 90 tablet 3    metFORMIN HCl 1000 MG Oral Tab Take 0.5 tablets (500 mg total) by mouth 2 (two) times daily with meals. 90 tablet 3    insulin glargine (BASAGLAR KWIKPEN) 100 UNIT/ML Subcutaneous Solution Pen-injector Inject 10 Units into the skin daily. 9 mL 1    levothyroxine 137 MCG Oral Tab Take 137 mcg by mouth before breakfast. 90 tablet 3    fluticasone-umeclidin-vilant (TRELEGY ELLIPTA) 200-62.5-25 MCG/ACT Inhalation Aerosol Powder, Breath Activated Inhale 1 puff into the lungs daily. 180 each 3    DULoxetine 30 MG Oral Cap DR Particles Take 1 capsule (30 mg total) by mouth daily. 90 capsule 3    semaglutide (OZEMPIC, 0.25 OR 0.5 MG/DOSE,) 2 MG/3ML Subcutaneous Solution Pen-injector Inject 0.5 mg into the skin once a week. 9 mL 0    clopidogrel 75 MG Oral Tab Take 1 tablet (75 mg total) by mouth daily. 90 tablet 3    JARDIANCE 25 MG Oral Tab TAKE ONE TABLET BY MOUTH ONE TIME DAILY 90 tablet 0    Potassium Chloride ER 20 MEQ Oral Tab CR Take 1 tablet by mouth daily. 90 tablet 0    torsemide 20 MG Oral Tab Take 1 tablet (20 mg total) by mouth daily. 90 tablet 3    carvedilol 6.25 MG Oral Tab  Take 1 tablet (6.25 mg total) by mouth 2 (two) times daily with meals. 180 tablet 3    oxyCODONE-acetaminophen  MG Oral Tab Take 1 tablet by mouth 4 (four) times daily as needed.      warfarin 5 MG Oral Tab Take 2 tablets (10 mg total) by mouth nightly. 7.5mg on Sunday and Wednesday  10mg on all other days         Requested Prescriptions     Signed Prescriptions Disp Refills    latanoprost 0.005 % Ophthalmic Solution 7.5 mL 0     Sig: Place 1 drop into both eyes nightly.    albuterol (VENTOLIN HFA) 108 (90 Base) MCG/ACT Inhalation Aero Soln 51 g 1     Sig: Inhale 2 puffs into the lungs every 4 (four) hours as needed for Wheezing.       No orders of the defined types were placed in this encounter.      No follow-ups on file.    The patient indicates understanding of these issues and agrees to the plan.    Electronically signed by Charlotte Rey MD 12/11/24             [1] No Known Allergies

## 2024-12-11 NOTE — PATIENT INSTRUCTIONS
Please make appointments with the following doctors:    Please see the blood doctor to discuss to see if you need to be on the blood-thinner (warfarin)  Please see the gastroenterologist to see if you are bleeding in the gut.   Please see the eye doctor for the glaucoma.  Please see your lung doctor. Please call [phone] for an appointment.      Can continue the Lyrica, but please do not take 2 tablets at once.     Please follow up with me in 3 months.

## 2024-12-17 ENCOUNTER — TELEPHONE (OUTPATIENT)
Dept: INTERNAL MEDICINE CLINIC | Facility: CLINIC | Age: 74
End: 2024-12-17

## 2024-12-17 ENCOUNTER — HOSPITAL ENCOUNTER (EMERGENCY)
Facility: HOSPITAL | Age: 74
Discharge: HOME | End: 2024-12-17
Attending: EMERGENCY MEDICINE
Payer: MEDICARE

## 2024-12-17 ENCOUNTER — APPOINTMENT (OUTPATIENT)
Dept: GENERAL RADIOLOGY | Facility: HOSPITAL | Age: 74
End: 2024-12-17
Attending: EMERGENCY MEDICINE
Payer: MEDICARE

## 2024-12-17 ENCOUNTER — OFFICE VISIT (OUTPATIENT)
Dept: INTERNAL MEDICINE CLINIC | Facility: CLINIC | Age: 74
End: 2024-12-17

## 2024-12-17 VITALS
DIASTOLIC BLOOD PRESSURE: 66 MMHG | SYSTOLIC BLOOD PRESSURE: 111 MMHG | RESPIRATION RATE: 19 BRPM | TEMPERATURE: 98 F | HEART RATE: 60 BPM | OXYGEN SATURATION: 92 %

## 2024-12-17 VITALS
SYSTOLIC BLOOD PRESSURE: 124 MMHG | WEIGHT: 253.81 LBS | HEART RATE: 72 BPM | HEIGHT: 69 IN | DIASTOLIC BLOOD PRESSURE: 85 MMHG | BODY MASS INDEX: 37.59 KG/M2 | OXYGEN SATURATION: 94 %

## 2024-12-17 DIAGNOSIS — Z79.4 TYPE 2 DIABETES MELLITUS WITH HYPERGLYCEMIA, WITH LONG-TERM CURRENT USE OF INSULIN (HCC): Primary | ICD-10-CM

## 2024-12-17 DIAGNOSIS — E11.65 TYPE 2 DIABETES MELLITUS WITH HYPERGLYCEMIA, WITH LONG-TERM CURRENT USE OF INSULIN (HCC): Primary | ICD-10-CM

## 2024-12-17 DIAGNOSIS — R53.1 WEAKNESS: ICD-10-CM

## 2024-12-17 DIAGNOSIS — R11.2 NAUSEA AND VOMITING IN ADULT: Primary | ICD-10-CM

## 2024-12-17 DIAGNOSIS — E86.0 DEHYDRATION: ICD-10-CM

## 2024-12-17 LAB
ALBUMIN SERPL-MCNC: 4.5 G/DL (ref 3.2–4.8)
ALP LIVER SERPL-CCNC: 81 U/L
ALT SERPL-CCNC: 19 U/L
ANION GAP SERPL CALC-SCNC: 8 MMOL/L (ref 0–18)
AST SERPL-CCNC: 66 U/L (ref ?–34)
BASOPHILS # BLD AUTO: 0.12 X10(3) UL (ref 0–0.2)
BASOPHILS NFR BLD AUTO: 1 %
BILIRUB DIRECT SERPL-MCNC: <0.1 MG/DL (ref ?–0.3)
BILIRUB SERPL-MCNC: 0.3 MG/DL (ref 0.2–1.1)
BUN BLD-MCNC: 16 MG/DL (ref 9–23)
CALCIUM BLD-MCNC: 9.7 MG/DL (ref 8.7–10.4)
CHLORIDE SERPL-SCNC: 105 MMOL/L (ref 98–112)
CO2 SERPL-SCNC: 26 MMOL/L (ref 21–32)
CREAT BLD-MCNC: 1.04 MG/DL
DEPRECATED RDW RBC AUTO: 59.4 FL (ref 35.1–46.3)
EGFRCR SERPLBLD CKD-EPI 2021: 76 ML/MIN/1.73M2 (ref 60–?)
EOSINOPHIL # BLD AUTO: 0.69 X10(3) UL (ref 0–0.7)
EOSINOPHIL NFR BLD AUTO: 5.8 %
ERYTHROCYTE [DISTWIDTH] IN BLOOD BY AUTOMATED COUNT: 22.4 % (ref 11–15)
FLUAV + FLUBV RNA SPEC NAA+PROBE: NEGATIVE
FLUAV + FLUBV RNA SPEC NAA+PROBE: NEGATIVE
GLUCOSE BLD-MCNC: 107 MG/DL (ref 70–99)
GLUCOSE BLOOD: 130
HCT VFR BLD AUTO: 43.3 %
HGB BLD-MCNC: 13.4 G/DL
IMM GRANULOCYTES # BLD AUTO: 0.06 X10(3) UL (ref 0–1)
IMM GRANULOCYTES NFR BLD: 0.5 %
LIPASE SERPL-CCNC: 20 U/L (ref 12–53)
LYMPHOCYTES # BLD AUTO: 2.17 X10(3) UL (ref 1–4)
LYMPHOCYTES NFR BLD AUTO: 18.1 %
MCH RBC QN AUTO: 23.5 PG (ref 26–34)
MCHC RBC AUTO-ENTMCNC: 30.9 G/DL (ref 31–37)
MCV RBC AUTO: 76 FL
MONOCYTES # BLD AUTO: 0.9 X10(3) UL (ref 0.1–1)
MONOCYTES NFR BLD AUTO: 7.5 %
NEUTROPHILS # BLD AUTO: 8.02 X10 (3) UL (ref 1.5–7.7)
NEUTROPHILS # BLD AUTO: 8.02 X10(3) UL (ref 1.5–7.7)
NEUTROPHILS NFR BLD AUTO: 67.1 %
PLATELET # BLD AUTO: 387 10(3)UL (ref 150–450)
PLATELET MORPHOLOGY: NORMAL
POTASSIUM SERPL-SCNC: 3.7 MMOL/L (ref 3.5–5.1)
PROT SERPL-MCNC: 7.8 G/DL (ref 5.7–8.2)
RBC # BLD AUTO: 5.7 X10(6)UL
RSV RNA SPEC NAA+PROBE: NEGATIVE
SARS-COV-2 RNA RESP QL NAA+PROBE: NOT DETECTED
SODIUM SERPL-SCNC: 139 MMOL/L (ref 136–145)
TEST STRIP LOT #: NORMAL NUMERIC
TROPONIN I SERPL HS-MCNC: 32 NG/L
WBC # BLD AUTO: 12 X10(3) UL (ref 4–11)

## 2024-12-17 PROCEDURE — 93005 ELECTROCARDIOGRAM TRACING: CPT

## 2024-12-17 PROCEDURE — 99284 EMERGENCY DEPT VISIT MOD MDM: CPT

## 2024-12-17 PROCEDURE — 93010 ELECTROCARDIOGRAM REPORT: CPT

## 2024-12-17 PROCEDURE — 83690 ASSAY OF LIPASE: CPT | Performed by: EMERGENCY MEDICINE

## 2024-12-17 PROCEDURE — 0241U SARS-COV-2/FLU A AND B/RSV BY PCR (GENEXPERT): CPT | Performed by: EMERGENCY MEDICINE

## 2024-12-17 PROCEDURE — 80048 BASIC METABOLIC PNL TOTAL CA: CPT | Performed by: EMERGENCY MEDICINE

## 2024-12-17 PROCEDURE — 99214 OFFICE O/P EST MOD 30 MIN: CPT | Performed by: INTERNAL MEDICINE

## 2024-12-17 PROCEDURE — 80076 HEPATIC FUNCTION PANEL: CPT | Performed by: EMERGENCY MEDICINE

## 2024-12-17 PROCEDURE — 96360 HYDRATION IV INFUSION INIT: CPT

## 2024-12-17 PROCEDURE — 71045 X-RAY EXAM CHEST 1 VIEW: CPT | Performed by: EMERGENCY MEDICINE

## 2024-12-17 PROCEDURE — 82947 ASSAY GLUCOSE BLOOD QUANT: CPT | Performed by: INTERNAL MEDICINE

## 2024-12-17 PROCEDURE — 84484 ASSAY OF TROPONIN QUANT: CPT | Performed by: EMERGENCY MEDICINE

## 2024-12-17 PROCEDURE — 85025 COMPLETE CBC W/AUTO DIFF WBC: CPT | Performed by: EMERGENCY MEDICINE

## 2024-12-17 RX ORDER — HYDROCODONE BITARTRATE AND ACETAMINOPHEN 5; 325 MG/1; MG/1
1 TABLET ORAL ONCE
Status: COMPLETED | OUTPATIENT
Start: 2024-12-17 | End: 2024-12-17

## 2024-12-17 RX ORDER — ONDANSETRON 8 MG/1
8 TABLET, ORALLY DISINTEGRATING ORAL EVERY 4 HOURS PRN
Qty: 10 TABLET | Refills: 0 | Status: SHIPPED | OUTPATIENT
Start: 2024-12-17 | End: 2024-12-24

## 2024-12-17 NOTE — TELEPHONE ENCOUNTER
Spoke to patient, full name and date of birth verified.  Patient was seen by Dr. Rey on 12/11/24 for TCM/HFU appointment.     Condition update:  Patient reports he is feeling \"low\" for past 3-4 days.   Patient has been sweating heavily yesterday and today, he went through 4-5 mansi shirt changes yesterday and 2 so far today.    Patient also reports dizziness/lightheadedness with stomach upset.     Patient has someone who can help drive him in.   Patient requests appointment with Dr. Rey - appointment scheduled:    Future Appointments   Date Time Provider Department Center   12/17/2024  2:45 PM Charlotte Rey MD [facility]   1/7/2025  1:00 PM [facility]   1/8/2025 12:30 PM Kel Thompson DO [facility]   1/9/2025  2:20 PM Kimberly Fuentes DPM [facility]   1/28/2025  2:00 PM Ben Pérez MD [facility]   3/13/2025  1:00 PM ELICEO Ramos MD [facility]STR[facility]

## 2024-12-17 NOTE — ED PROVIDER NOTES
Patient Seen in: [facility] Emergency Department    History   No chief complaint on file.      HPI    74 year old male here with nonbloody nonbilious emesis, nausea, chills and fatigue over the last 3 days, he denies any chest pain or dyspnea or any abdominal pain.  No fevers.  Has been ambulatory, though feels quite weak with his walker.  Denies any injuries.  Did report that he felt lightheaded and fell onto his bottom, denies any head injury    History reviewed.   Past Medical History:    Anxiety disorder, unspecified    Anxiety state    Atherosclerosis of coronary artery    stents x 6    Chronic low back pain with bilateral sciatica, left worse than right    Chronic obstructive pulmonary disease, unspecified (HCC)    Coronary atherosclerosis    Depression    Diabetes (HCC)    borderline     Disorder of thyroid    Essential hypertension    Glaucoma    first visit with IRMA, patient was taking Timolol OU BID for 20 years, has not used gtts in over 1 year because he ran out of gtts and had no refills, fundus photos taken today    Heart attack (HCC)    High blood pressure    High cholesterol    Hyperlipidemia    Kidney stone    Left Sided Neck pain, acute    Lumbar stenosis with neurogenic claudication    Major depressive disorder, recurrent, unspecified (HCC)    Morbid obesity with BMI of 40.0-44.9, adult (HCC)    Neuropathy    Pneumonia due to COVID-19 virus    Thyroid disease       History reviewed.   Past Surgical History:   Procedure Laterality Date    Cataract extraction extracapsular w/ intraocular lens implantation Right 2018    [facility] bare metal stent (bms)      Circumcision,othr  08/04/2020    Dr. Bonilla @ [facility]    Cysto/uretero w/lithotripsy Left 08/04/2020    Dr. 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     Iridotomy/iridectomy by laser      unknown Dr    Other      cardiac stents         Medications :  Prescriptions Prior to  Admission[1]     Family History   Problem Relation Age of Onset    Heart Attack Father     Hypertension Brother     Glaucoma Neg     Macular degeneration Neg        Smoking Status:   Social History     Socioeconomic History    Marital status: Single   Tobacco Use    Smoking status: Every Day     Current packs/day: 1.00     Types: Cigarettes    Smokeless tobacco: Former    Tobacco comments:     per pt, quit in 1994   Vaping Use    Vaping status: Some Days   Substance and Sexual Activity    Alcohol use: No     Comment: quit in 1994    Drug use: No   Other Topics Concern    Caffeine Concern Yes    Exercise No       Constitutional and vital signs reviewed.      Social History and Family History elements reviewed from today, pertinent positives to the presenting problem noted.    Physical Exam     ED Triage Vitals [12/17/24 1545]   /67   Pulse 70   Resp 18   Temp 98.3 °F (36.8 °C)   Temp src Oral   SpO2 98 %   O2 Device None (Room air)       All measures to prevent infection transmission during my interaction with the patient were taken. The patient was already wearing a droplet mask on my arrival to the room. Personal protective equipment was worn throughout the duration of the exam.  Handwashing was performed prior to and after the exam.  Stethoscope and any equipment used during my examination was cleaned with super sani-cloth germicidal wipes following the exam.     Physical Exam    General: NAD  Head: Normocephalic and atraumatic.  Mouth/Throat/Ears/Nose: Oropharynx is clear    Eyes: Conjunctivae and EOM are normal.   Neck: Normal range of motion. Supple.   Cardiovascular: Normal rate, regular rhythm, normal heart sounds.  Respiratory/Chest: Clear and equal bilaterally. Exhibits no tenderness.  Gastrointestinal: Soft, non-tender, non-distended. Bowel sounds are normal.   Musculoskeletal:No swelling or deformity.   Neurological: Alert and appropriate. No focal deficits. AOx4  CN II-XII grossly intact  5/5  strength: Left  strength, deltoid abduction, achilles, patella  5/5 strength: Right  strength, deltoid abduction, achilles, patella   SILT to bilateral upper and lower extremities   normal gait with walker. Normal FTN, HTS      Skin: Skin is warm and dry. No pallor.  Psychiatric: Has a normal mood and affect.      ED Course        Labs Reviewed   CBC WITH DIFFERENTIAL WITH PLATELET - Abnormal; Notable for the following components:       Result Value    WBC 12.0 (*)     MCV 76.0 (*)     MCH 23.5 (*)     MCHC 30.9 (*)     RDW-SD 59.4 (*)     RDW 22.4 (*)     Neutrophil Absolute Prelim 8.02 (*)     Neutrophil Absolute 8.02 (*)     All other components within normal limits   BASIC METABOLIC PANEL (8) - Abnormal; Notable for the following components:    Glucose 107 (*)     All other components within normal limits   HEPATIC FUNCTION PANEL (7) - Abnormal; Notable for the following components:    AST 66 (*)     All other components within normal limits   RBC MORPHOLOGY SCAN - Abnormal; Notable for the following components:    RBC Morphology See morphology below (*)     All other components within normal limits   TROPONIN I HIGH SENSITIVITY - Normal   LIPASE - Normal   REDRAW BMP - Normal   SARS-COV-2/FLU A AND B/RSV BY PCR (GENEXPERT) - Normal    Narrative:     This test is intended for the qualitative detection and differentiation of SARS-CoV-2, influenza A, influenza B, and respiratory syncytial virus (RSV) viral RNA in nasopharyngeal or nares swabs from individuals suspected of respiratory viral infection consistent with COVID-19 by their healthcare provider. Signs and symptoms of respiratory viral infection due to SARS-CoV-2, influenza, and RSV can be similar.                                    Test performed using the Xpert Xpress SARS-CoV-2/FLU/RSV (real time RT-PCR)  assay on the [employer] Sense[employer] instrument, [employer], [place] [address].                   This test is being used under the Food and Drug  Administration's Emergency Use Authorization.                                    The authorized Fact Sheet for Healthcare Providers for this assay is available upon request from the laboratory.   RAINBOW DRAW LAVENDER   RAINBOW DRAW LIGHT GREEN   RAINBOW DRAW BLUE     EKG    Rate, intervals and axes as noted on EKG Report.  Rate: 69  Rhythm: Sinus Rhythm  Reading: No STEMI.  This is my interpretation.  Bifascicular block.           As Interpreted by me    Imaging Results Available and Reviewed while in ED: XR CHEST AP PORTABLE  (CPT=71045)    Result Date: 12/17/2024  CONCLUSION:   1. Cardiac enlargement with secondary signs of slight fluid overload.  2. Small streaky opacities at the lung bases which could represent atelectasis, infiltrate, or a combination  Dictated by (CST): Sean Guerra MD on 12/17/2024 at 5:14 PM     Finalized by (CST): Sean Guerra MD on 12/17/2024 at 5:15 PM         ED Medications Administered:   Medications   sodium chloride 0.9 % IV bolus 500 mL (0 mL Intravenous Stopped 12/17/24 1817)   HYDROcodone-acetaminophen (Norco) 5-325 MG per tab 1 tablet (1 tablet Oral Given 12/17/24 1820)         MDM     Vitals:    12/17/24 1730 12/17/24 1745 12/17/24 1800 12/17/24 1830   BP: 119/60 125/85 107/90 111/66   Pulse: 53 72 69 60   Resp: 24 13 23 19   Temp:       TempSrc:       SpO2: 92% 92% 92%      *I personally reviewed and interpreted all ED vitals.    Pulse Ox: 94%, Room air, Normal     Monitor Interpretation:   normal sinus rhythm as interpreted by me.  The cardiac monitor was ordered given concern for electrolyte derangements.      Medical Decision Making      Differential Diagnosis/ Diagnostic Considerations: Dehydration, gastroenteritis, COVID-19    Complicating Factors: The patient already has CHF to contribute to the complexity of this ED evaluation.    I reviewed prior chart records including office visit note from earlier today prompting referral to the emergency department.  Patient  here with mild dehydration, he was provided with IV fluid bolus and feels significantly improved.       Considered admission however he has no neurologic deficits and is ambulatory without issues, presentation inconsistent with CVA.  His labs are reviewed and unremarkable for acute findings on my interpretation.   Dc In stable condition.  Patient is comfortable with the plan.     Prescriptions: Zofran      Disposition and Plan     Clinical Impression:  1. Nausea and vomiting in adult        Disposition:  Discharge    Follow-up:  Charlotte Rey MD  [address] [place] [id number]  [phone]    Schedule an appointment as soon as possible for a visit in 1 day(s)        Medications Prescribed:  Discharge Medication List as of 12/17/2024  6:36 PM        START taking these medications    Details   ondansetron 8 MG Oral Tablet Dispersible Take 1 tablet (8 mg total) by mouth every 4 (four) hours as needed for Nausea., Normal, Disp-10 tablet, R-0                              [1] (Not in a hospital admission)

## 2024-12-17 NOTE — PROGRESS NOTES
Caleb Rausch Jr. is a 74 year old male with complaints of:  Chief Complaint: Dizziness, Lightheadedness, Sweats, and Nausea    HPI     Caleb Rausch Jr. is a(n) 74 year old male with a history of hypothyroidism, diabetes, CAD status post stents on dual antiplatelet therapy, heart failure with reduced ejection fraction, hyperlipidemia, COPD, hypertension, PE on anticoagulation , who presents for the above issues.     For the last 3 days, he has been having constant sweats. He feels dizzy. He had a fall yesterday while going into a convenience store and again in the bathroom yesterday. Sugar has been 100-130. He has been using the diuretics. He has been throwing up  starting 1 hour ago. He feels extremely weak. Cbg today 130.     Past Medical History     Past Medical History:    Anxiety disorder, unspecified    Anxiety state    Atherosclerosis of coronary artery    stents x 6    Chronic low back pain with bilateral sciatica, left worse than right    Chronic obstructive pulmonary disease, unspecified (HCC)    Coronary atherosclerosis    Depression    Diabetes (HCC)    borderline     Disorder of thyroid    Essential hypertension    Glaucoma    first visit with IRMA, patient was taking Timolol OU BID for 20 years, has not used gtts in over 1 year because he ran out of gtts and had no refills, fundus photos taken today    Heart attack (HCC)    High blood pressure    High cholesterol    Hyperlipidemia    Kidney stone    Left Sided Neck pain, acute    Lumbar stenosis with neurogenic claudication    Major depressive disorder, recurrent, unspecified (HCC)    Morbid obesity with BMI of 40.0-44.9, adult (HCC)    Neuropathy    Pneumonia due to COVID-19 virus    Thyroid disease        Past Surgical History     Past Surgical History:   Procedure Laterality Date    Cataract extraction extracapsular w/ intraocular lens implantation Right 2018    [facility] bare metal stent (bms)      Circumcision,othr  08/04/2020    Dr. Bonilla @  [facility]    Cysto/uretero w/lithotripsy Left 08/04/2020    Dr. Bonilla @ 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     Iridotomy/iridectomy by laser      unknown Dr    Other      cardiac stents        Family History     Family History   Problem Relation Age of Onset    Heart Attack Father     Hypertension Brother     Glaucoma Neg     Macular degeneration Neg        Social History     Social History     Socioeconomic History    Marital status: Single   Tobacco Use    Smoking status: Every Day     Current packs/day: 1.00     Types: Cigarettes    Smokeless tobacco: Former    Tobacco comments:     per pt, quit in 1994   Vaping Use    Vaping status: Some Days   Substance and Sexual Activity    Alcohol use: No     Comment: quit in 1994    Drug use: No   Other Topics Concern    Caffeine Concern Yes    Exercise No   Social History Narrative    The patient uses the following assistive device(s):  single-point cane.      The patient does live in a home with stairs.     Social Drivers of Health     Financial Resource Strain: Low Risk  (12/10/2024)    Financial Resource Strain     Difficulty of Paying Living Expenses: Not hard at all     Med Affordability: No   Food Insecurity: No Food Insecurity (12/5/2024)    Food Insecurity     Food Insecurity: Never true   Transportation Needs: No Transportation Needs (12/10/2024)    Transportation Needs     Lack of Transportation: No   Social Connections: Feeling Socially Integrated (2/16/2023)    [facility]    OASIS : Social Isolation     Frequency of experiencing loneliness or isolation: Never   Housing Stability: Low Risk  (12/5/2024)    Housing Stability     Housing Instability: No       Allergies     Allergies[1]    Current Medications     Current Outpatient Medications   Medication Sig Dispense Refill    pregabalin 300 MG Oral Cap Take 1 capsule (300 mg total) by mouth 2 (two) times daily as  needed. WITH FOOD      latanoprost 0.005 % Ophthalmic Solution Place 1 drop into both eyes nightly. 7.5 mL 0    albuterol (VENTOLIN HFA) 108 (90 Base) MCG/ACT Inhalation Aero Soln Inhale 2 puffs into the lungs every 4 (four) hours as needed for Wheezing. 51 g 1    atorvastatin 80 MG Oral Tab Take 1 tablet (80 mg total) by mouth daily. 90 tablet 3    metFORMIN HCl 1000 MG Oral Tab Take 0.5 tablets (500 mg total) by mouth 2 (two) times daily with meals. 90 tablet 3    diclofenac 1 % External Gel       insulin glargine (BASAGLAR KWIKPEN) 100 UNIT/ML Subcutaneous Solution Pen-injector Inject 10 Units into the skin daily. 9 mL 1    levothyroxine 137 MCG Oral Tab Take 137 mcg by mouth before breakfast. 90 tablet 3    Accu-Chek Softclix Lancets Does not apply Misc 3 (three) times daily.      Glucose Blood (ACCU-CHEK GUIDE) In Vitro Strip 1 strip by In Vitro route 3 (three) times daily.      fluticasone-umeclidin-vilant (TRELEGY ELLIPTA) 200-62.5-25 MCG/ACT Inhalation Aerosol Powder, Breath Activated Inhale 1 puff into the lungs daily. 180 each 3    DULoxetine 30 MG Oral Cap DR Particles Take 1 capsule (30 mg total) by mouth daily. 90 capsule 3    semaglutide (OZEMPIC, 0.25 OR 0.5 MG/DOSE,) 2 MG/3ML Subcutaneous Solution Pen-injector Inject 0.5 mg into the skin once a week. 9 mL 0    Insulin Pen Needle (BD PEN NEEDLE RODERICK 2ND GEN) 32G X 4 MM Does not apply Misc Inject 1 each as directed daily. 90 each 3    clopidogrel 75 MG Oral Tab Take 1 tablet (75 mg total) by mouth daily. 90 tablet 3    JARDIANCE 25 MG Oral Tab TAKE ONE TABLET BY MOUTH ONE TIME DAILY 90 tablet 0    Potassium Chloride ER 20 MEQ Oral Tab CR Take 1 tablet by mouth daily. 90 tablet 0    GVOKE HYPOPEN 2-PACK 1 MG/0.2ML Subcutaneous injection INJECT THE CONTENTS OF ONE DEVICE IN THE LOWER ABDOMEN, OUTER THIGH, OR OUTER UPPER ARM FOR SEVERELY LOW BLOOD SUGAR. IF NO RESPONSE, MAY REPEAT WITH A NEW DEVICE IN 15 MINUTES.      torsemide 20 MG Oral Tab Take 1  tablet (20 mg total) by mouth daily. 90 tablet 3    carvedilol 6.25 MG Oral Tab Take 1 tablet (6.25 mg total) by mouth 2 (two) times daily with meals. 180 tablet 3    Blood Glucose Monitoring Suppl Does not apply Kit Please use to check blood sugar twice daily. 1 kit 0    Blood Glucose Monitoring Suppl Does not apply Misc Please use to check blood sugar twice daily 200 each 3    Blood Glucose Monitoring Suppl Does not apply Misc Please use to check blood sugar twice daily 200 each 0    oxyCODONE-acetaminophen  MG Oral Tab Take 1 tablet by mouth 4 (four) times daily as needed.      warfarin 5 MG Oral Tab Take 2 tablets (10 mg total) by mouth nightly. 7.5mg on Sunday and Wednesday  10mg on all other days      sacubitril-valsartan (ENTRESTO) 24-26 MG Oral Tab Take 1 tablet by mouth daily. (Patient not taking: Reported on 12/17/2024) 30 tablet 0    Polyethylene Glycol 3350 (MIRALAX) 17 g Oral Powd Pack Take 17 g by mouth daily. (Patient not taking: Reported on 12/17/2024) 5 each 0    spironolactone 25 MG Oral Tab Take 1 tablet (25 mg total) by mouth daily. (Patient not taking: Reported on 12/17/2024) 90 tablet 3    aspirin 81 MG Oral Tab EC Take 1 tablet (81 mg total) by mouth daily. (Patient not taking: Reported on 12/17/2024) 30 tablet 2     No current facility-administered medications for this visit.       Review of Systems     GENERAL HEALTH: feels well otherwise  SKIN: denies any unusual skin lesions or rashes  RESPIRATORY: denies shortness of breath with exertion  CARDIOVASCULAR: denies chest pain on exertion  GI: denies abdominal pain and denies heartburn  : denies any burning with urination, urinary frequency or urgency  NEURO: denies headaches, numbness or tingling, mental status changes  PSYCH: denies depressed mood, anxiety  MUSC: denies muscle aches, joint pain    Physical Exam     /85 (BP Location: Left arm, Patient Position: Sitting, Cuff Size: large)   Pulse 72   Ht 5' 9\" (1.753 m)   Wt  253 lb 12.8 oz (115.1 kg)   SpO2 94%   BMI 37.48 kg/m²     GENERAL: appears pale and weak  SKIN: no rashes,no suspicious lesions  HEENT: atraumatic, normocephalic, evelyn mucus membranes  NECK: supple,no adenopathy,no bruits  LUNGS: clear to auscultation  CARDIO: RRR without murmur  GI: good BS's,no masses, HSM or tenderness  EXTREMITIES: no cyanosis, clubbing or edema    Assessment and Plan     Diagnoses and all orders for this visit:    Dehydration. Weakness. Diaphoresis. Frequent falls at home. Patient may have been over diuresed as he appears dry on my exam. He is extremely weak and cannot safely go home. Concern for dehydration vs euglycemic DKA. EMS called. Patient to be taken to the ER. Sign out given to LAYNE Preston, at [facility] ER.           pregabalin 300 MG Oral Cap Take 1 capsule (300 mg total) by mouth 2 (two) times daily as needed. WITH FOOD      latanoprost 0.005 % Ophthalmic Solution Place 1 drop into both eyes nightly. 7.5 mL 0    albuterol (VENTOLIN HFA) 108 (90 Base) MCG/ACT Inhalation Aero Soln Inhale 2 puffs into the lungs every 4 (four) hours as needed for Wheezing. 51 g 1    atorvastatin 80 MG Oral Tab Take 1 tablet (80 mg total) by mouth daily. 90 tablet 3    metFORMIN HCl 1000 MG Oral Tab Take 0.5 tablets (500 mg total) by mouth 2 (two) times daily with meals. 90 tablet 3    diclofenac 1 % External Gel       insulin glargine (BASAGLAR KWIKPEN) 100 UNIT/ML Subcutaneous Solution Pen-injector Inject 10 Units into the skin daily. 9 mL 1    levothyroxine 137 MCG Oral Tab Take 137 mcg by mouth before breakfast. 90 tablet 3    Accu-Chek Softclix Lancets Does not apply Misc 3 (three) times daily.      Glucose Blood (ACCU-CHEK GUIDE) In Vitro Strip 1 strip by In Vitro route 3 (three) times daily.      fluticasone-umeclidin-vilant (TRELEGY ELLIPTA) 200-62.5-25 MCG/ACT Inhalation Aerosol Powder, Breath Activated Inhale 1 puff into the lungs daily. 180 each 3    DULoxetine 30 MG Oral Cap DR Particles Take 1  capsule (30 mg total) by mouth daily. 90 capsule 3    semaglutide (OZEMPIC, 0.25 OR 0.5 MG/DOSE,) 2 MG/3ML Subcutaneous Solution Pen-injector Inject 0.5 mg into the skin once a week. 9 mL 0    Insulin Pen Needle (BD PEN NEEDLE RODERICK 2ND GEN) 32G X 4 MM Does not apply Misc Inject 1 each as directed daily. 90 each 3    clopidogrel 75 MG Oral Tab Take 1 tablet (75 mg total) by mouth daily. 90 tablet 3    JARDIANCE 25 MG Oral Tab TAKE ONE TABLET BY MOUTH ONE TIME DAILY 90 tablet 0    Potassium Chloride ER 20 MEQ Oral Tab CR Take 1 tablet by mouth daily. 90 tablet 0    GVOKE HYPOPEN 2-PACK 1 MG/0.2ML Subcutaneous injection INJECT THE CONTENTS OF ONE DEVICE IN THE LOWER ABDOMEN, OUTER THIGH, OR OUTER UPPER ARM FOR SEVERELY LOW BLOOD SUGAR. IF NO RESPONSE, MAY REPEAT WITH A NEW DEVICE IN 15 MINUTES.      torsemide 20 MG Oral Tab Take 1 tablet (20 mg total) by mouth daily. 90 tablet 3    carvedilol 6.25 MG Oral Tab Take 1 tablet (6.25 mg total) by mouth 2 (two) times daily with meals. 180 tablet 3    Blood Glucose Monitoring Suppl Does not apply Kit Please use to check blood sugar twice daily. 1 kit 0    Blood Glucose Monitoring Suppl Does not apply Misc Please use to check blood sugar twice daily 200 each 3    Blood Glucose Monitoring Suppl Does not apply Misc Please use to check blood sugar twice daily 200 each 0    oxyCODONE-acetaminophen  MG Oral Tab Take 1 tablet by mouth 4 (four) times daily as needed.      warfarin 5 MG Oral Tab Take 2 tablets (10 mg total) by mouth nightly. 7.5mg on Sunday and Wednesday  10mg on all other days         Requested Prescriptions      No prescriptions requested or ordered in this encounter       No orders of the defined types were placed in this encounter.      No follow-ups on file.    The patient indicates understanding of these issues and agrees to the plan.    Electronically signed by Charlotte Rey MD 12/17/24             [1] No Known Allergies

## 2024-12-17 NOTE — ED INITIAL ASSESSMENT (HPI)
Pt presents to ED via [facility] from Dr office. Per EMS report pt was sent to ED for nausea,chills,fatigue, dizziness  and vomiting. Pt has lost 10lb in the last 4 days. Denies fevers. Pt was discharged from hospital few days ago.

## 2024-12-18 LAB
ATRIAL RATE: 69 BPM
P AXIS: 43 DEGREES
P-R INTERVAL: 164 MS
Q-T INTERVAL: 492 MS
QRS DURATION: 166 MS
QTC CALCULATION (BEZET): 527 MS
R AXIS: -79 DEGREES
T AXIS: 71 DEGREES
VENTRICULAR RATE: 69 BPM

## 2024-12-18 NOTE — ED QUICK NOTES
Daughter aware that pt is getting discharged. Daughter requested this RN to get get a ride home to his apartment. Daughter aware that [facility] will be called for ride. Daughter aware that she needs to  pt's car to Dr office where it was left.

## 2025-01-03 ENCOUNTER — PATIENT OUTREACH (OUTPATIENT)
Dept: CASE MANAGEMENT | Age: 75
End: 2025-01-03

## 2025-01-03 DIAGNOSIS — E11.65 TYPE 2 DIABETES MELLITUS WITH HYPERGLYCEMIA, WITH LONG-TERM CURRENT USE OF INSULIN (HCC): ICD-10-CM

## 2025-01-03 DIAGNOSIS — I50.22 CHRONIC HFREF (HEART FAILURE WITH REDUCED EJECTION FRACTION) (HCC): Primary | ICD-10-CM

## 2025-01-03 DIAGNOSIS — I10 PRIMARY HYPERTENSION: ICD-10-CM

## 2025-01-03 DIAGNOSIS — J44.1 CHRONIC OBSTRUCTIVE PULMONARY DISEASE WITH ACUTE EXACERBATION (HCC): ICD-10-CM

## 2025-01-03 DIAGNOSIS — F33.41 RECURRENT MAJOR DEPRESSIVE DISORDER, IN PARTIAL REMISSION (HCC): ICD-10-CM

## 2025-01-03 DIAGNOSIS — Z79.4 TYPE 2 DIABETES MELLITUS WITH HYPERGLYCEMIA, WITH LONG-TERM CURRENT USE OF INSULIN (HCC): ICD-10-CM

## 2025-01-03 NOTE — PROGRESS NOTES
Spoke to Caleb for Chronic Care Management.      Updates to patient care team/comments: UTD  Patient reported changes in medications: reports changes but not sure will bring meds to upcoming visit   Med Adherence  Comment: reports adherence      Health Maintenance:   Health Maintenance   Topic Date Due    Zoster Vaccines (1 of 2) Never done    Colorectal Cancer Screening  06/11/2019    Annual Physical  01/12/2024    Diabetes Care Dilated Eye Exam  05/10/2024    Influenza Vaccine (1) 10/01/2024    Annual Depression Screening  01/01/2025    Fall Risk Screening (Annual)  01/01/2025    Diabetes Care: Foot Exam (Annual)  01/01/2025    Tobacco Cessation Counseling  Never done    Diabetes Care: Microalb/Creat Ratio (Annual)  01/01/2025    COVID-19 Vaccine (4 - 2024-25 season) 01/11/2025 (Originally 9/1/2024)    Diabetes Care A1C  04/17/2025    Diabetes Care: GFR  12/17/2025    PSA  05/10/2026    Pneumococcal Vaccine: 65+ Years  Completed       Patient updates/concerns:   Spoke with patient.  Patient relates doing ok. Mood good/stable.  Denies any recent syncope episodes. Denies any recent falls. Continues to feel fatigued. Breathing stable. Diabetes managed by Endo. Continues to take taking medications as prescribed. Avoiding sugars and carbs. Physical activity about the same has been trying to walk more but doesn't have energy. Recommended again stretching or getting a cubii. Seeing pain management Tuesday. Vision stable.  Blood pressure and pulse unknown not checking but denies symptoms and was normal at last doctors visit. Denies any edema. PT/INR completed today. Denies any urinary issues at this time. Needs refills on medications but unsure which ones. Will bring to upcoming visit to confirm. Aware of upcoming visits and I will remind patient the day of.  No other questions or concerns.    Encouraged patient:   Self care: Take the time to do the things you love.   Nutrition:  Good nutrition helps us to maintain our  weight, fight off infection, and help reduce the risk of developing other chronic issues.   Physical activity:  Physical activity is important to help maintain independence and improve quality of life.     Goals/Action Plan:    Active goal from previous outreach: Staying healthy, maintain independence, and stability.    Patient reported progress towards goals: progressing                - What: continues to follow up on health conditions            - Where/When/How: seeing PCP and specialist   Patient Reported Barriers and Concerns: as per above                    - Plan for overcoming barriers: encouraged patient to call with any questions or concerns.     Care Managers Interventions: Continue to provide encouragement and education for healthy coping and diagnosis.      Future Appointments:   Future Appointments   Date Time Provider Department Center   1/7/2025  1:00 PM Charlotte Rey MD [facility]   1/8/2025 12:30 PM Kel Thompson DO [facility]   1/9/2025  2:20 PM Kimberly Fuentes DPM [facility]   1/28/2025  2:00 PM Ben Pérez MD [facility]   3/13/2025  1:00 PM [facility]         Next Care Manager Follow Up Date: 1 month     Reason For Follow Up: review progress and or barriers towards patient's goals.     Time Spent This Encounter Total: 20 min medical record review, telephone communication, care plan updates where needed, education, goals, and action plan recreation/update. Provided acknowledgment and validation to patient's concerns.   Monthly Minute Total including today: 20 min   Physical assessment, complete health history, and need for CCM established by Charlotte Rey MD.

## 2025-01-07 DIAGNOSIS — R79.89 LOW TESTOSTERONE: ICD-10-CM

## 2025-01-07 DIAGNOSIS — I10 HYPERTENSION, UNSPECIFIED TYPE: ICD-10-CM

## 2025-01-07 DIAGNOSIS — E03.9 HYPOTHYROIDISM, UNSPECIFIED TYPE: ICD-10-CM

## 2025-01-07 DIAGNOSIS — R73.09 HIGH GLUCOSE LEVEL: ICD-10-CM

## 2025-01-07 DIAGNOSIS — E55.9 VITAMIN D DEFICIENCY: ICD-10-CM

## 2025-01-07 DIAGNOSIS — E11.65 UNCONTROLLED TYPE 2 DIABETES MELLITUS WITH HYPERGLYCEMIA (HCC): ICD-10-CM

## 2025-01-07 DIAGNOSIS — E78.5 HYPERLIPIDEMIA LDL GOAL <70: ICD-10-CM

## 2025-01-07 DIAGNOSIS — I25.119 CORONARY ARTERY DISEASE INVOLVING NATIVE CORONARY ARTERY OF NATIVE HEART WITH ANGINA PECTORIS (HCC): ICD-10-CM

## 2025-01-07 DIAGNOSIS — I10 PRIMARY HYPERTENSION: ICD-10-CM

## 2025-01-07 DIAGNOSIS — Z79.4 TYPE 2 DIABETES MELLITUS WITH HYPERGLYCEMIA, WITH LONG-TERM CURRENT USE OF INSULIN (HCC): ICD-10-CM

## 2025-01-07 DIAGNOSIS — E07.9 THYROID DISEASE: ICD-10-CM

## 2025-01-07 DIAGNOSIS — E11.9 TYPE 2 DIABETES MELLITUS WITHOUT RETINOPATHY (HCC): ICD-10-CM

## 2025-01-07 DIAGNOSIS — R73.9 HYPERGLYCEMIA: ICD-10-CM

## 2025-01-07 DIAGNOSIS — E66.01 MORBID OBESITY WITH BMI OF 40.0-44.9, ADULT (HCC): ICD-10-CM

## 2025-01-07 DIAGNOSIS — E11.65 HYPERGLYCEMIA DUE TO DIABETES MELLITUS (HCC): ICD-10-CM

## 2025-01-07 DIAGNOSIS — E11.65 TYPE 2 DIABETES MELLITUS WITH HYPERGLYCEMIA, WITH LONG-TERM CURRENT USE OF INSULIN (HCC): ICD-10-CM

## 2025-01-07 RX ORDER — SEMAGLUTIDE 0.68 MG/ML
0.5 INJECTION, SOLUTION SUBCUTANEOUS WEEKLY
Qty: 9 ML | Refills: 0 | Status: SHIPPED | OUTPATIENT
Start: 2025-01-07 | End: 2025-07-06

## 2025-01-07 NOTE — TELEPHONE ENCOUNTER
Endocrine Refill protocol for oral and injectable diabetic medications    Protocol Criteria:  PASSED  Reason: N/A    If all below requirements are met, send a 90-day supply with 1 refill per provider protocol.    Verify appointment with Endocrinology completed in the last 6 months or scheduled in the next 3 months.  Verify A1C has been completed within the last 6 months and is below 8.5%     Last completed office visit: 1/26/2024 Ben Pérez MD   Next scheduled Follow up:   Future Appointments   Date Time Provider Department Center   1/28/2025  2:00 PM Ben Pérez MD [facility]      Last A1c result: Last A1c value was 6.6% done 10/17/2024.

## 2025-01-08 ENCOUNTER — OFFICE VISIT (OUTPATIENT)
Age: 75
End: 2025-01-08
Attending: STUDENT IN AN ORGANIZED HEALTH CARE EDUCATION/TRAINING PROGRAM
Payer: MEDICARE

## 2025-01-08 VITALS
HEIGHT: 69 IN | RESPIRATION RATE: 18 BRPM | HEART RATE: 80 BPM | TEMPERATURE: 97 F | SYSTOLIC BLOOD PRESSURE: 124 MMHG | WEIGHT: 266 LBS | OXYGEN SATURATION: 94 % | BODY MASS INDEX: 39.4 KG/M2 | DIASTOLIC BLOOD PRESSURE: 81 MMHG

## 2025-01-08 DIAGNOSIS — Z51.81 ANTICOAGULATION MANAGEMENT ENCOUNTER: ICD-10-CM

## 2025-01-08 DIAGNOSIS — D50.8 OTHER IRON DEFICIENCY ANEMIA: Primary | ICD-10-CM

## 2025-01-08 DIAGNOSIS — Z79.01 ANTICOAGULATION MANAGEMENT ENCOUNTER: ICD-10-CM

## 2025-01-08 DIAGNOSIS — I26.99 OTHER ACUTE PULMONARY EMBOLISM, UNSPECIFIED WHETHER ACUTE COR PULMONALE PRESENT (HCC): ICD-10-CM

## 2025-01-08 NOTE — CONSULTS
[facility] Hematology Oncology  Initial Hematology Clinic Consult Note    Patient Name: Caleb Rausch Jr.   YOB: 1950   Medical Record Number: [id number]    Subjective:   Caleb Rausch Jr. is a 74 year old male with a history of CAD s/p multiple stents, CHF, COPD diabetes obesity, history of provoked pulmonary embolism during COVID infection in early 2022 who presents for hematology follow-up regarding anticoagulation recommendations.  The patient was hospitalized and found to have a pulmonary embolism in January 2022 when he was hospitalized with COVID infection requiring ICU care and BiPAP.  He was discharged to rehab developed shortness of breath again and imaging in February showed residual PE.  It is unclear if there was actual progression on DOAC but he was transitioned to warfarin more so due to financial toxicity.  He follows with cardiology Dr. Anival Hammer who manages the patient's anticoagulation.  Currently patient is on aspirin Plavix and warfarin.    States he is operating at his baseline status of health.  Recently ran out of aspirin and has not restarted.  No major concerns or complaints at this time.    Review of Systems:  Hematology/Oncology ROS performed and negative except as above in HPI    History/Other:   Past Medical History:  Past Medical History:    Anxiety disorder, unspecified    Anxiety state    Atherosclerosis of coronary artery    stents x 6    Chronic low back pain with bilateral sciatica, left worse than right    Chronic obstructive pulmonary disease, unspecified (HCC)    Coronary atherosclerosis    Depression    Diabetes (HCC)    borderline     Disorder of thyroid    Essential hypertension    Glaucoma    first visit with IRMA, patient was taking Timolol OU BID for 20 years, has not used gtts in over 1 year because he ran out of gtts and had no refills, fundus photos taken today    Heart attack (HCC)    High blood pressure    High cholesterol    Hyperlipidemia    Kidney  stone    Left Sided Neck pain, acute    Lumbar stenosis with neurogenic claudication    Major depressive disorder, recurrent, unspecified (HCC)    Morbid obesity with BMI of 40.0-44.9, adult (HCC)    Neuropathy    Pneumonia due to COVID-19 virus    Thyroid disease       Past Surgical History:  Past Surgical History:   Procedure Laterality Date    Cataract extraction extracapsular w/ intraocular lens implantation Right 2018    [place]    Cath bare metal stent (bms)      Circumcision,othr  08/04/2020    Dr. Bonilla @ [facility]    Cysto/uretero w/lithotripsy Left 08/04/2020    Dr. Bonilla @ [facility] -- duplex left collecting system with both moieties joining in proximal ureter.     Iridotomy/iridectomy by laser      unknown Dr    Other      cardiac stents       Current Medications:   OZEMPIC, 0.25 OR 0.5 MG/DOSE, 2 MG/3ML Subcutaneous Solution Pen-injector Inject 0.5 mg into the skin once a week. 9 mL 0    pregabalin 300 MG Oral Cap Take 1 capsule (300 mg total) by mouth 2 (two) times daily as needed. WITH FOOD      latanoprost 0.005 % Ophthalmic Solution Place 1 drop into both eyes nightly. 7.5 mL 0    albuterol (VENTOLIN HFA) 108 (90 Base) MCG/ACT Inhalation Aero Soln Inhale 2 puffs into the lungs every 4 (four) hours as needed for Wheezing. 51 g 1    atorvastatin 80 MG Oral Tab Take 1 tablet (80 mg total) by mouth daily. 90 tablet 3    metFORMIN HCl 1000 MG Oral Tab Take 0.5 tablets (500 mg total) by mouth 2 (two) times daily with meals. 90 tablet 3    diclofenac 1 % External Gel       insulin glargine (BASAGLAR KWIKPEN) 100 UNIT/ML Subcutaneous Solution Pen-injector Inject 10 Units into the skin daily. 9 mL 1    levothyroxine 137 MCG Oral Tab Take 137 mcg by mouth before breakfast. 90 tablet 3    Accu-Chek Softclix Lancets Does not apply Misc 3 (three) times daily.      Glucose Blood (ACCU-CHEK GUIDE) In Vitro Strip 1 strip by In Vitro route 3 (three) times daily.      fluticasone-umeclidin-vilant (TRELEGY ELLIPTA)  200-62.5-25 MCG/ACT Inhalation Aerosol Powder, Breath Activated Inhale 1 puff into the lungs daily. 180 each 3    DULoxetine 30 MG Oral Cap DR Particles Take 1 capsule (30 mg total) by mouth daily. 90 capsule 3    Insulin Pen Needle (BD PEN NEEDLE RODERICK 2ND GEN) 32G X 4 MM Does not apply Misc Inject 1 each as directed daily. 90 each 3    clopidogrel 75 MG Oral Tab Take 1 tablet (75 mg total) by mouth daily. 90 tablet 3    JARDIANCE 25 MG Oral Tab TAKE ONE TABLET BY MOUTH ONE TIME DAILY 90 tablet 0    sacubitril-valsartan (ENTRESTO) 24-26 MG Oral Tab Take 1 tablet by mouth daily. 30 tablet 0    Potassium Chloride ER 20 MEQ Oral Tab CR Take 1 tablet by mouth daily. 90 tablet 0    GVOKE HYPOPEN 2-PACK 1 MG/0.2ML Subcutaneous injection INJECT THE CONTENTS OF ONE DEVICE IN THE LOWER ABDOMEN, OUTER THIGH, OR OUTER UPPER ARM FOR SEVERELY LOW BLOOD SUGAR. IF NO RESPONSE, MAY REPEAT WITH A NEW DEVICE IN 15 MINUTES.      torsemide 20 MG Oral Tab Take 1 tablet (20 mg total) by mouth daily. 90 tablet 3    carvedilol 6.25 MG Oral Tab Take 1 tablet (6.25 mg total) by mouth 2 (two) times daily with meals. 180 tablet 3    spironolactone 25 MG Oral Tab Take 1 tablet (25 mg total) by mouth daily. 90 tablet 3    Blood Glucose Monitoring Suppl Does not apply Kit Please use to check blood sugar twice daily. 1 kit 0    Blood Glucose Monitoring Suppl Does not apply Misc Please use to check blood sugar twice daily 200 each 3    Blood Glucose Monitoring Suppl Does not apply Misc Please use to check blood sugar twice daily 200 each 0    oxyCODONE-acetaminophen  MG Oral Tab Take 1 tablet by mouth 4 (four) times daily as needed.      warfarin 5 MG Oral Tab Take 2 tablets (10 mg total) by mouth nightly. 7.5mg on Sunday and Wednesday  10mg on all other days      aspirin 81 MG Oral Tab EC Take 1 tablet (81 mg total) by mouth daily. 30 tablet 2       Allergies:   Allergies[1]    Family Medical History:  Family History   Problem Relation Age  of Onset    Heart Attack Father     Hypertension Brother     Glaucoma Neg     Macular degeneration Neg        Social History:  Social History     Socioeconomic History    Marital status: Single     Spouse name: Not on file    Number of children: Not on file    Years of education: Not on file    Highest education level: Not on file   Occupational History    Not on file   Tobacco Use    Smoking status: Every Day     Current packs/day: 1.00     Types: Cigarettes    Smokeless tobacco: Former    Tobacco comments:     per pt, quit in 1994   Vaping Use    Vaping status: Some Days   Substance and Sexual Activity    Alcohol use: No     Comment: quit in 1994    Drug use: No    Sexual activity: Not on file   Other Topics Concern    Caffeine Concern Yes    Exercise No    Seat Belt Not Asked    Special Diet Not Asked    Stress Concern Not Asked    Weight Concern Not Asked     Service Not Asked    Blood Transfusions Not Asked    Occupational Exposure Not Asked    Hobby Hazards Not Asked    Sleep Concern Not Asked    Back Care Not Asked    Bike Helmet Not Asked    Self-Exams Not Asked   Social History Narrative    The patient uses the following assistive device(s):  single-point cane.      The patient does live in a home with stairs.     Social Drivers of Health     Financial Resource Strain: Low Risk  (12/10/2024)    Financial Resource Strain     Difficulty of Paying Living Expenses: Not hard at all     Med Affordability: No   Food Insecurity: No Food Insecurity (12/5/2024)    Food Insecurity     Food Insecurity: Never true   Transportation Needs: No Transportation Needs (12/10/2024)    Transportation Needs     Lack of Transportation: No     Car Seat: Not on file   Physical Activity: Not on file   Stress: Not on file   Social Connections: Feeling Socially Integrated (2/16/2023)    [facility], [facility]    OASIS : Social Isolation     Frequency of experiencing loneliness or  isolation: Never   Housing Stability: Low Risk  (12/5/2024)    Housing Stability     Housing Instability: No     Housing Instability Emergency: Not on file     Crib or [place]in[place]: Not on file       Objective:   Height 1.753 m (5' 9\"), weight 120.7 kg (266 lb).  Physical Exam:  General: A&Ox3, NAD  HEENT: PERRL, OP clear  CV: RRR, no murmurs  Pulm: CTA b/l, normal effort  Abd: soft, ntnd,  Extremities: no edema  Neurological: grossly intact    Results:   Labs:  Lab Results   Component Value Date    WBC 12.0 (H) 12/17/2024    HGB 13.4 12/17/2024    HCT 43.3 12/17/2024    .0 12/17/2024    CREATSERUM 1.04 12/17/2024    BUN 16 12/17/2024     12/17/2024    K 3.7 12/17/2024     12/17/2024    CO2 26.0 12/17/2024     (H) 12/17/2024    CA 9.7 12/17/2024    ALB 4.5 12/17/2024    ALKPHO 81 12/17/2024    BILT 0.3 12/17/2024    TP 7.8 12/17/2024    AST 66 (H) 12/17/2024    ALT 19 12/17/2024    PTT 28.1 12/05/2024    INR 1.02 12/06/2024    T4F 1.1 10/17/2024    TSH 5.202 (H) 10/17/2024    LIP 20 12/17/2024    PSA 0.42 07/01/2021    DDIMER 0.35 03/29/2024    ESRML 11 07/14/2018    CRP <0.29 01/19/2022    MG 2.2 01/19/2024    PHOS 2.6 12/25/2023    TROP <0.045 11/20/2020    CK 65 01/08/2022    B12 351 06/29/2020       Assessment & Plan:   Caleb Rausch  is a 74 year old male with a history of CAD s/p multiple stents, CHF, COPD diabetes obesity, history of provoked pulmonary embolism during COVID infection in early 2022 who presents for hematology follow-up regarding anticoagulation recommendations.  The patient was hospitalized and found to have a pulmonary embolism in January 2022 when he was hospitalized with COVID infection requiring ICU care and BiPAP.  He was discharged to rehab developed shortness of breath again and imaging in February 2022 showed residual PE.  It is unclear if there was actual progression on DOAC but he was transitioned to warfarin more so due to financial toxicity.  He remains on  warfarin.    It does not appear that warfarin remains necessary.  I will discuss further with cardiology and we may be able to consider discontinuing warfarin and continue with dual antiplatelet therapy given his significant cardiac history.    He does have iron deficiency related to undiagnosed GI bleed.  He has refused endoscopic evaluation.  We will start oral iron once daily and follow-up in about 3 to 4 months to assess repletion of his iron.    BRIT Thompson, DO    [facility] Hematology/Oncology  [facility]     This note was created using a voice-recognition transcribing system. Incorrect words or phrases may have been missed during proofreading. Please interpret accordingly.       [1] No Known Allergies

## 2025-01-09 ENCOUNTER — OFFICE VISIT (OUTPATIENT)
Dept: PODIATRY CLINIC | Facility: CLINIC | Age: 75
End: 2025-01-09

## 2025-01-09 DIAGNOSIS — Z79.4 TYPE 2 DIABETES MELLITUS WITH DIABETIC POLYNEUROPATHY, WITH LONG-TERM CURRENT USE OF INSULIN (HCC): Primary | ICD-10-CM

## 2025-01-09 DIAGNOSIS — E11.42 TYPE 2 DIABETES MELLITUS WITH DIABETIC POLYNEUROPATHY, WITH LONG-TERM CURRENT USE OF INSULIN (HCC): Primary | ICD-10-CM

## 2025-01-09 DIAGNOSIS — B35.1 ONYCHOMYCOSIS: ICD-10-CM

## 2025-01-09 PROCEDURE — 99204 OFFICE O/P NEW MOD 45 MIN: CPT | Performed by: STUDENT IN AN ORGANIZED HEALTH CARE EDUCATION/TRAINING PROGRAM

## 2025-01-09 NOTE — PROGRESS NOTES
[facility] Podiatry  Progress Note      Caleb Rausch Jr. is a 74 year old male.   Chief Complaint   Patient presents with    Diabetic Foot Care     Consult Diabetic Foot Care, FBS 90 am, on 12/17/2024 LOV with PCP Charlotte Matute MD. A1C= 6.6 on 10/17/24.             HPI:     Patient is a pleasant 74-year-old diabetic male presents to clinic for bilateral foot evaluation.  Patient admits to taking Lyrica for lower extremity neuropathy.  Admits to smoking.  Denies walking barefoot around the house.  Admits to elongated toenails.    Allergies: Patient has no known allergies.    Current Outpatient Medications   Medication Sig Dispense Refill    OZEMPIC, 0.25 OR 0.5 MG/DOSE, 2 MG/3ML Subcutaneous Solution Pen-injector Inject 0.5 mg into the skin once a week. 9 mL 0    pregabalin 300 MG Oral Cap Take 1 capsule (300 mg total) by mouth 2 (two) times daily as needed. WITH FOOD      latanoprost 0.005 % Ophthalmic Solution Place 1 drop into both eyes nightly. 7.5 mL 0    albuterol (VENTOLIN HFA) 108 (90 Base) MCG/ACT Inhalation Aero Soln Inhale 2 puffs into the lungs every 4 (four) hours as needed for Wheezing. 51 g 1    atorvastatin 80 MG Oral Tab Take 1 tablet (80 mg total) by mouth daily. 90 tablet 3    metFORMIN HCl 1000 MG Oral Tab Take 0.5 tablets (500 mg total) by mouth 2 (two) times daily with meals. 90 tablet 3    diclofenac 1 % External Gel       insulin glargine (BASAGLAR KWIKPEN) 100 UNIT/ML Subcutaneous Solution Pen-injector Inject 10 Units into the skin daily. 9 mL 1    levothyroxine 137 MCG Oral Tab Take 137 mcg by mouth before breakfast. 90 tablet 3    Accu-Chek Softclix Lancets Does not apply Misc 3 (three) times daily.      Glucose Blood (ACCU-CHEK GUIDE) In Vitro Strip 1 strip by In Vitro route 3 (three) times daily.      fluticasone-umeclidin-vilant (TRELEGY ELLIPTA) 200-62.5-25 MCG/ACT Inhalation Aerosol Powder, Breath Activated Inhale 1 puff into the lungs daily. 180 each 3    DULoxetine 30 MG Oral Cap  DR Particles Take 1 capsule (30 mg total) by mouth daily. 90 capsule 3    Insulin Pen Needle (BD PEN NEEDLE RODERICK 2ND GEN) 32G X 4 MM Does not apply Misc Inject 1 each as directed daily. 90 each 3    clopidogrel 75 MG Oral Tab Take 1 tablet (75 mg total) by mouth daily. 90 tablet 3    JARDIANCE 25 MG Oral Tab TAKE ONE TABLET BY MOUTH ONE TIME DAILY 90 tablet 0    sacubitril-valsartan (ENTRESTO) 24-26 MG Oral Tab Take 1 tablet by mouth daily. 30 tablet 0    Potassium Chloride ER 20 MEQ Oral Tab CR Take 1 tablet by mouth daily. 90 tablet 0    GVOKE HYPOPEN 2-PACK 1 MG/0.2ML Subcutaneous injection INJECT THE CONTENTS OF ONE DEVICE IN THE LOWER ABDOMEN, OUTER THIGH, OR OUTER UPPER ARM FOR SEVERELY LOW BLOOD SUGAR. IF NO RESPONSE, MAY REPEAT WITH A NEW DEVICE IN 15 MINUTES.      Polyethylene Glycol 3350 (MIRALAX) 17 g Oral Powd Pack Take 17 g by mouth daily. 5 each 0    torsemide 20 MG Oral Tab Take 1 tablet (20 mg total) by mouth daily. 90 tablet 3    carvedilol 6.25 MG Oral Tab Take 1 tablet (6.25 mg total) by mouth 2 (two) times daily with meals. 180 tablet 3    spironolactone 25 MG Oral Tab Take 1 tablet (25 mg total) by mouth daily. 90 tablet 3    Blood Glucose Monitoring Suppl Does not apply Kit Please use to check blood sugar twice daily. 1 kit 0    Blood Glucose Monitoring Suppl Does not apply Misc Please use to check blood sugar twice daily 200 each 3    Blood Glucose Monitoring Suppl Does not apply Misc Please use to check blood sugar twice daily 200 each 0    oxyCODONE-acetaminophen  MG Oral Tab Take 1 tablet by mouth 4 (four) times daily as needed.      warfarin 5 MG Oral Tab Take 2 tablets (10 mg total) by mouth nightly. 7.5mg on Sunday and Wednesday  10mg on all other days      aspirin 81 MG Oral Tab EC Take 1 tablet (81 mg total) by mouth daily. 30 tablet 2      Past Medical History:    Anxiety disorder, unspecified    Anxiety state    Atherosclerosis of coronary artery    stents x 6    Chronic low  back pain with bilateral sciatica, left worse than right    Chronic obstructive pulmonary disease, unspecified (HCC)    Coronary atherosclerosis    Depression    Diabetes (HCC)    borderline     Disorder of thyroid    Essential hypertension    Glaucoma    first visit with IRMA, patient was taking Timolol OU BID for 20 years, has not used gtts in over 1 year because he ran out of gtts and had no refills, fundus photos taken today    Heart attack (HCC)    High blood pressure    High cholesterol    Hyperlipidemia    Kidney stone    Left Sided Neck pain, acute    Lumbar stenosis with neurogenic claudication    Major depressive disorder, recurrent, unspecified (HCC)    Morbid obesity with BMI of 40.0-44.9, adult (HCC)    Neuropathy    Pneumonia due to COVID-19 virus    Thyroid disease      Past Surgical History:   Procedure Laterality Date    Cataract extraction extracapsular w/ intraocular lens implantation Right 2018    [facility] bare metal stent (bms)      Circumcision,othr  08/04/2020    Dr. Bonilla @ [facility]    Cysto/uretero w/lithotripsy Left 08/04/2020    Dr. Bonilla @ [facility] -- duplex left collecting system with both moieties joining in proximal ureter.     Iridotomy/iridectomy by laser      unknown Dr    Other      cardiac stents      Family History   Problem Relation Age of Onset    Heart Attack Father     Hypertension Brother     Glaucoma Neg     Macular degeneration Neg       Social History     Socioeconomic History    Marital status: Single   Tobacco Use    Smoking status: Every Day     Current packs/day: 1.00     Types: Cigarettes    Smokeless tobacco: Former    Tobacco comments:     per pt, quit in 1994   Vaping Use    Vaping status: Some Days   Substance and Sexual Activity    Alcohol use: No     Comment: quit in 1994    Drug use: No   Other Topics Concern    Caffeine Concern Yes    Exercise No           REVIEW OF SYSTEMS:     Denies nause, fever, chills  No calf pain  Denies chest pain or  SOB      EXAM:   There were no vitals taken for this visit.  GENERAL: well developed, well nourished, in no apparent distress  EXTREMITIES:   1. Integument: Normal skin temperature and turgor. Toenails x10 are elongated, thickened and discolored with subungal derbi.     2. Vascular: Dorsalis pedis two out of four bilateral and posterior tibial pulses two out of   four bilateral, capillary refill normal.   3. Musculoskeletal: All muscle groups are graded 5 out of 5 in the foot and ankle.   4. Neurological: Normal sharp dull sensation; reflexes normal.    Bilateral barefoot skin diabetic exam is normal, visualized feet and the appearance is normal.  Bilateral monofilament/sensation of both feet is normal.  Pulsation pedal pulse exam of both lower legs/feet is normal as well.             ASSESSMENT AND PLAN:   Diagnoses and all orders for this visit:    Type 2 diabetes mellitus with diabetic polyneuropathy, with long-term current use of insulin (HCC)    Onychomycosis        Plan:       -Patient examined, chart history reviewed.  -Discussed importance of proper pedal hygiene, regular foot checks, and tight glucose control.  -Sharply debrided nails x10 with a sterile nail nipper achieving a 20% reduction in thickness and length, without incident.   -Ambulate with supportive shoes and inserts and avoid walking barefoot.  -Educated patient on acute signs of infection advised patient to seek immediate medical attention if symptoms arise.    RTC in 1 year    The patient indicates understanding of these issues and agrees to the plan.        Kimberly Fuentes DPM

## 2025-01-30 ENCOUNTER — TELEPHONE (OUTPATIENT)
Dept: INTERNAL MEDICINE CLINIC | Facility: CLINIC | Age: 75
End: 2025-01-30

## 2025-02-03 RX ORDER — LATANOPROST 50 UG/ML
1 SOLUTION/ DROPS OPHTHALMIC NIGHTLY
Qty: 7.5 ML | Refills: 0 | OUTPATIENT
Start: 2025-02-03

## 2025-02-03 NOTE — TELEPHONE ENCOUNTER
I refilled this once while he was in between ophthalmologists. he needs to follow-up with his ophthalmologist for this.

## 2025-02-03 NOTE — TELEPHONE ENCOUNTER
Please kindly review; protocol     [] FAILS    [x] NO PROTOCOL ATTACHED      Does patient have any future/upcoming appointments with Primary Care?     [] YES           [x] NO    Recent Visit  Date Type Provider Dept   12/17/24 Office Visit Charlotte Rey MD [facility]     Requested Prescriptions   Pending Prescriptions Disp Refills    LATANOPROST 0.005 % Ophthalmic Solution [Pharmacy Med Name: Latanoprost Op 0.005 % Sol Gree] 7.5 mL 0     Sig: Place 1 drop into both eyes nightly.       There is no refill protocol information for this order

## 2025-02-10 ENCOUNTER — PATIENT OUTREACH (OUTPATIENT)
Dept: CASE MANAGEMENT | Age: 75
End: 2025-02-10

## 2025-02-10 NOTE — PROGRESS NOTES
Spoke to Caleb for Chronic Care Management.      Updates to patient care team/comments: UTD  Patient reported changes in medications: reports no changes   Med Adherence  Comment: reports adherence      Health Maintenance:   Health Maintenance   Topic Date Due    Zoster Vaccines (1 of 2) Never done    Colorectal Cancer Screening  06/11/2019    Annual Physical  01/12/2024    Diabetes Care Dilated Eye Exam  05/10/2024    COVID-19 Vaccine (4 - 2024-25 season) 09/01/2024    Influenza Vaccine (1) 10/01/2024    Annual Depression Screening  01/01/2025    Fall Risk Screening (Annual)  01/01/2025    Tobacco Cessation Counseling  Never done    Diabetes Care: Microalb/Creat Ratio (Annual)  01/01/2025    Diabetes Care A1C  04/17/2025    Diabetes Care: GFR  12/17/2025    PSA  05/10/2026    Diabetes Care: Foot Exam (Annual)  Completed    Pneumococcal Vaccine: 50+ Years  Completed    Meningococcal B Vaccine  Aged Out       Patient updates/concerns:   Spoke with patient.  Patient relates doing well. Mood good/stable.  Denies any recent syncope episodes. Denies any recent falls. Finally feeling better and improving. Plans on scheduling all follow up visits. Specialist info given. Breathing stable. Diabetes managed by Endo. Continues to take taking medications as prescribed. Avoiding sugars and carbs. Physical activity about the same. Recommended again slowly increasing activity as tolerated. Continues to see pain management. Vision stable. Aware due for eye exam.  Blood pressure and pulse unknown not checking but denies symptoms. Denies any edema. PT/INR being checked.. Denies any urinary issues at this time. No other questions or concerns.    Encouraged patient:   Self care: Take the time to do the things you love.   Nutrition:  Good nutrition helps us to maintain our weight, fight off infection, and help reduce the risk of developing other chronic issues.   Physical activity:  Physical activity is important to help maintain  independence and improve quality of life.     Goals/Action Plan:    Active goal from previous outreach: .Staying healthy, maintain independence, and stability.    Patient reported progress towards goals: progressing                - What: continues to follow up on health conditions            - Where/When/How: seeing PCP and specialist   Patient Reported Barriers and Concerns: as per above                    - Plan for overcoming barriers: encouraged patient to call with any questions or concerns     Care Managers Interventions: Continue to provide encouragement and education for healthy coping and diagnosis.      Future Appointments:   Future Appointments   Date Time Provider Department Center   3/13/2025  1:00 PM ELICEO Ramos MD [facility]   4/8/2025  1:00 PM [facility] RESOURCE [facility] [place] [facility]   4/8/2025  2:00 PM Kel Thompson,  [facility]         Next Care Manager Follow Up Date: 1 month     Reason For Follow Up: review progress and or barriers towards patient's goals.     Time Spent This Encounter Total: 18 min medical record review, telephone communication, care plan updates where needed, education, goals, and action plan recreation/update. Provided acknowledgment and validation to patient's concerns.   Monthly Minute Total including today: 18 min   Physical assessment, complete health history, and need for CCM established by Charlotte Rey MD.

## 2025-02-11 ENCOUNTER — OFFICE VISIT (OUTPATIENT)
Dept: INTERNAL MEDICINE CLINIC | Facility: CLINIC | Age: 75
End: 2025-02-11

## 2025-02-11 ENCOUNTER — TELEPHONE (OUTPATIENT)
Dept: INTERNAL MEDICINE CLINIC | Facility: CLINIC | Age: 75
End: 2025-02-11

## 2025-02-11 VITALS
OXYGEN SATURATION: 90 % | SYSTOLIC BLOOD PRESSURE: 124 MMHG | HEART RATE: 87 BPM | DIASTOLIC BLOOD PRESSURE: 80 MMHG | BODY MASS INDEX: 39.81 KG/M2 | WEIGHT: 268.81 LBS | HEIGHT: 69 IN

## 2025-02-11 DIAGNOSIS — U07.1 PULMONARY EMBOLISM ASSOCIATED WITH COVID-19 (HCC): ICD-10-CM

## 2025-02-11 DIAGNOSIS — Z79.4 TYPE 2 DIABETES MELLITUS WITH HYPERGLYCEMIA, WITH LONG-TERM CURRENT USE OF INSULIN (HCC): ICD-10-CM

## 2025-02-11 DIAGNOSIS — K59.03 DRUG-INDUCED CONSTIPATION: Primary | ICD-10-CM

## 2025-02-11 DIAGNOSIS — I26.99 PULMONARY EMBOLISM ASSOCIATED WITH COVID-19 (HCC): ICD-10-CM

## 2025-02-11 DIAGNOSIS — E11.65 TYPE 2 DIABETES MELLITUS WITH HYPERGLYCEMIA, WITH LONG-TERM CURRENT USE OF INSULIN (HCC): ICD-10-CM

## 2025-02-11 DIAGNOSIS — K02.9 TOOTH DECAY: ICD-10-CM

## 2025-02-11 PROCEDURE — 99214 OFFICE O/P EST MOD 30 MIN: CPT | Performed by: INTERNAL MEDICINE

## 2025-02-11 RX ORDER — LINACLOTIDE 72 UG/1
72 CAPSULE, GELATIN COATED ORAL DAILY
Qty: 30 CAPSULE | Refills: 1 | Status: SHIPPED | OUTPATIENT
Start: 2025-02-11 | End: 2025-03-13

## 2025-02-11 NOTE — PATIENT INSTRUCTIONS
For the constipation, start Linzess 75 mcg daily.     Please follow up with the eye doctor. Referral given to you today.     Please call your insurance to see which dentist is in your network.     However, I have given you a referral to a local dentist.   Erick Salazar, DDS  [address] [place] [address]  [phone]    Los follow up with your blood sugar doctor.

## 2025-02-11 NOTE — TELEPHONE ENCOUNTER
Talked to patient he made an appointment for today for Pre op due to he needs it for his teeth extraction.

## 2025-02-11 NOTE — PROGRESS NOTES
Caleb Rausch Jr. is a 74 year old male with complaints of:  Chief Complaint: Follow - Up (Would like recommendation to an eye dr and dentist), Constipation, and Finger Pain    HPI     Caleb Rausch Jr. is a(n) 74 year old male with a history of hypothyroidism, diabetes, CAD status post stents on dual antiplatelet therapy, heart failure with reduced ejection fraction, hyperlipidemia, COPD, hypertension, PE on anticoagulation, who presents for follow up.    Patient lost a few teeth about 1 year ago. Needs some work done on his teeth. Needs to see a dentist. Complicated as he is on DAPT and warfarin.     Has started oral iron, and is on chronic opioid therapy. He has tried exlax, miralax, and one other thing OTC without success.     Past Medical History     Past Medical History:    Anxiety disorder, unspecified    Anxiety state    Atherosclerosis of coronary artery    stents x 6    Chronic low back pain with bilateral sciatica, left worse than right    Chronic obstructive pulmonary disease, unspecified (HCC)    Coronary atherosclerosis    Depression    Diabetes (HCC)    borderline     Disorder of thyroid    Essential hypertension    Glaucoma    first visit with IRMA, patient was taking Timolol OU BID for 20 years, has not used gtts in over 1 year because he ran out of gtts and had no refills, fundus photos taken today    Heart attack (HCC)    High blood pressure    High cholesterol    Hyperlipidemia    Kidney stone    Left Sided Neck pain, acute    Lumbar stenosis with neurogenic claudication    Major depressive disorder, recurrent, unspecified    Morbid obesity with BMI of 40.0-44.9, adult (HCC)    Neuropathy    Pneumonia due to COVID-19 virus    Thyroid disease        Past Surgical History     Past Surgical History:   Procedure Laterality Date    Cataract extraction extracapsular w/ intraocular lens implantation Right 2018    [facility] bare metal stent (bms)      Circumcision,othr  08/04/2020    Dr. Bonilla @ [facility]     Cysto/uretero w/lithotripsy Left 08/04/2020    Dr. 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     Iridotomy/iridectomy by laser      unknown Dr    Other      cardiac stents        Family History     Family History   Problem Relation Age of Onset    Heart Attack Father     Hypertension Brother     Glaucoma Neg     Macular degeneration Neg        Social History     Social History     Socioeconomic History    Marital status: Single   Tobacco Use    Smoking status: Every Day     Current packs/day: 1.00     Types: Cigarettes    Smokeless tobacco: Former    Tobacco comments:     per pt, quit in 1994   Vaping Use    Vaping status: Some Days   Substance and Sexual Activity    Alcohol use: No     Comment: quit in 1994    Drug use: No   Other Topics Concern    Caffeine Concern Yes    Exercise No   Social History Narrative    The patient uses the following assistive device(s):  single-point cane.      The patient does live in a home with stairs.     Social Drivers of Health     Food Insecurity: No Food Insecurity (12/5/2024)    Food Insecurity     Food Insecurity: Never true   Transportation Needs: No Transportation Needs (12/10/2024)    Transportation Needs     Lack of Transportation: No   Housing Stability: Low Risk  (12/5/2024)    Housing Stability     Housing Instability: No       Allergies     Allergies[1]    Current Medications     Current Outpatient Medications   Medication Sig Dispense Refill    OZEMPIC, 0.25 OR 0.5 MG/DOSE, 2 MG/3ML Subcutaneous Solution Pen-injector Inject 0.5 mg into the skin once a week. 9 mL 0    pregabalin 300 MG Oral Cap Take 1 capsule (300 mg total) by mouth 2 (two) times daily as needed. WITH FOOD      latanoprost 0.005 % Ophthalmic Solution Place 1 drop into both eyes nightly. 7.5 mL 0    albuterol (VENTOLIN HFA) 108 (90 Base) MCG/ACT Inhalation Aero Soln Inhale 2 puffs into the lungs every 4 (four) hours as needed for Wheezing. 51 g 1     atorvastatin 80 MG Oral Tab Take 1 tablet (80 mg total) by mouth daily. 90 tablet 3    metFORMIN HCl 1000 MG Oral Tab Take 0.5 tablets (500 mg total) by mouth 2 (two) times daily with meals. 90 tablet 3    diclofenac 1 % External Gel       insulin glargine (BASAGLAR KWIKPEN) 100 UNIT/ML Subcutaneous Solution Pen-injector Inject 10 Units into the skin daily. 9 mL 1    levothyroxine 137 MCG Oral Tab Take 137 mcg by mouth before breakfast. 90 tablet 3    Accu-Chek Softclix Lancets Does not apply Misc 3 (three) times daily.      Glucose Blood (ACCU-CHEK GUIDE) In Vitro Strip 1 strip by In Vitro route 3 (three) times daily.      fluticasone-umeclidin-vilant (TRELEGY ELLIPTA) 200-62.5-25 MCG/ACT Inhalation Aerosol Powder, Breath Activated Inhale 1 puff into the lungs daily. 180 each 3    DULoxetine 30 MG Oral Cap DR Particles Take 1 capsule (30 mg total) by mouth daily. 90 capsule 3    Insulin Pen Needle (BD PEN NEEDLE RODERICK 2ND GEN) 32G X 4 MM Does not apply Misc Inject 1 each as directed daily. 90 each 3    clopidogrel 75 MG Oral Tab Take 1 tablet (75 mg total) by mouth daily. 90 tablet 3    JARDIANCE 25 MG Oral Tab TAKE ONE TABLET BY MOUTH ONE TIME DAILY 90 tablet 0    Potassium Chloride ER 20 MEQ Oral Tab CR Take 1 tablet by mouth daily. 90 tablet 0    GVOKE HYPOPEN 2-PACK 1 MG/0.2ML Subcutaneous injection INJECT THE CONTENTS OF ONE DEVICE IN THE LOWER ABDOMEN, OUTER THIGH, OR OUTER UPPER ARM FOR SEVERELY LOW BLOOD SUGAR. IF NO RESPONSE, MAY REPEAT WITH A NEW DEVICE IN 15 MINUTES.      torsemide 20 MG Oral Tab Take 1 tablet (20 mg total) by mouth daily. 90 tablet 3    carvedilol 6.25 MG Oral Tab Take 1 tablet (6.25 mg total) by mouth 2 (two) times daily with meals. 180 tablet 3    Blood Glucose Monitoring Suppl Does not apply Kit Please use to check blood sugar twice daily. 1 kit 0    Blood Glucose Monitoring Suppl Does not apply Misc Please use to check blood sugar twice daily 200 each 3    Blood Glucose Monitoring  Suppl Does not apply Misc Please use to check blood sugar twice daily 200 each 0    oxyCODONE-acetaminophen  MG Oral Tab Take 1 tablet by mouth 4 (four) times daily as needed.      warfarin 5 MG Oral Tab Take 2 tablets (10 mg total) by mouth nightly. 7.5mg on Sunday and Wednesday  10mg on all other days      aspirin 81 MG Oral Tab EC Take 1 tablet (81 mg total) by mouth daily. 30 tablet 2    sacubitril-valsartan (ENTRESTO) 24-26 MG Oral Tab Take 1 tablet by mouth daily. (Patient not taking: Reported on 2/11/2025) 30 tablet 0    Polyethylene Glycol 3350 (MIRALAX) 17 g Oral Powd Pack Take 17 g by mouth daily. (Patient not taking: Reported on 2/11/2025) 5 each 0    spironolactone 25 MG Oral Tab Take 1 tablet (25 mg total) by mouth daily. (Patient not taking: Reported on 2/11/2025) 90 tablet 3     No current facility-administered medications for this visit.       Review of Systems     GENERAL HEALTH: feels well otherwise  SKIN: denies any unusual skin lesions or rashes  RESPIRATORY: denies shortness of breath with exertion  CARDIOVASCULAR: denies chest pain on exertion  GI: denies abdominal pain and denies heartburn  : denies any burning with urination, urinary frequency or urgency  NEURO: denies headaches, numbness or tingling, mental status changes  PSYCH: denies depressed mood, anxiety  MUSC: denies muscle aches, joint pain    Physical Exam     /80 (BP Location: Right arm, Patient Position: Sitting, Cuff Size: large)   Pulse 87   Ht 5' 9\" (1.753 m)   Wt 268 lb 12.8 oz (121.9 kg)   SpO2 90%   BMI 39.69 kg/m²     GENERAL: well developed, well nourished,in no apparent distress  SKIN: no rashes,no suspicious lesions  HEENT: atraumatic, normocephalic,ears and throat are clear  NECK: supple,no adenopathy,no bruits  LUNGS: clear to auscultation  CARDIO: RRR without murmur  GI: good BS's,no masses, HSM or tenderness  EXTREMITIES: no cyanosis, clubbing or edema    Assessment and Plan     Diagnoses and all  orders for this visit:    Drug-induced constipation.  Has failed over-the-counter therapy.  Start Linzess.  -     linaCLOtide (LINZESS) 72 MCG Oral Cap; Take 72 mcg by mouth daily.    Tooth decay.  Dental referral given.  -     Specialty Other Referral - External    Type 2 diabetes mellitus with hyperglycemia, with long-term current use of insulin ([facility]).  Needs to follow with endocrinology.    Pulmonary embolism associated with COVID-19 ([facility]).  Has followed up with hematology.  Continues on warfarin for now, but if he were to stop the warfarin prior to any dental procedures, it would make perioperative planning much more simple.     linaCLOtide (LINZESS) 72 MCG Oral Cap Take 72 mcg by mouth daily. 30 capsule 1    OZEMPIC, 0.25 OR 0.5 MG/DOSE, 2 MG/3ML Subcutaneous Solution Pen-injector Inject 0.5 mg into the skin once a week. 9 mL 0    pregabalin 300 MG Oral Cap Take 1 capsule (300 mg total) by mouth 2 (two) times daily as needed. WITH FOOD      latanoprost 0.005 % Ophthalmic Solution Place 1 drop into both eyes nightly. 7.5 mL 0    albuterol (VENTOLIN HFA) 108 (90 Base) MCG/ACT Inhalation Aero Soln Inhale 2 puffs into the lungs every 4 (four) hours as needed for Wheezing. 51 g 1    atorvastatin 80 MG Oral Tab Take 1 tablet (80 mg total) by mouth daily. 90 tablet 3    metFORMIN HCl 1000 MG Oral Tab Take 0.5 tablets (500 mg total) by mouth 2 (two) times daily with meals. 90 tablet 3    diclofenac 1 % External Gel       insulin glargine (BASAGLAR KWIKPEN) 100 UNIT/ML Subcutaneous Solution Pen-injector Inject 10 Units into the skin daily. 9 mL 1    levothyroxine 137 MCG Oral Tab Take 137 mcg by mouth before breakfast. 90 tablet 3    Accu-Chek Softclix Lancets Does not apply Misc 3 (three) times daily.      Glucose Blood (ACCU-CHEK GUIDE) In Vitro Strip 1 strip by In Vitro route 3 (three) times daily.      fluticasone-umeclidin-vilant (TRELEGY ELLIPTA) 200-62.5-25 MCG/ACT Inhalation Aerosol Powder, Breath Activated  Inhale 1 puff into the lungs daily. 180 each 3    DULoxetine 30 MG Oral Cap DR Particles Take 1 capsule (30 mg total) by mouth daily. 90 capsule 3    Insulin Pen Needle (BD PEN NEEDLE RODERICK 2ND GEN) 32G X 4 MM Does not apply Misc Inject 1 each as directed daily. 90 each 3    clopidogrel 75 MG Oral Tab Take 1 tablet (75 mg total) by mouth daily. 90 tablet 3    JARDIANCE 25 MG Oral Tab TAKE ONE TABLET BY MOUTH ONE TIME DAILY 90 tablet 0    Potassium Chloride ER 20 MEQ Oral Tab CR Take 1 tablet by mouth daily. 90 tablet 0    GVOKE HYPOPEN 2-PACK 1 MG/0.2ML Subcutaneous injection INJECT THE CONTENTS OF ONE DEVICE IN THE LOWER ABDOMEN, OUTER THIGH, OR OUTER UPPER ARM FOR SEVERELY LOW BLOOD SUGAR. IF NO RESPONSE, MAY REPEAT WITH A NEW DEVICE IN 15 MINUTES.      torsemide 20 MG Oral Tab Take 1 tablet (20 mg total) by mouth daily. 90 tablet 3    carvedilol 6.25 MG Oral Tab Take 1 tablet (6.25 mg total) by mouth 2 (two) times daily with meals. 180 tablet 3    Blood Glucose Monitoring Suppl Does not apply Kit Please use to check blood sugar twice daily. 1 kit 0    Blood Glucose Monitoring Suppl Does not apply Misc Please use to check blood sugar twice daily 200 each 3    Blood Glucose Monitoring Suppl Does not apply Misc Please use to check blood sugar twice daily 200 each 0    oxyCODONE-acetaminophen  MG Oral Tab Take 1 tablet by mouth 4 (four) times daily as needed.      warfarin 5 MG Oral Tab Take 2 tablets (10 mg total) by mouth nightly. 7.5mg on Sunday and Wednesday  10mg on all other days      aspirin 81 MG Oral Tab EC Take 1 tablet (81 mg total) by mouth daily. 30 tablet 2       Requested Prescriptions     Signed Prescriptions Disp Refills    linaCLOtide (LINZESS) 72 MCG Oral Cap 30 capsule 1     Sig: Take 72 mcg by mouth daily.       No orders of the defined types were placed in this encounter.      No follow-ups on file.    The patient indicates understanding of these issues and agrees to the  plan.    Electronically signed by Charlotet Rey MD 02/11/25             [1] No Known Allergies

## 2025-03-03 ENCOUNTER — PATIENT OUTREACH (OUTPATIENT)
Dept: CASE MANAGEMENT | Age: 75
End: 2025-03-03

## 2025-03-03 DIAGNOSIS — J44.1 COPD EXACERBATION (HCC): Primary | ICD-10-CM

## 2025-03-03 DIAGNOSIS — E11.65 TYPE 2 DIABETES MELLITUS WITH HYPERGLYCEMIA, WITH LONG-TERM CURRENT USE OF INSULIN (HCC): ICD-10-CM

## 2025-03-03 DIAGNOSIS — I50.23 ACUTE ON CHRONIC HFREF (HEART FAILURE WITH REDUCED EJECTION FRACTION) (HCC): ICD-10-CM

## 2025-03-03 DIAGNOSIS — I10 PRIMARY HYPERTENSION: ICD-10-CM

## 2025-03-03 DIAGNOSIS — E78.00 PURE HYPERCHOLESTEROLEMIA: ICD-10-CM

## 2025-03-03 DIAGNOSIS — Z79.4 TYPE 2 DIABETES MELLITUS WITH HYPERGLYCEMIA, WITH LONG-TERM CURRENT USE OF INSULIN (HCC): ICD-10-CM

## 2025-03-03 NOTE — PROGRESS NOTES
Spoke to Caleb for Chronic Care Management.      Updates to patient care team/comments: UTD   Patient reported changes in medications: reports no changes   Med Adherence  Comment: reports adherence      Health Maintenance:   Health Maintenance   Topic Date Due    Zoster Vaccines (1 of 2) Never done    Colorectal Cancer Screening  06/11/2019    Annual Physical  01/12/2024    Diabetes Care Dilated Eye Exam  05/10/2024    Influenza Vaccine (1) 10/01/2024    Fall Risk Screening (Annual)  01/01/2025    Tobacco Cessation Counseling  Never done    Diabetes Care: Microalb/Creat Ratio (Annual)  01/01/2025    COVID-19 Vaccine (4 - 2024-25 season) 03/11/2025 (Originally 9/1/2024)    Diabetes Care A1C  04/17/2025    Diabetes Care: GFR  12/17/2025    PSA  05/10/2026    Annual Depression Screening  Completed    Diabetes Care: Foot Exam (Annual)  Completed    Pneumococcal Vaccine: 50+ Years  Completed    Meningococcal B Vaccine  Aged Out     Patient updates/concerns:   Spoke with patient.  Patient relates doing well. Mood good/stable.  Denies any recent syncope episodes. Denies any recent falls. Finally feeling better and improving. Breathing stable. Diabetes managed by Endo. Due for follow up. Continues to take taking medications as prescribed. Avoiding sugars and carbs. Physical activity about the same. Recommended again slowly increasing activity as tolerated. Continues to see pain management. Vision stable. Aware due for eye exam and its scheduled.  Blood pressure and pulse unknown not checking but denies symptoms. Denies any edema. PT/INR being checked. Denies any urinary issues at this time. No other questions or concerns.    Encouraged patient:   Self care: Take the time to do the things you love.   Nutrition:  Good nutrition helps us to maintain our weight, fight off infection, and help reduce the risk of developing other chronic issues.   Physical activity:  Physical activity is important to help maintain independence and  improve quality of life.     Goals/Action Plan:    Active goal from previous outreach: Staying healthy, maintain independence, and stability.    Patient reported progress towards goals: progressing               - What: continues to follow up            - Where/When/How: seeing PCP and specialist   Patient Reported Barriers and Concerns: as per above                   - Plan for overcoming barriers: encouraged patient to call with any questions or concerns.     Care Managers Interventions: Continue to provide encouragement and education for healthy coping and diagnosis.    Future Appointments:   Future Appointments   Date Time Provider Department Center   3/13/2025  1:00 PM ELICEO Ramos MD [facility]   4/8/2025  1:00 PM [facility] RESOURCE [facility] [place]   4/8/2025  2:00 PM eKl Thompson,  [facility] [place]   4/11/2025  9:00 AM [facility]   5/5/2025 10:45 AM [facility] P[facility] Follow Up Date: 1 month     Reason For Follow Up: review progress and or barriers towards patient's goals.     Time Spent This Encounter Total: 20 min medical record review, telephone communication, care plan updates where needed, education, goals, and action plan recreation/update. Provided acknowledgment and validation to patient's concerns.   Monthly Minute Total including today: 20 min   Physical assessment, complete health history, and need for CCM established by Charlotte Rey MD.

## 2025-03-13 ENCOUNTER — TELEPHONE (OUTPATIENT)
Facility: CLINIC | Age: 75
End: 2025-03-13

## 2025-03-13 ENCOUNTER — OFFICE VISIT (OUTPATIENT)
Facility: CLINIC | Age: 75
End: 2025-03-13

## 2025-03-13 VITALS
SYSTOLIC BLOOD PRESSURE: 111 MMHG | HEART RATE: 81 BPM | BODY MASS INDEX: 38.24 KG/M2 | HEIGHT: 69 IN | WEIGHT: 258.19 LBS | DIASTOLIC BLOOD PRESSURE: 70 MMHG

## 2025-03-13 DIAGNOSIS — K59.09 CHRONIC CONSTIPATION: Primary | ICD-10-CM

## 2025-03-13 PROCEDURE — 99203 OFFICE O/P NEW LOW 30 MIN: CPT | Performed by: INTERNAL MEDICINE

## 2025-03-13 NOTE — TELEPHONE ENCOUNTER
I received a successful fax confirmation from  [employer]  for Cologuard test and now the forms are now being sent to scanning. Thank you!

## 2025-03-13 NOTE — H&P
[facility] - Gastroenterology                                                                                                               Reason for consult: constipation    Requesting physician or provider: Charlotte Rey MD      HPI:   Caleb Rausch Jr. is a 74 year old year-old male with history of CAD (hx of stents, on plavix), hx of warfarin use (was given after covid), who presents for constipation.    Patient currently denies any GI symptoms of nausea, vomiting, dyspepsia, dysphagia, hematemesis, abdominal pain, change in bowel habits, thin stools, hematochezia, or melena.  Additionally there is no weight loss and no reported history of chest pain or shortness of breath.    Denies family hx of CRC      Prior endoscopies:  Never had a c-scope    Soc:  -no smoking  -no Etoh    Wt Readings from Last 6 Encounters:   02/11/25 268 lb 12.8 oz (121.9 kg)   01/08/25 266 lb (120.7 kg)   12/17/24 253 lb 12.8 oz (115.1 kg)   12/11/24 263 lb 3.2 oz (119.4 kg)   12/06/24 261 lb 12.8 oz (118.8 kg)   10/28/24 256 lb 3.2 oz (116.2 kg)        History, Medications, Allergies, ROS:      Past Medical History:    Anxiety disorder, unspecified    Anxiety state    Atherosclerosis of coronary artery    stents x 6    Chronic low back pain with bilateral sciatica, left worse than right    Chronic obstructive pulmonary disease, unspecified (HCC)    Coronary atherosclerosis    Depression    Diabetes (HCC)    borderline     Disorder of thyroid    Essential hypertension    Glaucoma    first visit with IRMA, patient was taking Timolol OU BID for 20 years, has not used gtts in over 1 year because he ran out of gtts and had no refills, fundus photos taken today    Heart attack (HCC)    High blood pressure    High cholesterol    Hyperlipidemia    Kidney stone    Left Sided Neck pain, acute    Lumbar stenosis with neurogenic claudication    Major depressive  disorder, recurrent, unspecified    Morbid obesity with BMI of 40.0-44.9, adult (HCC)    Neuropathy    Pneumonia due to COVID-19 virus    Thyroid disease      Past Surgical History:   Procedure Laterality Date    Cataract extraction extracapsular w/ intraocular lens implantation Right 2018    [place]    Cath bare metal stent (bms)      Circumcision,othr  08/04/2020    Dr. Bonilla @ [facility]    Cysto/uretero w/lithotripsy Left 08/04/2020    Dr. 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     Iridotomy/iridectomy by laser      unknown Dr    Other      cardiac stents      Family Hx:   Family History   Problem Relation Age of Onset    Heart Attack Father     Hypertension Brother     Glaucoma Neg     Macular degeneration Neg       Social History:   Social History     Socioeconomic History    Marital status: Single   Tobacco Use    Smoking status: Every Day     Current packs/day: 1.00     Types: Cigarettes    Smokeless tobacco: Former    Tobacco comments:     per pt, quit in 1994   Vaping Use    Vaping status: Some Days   Substance and Sexual Activity    Alcohol use: No     Comment: quit in 1994    Drug use: No   Other Topics Concern    Caffeine Concern Yes    Exercise No   Social History Narrative    The patient uses the following assistive device(s):  single-point cane.      The patient does live in a home with stairs.     Social Drivers of Health     Food Insecurity: No Food Insecurity (12/5/2024)    Food Insecurity     Food Insecurity: Never true   Transportation Needs: No Transportation Needs (12/10/2024)    Transportation Needs     Lack of Transportation: No   Housing Stability: Low Risk  (12/5/2024)    Housing Stability     Housing Instability: No        Medications (Active prior to today's visit):  Current Outpatient Medications   Medication Sig Dispense Refill    linaCLOtide (LINZESS) 72 MCG Oral Cap Take 72 mcg by mouth daily. 30 capsule 1    OZEMPIC, 0.25 OR 0.5  MG/DOSE, 2 MG/3ML Subcutaneous Solution Pen-injector Inject 0.5 mg into the skin once a week. 9 mL 0    pregabalin 300 MG Oral Cap Take 1 capsule (300 mg total) by mouth 2 (two) times daily as needed. WITH FOOD      latanoprost 0.005 % Ophthalmic Solution Place 1 drop into both eyes nightly. 7.5 mL 0    albuterol (VENTOLIN HFA) 108 (90 Base) MCG/ACT Inhalation Aero Soln Inhale 2 puffs into the lungs every 4 (four) hours as needed for Wheezing. 51 g 1    atorvastatin 80 MG Oral Tab Take 1 tablet (80 mg total) by mouth daily. 90 tablet 3    metFORMIN HCl 1000 MG Oral Tab Take 0.5 tablets (500 mg total) by mouth 2 (two) times daily with meals. 90 tablet 3    diclofenac 1 % External Gel       insulin glargine (BASAGLAR KWIKPEN) 100 UNIT/ML Subcutaneous Solution Pen-injector Inject 10 Units into the skin daily. 9 mL 1    levothyroxine 137 MCG Oral Tab Take 137 mcg by mouth before breakfast. 90 tablet 3    Accu-Chek Softclix Lancets Does not apply Misc 3 (three) times daily.      Glucose Blood (ACCU-CHEK GUIDE) In Vitro Strip 1 strip by In Vitro route 3 (three) times daily.      fluticasone-umeclidin-vilant (TRELEGY ELLIPTA) 200-62.5-25 MCG/ACT Inhalation Aerosol Powder, Breath Activated Inhale 1 puff into the lungs daily. 180 each 3    DULoxetine 30 MG Oral Cap DR Particles Take 1 capsule (30 mg total) by mouth daily. 90 capsule 3    Insulin Pen Needle (BD PEN NEEDLE RODERICK 2ND GEN) 32G X 4 MM Does not apply Misc Inject 1 each as directed daily. 90 each 3    clopidogrel 75 MG Oral Tab Take 1 tablet (75 mg total) by mouth daily. 90 tablet 3    JARDIANCE 25 MG Oral Tab TAKE ONE TABLET BY MOUTH ONE TIME DAILY 90 tablet 0    sacubitril-valsartan (ENTRESTO) 24-26 MG Oral Tab Take 1 tablet by mouth daily. (Patient not taking: Reported on 2/11/2025) 30 tablet 0    Potassium Chloride ER 20 MEQ Oral Tab CR Take 1 tablet by mouth daily. 90 tablet 0    GVOKE HYPOPEN 2-PACK 1 MG/0.2ML Subcutaneous injection INJECT THE CONTENTS OF ONE  DEVICE IN THE LOWER ABDOMEN, OUTER THIGH, OR OUTER UPPER ARM FOR SEVERELY LOW BLOOD SUGAR. IF NO RESPONSE, MAY REPEAT WITH A NEW DEVICE IN 15 MINUTES.      Polyethylene Glycol 3350 (MIRALAX) 17 g Oral Powd Pack Take 17 g by mouth daily. (Patient not taking: Reported on 2/11/2025) 5 each 0    torsemide 20 MG Oral Tab Take 1 tablet (20 mg total) by mouth daily. 90 tablet 3    carvedilol 6.25 MG Oral Tab Take 1 tablet (6.25 mg total) by mouth 2 (two) times daily with meals. 180 tablet 3    spironolactone 25 MG Oral Tab Take 1 tablet (25 mg total) by mouth daily. (Patient not taking: Reported on 2/11/2025) 90 tablet 3    Blood Glucose Monitoring Suppl Does not apply Kit Please use to check blood sugar twice daily. 1 kit 0    Blood Glucose Monitoring Suppl Does not apply Misc Please use to check blood sugar twice daily 200 each 3    Blood Glucose Monitoring Suppl Does not apply Misc Please use to check blood sugar twice daily 200 each 0    oxyCODONE-acetaminophen  MG Oral Tab Take 1 tablet by mouth 4 (four) times daily as needed.      warfarin 5 MG Oral Tab Take 2 tablets (10 mg total) by mouth nightly. 7.5mg on Sunday and Wednesday  10mg on all other days      aspirin 81 MG Oral Tab EC Take 1 tablet (81 mg total) by mouth daily. 30 tablet 2       Allergies:  Allergies[1]    ROS:   CONSTITUTIONAL:  negative for fevers, rigors  EYES:  negative for diplopia   RESPIRATORY:  negative for severe shortness of breath  CARDIOVASCULAR:  negative for crushing sub-sternal chest pain  GASTROINTESTINAL:  see HPI  GENITOURINARY:  negative for dysuria or gross hematuria  INTEGUMENT/BREAST:  SKIN:  negative for jaundice   ALLERGIC/IMMUNOLOGIC:  negative for hay fever  ENDOCRINE:  negative for cold intolerance and heat intolerance  MUSCULOSKELETAL:  negative for joint effusion/severe erythema  BEHAVIOR/PSYCH:  negative for psychotic behavior      PHYSICAL EXAM:   There were no vitals taken for this visit.    Gen- Patient appears  comfortable and in no acute discomfort  HEENT: the sclera appears anicteric, oropharynx clear, mucus membranes appear moist  CV- regular rate and rhythm, the extremities are warm and well perfused   Lung- Moves air well; No labored breathing  Abdomen- soft, obese abdomen, non-tender exam in all quadrants without rigidity or guarding, non-distended, no abnormal bowel sounds noted, no masses are palpated  Skin- No jaundice  Ext: no cyanosis, clubbing or edema is evident.   Neuro- Alert and interactive, and gross movements of extremities normal  Psych - appropriate, non-agitated    Labs/Imaging:     Patient's pertinent labs and imaging were reviewed and discussed with patient today.      ASSESSMENT/PLAN:   Caleb Rausch Jr. is a 74 year old year-old male with history of CAD (hx of stents, on plavix), hx of warfarin use (was given after covid), who presents for constipation.    #Warfarin use- he stopped 5 days ago, told it was post-COVID and now he doesn't need to take it.    #CAD  w/stents - advanced CAD, has 9 stents he says, on plavix, never stopped plavix before.    #Chronic constipation- likely 2/2 ozempic use and narcotic use. Start linzess daily, risks/benefits addressed.    #Screening - we discussed risks associated with colonoscopy, patient prefers non-invasive testing. Start with cologuard. NO family hx of CRC.    Recommendations:    Linzess daily  Cologuard          Orders This Visit:  No orders of the defined types were placed in this encounter.      Meds This Visit:  Requested Prescriptions      No prescriptions requested or ordered in this encounter       Imaging & Referrals:  None         MARCELA Ramos MD  Pager: [phone]  3/13/2025        This note was partially prepared using Dragon Medical voice recognition dictation software. As a result, errors may occur. When identified, these errors have been corrected. While every attempt is made to correct errors during dictation, discrepancies may still  exist.        [1] No Known Allergies

## 2025-03-13 NOTE — PATIENT INSTRUCTIONS
Cologuard    ---------    Comments A positive result can indicate the presence of colorectal cancer (CRC) or advanced adenoma (pre-cancer). Correlate clinically and strongly consider a follow-up investigation with a structural examination of the colon such as with diagnostic colonoscopy.* Note: The distribution of the most significant colonoscopy findings in average-risk patients with positive Cologuard results participating in a cross-sectional colorectal cancer screening study included: 3.7% with colorectal cancer; 20% with advanced adenoma; 31% with non-advanced adenoma; and 45% with no adenoma(s) found (Carlos et al. NE 2014; 370(4):4581-9867). The Cologuard result is based on the composite analysis of the Cologuard component biomarkers as a group, using an algorithm. The quantitative values of individual biomarkers are not reportable and are not associated with individual independent biomarker result thresholds.    How effective is Cologuard?  In a 10,000-patient clinical study, Cologuard found 92% of colon cancers.1 It also found 69% of high-risk precancers (high-grade dysplasia), those most likely to develop into cancer.  Both false positives and false negatives do occur. In a clinical study of Cologuard, 13% of people without cancer or precancer tested positive. Any positive should be followed by a diagnostic colonoscopy. Following a negative result, patients should continue participating in a screening program at an interval and with a method appropriate for the individual patient (currently cologuard every 3 years). Cologuard performance when used for repeat testing has not been evaluated or established.

## 2025-03-21 RX ORDER — SODIUM CHLORIDE 9 MG/ML
3 INJECTION, SOLUTION INTRAVENOUS
Status: CANCELLED | OUTPATIENT
Start: 2025-03-22 | End: 2025-03-22

## 2025-03-22 ENCOUNTER — LAB ENCOUNTER (OUTPATIENT)
Dept: LAB | Facility: HOSPITAL | Age: 75
End: 2025-03-22
Attending: NURSE PRACTITIONER
Payer: MEDICARE

## 2025-03-22 DIAGNOSIS — Z01.812 PRE-OPERATIVE LABORATORY EXAMINATION: ICD-10-CM

## 2025-03-22 DIAGNOSIS — Z01.818 PREOP EXAMINATION: ICD-10-CM

## 2025-03-22 DIAGNOSIS — I10 ESSENTIAL (PRIMARY) HYPERTENSION: ICD-10-CM

## 2025-03-22 DIAGNOSIS — I25.10 CORONARY ARTERY DISEASE: Primary | ICD-10-CM

## 2025-03-22 DIAGNOSIS — R06.02 SHORTNESS OF BREATH: ICD-10-CM

## 2025-03-22 LAB
ANION GAP SERPL CALC-SCNC: 6 MMOL/L (ref 0–18)
BASOPHILS # BLD AUTO: 0.1 X10(3) UL (ref 0–0.2)
BASOPHILS NFR BLD AUTO: 0.8 %
BUN BLD-MCNC: 14 MG/DL (ref 9–23)
BUN/CREAT SERPL: 15.9 (ref 10–20)
CALCIUM BLD-MCNC: 9.6 MG/DL (ref 8.7–10.4)
CHLORIDE SERPL-SCNC: 112 MMOL/L (ref 98–112)
CHOLEST SERPL-MCNC: 129 MG/DL (ref ?–200)
CO2 SERPL-SCNC: 23 MMOL/L (ref 21–32)
CREAT BLD-MCNC: 0.88 MG/DL
DEPRECATED RDW RBC AUTO: 63.1 FL (ref 35.1–46.3)
EGFRCR SERPLBLD CKD-EPI 2021: 91 ML/MIN/1.73M2 (ref 60–?)
EOSINOPHIL # BLD AUTO: 0.98 X10(3) UL (ref 0–0.7)
EOSINOPHIL NFR BLD AUTO: 7.4 %
ERYTHROCYTE [DISTWIDTH] IN BLOOD BY AUTOMATED COUNT: 21.1 % (ref 11–15)
FASTING PATIENT LIPID ANSWER: YES
FASTING STATUS PATIENT QL REPORTED: YES
GLUCOSE BLD-MCNC: 123 MG/DL (ref 70–99)
HCT VFR BLD AUTO: 49 %
HDLC SERPL-MCNC: 36 MG/DL (ref 40–59)
HGB BLD-MCNC: 15.4 G/DL
IMM GRANULOCYTES # BLD AUTO: 0.06 X10(3) UL (ref 0–1)
IMM GRANULOCYTES NFR BLD: 0.5 %
LDLC SERPL CALC-MCNC: 65 MG/DL (ref ?–100)
LYMPHOCYTES # BLD AUTO: 2.24 X10(3) UL (ref 1–4)
LYMPHOCYTES NFR BLD AUTO: 16.9 %
MCH RBC QN AUTO: 26.5 PG (ref 26–34)
MCHC RBC AUTO-ENTMCNC: 31.4 G/DL (ref 31–37)
MCV RBC AUTO: 84.3 FL
MONOCYTES # BLD AUTO: 0.99 X10(3) UL (ref 0.1–1)
MONOCYTES NFR BLD AUTO: 7.5 %
NEUTROPHILS # BLD AUTO: 8.86 X10 (3) UL (ref 1.5–7.7)
NEUTROPHILS # BLD AUTO: 8.86 X10(3) UL (ref 1.5–7.7)
NEUTROPHILS NFR BLD AUTO: 66.9 %
NONHDLC SERPL-MCNC: 93 MG/DL (ref ?–130)
OSMOLALITY SERPL CALC.SUM OF ELEC: 294 MOSM/KG (ref 275–295)
PLATELET # BLD AUTO: 282 10(3)UL (ref 150–450)
POTASSIUM SERPL-SCNC: 3.9 MMOL/L (ref 3.5–5.1)
RBC # BLD AUTO: 5.81 X10(6)UL
SODIUM SERPL-SCNC: 141 MMOL/L (ref 136–145)
TRIGL SERPL-MCNC: 163 MG/DL (ref 30–149)
VLDLC SERPL CALC-MCNC: 24 MG/DL (ref 0–30)
WBC # BLD AUTO: 13.2 X10(3) UL (ref 4–11)

## 2025-03-22 PROCEDURE — 80061 LIPID PANEL: CPT

## 2025-03-22 PROCEDURE — 36415 COLL VENOUS BLD VENIPUNCTURE: CPT

## 2025-03-22 PROCEDURE — 85025 COMPLETE CBC W/AUTO DIFF WBC: CPT

## 2025-03-22 PROCEDURE — 80048 BASIC METABOLIC PNL TOTAL CA: CPT

## 2025-03-25 ENCOUNTER — MED REC SCAN ONLY (OUTPATIENT)
Dept: INTERNAL MEDICINE CLINIC | Facility: CLINIC | Age: 75
End: 2025-03-25

## 2025-03-25 ENCOUNTER — HOSPITAL ENCOUNTER (OUTPATIENT)
Dept: INTERVENTIONAL RADIOLOGY/VASCULAR | Facility: HOSPITAL | Age: 75
Setting detail: OBSERVATION
Discharge: HOME | End: 2025-03-25
Attending: INTERNAL MEDICINE | Admitting: INTERNAL MEDICINE
Payer: MEDICARE

## 2025-03-25 VITALS
OXYGEN SATURATION: 93 % | WEIGHT: 258.81 LBS | RESPIRATION RATE: 20 BRPM | TEMPERATURE: 98 F | SYSTOLIC BLOOD PRESSURE: 106 MMHG | BODY MASS INDEX: 38.33 KG/M2 | DIASTOLIC BLOOD PRESSURE: 83 MMHG | HEART RATE: 84 BPM | HEIGHT: 69 IN

## 2025-03-25 DIAGNOSIS — R94.39 ABNORMAL NUCLEAR STRESS TEST: ICD-10-CM

## 2025-03-25 LAB
GLUCOSE BLDC GLUCOMTR-MCNC: 103 MG/DL (ref 70–99)
ISTAT ACTIVATED CLOTTING TIME: 297 SECONDS (ref 125–137)
ISTAT ACTIVATED CLOTTING TIME: 302 SECONDS (ref 125–137)

## 2025-03-25 PROCEDURE — 99152 MOD SED SAME PHYS/QHP 5/>YRS: CPT | Performed by: INTERNAL MEDICINE

## 2025-03-25 PROCEDURE — 93458 L HRT ARTERY/VENTRICLE ANGIO: CPT | Performed by: INTERNAL MEDICINE

## 2025-03-25 PROCEDURE — 4A023N7 MEASUREMENT OF CARDIAC SAMPLING AND PRESSURE, LEFT HEART, PERCUTANEOUS APPROACH: ICD-10-PCS | Performed by: INTERNAL MEDICINE

## 2025-03-25 PROCEDURE — 92972 PERQ TRLUML CORONRY LITHOTRP: CPT | Performed by: INTERNAL MEDICINE

## 2025-03-25 PROCEDURE — 02703ZZ DILATION OF CORONARY ARTERY, ONE ARTERY, PERCUTANEOUS APPROACH: ICD-10-PCS | Performed by: INTERNAL MEDICINE

## 2025-03-25 PROCEDURE — 99153 MOD SED SAME PHYS/QHP EA: CPT | Performed by: INTERNAL MEDICINE

## 2025-03-25 PROCEDURE — B2111ZZ FLUOROSCOPY OF MULTIPLE CORONARY ARTERIES USING LOW OSMOLAR CONTRAST: ICD-10-PCS | Performed by: INTERNAL MEDICINE

## 2025-03-25 PROCEDURE — 02F03ZZ FRAGMENTATION IN CORONARY ARTERY, ONE ARTERY, PERCUTANEOUS APPROACH: ICD-10-PCS | Performed by: INTERNAL MEDICINE

## 2025-03-25 PROCEDURE — 0271356 DILATION OF CORONARY ARTERY, TWO ARTERIES, BIFURCATION, WITH TWO DRUG-ELUTING INTRALUMINAL DEVICES, PERCUTANEOUS APPROACH: ICD-10-PCS | Performed by: INTERNAL MEDICINE

## 2025-03-25 PROCEDURE — 02C13Z6 EXTIRPATION OF MATTER FROM CORONARY ARTERY, TWO ARTERIES, BIFURCATION, PERCUTANEOUS APPROACH: ICD-10-PCS | Performed by: INTERNAL MEDICINE

## 2025-03-25 PROCEDURE — 82962 GLUCOSE BLOOD TEST: CPT

## 2025-03-25 PROCEDURE — XW0J3JA INTRODUCTION OF PACLITAXEL-COATED BALLOON TECHNOLOGY, TWO BALLOONS INTO CORONARY ARTERY, ONE ARTERY, PERCUTANEOUS APPROACH, NEW TECHNOLOGY GROUP 10: ICD-10-PCS | Performed by: INTERNAL MEDICINE

## 2025-03-25 PROCEDURE — 85347 COAGULATION TIME ACTIVATED: CPT

## 2025-03-25 PROCEDURE — 92978 ENDOLUMINL IVUS OCT C 1ST: CPT | Performed by: INTERNAL MEDICINE

## 2025-03-25 PROCEDURE — B240ZZ3 ULTRASONOGRAPHY OF SINGLE CORONARY ARTERY, INTRAVASCULAR: ICD-10-PCS | Performed by: INTERNAL MEDICINE

## 2025-03-25 PROCEDURE — 36415 COLL VENOUS BLD VENIPUNCTURE: CPT

## 2025-03-25 RX ORDER — CARVEDILOL 12.5 MG/1
12.5 TABLET ORAL 2 TIMES DAILY WITH MEALS
Qty: 60 TABLET | Refills: 11 | Status: SHIPPED | OUTPATIENT
Start: 2025-03-25

## 2025-03-25 RX ORDER — HEPARIN SODIUM 1000 [USP'U]/ML
INJECTION, SOLUTION INTRAVENOUS; SUBCUTANEOUS
Status: COMPLETED
Start: 2025-03-25 | End: 2025-03-25

## 2025-03-25 RX ORDER — FERROUS SULFATE 325(65) MG
325 TABLET, DELAYED RELEASE (ENTERIC COATED) ORAL
COMMUNITY

## 2025-03-25 RX ORDER — CLOPIDOGREL BISULFATE 75 MG/1
TABLET ORAL
Status: COMPLETED
Start: 2025-03-25 | End: 2025-03-25

## 2025-03-25 RX ORDER — NITROGLYCERIN 20 MG/100ML
INJECTION INTRAVENOUS
Status: COMPLETED
Start: 2025-03-25 | End: 2025-03-25

## 2025-03-25 RX ORDER — SODIUM CHLORIDE 9 MG/ML
3 INJECTION, SOLUTION INTRAVENOUS CONTINUOUS
Status: DISCONTINUED | OUTPATIENT
Start: 2025-03-25 | End: 2025-03-25

## 2025-03-25 RX ORDER — SACUBITRIL AND VALSARTAN 24; 26 MG/1; MG/1
1 TABLET, FILM COATED ORAL 2 TIMES DAILY
Status: ON HOLD | COMMUNITY
Start: 2023-02-05 | End: 2025-03-25

## 2025-03-25 RX ORDER — ASPIRIN 81 MG/1
324 TABLET, CHEWABLE ORAL ONCE
Status: DISCONTINUED | OUTPATIENT
Start: 2025-03-25 | End: 2025-03-25

## 2025-03-25 RX ORDER — CLOPIDOGREL BISULFATE 75 MG/1
75 TABLET ORAL DAILY
Status: DISCONTINUED | OUTPATIENT
Start: 2025-03-26 | End: 2025-03-25

## 2025-03-25 RX ORDER — MIDAZOLAM HYDROCHLORIDE 1 MG/ML
INJECTION INTRAMUSCULAR; INTRAVENOUS
Status: COMPLETED
Start: 2025-03-25 | End: 2025-03-25

## 2025-03-25 RX ORDER — CHLORHEXIDINE GLUCONATE 40 MG/ML
SOLUTION TOPICAL
Status: COMPLETED | OUTPATIENT
Start: 2025-03-25 | End: 2025-03-25

## 2025-03-25 RX ORDER — VERAPAMIL HYDROCHLORIDE 2.5 MG/ML
INJECTION INTRAVENOUS
Status: COMPLETED
Start: 2025-03-25 | End: 2025-03-25

## 2025-03-25 RX ORDER — ASPIRIN 81 MG/1
81 TABLET ORAL DAILY
Status: DISCONTINUED | OUTPATIENT
Start: 2025-03-26 | End: 2025-03-25

## 2025-03-25 RX ORDER — MIDAZOLAM HYDROCHLORIDE 1 MG/ML
INJECTION INTRAMUSCULAR; INTRAVENOUS
Status: DISCONTINUED
Start: 2025-03-25 | End: 2025-03-25 | Stop reason: WASHOUT

## 2025-03-25 RX ORDER — SODIUM CHLORIDE 9 MG/ML
INJECTION, SOLUTION INTRAVENOUS CONTINUOUS
Status: ACTIVE | OUTPATIENT
Start: 2025-03-25 | End: 2025-03-25

## 2025-03-25 RX ORDER — IOPAMIDOL 612 MG/ML
135 INJECTION, SOLUTION INTRAVASCULAR
Status: COMPLETED | OUTPATIENT
Start: 2025-03-25 | End: 2025-03-25

## 2025-03-25 RX ORDER — ATORVASTATIN CALCIUM 80 MG/1
80 TABLET, FILM COATED ORAL DAILY
Qty: 30 TABLET | Refills: 11 | Status: SHIPPED | OUTPATIENT
Start: 2025-03-25

## 2025-03-25 RX ORDER — OXYCODONE AND ACETAMINOPHEN 10; 325 MG/1; MG/1
1 TABLET ORAL ONCE
Status: COMPLETED | OUTPATIENT
Start: 2025-03-25 | End: 2025-03-25

## 2025-03-25 RX ORDER — LIDOCAINE HYDROCHLORIDE 20 MG/ML
INJECTION, SOLUTION EPIDURAL; INFILTRATION; INTRACAUDAL; PERINEURAL
Status: COMPLETED
Start: 2025-03-25 | End: 2025-03-25

## 2025-03-25 RX ORDER — INSULIN GLARGINE 100 [IU]/ML
10 INJECTION, SOLUTION SUBCUTANEOUS EVERY MORNING
COMMUNITY
End: 2025-04-16

## 2025-03-25 RX ORDER — SACUBITRIL AND VALSARTAN 24; 26 MG/1; MG/1
1 TABLET, FILM COATED ORAL 2 TIMES DAILY
Qty: 60 TABLET | Refills: 11 | Status: SHIPPED | OUTPATIENT
Start: 2025-03-25

## 2025-03-25 RX ADMIN — IOPAMIDOL 135 ML: 612 INJECTION, SOLUTION INTRAVASCULAR at 15:56:00

## 2025-03-25 RX ADMIN — SODIUM CHLORIDE 3 ML/KG/HR: 9 INJECTION, SOLUTION INTRAVENOUS at 11:45:00

## 2025-03-25 RX ADMIN — CHLORHEXIDINE GLUCONATE: 40 SOLUTION TOPICAL at 11:45:00

## 2025-03-25 RX ADMIN — OXYCODONE AND ACETAMINOPHEN 1 TABLET: 10; 325 TABLET ORAL at 18:17:00

## 2025-03-25 RX ADMIN — SODIUM CHLORIDE: 9 INJECTION, SOLUTION INTRAVENOUS at 16:00:00

## 2025-03-25 NOTE — IVS NOTE
Hand-Off     Procedure hand off report given to Lizet TILLMAN.   Pt's vital signs are stable.   C/o chronic back pain.   Right radial procedural access site is dry and intact with no signs or symptoms of bleeding or hematoma.  Armboard in place. TR band in place.

## 2025-03-25 NOTE — INTERVAL H&P NOTE
Pre-op Diagnosis: * No pre-op diagnosis entered *    The above referenced H&P was reviewed by Anival Hammer MD on 3/25/2025, the patient was examined and no significant changes have occurred in the patient's condition since the H&P was performed.  I discussed with the patient and/or legal representative the potential benefits, risks and side effects of this procedure; the likelihood of the patient achieving goals; and potential problems that might occur during recuperation.  I discussed reasonable alternatives to the procedure, including risks, benefits and side effects related to the alternatives and risks related to not receiving this procedure.  We will proceed with procedure as planned.

## 2025-03-25 NOTE — PROCEDURES
[facility]  part of [facility]    Cardiac Cath Procedure Note  Caleb Rausch Patient Status:  Outpatient    3/23/1950 MRN [id number]   Location [facility] INTERVENTIONAL SUITES Attending Anival Hammer MD   Hosp Day # 0 PCP Charlotte Rey MD       Cardiologist: Anival Hammer MD  Primary Proceduralist: Anival Hammer MD  Procedure Performed: LHC, COR, LV, and IVUS guided shockwave lithotripsy, laser atherectomy, Cutting Balloon angioplasty and PCI LAD in-stent restenosis  Date of Procedure: 3/25/2025   Indication: Abnormal stress test    Summary of procedure:    Successful PCI of distal LAD with GABRIELA x 1  Successful laser atherectomy, Cutting Balloon angioplasty, shockwave lithotripsy of severe long in-stent restenosis of the LAD, lastly DCB of in-stent restenosis with 3 x 30 mm agent balloon x 2  Successful PCI proximal circumflex with GABRIELA x 1      Recommendations:  Antiplatelet: ASA and Plavix  Medication compliance!  Entresto 24/26 mg twice daily, Coreg 12.5 mg twice daily  Smoking cessation!  Statin: On high dose dose statin, no changes  Discharge in 4 hours  Cardiac rehab as outpatient 1 to 2 weeks      Left Ventriculography and hemodynamics:   LV EF not done  LV EDP 12 mmHg  No gradient across aortic valve        Coronary Angiography  RCA: Long stented segment unremarkable.  Dominant and free of obstructive disease, supplies PDA and PL    Left main:  Free of obstructive disease    Left anterior descending: Long stented segment with diffuse in-stent restenosis, FATOU I flow to the apex.  Large diagonal to the lateral wall with ostial stenosis.    Circumflex: 95% mid vessel stenosis just proximal to the stented segment.  Remainder of the circumflex is free of obstructive disease, supplies multiple OM branches which are patent      LAD intervention  Lesion Characteristics-not torturous, moderately calcified.  Type non-C lesion.  Pre-intervention stenosis 95%, Post intervention stenosis 0%.  Pre FATOU  1, Post FATOU 3.      Guide Catheter: EBU 3.75  Wire: Heaven blue, Heaven black  Laser atherectomy 0.9 mm laser catheter, 80/80, multiple passes in the LAD and the diagonal  Pre-dil: 2.5 x 40 mm balloon  Intravascular ultrasound demonstrates severely calcified 2 layers of stent in the mid LAD, severe new atherosclerosis throughout the LAD in-stent segment  Shockwave lithotripsy: 3.5 x 15 mm shockwave balloon  High-pressure angioplasty: 3.5 x 20 mm at 24 nabila  Cutting balloon: 3 x 10 mm Austin balloon through the entire stented segment  Angioplasty: 3 x 30 mm NC balloon at 20 nabila through the entire stented segment  IVUS demonstrates modest luminal gain with less than 30% residual stenosis in the stented segment with exception of severely concentrically calcified 2 layers of stent in the mid LAD  Stent: 2.5 x 18 mm Cristóbal GABRIELA distal LAD  Post-dil: 2.5 mm stent balloon at 20 nabila  IVUS demonstrates adequate luminal gain, adequate stent placement, no dissection distal to the stented segment.  Agent DCB placed in-stent and mid and proximal LAD 3 x 30 mm balloon x 2        Circumflex intervention  Lesion Characteristics-moderately torturous, not calcified.  Type non-C lesion.  Pre-intervention stenosis 95%, Post intervention stenosis 0%.  Pre FATOU 3, Post FATOU 3.      Guide Catheter: EBU 3.75  Wire: Heaven lorenzana  Pre-dil: None  Stent: 4 x 22 mm Cristóbal GABRIELA at 14 nabila  Post-dil: None      Summary of Case: After written informed consent was obtained from the patient, patient was brought to the cardiac catheterization laboratory.  Patient was prepped and draped in the usual sterile fashion. Lidocaine 1% was used to infiltrate the right radial artery for local anesthesia and a 6 Grenadian introducer sheath was inserted into the right radial artery.      Selective coronary angiography performed with JR4 catheter for RCA and JL3.5 catheter for LCA.  Angiography performed in standard projections.      6 Lao JR4 catheter placed in LV for  hemodynamics.    Specimen sent to: No specimen collected  Estimated blood loss: 10 cc  Closure:  TR band      IV was maintained by RN and moderate conscious sedation of versed and fentanyl was given.  Patient was assessed and monitoring of oxygen, heart rate and blood pressure by nurse and myself during the exam 35 minutes.      Anival Hammer MD  03/25/25

## 2025-03-25 NOTE — IVS NOTE
Bedside handoff to Lizet TILLMAN  Pt denies any pain or discomfort  Right radial procedural site dry and intact, no bleeding or swelling noted.  TR band in place w/ 9cc air.  Armboard in place.   Activity restriction and bedrest status reviewed  Pt transferred with belongings

## 2025-03-25 NOTE — DISCHARGE INSTRUCTIONS
TRANSRADIAL DISCHARGE INSTRUCTIONS AND HOME CARE FOLLOWING CORONARY ANGIOGRAPHY,  INSERTION OF STENT IN THE CORONARY    Activity    DO NOT drive after the procedure.  You may resume driving late the following day according to the nurse or physician's instructions  Plan on resting and relaxing tonight and tomorrow  Resume your normal activity after 48 hours, or as instructed by your physician  Avoid drinking alcohol for the next 24 hours  Avoid wrist flexion, extension, and fine motor activities (i.e. texting, typing, using computer mouse, etc.) for 24 hours.  The armboard will help remind you not to do these motions.   Do not lift or pull anything heavier than 5 to 8 pounds and avoid pushing up with the affected hand for 1 week    What is Normal?    A small lump at the procedure site associated with mild tenderness when touched  The procedure site may be bruised or discolored  There may be a small amount of drainage on the bandage    Special Instructions     Drink plenty of fluids during the next 24 hours to \"flush\" the contrast from your system  Keep the bandage clean and dry  After 24 hours, you remove the bandage and armboard.  Remove the dressing and armboard tomorrow anytime after 3PM.  Do not put ointment, powders, or creams to site.   You can shower after removing the bandage, and wash the procedure site gently with soap and water  DO NOT submerge the procedure site for 1 week (no bath tubs, pools or washing dishes)  If you choose to wear a bandage for a few days, make sure it remains clean and dry and that it is changed daily  For local swelling: apply ice  Bleeding can occur at the procedure site - both on the outside of the skin and/or beneath the surface of the skin        If bleeding occurs:   Elevate hand above heart and apply pressure to the procedure site with 2-3 fingers, as instructed by the nurse.  Hold pressure for 20 minutes and the bleeding should stop.  Notify your physician of the  occurrence.  If the bleeding does not stop, call [phone] and continue to apply pressure    Additional Instructions:  Do not take glucophage/metformin containing products for at least 48 hours after procedure, unless otherwise directed by your physician.    When to contact physician: Call [phone] right away if you experience     Increased swelling or a large lump, pain or bleeding at the site that is not relieved by applying ice or pressure  Signs of infection: Redness, warmth, drainage at the site, chills, or temperature of 100.5 or greater  Changes in sensation, numbness, or tingling of affected hand    You Received a Stent:    You will remain on an antiplatelet drug (PLAVIX) and/or aspirin.  Antiplatelet medications are usually taken for six months to one year and should not be stopped unless your cardiologist directs you to do so.  These medications help to prevent blockage at the stent site.  If another physician or dentist asks you to stop your antiplatelet medication, you need to consult your cardiologist first.  Together, your cardiologist and other physician can discuss the risks that may be involved if you are not taking the antiplatelet medication   If an MRI is necessary, it may be done 4-6 weeks after your procedure.  Verify this with your cardiologist  Keep your stent card with you at all times!  If you need an MRI in the future, your stent card will need to be shown to the technologist before performing the MRI.  A duplicate card CANNOT be reproduced.    Other    You may resume your present diet, unless otherwise specified by your physician.    You may resume all of your medications as prescribed, unless otherwise directed by your physician.  A list of your medications was provided to you at discharge.  Gradually resume your previous aerobic exercise schedule as directed by your physician.      If you have any question or concern, please call the on-call nurse at [phone]    *Make sure you are  taking your Entresto twice daily - if there are any issues picking up this prescription, call Dr. Hammer's office right away.

## 2025-03-25 NOTE — IVS NOTE
DISCHARGE NOTE     Pt is able to sit up and ambulate without difficulty.   Pt tolerated fluids and food.   Right radial procedural site remains dry and intact with good circulation, motion and sensation.  Armboard in place.   No signs or symptoms of bleeding/hematoma noted.   Pt denies any pain or discomfort at this time.  Instructions provided, patient/family verbalizes understanding.   Dr. Hammer spoke with patient and will call patient's daughter, Larisa    Stent card given to patient    Left a message with cardiac rehab.  Cardiac rehab packet and cardiac diet handout given to patient     Follow up Appointment: 4/2 at 11:30AM with Dorcas LÓPEZ    New Prescription: atorvastatin, carvedilol, Entresto - sent to [facility]'s [facility] [place]

## 2025-03-25 NOTE — PLAN OF CARE
Patient is from home w/ grandson. A&Ox4. Currently on room air. Patient is a standby assist in room. [facility] completed - see documentation and notes. Bed in lowest position and locked. Call light in hand. Personal belongings w/in reach.     Problem: Patient Centered Care  Goal: Patient preferences are identified and integrated in the patient's plan of care  Description: Interventions:- What would you like us to know as we care for you? From home w/ grandson- Provide timely, complete, and accurate information to patient/family- Incorporate patient and family knowledge, values, beliefs, and cultural backgrounds into the planning and delivery of care- Encourage patient/family to participate in care and decision-making at the level they choose- Honor patient and family perspectives and choices  Outcome: Adequate for Discharge     Problem: Patient/Family Goals  Goal: Patient/Family Long Term Goal  Description: Patient's Long Term Goal: To feel better Interventions:- Continue medication administration as ordered- See additional Care Plan goals for specific interventions  Outcome: Adequate for Discharge  Goal: Patient/Family Short Term Goal  Description: Patient's Short Term Goal: To be discharged Interventions: - Assist patient to all tests and procedures. - See additional Care Plan goals for specific interventions  Outcome: Adequate for Discharge

## 2025-03-26 NOTE — PLAN OF CARE
Cath site clean and intact. Pt recovered. Plan for discharge tonight, Pt educated on post-cath precautions and discharge information.     Problem: Patient Centered Care  Goal: Patient preferences are identified and integrated in the patient's plan of care  Description: Interventions:- What would you like us to know as we care for you? Return home. - Provide timely, complete, and accurate information to patient/family- Incorporate patient and family knowledge, values, beliefs, and cultural backgrounds into the planning and delivery of care- Encourage patient/family to participate in care and decision-making at the level they choose- Honor patient and family perspectives and choices  Outcome: Progressing

## 2025-04-01 ENCOUNTER — PATIENT OUTREACH (OUTPATIENT)
Dept: CASE MANAGEMENT | Age: 75
End: 2025-04-01

## 2025-04-01 NOTE — PROGRESS NOTES
JIE verified for CCM monthly outreach.   I called patient and left a detailed message to call back.   I will follow up with patient at a later time.      Medical record reviewed including recent office visits and test results

## 2025-04-06 DIAGNOSIS — Z79.4 TYPE 2 DIABETES MELLITUS WITH HYPERGLYCEMIA, WITH LONG-TERM CURRENT USE OF INSULIN (HCC): ICD-10-CM

## 2025-04-06 DIAGNOSIS — E03.9 HYPOTHYROIDISM, UNSPECIFIED TYPE: ICD-10-CM

## 2025-04-06 DIAGNOSIS — I10 PRIMARY HYPERTENSION: ICD-10-CM

## 2025-04-06 DIAGNOSIS — E11.65 TYPE 2 DIABETES MELLITUS WITH HYPERGLYCEMIA, WITH LONG-TERM CURRENT USE OF INSULIN (HCC): ICD-10-CM

## 2025-04-06 DIAGNOSIS — I25.119 CORONARY ARTERY DISEASE INVOLVING NATIVE CORONARY ARTERY OF NATIVE HEART WITH ANGINA PECTORIS: ICD-10-CM

## 2025-04-06 DIAGNOSIS — R73.9 HYPERGLYCEMIA: ICD-10-CM

## 2025-04-06 DIAGNOSIS — E11.9 TYPE 2 DIABETES MELLITUS WITHOUT RETINOPATHY (HCC): ICD-10-CM

## 2025-04-06 DIAGNOSIS — E11.65 UNCONTROLLED TYPE 2 DIABETES MELLITUS WITH HYPERGLYCEMIA (HCC): ICD-10-CM

## 2025-04-06 DIAGNOSIS — E55.9 VITAMIN D DEFICIENCY: ICD-10-CM

## 2025-04-06 DIAGNOSIS — E66.01 MORBID OBESITY WITH BMI OF 40.0-44.9, ADULT (HCC): ICD-10-CM

## 2025-04-06 DIAGNOSIS — I10 HYPERTENSION, UNSPECIFIED TYPE: ICD-10-CM

## 2025-04-06 DIAGNOSIS — E11.65 HYPERGLYCEMIA DUE TO DIABETES MELLITUS (HCC): ICD-10-CM

## 2025-04-06 DIAGNOSIS — E78.5 HYPERLIPIDEMIA LDL GOAL <70: ICD-10-CM

## 2025-04-06 DIAGNOSIS — R79.89 LOW TESTOSTERONE: ICD-10-CM

## 2025-04-06 DIAGNOSIS — R73.09 HIGH GLUCOSE LEVEL: ICD-10-CM

## 2025-04-06 DIAGNOSIS — E07.9 THYROID DISEASE: ICD-10-CM

## 2025-04-07 RX ORDER — SEMAGLUTIDE 0.68 MG/ML
0.5 INJECTION, SOLUTION SUBCUTANEOUS WEEKLY
Qty: 9 ML | Refills: 0 | Status: SHIPPED | OUTPATIENT
Start: 2025-04-07

## 2025-04-07 NOTE — TELEPHONE ENCOUNTER
Endocrine Refill protocol for oral and injectable diabetic medications    Protocol Criteria:  PASSED  Reason: N/A    If all below requirements are met, send a 90-day supply with 1 refill per provider protocol.    Verify appointment with Endocrinology completed in the last 6 months or scheduled in the next 3 months.  Verify A1C has been completed within the last 6 months and is below 8.5%     Last completed office visit: Visit date not found   Next scheduled Follow up:   Future Appointments   Date Time Provider Department Center   4/8/2025  1:00 PM [facility]   4/8/2025  2:00 PM Bertin Joe FNP-C [facility]   4/9/2025 10:45 AM Ben Pérez MD [facility]  [facility]   4/11/2025  9:00 AM Charlotte Rey MD [facility]   5/5/2025 10:45 AM Ben Pérez MD [facility]      Last A1c result: Last A1c value was 6.6% done 10/17/2024.

## 2025-04-08 ENCOUNTER — APPOINTMENT (OUTPATIENT)
Age: 75
End: 2025-04-08
Attending: STUDENT IN AN ORGANIZED HEALTH CARE EDUCATION/TRAINING PROGRAM
Payer: MEDICARE

## 2025-04-09 ENCOUNTER — OFFICE VISIT (OUTPATIENT)
Age: 75
End: 2025-04-09
Attending: STUDENT IN AN ORGANIZED HEALTH CARE EDUCATION/TRAINING PROGRAM
Payer: MEDICARE

## 2025-04-09 VITALS
HEART RATE: 70 BPM | OXYGEN SATURATION: 90 % | BODY MASS INDEX: 40.26 KG/M2 | SYSTOLIC BLOOD PRESSURE: 106 MMHG | DIASTOLIC BLOOD PRESSURE: 65 MMHG | WEIGHT: 271.81 LBS | RESPIRATION RATE: 18 BRPM | HEIGHT: 69 IN | TEMPERATURE: 97 F

## 2025-04-09 DIAGNOSIS — D50.8 OTHER IRON DEFICIENCY ANEMIA: ICD-10-CM

## 2025-04-09 DIAGNOSIS — Z86.711 HISTORY OF PULMONARY EMBOLISM: Primary | ICD-10-CM

## 2025-04-09 DIAGNOSIS — E61.1 IRON DEFICIENCY: ICD-10-CM

## 2025-04-09 LAB
BASOPHILS # BLD AUTO: 0.09 X10(3) UL (ref 0–0.2)
BASOPHILS NFR BLD AUTO: 0.7 %
DEPRECATED HBV CORE AB SER IA-ACNC: 31 NG/ML (ref 50–336)
DEPRECATED RDW RBC AUTO: 60.5 FL (ref 35.1–46.3)
EOSINOPHIL # BLD AUTO: 0.84 X10(3) UL (ref 0–0.7)
EOSINOPHIL NFR BLD AUTO: 6.9 %
ERYTHROCYTE [DISTWIDTH] IN BLOOD BY AUTOMATED COUNT: 20.1 % (ref 11–15)
HCT VFR BLD AUTO: 43.8 % (ref 39–53)
HGB BLD-MCNC: 13.9 G/DL (ref 13–17.5)
IMM GRANULOCYTES # BLD AUTO: 0.04 X10(3) UL (ref 0–1)
IMM GRANULOCYTES NFR BLD: 0.3 %
IRON SATN MFR SERPL: 23 % (ref 20–50)
IRON SERPL-MCNC: 72 UG/DL (ref 65–175)
LYMPHOCYTES # BLD AUTO: 1.88 X10(3) UL (ref 1–4)
LYMPHOCYTES NFR BLD AUTO: 15.5 %
MCH RBC QN AUTO: 26.5 PG (ref 26–34)
MCHC RBC AUTO-ENTMCNC: 31.7 G/DL (ref 31–37)
MCV RBC AUTO: 83.6 FL (ref 80–100)
MONOCYTES # BLD AUTO: 0.78 X10(3) UL (ref 0.1–1)
MONOCYTES NFR BLD AUTO: 6.4 %
NEUTROPHILS # BLD AUTO: 8.48 X10 (3) UL (ref 1.5–7.7)
NEUTROPHILS # BLD AUTO: 8.48 X10(3) UL (ref 1.5–7.7)
NEUTROPHILS NFR BLD AUTO: 70.2 %
PLATELET # BLD AUTO: 313 10(3)UL (ref 150–450)
RBC # BLD AUTO: 5.24 X10(6)UL (ref 3.8–5.8)
TOTAL IRON BINDING CAPACITY: 317 UG/DL (ref 250–425)
TRANSFERRIN SERPL-MCNC: 235 MG/DL (ref 215–365)
WBC # BLD AUTO: 12.1 X10(3) UL (ref 4–11)

## 2025-04-09 NOTE — PROGRESS NOTES
[facility] Hematology Oncology  Initial Hematology Clinic Progress Note    Patient Name: Caleb Rausch   YOB: 1950   Medical Record Number: [id number]    Subjective:   Caleb Rausch is a 75 year old male with a history of CAD s/p multiple stents, CHF, COPD diabetes obesity, history of provoked pulmonary embolism during COVID infection in early 2022 who presents for hematology follow-up regarding anticoagulation recommendations.  The patient was hospitalized and found to have a pulmonary embolism in January 2022 when he was hospitalized with COVID infection requiring ICU care and BiPAP.  He was discharged to rehab developed shortness of breath again and imaging in February showed residual PE.  It is unclear if there was actual progression on DOAC but he was transitioned to warfarin more so due to financial toxicity.  He follows with cardiology Dr. Anival Hammer who manages the patient's anticoagulation.  Currently patient is on aspirin and Plavix.    Caleb feels well today. He denies any acute complaints. He has been off coumadin for >1 mo, cardiology has been managing his anticoagulation. He has been taking PO iron since January, feels \"a boost\" in energy since starting iron. Denies GI issue with PO iron, does intermittently take laxative 2/2 percocet use. Denies any acute complaints or concerns.    Review of Systems:  Hematology/Oncology ROS performed and negative except as above in HPI    History/Other:   Past Medical History:  Past Medical History:    Anxiety disorder, unspecified    Anxiety state    Atherosclerosis of coronary artery    stents x 6    Chronic low back pain with bilateral sciatica, left worse than right    Chronic obstructive pulmonary disease, unspecified (HCC)    Coronary atherosclerosis    Depression    Diabetes (HCC)    borderline     Disorder of thyroid    Essential hypertension    Glaucoma    first visit with IRMA, patient was taking Timolol OU BID for 20 years, has not used gtts in  over 1 year because he ran out of gtts and had no refills, fundus photos taken today    Heart attack (HCC)    High blood pressure    High cholesterol    Hyperlipidemia    Kidney stone    Left Sided Neck pain, acute    Lumbar stenosis with neurogenic claudication    Major depressive disorder, recurrent, unspecified    Morbid obesity with BMI of 40.0-44.9, adult (HCC)    Neuropathy    Pneumonia due to COVID-19 virus    Thyroid disease       Past Surgical History:  Past Surgical History:   Procedure Laterality Date    Cataract extraction extracapsular w/ intraocular lens implantation Right 2018    [facility] bare metal stent (bms)      Circumcision,othr  08/04/2020    Dr. Bonilla @ [facility]    Cysto/uretero w/lithotripsy Left 08/04/2020    Dr. 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     Iridotomy/iridectomy by laser      unknown Dr    Other      cardiac stents       Current Medications:   OZEMPIC, 0.25 OR 0.5 MG/DOSE, 2 MG/3ML Subcutaneous Solution Pen-injector Inject 0.5mg into the skin once a week. 9 mL 0    ferrous sulfate 325 (65 FE) MG Oral Tab EC Take 1 tablet (325 mg total) by mouth daily with breakfast.      Ergocalciferol (VITAMIN D2 OR) Take by mouth once a week.      Melatonin 5 MG Oral Tab Take 1 tablet (5 mg total) by mouth nightly as needed (insomnia).      insulin glargine 100 UNIT/ML Subcutaneous Solution Inject 10 Units into the skin every morning.      carvedilol 12.5 MG Oral Tab Take 1 tablet (12.5 mg total) by mouth 2 (two) times daily with meals. 60 tablet 11    atorvastatin 80 MG Oral Tab Take 1 tablet (80 mg total) by mouth daily. 30 tablet 11    sacubitril-valsartan (ENTRESTO) 24-26 MG Oral Tab Take 1 tablet by mouth 2 (two) times daily. 60 tablet 11    pregabalin 300 MG Oral Cap Take 1 capsule (300 mg total) by mouth in the morning and 1 capsule (300 mg total) before bedtime. WITH FOOD.      latanoprost 0.005 % Ophthalmic Solution Place  1 drop into both eyes nightly. 7.5 mL 0    albuterol (VENTOLIN HFA) 108 (90 Base) MCG/ACT Inhalation Aero Soln Inhale 2 puffs into the lungs every 4 (four) hours as needed for Wheezing. 51 g 1    metFORMIN HCl 1000 MG Oral Tab Take 0.5 tablets (500 mg total) by mouth 2 (two) times daily with meals. 90 tablet 3    diclofenac 1 % External Gel       levothyroxine 137 MCG Oral Tab Take 137 mcg by mouth before breakfast. 90 tablet 3    Accu-Chek Softclix Lancets Does not apply Misc in the morning and in the evening and before bedtime.      Glucose Blood (ACCU-CHEK GUIDE) In Vitro Strip 1 strip by In Vitro route in the morning and 1 strip in the evening and 1 strip before bedtime.      fluticasone-umeclidin-vilant (TRELEGY ELLIPTA) 200-62.5-25 MCG/ACT Inhalation Aerosol Powder, Breath Activated Inhale 1 puff into the lungs daily. 180 each 3    DULoxetine 30 MG Oral Cap DR Particles Take 1 capsule (30 mg total) by mouth daily. 90 capsule 3    Insulin Pen Needle (BD PEN NEEDLE RODERICK 2ND GEN) 32G X 4 MM Does not apply Misc Inject 1 each as directed daily. 90 each 3    clopidogrel 75 MG Oral Tab Take 1 tablet (75 mg total) by mouth daily. 90 tablet 3    JARDIANCE 25 MG Oral Tab TAKE ONE TABLET BY MOUTH ONE TIME DAILY 90 tablet 0    Potassium Chloride ER 20 MEQ Oral Tab CR Take 1 tablet by mouth daily. 90 tablet 0    torsemide 20 MG Oral Tab Take 1 tablet (20 mg total) by mouth daily. 90 tablet 3    Blood Glucose Monitoring Suppl Does not apply Kit Please use to check blood sugar twice daily. 1 kit 0    Blood Glucose Monitoring Suppl Does not apply Misc Please use to check blood sugar twice daily 200 each 3    Blood Glucose Monitoring Suppl Does not apply Misc Please use to check blood sugar twice daily 200 each 0    oxyCODONE-acetaminophen  MG Oral Tab Take 1 tablet by mouth as needed in the morning and 1 tablet as needed at noon and 1 tablet as needed in the evening and 1 tablet as needed before bedtime.      aspirin 81  MG Oral Tab EC Take 1 tablet (81 mg total) by mouth daily. 30 tablet 2       Allergies:   Allergies[1]    Family Medical History:  Family History   Problem Relation Age of Onset    Heart Attack Father     Hypertension Brother     Glaucoma Neg     Macular degeneration Neg        Social History:  Social History     Socioeconomic History    Marital status: Single     Spouse name: Not on file    Number of children: Not on file    Years of education: Not on file    Highest education level: Not on file   Occupational History    Not on file   Tobacco Use    Smoking status: Every Day     Current packs/day: 1.00     Types: Cigarettes    Smokeless tobacco: Former    Tobacco comments:     per pt, quit in 1994   Vaping Use    Vaping status: Some Days   Substance and Sexual Activity    Alcohol use: No     Comment: quit in 1994    Drug use: No    Sexual activity: Not on file   Other Topics Concern    Caffeine Concern Yes    Exercise No    Seat Belt Not Asked    Special Diet Not Asked    Stress Concern Not Asked    Weight Concern Not Asked     Service Not Asked    Blood Transfusions Not Asked    Occupational Exposure Not Asked    Hobby Hazards Not Asked    Sleep Concern Not Asked    Back Care Not Asked    Bike Helmet Not Asked    Self-Exams Not Asked   Social History Narrative    The patient uses the following assistive device(s):  single-point cane.      The patient does live in a home with stairs.     Social Drivers of Health     Food Insecurity: No Food Insecurity (12/5/2024)    Food Insecurity     Food Insecurity: Never true   Transportation Needs: No Transportation Needs (12/10/2024)    Transportation Needs     Lack of Transportation: No     Car Seat: Not on file   Housing Stability: Low Risk  (12/5/2024)    Housing Stability     Housing Instability: No     Housing Instability Emergency: Not on file     Crib or [place]inette: Not on file       Objective:   Blood pressure 106/65, pulse 70, temperature 96.7 °F (35.9 °C),  temperature source Tympanic, resp. rate 18, height 1.753 m (5' 9\"), weight 123.3 kg (271 lb 12.8 oz), SpO2 90%.  Physical Exam:  General: A&Ox3, NAD  HEENT: PERRL, OP clear  CV: RRR, no murmurs  Pulm: CTA b/l, normal effort  Abd: soft, ntnd,  Extremities: no edema  Neurological: grossly intact    Results:   Labs:  Lab Results   Component Value Date    WBC 12.1 (H) 04/09/2025    HGB 13.9 04/09/2025    HCT 43.8 04/09/2025    .0 04/09/2025    CREATSERUM 0.88 03/22/2025    BUN 14 03/22/2025     03/22/2025    K 3.9 03/22/2025     03/22/2025    CO2 23.0 03/22/2025     (H) 03/22/2025    CA 9.6 03/22/2025    ALB 4.5 12/17/2024    ALKPHO 81 12/17/2024    BILT 0.3 12/17/2024    TP 7.8 12/17/2024    AST 66 (H) 12/17/2024    ALT 19 12/17/2024    PTT 28.1 12/05/2024    INR 1.02 12/06/2024    T4F 1.1 10/17/2024    TSH 5.202 (H) 10/17/2024    LIP 20 12/17/2024    PSA 0.42 07/01/2021    DDIMER 0.35 03/29/2024    ESRML 11 07/14/2018    CRP <0.29 01/19/2022    MG 2.2 01/19/2024    PHOS 2.6 12/25/2023    TROP <0.045 11/20/2020    CK 65 01/08/2022    B12 351 06/29/2020       Assessment & Plan:   Caleb Rausch is a 75 year old male with a history of CAD s/p multiple stents, CHF, COPD diabetes obesity, history of provoked pulmonary embolism during COVID infection in early 2022 who presents for hematology follow-up regarding anticoagulation recommendations.  The patient was hospitalized and found to have a pulmonary embolism in January 2022 when he was hospitalized with COVID infection requiring ICU care and BiPAP.  He was discharged to rehab developed shortness of breath again and imaging in February 2022 showed residual PE.  It is unclear if there was actual progression on DOAC but he was transitioned to warfarin more so due to financial toxicity.  He was then transitioned to dual antiplatelet therapy with plavix + aspirin and has remained stable, anticoagulation managed primarily per cardiology.    History of  PE  - continue dual antiplatelet therapy, plavix and aspirin, given significant cardiac history  - follows with cardiology who has been managing coagulation, f/u as scheduled    Iron deficiency  - related to undiagnosed GI bleed, has refused endoscopic evaluation  - tolerating PO iron without difficulty, good response  - clinically feels improved  - pt would like to f/u in one year for above issues, follow up prn if recurrent clot, anemia in interim    MDM Low    Bertin Joe, FNP-C    [facility] Hematology/Oncology  [facility]     This note was created using a voice-recognition transcribing system. Incorrect words or phrases may have been missed during proofreading. Please interpret accordingly.         [1] No Known Allergies

## 2025-04-10 DIAGNOSIS — Z79.4 TYPE 2 DIABETES MELLITUS WITH HYPERGLYCEMIA, WITH LONG-TERM CURRENT USE OF INSULIN (HCC): Primary | ICD-10-CM

## 2025-04-10 DIAGNOSIS — E11.65 TYPE 2 DIABETES MELLITUS WITH HYPERGLYCEMIA, WITH LONG-TERM CURRENT USE OF INSULIN (HCC): Primary | ICD-10-CM

## 2025-04-10 RX ORDER — INSULIN GLARGINE-YFGN 100 [IU]/ML
INJECTION, SOLUTION SUBCUTANEOUS
Qty: 9 ML | Refills: 2 | Status: CANCELLED | OUTPATIENT
Start: 2025-04-10

## 2025-04-10 NOTE — TELEPHONE ENCOUNTER
Patient is calling for refill on the following:     insulin glargine (BASAGLAR KWIKPEN) 100 UNIT/ML Subcutaneous Solution Pen-injector Inject 10 Units into the skin daily. 9 mL 1     This note above is copied from his last office visit note with Dr. Rey on 2/11/25. I don't see this active on his medication list. He advised [place] won't give him the script so I called [place] and spoke with Kiara formerly [facility] who advised the last time he picked up Basiglar Quick Pen was 10/28/24 from Dr. Rey and that should only have lasted until January so she's not sure what he's been using if anything. Now, the pharmacist is advising that his insurance stated it's not on formulary now so we'd have to prescribe insulin glargine YFGN    Pended for review    Routed to [facility] refills for protocol

## 2025-04-10 NOTE — TELEPHONE ENCOUNTER
I would need to clarify with the patient tomorrow what he has been using.  Discuss refill at his appointment tomorrow.

## 2025-04-10 NOTE — TELEPHONE ENCOUNTER
Please review. Protocol Failed; No Protocol    Future Appointments   Date Time Provider Department Center   4/11/2025  9:00 AM Charlotte Rey MD [facility]   5/5/2025 10:45 AM Ben Pérez MD [facility]   4/8/2026 11:30 AM Bertin Joe FNP-[facility]   4/9/2026 10:45 AM [facility]     Patient is calling for refill on the following:      insulin glargine (BASAGLAR KWIKPEN) 100 UNIT/ML Subcutaneous Solution Pen-injector Inject 10 Units into the skin daily. 9 mL 1      This note above is copied from his last office visit note with Dr. Rey on 2/11/25. I don't see this active on his medication list. He advised [place] won't give him the script so I called [place] and spoke with Kiara [facility] who advised the last time he picked up Basiglar Quick Pen was 10/28/24 from Dr. Rey and that should only have lasted until January so she's not sure what he's been using if anything. Now, the pharmacist is advising that his insurance stated it's not on formulary now so we'd have to prescribe insulin glargine YFGN     Pended for review     Routed to [facility] refills for protocol

## 2025-04-12 ENCOUNTER — TELEPHONE (OUTPATIENT)
Dept: INTERNAL MEDICINE CLINIC | Facility: CLINIC | Age: 75
End: 2025-04-12

## 2025-04-12 NOTE — TELEPHONE ENCOUNTER
[id number]      Current Outpatient Medications:     insulin glargine 100 UNIT/ML Subcutaneous Solution, Inject 10 Units into the skin every morning., Disp: , Rfl:   fl: 2

## 2025-04-14 NOTE — TELEPHONE ENCOUNTER
Medication is historical:  insulin glargine 100 UNIT/ML Subcutaneous Solution               Guidelines: Dose, Route, Frequency: 10 Units, Subcutaneous, Every morningOrdered On: 03/25/2025ReportDx Associated: Long-term:    Inject 10 Units into the skin every morning., Historical             Directions: Inject 10 Units into the skin every morning.  Authorized By: External/Patient, Reported         Medication is historical. Please resend for a prior auth to be done

## 2025-04-16 RX ORDER — INSULIN GLARGINE 100 [IU]/ML
10 INJECTION, SOLUTION SUBCUTANEOUS EVERY MORNING
Qty: 10 ML | Refills: 0 | Status: SHIPPED | OUTPATIENT
Start: 2025-04-16 | End: 2025-05-08

## 2025-04-16 NOTE — TELEPHONE ENCOUNTER
patient is calling wanting to know the status if his basaglar insulin refill request. Patient stated that he has been out for 1 week. Patient injects 10 units.   Patient was inform that you were going to discuss this with him at his appointment on 4/11/2025. Patient stated that he was not aware he had a appointment with you on 4/11/2025. Dr. Rey do you still want patient to make a appointment to discuss. If yes can we use your Res 24 slot for patient?

## 2025-04-21 NOTE — TELEPHONE ENCOUNTER
Has the member had an inadequate response to BOTH of the following formulary alternatives: insulin degludec (biosimilar for Tresiba U100 pen, U200 pen, U100 vial) AND insulin glargine-yfgn U100 pen (biosimilar for Semglee)?*

## 2025-04-24 ENCOUNTER — NURSE TRIAGE (OUTPATIENT)
Dept: INTERNAL MEDICINE CLINIC | Facility: CLINIC | Age: 75
End: 2025-04-24

## 2025-04-24 RX ORDER — LINACLOTIDE 72 UG/1
72 CAPSULE, GELATIN COATED ORAL DAILY
Qty: 30 CAPSULE | Refills: 0 | OUTPATIENT
Start: 2025-04-24 | End: 2025-05-24

## 2025-04-24 NOTE — TELEPHONE ENCOUNTER
Action Requested: Summary for Provider     []  Critical Lab, Recommendations Needed  [] Need Additional Advice  []   FYI    []   Need Orders  [] Need Medications Sent to Pharmacy  []  Other     SUMMARY: Per Protocol disposition advised to be seen in the office to discuss diabetic medications and eye concern. Patient also needs a referral for a diabetic eye exam.     Reason for call: Eye Problem  Onset: one month    Patient (name and  verified) calling with multiple concerns. Patient stopped taking his diabetes medication Basaglar Kwikpen and also stopped his eye drops glaucoma. Patient c/o his vision becoming cloudy and states he has not seen the eye doctor in a long time.   Assisted patient with appt scheduling, verbalized understanding and agrees to plan.   Future Appointments   Date Time Provider Department Center   2025  3:00 PM Charlotte Rey MD [facility]   2025 10:45 AM Alsayed, Mahmoud, MD [facility]   2026 11:30 AM Bertin Joe FNP-C [facility]   2026 10:45 AM [facility] [place] [facility]       Reason for Disposition   Brief (now gone) blurred vision and unexplained    Protocols used: Vision Loss or Change-A-OH

## 2025-04-24 NOTE — TELEPHONE ENCOUNTER
I don't know. I do not manage the diabetes. The patient follows with endocrinology, Dr. Hackett.    I have reviewed and confirmed nurses' notes...

## 2025-04-29 RX ORDER — TORSEMIDE 20 MG/1
20 TABLET ORAL DAILY
Qty: 90 TABLET | Refills: 3 | Status: SHIPPED | OUTPATIENT
Start: 2025-04-29

## 2025-04-29 RX ORDER — CARVEDILOL 6.25 MG/1
6.25 TABLET ORAL 2 TIMES DAILY WITH MEALS
Qty: 180 TABLET | Refills: 0 | OUTPATIENT
Start: 2025-04-29

## 2025-04-29 NOTE — TELEPHONE ENCOUNTER
Refill passed per [facility] protocols.    Requested Prescriptions   Pending Prescriptions Disp Refills    TORSEMIDE 20 MG Oral Tab [Pharmacy Med Name: Torsemide 20 Mg Tab Teva] 90 tablet 3     Sig: Take 1 tablet (20 mg total) by mouth daily.       Hypertension Medications Protocol Passed - 4/29/2025  6:56 PM

## 2025-05-01 ENCOUNTER — PATIENT OUTREACH (OUTPATIENT)
Dept: CASE MANAGEMENT | Age: 75
End: 2025-05-01

## 2025-05-01 NOTE — PROGRESS NOTES
Spoke to Caleb for Chronic Care Management.      Updates to patient care team/comments: UTD  Patient reported changes in medications: Reports changes   Med Adherence  Comment: reports non adherent      Health Maintenance:   Health Maintenance   Topic Date Due    Zoster Vaccines (1 of 2) Never done    Colorectal Cancer Screening  06/11/2019    Annual Physical  01/12/2024    Diabetes Care Dilated Eye Exam  05/10/2024    COVID-19 Vaccine (4 - 2024-25 season) 09/01/2024    Tobacco Cessation Counseling  Never done    Diabetes Care: Microalb/Creat Ratio (Annual)  01/01/2025    Diabetes Care A1C  04/17/2025    Influenza Vaccine (Season Ended) 10/01/2025    Diabetes Care: GFR  03/22/2026    PSA  05/10/2026    Annual Depression Screening  Completed    Fall Risk Screening (Annual)  Completed    Diabetes Care: Foot Exam (Annual)  Completed    Pneumococcal Vaccine: 50+ Years  Completed    Meningococcal B Vaccine  Aged Out       Patient updates/concerns:   Spoke with patient.  Patient relates doing well. Mood good/stable.  Denies any recent syncope episodes. Denies any recent falls. Breathing stable. Diabetes managed by Endo. Ran out of insulin a few weeks ago and has not been checking blood sugars. Due for follow up. Seeing them on Monday. Continues to take taking medications as prescribed. Avoiding sugars and carbs. Physical activity about the same. Recommended again slowly increasing activity as tolerated. Continues to see pain management. Vision stable. Aware due for eye exam and its scheduled.  Blood pressure and pulse unknown not checking but denies symptoms. Denies any edema. PT/INR being checked. Denies any urinary issues at this time. Aware of upcoming visits. No other questions or concerns.    Encouraged patient:   Self care: Take the time to do the things you love.   Nutrition:  Good nutrition helps us to maintain our weight, fight off infection, and help reduce the risk of developing other chronic issues.   Physical  activity:  Physical activity is important to help maintain independence and improve quality of life.       Goals/Action Plan:    Active goal from previous outreach: Staying healthy, maintain independence, and stability.     Patient reported progress towards goals: progressing                - What: continues to follow up on health conditions            - Where/When/How: seeing PCP and specialist   Patient Reported Barriers and Concerns: as per above                    - Plan for overcoming barriers: encouraged patient to call with any questions or concerns.     Care Managers Interventions: Continue to provide encouragement and education for healthy coping and diagnosis.      Future Appointments:   Future Appointments   Date Time Provider Department Center   5/5/2025 10:45 AM Ben Pérez MD [facility]   5/5/2025 12:40 PM [facility]   4/8/2026 11:30 AM Bertin Joe FNP-C [facility] [place] [facility]   4/9/2026 10:45 AM [facility]         Next Care Manager Follow Up Date: 1 month     Reason For Follow Up: review progress and or barriers towards patient's goals.     Time Spent This Encounter Total: 18 min medical record review, telephone communication, care plan updates where needed, education, goals, and action plan recreation/update. Provided acknowledgment and validation to patient's concerns.   Monthly Minute Total including today: 18 min   Physical assessment, complete health history, and need for CCM established by Charlotte Rey MD.

## 2025-05-05 ENCOUNTER — NURSE TRIAGE (OUTPATIENT)
Dept: INTERNAL MEDICINE CLINIC | Facility: CLINIC | Age: 75
End: 2025-05-05

## 2025-05-05 NOTE — TELEPHONE ENCOUNTER
Action Requested: Summary for Provider     []  Critical Lab, Recommendations Needed  [] Need Additional Advice  []   FYI    []   Need Orders  [] Need Medications Sent to Pharmacy  []  Other     SUMMARY: Office visit. Patient declined. ER flags discussed. Appointment rescheduled.     Future Appointments   Date Time Provider Department Center   5/12/2025  2:20 PM Charlotte Rey MD [facility]   5/12/2025  5:45 PM Ben Pérez MD [facility]   4/8/2026 11:30 AM Bertin Joe FNP-C [facility] [place] [facility]   4/9/2026 10:45 AM [facility]     Reason for call: Back Pain  Onset: Data Unavailable    Patient states that he had an appointment today but needs to cancel. He slept on his recliner last night. He got up to go to the bathroom and now has back pain. He only has pain when he moves. He can walk but it is painful. He states that he would like to take over the counter medications and rest today.     Reason for Disposition   SEVERE back pain (e.g., excruciating, unable to do any normal activities) and not improved after pain medicine and CARE ADVICE    Protocols used: Back Pain-A-OH

## 2025-05-07 RX ORDER — LINACLOTIDE 72 UG/1
72 CAPSULE, GELATIN COATED ORAL DAILY
Qty: 30 CAPSULE | Refills: 0 | OUTPATIENT
Start: 2025-05-07 | End: 2025-06-06

## 2025-05-08 ENCOUNTER — LAB ENCOUNTER (OUTPATIENT)
Dept: LAB | Age: 75
End: 2025-05-08
Attending: INTERNAL MEDICINE
Payer: MEDICARE

## 2025-05-08 ENCOUNTER — OFFICE VISIT (OUTPATIENT)
Dept: INTERNAL MEDICINE CLINIC | Facility: CLINIC | Age: 75
End: 2025-05-08

## 2025-05-08 VITALS
HEIGHT: 69 IN | OXYGEN SATURATION: 93 % | BODY MASS INDEX: 39.58 KG/M2 | SYSTOLIC BLOOD PRESSURE: 95 MMHG | WEIGHT: 267.19 LBS | TEMPERATURE: 98 F | DIASTOLIC BLOOD PRESSURE: 52 MMHG | RESPIRATION RATE: 18 BRPM | HEART RATE: 82 BPM

## 2025-05-08 DIAGNOSIS — I25.119 CORONARY ARTERY DISEASE INVOLVING NATIVE CORONARY ARTERY OF NATIVE HEART WITH ANGINA PECTORIS: ICD-10-CM

## 2025-05-08 DIAGNOSIS — J44.1 CHRONIC OBSTRUCTIVE PULMONARY DISEASE WITH ACUTE EXACERBATION (HCC): ICD-10-CM

## 2025-05-08 DIAGNOSIS — E11.65 TYPE 2 DIABETES MELLITUS WITH HYPERGLYCEMIA, WITH LONG-TERM CURRENT USE OF INSULIN (HCC): ICD-10-CM

## 2025-05-08 DIAGNOSIS — K02.9 TOOTH DECAY: ICD-10-CM

## 2025-05-08 DIAGNOSIS — Z79.4 TYPE 2 DIABETES MELLITUS WITH HYPERGLYCEMIA, WITH LONG-TERM CURRENT USE OF INSULIN (HCC): Primary | ICD-10-CM

## 2025-05-08 DIAGNOSIS — I10 PRIMARY HYPERTENSION: ICD-10-CM

## 2025-05-08 DIAGNOSIS — E03.9 HYPOTHYROIDISM, UNSPECIFIED TYPE: ICD-10-CM

## 2025-05-08 DIAGNOSIS — E78.5 HYPERLIPIDEMIA LDL GOAL <70: ICD-10-CM

## 2025-05-08 DIAGNOSIS — U07.1 PULMONARY EMBOLISM ASSOCIATED WITH COVID-19 (HCC): ICD-10-CM

## 2025-05-08 DIAGNOSIS — I26.99 PULMONARY EMBOLISM ASSOCIATED WITH COVID-19 (HCC): ICD-10-CM

## 2025-05-08 DIAGNOSIS — K59.03 DRUG-INDUCED CONSTIPATION: ICD-10-CM

## 2025-05-08 DIAGNOSIS — Z79.4 TYPE 2 DIABETES MELLITUS WITH HYPERGLYCEMIA, WITH LONG-TERM CURRENT USE OF INSULIN (HCC): ICD-10-CM

## 2025-05-08 DIAGNOSIS — G47.33 OSA (OBSTRUCTIVE SLEEP APNEA): ICD-10-CM

## 2025-05-08 DIAGNOSIS — I50.23 ACUTE ON CHRONIC SYSTOLIC CONGESTIVE HEART FAILURE (HCC): ICD-10-CM

## 2025-05-08 DIAGNOSIS — D50.9 IRON DEFICIENCY ANEMIA, UNSPECIFIED IRON DEFICIENCY ANEMIA TYPE: ICD-10-CM

## 2025-05-08 DIAGNOSIS — E11.65 TYPE 2 DIABETES MELLITUS WITH HYPERGLYCEMIA, WITH LONG-TERM CURRENT USE OF INSULIN (HCC): Primary | ICD-10-CM

## 2025-05-08 LAB
ALBUMIN SERPL-MCNC: 4.2 G/DL (ref 3.2–4.8)
ALBUMIN/GLOB SERPL: 1.5 {RATIO} (ref 1–2)
ALP LIVER SERPL-CCNC: 79 U/L (ref 45–117)
ALT SERPL-CCNC: 22 U/L (ref 10–49)
ANION GAP SERPL CALC-SCNC: 7 MMOL/L (ref 0–18)
AST SERPL-CCNC: 18 U/L (ref ?–34)
BILIRUB SERPL-MCNC: 0.2 MG/DL (ref 0.2–1.1)
BUN BLD-MCNC: 22 MG/DL (ref 9–23)
BUN/CREAT SERPL: 20.2 (ref 10–20)
CALCIUM BLD-MCNC: 9.2 MG/DL (ref 8.7–10.4)
CHLORIDE SERPL-SCNC: 106 MMOL/L (ref 98–112)
CHOLEST SERPL-MCNC: 134 MG/DL (ref ?–200)
CO2 SERPL-SCNC: 27 MMOL/L (ref 21–32)
CREAT BLD-MCNC: 1.09 MG/DL (ref 0.7–1.3)
CREAT UR-SCNC: 122.5 MG/DL
EGFRCR SERPLBLD CKD-EPI 2021: 72 ML/MIN/1.73M2 (ref 60–?)
EST. AVERAGE GLUCOSE BLD GHB EST-MCNC: 140 MG/DL (ref 68–126)
FASTING PATIENT LIPID ANSWER: NO
FASTING STATUS PATIENT QL REPORTED: NO
GLOBULIN PLAS-MCNC: 2.8 G/DL (ref 2–3.5)
GLUCOSE BLD-MCNC: 131 MG/DL (ref 70–99)
HBA1C MFR BLD: 6.5 % (ref ?–5.7)
HDLC SERPL-MCNC: 31 MG/DL (ref 40–59)
LDLC SERPL CALC-MCNC: 78 MG/DL (ref ?–100)
MICROALBUMIN UR-MCNC: 11.3 MG/DL
MICROALBUMIN/CREAT 24H UR-RTO: 92.2 UG/MG (ref ?–30)
NONHDLC SERPL-MCNC: 103 MG/DL (ref ?–130)
OSMOLALITY SERPL CALC.SUM OF ELEC: 295 MOSM/KG (ref 275–295)
POTASSIUM SERPL-SCNC: 3.9 MMOL/L (ref 3.5–5.1)
PROT SERPL-MCNC: 7 G/DL (ref 5.7–8.2)
SODIUM SERPL-SCNC: 140 MMOL/L (ref 136–145)
TRIGL SERPL-MCNC: 139 MG/DL (ref 30–149)
TSI SER-ACNC: 3.17 UIU/ML (ref 0.55–4.78)
VIT B12 SERPL-MCNC: 714 PG/ML (ref 211–911)
VIT D+METAB SERPL-MCNC: 32.6 NG/ML (ref 30–100)
VLDLC SERPL CALC-MCNC: 22 MG/DL (ref 0–30)

## 2025-05-08 PROCEDURE — 83036 HEMOGLOBIN GLYCOSYLATED A1C: CPT

## 2025-05-08 PROCEDURE — 80061 LIPID PANEL: CPT

## 2025-05-08 PROCEDURE — 82570 ASSAY OF URINE CREATININE: CPT

## 2025-05-08 PROCEDURE — 82306 VITAMIN D 25 HYDROXY: CPT

## 2025-05-08 PROCEDURE — 82043 UR ALBUMIN QUANTITATIVE: CPT

## 2025-05-08 PROCEDURE — 80053 COMPREHEN METABOLIC PANEL: CPT

## 2025-05-08 PROCEDURE — 36415 COLL VENOUS BLD VENIPUNCTURE: CPT

## 2025-05-08 PROCEDURE — 82607 VITAMIN B-12: CPT

## 2025-05-08 PROCEDURE — 99214 OFFICE O/P EST MOD 30 MIN: CPT | Performed by: INTERNAL MEDICINE

## 2025-05-08 PROCEDURE — 84443 ASSAY THYROID STIM HORMONE: CPT

## 2025-05-08 NOTE — ASSESSMENT & PLAN NOTE
Will defer the management of the patient's diabetes to endocrinology.  However, today we will recheck the hemoglobin A1c and urine microalbumin.  Will do foot exam at future visit.  Ophthalmology referral given.  Orders:    Comp Metabolic Panel (14); Future    Lipid Panel; Future    Vitamin D; Future    TSH W Reflex To Free T4; Future    Hemoglobin A1C; Future    Microalb/Creat Ratio, Random Urine; Future    OPHTHALMOLOGY - INTERNAL    Vitamin B12 [E]; Future

## 2025-05-08 NOTE — ASSESSMENT & PLAN NOTE
Blood pressure 95/52 today.  This is likely due to to a combination of the Entresto and carvedilol.  Patient is asymptomatic.  Orders:    Comp Metabolic Panel (14); Future    Lipid Panel; Future    Vitamin D; Future    TSH W Reflex To Free T4; Future    Hemoglobin A1C; Future    Microalb/Creat Ratio, Random Urine; Future    OPHTHALMOLOGY - INTERNAL    Vitamin B12 [E]; Future

## 2025-05-08 NOTE — ASSESSMENT & PLAN NOTE
He continues on levothyroxine 137 mcg daily.  Recheck TSH.  Orders:    Comp Metabolic Panel (14); Future    Lipid Panel; Future    Vitamin D; Future    TSH W Reflex To Free T4; Future    Hemoglobin A1C; Future    Microalb/Creat Ratio, Random Urine; Future    OPHTHALMOLOGY - INTERNAL    Vitamin B12 [E]; Future

## 2025-05-08 NOTE — ASSESSMENT & PLAN NOTE
Continue current inhalers.  Orders:    Comp Metabolic Panel (14); Future    Lipid Panel; Future    Vitamin D; Future    TSH W Reflex To Free T4; Future    Hemoglobin A1C; Future    Microalb/Creat Ratio, Random Urine; Future    OPHTHALMOLOGY - INTERNAL    Vitamin B12 [E]; Future

## 2025-05-08 NOTE — ASSESSMENT & PLAN NOTE
Continues to follow with cardiology.  Recently had an angiogram done, with 2 stents placed.  Continues on Entresto, atorvastatin, carvedilol, clopidogrel, aspirin.  He is not on the Jardiance.  He does continue on the Ozempic.  Orders:    Comp Metabolic Panel (14); Future    Lipid Panel; Future    Vitamin D; Future    TSH W Reflex To Free T4; Future    Hemoglobin A1C; Future    Microalb/Creat Ratio, Random Urine; Future    OPHTHALMOLOGY - INTERNAL    Vitamin B12 [E]; Future

## 2025-05-08 NOTE — PATIENT INSTRUCTIONS
Please follow up with the eye doctor. Referral given today.     Continue all the medications that you brought in.     Blood and urine tests today.     Follow up with the diabetes doctor on 5/12/2025.

## 2025-05-08 NOTE — ASSESSMENT & PLAN NOTE
Therapy includes Entresto 24-26 mg twice daily, carvedilol 12.5 mg twice daily.  He is off the Jardiance.  He continues on torsemide 20 mg daily for fluid management.

## 2025-05-08 NOTE — PROGRESS NOTES
Caleb Rausch is a 75 year old male with complaints of:  Chief Complaint: Medication Follow-Up and Referral    HPI     Caleb Rausch is a(n) 75 year old male with a history of hypothyroidism, diabetes, CAD status post stents on dual antiplatelet therapy, heart failure with reduced ejection fraction, hyperlipidemia, COPD, hypertension, PE on anticoagulation, who presents for medication follow up.    Patient is generally confused about his medication. Brought all his oral medications. He is currently taking the following medications:  - Iron 28 mg daily  - Linzess 72 mcg daily as needed for constipation  - Entresto 24-26 mg twice daily  - Atorvastatin 80 mg daily  - Pregabalin 300 mg 3 times daily as needed  - Levothyroxine 137 mcg daily  - Torsemide 20 mg daily; patient usually takes it when he is at home   - Clopidogrel 75 mg daily  - Carvedilol 12.5 mg twice daily  - Metformin 500 mg twice daily  - Aspirin 81 mg daily  - ozempic 0.5 mg weekly     Patient has not been checking his blood sugar at home.  His meter broke several months ago.  He has been out of insulin.  He was told by his insurance company that they would no longer cover the insulin that he was on prior.  He will be following up with his endocrinologist and 5/12/2024. He is taking ozempic but not Jardiance. He has not followed with ophthalmology.      He has recently followed with his cardiologist and had cardiac cath done.  Reportedly, he has had 2 stents placed.  He continues on dual antiplatelet therapy with aspirin and Plavix.    He has been following with hematology.  Using replating iron.  Recent iron studies do show a modest improvement.  He has declined endoscopic evaluation.  Was taken off of anticoagulation with warfarin recently.    Past Medical History     Past Medical History[1]     Past Surgical History     Past Surgical History[2]     Family History     Family History[3]    Social History     Short Social Hx on File[4]    Allergies      Allergies[5]    Current Medications     Current Outpatient Medications   Medication Sig Dispense Refill    torsemide 20 MG Oral Tab Take 1 tablet (20 mg total) by mouth daily. 90 tablet 3    insulin glargine 100 UNIT/ML Subcutaneous Solution Inject 10 Units into the skin every morning. (Patient not taking: Reported on 5/8/2025) 10 mL 0    OZEMPIC, 0.25 OR 0.5 MG/DOSE, 2 MG/3ML Subcutaneous Solution Pen-injector Inject 0.5mg into the skin once a week. 9 mL 0    ferrous sulfate 325 (65 FE) MG Oral Tab EC Take 1 tablet (325 mg total) by mouth daily with breakfast.      Ergocalciferol (VITAMIN D2 OR) Take by mouth once a week.      Melatonin 5 MG Oral Tab Take 1 tablet (5 mg total) by mouth nightly as needed (insomnia). (Patient not taking: Reported on 5/8/2025)      carvedilol 12.5 MG Oral Tab Take 1 tablet (12.5 mg total) by mouth 2 (two) times daily with meals. 60 tablet 11    atorvastatin 80 MG Oral Tab Take 1 tablet (80 mg total) by mouth daily. 30 tablet 11    sacubitril-valsartan (ENTRESTO) 24-26 MG Oral Tab Take 1 tablet by mouth 2 (two) times daily. 60 tablet 11    pregabalin 300 MG Oral Cap Take 1 capsule (300 mg total) by mouth in the morning and 1 capsule (300 mg total) before bedtime. WITH FOOD.      latanoprost 0.005 % Ophthalmic Solution Place 1 drop into both eyes nightly. 7.5 mL 0    albuterol (VENTOLIN HFA) 108 (90 Base) MCG/ACT Inhalation Aero Soln Inhale 2 puffs into the lungs every 4 (four) hours as needed for Wheezing. 51 g 1    metFORMIN HCl 1000 MG Oral Tab Take 0.5 tablets (500 mg total) by mouth 2 (two) times daily with meals. 90 tablet 3    diclofenac 1 % External Gel       levothyroxine 137 MCG Oral Tab Take 137 mcg by mouth before breakfast. 90 tablet 3    Accu-Chek Softclix Lancets Does not apply Misc in the morning and in the evening and before bedtime. (Patient not taking: Reported on 5/8/2025)      Glucose Blood (ACCU-CHEK GUIDE) In Vitro Strip 1 strip by In Vitro route in the  morning and 1 strip in the evening and 1 strip before bedtime. (Patient not taking: Reported on 5/8/2025)      fluticasone-umeclidin-vilant (TRELEGY ELLIPTA) 200-62.5-25 MCG/ACT Inhalation Aerosol Powder, Breath Activated Inhale 1 puff into the lungs daily. 180 each 3    DULoxetine 30 MG Oral Cap DR Particles Take 1 capsule (30 mg total) by mouth daily. (Patient not taking: Reported on 5/8/2025) 90 capsule 3    Insulin Pen Needle (BD PEN NEEDLE RODERICK 2ND GEN) 32G X 4 MM Does not apply Misc Inject 1 each as directed daily. (Patient not taking: Reported on 5/8/2025) 90 each 3    clopidogrel 75 MG Oral Tab Take 1 tablet (75 mg total) by mouth daily. 90 tablet 3    JARDIANCE 25 MG Oral Tab TAKE ONE TABLET BY MOUTH ONE TIME DAILY (Patient not taking: Reported on 5/8/2025) 90 tablet 0    Potassium Chloride ER 20 MEQ Oral Tab CR Take 1 tablet by mouth daily. 90 tablet 0    Blood Glucose Monitoring Suppl Does not apply Kit Please use to check blood sugar twice daily. (Patient not taking: Reported on 5/8/2025) 1 kit 0    Blood Glucose Monitoring Suppl Does not apply Misc Please use to check blood sugar twice daily (Patient not taking: Reported on 5/8/2025) 200 each 3    Blood Glucose Monitoring Suppl Does not apply Misc Please use to check blood sugar twice daily (Patient not taking: Reported on 5/8/2025) 200 each 0    oxyCODONE-acetaminophen  MG Oral Tab Take 1 tablet by mouth as needed in the morning and 1 tablet as needed at noon and 1 tablet as needed in the evening and 1 tablet as needed before bedtime.      aspirin 81 MG Oral Tab EC Take 1 tablet (81 mg total) by mouth daily. 30 tablet 2     No current facility-administered medications for this visit.       Review of Systems     GENERAL HEALTH: feels well otherwise  SKIN: denies any unusual skin lesions or rashes  RESPIRATORY: denies shortness of breath with exertion  CARDIOVASCULAR: denies chest pain on exertion  GI: denies abdominal pain and denies  heartburn  : denies any burning with urination, urinary frequency or urgency  NEURO: denies headaches, numbness or tingling, mental status changes  PSYCH: denies depressed mood, anxiety  MUSC: denies muscle aches, joint pain    Physical Exam     BP 95/52 (BP Location: Left arm, Patient Position: Sitting, Cuff Size: large)   Pulse 82   Temp 97.8 °F (36.6 °C) (Temporal)   Resp 18   Ht 5' 9\" (1.753 m)   Wt 267 lb 3.2 oz (121.2 kg)   SpO2 93%   BMI 39.46 kg/m²     GENERAL: well developed, well nourished,in no apparent distress  SKIN: no rashes,no suspicious lesions  HEENT: atraumatic, normocephalic,ears and throat are clear  NECK: supple,no adenopathy,no bruits  LUNGS: clear to auscultation  CARDIO: RRR without murmur  GI: good BS's,no masses, HSM or tenderness  EXTREMITIES: no cyanosis, clubbing or edema    Assessment and Plan     Assessment & Plan  Type 2 diabetes mellitus with hyperglycemia, with long-term current use of insulin ([facility])  Will defer the management of the patient's diabetes to endocrinology.  However, today we will recheck the hemoglobin A1c and urine microalbumin.  Will do foot exam at future visit.  Ophthalmology referral given.  Orders:    Comp Metabolic Panel (14); Future    Lipid Panel; Future    Vitamin D; Future    TSH W Reflex To Free T4; Future    Hemoglobin A1C; Future    Microalb/Creat Ratio, Random Urine; Future    OPHTHALMOLOGY - INTERNAL    Vitamin B12 [E]; Future    Drug-induced constipation  Continue Linzess as needed.  Orders:    Comp Metabolic Panel (14); Future    Lipid Panel; Future    Vitamin D; Future    TSH W Reflex To Free T4; Future    Hemoglobin A1C; Future    Microalb/Creat Ratio, Random Urine; Future    OPHTHALMOLOGY - INTERNAL    Vitamin B12 [E]; Future    Tooth decay  Will need to follow with a dentist.  He will need to find one in the community.  The dentist will have to confer with his cardiologist with regard to his dual antiplatelet therapy.  Orders:    Comp  Metabolic Panel (14); Future    Lipid Panel; Future    Vitamin D; Future    TSH W Reflex To Free T4; Future    Hemoglobin A1C; Future    Microalb/Creat Ratio, Random Urine; Future    OPHTHALMOLOGY - INTERNAL    Vitamin B12 [E]; Future    Pulmonary embolism associated with COVID-19 (HCC)  Was recently taken off of anticoagulation by hematology.  Orders:    Comp Metabolic Panel (14); Future    Lipid Panel; Future    Vitamin D; Future    TSH W Reflex To Free T4; Future    Hemoglobin A1C; Future    Microalb/Creat Ratio, Random Urine; Future    OPHTHALMOLOGY - INTERNAL    Vitamin B12 [E]; Future    Chronic obstructive pulmonary disease with acute exacerbation (HCC)  Continue current inhalers.  Orders:    Comp Metabolic Panel (14); Future    Lipid Panel; Future    Vitamin D; Future    TSH W Reflex To Free T4; Future    Hemoglobin A1C; Future    Microalb/Creat Ratio, Random Urine; Future    OPHTHALMOLOGY - INTERNAL    Vitamin B12 [E]; Future    Hyperlipidemia LDL goal <70  Continue atorvastatin 80 milligrams daily.  Orders:    Comp Metabolic Panel (14); Future    Lipid Panel; Future    Vitamin D; Future    TSH W Reflex To Free T4; Future    Hemoglobin A1C; Future    Microalb/Creat Ratio, Random Urine; Future    OPHTHALMOLOGY - INTERNAL    Vitamin B12 [E]; Future    Primary hypertension  Blood pressure 95/52 today.  This is likely due to to a combination of the Entresto and carvedilol.  Patient is asymptomatic.  Orders:    Comp Metabolic Panel (14); Future    Lipid Panel; Future    Vitamin D; Future    TSH W Reflex To Free T4; Future    Hemoglobin A1C; Future    Microalb/Creat Ratio, Random Urine; Future    OPHTHALMOLOGY - INTERNAL    Vitamin B12 [E]; Future    Coronary artery disease involving native coronary artery of native heart with angina pectoris  Continues to follow with cardiology.  Recently had an angiogram done, with 2 stents placed.  Continues on Entresto, atorvastatin, carvedilol, clopidogrel, aspirin.  He is  not on the Jardiance.  He does continue on the Ozempic.  Orders:    Comp Metabolic Panel (14); Future    Lipid Panel; Future    Vitamin D; Future    TSH W Reflex To Free T4; Future    Hemoglobin A1C; Future    Microalb/Creat Ratio, Random Urine; Future    OPHTHALMOLOGY - INTERNAL    Vitamin B12 [E]; Future    DIRK (obstructive sleep apnea)  Using the CPAP machine.  Orders:    Comp Metabolic Panel (14); Future    Lipid Panel; Future    Vitamin D; Future    TSH W Reflex To Free T4; Future    Hemoglobin A1C; Future    Microalb/Creat Ratio, Random Urine; Future    OPHTHALMOLOGY - INTERNAL    Vitamin B12 [E]; Future    Hypothyroidism, unspecified type  He continues on levothyroxine 137 mcg daily.  Recheck TSH.  Orders:    Comp Metabolic Panel (14); Future    Lipid Panel; Future    Vitamin D; Future    TSH W Reflex To Free T4; Future    Hemoglobin A1C; Future    Microalb/Creat Ratio, Random Urine; Future    OPHTHALMOLOGY - INTERNAL    Vitamin B12 [E]; Future    Iron deficiency anemia, unspecified iron deficiency anemia type  Continues on iron daily.  Has refused endoscopic evaluation.  Following with hematology.       Acute on chronic systolic congestive heart failure (HCC)  Therapy includes Entresto 24-26 mg twice daily, carvedilol 12.5 mg twice daily.  He is off the Jardiance.  He continues on torsemide 20 mg daily for fluid management.            Current Medications[6]    Requested Prescriptions      No prescriptions requested or ordered in this encounter       Orders Placed This Encounter   Procedures    Comp Metabolic Panel (14)     Standing Status:   Future     Number of Occurrences:   1     Expected Date:   5/8/2025     Expiration Date:   5/8/2026    Lipid Panel     Standing Status:   Future     Number of Occurrences:   1     Expected Date:   5/8/2025     Expiration Date:   5/8/2026     Release to patient:   Immediate    Vitamin D     Standing Status:   Future     Number of Occurrences:   1     Expected Date:    5/8/2025     Expiration Date:   5/8/2026     Please pick the scenario that best fits the purpose for ordering this test:   General Screening/Vit D deficiency (25-Hydroxy)     Release to patient:   Immediate    TSH W Reflex To Free T4     Standing Status:   Future     Number of Occurrences:   1     Expected Date:   5/8/2025     Expiration Date:   5/8/2026     Release to patient:   Immediate    Hemoglobin A1C     Standing Status:   Future     Number of Occurrences:   1     Expected Date:   5/8/2025     Expiration Date:   5/8/2026     Release to patient:   Immediate    Microalb/Creat Ratio, Random Urine     Standing Status:   Future     Number of Occurrences:   1     Expected Date:   5/8/2025     Expiration Date:   5/8/2026     Release to patient:   Immediate    Vitamin B12 [E]     Standing Status:   Future     Number of Occurrences:   1     Expected Date:   5/8/2025     Expiration Date:   5/8/2026     Release to patient:   Immediate       Return in about 3 months (around 8/8/2025) for follow up .    The patient indicates understanding of these issues and agrees to the plan.    Electronically signed by Charlotte Rey MD 05/08/25           [1]   Past Medical History:   Anxiety disorder, unspecified    Anxiety state    Atherosclerosis of coronary artery    stents x 6    Chronic low back pain with bilateral sciatica, left worse than right    Chronic obstructive pulmonary disease, unspecified (HCC)    Coronary atherosclerosis    Depression    Diabetes (HCC)    borderline     Disorder of thyroid    Essential hypertension    Glaucoma    first visit with IRMA, patient was taking Timolol OU BID for 20 years, has not used gtts in over 1 year because he ran out of gtts and had no refills, fundus photos taken today    Heart attack (HCC)    High blood pressure    High cholesterol    Hyperlipidemia    Kidney stone    Left Sided Neck pain, acute    Lumbar stenosis with neurogenic claudication    Major depressive disorder, recurrent,  unspecified    Morbid obesity with BMI of 40.0-44.9, adult (HCC)    Neuropathy    Pneumonia due to COVID-19 virus    Thyroid disease   [2]   Past Surgical History:  Procedure Laterality Date    Cataract extraction extracapsular w/ intraocular lens implantation Right 2018    [place]    Cath bare metal stent (bms)      Circumcision,othr  08/04/2020    Dr. Bonilla @ [facility]    Cysto/uretero w/lithotripsy Left 08/04/2020    Dr. 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     Iridotomy/iridectomy by laser      unknown Dr    Other      cardiac stents   [3]   Family History  Problem Relation Age of Onset    Heart Attack Father     Hypertension Brother     Glaucoma Neg     Macular degeneration Neg    [4]   Social History  Socioeconomic History    Marital status: Single   Tobacco Use    Smoking status: Every Day     Current packs/day: 1.00     Types: Cigarettes    Smokeless tobacco: Former    Tobacco comments:     per pt, quit in 1994   Vaping Use    Vaping status: Former   Substance and Sexual Activity    Alcohol use: No     Comment: quit in 1994    Drug use: No   Other Topics Concern    Caffeine Concern Yes    Exercise No   Social History Narrative    The patient uses the following assistive device(s):  single-point cane.      The patient does live in a home with stairs.     Social Drivers of Health     Food Insecurity: No Food Insecurity (12/5/2024)    Food Insecurity     Food Insecurity: Never true   Transportation Needs: No Transportation Needs (12/10/2024)    Transportation Needs     Lack of Transportation: No   Housing Stability: Low Risk  (12/5/2024)    Housing Stability     Housing Instability: No   [5] No Known Allergies  [6]    torsemide 20 MG Oral Tab Take 1 tablet (20 mg total) by mouth daily. 90 tablet 3    OZEMPIC, 0.25 OR 0.5 MG/DOSE, 2 MG/3ML Subcutaneous Solution Pen-injector Inject 0.5mg into the skin once a week. 9 mL 0    ferrous sulfate 325 (65 FE) MG  Oral Tab EC Take 1 tablet (325 mg total) by mouth daily with breakfast.      Ergocalciferol (VITAMIN D2 OR) Take by mouth once a week.      carvedilol 12.5 MG Oral Tab Take 1 tablet (12.5 mg total) by mouth 2 (two) times daily with meals. 60 tablet 11    atorvastatin 80 MG Oral Tab Take 1 tablet (80 mg total) by mouth daily. 30 tablet 11    sacubitril-valsartan (ENTRESTO) 24-26 MG Oral Tab Take 1 tablet by mouth 2 (two) times daily. 60 tablet 11    pregabalin 300 MG Oral Cap Take 1 capsule (300 mg total) by mouth in the morning and 1 capsule (300 mg total) before bedtime. WITH FOOD.      latanoprost 0.005 % Ophthalmic Solution Place 1 drop into both eyes nightly. 7.5 mL 0    albuterol (VENTOLIN HFA) 108 (90 Base) MCG/ACT Inhalation Aero Soln Inhale 2 puffs into the lungs every 4 (four) hours as needed for Wheezing. 51 g 1    metFORMIN HCl 1000 MG Oral Tab Take 0.5 tablets (500 mg total) by mouth 2 (two) times daily with meals. 90 tablet 3    levothyroxine 137 MCG Oral Tab Take 137 mcg by mouth before breakfast. 90 tablet 3    fluticasone-umeclidin-vilant (TRELEGY ELLIPTA) 200-62.5-25 MCG/ACT Inhalation Aerosol Powder, Breath Activated Inhale 1 puff into the lungs daily. 180 each 3    clopidogrel 75 MG Oral Tab Take 1 tablet (75 mg total) by mouth daily. 90 tablet 3    aspirin 81 MG Oral Tab EC Take 1 tablet (81 mg total) by mouth daily. 30 tablet 2

## 2025-05-08 NOTE — ASSESSMENT & PLAN NOTE
Continue atorvastatin 80 milligrams daily.  Orders:    Comp Metabolic Panel (14); Future    Lipid Panel; Future    Vitamin D; Future    TSH W Reflex To Free T4; Future    Hemoglobin A1C; Future    Microalb/Creat Ratio, Random Urine; Future    OPHTHALMOLOGY - INTERNAL    Vitamin B12 [E]; Future

## 2025-05-12 ENCOUNTER — OFFICE VISIT (OUTPATIENT)
Dept: ENDOCRINOLOGY CLINIC | Facility: CLINIC | Age: 75
End: 2025-05-12

## 2025-05-12 VITALS
WEIGHT: 268 LBS | HEART RATE: 84 BPM | DIASTOLIC BLOOD PRESSURE: 71 MMHG | HEIGHT: 69 IN | BODY MASS INDEX: 39.69 KG/M2 | SYSTOLIC BLOOD PRESSURE: 107 MMHG

## 2025-05-12 DIAGNOSIS — E55.9 VITAMIN D DEFICIENCY: ICD-10-CM

## 2025-05-12 DIAGNOSIS — E11.65 TYPE 2 DIABETES MELLITUS WITH HYPERGLYCEMIA, WITH LONG-TERM CURRENT USE OF INSULIN (HCC): ICD-10-CM

## 2025-05-12 DIAGNOSIS — R79.89 LOW TESTOSTERONE: ICD-10-CM

## 2025-05-12 DIAGNOSIS — I10 HYPERTENSION, UNSPECIFIED TYPE: ICD-10-CM

## 2025-05-12 DIAGNOSIS — R73.09 HIGH GLUCOSE LEVEL: ICD-10-CM

## 2025-05-12 DIAGNOSIS — Z79.4 TYPE 2 DIABETES MELLITUS WITH HYPERGLYCEMIA, WITH LONG-TERM CURRENT USE OF INSULIN (HCC): ICD-10-CM

## 2025-05-12 DIAGNOSIS — E78.5 HYPERLIPIDEMIA LDL GOAL <70: ICD-10-CM

## 2025-05-12 DIAGNOSIS — E11.65 UNCONTROLLED TYPE 2 DIABETES MELLITUS WITH HYPERGLYCEMIA (HCC): ICD-10-CM

## 2025-05-12 DIAGNOSIS — E03.9 HYPOTHYROIDISM, UNSPECIFIED TYPE: ICD-10-CM

## 2025-05-12 DIAGNOSIS — I25.119 CORONARY ARTERY DISEASE INVOLVING NATIVE CORONARY ARTERY OF NATIVE HEART WITH ANGINA PECTORIS: ICD-10-CM

## 2025-05-12 DIAGNOSIS — R73.9 HYPERGLYCEMIA: ICD-10-CM

## 2025-05-12 DIAGNOSIS — E11.9 TYPE 2 DIABETES MELLITUS WITHOUT RETINOPATHY (HCC): ICD-10-CM

## 2025-05-12 DIAGNOSIS — I10 PRIMARY HYPERTENSION: ICD-10-CM

## 2025-05-12 DIAGNOSIS — E07.9 THYROID DISEASE: ICD-10-CM

## 2025-05-12 DIAGNOSIS — E11.65 HYPERGLYCEMIA DUE TO DIABETES MELLITUS (HCC): ICD-10-CM

## 2025-05-12 DIAGNOSIS — E66.01 MORBID OBESITY WITH BMI OF 40.0-44.9, ADULT (HCC): ICD-10-CM

## 2025-05-12 LAB
GLUCOSE BLOOD: 154
TEST STRIP LOT #: NORMAL NUMERIC

## 2025-05-12 RX ORDER — DAPAGLIFLOZIN 10 MG/1
10 TABLET, FILM COATED ORAL DAILY
Qty: 90 TABLET | Refills: 0 | Status: SHIPPED | OUTPATIENT
Start: 2025-05-12 | End: 2025-08-10

## 2025-05-12 RX ORDER — LANCETS 33 GAUGE
1 EACH MISCELLANEOUS DAILY
Qty: 100 EACH | Refills: 1 | Status: SHIPPED | OUTPATIENT
Start: 2025-05-12

## 2025-05-12 RX ORDER — BLOOD SUGAR DIAGNOSTIC
1 STRIP MISCELLANEOUS 3 TIMES DAILY
Qty: 300 STRIP | Refills: 1 | Status: SHIPPED | OUTPATIENT
Start: 2025-05-12 | End: 2025-08-10

## 2025-05-12 RX ORDER — SEMAGLUTIDE 1.34 MG/ML
1 INJECTION, SOLUTION SUBCUTANEOUS WEEKLY
Qty: 9 ML | Refills: 1 | Status: SHIPPED | OUTPATIENT
Start: 2025-05-12

## 2025-05-12 NOTE — PROGRESS NOTES
Follow up for DM, hypothyroid, HF .    Requesting Physician:   ..Charlotte Rey MD      CHIEF COMPLAINT:    Chief Complaint   Patient presents with    Hypothyroidism     F/u        HISTORY OF PRESENT ILLNESS:   Caleb Rausch is a 75 year old male who presents with     Ws seen with his son     Pt was seen before by Dr Barahona   I reviewed notes, labs and w/u  He does not know his meds. No meter today   Uses walker   We reviewed and discussed outside labs.   B12 levels are good    He did not take insulin for a month     He is on  Metfromin 500 mg bid    Ozempic 0.5 mg  Q wk       Lab Results   Component Value Date    A1C 6.5 (H) 2025    A1C 6.6 (H) 10/17/2024    A1C 8.3 (H) 2024    A1C 8.5 (H) 2023    A1C 9.0 (H) 2023           DM quality measures:  A1C/Blood pressure: as reported above   Nephropathy screening:   continue ace /arb rx.   LIPID screening:  . yes statin rx.   Last dilated eye exam: No data recorded   Exam shows retinopathy? No data recorded  Last diabetic foot exam: Last Foot Exam: 25    Dentist : recommend every 6m  Neuropathy: ?  CAD/ASCVD/PAD/CVA: HF       ASSESSMENT AND PLAN:  Uncontrolled complicated DM , hypothyroid, HF, obese, DLP  Very high risk for complications given his poor understanding of his diease and lives alone.   Will increase ozempic and stop insulin     Plan  2025 B    RTC in 3 mo     Ozempic 1 mg weekly   Will add farxiga if covered by insurance   Metformin 500 mg twice a day     Hold isnulin now     Annual eye exam and foot exam     Let us know if meds are not covered so we might get preAuth or change to preferred agent      GLP-1 agonists:  No personal or family history of MEN syndrome   No personal history pancreatitis   Patient counselled regarding side effects including injection site reactions, nausea, vomiting, diarrhea, pancreatitis, gastroparesis and rare side effect carlos Richard syndrome.    SGLT2 inhibitors  No UTI or yeast infection    Discussed side effects including UTI and fungal infections.   Discussed the importance of hydration.      If you have low blood sugar <70, take 15 grams of carb (8 oz juice or regular soda) and recheck in 15 minutes.    Follow up with podiatry and eye doctor annually.   Patients need to wear covered shoes all the time and check feet daily.   Bring your meter/BG log to the next visit.        We reviewed a very large amount of complicated data including BG target range  The patient remains at ongoing is at high risk for complications related to uncontrolled diabetes and treatment.  The patient requires a great deal of self-management and support. We expect the patient's risk to be reduced with the changes to the treatment plan that we recommended today.         PAST MEDICAL HISTORY:   Past Medical History:    Anxiety disorder, unspecified    Anxiety state    Atherosclerosis of coronary artery    stents x 6    Chronic low back pain with bilateral sciatica, left worse than right    Chronic obstructive pulmonary disease, unspecified (HCC)    Coronary atherosclerosis    Depression    Diabetes (HCC)    borderline     Disorder of thyroid    Essential hypertension    Glaucoma    first visit with IRMA, patient was taking Timolol OU BID for 20 years, has not used gtts in over 1 year because he ran out of gtts and had no refills, fundus photos taken today    Heart attack (HCC)    High blood pressure    High cholesterol    Hyperlipidemia    Kidney stone    Left Sided Neck pain, acute    Lumbar stenosis with neurogenic claudication    Major depressive disorder, recurrent, unspecified    Morbid obesity with BMI of 40.0-44.9, adult (HCC)    Neuropathy    Pneumonia due to COVID-19 virus    Thyroid disease       PAST SURGICAL HISTORY:   Past Surgical History:   Procedure Laterality Date    Cataract extraction extracapsular w/ intraocular lens implantation Right 2018    [facility] bare metal stent (bms)      Circumcision,othr   08/04/2020    Dr. Bonilla @ [facility]    Cysto/uretero w/lithotripsy Left 08/04/2020    Dr. 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     Iridotomy/iridectomy by laser      unknown     Other      cardiac stents       CURRENT MEDICATIONS:    Current Outpatient Medications   Medication Sig Dispense Refill    Lancets 33G Does not apply Misc 1 each daily. 100 each 1    Glucose Blood (BLOOD GLUCOSE TEST STRIPS 333) In Vitro Strip 1 each by In Vitro route 3 (three) times daily. 300 strip 1    Blood Gluc Meter Disp-Strips Does not apply Device Check BG 3x/day 1 each 0    dapagliflozin (FARXIGA) 10 MG Oral Tab Take 1 tablet (10 mg total) by mouth daily. 90 tablet 0    metFORMIN HCl 1000 MG Oral Tab Take 0.5 tablets (500 mg total) by mouth 2 (two) times daily with meals. 90 tablet 3    semaglutide (OZEMPIC, 1 MG/DOSE,) 4 MG/3ML Subcutaneous Solution Pen-injector Inject 1 mg into the skin once a week. 9 mL 1    atorvastatin 80 MG Oral Tab Take 1 tablet (80 mg total) by mouth daily. 30 tablet 11    levothyroxine 137 MCG Oral Tab Take 137 mcg by mouth before breakfast. 90 tablet 3    torsemide 20 MG Oral Tab Take 1 tablet (20 mg total) by mouth daily. 90 tablet 3    ferrous sulfate 325 (65 FE) MG Oral Tab EC Take 1 tablet (325 mg total) by mouth daily with breakfast.      Ergocalciferol (VITAMIN D2 OR) Take by mouth once a week.      carvedilol 12.5 MG Oral Tab Take 1 tablet (12.5 mg total) by mouth 2 (two) times daily with meals. 60 tablet 11    sacubitril-valsartan (ENTRESTO) 24-26 MG Oral Tab Take 1 tablet by mouth 2 (two) times daily. 60 tablet 11    pregabalin 300 MG Oral Cap Take 1 capsule (300 mg total) by mouth in the morning and 1 capsule (300 mg total) before bedtime. WITH FOOD.      latanoprost 0.005 % Ophthalmic Solution Place 1 drop into both eyes nightly. 7.5 mL 0    albuterol (VENTOLIN HFA) 108 (90 Base) MCG/ACT Inhalation Aero Soln Inhale 2 puffs into the lungs  every 4 (four) hours as needed for Wheezing. 51 g 1    fluticasone-umeclidin-vilant (TRELEGY ELLIPTA) 200-62.5-25 MCG/ACT Inhalation Aerosol Powder, Breath Activated Inhale 1 puff into the lungs daily. 180 each 3    clopidogrel 75 MG Oral Tab Take 1 tablet (75 mg total) by mouth daily. 90 tablet 3    Blood Glucose Monitoring Suppl Does not apply Kit Please use to check blood sugar twice daily. (Patient not taking: Reported on 5/8/2025) 1 kit 0    Blood Glucose Monitoring Suppl Does not apply Misc Please use to check blood sugar twice daily (Patient not taking: Reported on 5/8/2025) 200 each 3    Blood Glucose Monitoring Suppl Does not apply Misc Please use to check blood sugar twice daily (Patient not taking: Reported on 5/8/2025) 200 each 0    aspirin 81 MG Oral Tab EC Take 1 tablet (81 mg total) by mouth daily. 30 tablet 2       ALLERGIES:  No Known Allergies    SOCIAL HISTORY:    Social History     Socioeconomic History    Marital status: Single   Tobacco Use    Smoking status: Every Day     Current packs/day: 1.00     Types: Cigarettes    Smokeless tobacco: Former    Tobacco comments:     per pt, quit in 1994   Vaping Use    Vaping status: Former   Substance and Sexual Activity    Alcohol use: No     Comment: quit in 1994    Drug use: No   Other Topics Concern    Caffeine Concern Yes    Exercise No       FAMILY HISTORY:   Family History   Problem Relation Age of Onset    Heart Attack Father     Hypertension Brother     Glaucoma Neg     Macular degeneration Neg           PHYSICAL EXAM:   Height: 5' 9\" (175.3 cm) (05/12 1412)  Weight: 268 lb (121.6 kg) (05/12 1412)  BSA (Calculated - sq m): 2.34 sq meters (05/12 1412)  Pulse: 84 (05/12 1412)  BP: 107/71 (05/12 1412)  Temp: --  Do Not Use - Resp Rate: --  SpO2: --      Uses walker   DATA:     Pertinent data reviewed     Cholesterol: 134, done on 5/8/2025.  HDL Cholesterol: 31, done on 5/8/2025.  LDL Cholesterol: 78, done on 5/8/2025.  TriGlycerides 139, done on  2025.  Micro Albumen/Creatinine:    Lab Results   Component Value Date    MICROALBCREA 92.2 (H) 2025    MICROALBCREA 167.0 (H) 10/17/2024    MICROALBCREA 241.5 (H) 2023     Lab Results   Component Value Date    A1C 6.5 (H) 2025    A1C 6.6 (H) 10/17/2024    A1C 8.3 (H) 2024    A1C 8.5 (H) 2023    A1C 9.0 (H) 2023       Latest Reference Range & Units 25 14:25   Vitamin B12 211 - 911 pg/mL 714      Latest Reference Range & Units 25 14:25   TSH 0.550 - 4.780 uIU/mL 3.168            No results for input(s): \"TSH\", \"T4F\", \"T3F\", \"THYP\" in the last 72 hours.  No results found.        Orders Placed This Encounter   Procedures    POC HemoCue Glucose 201 (Finger stick glucose)     Orders Placed This Encounter    POC HemoCue Glucose 201 (Finger stick glucose)     Release to patient:   Immediate    Lancets 33G Does not apply Misc     Si each daily.     Dispense:  100 each     Refill:  1     The brand preferred by insurance. Thanks    Glucose Blood (BLOOD GLUCOSE TEST STRIPS 333) In Vitro Strip     Si each by In Vitro route 3 (three) times daily.     Dispense:  300 strip     Refill:  1     The brand preferred by insurance. Thanks    Blood Gluc Meter Disp-Strips Does not apply Device     Sig: Check BG 3x/day     Dispense:  1 each     Refill:  0     Glucose meter and testing strips. The brand preferred by insurance. Thanks    dapagliflozin (FARXIGA) 10 MG Oral Tab     Sig: Take 1 tablet (10 mg total) by mouth daily.     Dispense:  90 tablet     Refill:  0    metFORMIN HCl 1000 MG Oral Tab     Sig: Take 0.5 tablets (500 mg total) by mouth 2 (two) times daily with meals.     Dispense:  90 tablet     Refill:  3    semaglutide (OZEMPIC, 1 MG/DOSE,) 4 MG/3ML Subcutaneous Solution Pen-injector     Sig: Inject 1 mg into the skin once a week.     Dispense:  9 mL     Refill:  1          This is a specialized patient consultation in endocrinology and required comprehensive review  of prior records, as well as current evaluation, with time required for consideration of complex endocrine issues and consultation. For this visit, I personally interviewed the patient, and family member if accompanied, performed the pertinent parts of the history and physical examination. ROS included screening for appropriate endocrine conditions.   Today's diagnosis and plan were reviewed in detail with the patient who states understanding and agrees with plan. I discussed with the patient possible diagnosis, differential diagnosis, need for work up , treatment options, alternatives and side effects.     Please see note for details about time spent which includes:   · pre-visit preparation  · reviewing records  · face to face time with the patient   · timely documentation of the encounter  · ordering medications/tests  · communication with care team  · care coordination    I appreciate the opportunity to be part of your patient's medical care and will keep you, as the referring and primary physicians, informed about the care of your patient, including possible future surgery and pathology findings. Please feel free to contact me should you have any questions.    Total time 40 minutes   Ben Pérez MD

## 2025-05-12 NOTE — PATIENT INSTRUCTIONS
2025 B    RTC in 3 mo     Ozempic 1 mg weekly   Will add farxiga if covered by insurance   Metformin 500 mg twice a day     Hold isnulin now     Annual eye exam and foot exam     Let us know if meds are not covered so we might get preAuth or change to preferred agent      GLP-1 agonists:  No personal or family history of MEN syndrome   No personal history pancreatitis   Patient counselled regarding side effects including injection site reactions, nausea, vomiting, diarrhea, pancreatitis, gastroparesis and rare side effect carlos Richard syndrome.    SGLT2 inhibitors  No UTI or yeast infection   Discussed side effects including UTI and fungal infections.   Discussed the importance of hydration.      If you have low blood sugar <70, take 15 grams of carb (8 oz juice or regular soda) and recheck in 15 minutes.    Follow up with podiatry and eye doctor annually.   Patients need to wear covered shoes all the time and check feet daily.   Bring your meter/BG log to the next visit.

## 2025-05-14 ENCOUNTER — TELEPHONE (OUTPATIENT)
Facility: LOCATION | Age: 75
End: 2025-05-14

## 2025-05-14 ENCOUNTER — TELEPHONE (OUTPATIENT)
Dept: ENDOCRINOLOGY CLINIC | Facility: CLINIC | Age: 75
End: 2025-05-14

## 2025-05-14 NOTE — TELEPHONE ENCOUNTER
dapagliflozin (FARXIGA) 10 MG Oral Tab Take 1 tablet (10 mg total) by mouth daily. 90 tablet 0    Drug: Dapagliflozin Propanediol 10 mg tab    Key [id number]

## 2025-05-19 ENCOUNTER — TELEPHONE (OUTPATIENT)
Dept: INTERNAL MEDICINE CLINIC | Facility: CLINIC | Age: 75
End: 2025-05-19

## 2025-05-19 ENCOUNTER — TELEPHONE (OUTPATIENT)
Dept: ENDOCRINOLOGY CLINIC | Facility: CLINIC | Age: 75
End: 2025-05-19

## 2025-05-19 DIAGNOSIS — E03.9 HYPOTHYROIDISM, UNSPECIFIED TYPE: Primary | ICD-10-CM

## 2025-05-19 NOTE — TELEPHONE ENCOUNTER
The patient called to speak with a nurse in regards to starting testosterone treatment. Please call.

## 2025-05-19 NOTE — TELEPHONE ENCOUNTER
Patient called, verified Name and . Requesting testosterone therapy treatment. Advised to call Endocrinology to schedule an appointment. Given Endo contact information. He will call to schedule an appointment. Patient verbalized understanding and had no further questions at this time.

## 2025-05-22 ENCOUNTER — TELEPHONE (OUTPATIENT)
Dept: INTERNAL MEDICINE CLINIC | Facility: CLINIC | Age: 75
End: 2025-05-22

## 2025-05-22 NOTE — TELEPHONE ENCOUNTER
Spoke with patient, Date of Birth verified  He is calling to get Endo information, pt provided as requested.   See telephone encounter 5-19-25.      Future Appointments   Date Time Provider Department Center   8/8/2025  1:00 PM Charlotte Rey MD [facility]   8/15/2025 11:30 AM Ben Pérez MD [facility]   4/8/2026 11:30 AM Bertin Joe FNP-C [facility]   4/9/2026 10:45 AM [facility]

## 2025-05-23 NOTE — TELEPHONE ENCOUNTER
Unable to submit Prior Authorization via [employer], [employer], and [employer].    Please follow up on processing Prior Authorization via phone call to insurance.  Thank you.

## 2025-05-27 NOTE — TELEPHONE ENCOUNTER
Spoke with patient states he has been experiencing symptoms of low testosterone: fatigue, low energy, poor concentration. He is requesting for testosterone lab order to be done, states 4-5 years ago he was on testosterone injections.

## 2025-05-27 NOTE — TELEPHONE ENCOUNTER
Labs fasting am after good night sleep   Schedule an appt sooner in 1-2 weeks to discuss result  thanks

## 2025-05-28 NOTE — TELEPHONE ENCOUNTER
Dr. Pérez,  No appts available in [place]. OK to overbook? Pt does not have MyChart. Please advise.    RN spoke with pt, verified name and . Pt picked up a \"bunch\" of prescriptions at the pharmacy yesterday, but he is unsure if the Farxiga was one ow them. RN recommended pt check and let office know if he was unable to  Farxoga. RN provided lab instructions and told pt Dr. Pérez would like to see him in 1-2 weeks. No appts available, so RN told pt RN would call back. Pt verbalized understanding.    Spoke with Yolanda at [facility]. Med went through insurance for $0 copay.

## 2025-05-29 NOTE — TELEPHONE ENCOUNTER
Spoke with pt. Pt to complete labs today after fasting overnight. RN sked pt for an appt with Dr. Pérez on 6/5. DG office address provided. Pt verbalized understanding.

## 2025-05-30 ENCOUNTER — LAB ENCOUNTER (OUTPATIENT)
Dept: LAB | Facility: HOSPITAL | Age: 75
End: 2025-05-30
Attending: INTERNAL MEDICINE
Payer: MEDICAID

## 2025-05-30 DIAGNOSIS — E03.9 HYPOTHYROIDISM, UNSPECIFIED TYPE: ICD-10-CM

## 2025-05-30 PROCEDURE — 84410 TESTOSTERONE BIOAVAILABLE: CPT

## 2025-05-30 PROCEDURE — 36415 COLL VENOUS BLD VENIPUNCTURE: CPT

## 2025-06-02 ENCOUNTER — TELEPHONE (OUTPATIENT)
Dept: INTERNAL MEDICINE CLINIC | Facility: CLINIC | Age: 75
End: 2025-06-02

## 2025-06-02 NOTE — TELEPHONE ENCOUNTER
Patient called to inform Dr. Rey that they were informed at their appointment last Friday with the Eye Doctor that they have a Cataract.     Patient was informed that they needed to stay off a medication that the patient would like to go over with Dr. Rey.       Please call for more information.

## 2025-06-02 NOTE — TELEPHONE ENCOUNTER
RN =Please call the ophthalmologist tomorrow to clarify the surgery date, medication questions, and pre op  lab details.        RN contacted the patient, who stated that his pharmacy has clarified his Ozempic (prescribed by the endo). He got confused.   He lives with a friend, but he mainly takes care of the appointments and medications.     He mentioned he saw an eye specialist ([facility] in [place] on 5/22/25 and will have surgery in June. The ophthalmologist gave him a paper, but he lost it.   He needs a pre-op clearance.   He forgot the name of the ophthalmologist, but the office number is [phone].    RN called the above number twice, but both times nobody answered the phone and could not leave any messages.   RN found the information below online.    States that he had right eye cataract surgery 7 years ago. Now, they plan to do the left eye.            [place]  [id number] [place] [address]  [address]  Phone: [phone]  Fax: [phone]          Future Appointments   Date Time Provider Department Center   6/5/2025  1:00 PM Ben Pérez MD [facility] [facility]   8/8/2025  1:00 PM [facility]   8/15/2025 11:30 AM Ben Pérez MD [facility]O [facility]   4/8/2026 11:30 AM Bertin Joe FNP-C [facility] [place] [facility]   4/9/2026 10:45 AM [facility] [place] [facility]

## 2025-06-03 NOTE — TELEPHONE ENCOUNTER
Called [facility] [phone] ext. [phone].   Staff stated she could not find patient in their records, will need to know doctor's name that patient saw on 5/22/25.     Per chart review, referral was placed to ophthalmology, [facility] on 5/8/25.     RN called [facility] at [phone] left message to call office back with hours and patient name to see if patient has been seen there.    Spoke to patient, full name and date of birth verified.  Patient informed could not find who his surgeon is.     Patient is currently [place] [employer], he will search for the letter from eye doctor, if he cannot find it, he will go to the eye doctor's office and get another copy, then call us back to make pre-op appointment with Dr. Rey as soon as possible.

## 2025-06-03 NOTE — TELEPHONE ENCOUNTER
Patient called and said the number for us to call his eye surgeon is # [phone] ext [phone]. Please advise

## 2025-06-03 NOTE — TELEPHONE ENCOUNTER
Attempted call, but no answer, voicemail box could not accept messages.  Called patient and asked him to give us a number to his ophthalmologist.  He will check and call back.  He has eye surgery scheduled for 6/16/2025.

## 2025-06-04 LAB
SEX HORM BIND GLOB: 25.2 NMOL/L
TESTOST % FREE+WEAK BND: 25.5 %
TESTOST FREE+WEAK BND: 77.2 NG/DL
TESTOSTERONE TOT /MS: 302.9 NG/DL

## 2025-06-04 NOTE — TELEPHONE ENCOUNTER
Patient returned call. Message from below discussed with patient. Patient states that the message is incorrect. States he spoke to [facility] and provided them with all that information and has appointment scheduled for the 16th. Advised patient that he may want to reach out to [facility] to confirm. Patient verbalized understanding. No further questions at this time.

## 2025-06-04 NOTE — TELEPHONE ENCOUNTER
Dr Rey Office staff,    FYI...    I received a call from Kerri from [facility] telephone [phone]  Patient has been located in there system and he had called to schedule an appointment. Patient did not have his insurance information on hand and was told to call back.  Patient never called office back      I called patient at [phone]  No pickup and voicemail full

## 2025-06-04 NOTE — TELEPHONE ENCOUNTER
Patient called and states he has cataract surgery scheduled for 6/17/25 with Dr. Eaton with [facility]. I called the office to see what they needed to clear patient. They will fax the papers over to the [facility] office today.     Patient also inquiring about how long he needs to be off Plavix.     Phone: [phone]

## 2025-06-06 RX ORDER — LINACLOTIDE 72 UG/1
CAPSULE, GELATIN COATED ORAL
Qty: 30 CAPSULE | Refills: 0 | OUTPATIENT
Start: 2025-06-06

## 2025-06-06 NOTE — TELEPHONE ENCOUNTER
Please review; protocol failed/ has no protocol      Is refill appropriate?        linaCLOtide (LINZESS) 72 MCG Oral Cap         The original prescription was discontinued on 3/25/2025 by Astrid Dumont RN. Renewing this prescription may not be appropriate.    Sig: Take 72 mcg by mouth daily.

## 2025-06-09 ENCOUNTER — TELEPHONE (OUTPATIENT)
Dept: ENDOCRINOLOGY CLINIC | Facility: CLINIC | Age: 75
End: 2025-06-09

## 2025-06-09 ENCOUNTER — PATIENT OUTREACH (OUTPATIENT)
Dept: CASE MANAGEMENT | Age: 75
End: 2025-06-09

## 2025-06-09 RX ORDER — LINACLOTIDE 72 UG/1
72 CAPSULE, GELATIN COATED ORAL DAILY
Qty: 30 CAPSULE | Refills: 1 | Status: SHIPPED | OUTPATIENT
Start: 2025-06-09 | End: 2025-07-09

## 2025-06-10 NOTE — TELEPHONE ENCOUNTER
Please call pt with work up result     Normal testosterone levels    Latest Reference Range & Units 05/30/25 12:42   Sex Horm Bind Glob 19.3 - 76.4 nmol/L 25.2   Testost % Free+Weak Bnd 9.0 - 46.0 % 25.5   Testost Free+Weak Bnd 40.0 - 250.0 ng/dL 77.2   Testosterone Tot LC/.0 - 916.0 ng/dL 302.9       Thanks

## 2025-06-11 ENCOUNTER — MED REC SCAN ONLY (OUTPATIENT)
Dept: INTERNAL MEDICINE CLINIC | Facility: CLINIC | Age: 75
End: 2025-06-11

## 2025-06-12 ENCOUNTER — TELEPHONE (OUTPATIENT)
Dept: INTERNAL MEDICINE CLINIC | Facility: CLINIC | Age: 75
End: 2025-06-12

## 2025-06-12 NOTE — TELEPHONE ENCOUNTER
Received medical clearance form from [facility]. Patient was advised to schedule pre op exam within 30 days of procedure. Surgery date is for 6/17/2025, no pre op exam is scheduled. Progress notes, physical and forms were due by 6/3/25. Will send forms for scanning.

## 2025-06-13 ENCOUNTER — EKG ENCOUNTER (OUTPATIENT)
Dept: LAB | Facility: HOSPITAL | Age: 75
End: 2025-06-13
Attending: Nurse Practitioner
Payer: MEDICARE

## 2025-06-13 ENCOUNTER — TELEPHONE (OUTPATIENT)
Dept: INTERNAL MEDICINE CLINIC | Facility: CLINIC | Age: 75
End: 2025-06-13

## 2025-06-13 DIAGNOSIS — E11.65 TYPE 2 DIABETES MELLITUS WITH HYPERGLYCEMIA, WITH LONG-TERM CURRENT USE OF INSULIN (HCC): ICD-10-CM

## 2025-06-13 DIAGNOSIS — Z01.818 PRE-OPERATIVE EXAMINATION FOR INTERNAL MEDICINE: ICD-10-CM

## 2025-06-13 DIAGNOSIS — Z79.4 TYPE 2 DIABETES MELLITUS WITH HYPERGLYCEMIA, WITH LONG-TERM CURRENT USE OF INSULIN (HCC): ICD-10-CM

## 2025-06-13 PROBLEM — U07.1 COVID-19: Status: ACTIVE | Noted: 2022-01-22

## 2025-06-13 PROBLEM — R26.9 ABNORMAL GAIT: Status: ACTIVE | Noted: 2022-01-24

## 2025-06-13 PROBLEM — F33.9 RECURRENT MAJOR DEPRESSION: Status: ACTIVE | Noted: 2022-01-22

## 2025-06-13 PROBLEM — R27.9 INCOORDINATION: Status: ACTIVE | Noted: 2022-01-24

## 2025-06-13 LAB
ALBUMIN SERPL-MCNC: 4.6 G/DL (ref 3.2–4.8)
ALBUMIN/GLOB SERPL: 1.8 {RATIO} (ref 1–2)
ALP LIVER SERPL-CCNC: 75 U/L (ref 45–117)
ALT SERPL-CCNC: 15 U/L (ref 10–49)
ANION GAP SERPL CALC-SCNC: 8 MMOL/L (ref 0–18)
AST SERPL-CCNC: 11 U/L (ref ?–34)
ATRIAL RATE: 77 BPM
BILIRUB SERPL-MCNC: 0.4 MG/DL (ref 0.2–1.1)
BUN BLD-MCNC: 20 MG/DL (ref 9–23)
BUN/CREAT SERPL: 17.7 (ref 10–20)
CALCIUM BLD-MCNC: 9.7 MG/DL (ref 8.7–10.4)
CHLORIDE SERPL-SCNC: 106 MMOL/L (ref 98–112)
CO2 SERPL-SCNC: 27 MMOL/L (ref 21–32)
CREAT BLD-MCNC: 1.13 MG/DL (ref 0.7–1.3)
EGFRCR SERPLBLD CKD-EPI 2021: 69 ML/MIN/1.73M2 (ref 60–?)
EST. AVERAGE GLUCOSE BLD GHB EST-MCNC: 140 MG/DL (ref 68–126)
FASTING STATUS PATIENT QL REPORTED: NO
GLOBULIN PLAS-MCNC: 2.5 G/DL (ref 2–3.5)
GLUCOSE BLD-MCNC: 100 MG/DL (ref 70–99)
HBA1C MFR BLD: 6.5 % (ref ?–5.7)
OSMOLALITY SERPL CALC.SUM OF ELEC: 295 MOSM/KG (ref 275–295)
P AXIS: 43 DEGREES
P-R INTERVAL: 170 MS
POTASSIUM SERPL-SCNC: 3.5 MMOL/L (ref 3.5–5.1)
PROT SERPL-MCNC: 7.1 G/DL (ref 5.7–8.2)
Q-T INTERVAL: 450 MS
QRS DURATION: 166 MS
QTC CALCULATION (BEZET): 509 MS
R AXIS: -70 DEGREES
SODIUM SERPL-SCNC: 141 MMOL/L (ref 136–145)
T AXIS: 71 DEGREES
VENTRICULAR RATE: 77 BPM

## 2025-06-13 PROCEDURE — 93005 ELECTROCARDIOGRAM TRACING: CPT

## 2025-06-13 PROCEDURE — 80053 COMPREHEN METABOLIC PANEL: CPT

## 2025-06-13 PROCEDURE — 93010 ELECTROCARDIOGRAM REPORT: CPT | Performed by: INTERNAL MEDICINE

## 2025-06-13 PROCEDURE — 83036 HEMOGLOBIN GLYCOSYLATED A1C: CPT

## 2025-06-13 PROCEDURE — 36415 COLL VENOUS BLD VENIPUNCTURE: CPT

## 2025-06-13 NOTE — TELEPHONE ENCOUNTER
Called patient name and  verified. Informed the patient that [employer] [employer] has placed the order for his labs and instructed them to have them completed at the [facility] prior to his appointment on Monday. Patient understood and stated he will go within the next hour.

## 2025-06-13 NOTE — TELEPHONE ENCOUNTER
Spoke to pt. Provided results as written below by MD, he verbalized understanding. Denies further questions or concerns.

## 2025-06-16 PROBLEM — U07.1 ACUTE HYPOXEMIC RESPIRATORY FAILURE DUE TO COVID-19 (HCC): Status: RESOLVED | Noted: 2022-01-08 | Resolved: 2025-06-16

## 2025-06-16 PROBLEM — J44.1 COPD EXACERBATION (HCC): Status: RESOLVED | Noted: 2023-12-23 | Resolved: 2025-06-16

## 2025-06-16 PROBLEM — J96.01 ACUTE HYPOXEMIC RESPIRATORY FAILURE DUE TO COVID-19 (HCC): Status: RESOLVED | Noted: 2022-01-08 | Resolved: 2025-06-16

## 2025-06-16 PROBLEM — R09.02 HYPOXIA: Status: RESOLVED | Noted: 2023-12-26 | Resolved: 2025-06-16

## 2025-06-16 PROBLEM — R07.9 CHEST PAIN WITH HIGH RISK FOR CARDIAC ETIOLOGY: Status: RESOLVED | Noted: 2024-01-18 | Resolved: 2025-06-16

## 2025-06-16 PROBLEM — R06.00 DYSPNEA, UNSPECIFIED TYPE: Status: RESOLVED | Noted: 2023-06-20 | Resolved: 2025-06-16

## 2025-06-18 ENCOUNTER — TELEPHONE (OUTPATIENT)
Dept: INTERNAL MEDICINE CLINIC | Facility: CLINIC | Age: 75
End: 2025-06-18

## 2025-06-18 NOTE — TELEPHONE ENCOUNTER
Spoke with patient,  verified, he is returing our call.   He was informed of Ghada LÓPEZ recommendation, pt stated understanding.  He already had pre op labs and EKG done.   Warm transferred to [facility] staff.  See lab result 6-3-25        Future Appointments   Date Time Provider Department Center   2025  3:40 PM [facility]   2025  1:00 PM [facility]   8/15/2025 11:30 AM [facility]   2026 11:30 AM Bertin Joe FNP-C [facility] [place] [facility]   2026 10:45 AM [facility] [facility]

## 2025-06-18 NOTE — TELEPHONE ENCOUNTER
He did not show up for his required pre-op office visit this week.  He requires cardiac clearance per the dental office.  He needs to schedule an appointment with an internal med provider, as well as his cardiologist before he can be cleared.

## 2025-06-18 NOTE — TELEPHONE ENCOUNTER
To RAMESH Gregory...    Patient calling (verified full name and date of birth).  Stated he completed  pre-op labs and EKG on 6-13-25 and is waiting for the results which need to be faxed to ATTN: Elvira at [facility] office fax [phone]

## 2025-06-25 ENCOUNTER — TELEPHONE (OUTPATIENT)
Dept: INTERNAL MEDICINE CLINIC | Facility: CLINIC | Age: 75
End: 2025-06-25

## 2025-06-25 ENCOUNTER — OFFICE VISIT (OUTPATIENT)
Dept: INTERNAL MEDICINE CLINIC | Facility: CLINIC | Age: 75
End: 2025-06-25

## 2025-06-25 VITALS
OXYGEN SATURATION: 94 % | RESPIRATION RATE: 18 BRPM | TEMPERATURE: 98 F | HEART RATE: 82 BPM | WEIGHT: 263.63 LBS | DIASTOLIC BLOOD PRESSURE: 70 MMHG | SYSTOLIC BLOOD PRESSURE: 110 MMHG | HEIGHT: 69 IN | BODY MASS INDEX: 39.05 KG/M2

## 2025-06-25 DIAGNOSIS — Z59.10 INADEQUATE HOUSING, UNSPECIFIED: ICD-10-CM

## 2025-06-25 DIAGNOSIS — Z59.87 MATERIAL HARDSHIP DUE TO LIMITED FINANCIAL RESOURCES, NOT ELSEWHERE CLASSIFIED: ICD-10-CM

## 2025-06-25 DIAGNOSIS — E11.65 TYPE 2 DIABETES MELLITUS WITH HYPERGLYCEMIA, WITH LONG-TERM CURRENT USE OF INSULIN (HCC): ICD-10-CM

## 2025-06-25 DIAGNOSIS — I25.119 CORONARY ARTERY DISEASE INVOLVING NATIVE CORONARY ARTERY OF NATIVE HEART WITH ANGINA PECTORIS: Primary | ICD-10-CM

## 2025-06-25 DIAGNOSIS — F32.0 CURRENT MILD EPISODE OF MAJOR DEPRESSIVE DISORDER WITHOUT PRIOR EPISODE: ICD-10-CM

## 2025-06-25 DIAGNOSIS — Z01.818 ENCOUNTER FOR PRE-OPERATIVE EXAMINATION: ICD-10-CM

## 2025-06-25 DIAGNOSIS — Z59.41 FOOD INSECURITY: ICD-10-CM

## 2025-06-25 DIAGNOSIS — Z79.4 TYPE 2 DIABETES MELLITUS WITH HYPERGLYCEMIA, WITH LONG-TERM CURRENT USE OF INSULIN (HCC): ICD-10-CM

## 2025-06-25 PROCEDURE — 99214 OFFICE O/P EST MOD 30 MIN: CPT | Performed by: INTERNAL MEDICINE

## 2025-06-25 RX ORDER — VENLAFAXINE HYDROCHLORIDE 75 MG/1
75 CAPSULE, EXTENDED RELEASE ORAL 2 TIMES DAILY
Qty: 180 CAPSULE | Refills: 1 | Status: SHIPPED | OUTPATIENT
Start: 2025-06-25

## 2025-06-25 RX ORDER — VENLAFAXINE HYDROCHLORIDE 75 MG/1
75 CAPSULE, EXTENDED RELEASE ORAL DAILY
Qty: 90 CAPSULE | Refills: 1 | Status: SHIPPED | OUTPATIENT
Start: 2025-06-25 | End: 2025-06-25

## 2025-06-25 SDOH — ECONOMIC STABILITY - FOOD INSECURITY: FOOD INSECURITY: Z59.41

## 2025-06-25 SDOH — ECONOMIC STABILITY - INCOME SECURITY: MATERIAL HARDSHIP DUE TO LIMITED FINANCIAL RESOURCES, NOT ELSEWHERE CLASSIFIED: Z59.87

## 2025-06-25 SDOH — ECONOMIC STABILITY - HOUSING INSECURITY: INADEQUATE HOUSING UNSPECIFIED: Z59.10

## 2025-06-25 NOTE — TELEPHONE ENCOUNTER
Pharmacy note: have received two Rxs for this medication - which one do you want?    Other Rx for 1QD which      Current Outpatient Medications:     venlafaxine ER 75 MG Oral Capsule SR 24 Hr, Take 1 capsule (75 mg total) by mouth in the morning and 1 capsule (75 mg total) before bedtime., Disp: 180 capsule, Rfl: 1

## 2025-06-25 NOTE — PROGRESS NOTES
The patient is a 75 year old male with complaints of:  Chief Complaint   Patient presents with    Pre-Op Exam       HPI:   Patient presents for medical clearance for cataract surgery  Surgeon is Naga Mancini   Procedure is to be done on TBD  Patient reports no symptoms of angina, arrhythmia or congestive heart failure.  There is a previous hx of cardiac or vascular issues.  Patient has a history of diabetes, coronary artery disease with multiple stents on dual antiplatelet therapy, heart failure with reduced ejection fraction, and a history of PE.     With regards to the cardiac history, patient is following with cardiology.  He recently had a cardiac cath done, with 2 stents placed.  He continues on dual antiplatelet therapy with aspirin and Plavix.    Furthermore, patient does have a history of diabetes.  He is following with endocrinology.  His last A1c was done relatively recently on 6/13/2025, was 6.5.     Patient also has a history of PE.  He has recently followed with hematology, who stopped the warfarin.    Patient states that he has breathing difficulty down\".  He ruminates often on the various hardships that he is experienced in his life.  He also feels like he has low energy.  He is inquiring about testosterone placement therapy, though he was informed that he had normal serum testosterone levels, and that the risk of hormone replacement therapy in this patient far outweighs the benefits.  He continues on duloxetine 30 mg twice daily, but is not deriving any benefit in terms of his pain control or his mood from it.    Past Medical History     Past Medical History[1]      Past Surgical History     Past Surgical History[2]      Family History     Family History[3]     Social History     Short Social Hx on File[4]     ALLERGIES:   Allergies[5]     CURRENT MEDS:   Current Outpatient Medications   Medication Sig Dispense Refill    DULoxetine 30 MG Oral Cap DR Particles TAKE TWO CAPSULES BY MOUTH DAILY  FOR NERVE PAIN      ketorolac 0.5 % Ophthalmic Solution       ofloxacin 0.3 % Ophthalmic Solution       oxyCODONE-acetaminophen  MG Oral Tab Take 1 tablet by mouth 4 (four) times daily as needed for Pain.      prednisoLONE 1 % Ophthalmic Suspension       linaCLOtide (LINZESS) 72 MCG Oral Cap Take 72 mcg by mouth daily. 30 capsule 1    Lancets 33G Does not apply Misc 1 each daily. 100 each 1    Glucose Blood (BLOOD GLUCOSE TEST STRIPS 333) In Vitro Strip 1 each by In Vitro route 3 (three) times daily. 300 strip 1    Blood Gluc Meter Disp-Strips Does not apply Device Check BG 3x/day 1 each 0    dapagliflozin (FARXIGA) 10 MG Oral Tab Take 1 tablet (10 mg total) by mouth daily. 90 tablet 0    metFORMIN HCl 1000 MG Oral Tab Take 0.5 tablets (500 mg total) by mouth 2 (two) times daily with meals. 90 tablet 3    semaglutide (OZEMPIC, 1 MG/DOSE,) 4 MG/3ML Subcutaneous Solution Pen-injector Inject 1 mg into the skin once a week. 9 mL 1    torsemide 20 MG Oral Tab Take 1 tablet (20 mg total) by mouth daily. 90 tablet 3    ferrous sulfate 325 (65 FE) MG Oral Tab EC Take 1 tablet (325 mg total) by mouth daily with breakfast.      Ergocalciferol (VITAMIN D2 OR) Take by mouth once a week.      carvedilol 12.5 MG Oral Tab Take 1 tablet (12.5 mg total) by mouth 2 (two) times daily with meals. 60 tablet 11    atorvastatin 80 MG Oral Tab Take 1 tablet (80 mg total) by mouth daily. 30 tablet 11    sacubitril-valsartan (ENTRESTO) 24-26 MG Oral Tab Take 1 tablet by mouth 2 (two) times daily. 60 tablet 11    pregabalin 300 MG Oral Cap Take 1 capsule (300 mg total) by mouth in the morning and 1 capsule (300 mg total) before bedtime. WITH FOOD.      latanoprost 0.005 % Ophthalmic Solution Place 1 drop into both eyes nightly. 7.5 mL 0    albuterol (VENTOLIN HFA) 108 (90 Base) MCG/ACT Inhalation Aero Soln Inhale 2 puffs into the lungs every 4 (four) hours as needed for Wheezing. 51 g 1    levothyroxine 137 MCG Oral Tab Take 137 mcg by  mouth before breakfast. 90 tablet 3    fluticasone-umeclidin-vilant (TRELEGY ELLIPTA) 200-62.5-25 MCG/ACT Inhalation Aerosol Powder, Breath Activated Inhale 1 puff into the lungs daily. 180 each 3    clopidogrel 75 MG Oral Tab Take 1 tablet (75 mg total) by mouth daily. 90 tablet 3    aspirin 81 MG Oral Tab EC Take 1 tablet (81 mg total) by mouth daily. 30 tablet 2    Blood Glucose Monitoring Suppl Does not apply Kit Please use to check blood sugar twice daily. (Patient not taking: Reported on 6/25/2025) 1 kit 0    Blood Glucose Monitoring Suppl Does not apply Misc Please use to check blood sugar twice daily (Patient not taking: Reported on 6/25/2025) 200 each 3    Blood Glucose Monitoring Suppl Does not apply Misc Please use to check blood sugar twice daily (Patient not taking: Reported on 6/25/2025) 200 each 0     No current facility-administered medications for this visit.          REVIEW OF SYSTEMS:     Review of Systems   Constitutional: Negative.    HENT: Negative.     Eyes: Negative.    Respiratory: Negative.     Cardiovascular: Negative.    Gastrointestinal:  Positive for constipation.   Genitourinary: Negative.    Musculoskeletal: Negative.    Skin: Negative.    Allergic/Immunologic: Negative.    Neurological: Negative.    Hematological: Negative.    Psychiatric/Behavioral: Negative.  Positive for depressed mood.        EXAM:     /70 (BP Location: Left arm, Patient Position: Sitting, Cuff Size: large)   Pulse 82   Temp 97.7 °F (36.5 °C) (Oral)   Resp 18   Ht 5' 9\" (1.753 m)   Wt 263 lb 9.6 oz (119.6 kg)   SpO2 94%   BMI 38.93 kg/m²     Physical Exam  Constitutional:       General: He is not in acute distress.     Appearance: He is obese.   HENT:      Head: Normocephalic and atraumatic.      Nose: Nose normal.      Mouth/Throat:      Mouth: Mucous membranes are moist.   Cardiovascular:      Rate and Rhythm: Normal rate and regular rhythm.   Pulmonary:      Effort: Pulmonary effort is normal.    Abdominal:      Palpations: Abdomen is soft.   Skin:     General: Skin is warm.   Neurological:      General: No focal deficit present.   Psychiatric:         Mood and Affect: Mood normal.         Behavior: Behavior normal.         Thought Content: Thought content normal.         Judgment: Judgment normal.          ASSESSMENT AND PLAN:   Caleb was seen today for pre-op exam.    Diagnoses and all orders for this visit:    Coronary artery disease involving native coronary artery of native heart with angina pectoris.  Will need to follow-up with cardiology given his complex coronary artery disease history regarding perioperative management of dual antiplatelet therapy.  A call was placed to [facility].  I personally spoke to Dr. Hammer's nurse, Marguerite.  She will get back to us regarding management of DAPT.    Encounter for pre-operative examination.  EKG, CMP, A1c completed 6/13/2025.  Await recommendations from cardiology.    Type 2 diabetes mellitus with hyperglycemia, with long-term current use of insulin (HCC).  Following with endocrinology.  Last A1c was done 6/13/2025, was 6.5.    Current mild episode of major depressive disorder without prior episode  -     Discontinue: venlafaxine ER 75 MG Oral Capsule SR 24 Hr; Take 1 capsule (75 mg total) by mouth daily.  -     venlafaxine ER 75 MG Oral Capsule SR 24 Hr; Take 1 capsule (75 mg total) by mouth in the morning and 1 capsule (75 mg total) before bedtime.        Current Medications[6]      Revised Cardiac Risk Index (modified Che Index):     High-risk surgical procedure (intraperitoneal, intrathoracic, suprainguinal vascular) No   History of MI or positive stress test, current chest pain secondary to myocardiac ischemia, current nitrate therapy, or EKG with pathologic Q wave yes   History of CHF, pulmonary edema, PND, current rales, S3 gallop, chest xray with pulmonary vascular redistribution yes   History of CVA/TIA No   Preoperative treatment with insulin No    Preoperative creatinine > 2.0 No   Score 2     Patient has a score of 2 on the RCRI, which confers a risk of 10.0% for MI, death, an cardiac arrest for 30 days perioperatively.     EKG: last done 6/13/2025 --> normal sinus rhythm, w/ RBBB and LAFB, LVH.  Notably, nonspecific T wave abnormality no longer evident in the inferior leads compared to EKG done in December 17, 2024.    This patient is considered intermediate risk for cardiopulmonary complications secondary to surgery.     Further recommendations to follow after hearing back from cardiology.    No follow-ups on file.    Electronically signed by Charlotte Rey MD         [1]   Past Medical History:   Anxiety disorder, unspecified    Anxiety state    Atherosclerosis of coronary artery    stents x 6    Chronic low back pain with bilateral sciatica, left worse than right    Chronic obstructive pulmonary disease, unspecified (HCC)    Coronary atherosclerosis    Depression    Diabetes (HCC)    borderline     Disorder of thyroid    Essential hypertension    Glaucoma    first visit with IRMA, patient was taking Timolol OU BID for 20 years, has not used gtts in over 1 year because he ran out of gtts and had no refills, fundus photos taken today    Heart attack (HCC)    High blood pressure    High cholesterol    Hyperlipidemia    Kidney stone    Left Sided Neck pain, acute    Lumbar stenosis with neurogenic claudication    Major depressive disorder, recurrent, unspecified    Morbid obesity with BMI of 40.0-44.9, adult (HCC)    Neuropathy    Pneumonia due to COVID-19 virus    Thyroid disease   [2]   Past Surgical History:  Procedure Laterality Date    Cataract extraction extracapsular w/ intraocular lens implantation Right 2018    [facility] bare metal stent (bms)      Circumcision,othr  08/04/2020    Dr. Bonilla @ [facility]    Cysto/uretero w/lithotripsy Left 08/04/2020    Dr. 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     Iridotomy/iridectomy by laser      unknown Dr    Other      cardiac stents   [3]   Family History  Problem Relation Age of Onset    Heart Attack Father     Hypertension Brother     Glaucoma Neg     Macular degeneration Neg    [4]   Social History  Socioeconomic History    Marital status: Single   Tobacco Use    Smoking status: Every Day     Current packs/day: 1.00     Types: Cigarettes    Smokeless tobacco: Former    Tobacco comments:     per pt, quit in 1994   Vaping Use    Vaping status: Former   Substance and Sexual Activity    Alcohol use: No     Comment: quit in 1994    Drug use: No   Other Topics Concern    Caffeine Concern Yes    Exercise No   Social History Narrative    The patient uses the following assistive device(s):  single-point cane.      The patient does live in a home with stairs.     Social Drivers of Health     Food Insecurity: Food Insecurity Present (6/25/2025)    NCSS - Food Insecurity     Worried About Running Out of Food in the Last Year: Yes     Ran Out of Food in the Last Year: Yes   Transportation Needs: No Transportation Needs (6/25/2025)    NCSS - Transportation     Lack of Transportation: No   Housing Stability: At Risk (6/25/2025)    NCSS - Housing/Utilities     Has Housing: Yes     Worried About Losing Housing: Yes     Unable to Get Utilities: Yes   [5] No Known Allergies  [6]    venlafaxine ER 75 MG Oral Capsule SR 24 Hr Take 1 capsule (75 mg total) by mouth in the morning and 1 capsule (75 mg total) before bedtime. 180 capsule 1    ketorolac 0.5 % Ophthalmic Solution       ofloxacin 0.3 % Ophthalmic Solution       oxyCODONE-acetaminophen  MG Oral Tab Take 1 tablet by mouth 4 (four) times daily as needed for Pain.      prednisoLONE 1 % Ophthalmic Suspension       linaCLOtide (LINZESS) 72 MCG Oral Cap Take 72 mcg by mouth daily. 30 capsule 1    Lancets 33G Does not apply Misc 1 each daily. 100 each 1    Glucose Blood (BLOOD GLUCOSE TEST STRIPS 333) In Vitro Strip 1  each by In Vitro route 3 (three) times daily. 300 strip 1    Blood Gluc Meter Disp-Strips Does not apply Device Check BG 3x/day 1 each 0    dapagliflozin (FARXIGA) 10 MG Oral Tab Take 1 tablet (10 mg total) by mouth daily. 90 tablet 0    metFORMIN HCl 1000 MG Oral Tab Take 0.5 tablets (500 mg total) by mouth 2 (two) times daily with meals. 90 tablet 3    semaglutide (OZEMPIC, 1 MG/DOSE,) 4 MG/3ML Subcutaneous Solution Pen-injector Inject 1 mg into the skin once a week. 9 mL 1    torsemide 20 MG Oral Tab Take 1 tablet (20 mg total) by mouth daily. 90 tablet 3    ferrous sulfate 325 (65 FE) MG Oral Tab EC Take 1 tablet (325 mg total) by mouth daily with breakfast.      Ergocalciferol (VITAMIN D2 OR) Take by mouth once a week.      carvedilol 12.5 MG Oral Tab Take 1 tablet (12.5 mg total) by mouth 2 (two) times daily with meals. 60 tablet 11    atorvastatin 80 MG Oral Tab Take 1 tablet (80 mg total) by mouth daily. 30 tablet 11    sacubitril-valsartan (ENTRESTO) 24-26 MG Oral Tab Take 1 tablet by mouth 2 (two) times daily. 60 tablet 11    pregabalin 300 MG Oral Cap Take 1 capsule (300 mg total) by mouth in the morning and 1 capsule (300 mg total) before bedtime. WITH FOOD.      latanoprost 0.005 % Ophthalmic Solution Place 1 drop into both eyes nightly. 7.5 mL 0    albuterol (VENTOLIN HFA) 108 (90 Base) MCG/ACT Inhalation Aero Soln Inhale 2 puffs into the lungs every 4 (four) hours as needed for Wheezing. 51 g 1    levothyroxine 137 MCG Oral Tab Take 137 mcg by mouth before breakfast. 90 tablet 3    fluticasone-umeclidin-vilant (TRELEGY ELLIPTA) 200-62.5-25 MCG/ACT Inhalation Aerosol Powder, Breath Activated Inhale 1 puff into the lungs daily. 180 each 3    clopidogrel 75 MG Oral Tab Take 1 tablet (75 mg total) by mouth daily. 90 tablet 3    aspirin 81 MG Oral Tab EC Take 1 tablet (81 mg total) by mouth daily. 30 tablet 2

## 2025-06-25 NOTE — PATIENT INSTRUCTIONS
Please STOP the duloxetine.     Please START venlafaxine 75 mg twice a day.     Follow up in 6 weeks.

## 2025-06-25 NOTE — PROGRESS NOTES
The following individual(s) verbally consented to be recorded using ambient AI listening technology and understand that they can each withdraw their consent to this listening technology at any point by asking the clinician to turn off or pause the recording:     Patient name: Caleb Rausch  Additional names: Dani Rausch

## 2025-06-26 ENCOUNTER — TELEPHONE (OUTPATIENT)
Dept: INTERNAL MEDICINE CLINIC | Facility: CLINIC | Age: 75
End: 2025-06-26

## 2025-06-26 NOTE — TELEPHONE ENCOUNTER
Cameron. Radha, could you place a call to the patient's ophtho, Dr. Mancini? I would like to discuss this patient with them

## 2025-06-26 NOTE — TELEPHONE ENCOUNTER
Marguerite calling form [facility] in regards to the patient's medications and upcoming cataract procedure    Marguerite spoke with Dr Hammer and he does NOT want the patient to stop talking ASA or Plavix with the surgery      Dr Rey-- Routing as For Your Information

## 2025-06-26 NOTE — TELEPHONE ENCOUNTER
LAYNE Larsen - [facility] called, verified patient's Name and . Requesting more information regarding the patient. States they received a message yesterday regarding request for appointment for clearance.    Chart reviewed. Relayed patient was seen yesterday for pre-op. Plan is for cataract surgery with Dr. Naga Mancini, date to be determined. Verbalized understanding and had no further questions at this time.

## 2025-06-26 NOTE — TELEPHONE ENCOUNTER
Chart reviewed. Once a day prescription was discontinued.  Spoke with pharmacist, Kiara. Informed once a day was discontinued and to dispense:  venlafaxine ER 75 MG Oral Capsule SR 24 Hr 180 capsule 1 6/25/2025 --   Sig:   Take 1 capsule (75 mg total) by mouth in the morning and 1 capsule (75 mg total) before bedtime.         Medication Quantity Refills Start End   venlafaxine ER 75 MG Oral Capsule SR 24 Hr (Discontinued) 90 capsule 1 6/25/2025 6/25/2025   Sig:   Take 1 capsule (75 mg total) by mouth daily.     Route:   Oral     Order #:   [id number]

## 2025-06-27 ENCOUNTER — PATIENT OUTREACH (OUTPATIENT)
Age: 75
End: 2025-06-27

## 2025-06-27 NOTE — PROGRESS NOTES
06/27/25 1041   Call Details   Call Attempt # 1   SDOH Domain(s) with needs Food Insecurity;Housing Instability;Utilities Concern   SDOH Free Text Details   Food Insecurity Details Patient shares concerns for food.   Housing Instability Details Patient shares being short on rent.   Utilities Concern Details Patient shares being behind on light bill.   Resources Provided   Resources Provided findhelp;Internet Search   Internet Search Details [facility] office [address] [place] [address] and [facility] [phone].     Patient shares owing $400 in rent. Patient shares interest in applying for SNAP benefits. Patient also shares having no car, will use Medicaid transportation services if needed for appts. Verbally provided patient with resources. Advised to call back with further concerns.

## 2025-07-02 RX ORDER — CLOPIDOGREL BISULFATE 75 MG/1
75 TABLET ORAL DAILY
Qty: 90 TABLET | Refills: 1 | Status: SHIPPED | OUTPATIENT
Start: 2025-07-02

## 2025-07-02 NOTE — TELEPHONE ENCOUNTER
For replies, please route to pool: [facility] CENTRAL REFILLS    Please review: medication fails/has no protocol attached.    Future Appointments   Date Time Provider Department Center   8/8/2025  1:00 PM Charlotte Rey MD [facility]

## 2025-07-06 NOTE — TELEPHONE ENCOUNTER
Patient calling to state Bailey told patient to call today for results.   
See Multiple TE for same request dated 5/11/24  
s/p hit right shoulder with an elevator door one week ago  right shoulder bruising and pain since then

## 2025-07-11 ENCOUNTER — PATIENT OUTREACH (OUTPATIENT)
Age: 75
End: 2025-07-11

## 2025-07-11 NOTE — PROGRESS NOTES
07/11/25 0937   Call Details   Call Attempt # 2   SDOH Domain(s) with needs Food Insecurity;Housing Instability;Utilities Concern   Resource Follow-up   Were resources received by patient? Select domain to document Food Insecurity;Housing Instability;Utilities Concern   Food Resources Received? Yes   Housing Resources Received? Yes   Utilities Resources Received? Yes   Outreach Outcome   SDOH Overall Outreach Outcome Complete     Spoke with patient to follow up on resources provided. Patient shares he has financial assistance and hung up the call. Closing program.

## 2025-07-16 ENCOUNTER — PATIENT OUTREACH (OUTPATIENT)
Dept: CASE MANAGEMENT | Age: 75
End: 2025-07-16

## 2025-07-25 RX ORDER — LEVOTHYROXINE SODIUM 137 UG/1
137 TABLET ORAL
Qty: 90 TABLET | Refills: 0 | OUTPATIENT
Start: 2025-07-25

## 2025-08-04 RX ORDER — DULOXETIN HYDROCHLORIDE 30 MG/1
CAPSULE, DELAYED RELEASE ORAL
COMMUNITY
Start: 2025-07-22 | End: 2025-08-08 | Stop reason: ALTCHOICE

## 2025-08-05 RX ORDER — LINACLOTIDE 72 UG/1
72 CAPSULE, GELATIN COATED ORAL DAILY
Qty: 90 CAPSULE | Refills: 0 | Status: SHIPPED | OUTPATIENT
Start: 2025-08-05 | End: 2025-09-04

## 2025-08-08 ENCOUNTER — TELEPHONE (OUTPATIENT)
Dept: INTERNAL MEDICINE CLINIC | Facility: CLINIC | Age: 75
End: 2025-08-08

## 2025-08-08 ENCOUNTER — OFFICE VISIT (OUTPATIENT)
Dept: INTERNAL MEDICINE CLINIC | Facility: CLINIC | Age: 75
End: 2025-08-08

## 2025-08-08 VITALS
WEIGHT: 262.5 LBS | SYSTOLIC BLOOD PRESSURE: 128 MMHG | HEIGHT: 69 IN | DIASTOLIC BLOOD PRESSURE: 76 MMHG | HEART RATE: 83 BPM | TEMPERATURE: 97 F | BODY MASS INDEX: 38.88 KG/M2 | OXYGEN SATURATION: 94 %

## 2025-08-08 DIAGNOSIS — I26.99 PULMONARY EMBOLISM ASSOCIATED WITH COVID-19 (HCC): ICD-10-CM

## 2025-08-08 DIAGNOSIS — E11.65 TYPE 2 DIABETES MELLITUS WITH HYPERGLYCEMIA, WITH LONG-TERM CURRENT USE OF INSULIN (HCC): ICD-10-CM

## 2025-08-08 DIAGNOSIS — U07.1 PULMONARY EMBOLISM ASSOCIATED WITH COVID-19 (HCC): ICD-10-CM

## 2025-08-08 DIAGNOSIS — Z79.4 TYPE 2 DIABETES MELLITUS WITH HYPERGLYCEMIA, WITH LONG-TERM CURRENT USE OF INSULIN (HCC): ICD-10-CM

## 2025-08-08 DIAGNOSIS — I25.119 CORONARY ARTERY DISEASE INVOLVING NATIVE CORONARY ARTERY OF NATIVE HEART WITH ANGINA PECTORIS: Primary | ICD-10-CM

## 2025-08-08 DIAGNOSIS — E78.5 HYPERLIPIDEMIA LDL GOAL <70: ICD-10-CM

## 2025-08-08 DIAGNOSIS — I50.22 CHRONIC HFREF (HEART FAILURE WITH REDUCED EJECTION FRACTION) (HCC): ICD-10-CM

## 2025-08-08 DIAGNOSIS — E03.9 HYPOTHYROIDISM, UNSPECIFIED TYPE: ICD-10-CM

## 2025-08-08 DIAGNOSIS — M48.062 LUMBAR STENOSIS WITH NEUROGENIC CLAUDICATION: ICD-10-CM

## 2025-08-08 DIAGNOSIS — D50.9 IRON DEFICIENCY ANEMIA, UNSPECIFIED IRON DEFICIENCY ANEMIA TYPE: ICD-10-CM

## 2025-08-08 DIAGNOSIS — I10 PRIMARY HYPERTENSION: ICD-10-CM

## 2025-08-08 PROCEDURE — 99214 OFFICE O/P EST MOD 30 MIN: CPT | Performed by: INTERNAL MEDICINE

## 2025-08-08 RX ORDER — OXYCODONE AND ACETAMINOPHEN 10; 325 MG/1; MG/1
1 TABLET ORAL 4 TIMES DAILY PRN
Qty: 15 TABLET | Refills: 0 | Status: SHIPPED | OUTPATIENT
Start: 2025-08-08

## 2025-08-15 ENCOUNTER — OFFICE VISIT (OUTPATIENT)
Facility: CLINIC | Age: 75
End: 2025-08-15

## 2025-08-15 VITALS
BODY MASS INDEX: 39.25 KG/M2 | HEART RATE: 83 BPM | SYSTOLIC BLOOD PRESSURE: 99 MMHG | HEIGHT: 69 IN | DIASTOLIC BLOOD PRESSURE: 67 MMHG | WEIGHT: 265 LBS

## 2025-08-15 DIAGNOSIS — I25.119 CORONARY ARTERY DISEASE INVOLVING NATIVE CORONARY ARTERY OF NATIVE HEART WITH ANGINA PECTORIS: ICD-10-CM

## 2025-08-15 DIAGNOSIS — E11.65 UNCONTROLLED TYPE 2 DIABETES MELLITUS WITH HYPERGLYCEMIA (HCC): Primary | ICD-10-CM

## 2025-08-15 DIAGNOSIS — E66.01 MORBID OBESITY WITH BMI OF 40.0-44.9, ADULT (HCC): ICD-10-CM

## 2025-08-15 DIAGNOSIS — R73.09 HIGH GLUCOSE LEVEL: ICD-10-CM

## 2025-08-15 DIAGNOSIS — E07.9 THYROID DISEASE: ICD-10-CM

## 2025-08-15 DIAGNOSIS — E03.9 HYPOTHYROIDISM, UNSPECIFIED TYPE: ICD-10-CM

## 2025-08-15 DIAGNOSIS — R73.9 HYPERGLYCEMIA: ICD-10-CM

## 2025-08-15 DIAGNOSIS — E11.65 HYPERGLYCEMIA DUE TO DIABETES MELLITUS (HCC): ICD-10-CM

## 2025-08-15 DIAGNOSIS — I10 HYPERTENSION, UNSPECIFIED TYPE: ICD-10-CM

## 2025-08-15 DIAGNOSIS — E11.9 TYPE 2 DIABETES MELLITUS WITHOUT RETINOPATHY (HCC): ICD-10-CM

## 2025-08-15 DIAGNOSIS — E78.5 HYPERLIPIDEMIA LDL GOAL <70: ICD-10-CM

## 2025-08-15 DIAGNOSIS — E55.9 VITAMIN D DEFICIENCY: ICD-10-CM

## 2025-08-15 DIAGNOSIS — E11.65 TYPE 2 DIABETES MELLITUS WITH HYPERGLYCEMIA, WITH LONG-TERM CURRENT USE OF INSULIN (HCC): ICD-10-CM

## 2025-08-15 DIAGNOSIS — I10 PRIMARY HYPERTENSION: ICD-10-CM

## 2025-08-15 DIAGNOSIS — Z79.4 TYPE 2 DIABETES MELLITUS WITH HYPERGLYCEMIA, WITH LONG-TERM CURRENT USE OF INSULIN (HCC): ICD-10-CM

## 2025-08-15 LAB
GLUCOSE BLOOD: 141
TEST STRIP LOT #: NORMAL NUMERIC

## 2025-08-15 PROCEDURE — 82947 ASSAY GLUCOSE BLOOD QUANT: CPT | Performed by: INTERNAL MEDICINE

## 2025-08-15 PROCEDURE — G2211 COMPLEX E/M VISIT ADD ON: HCPCS | Performed by: INTERNAL MEDICINE

## 2025-08-15 PROCEDURE — 99214 OFFICE O/P EST MOD 30 MIN: CPT | Performed by: INTERNAL MEDICINE

## 2025-08-15 RX ORDER — SEMAGLUTIDE 1.34 MG/ML
1 INJECTION, SOLUTION SUBCUTANEOUS WEEKLY
Qty: 9 ML | Refills: 1 | Status: SHIPPED | OUTPATIENT
Start: 2025-08-15

## (undated) DIAGNOSIS — Z79.4 TYPE 2 DIABETES MELLITUS WITH HYPERGLYCEMIA, WITH LONG-TERM CURRENT USE OF INSULIN (HCC): ICD-10-CM

## (undated) DIAGNOSIS — G47.00 INSOMNIA, UNSPECIFIED TYPE: ICD-10-CM

## (undated) DIAGNOSIS — I10 PRIMARY HYPERTENSION: ICD-10-CM

## (undated) DIAGNOSIS — N40.1 BENIGN PROSTATIC HYPERPLASIA WITH NOCTURIA: ICD-10-CM

## (undated) DIAGNOSIS — S62.501K: ICD-10-CM

## (undated) DIAGNOSIS — I50.42 CHRONIC COMBINED SYSTOLIC AND DIASTOLIC CHF, NYHA CLASS 3 (HCC): ICD-10-CM

## (undated) DIAGNOSIS — U07.1 ACUTE HYPOXEMIC RESPIRATORY FAILURE DUE TO COVID-19 (HCC): ICD-10-CM

## (undated) DIAGNOSIS — F32.A DEPRESSION, UNSPECIFIED DEPRESSION TYPE: ICD-10-CM

## (undated) DIAGNOSIS — E78.00 PURE HYPERCHOLESTEROLEMIA: ICD-10-CM

## (undated) DIAGNOSIS — E11.65 TYPE 2 DIABETES MELLITUS WITH HYPERGLYCEMIA, WITH LONG-TERM CURRENT USE OF INSULIN (HCC): ICD-10-CM

## (undated) DIAGNOSIS — J96.01 ACUTE HYPOXEMIC RESPIRATORY FAILURE DUE TO COVID-19 (HCC): ICD-10-CM

## (undated) DIAGNOSIS — U07.1 PNEUMONIA DUE TO COVID-19 VIRUS: ICD-10-CM

## (undated) DIAGNOSIS — R35.1 BENIGN PROSTATIC HYPERPLASIA WITH NOCTURIA: ICD-10-CM

## (undated) DIAGNOSIS — E03.9 HYPOTHYROIDISM, UNSPECIFIED TYPE: ICD-10-CM

## (undated) DIAGNOSIS — I25.10 CORONARY ARTERY DISEASE INVOLVING NATIVE CORONARY ARTERY OF NATIVE HEART WITHOUT ANGINA PECTORIS: ICD-10-CM

## (undated) DIAGNOSIS — E03.9 HYPOTHYROIDISM, UNSPECIFIED TYPE: Primary | ICD-10-CM

## (undated) DIAGNOSIS — E55.9 VITAMIN D DEFICIENCY: ICD-10-CM

## (undated) DIAGNOSIS — S62.501D CLOSED NONDISPLACED FRACTURE OF PHALANX OF RIGHT THUMB WITH ROUTINE HEALING, UNSPECIFIED PHALANX, SUBSEQUENT ENCOUNTER: ICD-10-CM

## (undated) DIAGNOSIS — Z86.711 HISTORY OF PULMONARY EMBOLISM: ICD-10-CM

## (undated) DIAGNOSIS — I26.99 PE (PULMONARY THROMBOEMBOLISM) (HCC): ICD-10-CM

## (undated) DIAGNOSIS — U07.1 COVID-19: ICD-10-CM

## (undated) DIAGNOSIS — S62.511D CLOSED DISPLACED FRACTURE OF PROXIMAL PHALANX OF RIGHT THUMB WITH ROUTINE HEALING: ICD-10-CM

## (undated) DIAGNOSIS — S62.501G: ICD-10-CM

## (undated) DIAGNOSIS — S62.514D CLOSED NONDISPLACED FRACTURE OF PROXIMAL PHALANX OF RIGHT THUMB WITH ROUTINE HEALING, SUBSEQUENT ENCOUNTER: ICD-10-CM

## (undated) DIAGNOSIS — J12.82 PNEUMONIA DUE TO COVID-19 VIRUS: ICD-10-CM

## (undated) DIAGNOSIS — I10 HYPERTENSION, UNSPECIFIED TYPE: ICD-10-CM

## (undated) DIAGNOSIS — I25.10 CORONARY ARTERY DISEASE INVOLVING NATIVE CORONARY ARTERY WITHOUT ANGINA PECTORIS, UNSPECIFIED WHETHER NATIVE OR TRANSPLANTED HEART: ICD-10-CM

## (undated) DIAGNOSIS — E66.01 MORBID OBESITY WITH BMI OF 40.0-44.9, ADULT (HCC): ICD-10-CM

## (undated) DEVICE — SOLO FLEX HYBRID GUIDEWIRE .03

## (undated) DEVICE — MEDI-VAC NON-CONDUCTIVE SUCTION TUBING: Brand: CARDINAL HEALTH

## (undated) DEVICE — MINOR GENERAL: Brand: MEDLINE INDUSTRIES, INC.

## (undated) DEVICE — BANDAGE COBAN 5X2 TAN LTX

## (undated) DEVICE — NITINOL STONE RETRIEVAL BASKET: Brand: ZERO TIP

## (undated) DEVICE — CYSTO PACK: Brand: MEDLINE INDUSTRIES, INC.

## (undated) DEVICE — CONTAINER SPEC STR 4OZ GRY LID

## (undated) DEVICE — URETERAL ACCESS SHEATH SET: Brand: NAVIGATOR HD

## (undated) DEVICE — OCCLUSIVE GAUZE STRIP,3% BISMUTH TRIBROMOPHENATE IN PETROLATUM BLEND: Brand: XEROFORM

## (undated) DEVICE — OPEN-END URETERAL CATHETER SOF-FLEX: Brand: SOF-FLEX

## (undated) DEVICE — Device

## (undated) DEVICE — STANDARD HYPODERMIC NEEDLE,POLYPROPYLENE HUB: Brand: MONOJECT

## (undated) DEVICE — TRAY SKIN PREP PVP-1

## (undated) DEVICE — SOL  .9 1000ML BTL

## (undated) DEVICE — 9534HP TRANSPARENT DRSG W/FRAME: Brand: 3M™ TEGADERM™

## (undated) DEVICE — 3M™ STERI-STRIP™ COMPOUND BENZOIN TINCTURE 40 BAGS/CARTON 4 CARTONS/CASE C1544: Brand: 3M™ STERI-STRIP™

## (undated) DEVICE — TOWEL OR BLU 16X26 STRL

## (undated) DEVICE — ENCORE® LATEX ACCLAIM SIZE 8, STERILE LATEX POWDER-FREE SURGICAL GLOVE: Brand: ENCORE

## (undated) DEVICE — GOWN SURG AERO BLUE PERF LG

## (undated) DEVICE — GAMMEX® PI HYBRID SIZE 6.5, STERILE POWDER-FREE SURGICAL GLOVE, POLYISOPRENE AND NEOPRENE BLEND: Brand: GAMMEX

## (undated) DEVICE — GAMMEX® PI HYBRID SIZE 7.5, STERILE POWDER-FREE SURGICAL GLOVE, POLYISOPRENE AND NEOPRENE BLEND: Brand: GAMMEX

## (undated) DEVICE — TIGERTAIL 5F FLXTIP 70CM

## (undated) DEVICE — ENDOSCOPIC VALVE WITH ADAPTER.: Brand: SURSEAL® II

## (undated) DEVICE — DILATOR/SHEATH SET: Brand: 8/10 DILATOR/SHEATH SET

## (undated) DEVICE — SUTURE CHROMIC 3-0 LIGAPAK L

## (undated) DEVICE — SUTURE CHROMIC GUT 4-0 SH

## (undated) DEVICE — ISOVUE 300 10X100ML VIAL

## (undated) DEVICE — SOL H2O 1000ML BTL

## (undated) DEVICE — UROLOGY DRAIN BAG

## (undated) DEVICE — SOL  .9 3000ML

## (undated) NOTE — LETTER
03/28/19            Dear Patient:    I am writing to you to remind you to schedule your annual diabetic eye exam.  Diabetes remains one of the leading causes of blindness in American adults.   Early diagnosis is important in preventing the progression to mo

## (undated) NOTE — ED AVS SNAPSHOT
Jose Antonio Kirsten. MRN: [id number]    Department:  [facility] Emergency Department   Date of Visit:  11/20/2018           Disclosure     Insurance plans vary and the physician(s) referred by the ER may not be covered by your plan.  Please contact within the next three months to obtain basic health screening including reassessment of your blood pressure.     IF THERE IS ANY CHANGE OR WORSENING OF YOUR CONDITION, CALL YOUR PRIMARY CARE PHYSICIAN AT ONCE OR RETURN IMMEDIATELY TO THE EMERGENCY DEPARTMEN

## (undated) NOTE — LETTER
Date & Time: 9/26/2023, 10:14 AM  Patient: Dannielle Rendon. Encounter Provider(s):    MD Bindu Palacios MD Junette Lisle, MD Ofilia Slipper, APRN         This certifies that Zeb Loera, a patient at an [address] facility, am leaving the facility voluntarily and against the advice of my physician. I acknowledge that I have been:    1. informed that my physician believes that I need to receive care here;  2. informed that if I leave, I could become sicker or even die; and  3. provided discharge instructions consistent with my current diagnosis. I hereby release my physician, the facility, and its employees from all responsibility for any ill effects which may result from this action. __________________________________  Patient or authorized caregiver signature    __________________________________  RN signature    If no patient or patient representative signature was obtained, sign below to acknowledge that the form was reviewed with the patient and that the patient refused to sign.     __________________________________  RN signature

## (undated) NOTE — LETTER
6/18/2021              [address] [facility] [address], [address] [address]         Dear Meredith Chambers,    [facility] records indicate that the tests ordered for you by Dorota Ventura MD  have not been done.   If you have, in

## (undated) NOTE — LETTER
Caleb Rausch  [address]  [place] [address]      Dear Caleb:    You had previously enrolled in our Chronic Care Management program and had been speaking with your Health .    Currently, we are graduating patients who have been maintaining health. After further review of your chart  we have noticed you've made great progress in managing your health, and I'm proud of the steps you've taken based on your recent check-ins and how well you're following your care plan.      If you have any questions or concerns, or if you find that you need additional support, please call your Primary Care Doctor.     It was a pleasure working with you,    [facility]  Care Management Department

## (undated) NOTE — LETTER
18      Anastacio Ochoa. :  3/23/1950      To Whom It May Concern: This patient was seen in our office on 18. He was prescribed aquatic therapy for his lower back and neck.     If this office may be of further assistance, please do not hesi

## (undated) NOTE — Clinical Note
Gibson Juarez, Pt will be coming in tomorrow 1/31/19 for TCM appt. Pt still having sciatic pain but no chest pains since d/c. Please see med issues in note regarding percocet-unable to fill as it is too soon since last fill on 1/15/19. Pt seeing [facility] Maria/MICHELLE

## (undated) NOTE — LETTER
10/4/2023              [address]        [place] [address]         Dear Cindy Patel,      It was a pleasure to see you at our [facility] [address] [facility] office. We have not been able to reach you by phone. Your TSH is high need to increase levothyroxine to 150 mcg , repeat tsh in 6 weeks a new prescription has been sent to your pharmacy and the future lab test has been ordered. There is no need for further testing at this time. I look forward to seeing you at your next scheduled appointment.            Yours Truly,    [facility] [address], [address] [facility] [place] [address] [phone]

## (undated) NOTE — LETTER
6/18/2021              [address] [place] [address], [address] [place] [address]         Dear Trey Gaines,    [address] [place] [address] records indicate that the tests ordered for you by Melida Mehta MD  have not been done.   If you have, in

## (undated) NOTE — ED AVS SNAPSHOT
Tellopaulina Thrashers. MRN: [id number]    Department:  [facility] Emergency Department   Date of Visit:  10/12/2017           Disclosure     Insurance plans vary and the physician(s) referred by the ER may not be covered by your plan.  Please contact CARE PHYSICIAN AT ONCE OR RETURN IMMEDIATELY TO THE EMERGENCY DEPARTMENT. If you have been prescribed any medication(s), please fill your prescription right away and begin taking the medication(s) as directed.   If you believe that any of the medications

## (undated) NOTE — LETTER
Hospital Discharge Documentation  Please phone to schedule a hospital follow up appointment.     From: [address] Hospitalist's Office  Phone: [phone]    Patient discharged time/date: 7/24/2018  1:31 PM  Patient discharge disposition:  Home or Self /85 (BP Location: Right arm)   Pulse 75   Temp 98.4 °F (36.9 °C) (Oral)   Resp 18   Ht 5' 9\" (1.753 m)   Wt 288 lb 9.6 oz (130.9 kg)   SpO2 96%   BMI 42.62 kg/m²      Gen:  NAD. A and O x  3  CV:   RRR.   No m/g/r  Pulm:   CTA bilat  Abd:   +bs, sof CHANGE how you take these medications      Instructions Prescription details   MetFORMIN HCl 500 MG Tabs  Commonly known as:  GLUCOPHAGE  What changed:  additional instructions      Take 1 tablet (500 mg total) by mouth daily with breakfast. Start Thursday Take 1 tablet (10 mEq total) by mouth every other day. Quantity:  15 tablet  Refills:  0     Rosuvastatin Calcium 20 MG Tabs  Commonly known as:  CRESTOR      Take 1 tablet (20 mg total) by mouth nightly.    Quantity:  90 tablet  Refills:  3     spironol 0-28   Low Risk. TCM Follow-Up Recommendation:  LACE > 58: High Risk of readmission after discharge from the hospital.      >35 minutes spent preparing this discharge.     Louis House  7/24/2018  4:34 PM[CL.1]     Electronically signed by Claudia Murrell

## (undated) NOTE — LETTER
4/1/2019              [address] [facility] [address] [id number]         Dear Caroline Deaner,    [facility] records indicate that the tests ordered for you by Evelia Barrientos MD  have not been done.   If you have, in fact, already complete

## (undated) NOTE — IP AVS SNAPSHOT
Patient Demographics     Address  [address]  [address] [facility] [address] Phone  [phone] [facility])  [phone] (Mobile) *Preferred*      Emergency Contact(s)     Name Relation Home Work Mobile    Larisa Crawford Daughter   [phone]      Al TID CC and HS. * Insulin Aspart Pen 100 UNIT/ML Sopn  Commonly known as: NOVOLOG  Inject 14 Units into the skin daily with dinner. * Insulin Aspart Pen 100 UNIT/ML Sopn  Commonly known as: NOVOLOG  Inject 14 Units into the skin daily with lunch. LYRICA  Take 1 capsule (300 mg total) by mouth 2 (two) times daily. What changed:   · when to take this  · reasons to take this     tamsulosin 0.4 MG Caps  Commonly known as: FLOMAX  Take 1 capsule (0.4 mg total) by mouth daily.   Start taking on: January Tabs  · oxyCODONE-acetaminophen  MG Tabs  · pantoprazole 40 MG Tbec  · Polyethylene Glycol 3350 17 g Pack  · pregabalin 300 MG Caps  · QUEtiapine 25 MG Tabs  · tamsulosin 0.4 MG Caps          Follow-up Information     Co, Shelby López MD In 2 weeks. Brenna Angel MD         dexamethasone 4 MG tablet  Commonly known as: DECADRON  Start taking on: January 22, 2022  Next dose due:  Tomorrow morning      Take 1 tablet (4 mg total) by mouth daily with breakfast. Please take 4 mg, 2 tabs, 1/22, 1/2 mouth every morning. Chau Perez MD         lisinopril 5 MG Tabs  Start taking on: January 22, 2022  Next dose due: Tomorrow morning      Take 1 tablet (5 mg total) by mouth daily.    Lisa Zarate MD         metoprolol succinate 50 MG Tb24  C (two) times daily. Ventolin  (90 Base) MCG/ACT Aers  Generic drug: albuterol      Inhale 2 puffs into the lungs every 6 (six) hours as needed for Wheezing.    Mary Angel MD               Where to Get Your Medications      Please  y tab 50 mg 01/21/22 0841 Given      [id number] oxyCODONE-acetaminophen (PERCOCET)  MG per tab 1 tablet 01/20/22 1840 Given      [id number] oxyCODONE-acetaminophen (PERCOCET)  MG per tab 1 tablet 01/21/22 0459 Given      [id number] oxyCODONE-acetami 01/21/2022 1600   Resp 18 Filed at 01/21/2022 1600   Temp 98.1 °F (36.7 °C) Filed at 01/21/2022 1600   SpO2 95 % Filed at 01/21/2022 1600      Patient's Most Recent Weight       Most Recent Value   Patient Weight 126.4 kg (278 lb 9.6 oz)      CPAP Settings hypothyroidism  Patient vaccinated x2 with Brennan Larsen few months ago with no booster yet  Patient started with symptoms of COVID-19 about 5-6 days ago with weakness and fatigue and low-grade fever and progressed over the last few days to shortness of breath an User      Tobacco comment: per pt, quit in 1994    Vaping Use      Vaping Use: Never used    Alcohol use: No      Comment: quit in 1994    Drug use: No    Allergies/Medications:    Allergies: No Known Allergies  clopidogrel 75 MG Oral Tab, Take 1 tablet (75 Negative for abdominal pain and abdominal distention. Musculoskeletal: Negative. Skin: Negative. Neurological: Negative for seizures. Hematological: Negative. Psychiatric/Behavioral: Negative for confusion and agitation.        Physical Exam: B12 351 06/29/2020     XR CHEST AP PORTABLE  (CPT=71045)    Result Date: 1/9/2022  CONCLUSION:  Severe viral pneumonia due to COVID-19. A preliminary report was issued by the [address] [place] [address] Radiology teleradiology service. There are no major discrepancies.   D Ruby Herndon MD (Physician)     Consult Orders    1. Consult to Endocrinology [[id number]] ordered by Ez Morton MD at 01/10/22 [address]    Report of Consultation    Alois Gitelman.  Patient Status:  Inpatient He quit after 50.00 years of use. He has quit using smokeless tobacco. He reports that he does not drink alcohol and does not use drugs.     Allergies:  No Known Allergies    Medications:    Current Facility-Administered Medications:   •  Insulin Aspart Pen % injection 50 mL, 50 mL, Intravenous, Q15 Min PRN **OR** glucose (DEX4) oral liquid 30 g, 30 g, Oral, Q15 Min PRN **OR** glucose-vitamin C (DEX-4) chewable tab 8 tablet, 8 tablet, Oral, Q15 Min PRN  •  remdesivir 100 mg in sodium chloride 0.9% 270 mL IVPB hyperglycemia   Endocrinology is consulted for inpatient DM management    PLAN:   Levemir 15 daily  novolog 4 TID with CF with meals  Accuchecks ac and hs  Hypoglycemia protocol    Thank you for the consult. We will follow.     Smita Samaniego MD      Elec Robert Pena MD Sonographer: Tika Mccrary  CC: Alexandrea Hwang CC: Juan Daniel Duque  ------------------------------------------------------------------- Study Conclusions 1. Left ventricle:  The cavity size was normal. Wall thickness was    increased inc normal in size. Mitral valve:   Normal thickened leaflets. Mobility was not restricted. Doppler: There was no evidence for stenosis. No regurgitation. Valve area by pressure half-time: 3.86cm^2.  Indexed valve area by pressure half-time: 1.52cm^2/m 1.1        0.6 - 1.0   LVOT                             Value        10/31/2020 Reference  LVOT ID, S                       2.4   cm     2.5        ---------   Aorta                            Value        10/31/2020 Reference  Aortic root ID on 1/8/22; acute hypoxemic resp failure       Problem List  Principal Problem:    Acute hypoxemic respiratory failure due to COVID-19 [facility])  Active Problems:    Type 2 diabetes mellitus with hyperglycemia, with long-term current use of insulin (HCC)    Pne +  Static Standing: Fair -  Dynamic Standing: Fair -    ACTIVITY TOLERANCE  Pulse: 74  Heart Rate Source: Monitor  Resp: 18  BP: 117/67  BP Location: Left arm  BP Method: Automatic  Patient Position: Sitting     O2 WALK  Oxygen Therapy  SPO2% on Room Air a Goal #2 Patient is able to demonstrate transfers Sit to/from Stand at assistance level: modified independent with walker - rolling      Goal #2  Current Status CGA with RW   Goal #3 Patient is able to ambulate 150 feet with assist device: walker - riley Type: Occupational Therapist    Filed: 1/20/2022  4:51 PM Date of Service: 1/20/2022  1:17 PM Status: Signed    : Thalia Miranda OT (Occupational Therapist)       OCCUPATIONAL THERAPY TREATMENT NOTE - INPATIENT        Room Number: 290/872-X    Pre training;Patient/Family education; Endurance training;Functional transfer training;ADL training;Energy conservation/work simplification techniques    SUBJECTIVE  \"I have to get better to I can go to [place] with my boy.  We have never been apart\"    OBJECT required min a for le dressing due to back pain          Goals  on: 22  Frequency: 3-5x week                  Video Swallow Study Notes    No notes of this type exist for this encounter.      SLP Notes    No notes of this type exist for this enco

## (undated) NOTE — Clinical Note
Spoke with pt today for TCM--made TCM appt for 1/03/2024 at [facility] you.   Future Appointments 1/3/2024   4:30 PM    Jacquelyn Williamson MD         [id number]            [place] [id number] 1/15/2024  11:00 AM   Darron Gaines NP          [facility]        [facility]

## (undated) NOTE — LETTER
May 10, 2023      No Recipients     Patient: Kenneth Aguilar. YOB: 1950   Date of Visit: 5/10/2023       Dear Dr. Gena Gaming Recipients: Thank you for referring Bob Ferreira to me for evaluation. Here is my assessment and plan of care: Kenneth Aguilar. is a 72year old male. HPI:     HPI    Pt has been a diabetic for 1 years       Pt's diabetes is currently controlled by pills   Pt checks BS daily   Pt's last blood sugar was 110 today   Last HA1C was 9.1 on 1/12/23  Endocrinologist: none      NP here for a diabetic eye exam. Pt states he was on glaucoma drops for 20 years. He thinks he used to be on Latanoprost both eyes at bedtime and Timolol both eyes BID. Pt states he moved here a few years ago and stopped drops about one year ago when he ran out of refills. Pt complains of a decrease in vision over the past few years. Pt states he only has central vision right eye. He is currently using OTC reading glasses only. POH: PC IOL both eyes in 2018 in [place]. Pt denies family history of glaucoma or macular degeneration.      Per Dr Corinne Lown and Dr Ibrahima Rowell edited by Popeye Robertson OT on 5/10/2023  2:07 PM.        Patient History:  Past Medical History:   Diagnosis Date    Anxiety disorder, unspecified 01/22/2022    Atherosclerosis of coronary artery     stents x 6    Chronic low back pain with bilateral sciatica, left worse than right 11/28/2017    Chronic obstructive pulmonary disease, unspecified ([address] 01/22/2022    Coronary atherosclerosis     Diabetes ([address]     borderline     Disorder of thyroid     Essential hypertension     Glaucoma 05/10/2023    first visit with IRMA, patient was taking Timolol OU BID for 20 years, has not used gtts in over 1 year because he ran out of gtts and had no refills, fundus photos taken today    Heart attack ([address]     High blood pressure     High cholesterol     Hyperlipidemia     Kidney stone 07/2020    Left Sided Neck pain, acute 11/28/2017    Lumbar stenosis with neurogenic claudication 05/31/2018    Major depressive disorder, recurrent, unspecified ([facility] 01/22/2022    Morbid obesity with BMI of 40.0-44.9, adult ([facility]     Pneumonia due to COVID-19 virus 01/09/2022    Thyroid disease        Surgical History: Harsh Gallegos has a past surgical history that includes other (cardiac stents); cath bare metal stent (bms); circumcision,othr (08/04/2020) (Dr. Obie Porras @ [facility]); cysto/uretero w/lithotripsy (Left, 08/04/2020) (Dr. Obie Porras @ 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 ); Cataract extraction, extracapsular w/ intraocular lens implant (Right, 2018) [facility]); and iridotomy/iridectomy by laser (unknown Dr). Family History   Problem Relation Age of Onset    Heart Attack Father     Hypertension Brother     Glaucoma Neg     Macular degeneration Neg        Social History:   Social History     Socioeconomic History    Marital status: Single   Tobacco Use    Smoking status: Former     Years: 50.00     Types: Cigarettes    Smokeless tobacco: Former    Tobacco comments:     per pt, quit in 1994   Vaping Use    Vaping Use: Never used   Substance and Sexual Activity    Alcohol use: No     Comment: quit in 1994    Drug use: No   Other Topics Concern    Caffeine Concern Yes    Exercise No   Social History Narrative    The patient uses the following assistive device(s):  single-point cane. The patient does live in a home with stairs. Medications:  Current Outpatient Medications   Medication Sig Dispense Refill    latanoprost 0.005 % Ophthalmic Solution Place 1 drop into both eyes nightly. Instill 1 drop by ophthalmic route every night into both eyes 3 each 3    Blood Glucose Monitoring Suppl Does not apply Kit Please use to check blood sugar twice daily.  1 kit 0    Blood Glucose Monitoring Suppl Does not apply Misc Please use to check blood sugar twice daily 200 each 3    Blood Glucose Monitoring Suppl Does not apply Misc Please use to check blood sugar twice daily 200 each 0    ENTRESTO 24-26 MG Oral Tab Take 1 tablet by mouth 2 (two) times daily. 180 tablet 0    empagliflozin 10 MG Oral Tab Take 1 tablet (10 mg total) by mouth daily. 90 tablet 0    levothyroxine 137 MCG Oral Tab Take 137 mcg by mouth before breakfast. 90 tablet 1    Potassium Chloride ER 20 MEQ Oral Tab CR Take 1 tablet by mouth daily. 90 tablet 1    torsemide 10 MG Oral Tab Take 1 tablet (10 mg total) by mouth daily. 90 tablet 1    atorvastatin 80 MG Oral Tab Take 1 tablet (80 mg total) by mouth daily. 90 tablet 0    carvedilol 12.5 MG Oral Tab Take 1 tablet (12.5 mg total) by mouth 2 (two) times daily with meals. 180 tablet 1    metFORMIN 1000 MG Oral Tab Take 0.5 tablets (500 mg total) by mouth 2 (two) times daily with meals. 180 tablet 1    enalapril 10 MG Oral Tab Take 0.5 tablets (5 mg total) by mouth 2 (two) times daily. 180 tablet 1    oxyCODONE-acetaminophen  MG Oral Tab Take 1 tablet by mouth 4 (four) times daily as needed. albuterol (PROAIR HFA) 108 (90 Base) MCG/ACT Inhalation Aero Soln Inhale 2 puffs into the lungs every 4 (four) hours as needed for Wheezing. 3 each 1    clopidogrel 75 MG Oral Tab Take 1 tablet (75 mg total) by mouth daily. 90 tablet 1    fluticasone-umeclidin-vilant (TRELEGY ELLIPTA) 014-69.2-26 MCG/INH Inhalation Aerosol Powder, Breath Activated Inhale 1 puff into the lungs daily. 1 each 0    ERGOCALCIFEROL 1.25 MG (84955 UT) Oral Cap TAKE ONE CAPSULE BY MOUTH ONCE A WEEK FOR 84 DAYS, THEN 1 CAPSULE EVERY 30 DAYS.(REPEAT LABS AFTER 12 WEEKS) 15 capsule 0    DULoxetine 30 MG Oral Cap DR Particles Take 1 capsule (30 mg total) by mouth daily. warfarin 5 MG Oral Tab Take 1 tablet (5 mg total) by mouth nightly.  Per patient:  Takes 2 tablets on Mondays and Tuesdays  Takes 1 and 1/2 tablets on Wednesdays, Thurs, Fri  Takes 1 tablet on Sat and Sun      pregabalin 300 MG Oral Cap Take 1 capsule (300 mg total) by mouth 2 (two) times daily. 30 capsule 0    warfarin 1 MG Oral Tab Take 9.5 tablets (9.5 mg total) by mouth every evening. Home health is checking your INR on 3/3/22 (Patient taking differently: Take 10 tablets (10 mg total) by mouth every evening. Dose adjustment per cardiology clinic.) 100 tablet 0    diclofenac 1 % External Gel       aspirin 81 MG Oral Tab EC Take 1 tablet (81 mg total) by mouth daily.  30 tablet 2       Allergies:  No Known Allergies    ROS:     ROS    Positive for: Endocrine, Eyes  Negative for: Constitutional, Gastrointestinal, Neurological, Skin, Genitourinary, Musculoskeletal, HENT, Cardiovascular, Respiratory, Psychiatric, Allergic/Imm, Heme/Lymph  Last edited by Popeye Robertson OT on 5/10/2023  1:43 PM.          PHYSICAL EXAM:     Base Eye Exam       Visual Acuity (Snellen - Linear)         Right Left    Dist sc 20/30 -2 20/200    Dist ph sc 20/20 20/40 -2    Near cc 20/20 20/70              Tonometry (Applanation, 2:20 PM)         Right Left    Pressure 18 18              Pupils         Pupils    Right PERRL    Left PERRL              Visual Fields         Left Right     Full     Restrictions  Total superior temporal, inferior temporal, superior nasal, inferior nasal deficiencies              Extraocular Movement         Right Left     Full, Ortho Full, Ortho              Dilation       Both eyes: 1.0% Mydriacyl and 2.5% Atul Synephrine @ 2:20 PM              Dilation #2       Both eyes: 1.0% Mydriacyl and 2.5% Atul Synephrine @ 2:24 PM                  Slit Lamp and Fundus Exam       Slit Lamp Exam         Right Left    Lids/Lashes Dermatochalasis, Meibomian gland dysfunction Dermatochalasis, Meibomian gland dysfunction, papilloma centrally LLL    Conjunctiva/Sclera Nasal/temp pinguecula Nasal/temp pinguecula    Cornea no krukenberg's spindle no krukenberg's spindle    Anterior Chamber Deep and quiet Deep and quiet    Iris chafed iris @ 9 o'clock, PI @ 9 o'clock, No transillumination defects PI @ 3 o'clock, No transillumination defects    Lens PCIOL with trace opacity nasally 2+ Nuclear sclerosis    Vitreous Vitreous floaters Clear              Fundus Exam         Right Left    Disc Sloping margin, Temporal crescent Good rim, Temporal crescent    C/D Ratio 0.9 0.6    Macula Normal-no BDR Normal-no BDR    Vessels Normal Normal    Periphery Normal Normal                  Refraction       Wearing Rx         Sphere Cylinder    Right +2.50 Sphere    Left +2.50 Sphere      Type: OTC reading only              Manifest Refraction (Auto)         Sphere Cylinder [place] [facility] [place]    Right -1.00 +0.75 035       Left -3.00 +1.50 175                 Manifest Refraction #2         Sphere Cylinder [place] [facility] [place]    Right -1.25 +0.75 040 20/20- +2.50 20/20    Left -3.00 +1.50 180 20/20- +2.50 20/20              Final Rx         Sphere Cylinder [place] [facility] [place]    Right -1.25 +0.75 040 20/20- +2.50 20/20    Left -3.00 +1.50 180 20/20- +2.50 20/20      Type: Progressive bifocal                     ASSESSMENT/PLAN:     Diagnoses and Plan:     Type 2 diabetes mellitus without retinopathy (HCC)  Discussed with patient that hemoglobin A1C of 9.1 is too high. To consider referral to endocrinologist in the future for glycemic control. Patient should discuss with their primary care physician. Glaucoma suspect of both eyes  Discussed with patient that he is a glaucoma suspect based on increased cupping of the optic nerves in both eyes. Retinal photos taken today to document optic nerves. Glaucoma diagnostic testing ordered. Will not start medication, but will continue to observe. Patient verbalized understanding.     Will start patient on Latanoprost in both eyes every night    Will see patient for next available visual field, OCT and pachy with no MD, then 1 month for a pressure check    Pseudophakia, right eye  No treatment    Age-related nuclear cataract of left eye  Discussed mild cataracts with patient. No treatment recommended at this time. New glasses Rx given today, recommend update      Orders Placed This Encounter      Fundus Photos - OU - Both Eyes      Frias Visual Field - OU - Both Eyes      OCT, Optic Nerve - OU - Both Eyes      Meds This Visit:  Requested Prescriptions     Signed Prescriptions Disp Refills    latanoprost 0.005 % Ophthalmic Solution 3 each 3     Sig: Place 1 drop into both eyes nightly. Instill 1 drop by ophthalmic route every night into both eyes        Follow up instructions:  Return for Next Avail VF, OCT and pachy with no MD, then 1 month IOP check. 5/10/2023  Scribed by: Cris Stern MD      If you have questions, please do not hesitate to call me. I look forward to following Caleb along with you.     Sincerely,        Cris Stern MD        CC:   No Recipients    Document electronically generated by: Cris Stern MD

## (undated) NOTE — ED AVS SNAPSHOT
Chelsey Whitt. MRN: [id number]    Department:  [facility] Emergency Department   Date of Visit:  10/10/2017           Disclosure     Insurance plans vary and the physician(s) referred by the ER may not be covered by your plan.  Please contact you If you have been prescribed any medication(s), please fill your prescription right away and begin taking the medication(s) as directed    If the emergency physician has read X-rays, these will be re-interpreted by a radiologist.  If there is a significant

## (undated) NOTE — LETTER
03/28/19    Kurt Stoddard. PCP: Alfred Paniagua MD  [address]  [address] [facility] [address] [facility] [place]      [id number]        Dear Esau Qiu,    Diabetes is a serious chronic disease that requires regular visits with your doctor to monitor your condition.  There

## (undated) NOTE — LETTER
AUTHORIZATION FOR SURGICAL OPERATION OR OTHER PROCEDURE    1.  I hereby authorize Dr. Ean Be and the [facility] staff assigned to my case to perform the following operation and/or procedure at the [facility]:    Bilateral upper lumbar paraspinal trig Patient signature:  ___________________________________________________             Relationship to Patient:           []  Parent    Responsible person                          []  Spouse  In case of minor or                    [] Other  _____________   In

## (undated) NOTE — LETTER
February 4, 2019    [address] [place] [id number]      Dear Nav Gilman: It was a pleasure speaking with you over the phone recently.  I wanted to send you some contact information for you to utilize when you have a question and o

## (undated) NOTE — ED AVS SNAPSHOT
Anastacio Ochoa. MRN: [id number]    Department:  [facility] Emergency Department   Date of Visit:  9/28/2017           Disclosure     Insurance plans vary and the physician(s) referred by the ER may not be covered by your plan.  Please contact your If you have been prescribed any medication(s), please fill your prescription right away and begin taking the medication(s) as directed    If the emergency physician has read X-rays, these will be re-interpreted by a radiologist.  If there is a significant

## (undated) NOTE — IP AVS SNAPSHOT
[facility]            (For Outpatient Use Only) Initial Admit Date: 1/8/2022   Inpt/Obs Admit Date: Inpt: 1/9/22 / Obs: N/A   Discharge Date:    Hospital Acct:  [de-identified]   MRN: [de-identified]   CSN: [id number]   CEID: [id number]        [facility] Subscriber:    Hospital Account Financial Class: Medicaid    January 21, 2022

## (undated) NOTE — LETTER
[facility] [address], [address]  Consent for Procedure/Sedation    Date: 8/22/17    Time: 1700      1.  I authorize the performance upon Zora Jimenez the following:cardiac catheterization, left ventricular cineangiography, bilat period, the physician will determine when the applicable recovery period ends for purposes of reinstating the Do Not Resuscitate (DNR) order.     Signature of Patient: ____________________________________________________    Signature of person authorized

## (undated) NOTE — LETTER
Hospital Discharge Documentation  Patient was discharged to [facility] [phone].     From: [address] Hospitalist's Office  Phone: [phone]    Patient discharged time/date: 1/21/2022  5:36 PM  Patient discharge disposition:  SNF able to strengthen and get his bearings before discharge as well. The Decadron wreaked havoc with his diabetes and Dr. Sarah Owens came to help assist us with its management.   The patient improved enough to be slowly weaned off of oxygen and now appeared st 2000-calorie ADA. ACTIVITY:  PT/OT. No heavy exercise, otherwise as tolerated. FOLLOWUP:  With Dr. Erich Ponce in 2 weeks, Dr. Erik Kumar in 2 weeks, Dr. Lorin Littlejohn in 2 weeks. Please call as soon as possible for after rehab discharge.   Dr. Lorin Littlejohn or her designate down.   20.   Quetiapine 12.5 mg every 6 hours as needed for hallucinations. Watch for severe muscle stiffness and fever. 21.   MiraLAX 17 g daily. 22.   Protonix 40 mg every morning. RISK OF READMISSION:  High. TCM followup recommended.        D

## (undated) NOTE — LETTER
Patient Name: Devan Gillespie        : 3/23/1950       Medical Record #: [id number]    CONSENT FOR PROCEDURES/SEDATION    Date: 2017       Time: 9:04 PM        1.  I authorize the performance upon Devan Gillespie the following:  Cardiac catheterization

## (undated) NOTE — LETTER
[address] [facility]     I agree to have a Peripherally Inserted Central Catheter (PICC) placed in my arm.    1. The PICC insertion procedure, care, maintenance, risks, benefits, and complications have been explained to me b the PICC, including risks, benefits, and side effects related to the alternatives and risks related to not receiving this procedure.     8.  I have expressed any questions about this procedure to my physician or the PICC Proceduralist and he/she has answere

## (undated) NOTE — LETTER
Dear Taras Alan:    IF YOU'RE 48 OR OLDER, GETTING A COLORECTAL CANCER SCREENING TEST COULD SAVE YOUR LIFE    Colorectal cancer is the second leading cancer killer in the [place]. Polyps and colorectal cancer does not always cause symptoms.   That's why sc

## (undated) NOTE — LETTER
7/5/2024              Caleb Rausch Jr.        [address] [facility], [address] [place] [id number]         Dear Caleb,    It has come to our attention that you are due for a follow up appointment with Dr. Ben Pérez.  As you know, regular medical attention is essential to maintaining your health.  Please contact the office at your earliest convenience to schedule.  We look forward to seeing you soon.      Sincerely,    Endocrinology Clinical Staff  Phone: [phone]  Fax: [phone]

## (undated) NOTE — LETTER
[address], [place]  Authorization for Invasive Procedures  1.  I hereby authorize Dr. Ashley Pena , my physician and whomever may be designated as the doctor's assistant, to perform the following operation and/or procedure:  Cardiac cat the event that I wish to have autologous transfusions of my own blood, or a directed donor transfusion, I will discuss this with my physician.      5. I consent to the photographing of the operations or procedures to be performed for the purposes of advanci Responsible person in case of minor or unconscious: _____________________________Relationship: ____________     Witness Signature: ____________________________________________ Date: __________ Time: ___________    Statement of Physician  My signature below

## (undated) NOTE — LETTER
Hospital Discharge Documentation  Please phone to schedule a hospital follow up appointment.     From: [address] Hospitalist's Office  Phone: [phone]    Patient discharged time/date: 11/3/2020  9:57 AM  Patient discharge disposition:  Home or Self BP: 110/70 117/86  109/76   BP Location: Right arm Right arm  Right arm   Pulse: 77 78     Resp: 16 20  20   Temp: 98.2 °F (36.8 °C) 98 °F (36.7 °C)  98.1 °F (36.7 °C)   TempSrc: Oral Oral  Oral   SpO2: 94% 95%  96%   Weight:   281 lb 11.2 oz (127.8 kg) .0 11/03/2020    CREATSERUM 0.95 11/03/2020    BUN 22 (H) 11/03/2020     11/03/2020    K 4.2 11/03/2020     11/03/2020    CO2 24.0 11/03/2020     (H) 11/03/2020    CA 9.5 11/03/2020    ALB 3.5 06/29/2020    ALKPHO 74 06/29/2020 Take 1 tablet (10 mg total) by mouth daily.    Quantity: 30 tablet  Refills: 2        CONTINUE taking these medications      Instructions Prescription details   Amoxicillin-Pot Clavulanate 875-125 MG Tabs  Commonly known as: AUGMENTIN      Take 1 tablet by Senna-Docusate Sodium 8.6-50 MG Tabs  Commonly known as: SENOKOT S      Take 2 tablets by mouth nightly as needed for constipation.    Quantity: 14 tablet  Refills: 0     spironolactone 25 MG Tabs  Commonly known as: ALDACTONE      Take 25 mg by mouth daily

## (undated) NOTE — LETTER
3/21/2019              [address] [facility] [id number]         Dear Rachael Ordaz,    [facility] records indicate that the tests ordered for you by Kristin Shepherd MD  have not been done.   If you have, in fact, already completed

## (undated) NOTE — LETTER
[address], [place]  Authorization for Invasive Procedures  1.  I hereby authorize          , my physician and whomever may be designated as the doctor's assistant, to perform the following operation and/or procedure: Cardiac c 4. Should the need arise during my operation or immediate post-operative period; I also consent to the administration of blood and/or blood products.  Further, I understand that despite careful testing and screening of blood and blood products, I may still 9. Patients having a sterilization procedure: I understand that if the procedure is successful the results will be permanent and it will therefore be impossible for me to inseminate, conceive or bear children.  I also understand that the procedure is intend

## (undated) NOTE — Clinical Note
Thank you for the consult. I saw Mr. Rausch the endocrine/diabetes clinic today. Please see attached my note. Please feel free to contact me with any questions. Thanks!

## (undated) NOTE — LETTER
[facility] [address], [address]  Consent for Procedure/Sedation    Date: August 23, 2017    Time: 2100      1.  I authorize the performance upon Mary Linder the following:cardiac catheterization, left ventricular cineangiograph period, the physician will determine when the applicable recovery period ends for purposes of reinstating the Do Not Resuscitate (DNR) order.     Signature of Patient: ____________________________________________________    Signature of person authorized

## (undated) NOTE — ED AVS SNAPSHOT
Edilberto Henriquez. MRN: [id number]    Department:  [facility] Emergency Department   Date of Visit:  8/25/2018           Disclosure     Insurance plans vary and the physician(s) referred by the ER may not be covered by your plan.  Please contact y within the next three months to obtain basic health screening including reassessment of your blood pressure.     IF THERE IS ANY CHANGE OR WORSENING OF YOUR CONDITION, CALL YOUR PRIMARY CARE PHYSICIAN AT ONCE OR RETURN IMMEDIATELY TO THE EMERGENCY DEPARTMEN

## (undated) NOTE — LETTER
[address] [facility], [address] [place] [address], [facility] [address]  [address] [place] [address] [phone]            November 28, 2017    The purpose of this Agreement is to prevent misunderstandings about certain medications you will be life, and how well medicine is helping to relieve pain.    _______ I will not use any illegal controlled substances, including marijuana, cocaine, etc., nor will I misuse or self-prescribe/medicate with legal controlled substances.   Use of alcohol will be

## (undated) NOTE — LETTER
[facility] [address], [address]  Consent for Procedure/Sedation    Date: 08/21/2017    Time:       1.  I authorize the performance upon Barbi Mancia the following:cardiac catheterization, left ventricular cineangiography, bilate period, the physician will determine when the applicable recovery period ends for purposes of reinstating the Do Not Resuscitate (DNR) order.     Signature of Patient: ____________________________________________________    Signature of person authorized

## (undated) NOTE — Clinical Note
Report given to floor RN Christiane Seat. Pt remains stable and without increasing complaints of discomfort or pain. Pain unchanged. Alert and oriented X4. No n/v at this time.  Pt recently seen by [facility] cardiologist Dr. Noe Whitlock test results endorsed to

## (undated) NOTE — LETTER
Hospital Discharge Documentation  Please phone to schedule a hospital follow up appointment.     From: [address] Hospitalist's Office  Phone: [phone]    Patient discharged time/date: 11/28/2018  3:53 PM  Patient discharge disposition:  Home or [facility] admission. His Lasix was held and he was given IV fluids. His creatinine improved to 1.04. He had no further recurrence of chest pain. He is medically stable discharge home.      Consultants     Provider Role Specialty    Maegan Dan MD Consultin Take 1 capsule (100 mg total) by mouth 2 (two) times daily. Quantity:  60 capsule  Refills:  0     HYDROcodone-acetaminophen  MG Tabs  Commonly known as:  NORCO      Take 1 tablet by mouth every 8 (eight) hours as needed for Pain.    Quantity:

## (undated) NOTE — LETTER
[facility] [address], [address]  Consent for Procedure/Sedation    Date: ***    Time: ***      1. I authorize the performance upon Donavon Granado the following:cardiac catheterization, left ventricular cineangiography, bilateral period, the physician will determine when the applicable recovery period ends for purposes of reinstating the Do Not Resuscitate (DNR) order.     Signature of Patient: ____________________________________________________    Signature of person authorized

## (undated) NOTE — Clinical Note
Transitional Care Management call completed. A Transitional Care Management appointment is scheduled for 12/11/2024. Thank you.

## (undated) NOTE — Clinical Note
Pt does not have HFU appt scheduled at this time. TE sent to triage regarding refill request for Vanduser as well as HFU appt. Thank you!